# Patient Record
Sex: FEMALE | Race: WHITE | NOT HISPANIC OR LATINO | Employment: OTHER | URBAN - METROPOLITAN AREA
[De-identification: names, ages, dates, MRNs, and addresses within clinical notes are randomized per-mention and may not be internally consistent; named-entity substitution may affect disease eponyms.]

---

## 2017-04-15 ENCOUNTER — HOSPITAL ENCOUNTER (EMERGENCY)
Facility: HOSPITAL | Age: 72
Discharge: HOME | End: 2017-04-15
Attending: EMERGENCY MEDICINE
Payer: MEDICARE

## 2017-04-15 VITALS
TEMPERATURE: 97 F | WEIGHT: 187 LBS | SYSTOLIC BLOOD PRESSURE: 120 MMHG | RESPIRATION RATE: 20 BRPM | OXYGEN SATURATION: 97 % | HEART RATE: 58 BPM | BODY MASS INDEX: 33.13 KG/M2 | DIASTOLIC BLOOD PRESSURE: 56 MMHG | HEIGHT: 63 IN

## 2017-04-15 DIAGNOSIS — L29.9 ITCHING: ICD-10-CM

## 2017-04-15 DIAGNOSIS — F22 EKBOM'S DELUSIONAL PARASITOSIS: Primary | ICD-10-CM

## 2017-04-15 DIAGNOSIS — R53.81 MALAISE: ICD-10-CM

## 2017-04-15 LAB
ALBUMIN SERPL BCP-MCNC: 3.7 G/DL
ALP SERPL-CCNC: 68 U/L
ALT SERPL W/O P-5'-P-CCNC: 19 U/L
ANION GAP SERPL CALC-SCNC: 12 MMOL/L
AST SERPL-CCNC: 21 U/L
BASOPHILS # BLD AUTO: 0.05 K/UL
BASOPHILS NFR BLD: 0.4 %
BILIRUB SERPL-MCNC: 0.4 MG/DL
BILIRUB UR QL STRIP: NEGATIVE
BUN SERPL-MCNC: 25 MG/DL
CALCIUM SERPL-MCNC: 9.7 MG/DL
CHLORIDE SERPL-SCNC: 101 MMOL/L
CLARITY UR: CLEAR
CO2 SERPL-SCNC: 27 MMOL/L
COLOR UR: YELLOW
CREAT SERPL-MCNC: 1.3 MG/DL
DIFFERENTIAL METHOD: ABNORMAL
EOSINOPHIL # BLD AUTO: 0.3 K/UL
EOSINOPHIL NFR BLD: 2.4 %
ERYTHROCYTE [DISTWIDTH] IN BLOOD BY AUTOMATED COUNT: 13 %
EST. GFR  (AFRICAN AMERICAN): 48 ML/MIN/1.73 M^2
EST. GFR  (NON AFRICAN AMERICAN): 41 ML/MIN/1.73 M^2
GLUCOSE SERPL-MCNC: 99 MG/DL
GLUCOSE UR QL STRIP: NEGATIVE
HCT VFR BLD AUTO: 38.7 %
HGB BLD-MCNC: 12.6 G/DL
HGB UR QL STRIP: NEGATIVE
KETONES UR QL STRIP: NEGATIVE
LEUKOCYTE ESTERASE UR QL STRIP: NEGATIVE
LYMPHOCYTES # BLD AUTO: 2.9 K/UL
LYMPHOCYTES NFR BLD: 23.7 %
MCH RBC QN AUTO: 29.6 PG
MCHC RBC AUTO-ENTMCNC: 32.6 %
MCV RBC AUTO: 91 FL
MONOCYTES # BLD AUTO: 1.6 K/UL
MONOCYTES NFR BLD: 13.2 %
NEUTROPHILS # BLD AUTO: 7.2 K/UL
NEUTROPHILS NFR BLD: 60.1 %
NITRITE UR QL STRIP: NEGATIVE
PH UR STRIP: 6 [PH] (ref 5–8)
PLATELET # BLD AUTO: 318 K/UL
PMV BLD AUTO: 9.7 FL
POTASSIUM SERPL-SCNC: 4.7 MMOL/L
PROT SERPL-MCNC: 7.4 G/DL
PROT UR QL STRIP: ABNORMAL
RBC # BLD AUTO: 4.26 M/UL
SODIUM SERPL-SCNC: 140 MMOL/L
SP GR UR STRIP: 1.02 (ref 1–1.03)
TROPONIN I SERPL DL<=0.01 NG/ML-MCNC: 0.01 NG/ML
URN SPEC COLLECT METH UR: ABNORMAL
UROBILINOGEN UR STRIP-ACNC: 1 EU/DL
WBC # BLD AUTO: 12.03 K/UL

## 2017-04-15 PROCEDURE — 85025 COMPLETE CBC W/AUTO DIFF WBC: CPT

## 2017-04-15 PROCEDURE — 63600175 PHARM REV CODE 636 W HCPCS: Performed by: EMERGENCY MEDICINE

## 2017-04-15 PROCEDURE — 93005 ELECTROCARDIOGRAM TRACING: CPT

## 2017-04-15 PROCEDURE — 80053 COMPREHEN METABOLIC PANEL: CPT

## 2017-04-15 PROCEDURE — 99284 EMERGENCY DEPT VISIT MOD MDM: CPT | Mod: 25

## 2017-04-15 PROCEDURE — 84484 ASSAY OF TROPONIN QUANT: CPT

## 2017-04-15 PROCEDURE — 96374 THER/PROPH/DIAG INJ IV PUSH: CPT

## 2017-04-15 PROCEDURE — 81003 URINALYSIS AUTO W/O SCOPE: CPT

## 2017-04-15 PROCEDURE — 93010 ELECTROCARDIOGRAM REPORT: CPT | Mod: ,,, | Performed by: INTERNAL MEDICINE

## 2017-04-15 RX ORDER — PERMETHRIN 50 MG/G
CREAM TOPICAL
Qty: 60 G | Refills: 1 | Status: SHIPPED | OUTPATIENT
Start: 2017-04-15 | End: 2021-05-22 | Stop reason: ALTCHOICE

## 2017-04-15 RX ORDER — DIPHENHYDRAMINE HYDROCHLORIDE 50 MG/ML
12.5 INJECTION INTRAMUSCULAR; INTRAVENOUS
Status: COMPLETED | OUTPATIENT
Start: 2017-04-15 | End: 2017-04-15

## 2017-04-15 RX ORDER — HYDROXYZINE HYDROCHLORIDE 25 MG/1
25 TABLET, FILM COATED ORAL EVERY 6 HOURS
Qty: 12 TABLET | Refills: 0 | Status: SHIPPED | OUTPATIENT
Start: 2017-04-15 | End: 2021-05-22 | Stop reason: ALTCHOICE

## 2017-04-15 RX ADMIN — DIPHENHYDRAMINE HYDROCHLORIDE 12.5 MG: 50 INJECTION, SOLUTION INTRAMUSCULAR; INTRAVENOUS at 01:04

## 2017-04-15 NOTE — ED NOTES
Pt presents to ed with c/o a bodily insect infestation xseveral months. States that she constantly has bugs crawling on her and is not able to get rid of them. Reports that small, white maggots were found crawling out of a self-inflicted scratching wound on face. Patient brought samples of the insects, and while there appears to be perhaps 1-2 small insects, the rest have the appearance of small pieces of rolled-up tissue. Pt is aaox4, neurologically intact. No psychiatric history on file. Family member at bedside. Will continue to monitor.

## 2017-04-15 NOTE — ED AVS SNAPSHOT
[facility] [address]  [place] [address]               Enedelia García   4/15/2017 12:16 AM   ED    Description:  Female : 1945   Department:  [facility]           Your Care was Coordinated By:     Provider Role From To    Samuel Pantoja MD Attending Provider 04/15/17 0025 --      Reason for Visit     Head Lice           Diagnoses this Visit        Comments    Ekbom's delusional parasitosis    -  Primary     Malaise         Formication of skin         Itching           ED Disposition     None           To Do List           Follow-up Information     Follow up with John Gaytan MD In 2 days.    Specialty:  Family Medicine    Contact information:    [address]  [facility] [address]  [phone]         These Medications        Disp Refills Start End    hydrOXYzine HCl (ATARAX) 25 MG tablet 12 tablet 0 4/15/2017     Take 1 tablet (25 mg total) by mouth every 6 (six) hours. - Oral    Pharmacy: [facility] - [address] Ph #: [phone]       permethrin (ELIMITE) 5 % cream 60 g 1 4/15/2017     Apply to affected area once    Pharmacy: [facility] [facility] [address] Ph #: [phone]         [facility]     [facility] Nurse Care [facility] - 24/7 Assistance  Unless otherwise directed by your provider, please contact [facility], our nurse care line that is available for 24/7 assistance.     Registered nurses in the [facility] provide: appointment scheduling, clinical advisement, health education, and other advisory services.  Call: [phone] (toll free)               Medications           Message regarding Medications     Verify the changes and/or additions to your medication regime listed below are the same as discussed with your clinician today.  If any of these changes or additions are incorrect, please  notify your healthcare provider.        START taking these NEW medications        Refills    hydrOXYzine HCl (ATARAX) 25 MG tablet 0    Sig: Take 1 tablet (25 mg total) by mouth every 6 (six) hours.    Class: Print    Route: Oral    permethrin (ELIMITE) 5 % cream 1    Sig: Apply to affected area once    Class: Print      These medications were administered today        Dose Freq    diphenhydrAMINE injection 12.5 mg 12.5 mg ED 1 Time    Sig: Inject 0.25 mLs (12.5 mg total) into the vein ED 1 Time.    Class: Normal    Route: Intravenous           Verify that the below list of medications is an accurate representation of the medications you are currently taking.  If none reported, the list may be blank. If incorrect, please contact your healthcare provider. Carry this list with you in case of emergency.           Current Medications     aspirin (ECOTRIN) 81 MG EC tablet Take 81 mg by mouth once daily.    atenolol (TENORMIN) 25 MG tablet Take 25 mg by mouth once daily.    duloxetine (CYMBALTA) 30 MG capsule Take 30 mg by mouth once daily.    fenofibrate (TRICOR) 54 MG tablet Take 54 mg by mouth once daily.    furosemide (LASIX) 40 MG tablet Take 40 mg by mouth 2 (two) times daily.    gabapentin (NEURONTIN) 600 MG tablet Take 600 mg by mouth 3 (three) times daily.    hydrOXYzine HCl (ATARAX) 25 MG tablet Take 1 tablet (25 mg total) by mouth every 6 (six) hours.    lorazepam (ATIVAN) 1 MG tablet Take 1 mg by mouth every 6 (six) hours as needed.    morphine (MS CONTIN) 15 MG 12 hr tablet Take 15 mg by mouth 2 (two) times daily.    omeprazole (PRILOSEC) 40 MG capsule Take 40 mg by mouth once daily.    permethrin (ELIMITE) 5 % cream Apply to affected area once    rosuvastatin (CRESTOR) 10 MG tablet Take 10 mg by mouth once daily.    tizanidine (ZANAFLEX) 2 MG tablet Take 4 mg by mouth every 6 (six) hours as needed.    valsartan (DIOVAN) 40 MG tablet Take 40 mg by mouth once daily.           Clinical Reference Information     "       Your Vitals Were     BP Pulse Temp Resp Height Weight    118/58 58 97.4 °F (36.3 °C) (Oral) 20 5' 3" (1.6 m) 84.8 kg (187 lb)    SpO2 BMI             93% 33.13 kg/m2         Allergies as of 4/15/2017        Reactions    Iodinated Contrast Media - Oral And Iv Dye Anaphylaxis      Immunizations Administered on Date of Encounter - 4/15/2017     None      ED Micro, Lab, POCT     Start Ordered       Status Ordering Provider    04/15/17 0100 04/15/17 0059  Troponin I  STAT      In process     04/15/17 0059 04/15/17 0059  CBC auto differential  STAT      Final result     04/15/17 0059 04/15/17 0059  Comprehensive metabolic panel  STAT      In process     04/15/17 0059 04/15/17 0059  Urinalysis  STAT      Acknowledged       ED Imaging Orders     None      Discharge References/Attachments     SCABIES (ENGLISH)      [facility] Sign-Up     Activating your [facility] account is as easy as 1-2-3!     1) Visit [email or web address], select Sign Up Now, enter this activation code and your date of birth, then select Next.  [email or web address]  Expires: 5/30/2017  1:21 AM      2) Create a username and password to use when you visit [facility] in the future and select a security question in case you lose your password and select Next.    3) Enter your e-mail address and click Sign Up!    Additional Information  If you have questions, please e-mail [email or web address] or call [phone] to talk to our [facility] staff. Remember, [facility] is NOT to be used for urgent needs. For medical emergencies, dial 911.          [facility] complies with applicable Federal civil rights laws and does not discriminate on the basis of race, color, national origin, age, disability, or sex.        Language Assistance Services     ATTENTION: Language assistance services are available, free of charge. Please call [phone].      ATENCIÓN: Si habla español, tiene a webb disposición servicios gratuitos de asistencia lingüística. Llame " al [phone].     [place] Ý: N?u b?n nói Ti?ng Vi?t, có các d?ch v? h? tr? ngôn ng? mi?n phí dành cho b?n. G?i s? [phone].

## 2017-04-15 NOTE — ED PROVIDER NOTES
"Encounter Date: 4/15/2017       History     Chief Complaint   Patient presents with    Head Lice     States that she has had bugs crawling all over her for the past two months and seem to be living under her skin. Claims that "maggots" have been crawling out of an abrasion on her face.      Review of patient's allergies indicates:   Allergen Reactions    Iodinated contrast media - oral and iv dye Anaphylaxis     HPI Comments: Pt comes to the ED with a 2 month history of feeling of bugs crawling in her hair and on her skin. She was given permethrin by her PCP without improvement. Her boy friend who lives with her has no symptoms.   Pt has several empty pill bottles which she claims contains bugs that she has removed.     The history is provided by the patient and a friend.     Past Medical History:   Diagnosis Date    Coronary artery disease     Epilepsy     Hyperlipidemia     Hypertension      Past Surgical History:   Procedure Laterality Date    APPENDECTOMY      CORONARY STENT PLACEMENT      HYSTERECTOMY      TONSILLECTOMY       Family History   Problem Relation Age of Onset    Heart disease Mother     Heart disease Father      Social History   Substance Use Topics    Smoking status: Never Smoker    Smokeless tobacco: Not on file    Alcohol use No     Review of Systems   Constitutional: Negative for fatigue and fever.   HENT: Negative for congestion and sore throat.    Respiratory: Negative for cough, shortness of breath, wheezing and stridor.    Cardiovascular: Negative for chest pain, palpitations and leg swelling.   Gastrointestinal: Negative for abdominal distention, abdominal pain, constipation, nausea and vomiting.   Genitourinary: Negative for difficulty urinating and dysuria.   Musculoskeletal: Negative for back pain.   Skin: Positive for rash.   Neurological: Negative for weakness.   Hematological: Does not bruise/bleed easily.   All other systems reviewed and are negative.      Physical " Exam   Initial Vitals   BP Pulse Resp Temp SpO2   04/15/17 0018 04/15/17 0018 04/15/17 0018 04/15/17 0018 04/15/17 0018   118/58 58 20 97.4 °F (36.3 °C) 93 %     Physical Exam    Nursing note and vitals reviewed.  Constitutional: Vital signs are normal. She appears well-developed and well-nourished. She is not diaphoretic. No distress.   HENT:   Head: Normocephalic and atraumatic.   Eyes: Conjunctivae and EOM are normal. Pupils are equal, round, and reactive to light.   Neck: Normal range of motion. Neck supple.   Cardiovascular: Normal rate, regular rhythm and normal heart sounds.   Pulmonary/Chest: Breath sounds normal. No respiratory distress. She has no wheezes. She has no rhonchi. She has no rales.   Abdominal: Soft. She exhibits no distension. There is no tenderness. There is no rebound and no guarding.   Musculoskeletal: Normal range of motion. She exhibits no edema or tenderness.   Neurological: She is alert and oriented to person, place, and time.   Skin: Skin is warm and dry.   Multiple linear erosions in skin. No visible evidence of lice, mites or any insects.    Psychiatric: She has a normal mood and affect.         ED Course   Procedures  Labs Reviewed   CBC W/ AUTO DIFFERENTIAL   COMPREHENSIVE METABOLIC PANEL   URINALYSIS   TROPONIN I                               ED Course     Clinical Impression:   The primary encounter diagnosis was Ekbom's delusional parasitosis. Diagnoses of Malaise, Formication of skin, and Itching were also pertinent to this visit.          Samuel Pantoja MD  04/15/17 0122

## 2017-12-21 ENCOUNTER — HOSPITAL ENCOUNTER (EMERGENCY)
Facility: HOSPITAL | Age: 72
Discharge: PSYCHIATRIC OR SUBSTANCE USE FACILITY | End: 2017-12-21
Attending: EMERGENCY MEDICINE
Payer: MEDICARE

## 2017-12-21 VITALS
DIASTOLIC BLOOD PRESSURE: 66 MMHG | OXYGEN SATURATION: 97 % | SYSTOLIC BLOOD PRESSURE: 138 MMHG | HEIGHT: 64 IN | WEIGHT: 190 LBS | BODY MASS INDEX: 32.44 KG/M2 | HEART RATE: 72 BPM | TEMPERATURE: 98 F | RESPIRATION RATE: 18 BRPM

## 2017-12-21 DIAGNOSIS — F33.3 MDD (MAJOR DEPRESSIVE DISORDER), RECURRENT, SEVERE, WITH PSYCHOSIS: ICD-10-CM

## 2017-12-21 DIAGNOSIS — F22 EKBOM'S DELUSIONAL PARASITOSIS: Primary | ICD-10-CM

## 2017-12-21 LAB
ALBUMIN SERPL BCP-MCNC: 3.6 G/DL
ALP SERPL-CCNC: 71 U/L
ALT SERPL W/O P-5'-P-CCNC: 40 U/L
AMPHET+METHAMPHET UR QL: NEGATIVE
ANION GAP SERPL CALC-SCNC: 11 MMOL/L
APAP SERPL-MCNC: <3 UG/ML
AST SERPL-CCNC: 52 U/L
BARBITURATES UR QL SCN>200 NG/ML: NEGATIVE
BASOPHILS # BLD AUTO: 0 K/UL
BASOPHILS NFR BLD: 0 %
BENZODIAZ UR QL SCN>200 NG/ML: NEGATIVE
BILIRUB SERPL-MCNC: 0.3 MG/DL
BILIRUB UR QL STRIP: ABNORMAL
BUN SERPL-MCNC: 41 MG/DL
BZE UR QL SCN: NEGATIVE
CALCIUM SERPL-MCNC: 10.1 MG/DL
CANNABINOIDS UR QL SCN: NEGATIVE
CHLORIDE SERPL-SCNC: 103 MMOL/L
CLARITY UR: CLEAR
CO2 SERPL-SCNC: 26 MMOL/L
COLOR UR: YELLOW
CREAT SERPL-MCNC: 1.9 MG/DL
CREAT UR-MCNC: 186.6 MG/DL
DIFFERENTIAL METHOD: ABNORMAL
EOSINOPHIL # BLD AUTO: 0 K/UL
EOSINOPHIL NFR BLD: 0 %
ERYTHROCYTE [DISTWIDTH] IN BLOOD BY AUTOMATED COUNT: 13.2 %
EST. GFR  (AFRICAN AMERICAN): 30 ML/MIN/1.73 M^2
EST. GFR  (NON AFRICAN AMERICAN): 26 ML/MIN/1.73 M^2
ETHANOL SERPL-MCNC: <10 MG/DL
GLUCOSE SERPL-MCNC: 96 MG/DL
GLUCOSE UR QL STRIP: NEGATIVE
HCT VFR BLD AUTO: 34.5 %
HGB BLD-MCNC: 11.3 G/DL
HGB UR QL STRIP: NEGATIVE
KETONES UR QL STRIP: NEGATIVE
LEUKOCYTE ESTERASE UR QL STRIP: NEGATIVE
LYMPHOCYTES # BLD AUTO: 1.8 K/UL
LYMPHOCYTES NFR BLD: 10.7 %
MCH RBC QN AUTO: 29.7 PG
MCHC RBC AUTO-ENTMCNC: 32.8 G/DL
MCV RBC AUTO: 91 FL
METHADONE UR QL SCN>300 NG/ML: NEGATIVE
MONOCYTES # BLD AUTO: 1.5 K/UL
MONOCYTES NFR BLD: 9 %
NEUTROPHILS # BLD AUTO: 13.3 K/UL
NEUTROPHILS NFR BLD: 80 %
NITRITE UR QL STRIP: NEGATIVE
OPIATES UR QL SCN: NORMAL
PCP UR QL SCN>25 NG/ML: NEGATIVE
PH UR STRIP: 5 [PH] (ref 5–8)
PLATELET # BLD AUTO: 332 K/UL
PMV BLD AUTO: 9.3 FL
POTASSIUM SERPL-SCNC: 4.5 MMOL/L
PROT SERPL-MCNC: 7.6 G/DL
PROT UR QL STRIP: ABNORMAL
RBC # BLD AUTO: 3.81 M/UL
SODIUM SERPL-SCNC: 140 MMOL/L
SP GR UR STRIP: >=1.03 (ref 1–1.03)
TOXICOLOGY INFORMATION: NORMAL
TSH SERPL DL<=0.005 MIU/L-ACNC: 0.44 UIU/ML
URN SPEC COLLECT METH UR: ABNORMAL
UROBILINOGEN UR STRIP-ACNC: 1 EU/DL
WBC # BLD AUTO: 16.59 K/UL

## 2017-12-21 PROCEDURE — 99285 EMERGENCY DEPT VISIT HI MDM: CPT | Mod: 25

## 2017-12-21 PROCEDURE — 81003 URINALYSIS AUTO W/O SCOPE: CPT

## 2017-12-21 PROCEDURE — G0426 INPT/ED TELECONSULT50: HCPCS | Mod: GT,,, | Performed by: PSYCHIATRY & NEUROLOGY

## 2017-12-21 PROCEDURE — 80320 DRUG SCREEN QUANTALCOHOLS: CPT

## 2017-12-21 PROCEDURE — 85025 COMPLETE CBC W/AUTO DIFF WBC: CPT

## 2017-12-21 PROCEDURE — 80307 DRUG TEST PRSMV CHEM ANLYZR: CPT

## 2017-12-21 PROCEDURE — 25000003 PHARM REV CODE 250: Performed by: EMERGENCY MEDICINE

## 2017-12-21 PROCEDURE — 80329 ANALGESICS NON-OPIOID 1 OR 2: CPT

## 2017-12-21 PROCEDURE — 80053 COMPREHEN METABOLIC PANEL: CPT

## 2017-12-21 PROCEDURE — 84443 ASSAY THYROID STIM HORMONE: CPT

## 2017-12-21 RX ORDER — SULFAMETHOXAZOLE AND TRIMETHOPRIM 800; 160 MG/1; MG/1
1 TABLET ORAL 2 TIMES DAILY
COMMUNITY
End: 2020-01-15

## 2017-12-21 RX ORDER — QUETIAPINE FUMARATE 25 MG/1
50 TABLET, FILM COATED ORAL
Status: COMPLETED | OUTPATIENT
Start: 2017-12-21 | End: 2017-12-21

## 2017-12-21 RX ADMIN — QUETIAPINE FUMARATE 50 MG: 25 TABLET, FILM COATED ORAL at 03:12

## 2017-12-21 NOTE — ED NOTES
"To ER with small superficial sore to right arm.  Pt states that she has a "bug" that looks like a "bone" coming out of it for several months.  Denies pain.  Request x-ray in order to see worm.  "

## 2017-12-21 NOTE — ED NOTES
Awake and alert, resp even and unlabored.  No distress or c/o at this time.  Sitter at the bedside.

## 2017-12-21 NOTE — ED NOTES
Pt has been accepted to [facility] by Dr. Vences. Will request transport. Report given to [facility] ([phone]).

## 2017-12-21 NOTE — CONSULTS
"Tele-Consultation to Emergency Department from Psychiatry    Please see previous notes:    Patient agreeable to consultation via telepsychiatry.    Consultation started: 12/21/2017 at 1:30 AM  The chief complaint leading to psychiatric consultation is: " Bugs are in her body "  This consultation was requested by Bennett Souza MD, the Emergency Department attending physician.  The location of the consulting psychiatrist is [place].  The patient location is [facility].  The patient arrived at the ED at: 12:28 AM    Also present with the patient at the time of the consultation: Patient    Patient Identification:  Enedelia García is a 72 y.o. female.    Patient information was obtained from patient, past medical records and ER Physician.  Patient presented involuntarily to the Emergency Department  by her daughter    History of Present Illness:  This is a 72 years old CM with a possible history of depression who was brought into ER for having delusional thoughts that bugs are stuck in her body with severe agitation and irritability.    Upon evaluation : She says she has been suffering a lot for the past one year. Says she has bugs and little creatures harboring in her body and it hurts badly . She says they are moving in her ears . She says nobody believes her . She says she has been seen by different dermatologist and no one has given her any explanation for her issues. She says they think " I am crazy" . She says "these creatures camouflage underneath the scab . Says she has had 20 of them few days ago and she put them on the piece of paper. She says they only move when she touches them . She says they have wings  but they don't fly , they are in different shapes and sizes , some of them are triangular" . She says sometimes they calm down but they never leave her body and they dorm inside of her. Says they have gotten worse for the past 2 months . Says " They ruin her life ,she is not able to enjoy her life, " "says lost her job one year ago bec of all this ". Says her daughter saw them crawling . She says all this making her frustrated , agitated and depressed.She says she was very happy bubbly person and used to put make-up on her and dressed up but not anymore since this has been happening for the past one year.     She admits to feeling depressed and started crying while talking about these issues . Says  she wants to feel normal again. Says " I am intelligent woman, these little creatures  destroy my body  and dermatologists just give me a lotion to put it on, and they say I have to see a real doctor, I want to feel myself again I want to be happy , joyful and gracious woman that I was before. " Reports not sleeping well ,wakes up every 2 hrs ,  not eating well as well , feels helpless, denies to SI or HI or AVH.     Says her cat  recently and she misses her a lot.    Psychiatric History:   Hospitalization: No  Medication Trials: Yes  Suicide Attempts: no  Violence: No  Depression: Yes  Monika: no  AH's: No  Delusions: Yes    Review of Systems:  Negative except depression , not sleeping , delusional thoughts    Past Medical History:   Past Medical History:   Diagnosis Date    Coronary artery disease     Epilepsy     Hyperlipidemia     Hypertension         Seizures: H/o Seizure d/o  Head trauma/l.o.c.: No  Wish to become pregnant[if female of childbearing age]: NA    Allergies: Listed below  Review of patient's allergies indicates:   Allergen Reactions    Iodinated contrast- oral and iv dye Anaphylaxis       Medications in ER: Medications - No data to display    Medications at home: Cymbalta    Substance Abuse History:   Alchohol: Denies  Drug: Denies     Legal History:   Past charges/incarcerations: No  Pending charges: No    Family Psychiatric History: Unknown    Social History:   History of Physical/Sexual Abuse: NA  Education: HS    Employment/Disability: 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 , 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   Financial: Yes  Children: Daughter   Housing Status: Lives by herself  Sikhism: NA   History: NA   Recreational Activities: Not able to enjoy  Access to Gun: No     Current Evaluation:     Constitutional  Vitals:  Vitals:    12/21/17 0017   BP: (!) 141/63   Pulse: 73   Resp: 20   Temp: 98 °F (36.7 °C)   TempSrc: Oral   SpO2: 96%   Weight: 86.2 kg (190 lb)   Height: 5' 4" (1.626 m)      General:  unremarkable, age appropriate     Musculoskeletal  Muscle Strength/Tone:   moving arms normally   Gait & Station:   sitting on stretcher     Psychiatric  Level of Consciousness: alert  Orientation: oriented to person, place and time  Grooming: in hospital gown  Psychomotor Behavior: no agitation  Speech: normal in rate, rhythm and volume  Language: uses words appropriately  Mood: Depressed  Affect: Restricted  Thought Process: Disorg  Associations: Intact  Thought Content: No AVH no SI or HI + Delusions  Memory: Intact  Attention: intact to interview  Fund of Knowledge: appears adequate  Insight: Poor   Judgement: Poor    Relevant Elements of Neurological Exam: no abnormality of posture noted    Assessment - Diagnosis - Goals:     Diagnosis/Impression: MDD REC severe with Psychosis    Rec: PEC d/t GD ,start her on Prozac 10 mg po QAM for depression and Seroquel 50 mg po QHS for psychosis and sleep  , discont. Cymbalta, needs to be hospitalized in Inpatient Psych Unit for stabilization.    Time with patient: 15 minutes    More than 50% of the time was spent counseling/coordinating care    Laboratory Data: Labs Reviewed - No data to display    Thanks Bennett Souza MD for the consult.    Consulting clinician was informed of the encounter and consult note.    Consultation ended: 12/21/2017 at  2:30 AM  "

## 2017-12-21 NOTE — ED PROVIDER NOTES
Encounter Date: 12/21/2017    SCRIBE #1 NOTE: I, Reneemel Lee, am scribing for, and in the presence of, Dr. Souza.       History     Chief Complaint   Patient presents with    foreign body in right arm     pt to triage ambulatory and reports may have a bug stuck in her right forearm and is picking at her skin; pt reports this has been happening for a year and everyone thinks i am crazy; pt has used elimite in the last 3 months; pt has another hospital bracelet on from [facility] for chest pain and uti and taking bactrim     Time seen by provider: 12:28 AM    This is a 72 y.o. female who presents with complaint of small white fibers and bugs boring through her skin onset a year ago. The patient associates pain to affected areas. The patient says she's seen multiple dermatologists over the course of the past year and has been diagnosed with delusional parasitosis. The patient states the symptoms started shortly after she lost her job. She endorses depression for a number of issues in her life. She has become fixated on this issue and demands an xray or ultrasound to evaluate for the bugs she thinks are under her skin. She states some of them have teeth and she can see their head sticking out of her skin.  She also has been keeping prescription bottles with what she thinks are the bugs and fibers she has pulled from her skin.      The history is provided by the patient.     Review of patient's allergies indicates:   Allergen Reactions    Iodinated contrast- oral and iv dye Anaphylaxis     Past Medical History:   Diagnosis Date    Coronary artery disease     Epilepsy     Hyperlipidemia     Hypertension      Past Surgical History:   Procedure Laterality Date    APPENDECTOMY      BACK SURGERY      CORONARY STENT PLACEMENT      HYSTERECTOMY      TONSILLECTOMY       Family History   Problem Relation Age of Onset    Heart disease Mother     Heart disease Father      Social History   Substance Use Topics     Smoking status: Never Smoker    Smokeless tobacco: Not on file    Alcohol use No     Review of Systems   Constitutional: Negative for fever.   HENT: Negative for sore throat.    Respiratory: Negative for shortness of breath.    Cardiovascular: Negative for chest pain.   Gastrointestinal: Negative for nausea.   Genitourinary: Negative for dysuria.   Musculoskeletal: Negative for back pain.   Skin:        Small fibers and bugs under skin   Neurological: Negative for weakness.   Hematological: Does not bruise/bleed easily.   All other systems reviewed and are negative.      Physical Exam     Initial Vitals [12/21/17 0017]   BP Pulse Resp Temp SpO2   (!) 141/63 73 20 98 °F (36.7 °C) 96 %      MAP       89         Physical Exam    Nursing note and vitals reviewed.  Constitutional: She appears well-developed and well-nourished. She is not diaphoretic. No distress.   HENT:   Head: Normocephalic and atraumatic.   Mouth/Throat: Oropharynx is clear and moist.   Eyes: Conjunctivae and EOM are normal. Pupils are equal, round, and reactive to light.   Neck: Normal range of motion. Neck supple.   Cardiovascular: Normal rate, regular rhythm, normal heart sounds and intact distal pulses. Exam reveals no gallop and no friction rub.    No murmur heard.  Pulmonary/Chest: Breath sounds normal. She has no wheezes. She has no rhonchi. She has no rales.   Abdominal: Soft. She exhibits no distension. There is no tenderness.   Musculoskeletal: Normal range of motion.   Neurological: She is alert and oriented to person, place, and time. She has normal strength.   Skin: Skin is warm and dry. No rash noted. No erythema.   Numerous excoriations over the body    Psychiatric: Her mood appears anxious. She is agitated. Thought content is delusional.         ED Course   Procedures  Labs Reviewed   CBC W/ AUTO DIFFERENTIAL - Abnormal; Notable for the following:        Result Value    WBC 16.59 (*)     RBC 3.81 (*)     Hemoglobin 11.3 (*)      Hematocrit 34.5 (*)     Gran # 13.3 (*)     Mono # 1.5 (*)     Gran% 80.0 (*)     Lymph% 10.7 (*)     All other components within normal limits   COMPREHENSIVE METABOLIC PANEL - Abnormal; Notable for the following:     BUN, Bld 41 (*)     Creatinine 1.9 (*)     AST 52 (*)     eGFR if  30 (*)     eGFR if non  26 (*)     All other components within normal limits   ACETAMINOPHEN LEVEL - Abnormal; Notable for the following:     Acetaminophen (Tylenol), Serum <3.0 (*)     All other components within normal limits   ALCOHOL,MEDICAL (ETHANOL)   TSH   URINALYSIS   DRUG SCREEN PANEL, URINE EMERGENCY          X-Rays:       Medical Decision Making:   History:   Old Medical Records: I decided to obtain old medical records.              Attending Attestation:           Physician Attestation for Scribe:  Physician Attestation Statement for Scribe #1: I, Bennett Souza, reviewed documentation, as scribed by Renee Lee in my presence, and it is both accurate and complete.                 ED Course     I obtained a telemedicine consult from psychiatry.  Psychiatrist believes the patient is psychotic and gravely disabled and warrants completion of the physician's emergency certificate for psychiatric care.  The patient is agreeable to this plan.  I also discussed plan of care with the patient's daughter who is in agreement.  Clinical Impression:   The primary encounter diagnosis was Ekbom's delusional parasitosis. A diagnosis of MDD (major depressive disorder), recurrent, severe, with psychosis was also pertinent to this visit.  .  1. Ekbom's delusional parasitosis    2. MDD (major depressive disorder), recurrent, severe, with psychosis        Disposition:   Disposition: Transferred  Condition: Stable            Bennett Souza MD  12/21/17 0433

## 2020-01-15 ENCOUNTER — HOSPITAL ENCOUNTER (EMERGENCY)
Facility: HOSPITAL | Age: 75
Discharge: HOME | End: 2020-01-15
Attending: EMERGENCY MEDICINE
Payer: MEDICARE

## 2020-01-15 VITALS
HEIGHT: 64 IN | TEMPERATURE: 98 F | RESPIRATION RATE: 20 BRPM | WEIGHT: 175 LBS | SYSTOLIC BLOOD PRESSURE: 188 MMHG | BODY MASS INDEX: 29.88 KG/M2 | OXYGEN SATURATION: 99 % | DIASTOLIC BLOOD PRESSURE: 86 MMHG | HEART RATE: 51 BPM

## 2020-01-15 DIAGNOSIS — T14.8XXA SKIN AVULSION: ICD-10-CM

## 2020-01-15 DIAGNOSIS — Z23 TETANUS-DIPHTHERIA VACCINATION ADMINISTERED AT CURRENT VISIT: ICD-10-CM

## 2020-01-15 DIAGNOSIS — M79.605 LEFT LEG PAIN: Primary | ICD-10-CM

## 2020-01-15 PROCEDURE — 25000003 PHARM REV CODE 250: Performed by: NURSE PRACTITIONER

## 2020-01-15 PROCEDURE — 90715 TDAP VACCINE 7 YRS/> IM: CPT | Performed by: NURSE PRACTITIONER

## 2020-01-15 PROCEDURE — 99284 EMERGENCY DEPT VISIT MOD MDM: CPT | Mod: 25

## 2020-01-15 PROCEDURE — 63600175 PHARM REV CODE 636 W HCPCS: Performed by: NURSE PRACTITIONER

## 2020-01-15 PROCEDURE — 90471 IMMUNIZATION ADMIN: CPT | Performed by: NURSE PRACTITIONER

## 2020-01-15 RX ORDER — ATENOLOL 25 MG/1
50 TABLET ORAL
Status: COMPLETED | OUTPATIENT
Start: 2020-01-15 | End: 2020-01-15

## 2020-01-15 RX ORDER — ATENOLOL 25 MG/1
25 TABLET ORAL
Status: DISCONTINUED | OUTPATIENT
Start: 2020-01-15 | End: 2020-01-15

## 2020-01-15 RX ADMIN — ATENOLOL 50 MG: 25 TABLET ORAL at 04:01

## 2020-01-15 RX ADMIN — CLOSTRIDIUM TETANI TOXOID ANTIGEN (FORMALDEHYDE INACTIVATED), CORYNEBACTERIUM DIPHTHERIAE TOXOID ANTIGEN (FORMALDEHYDE INACTIVATED), BORDETELLA PERTUSSIS TOXOID ANTIGEN (GLUTARALDEHYDE INACTIVATED), BORDETELLA PERTUSSIS FILAMENTOUS HEMAGGLUTININ ANTIGEN (FORMALDEHYDE INACTIVATED), BORDETELLA PERTUSSIS PERTACTIN ANTIGEN, AND BORDETELLA PERTUSSIS FIMBRIAE 2/3 ANTIGEN 0.5 ML: 5; 2; 2.5; 5; 3; 5 INJECTION, SUSPENSION INTRAMUSCULAR at 03:01

## 2020-01-15 NOTE — DISCHARGE INSTRUCTIONS
Clean wound twice a day with soap and water  Change bandage as needed  Return with any signs of infection

## 2020-01-15 NOTE — ED PROVIDER NOTES
Encounter Date: 1/15/2020    SCRIBE #1 NOTE: I, Nazario Steele, am scribing for, and in the presence of,  JERED Thomason. I have scribed the entire note.       History     Chief Complaint   Patient presents with    Leg Injury     Leg scraped by the bottom corner of a car door. Skin tear noted to LLE. /85, denies symptoms.      Enedelia García is a 74 y.o. female who  has a past medical history of Coronary artery disease, Epilepsy, Hyperlipidemia, and Hypertension.    The patient presents to the ED due to left leg injury. Patient reports that she scraped her left leg while closing a car door on her left leg just PTA. She has been able to ambulate since the incident without any issues. Patient denies any further trauma, and she does not report any other symptoms. She is unsure of her tetanus status.    The history is provided by the patient.     Review of patient's allergies indicates:   Allergen Reactions    Iodinated contrast media Anaphylaxis    Phenergan [promethazine]      Vomiting     Past Medical History:   Diagnosis Date    Coronary artery disease     Epilepsy     Hyperlipidemia     Hypertension      Past Surgical History:   Procedure Laterality Date    APPENDECTOMY      BACK SURGERY      CORONARY STENT PLACEMENT      HYSTERECTOMY      TONSILLECTOMY       Family History   Problem Relation Age of Onset    Heart disease Mother     Heart disease Father      Social History     Tobacco Use    Smoking status: Never Smoker   Substance Use Topics    Alcohol use: No    Drug use: No     Review of Systems   Constitutional: Negative for chills and fever.   HENT: Negative for ear pain and sore throat.    Eyes: Negative for redness.   Respiratory: Negative for shortness of breath.    Cardiovascular: Negative for chest pain.   Gastrointestinal: Negative for abdominal pain, diarrhea, nausea and vomiting.   Genitourinary: Negative for dysuria.   Musculoskeletal: Negative for back pain.   Skin:  Positive for wound. Negative for rash.   Neurological: Negative for weakness and headaches.   Hematological: Does not bruise/bleed easily.   All other systems reviewed and are negative.      Physical Exam     Initial Vitals [01/15/20 1429]   BP Pulse Resp Temp SpO2   (!) 213/85 (!) 55 20 98.4 °F (36.9 °C) 97 %      MAP       --         Physical Exam    Nursing note and vitals reviewed.  Constitutional: She appears well-developed and well-nourished. No distress.   HENT:   Head: Normocephalic and atraumatic.   Eyes: Conjunctivae and EOM are normal. Pupils are equal, round, and reactive to light.   Cardiovascular: Normal rate, regular rhythm, normal heart sounds and intact distal pulses. Exam reveals no gallop and no friction rub.    No murmur heard.  Pulmonary/Chest: Breath sounds normal. No respiratory distress. She has no wheezes. She has no rhonchi. She has no rales. She exhibits no tenderness.   Musculoskeletal: Normal range of motion. She exhibits no tenderness.   Neurological: She is alert and oriented to person, place, and time. GCS score is 15. GCS eye subscore is 4. GCS verbal subscore is 5. GCS motor subscore is 6.   Skin: Skin is warm and dry. Capillary refill takes less than 2 seconds.   See images below regarding skin tear to left lower leg.           ED Course   Procedures  Labs Reviewed - No data to display        Medical Decision Making:   Initial Assessment:   74-year-old female which presents to the emergency room for evaluation of a skin tear to her left lower leg.  Patient is ambulatory without difficulty.  She is unsure of her tetanus status a 1 has been ordered.  Differential Diagnosis:   Skin tear, encounter for tetanus, hypertension  ED Management:  Patient examined and noted to have a small skin avulsion to her left lower.  Last tetanus was 2013 to the patient was given a tetanus shot while in the ED.  A Aquacel foam was placed to the skin avulsion and the patient was advised to clean the area  twice a day with soap and water.  Patient also advised of signs of infection to monitor for. Patient given strict return precautions and voiced understanding of all discharge instructions. Pt was stable at discharge.                        ED Course as of Chris 15 1859   Wed Chris 15, 2020   1509 BP(!): 213/85 [AT]   1509 Temp: 98.4 °F (36.9 °C) [AT]   1509 Temp src: Oral [AT]   1509 Pulse(!): 55 [AT]   1509 Resp: 20 [AT]   1509 SpO2: 97 % [AT]   1739 BP(!): 188/86 [AT]      ED Course User Index  [AT] JERED Will        Clinical Impression:       ICD-10-CM ICD-9-CM   1. Left leg pain M79.605 729.5   2. Skin avulsion T14.8XXA 879.8   3. Tetanus-diphtheria vaccination administered at current visit Z23 V49.89     Disposition:   Disposition: Discharged  Condition: Stable    Scribe attestation: This document was produced by a scribe under my direction and in my presence. I agree with the content of the note and have made any necessary edits.     Jania Man DNP, JERED-BC                 JERED Will  01/15/20 1902

## 2020-01-15 NOTE — ED NOTES
Patient is unsure of what medications she takes daily and thinks she was taken off all medications recently for her BP

## 2020-01-15 NOTE — ED NOTES
Patient has a laceration to the left lateral side of lower extremity. Patient stated she smashed her leg in the car door. Wound cleaned and mirplex applied to site    APPEARANCE: Alert, oriented and in no acute distress.  CARDIAC: Normal rate and rhythm, no murmur heard.   PERIPHERAL VASCULAR: peripheral pulses present. Normal cap refill. No edema. Warm to touch.    RESPIRATORY:Normal rate and effort, breath sounds clear bilaterally throughout chest. Respirations are equal and unlabored no obvious signs of distress.  GASTRO: soft, bowel sounds normal, no tenderness, no abdominal distention.  MUSC: Full ROM. No bony tenderness or soft tissue tenderness. No obvious deformity.  SKIN: Skin is warm and dry, normal skin turgor, mucous membranes moist. Wound to left lower extremity. Cleaned and dressed  NEURO: 5/5 strength major flexors/extensors bilaterally. Sensory intact to light touch bilaterally. [place] coma scale: eyes open spontaneously-4, oriented & converses-5, obeys commands-6. No neurological abnormalities.   MENTAL STATUS: awake, alert and aware of environment.

## 2020-09-11 ENCOUNTER — OFFICE VISIT (OUTPATIENT)
Dept: URGENT CARE | Facility: CLINIC | Age: 75
End: 2020-09-11
Payer: MEDICARE

## 2020-09-11 VITALS
HEART RATE: 60 BPM | TEMPERATURE: 98 F | HEIGHT: 64 IN | BODY MASS INDEX: 29.88 KG/M2 | DIASTOLIC BLOOD PRESSURE: 57 MMHG | WEIGHT: 175 LBS | RESPIRATION RATE: 18 BRPM | OXYGEN SATURATION: 95 % | SYSTOLIC BLOOD PRESSURE: 136 MMHG

## 2020-09-11 DIAGNOSIS — L30.9 DERMATITIS: Primary | ICD-10-CM

## 2020-09-11 DIAGNOSIS — R21 RASH: ICD-10-CM

## 2020-09-11 LAB
CTP QC/QA: YES
SARS-COV-2 RDRP RESP QL NAA+PROBE: NEGATIVE

## 2020-09-11 PROCEDURE — 99203 OFFICE O/P NEW LOW 30 MIN: CPT | Mod: S$GLB,,, | Performed by: PHYSICIAN ASSISTANT

## 2020-09-11 PROCEDURE — U0002: ICD-10-PCS | Mod: S$GLB,,, | Performed by: PHYSICIAN ASSISTANT

## 2020-09-11 PROCEDURE — 99203 PR OFFICE/OUTPT VISIT, NEW, LEVL III, 30-44 MIN: ICD-10-PCS | Mod: S$GLB,,, | Performed by: PHYSICIAN ASSISTANT

## 2020-09-11 PROCEDURE — U0002 COVID-19 LAB TEST NON-CDC: HCPCS | Mod: S$GLB,,, | Performed by: PHYSICIAN ASSISTANT

## 2020-09-11 RX ORDER — MUPIROCIN 20 MG/G
OINTMENT TOPICAL
Qty: 22 G | Refills: 0 | Status: SHIPPED | OUTPATIENT
Start: 2020-09-11 | End: 2021-05-22 | Stop reason: ALTCHOICE

## 2020-09-11 NOTE — PROGRESS NOTES
"Subjective:       Patient ID: Enedelia García is a 75 y.o. female.    Vitals:  height is 5' 4" (1.626 m) and weight is 79.4 kg (175 lb). Her temperature is 98.1 °F (36.7 °C). Her blood pressure is 136/57 (abnormal) and her pulse is 60. Her respiration is 18 and oxygen saturation is 95%.     Chief Complaint: Insect Bite    About a month ago, patient states she woke up with a few bites. Now, a month later, the bites are everywhere. She went to see her dermatologist. He told her they were bites and he gave her a antibiotics. She had it filled originally, and is now 2 days into her refill.    Insect Bite  This is a new problem. The current episode started 1 to 4 weeks ago. The problem occurs constantly. The problem has been gradually worsening. Associated symptoms include fatigue. Pertinent negatives include no abdominal pain, arthralgias, chest pain, chills, congestion, coughing, fever, headaches, joint swelling, myalgias, nausea, neck pain, rash, sore throat, vertigo, vomiting or weakness.       Constitution: Positive for fatigue. Negative for chills and fever.   HENT: Negative for congestion and sore throat.    Neck: Negative for neck pain, neck stiffness and painful lymph nodes.   Cardiovascular: Negative for chest pain and leg swelling.   Eyes: Negative for double vision and blurred vision.   Respiratory: Negative for cough and shortness of breath.    Gastrointestinal: Negative for abdominal pain, nausea, vomiting and constipation.   Genitourinary: Negative for dysuria, frequency, urgency and history of kidney stones.   Musculoskeletal: Negative for joint pain, joint swelling, muscle cramps and muscle ache.   Skin: Negative for color change, pale, rash, erythema and bruising.   Allergic/Immunologic: Negative for seasonal allergies.   Neurological: Negative for dizziness, history of vertigo, light-headedness, passing out and headaches.   Hematologic/Lymphatic: Negative for swollen lymph nodes. "   Psychiatric/Behavioral: Negative for nervous/anxious, sleep disturbance and depression. The patient is not nervous/anxious.        Objective:      Physical Exam   Constitutional: She is oriented to person, place, and time. She appears well-developed. She is cooperative.  Non-toxic appearance. She does not appear ill. No distress.   HENT:   Head: Normocephalic and atraumatic. Head is without abrasion, without contusion and without laceration.   Ears:   Right Ear: Hearing, tympanic membrane, external ear and ear canal normal.   Left Ear: Hearing, tympanic membrane, external ear and ear canal normal.   Nose: Nose normal. No mucosal edema, rhinorrhea or nasal deformity. No epistaxis. Right sinus exhibits no maxillary sinus tenderness and no frontal sinus tenderness. Left sinus exhibits no maxillary sinus tenderness and no frontal sinus tenderness.   Mouth/Throat: Uvula is midline, oropharynx is clear and moist and mucous membranes are normal. No trismus in the jaw. Normal dentition. No uvula swelling. No oropharyngeal exudate, posterior oropharyngeal edema or posterior oropharyngeal erythema.   Eyes: Pupils are equal, round, and reactive to light. Conjunctivae, EOM and lids are normal. No scleral icterus.   Neck: Trachea normal, full passive range of motion without pain and phonation normal. Neck supple. No neck rigidity. No edema and no erythema present.   Cardiovascular: Normal rate, regular rhythm, normal heart sounds and normal pulses.   Pulmonary/Chest: Effort normal and breath sounds normal. No stridor. No respiratory distress. She has no decreased breath sounds. She has no rhonchi.   Abdominal: Normal appearance.   Musculoskeletal: Normal range of motion.         General: No deformity.   Neurological: She is alert and oriented to person, place, and time. She exhibits normal muscle tone. Coordination normal.   Skin: Skin is warm, dry, intact, not diaphoretic, not pale, rash, not pustular, macular, not vesicular,  not papular and no abscessed. Capillary refill takes less than 2 seconds. abrasion, burn, bruising, erythema and ecchymosis     Psychiatric: Her speech is normal and behavior is normal. Judgment and thought content normal.   Nursing note and vitals reviewed.        Assessment:       1. Dermatitis    2. Rash        Plan:         Dermatitis  -     mupirocin (BACTROBAN) 2 % ointment; Apply to affected area 3 times daily  Dispense: 22 g; Refill: 0    Rash  -     POCT COVID-19 Rapid Screening     Reviewed lab results with patient.  Discussed exact etiology of dermatitis unknown.  Discussed could be from insect bites however she has a few yellow crusting lesions do not believe she has an overlying infection.  Discussed since she is currently taking doxycycline she can continue to take this medication.  Discussed to continue treatment as prescribed by Dermatology.  Discussed to follow with dermatology to ensure complete resolution dermatitis. Discussed diagnosis with patient as well as treatment and home care. Discussed return to clinic precautions vs ER precautions. All patients questions answered. Patient verbalized understanding. Patient agreed with plan of care.         Nonspecific Dermatitis  Dermatitis is a skin rash caused by something that touches the skin and makes it irritated and inflamed.  Your skin may be red, swollen, dry, and may be cracked. Blisters may form and ooze. The rash will itch.  Dermatitis can form on the face and neck, backs of hands, forearms, genitals, and lower legs. Dermatitis is not passed from person to person.  Talk with your health care provider about what may have caused the rash. Common things that cause skin allergies are metal in jewelry, plants like poison ivy or poison oak, and certain skin care products. You will need to avoid the source of your rash in the future to prevent it from coming back. In some cases, the cause of the dermatitis may not be found.  Treatment is done to  relieve itching and prevent the rash from coming back. The rash should go away in a few days to a few weeks.  Home care  The health care provider may prescribe medications to relieve swelling and itching. Follow all instructions when using these medications.  · Avoid anything that heats up your skin, such as hot showers or baths, or direct sunlight. This can make itching worse.  · Stay away from whatever you think caused the rash.  · Bathe in warm, not hot, water. Apply a moisturizing lotion after bathing to prevent dry skin.  · Avoid skin irritants such as wool or silk clothing, grease, oils, harsh soaps, and detergents.  · Apply cold compresses to soothe your sores to help relieve your symptoms. Do this for 30 minutes 3 to 4 times a day. You can make a cold compress by soaking a cloth in cold water. Squeeze out excess water. You can add colloidal oatmeal to the water to help reduce itching. For severe itching in a small area, apply an ice pack wrapped in a thin towel. Do this for 20 minutes 3 to 4 times a day.  · You can also help relieve large areas of itching by taking a lukewarm bath with colloidal oatmeal added to the water.  · Use hydrocortisone cream for redness and irritation, unless another medicine was prescribed. You can also use benzocaine anesthetic cream or spray.  · Use oral diphenhydramine to help reduce itching. This is an antihistamine you can buy at drug and grocery stores. It can make you sleepy, so use lower doses during the daytime. Or you can use loratadine. This is an antihistamine that will not make you sleepy. Dont use diphenhydramine if you have glaucoma or have trouble urinating because of an enlarged prostate.  · Wash your hands or use an antibacterial gel often to prevent the spread of the rash.  Follow-up care  Follow up with your health care provider. Make an appointment with your health care provider if your symptoms do not get better in the next 1 to 2 weeks.  When to seek medical  advice  Call your health care provider right away if any of these occur:  · Spreading of the rash to other parts of your body  · Severe swelling of your face, eyelids, mouth, throat or tongue  · Trouble urinating due to swelling in the genital area  · Fever of 100.4°F (38°C) or higher  · Redness or swelling that gets worse  · Pain that gets worse  · Foul-smelling fluid leaking from the skin  · Yellow-brown crusts on the open blisters  · Joint pain   Date Last Reviewed: 7/23/2014 © 2000-2017 [employer]. [address] [place] [address]. All rights reserved. This information is not intended as a substitute for professional medical care. Always follow your healthcare professional's instructions.      Please follow up with your Primary care provider within 2-5 days if your signs and symptoms have not resolved or worsen.     If your condition worsens or fails to improve we recommend that you receive another evaluation at the emergency room immediately or contact your primary medical clinic to discuss your concerns.   You must understand that you have received an Urgent Care treatment only and that you may be released before all of your medical problems are known or treated. You, the patient, will arrange for follow up care as instructed.     RED FLAGS/WARNING SYMPTOMS DISCUSSED WITH PATIENT THAT WOULD WARRANT EMERGENT MEDICAL ATTENTION. PATIENT VERBALIZED UNDERSTANDING.

## 2020-09-11 NOTE — PATIENT INSTRUCTIONS
Nonspecific Dermatitis  Dermatitis is a skin rash caused by something that touches the skin and makes it irritated and inflamed.  Your skin may be red, swollen, dry, and may be cracked. Blisters may form and ooze. The rash will itch.  Dermatitis can form on the face and neck, backs of hands, forearms, genitals, and lower legs. Dermatitis is not passed from person to person.  Talk with your health care provider about what may have caused the rash. Common things that cause skin allergies are metal in jewelry, plants like poison ivy or poison oak, and certain skin care products. You will need to avoid the source of your rash in the future to prevent it from coming back. In some cases, the cause of the dermatitis may not be found.  Treatment is done to relieve itching and prevent the rash from coming back. The rash should go away in a few days to a few weeks.  Home care  The health care provider may prescribe medications to relieve swelling and itching. Follow all instructions when using these medications.  · Avoid anything that heats up your skin, such as hot showers or baths, or direct sunlight. This can make itching worse.  · Stay away from whatever you think caused the rash.  · Bathe in warm, not hot, water. Apply a moisturizing lotion after bathing to prevent dry skin.  · Avoid skin irritants such as wool or silk clothing, grease, oils, harsh soaps, and detergents.  · Apply cold compresses to soothe your sores to help relieve your symptoms. Do this for 30 minutes 3 to 4 times a day. You can make a cold compress by soaking a cloth in cold water. Squeeze out excess water. You can add colloidal oatmeal to the water to help reduce itching. For severe itching in a small area, apply an ice pack wrapped in a thin towel. Do this for 20 minutes 3 to 4 times a day.  · You can also help relieve large areas of itching by taking a lukewarm bath with colloidal oatmeal added to the water.  · Use hydrocortisone cream for redness  and irritation, unless another medicine was prescribed. You can also use benzocaine anesthetic cream or spray.  · Use oral diphenhydramine to help reduce itching. This is an antihistamine you can buy at drug and grocery stores. It can make you sleepy, so use lower doses during the daytime. Or you can use loratadine. This is an antihistamine that will not make you sleepy. Dont use diphenhydramine if you have glaucoma or have trouble urinating because of an enlarged prostate.  · Wash your hands or use an antibacterial gel often to prevent the spread of the rash.  Follow-up care  Follow up with your health care provider. Make an appointment with your health care provider if your symptoms do not get better in the next 1 to 2 weeks.  When to seek medical advice  Call your health care provider right away if any of these occur:  · Spreading of the rash to other parts of your body  · Severe swelling of your face, eyelids, mouth, throat or tongue  · Trouble urinating due to swelling in the genital area  · Fever of 100.4°F (38°C) or higher  · Redness or swelling that gets worse  · Pain that gets worse  · Foul-smelling fluid leaking from the skin  · Yellow-brown crusts on the open blisters  · Joint pain   Date Last Reviewed: 7/23/2014  © 1796-6285 [employer]. [address] [place] [address], [place] [address]. All rights reserved. This information is not intended as a substitute for professional medical care. Always follow your healthcare professional's instructions.      Please follow up with your Primary care provider within 2-5 days if your signs and symptoms have not resolved or worsen.     If your condition worsens or fails to improve we recommend that you receive another evaluation at the emergency room immediately or contact your primary medical clinic to discuss your concerns.   You must understand that you have received an Urgent Care treatment only and that you may be released before all of your medical  problems are known or treated. You, the patient, will arrange for follow up care as instructed.     RED FLAGS/WARNING SYMPTOMS DISCUSSED WITH PATIENT THAT WOULD WARRANT EMERGENT MEDICAL ATTENTION. PATIENT VERBALIZED UNDERSTANDING.

## 2021-03-05 ENCOUNTER — TELEPHONE (OUTPATIENT)
Dept: RHEUMATOLOGY | Facility: CLINIC | Age: 76
End: 2021-03-05

## 2021-03-10 ENCOUNTER — TELEPHONE (OUTPATIENT)
Dept: RHEUMATOLOGY | Facility: CLINIC | Age: 76
End: 2021-03-10

## 2021-03-11 ENCOUNTER — TELEPHONE (OUTPATIENT)
Dept: RHEUMATOLOGY | Facility: CLINIC | Age: 76
End: 2021-03-11

## 2021-03-15 ENCOUNTER — HOSPITAL ENCOUNTER (OUTPATIENT)
Dept: RADIOLOGY | Facility: HOSPITAL | Age: 76
Discharge: HOME | End: 2021-03-15
Attending: INTERNAL MEDICINE
Payer: MEDICARE

## 2021-03-15 ENCOUNTER — OFFICE VISIT (OUTPATIENT)
Dept: RHEUMATOLOGY | Facility: CLINIC | Age: 76
End: 2021-03-15
Payer: MEDICARE

## 2021-03-15 VITALS
OXYGEN SATURATION: 96 % | HEIGHT: 64 IN | HEART RATE: 49 BPM | DIASTOLIC BLOOD PRESSURE: 75 MMHG | BODY MASS INDEX: 30.04 KG/M2 | TEMPERATURE: 97 F | SYSTOLIC BLOOD PRESSURE: 152 MMHG

## 2021-03-15 DIAGNOSIS — M25.50 POLYARTHRALGIA: ICD-10-CM

## 2021-03-15 DIAGNOSIS — M79.7 FIBROMYALGIA: Primary | ICD-10-CM

## 2021-03-15 DIAGNOSIS — K21.9 GASTROESOPHAGEAL REFLUX DISEASE, UNSPECIFIED WHETHER ESOPHAGITIS PRESENT: ICD-10-CM

## 2021-03-15 DIAGNOSIS — F32.89 OTHER DEPRESSION: ICD-10-CM

## 2021-03-15 DIAGNOSIS — I25.10 CORONARY ARTERY DISEASE, ANGINA PRESENCE UNSPECIFIED, UNSPECIFIED VESSEL OR LESION TYPE, UNSPECIFIED WHETHER NATIVE OR TRANSPLANTED HEART: ICD-10-CM

## 2021-03-15 DIAGNOSIS — Z79.891 CHRONICALLY ON OPIATE THERAPY: ICD-10-CM

## 2021-03-15 DIAGNOSIS — G89.29 CHRONIC MIDLINE LOW BACK PAIN WITHOUT SCIATICA: ICD-10-CM

## 2021-03-15 DIAGNOSIS — M54.50 CHRONIC MIDLINE LOW BACK PAIN WITHOUT SCIATICA: ICD-10-CM

## 2021-03-15 DIAGNOSIS — M79.7 FIBROMYALGIA: ICD-10-CM

## 2021-03-15 PROCEDURE — 1101F PT FALLS ASSESS-DOCD LE1/YR: CPT | Mod: CPTII,S$GLB,, | Performed by: INTERNAL MEDICINE

## 2021-03-15 PROCEDURE — 1159F MED LIST DOCD IN RCRD: CPT | Mod: S$GLB,,, | Performed by: INTERNAL MEDICINE

## 2021-03-15 PROCEDURE — 3077F PR MOST RECENT SYSTOLIC BLOOD PRESSURE >= 140 MM HG: ICD-10-PCS | Mod: CPTII,S$GLB,, | Performed by: INTERNAL MEDICINE

## 2021-03-15 PROCEDURE — 1159F PR MEDICATION LIST DOCUMENTED IN MEDICAL RECORD: ICD-10-PCS | Mod: S$GLB,,, | Performed by: INTERNAL MEDICINE

## 2021-03-15 PROCEDURE — 77077 JOINT SURVEY SINGLE VIEW: CPT | Mod: TC,PO

## 2021-03-15 PROCEDURE — 1125F AMNT PAIN NOTED PAIN PRSNT: CPT | Mod: S$GLB,,, | Performed by: INTERNAL MEDICINE

## 2021-03-15 PROCEDURE — 77077 XR ARTHRITIS SURVEY: ICD-10-PCS | Mod: 26,,, | Performed by: RADIOLOGY

## 2021-03-15 PROCEDURE — 3077F SYST BP >= 140 MM HG: CPT | Mod: CPTII,S$GLB,, | Performed by: INTERNAL MEDICINE

## 2021-03-15 PROCEDURE — 3078F DIAST BP <80 MM HG: CPT | Mod: CPTII,S$GLB,, | Performed by: INTERNAL MEDICINE

## 2021-03-15 PROCEDURE — 99205 OFFICE O/P NEW HI 60 MIN: CPT | Mod: S$GLB,,, | Performed by: INTERNAL MEDICINE

## 2021-03-15 PROCEDURE — 1125F PR PAIN SEVERITY QUANTIFIED, PAIN PRESENT: ICD-10-PCS | Mod: S$GLB,,, | Performed by: INTERNAL MEDICINE

## 2021-03-15 PROCEDURE — 77077 JOINT SURVEY SINGLE VIEW: CPT | Mod: 26,,, | Performed by: RADIOLOGY

## 2021-03-15 PROCEDURE — 3288F FALL RISK ASSESSMENT DOCD: CPT | Mod: CPTII,S$GLB,, | Performed by: INTERNAL MEDICINE

## 2021-03-15 PROCEDURE — 3078F PR MOST RECENT DIASTOLIC BLOOD PRESSURE < 80 MM HG: ICD-10-PCS | Mod: CPTII,S$GLB,, | Performed by: INTERNAL MEDICINE

## 2021-03-15 PROCEDURE — 3288F PR FALLS RISK ASSESSMENT DOCUMENTED: ICD-10-PCS | Mod: CPTII,S$GLB,, | Performed by: INTERNAL MEDICINE

## 2021-03-15 PROCEDURE — 99999 PR PBB SHADOW E&M-EST. PATIENT-LVL III: ICD-10-PCS | Mod: PBBFAC,,, | Performed by: INTERNAL MEDICINE

## 2021-03-15 PROCEDURE — 99205 PR OFFICE/OUTPT VISIT, NEW, LEVL V, 60-74 MIN: ICD-10-PCS | Mod: S$GLB,,, | Performed by: INTERNAL MEDICINE

## 2021-03-15 PROCEDURE — 99999 PR PBB SHADOW E&M-EST. PATIENT-LVL III: CPT | Mod: PBBFAC,,, | Performed by: INTERNAL MEDICINE

## 2021-03-15 PROCEDURE — 1101F PR PT FALLS ASSESS DOC 0-1 FALLS W/OUT INJ PAST YR: ICD-10-PCS | Mod: CPTII,S$GLB,, | Performed by: INTERNAL MEDICINE

## 2021-04-13 ENCOUNTER — TELEPHONE (OUTPATIENT)
Dept: RHEUMATOLOGY | Facility: CLINIC | Age: 76
End: 2021-04-13

## 2021-05-19 ENCOUNTER — OFFICE VISIT (OUTPATIENT)
Dept: URGENT CARE | Facility: CLINIC | Age: 76
End: 2021-05-19
Payer: MEDICARE

## 2021-05-19 VITALS
OXYGEN SATURATION: 96 % | DIASTOLIC BLOOD PRESSURE: 75 MMHG | WEIGHT: 175 LBS | BODY MASS INDEX: 29.88 KG/M2 | SYSTOLIC BLOOD PRESSURE: 159 MMHG | RESPIRATION RATE: 18 BRPM | HEIGHT: 64 IN | TEMPERATURE: 97 F | HEART RATE: 58 BPM

## 2021-05-19 DIAGNOSIS — M79.604 PAIN IN RIGHT LEG: Primary | ICD-10-CM

## 2021-05-19 DIAGNOSIS — S80.11XA CONTUSION OF RIGHT LOWER EXTREMITY, INITIAL ENCOUNTER: ICD-10-CM

## 2021-05-19 PROCEDURE — 87186 SC STD MICRODIL/AGAR DIL: CPT | Performed by: FAMILY MEDICINE

## 2021-05-19 PROCEDURE — 96372 PR INJECTION,THERAP/PROPH/DIAG2ST, IM OR SUBCUT: ICD-10-PCS | Mod: S$GLB,,, | Performed by: FAMILY MEDICINE

## 2021-05-19 PROCEDURE — 99214 OFFICE O/P EST MOD 30 MIN: CPT | Mod: 25,S$GLB,, | Performed by: FAMILY MEDICINE

## 2021-05-19 PROCEDURE — 73590 XR TIBIA FIBULA 2 VIEW RIGHT: ICD-10-PCS | Mod: FY,RT,S$GLB, | Performed by: RADIOLOGY

## 2021-05-19 PROCEDURE — 99214 PR OFFICE/OUTPT VISIT, EST, LEVL IV, 30-39 MIN: ICD-10-PCS | Mod: 25,S$GLB,, | Performed by: FAMILY MEDICINE

## 2021-05-19 PROCEDURE — 1125F PR PAIN SEVERITY QUANTIFIED, PAIN PRESENT: ICD-10-PCS | Mod: S$GLB,,, | Performed by: FAMILY MEDICINE

## 2021-05-19 PROCEDURE — 1125F AMNT PAIN NOTED PAIN PRSNT: CPT | Mod: S$GLB,,, | Performed by: FAMILY MEDICINE

## 2021-05-19 PROCEDURE — 96372 THER/PROPH/DIAG INJ SC/IM: CPT | Mod: S$GLB,,, | Performed by: FAMILY MEDICINE

## 2021-05-19 PROCEDURE — 73590 X-RAY EXAM OF LOWER LEG: CPT | Mod: FY,RT,S$GLB, | Performed by: RADIOLOGY

## 2021-05-19 PROCEDURE — 87070 CULTURE OTHR SPECIMN AEROBIC: CPT | Performed by: FAMILY MEDICINE

## 2021-05-19 PROCEDURE — 87077 CULTURE AEROBIC IDENTIFY: CPT | Performed by: FAMILY MEDICINE

## 2021-05-19 RX ORDER — MUPIROCIN 20 MG/G
OINTMENT TOPICAL
Qty: 22 G | Refills: 1 | Status: SHIPPED | OUTPATIENT
Start: 2021-05-19 | End: 2021-05-22 | Stop reason: ALTCHOICE

## 2021-05-19 RX ORDER — SULFAMETHOXAZOLE AND TRIMETHOPRIM 800; 160 MG/1; MG/1
1 TABLET ORAL 2 TIMES DAILY
Qty: 20 TABLET | Refills: 0 | Status: SHIPPED | OUTPATIENT
Start: 2021-05-19 | End: 2021-05-22

## 2021-05-19 RX ORDER — CEFTRIAXONE 500 MG/1
500 INJECTION, POWDER, FOR SOLUTION INTRAMUSCULAR; INTRAVENOUS
Status: COMPLETED | OUTPATIENT
Start: 2021-05-19 | End: 2021-05-19

## 2021-05-19 RX ADMIN — CEFTRIAXONE 500 MG: 500 INJECTION, POWDER, FOR SOLUTION INTRAMUSCULAR; INTRAVENOUS at 02:05

## 2021-05-22 ENCOUNTER — HOSPITAL ENCOUNTER (OUTPATIENT)
Facility: HOSPITAL | Age: 76
Discharge: HOME | End: 2021-05-22
Attending: EMERGENCY MEDICINE | Admitting: INTERNAL MEDICINE
Payer: MEDICARE

## 2021-05-22 VITALS
WEIGHT: 188 LBS | OXYGEN SATURATION: 96 % | BODY MASS INDEX: 32.1 KG/M2 | SYSTOLIC BLOOD PRESSURE: 178 MMHG | RESPIRATION RATE: 18 BRPM | HEIGHT: 64 IN | TEMPERATURE: 98 F | DIASTOLIC BLOOD PRESSURE: 76 MMHG | HEART RATE: 61 BPM

## 2021-05-22 DIAGNOSIS — R07.9 CHEST PAIN: Primary | ICD-10-CM

## 2021-05-22 DIAGNOSIS — I25.118 CORONARY ARTERY DISEASE OF NATIVE ARTERY OF NATIVE HEART WITH STABLE ANGINA PECTORIS: ICD-10-CM

## 2021-05-22 DIAGNOSIS — E78.2 MIXED HYPERLIPIDEMIA: ICD-10-CM

## 2021-05-22 DIAGNOSIS — R07.2 PRECORDIAL PAIN: ICD-10-CM

## 2021-05-22 DIAGNOSIS — N17.9 AKI (ACUTE KIDNEY INJURY): ICD-10-CM

## 2021-05-22 DIAGNOSIS — I25.10 CORONARY ARTERY DISEASE INVOLVING NATIVE CORONARY ARTERY OF NATIVE HEART WITHOUT ANGINA PECTORIS: ICD-10-CM

## 2021-05-22 DIAGNOSIS — I16.0 HYPERTENSIVE URGENCY: ICD-10-CM

## 2021-05-22 DIAGNOSIS — D64.9 NORMOCYTIC ANEMIA: ICD-10-CM

## 2021-05-22 LAB
ALBUMIN SERPL BCP-MCNC: 3.5 G/DL (ref 3.5–5.2)
ALBUMIN SERPL BCP-MCNC: 3.6 G/DL (ref 3.5–5.2)
ALP SERPL-CCNC: 64 U/L (ref 55–135)
ALP SERPL-CCNC: 66 U/L (ref 55–135)
ALT SERPL W/O P-5'-P-CCNC: 22 U/L (ref 10–44)
ALT SERPL W/O P-5'-P-CCNC: 22 U/L (ref 10–44)
ANION GAP SERPL CALC-SCNC: 11 MMOL/L (ref 8–16)
ANION GAP SERPL CALC-SCNC: 9 MMOL/L (ref 8–16)
AORTIC ROOT ANNULUS: 2.69 CM
AST SERPL-CCNC: 23 U/L (ref 10–40)
AST SERPL-CCNC: 23 U/L (ref 10–40)
AV INDEX (PROSTH): 0.37
AV MEAN GRADIENT: 19 MMHG
AV PEAK GRADIENT: 33 MMHG
AV VALVE AREA: 1.18 CM2
AV VELOCITY RATIO: 0.36
BACTERIA SPEC AEROBE CULT: ABNORMAL
BASOPHILS # BLD AUTO: 0.05 K/UL (ref 0–0.2)
BASOPHILS # BLD AUTO: 0.05 K/UL (ref 0–0.2)
BASOPHILS NFR BLD: 0.4 % (ref 0–1.9)
BASOPHILS NFR BLD: 0.4 % (ref 0–1.9)
BILIRUB SERPL-MCNC: 0.2 MG/DL (ref 0.1–1)
BILIRUB SERPL-MCNC: 0.3 MG/DL (ref 0.1–1)
BNP SERPL-MCNC: 110 PG/ML (ref 0–99)
BSA FOR ECHO PROCEDURE: 1.96 M2
BUN SERPL-MCNC: 26 MG/DL (ref 8–23)
BUN SERPL-MCNC: 28 MG/DL (ref 8–23)
CALCIUM SERPL-MCNC: 9 MG/DL (ref 8.7–10.5)
CALCIUM SERPL-MCNC: 9.3 MG/DL (ref 8.7–10.5)
CHLORIDE SERPL-SCNC: 100 MMOL/L (ref 95–110)
CHLORIDE SERPL-SCNC: 101 MMOL/L (ref 95–110)
CHOLEST SERPL-MCNC: 207 MG/DL (ref 120–199)
CHOLEST/HDLC SERPL: 3.8 {RATIO} (ref 2–5)
CK SERPL-CCNC: 145 U/L (ref 20–180)
CO2 SERPL-SCNC: 26 MMOL/L (ref 23–29)
CO2 SERPL-SCNC: 29 MMOL/L (ref 23–29)
CREAT SERPL-MCNC: 1.5 MG/DL (ref 0.5–1.4)
CREAT SERPL-MCNC: 1.7 MG/DL (ref 0.5–1.4)
CREAT UR-MCNC: 57 MG/DL (ref 15–325)
CTP QC/QA: YES
CV ECHO LV RWT: 0.57 CM
D DIMER PPP IA.FEU-MCNC: 0.75 MG/L FEU
DIFFERENTIAL METHOD: ABNORMAL
DIFFERENTIAL METHOD: ABNORMAL
DOP CALC AO PEAK VEL: 2.87 M/S
DOP CALC AO VTI: 66.55 CM
DOP CALC LVOT AREA: 3.2 CM2
DOP CALC LVOT DIAMETER: 2.01 CM
DOP CALC LVOT PEAK VEL: 1.04 M/S
DOP CALC LVOT STROKE VOLUME: 78.84 CM3
DOP CALCLVOT PEAK VEL VTI: 24.86 CM
E WAVE DECELERATION TIME: 168.88 MSEC
E/A RATIO: 0.99
E/E' RATIO: 11.53 M/S
ECHO LV POSTERIOR WALL: 1.11 CM (ref 0.6–1.1)
EJECTION FRACTION: 55 %
EOSINOPHIL # BLD AUTO: 0.1 K/UL (ref 0–0.5)
EOSINOPHIL # BLD AUTO: 0.1 K/UL (ref 0–0.5)
EOSINOPHIL NFR BLD: 0.5 % (ref 0–8)
EOSINOPHIL NFR BLD: 0.9 % (ref 0–8)
ERYTHROCYTE [DISTWIDTH] IN BLOOD BY AUTOMATED COUNT: 13.4 % (ref 11.5–14.5)
ERYTHROCYTE [DISTWIDTH] IN BLOOD BY AUTOMATED COUNT: 13.6 % (ref 11.5–14.5)
EST. GFR  (AFRICAN AMERICAN): 33 ML/MIN/1.73 M^2
EST. GFR  (AFRICAN AMERICAN): 39 ML/MIN/1.73 M^2
EST. GFR  (NON AFRICAN AMERICAN): 29 ML/MIN/1.73 M^2
EST. GFR  (NON AFRICAN AMERICAN): 34 ML/MIN/1.73 M^2
ESTIMATED AVG GLUCOSE: 117 MG/DL (ref 68–131)
FERRITIN SERPL-MCNC: 99 NG/ML (ref 20–300)
FOLATE SERPL-MCNC: 38.6 NG/ML (ref 4–24)
FRACTIONAL SHORTENING: 37 % (ref 28–44)
GLUCOSE SERPL-MCNC: 89 MG/DL (ref 70–110)
GLUCOSE SERPL-MCNC: 91 MG/DL (ref 70–110)
HBA1C MFR BLD: 5.7 % (ref 4–5.6)
HCT VFR BLD AUTO: 34.7 % (ref 37–48.5)
HCT VFR BLD AUTO: 34.9 % (ref 37–48.5)
HDLC SERPL-MCNC: 55 MG/DL (ref 40–75)
HDLC SERPL: 26.6 % (ref 20–50)
HGB BLD-MCNC: 11.4 G/DL (ref 12–16)
HGB BLD-MCNC: 11.4 G/DL (ref 12–16)
IMM GRANULOCYTES # BLD AUTO: 0.07 K/UL (ref 0–0.04)
IMM GRANULOCYTES # BLD AUTO: 0.07 K/UL (ref 0–0.04)
IMM GRANULOCYTES NFR BLD AUTO: 0.5 % (ref 0–0.5)
IMM GRANULOCYTES NFR BLD AUTO: 0.6 % (ref 0–0.5)
INTERVENTRICULAR SEPTUM: 1.15 CM (ref 0.6–1.1)
IRON SERPL-MCNC: 41 UG/DL (ref 30–160)
IVRT: 68.51 MSEC
LA MAJOR: 5.68 CM
LA MINOR: 5.35 CM
LA WIDTH: 2.65 CM
LDLC SERPL CALC-MCNC: 111.4 MG/DL (ref 63–159)
LEFT ATRIUM SIZE: 4.09 CM
LEFT ATRIUM VOLUME INDEX MOD: 18.2 ML/M2
LEFT ATRIUM VOLUME INDEX: 26.6 ML/M2
LEFT ATRIUM VOLUME MOD: 34.8 CM3
LEFT ATRIUM VOLUME: 50.76 CM3
LEFT INTERNAL DIMENSION IN SYSTOLE: 2.48 CM (ref 2.1–4)
LEFT VENTRICLE DIASTOLIC VOLUME INDEX: 34.9 ML/M2
LEFT VENTRICLE DIASTOLIC VOLUME: 66.66 ML
LEFT VENTRICLE MASS INDEX: 77 G/M2
LEFT VENTRICLE SYSTOLIC VOLUME INDEX: 11.4 ML/M2
LEFT VENTRICLE SYSTOLIC VOLUME: 21.83 ML
LEFT VENTRICULAR INTERNAL DIMENSION IN DIASTOLE: 3.92 CM (ref 3.5–6)
LEFT VENTRICULAR MASS: 146.86 G
LV LATERAL E/E' RATIO: 12.25 M/S
LV SEPTAL E/E' RATIO: 10.89 M/S
LYMPHOCYTES # BLD AUTO: 2.3 K/UL (ref 1–4.8)
LYMPHOCYTES # BLD AUTO: 2.3 K/UL (ref 1–4.8)
LYMPHOCYTES NFR BLD: 15.8 % (ref 18–48)
LYMPHOCYTES NFR BLD: 19.3 % (ref 18–48)
MAGNESIUM SERPL-MCNC: 2.8 MG/DL (ref 1.6–2.6)
MCH RBC QN AUTO: 30.6 PG (ref 27–31)
MCH RBC QN AUTO: 30.6 PG (ref 27–31)
MCHC RBC AUTO-ENTMCNC: 32.7 G/DL (ref 32–36)
MCHC RBC AUTO-ENTMCNC: 32.9 G/DL (ref 32–36)
MCV RBC AUTO: 93 FL (ref 82–98)
MCV RBC AUTO: 94 FL (ref 82–98)
MONOCYTES # BLD AUTO: 1.4 K/UL (ref 0.3–1)
MONOCYTES # BLD AUTO: 1.6 K/UL (ref 0.3–1)
MONOCYTES NFR BLD: 11.3 % (ref 4–15)
MONOCYTES NFR BLD: 11.7 % (ref 4–15)
MV A" WAVE DURATION": 11.99 MSEC
MV MEAN GRADIENT: 0 MMHG
MV PEAK A VEL: 0.99 M/S
MV PEAK E VEL: 0.98 M/S
MV PEAK GRADIENT: 4 MMHG
MV STENOSIS PRESSURE HALF TIME: 51.94 MS
MV VALVE AREA P 1/2 METHOD: 4.24 CM2
NEUTROPHILS # BLD AUTO: 10.2 K/UL (ref 1.8–7.7)
NEUTROPHILS # BLD AUTO: 8 K/UL (ref 1.8–7.7)
NEUTROPHILS NFR BLD: 67.1 % (ref 38–73)
NEUTROPHILS NFR BLD: 71.5 % (ref 38–73)
NONHDLC SERPL-MCNC: 152 MG/DL
NRBC BLD-RTO: 0 /100 WBC
NRBC BLD-RTO: 0 /100 WBC
PISA MRMAX VEL: 0.05 M/S
PISA TR MAX VEL: 3.16 M/S
PLATELET # BLD AUTO: 306 K/UL (ref 150–450)
PLATELET # BLD AUTO: 317 K/UL (ref 150–450)
PMV BLD AUTO: 9.2 FL (ref 9.2–12.9)
PMV BLD AUTO: 9.4 FL (ref 9.2–12.9)
POTASSIUM SERPL-SCNC: 4.3 MMOL/L (ref 3.5–5.1)
POTASSIUM SERPL-SCNC: 4.8 MMOL/L (ref 3.5–5.1)
PROT SERPL-MCNC: 6.9 G/DL (ref 6–8.4)
PROT SERPL-MCNC: 7 G/DL (ref 6–8.4)
PULM VEIN S/D RATIO: 1.37
PV PEAK D VEL: 0.57 M/S
PV PEAK S VEL: 0.78 M/S
RA MAJOR: 4.87 CM
RA PRESSURE: 3 MMHG
RA WIDTH: 3.11 CM
RBC # BLD AUTO: 3.73 M/UL (ref 4–5.4)
RBC # BLD AUTO: 3.73 M/UL (ref 4–5.4)
RIGHT VENTRICULAR END-DIASTOLIC DIMENSION: 2.16 CM
RV TISSUE DOPPLER FREE WALL SYSTOLIC VELOCITY 1 (APICAL 4 CHAMBER VIEW): 21.34 CM/S
SARS-COV-2 RDRP RESP QL NAA+PROBE: NEGATIVE
SATURATED IRON: 10 % (ref 20–50)
SODIUM SERPL-SCNC: 138 MMOL/L (ref 136–145)
SODIUM SERPL-SCNC: 138 MMOL/L (ref 136–145)
SODIUM UR-SCNC: 126 MMOL/L (ref 20–250)
TDI LATERAL: 0.08 M/S
TDI SEPTAL: 0.09 M/S
TDI: 0.09 M/S
TOTAL IRON BINDING CAPACITY: 391 UG/DL (ref 250–450)
TR MAX PG: 40 MMHG
TRANSFERRIN SERPL-MCNC: 264 MG/DL (ref 200–375)
TRIGL SERPL-MCNC: 203 MG/DL (ref 30–150)
TROPONIN I SERPL DL<=0.01 NG/ML-MCNC: 0.01 NG/ML (ref 0–0.03)
TSH SERPL DL<=0.005 MIU/L-ACNC: 1.01 UIU/ML (ref 0.4–4)
TV REST PULMONARY ARTERY PRESSURE: 43 MMHG
UUN UR-MCNC: 399 MG/DL (ref 140–1050)
VIT B12 SERPL-MCNC: 209 PG/ML (ref 210–950)
WBC # BLD AUTO: 11.92 K/UL (ref 3.9–12.7)
WBC # BLD AUTO: 14.27 K/UL (ref 3.9–12.7)

## 2021-05-22 PROCEDURE — 93010 EKG 12-LEAD: ICD-10-PCS | Mod: ,,, | Performed by: INTERNAL MEDICINE

## 2021-05-22 PROCEDURE — 36415 COLL VENOUS BLD VENIPUNCTURE: CPT | Performed by: STUDENT IN AN ORGANIZED HEALTH CARE EDUCATION/TRAINING PROGRAM

## 2021-05-22 PROCEDURE — 84443 ASSAY THYROID STIM HORMONE: CPT | Performed by: STUDENT IN AN ORGANIZED HEALTH CARE EDUCATION/TRAINING PROGRAM

## 2021-05-22 PROCEDURE — 96372 THER/PROPH/DIAG INJ SC/IM: CPT | Mod: 59

## 2021-05-22 PROCEDURE — 83735 ASSAY OF MAGNESIUM: CPT | Performed by: STUDENT IN AN ORGANIZED HEALTH CARE EDUCATION/TRAINING PROGRAM

## 2021-05-22 PROCEDURE — 80053 COMPREHEN METABOLIC PANEL: CPT | Performed by: EMERGENCY MEDICINE

## 2021-05-22 PROCEDURE — 93010 ELECTROCARDIOGRAM REPORT: CPT | Mod: ,,, | Performed by: INTERNAL MEDICINE

## 2021-05-22 PROCEDURE — 25000003 PHARM REV CODE 250: Performed by: STUDENT IN AN ORGANIZED HEALTH CARE EDUCATION/TRAINING PROGRAM

## 2021-05-22 PROCEDURE — G0378 HOSPITAL OBSERVATION PER HR: HCPCS

## 2021-05-22 PROCEDURE — 96375 TX/PRO/DX INJ NEW DRUG ADDON: CPT | Mod: 59

## 2021-05-22 PROCEDURE — 63600175 PHARM REV CODE 636 W HCPCS: Performed by: STUDENT IN AN ORGANIZED HEALTH CARE EDUCATION/TRAINING PROGRAM

## 2021-05-22 PROCEDURE — 84540 ASSAY OF URINE/UREA-N: CPT | Performed by: STUDENT IN AN ORGANIZED HEALTH CARE EDUCATION/TRAINING PROGRAM

## 2021-05-22 PROCEDURE — 80053 COMPREHEN METABOLIC PANEL: CPT | Mod: 91 | Performed by: STUDENT IN AN ORGANIZED HEALTH CARE EDUCATION/TRAINING PROGRAM

## 2021-05-22 PROCEDURE — 82607 VITAMIN B-12: CPT | Performed by: STUDENT IN AN ORGANIZED HEALTH CARE EDUCATION/TRAINING PROGRAM

## 2021-05-22 PROCEDURE — 82746 ASSAY OF FOLIC ACID SERUM: CPT | Performed by: STUDENT IN AN ORGANIZED HEALTH CARE EDUCATION/TRAINING PROGRAM

## 2021-05-22 PROCEDURE — 93005 ELECTROCARDIOGRAM TRACING: CPT

## 2021-05-22 PROCEDURE — U0002 COVID-19 LAB TEST NON-CDC: HCPCS | Performed by: EMERGENCY MEDICINE

## 2021-05-22 PROCEDURE — 25000003 PHARM REV CODE 250: Performed by: EMERGENCY MEDICINE

## 2021-05-22 PROCEDURE — 85025 COMPLETE CBC W/AUTO DIFF WBC: CPT | Mod: 91 | Performed by: STUDENT IN AN ORGANIZED HEALTH CARE EDUCATION/TRAINING PROGRAM

## 2021-05-22 PROCEDURE — 63600175 PHARM REV CODE 636 W HCPCS: Performed by: EMERGENCY MEDICINE

## 2021-05-22 PROCEDURE — 83880 ASSAY OF NATRIURETIC PEPTIDE: CPT | Performed by: EMERGENCY MEDICINE

## 2021-05-22 PROCEDURE — 83036 HEMOGLOBIN GLYCOSYLATED A1C: CPT | Performed by: STUDENT IN AN ORGANIZED HEALTH CARE EDUCATION/TRAINING PROGRAM

## 2021-05-22 PROCEDURE — 96374 THER/PROPH/DIAG INJ IV PUSH: CPT

## 2021-05-22 PROCEDURE — 82728 ASSAY OF FERRITIN: CPT | Performed by: STUDENT IN AN ORGANIZED HEALTH CARE EDUCATION/TRAINING PROGRAM

## 2021-05-22 PROCEDURE — 82550 ASSAY OF CK (CPK): CPT | Performed by: STUDENT IN AN ORGANIZED HEALTH CARE EDUCATION/TRAINING PROGRAM

## 2021-05-22 PROCEDURE — 85025 COMPLETE CBC W/AUTO DIFF WBC: CPT | Performed by: EMERGENCY MEDICINE

## 2021-05-22 PROCEDURE — 82570 ASSAY OF URINE CREATININE: CPT | Performed by: STUDENT IN AN ORGANIZED HEALTH CARE EDUCATION/TRAINING PROGRAM

## 2021-05-22 PROCEDURE — 85379 FIBRIN DEGRADATION QUANT: CPT | Performed by: EMERGENCY MEDICINE

## 2021-05-22 PROCEDURE — 84484 ASSAY OF TROPONIN QUANT: CPT | Performed by: EMERGENCY MEDICINE

## 2021-05-22 PROCEDURE — 93010 ELECTROCARDIOGRAM REPORT: CPT | Mod: 76,,, | Performed by: INTERNAL MEDICINE

## 2021-05-22 PROCEDURE — 83540 ASSAY OF IRON: CPT | Performed by: STUDENT IN AN ORGANIZED HEALTH CARE EDUCATION/TRAINING PROGRAM

## 2021-05-22 PROCEDURE — 84484 ASSAY OF TROPONIN QUANT: CPT | Mod: 91 | Performed by: STUDENT IN AN ORGANIZED HEALTH CARE EDUCATION/TRAINING PROGRAM

## 2021-05-22 PROCEDURE — 99285 EMERGENCY DEPT VISIT HI MDM: CPT | Mod: 25

## 2021-05-22 PROCEDURE — 80061 LIPID PANEL: CPT | Performed by: STUDENT IN AN ORGANIZED HEALTH CARE EDUCATION/TRAINING PROGRAM

## 2021-05-22 PROCEDURE — 94761 N-INVAS EAR/PLS OXIMETRY MLT: CPT

## 2021-05-22 PROCEDURE — 84300 ASSAY OF URINE SODIUM: CPT | Performed by: STUDENT IN AN ORGANIZED HEALTH CARE EDUCATION/TRAINING PROGRAM

## 2021-05-22 RX ORDER — FUROSEMIDE 40 MG/1
40 TABLET ORAL 2 TIMES DAILY
Status: DISCONTINUED | OUTPATIENT
Start: 2021-05-22 | End: 2021-05-22 | Stop reason: HOSPADM

## 2021-05-22 RX ORDER — GABAPENTIN 300 MG/1
600 CAPSULE ORAL 3 TIMES DAILY
Status: DISCONTINUED | OUTPATIENT
Start: 2021-05-22 | End: 2021-05-22 | Stop reason: HOSPADM

## 2021-05-22 RX ORDER — GLUCAGON 1 MG
1 KIT INJECTION
Status: DISCONTINUED | OUTPATIENT
Start: 2021-05-22 | End: 2021-05-22 | Stop reason: HOSPADM

## 2021-05-22 RX ORDER — LOSARTAN POTASSIUM 50 MG/1
50 TABLET ORAL DAILY
Status: COMPLETED | OUTPATIENT
Start: 2021-05-22 | End: 2021-05-22

## 2021-05-22 RX ORDER — ACETAMINOPHEN 325 MG/1
650 TABLET ORAL EVERY 6 HOURS PRN
Status: DISCONTINUED | OUTPATIENT
Start: 2021-05-22 | End: 2021-05-22 | Stop reason: HOSPADM

## 2021-05-22 RX ORDER — DULOXETIN HYDROCHLORIDE 30 MG/1
30 CAPSULE, DELAYED RELEASE ORAL DAILY
Status: DISCONTINUED | OUTPATIENT
Start: 2021-05-22 | End: 2021-05-22 | Stop reason: HOSPADM

## 2021-05-22 RX ORDER — LOSARTAN POTASSIUM 50 MG/1
50 TABLET ORAL DAILY
Status: DISCONTINUED | OUTPATIENT
Start: 2021-05-22 | End: 2021-05-22

## 2021-05-22 RX ORDER — IRBESARTAN 150 MG/1
150 TABLET ORAL NIGHTLY
Status: ON HOLD | COMMUNITY
End: 2021-05-22 | Stop reason: SDUPTHER

## 2021-05-22 RX ORDER — METOPROLOL SUCCINATE 25 MG/1
25 TABLET, EXTENDED RELEASE ORAL DAILY
COMMUNITY
End: 2022-04-04 | Stop reason: SDUPTHER

## 2021-05-22 RX ORDER — ASPIRIN 81 MG/1
81 TABLET ORAL DAILY
Status: DISCONTINUED | OUTPATIENT
Start: 2021-05-22 | End: 2021-05-22 | Stop reason: HOSPADM

## 2021-05-22 RX ORDER — ROSUVASTATIN CALCIUM 10 MG/1
20 TABLET, COATED ORAL DAILY
Status: DISCONTINUED | OUTPATIENT
Start: 2021-05-22 | End: 2021-05-22 | Stop reason: HOSPADM

## 2021-05-22 RX ORDER — METOPROLOL SUCCINATE 25 MG/1
25 TABLET, EXTENDED RELEASE ORAL DAILY
Status: DISCONTINUED | OUTPATIENT
Start: 2021-05-22 | End: 2021-05-22

## 2021-05-22 RX ORDER — ISOSORBIDE MONONITRATE 30 MG/1
30 TABLET, EXTENDED RELEASE ORAL DAILY
COMMUNITY
Start: 2021-05-03 | End: 2021-09-30 | Stop reason: SDUPTHER

## 2021-05-22 RX ORDER — ONDANSETRON 2 MG/ML
4 INJECTION INTRAMUSCULAR; INTRAVENOUS
Status: COMPLETED | OUTPATIENT
Start: 2021-05-22 | End: 2021-05-22

## 2021-05-22 RX ORDER — IBUPROFEN 200 MG
16 TABLET ORAL
Status: DISCONTINUED | OUTPATIENT
Start: 2021-05-22 | End: 2021-05-22 | Stop reason: HOSPADM

## 2021-05-22 RX ORDER — ASPIRIN 325 MG
325 TABLET ORAL
Status: COMPLETED | OUTPATIENT
Start: 2021-05-22 | End: 2021-05-22

## 2021-05-22 RX ORDER — SODIUM CHLORIDE 0.9 % (FLUSH) 0.9 %
10 SYRINGE (ML) INJECTION
Status: DISCONTINUED | OUTPATIENT
Start: 2021-05-22 | End: 2021-05-22 | Stop reason: HOSPADM

## 2021-05-22 RX ORDER — HEPARIN SODIUM 5000 [USP'U]/ML
5000 INJECTION, SOLUTION INTRAVENOUS; SUBCUTANEOUS EVERY 8 HOURS
Status: DISCONTINUED | OUTPATIENT
Start: 2021-05-22 | End: 2021-05-22 | Stop reason: HOSPADM

## 2021-05-22 RX ORDER — NITROGLYCERIN 0.4 MG/1
0.4 TABLET SUBLINGUAL EVERY 5 MIN PRN
Status: DISCONTINUED | OUTPATIENT
Start: 2021-05-22 | End: 2021-05-22 | Stop reason: HOSPADM

## 2021-05-22 RX ORDER — LOSARTAN POTASSIUM 50 MG/1
100 TABLET ORAL DAILY
Status: DISCONTINUED | OUTPATIENT
Start: 2021-05-23 | End: 2021-05-22 | Stop reason: HOSPADM

## 2021-05-22 RX ORDER — OXYCODONE AND ACETAMINOPHEN 10; 325 MG/1; MG/1
1 TABLET ORAL 2 TIMES DAILY PRN
Status: ON HOLD | COMMUNITY
Start: 2021-05-10 | End: 2021-05-22 | Stop reason: HOSPADM

## 2021-05-22 RX ORDER — PANTOPRAZOLE SODIUM 40 MG/1
40 TABLET, DELAYED RELEASE ORAL DAILY
Status: DISCONTINUED | OUTPATIENT
Start: 2021-05-22 | End: 2021-05-22 | Stop reason: HOSPADM

## 2021-05-22 RX ORDER — IRBESARTAN 300 MG/1
300 TABLET ORAL NIGHTLY
Qty: 60 TABLET | Refills: 5 | Status: SHIPPED | OUTPATIENT
Start: 2021-05-22 | End: 2022-08-12 | Stop reason: SDUPTHER

## 2021-05-22 RX ORDER — METOPROLOL SUCCINATE 25 MG/1
25 TABLET, EXTENDED RELEASE ORAL DAILY
Status: DISCONTINUED | OUTPATIENT
Start: 2021-05-22 | End: 2021-05-22 | Stop reason: HOSPADM

## 2021-05-22 RX ORDER — HYDRALAZINE HYDROCHLORIDE 20 MG/ML
10 INJECTION INTRAMUSCULAR; INTRAVENOUS
Status: COMPLETED | OUTPATIENT
Start: 2021-05-22 | End: 2021-05-22

## 2021-05-22 RX ORDER — IBUPROFEN 200 MG
24 TABLET ORAL
Status: DISCONTINUED | OUTPATIENT
Start: 2021-05-22 | End: 2021-05-22 | Stop reason: HOSPADM

## 2021-05-22 RX ORDER — IRBESARTAN 300 MG/1
300 TABLET ORAL NIGHTLY
Qty: 60 TABLET | Refills: 5 | Status: SHIPPED | OUTPATIENT
Start: 2021-05-22 | End: 2021-05-22 | Stop reason: SDUPTHER

## 2021-05-22 RX ADMIN — LOSARTAN POTASSIUM 50 MG: 50 TABLET, FILM COATED ORAL at 09:05

## 2021-05-22 RX ADMIN — ROSUVASTATIN CALCIUM 20 MG: 10 TABLET, COATED ORAL at 08:05

## 2021-05-22 RX ADMIN — PANTOPRAZOLE SODIUM 40 MG: 40 TABLET, DELAYED RELEASE ORAL at 08:05

## 2021-05-22 RX ADMIN — METOPROLOL SUCCINATE 25 MG: 25 TABLET, EXTENDED RELEASE ORAL at 05:05

## 2021-05-22 RX ADMIN — HYDRALAZINE HYDROCHLORIDE 10 MG: 20 INJECTION, SOLUTION INTRAMUSCULAR; INTRAVENOUS at 01:05

## 2021-05-22 RX ADMIN — THERA TABS 1 TABLET: TAB at 08:05

## 2021-05-22 RX ADMIN — HEPARIN SODIUM 5000 UNITS: 5000 INJECTION INTRAVENOUS; SUBCUTANEOUS at 05:05

## 2021-05-22 RX ADMIN — ASPIRIN 81 MG: 81 TABLET, COATED ORAL at 08:05

## 2021-05-22 RX ADMIN — LIDOCAINE HYDROCHLORIDE: 20 SOLUTION ORAL; TOPICAL at 02:05

## 2021-05-22 RX ADMIN — LOSARTAN POTASSIUM 50 MG: 50 TABLET, FILM COATED ORAL at 05:05

## 2021-05-22 RX ADMIN — ASPIRIN 325 MG ORAL TABLET 325 MG: 325 PILL ORAL at 01:05

## 2021-05-22 RX ADMIN — ONDANSETRON 4 MG: 2 INJECTION INTRAMUSCULAR; INTRAVENOUS at 04:05

## 2021-05-22 RX ADMIN — FUROSEMIDE 40 MG: 40 TABLET ORAL at 08:05

## 2021-05-22 RX ADMIN — ACETAMINOPHEN 650 MG: 325 TABLET ORAL at 09:05

## 2021-05-22 RX ADMIN — GABAPENTIN 600 MG: 300 CAPSULE ORAL at 08:05

## 2021-05-22 RX ADMIN — DULOXETINE HYDROCHLORIDE 30 MG: 30 CAPSULE, DELAYED RELEASE ORAL at 08:05

## 2021-05-23 ENCOUNTER — TELEPHONE (OUTPATIENT)
Dept: URGENT CARE | Facility: CLINIC | Age: 76
End: 2021-05-23

## 2021-05-23 DIAGNOSIS — Z22.322 MRSA (METHICILLIN RESISTANT STAPH AUREUS) CULTURE POSITIVE: Primary | ICD-10-CM

## 2021-05-23 RX ORDER — CLINDAMYCIN HYDROCHLORIDE 300 MG/1
300 CAPSULE ORAL 3 TIMES DAILY
Qty: 21 CAPSULE | Refills: 0 | Status: SHIPPED | OUTPATIENT
Start: 2021-05-23 | End: 2021-05-30

## 2021-05-24 ENCOUNTER — HOSPITAL ENCOUNTER (EMERGENCY)
Facility: HOSPITAL | Age: 76
Discharge: HOME | End: 2021-05-24
Attending: EMERGENCY MEDICINE
Payer: MEDICARE

## 2021-05-24 VITALS
WEIGHT: 195 LBS | HEART RATE: 60 BPM | TEMPERATURE: 99 F | BODY MASS INDEX: 33.47 KG/M2 | OXYGEN SATURATION: 98 % | DIASTOLIC BLOOD PRESSURE: 66 MMHG | RESPIRATION RATE: 18 BRPM | SYSTOLIC BLOOD PRESSURE: 146 MMHG

## 2021-05-24 DIAGNOSIS — K59.00 CONSTIPATION: ICD-10-CM

## 2021-05-24 DIAGNOSIS — R55 SYNCOPE: ICD-10-CM

## 2021-05-24 LAB
ALBUMIN SERPL BCP-MCNC: 3.4 G/DL (ref 3.5–5.2)
ALP SERPL-CCNC: 94 U/L (ref 55–135)
ALT SERPL W/O P-5'-P-CCNC: 23 U/L (ref 10–44)
ANION GAP SERPL CALC-SCNC: 12 MMOL/L (ref 8–16)
AST SERPL-CCNC: 23 U/L (ref 10–40)
BASOPHILS NFR BLD: 0 % (ref 0–1.9)
BILIRUB SERPL-MCNC: 0.2 MG/DL (ref 0.1–1)
BILIRUB UR QL STRIP: NEGATIVE
BUN SERPL-MCNC: 31 MG/DL (ref 8–23)
CALCIUM SERPL-MCNC: 11.1 MG/DL (ref 8.7–10.5)
CHLORIDE SERPL-SCNC: 100 MMOL/L (ref 95–110)
CLARITY UR: CLEAR
CO2 SERPL-SCNC: 26 MMOL/L (ref 23–29)
COLOR UR: YELLOW
CREAT SERPL-MCNC: 1.8 MG/DL (ref 0.5–1.4)
CTP QC/QA: YES
DIFFERENTIAL METHOD: ABNORMAL
EOSINOPHIL NFR BLD: 1 % (ref 0–8)
ERYTHROCYTE [DISTWIDTH] IN BLOOD BY AUTOMATED COUNT: 13.4 % (ref 11.5–14.5)
EST. GFR  (AFRICAN AMERICAN): 31 ML/MIN/1.73 M^2
EST. GFR  (NON AFRICAN AMERICAN): 27 ML/MIN/1.73 M^2
GIANT PLATELETS BLD QL SMEAR: PRESENT
GLUCOSE SERPL-MCNC: 133 MG/DL (ref 70–110)
GLUCOSE UR QL STRIP: NEGATIVE
HCT VFR BLD AUTO: 41.1 % (ref 37–48.5)
HGB BLD-MCNC: 13.1 G/DL (ref 12–16)
HGB UR QL STRIP: NEGATIVE
HOWELL-JOLLY BOD BLD QL SMEAR: ABNORMAL
IMM GRANULOCYTES # BLD AUTO: ABNORMAL K/UL (ref 0–0.04)
IMM GRANULOCYTES NFR BLD AUTO: ABNORMAL % (ref 0–0.5)
KETONES UR QL STRIP: NEGATIVE
LACTATE SERPL-SCNC: 1.8 MMOL/L (ref 0.5–2.2)
LACTATE SERPL-SCNC: 2.8 MMOL/L (ref 0.5–2.2)
LEUKOCYTE ESTERASE UR QL STRIP: NEGATIVE
LYMPHOCYTES NFR BLD: 9 % (ref 18–48)
MCH RBC QN AUTO: 29.9 PG (ref 27–31)
MCHC RBC AUTO-ENTMCNC: 31.9 G/DL (ref 32–36)
MCV RBC AUTO: 94 FL (ref 82–98)
MONOCYTES NFR BLD: 12 % (ref 4–15)
MYELOCYTES NFR BLD MANUAL: 1 %
NEUTROPHILS NFR BLD: 77 % (ref 38–73)
NITRITE UR QL STRIP: NEGATIVE
NRBC BLD-RTO: 0 /100 WBC
PAPPENHEIMER BOD BLD QL SMEAR: PRESENT
PH UR STRIP: 6 [PH] (ref 5–8)
PLATELET # BLD AUTO: 356 K/UL (ref 150–450)
PLATELET BLD QL SMEAR: ABNORMAL
PMV BLD AUTO: 10.5 FL (ref 9.2–12.9)
POLYCHROMASIA BLD QL SMEAR: ABNORMAL
POTASSIUM SERPL-SCNC: 4.5 MMOL/L (ref 3.5–5.1)
PROT SERPL-MCNC: 6.8 G/DL (ref 6–8.4)
PROT UR QL STRIP: NEGATIVE
RBC # BLD AUTO: 4.38 M/UL (ref 4–5.4)
SARS-COV-2 RDRP RESP QL NAA+PROBE: NEGATIVE
SODIUM SERPL-SCNC: 138 MMOL/L (ref 136–145)
SP GR UR STRIP: 1.01 (ref 1–1.03)
URN SPEC COLLECT METH UR: NORMAL
UROBILINOGEN UR STRIP-ACNC: NEGATIVE EU/DL
WBC # BLD AUTO: 22.53 K/UL (ref 3.9–12.7)

## 2021-05-24 PROCEDURE — 87040 BLOOD CULTURE FOR BACTERIA: CPT | Performed by: EMERGENCY MEDICINE

## 2021-05-24 PROCEDURE — 80053 COMPREHEN METABOLIC PANEL: CPT | Performed by: PHYSICIAN ASSISTANT

## 2021-05-24 PROCEDURE — 96374 THER/PROPH/DIAG INJ IV PUSH: CPT

## 2021-05-24 PROCEDURE — 96361 HYDRATE IV INFUSION ADD-ON: CPT

## 2021-05-24 PROCEDURE — 93010 ELECTROCARDIOGRAM REPORT: CPT | Mod: ,,, | Performed by: INTERNAL MEDICINE

## 2021-05-24 PROCEDURE — 25000003 PHARM REV CODE 250: Performed by: NURSE PRACTITIONER

## 2021-05-24 PROCEDURE — 99284 EMERGENCY DEPT VISIT MOD MDM: CPT | Mod: 25

## 2021-05-24 PROCEDURE — 93005 ELECTROCARDIOGRAM TRACING: CPT

## 2021-05-24 PROCEDURE — 85027 COMPLETE CBC AUTOMATED: CPT | Performed by: NURSE PRACTITIONER

## 2021-05-24 PROCEDURE — 83605 ASSAY OF LACTIC ACID: CPT | Mod: 91 | Performed by: NURSE PRACTITIONER

## 2021-05-24 PROCEDURE — U0002 COVID-19 LAB TEST NON-CDC: HCPCS | Performed by: NURSE PRACTITIONER

## 2021-05-24 PROCEDURE — 85007 BL SMEAR W/DIFF WBC COUNT: CPT | Performed by: NURSE PRACTITIONER

## 2021-05-24 PROCEDURE — 63600175 PHARM REV CODE 636 W HCPCS: Performed by: EMERGENCY MEDICINE

## 2021-05-24 PROCEDURE — 81003 URINALYSIS AUTO W/O SCOPE: CPT | Performed by: NURSE PRACTITIONER

## 2021-05-24 PROCEDURE — 93010 EKG 12-LEAD: ICD-10-PCS | Mod: ,,, | Performed by: INTERNAL MEDICINE

## 2021-05-24 RX ORDER — ONDANSETRON 2 MG/ML
4 INJECTION INTRAMUSCULAR; INTRAVENOUS
Status: COMPLETED | OUTPATIENT
Start: 2021-05-24 | End: 2021-05-24

## 2021-05-24 RX ORDER — ONDANSETRON 4 MG/1
4 TABLET, ORALLY DISINTEGRATING ORAL EVERY 6 HOURS PRN
Qty: 12 TABLET | Refills: 0 | Status: SHIPPED | OUTPATIENT
Start: 2021-05-24 | End: 2021-07-04 | Stop reason: SDUPTHER

## 2021-05-24 RX ADMIN — SODIUM CHLORIDE 1000 ML: 0.9 INJECTION, SOLUTION INTRAVENOUS at 04:05

## 2021-05-24 RX ADMIN — ONDANSETRON 4 MG: 2 INJECTION INTRAMUSCULAR; INTRAVENOUS at 03:05

## 2021-05-25 ENCOUNTER — TELEPHONE (OUTPATIENT)
Dept: URGENT CARE | Facility: CLINIC | Age: 76
End: 2021-05-25

## 2021-05-27 ENCOUNTER — TELEPHONE (OUTPATIENT)
Dept: URGENT CARE | Facility: CLINIC | Age: 76
End: 2021-05-27

## 2021-05-28 ENCOUNTER — TELEPHONE (OUTPATIENT)
Dept: URGENT CARE | Facility: CLINIC | Age: 76
End: 2021-05-28

## 2021-05-30 LAB
BACTERIA BLD CULT: NORMAL
BACTERIA BLD CULT: NORMAL

## 2021-06-22 ENCOUNTER — OFFICE VISIT (OUTPATIENT)
Dept: URGENT CARE | Facility: CLINIC | Age: 76
End: 2021-06-22
Payer: MEDICARE

## 2021-06-22 VITALS
SYSTOLIC BLOOD PRESSURE: 162 MMHG | RESPIRATION RATE: 18 BRPM | DIASTOLIC BLOOD PRESSURE: 75 MMHG | OXYGEN SATURATION: 95 % | HEIGHT: 64 IN | TEMPERATURE: 98 F | HEART RATE: 64 BPM | BODY MASS INDEX: 33.29 KG/M2 | WEIGHT: 195 LBS

## 2021-06-22 DIAGNOSIS — R30.0 DYSURIA: ICD-10-CM

## 2021-06-22 DIAGNOSIS — N30.00 ACUTE CYSTITIS WITHOUT HEMATURIA: Primary | ICD-10-CM

## 2021-06-22 LAB
BILIRUB UR QL STRIP: NEGATIVE
GLUCOSE UR QL STRIP: NEGATIVE
KETONES UR QL STRIP: NEGATIVE
LEUKOCYTE ESTERASE UR QL STRIP: POSITIVE
PH, POC UA: 5
POC BLOOD, URINE: NEGATIVE
POC NITRATES, URINE: NEGATIVE
PROT UR QL STRIP: NEGATIVE
SP GR UR STRIP: 1.02 (ref 1–1.03)
UROBILINOGEN UR STRIP-ACNC: ABNORMAL (ref 0.1–1.1)

## 2021-06-22 PROCEDURE — 87186 SC STD MICRODIL/AGAR DIL: CPT | Performed by: PHYSICIAN ASSISTANT

## 2021-06-22 PROCEDURE — 81003 POCT URINALYSIS, DIPSTICK, AUTOMATED, W/O SCOPE: ICD-10-PCS | Mod: QW,S$GLB,, | Performed by: PHYSICIAN ASSISTANT

## 2021-06-22 PROCEDURE — 87077 CULTURE AEROBIC IDENTIFY: CPT | Performed by: PHYSICIAN ASSISTANT

## 2021-06-22 PROCEDURE — 81003 URINALYSIS AUTO W/O SCOPE: CPT | Mod: QW,S$GLB,, | Performed by: PHYSICIAN ASSISTANT

## 2021-06-22 PROCEDURE — 99213 PR OFFICE/OUTPT VISIT, EST, LEVL III, 20-29 MIN: ICD-10-PCS | Mod: 25,S$GLB,, | Performed by: PHYSICIAN ASSISTANT

## 2021-06-22 PROCEDURE — 99213 OFFICE O/P EST LOW 20 MIN: CPT | Mod: 25,S$GLB,, | Performed by: PHYSICIAN ASSISTANT

## 2021-06-22 PROCEDURE — 87088 URINE BACTERIA CULTURE: CPT | Performed by: PHYSICIAN ASSISTANT

## 2021-06-22 PROCEDURE — 87086 URINE CULTURE/COLONY COUNT: CPT | Performed by: PHYSICIAN ASSISTANT

## 2021-06-22 RX ORDER — CEPHALEXIN 500 MG/1
500 CAPSULE ORAL EVERY 12 HOURS
Qty: 14 CAPSULE | Refills: 0 | Status: SHIPPED | OUTPATIENT
Start: 2021-06-22 | End: 2021-06-29

## 2021-06-25 ENCOUNTER — TELEPHONE (OUTPATIENT)
Dept: URGENT CARE | Facility: CLINIC | Age: 76
End: 2021-06-25

## 2021-06-25 LAB — BACTERIA UR CULT: ABNORMAL

## 2021-06-28 ENCOUNTER — TELEPHONE (OUTPATIENT)
Dept: URGENT CARE | Facility: CLINIC | Age: 76
End: 2021-06-28

## 2021-06-30 ENCOUNTER — OFFICE VISIT (OUTPATIENT)
Dept: CARDIOLOGY | Facility: CLINIC | Age: 76
End: 2021-06-30
Payer: MEDICARE

## 2021-06-30 ENCOUNTER — TELEPHONE (OUTPATIENT)
Dept: URGENT CARE | Facility: CLINIC | Age: 76
End: 2021-06-30

## 2021-06-30 VITALS
SYSTOLIC BLOOD PRESSURE: 122 MMHG | HEART RATE: 67 BPM | HEIGHT: 64 IN | WEIGHT: 191.94 LBS | DIASTOLIC BLOOD PRESSURE: 72 MMHG | BODY MASS INDEX: 32.77 KG/M2

## 2021-06-30 DIAGNOSIS — I20.89 STABLE ANGINA PECTORIS: Primary | ICD-10-CM

## 2021-06-30 DIAGNOSIS — I10 ESSENTIAL HYPERTENSION: ICD-10-CM

## 2021-06-30 DIAGNOSIS — I25.118 CORONARY ARTERY DISEASE OF NATIVE ARTERY OF NATIVE HEART WITH STABLE ANGINA PECTORIS: ICD-10-CM

## 2021-06-30 PROCEDURE — 1159F PR MEDICATION LIST DOCUMENTED IN MEDICAL RECORD: ICD-10-PCS | Mod: S$GLB,,, | Performed by: INTERNAL MEDICINE

## 2021-06-30 PROCEDURE — 99204 OFFICE O/P NEW MOD 45 MIN: CPT | Mod: S$GLB,,, | Performed by: INTERNAL MEDICINE

## 2021-06-30 PROCEDURE — 1159F MED LIST DOCD IN RCRD: CPT | Mod: S$GLB,,, | Performed by: INTERNAL MEDICINE

## 2021-06-30 PROCEDURE — 99204 PR OFFICE/OUTPT VISIT, NEW, LEVL IV, 45-59 MIN: ICD-10-PCS | Mod: S$GLB,,, | Performed by: INTERNAL MEDICINE

## 2021-06-30 PROCEDURE — 99999 PR PBB SHADOW E&M-EST. PATIENT-LVL IV: ICD-10-PCS | Mod: PBBFAC,,, | Performed by: INTERNAL MEDICINE

## 2021-06-30 PROCEDURE — 99999 PR PBB SHADOW E&M-EST. PATIENT-LVL IV: CPT | Mod: PBBFAC,,, | Performed by: INTERNAL MEDICINE

## 2021-06-30 RX ORDER — SULFAMETHOXAZOLE AND TRIMETHOPRIM 800; 160 MG/1; MG/1
TABLET ORAL
COMMUNITY
End: 2021-12-20

## 2021-06-30 RX ORDER — BUTALBITAL, ACETAMINOPHEN AND CAFFEINE 300; 40; 50 MG/1; MG/1; MG/1
1 CAPSULE ORAL
COMMUNITY
Start: 2021-06-16 | End: 2023-06-22 | Stop reason: DRUGHIGH

## 2021-06-30 RX ORDER — METOPROLOL TARTRATE 25 MG/1
12.5 TABLET, FILM COATED ORAL 2 TIMES DAILY
COMMUNITY
Start: 2021-06-09 | End: 2021-12-20

## 2021-06-30 RX ORDER — MUPIROCIN 20 MG/G
OINTMENT TOPICAL
COMMUNITY
End: 2021-12-06

## 2021-06-30 RX ORDER — HYDROXYZINE HYDROCHLORIDE 25 MG/1
25 TABLET, FILM COATED ORAL DAILY PRN
COMMUNITY
Start: 2021-06-01 | End: 2021-12-20

## 2021-06-30 RX ORDER — OXYCODONE HYDROCHLORIDE 10 MG/1
10 TABLET ORAL 4 TIMES DAILY
COMMUNITY
End: 2024-01-18 | Stop reason: ALTCHOICE

## 2021-07-01 ENCOUNTER — PATIENT MESSAGE (OUTPATIENT)
Dept: ADMINISTRATIVE | Facility: OTHER | Age: 76
End: 2021-07-01

## 2021-07-04 ENCOUNTER — HOSPITAL ENCOUNTER (EMERGENCY)
Facility: HOSPITAL | Age: 76
Discharge: HOME | End: 2021-07-04
Attending: EMERGENCY MEDICINE
Payer: MEDICARE

## 2021-07-04 DIAGNOSIS — N39.0 URINARY TRACT INFECTION WITHOUT HEMATURIA, SITE UNSPECIFIED: Primary | ICD-10-CM

## 2021-07-04 LAB
BACTERIA #/AREA URNS HPF: ABNORMAL /HPF
BILIRUB UR QL STRIP: NEGATIVE
CLARITY UR: ABNORMAL
COLOR UR: YELLOW
GLUCOSE UR QL STRIP: NEGATIVE
HGB UR QL STRIP: ABNORMAL
HYALINE CASTS #/AREA URNS LPF: 0 /LPF
KETONES UR QL STRIP: NEGATIVE
LEUKOCYTE ESTERASE UR QL STRIP: ABNORMAL
MICROSCOPIC COMMENT: ABNORMAL
NITRITE UR QL STRIP: NEGATIVE
PH UR STRIP: 5 [PH] (ref 5–8)
PROT UR QL STRIP: ABNORMAL
RBC #/AREA URNS HPF: 50 /HPF (ref 0–4)
SP GR UR STRIP: 1.03 (ref 1–1.03)
URN SPEC COLLECT METH UR: ABNORMAL
UROBILINOGEN UR STRIP-ACNC: NEGATIVE EU/DL
WBC #/AREA URNS HPF: >100 /HPF (ref 0–5)

## 2021-07-04 PROCEDURE — 87186 SC STD MICRODIL/AGAR DIL: CPT | Performed by: EMERGENCY MEDICINE

## 2021-07-04 PROCEDURE — 99284 EMERGENCY DEPT VISIT MOD MDM: CPT | Mod: 25

## 2021-07-04 PROCEDURE — 87077 CULTURE AEROBIC IDENTIFY: CPT | Performed by: EMERGENCY MEDICINE

## 2021-07-04 PROCEDURE — 87086 URINE CULTURE/COLONY COUNT: CPT | Performed by: EMERGENCY MEDICINE

## 2021-07-04 PROCEDURE — 81000 URINALYSIS NONAUTO W/SCOPE: CPT | Performed by: EMERGENCY MEDICINE

## 2021-07-04 PROCEDURE — 87088 URINE BACTERIA CULTURE: CPT | Performed by: EMERGENCY MEDICINE

## 2021-07-04 RX ORDER — ONDANSETRON 4 MG/1
4 TABLET, ORALLY DISINTEGRATING ORAL EVERY 6 HOURS PRN
Qty: 15 TABLET | Refills: 0 | Status: SHIPPED | OUTPATIENT
Start: 2021-07-04 | End: 2021-12-20

## 2021-07-04 RX ORDER — PHENAZOPYRIDINE HYDROCHLORIDE 200 MG/1
200 TABLET, FILM COATED ORAL 3 TIMES DAILY
Qty: 6 TABLET | Refills: 0 | Status: SHIPPED | OUTPATIENT
Start: 2021-07-04 | End: 2021-07-14

## 2021-07-04 RX ORDER — AMOXICILLIN AND CLAVULANATE POTASSIUM 875; 125 MG/1; MG/1
1 TABLET, FILM COATED ORAL 2 TIMES DAILY
Qty: 14 TABLET | Refills: 0 | Status: SHIPPED | OUTPATIENT
Start: 2021-07-04 | End: 2021-09-15 | Stop reason: ALTCHOICE

## 2021-07-05 VITALS
TEMPERATURE: 98 F | BODY MASS INDEX: 30.9 KG/M2 | HEIGHT: 64 IN | WEIGHT: 181 LBS | SYSTOLIC BLOOD PRESSURE: 186 MMHG | HEART RATE: 67 BPM | OXYGEN SATURATION: 97 % | DIASTOLIC BLOOD PRESSURE: 74 MMHG | RESPIRATION RATE: 18 BRPM

## 2021-07-07 LAB — BACTERIA UR CULT: ABNORMAL

## 2021-09-09 ENCOUNTER — HOSPITAL ENCOUNTER (EMERGENCY)
Facility: HOSPITAL | Age: 76
Discharge: HOME | End: 2021-09-09
Attending: EMERGENCY MEDICINE
Payer: MEDICARE

## 2021-09-09 VITALS
OXYGEN SATURATION: 95 % | TEMPERATURE: 98 F | DIASTOLIC BLOOD PRESSURE: 56 MMHG | HEART RATE: 71 BPM | RESPIRATION RATE: 18 BRPM | SYSTOLIC BLOOD PRESSURE: 115 MMHG

## 2021-09-09 DIAGNOSIS — S81.811A NONINFECTED SKIN TEAR OF RIGHT LOWER EXTREMITY, INITIAL ENCOUNTER: ICD-10-CM
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
## 2021-09-09 DIAGNOSIS — S81.812A NONINFECTED SKIN TEAR OF LEFT LOWER EXTREMITY, INITIAL ENCOUNTER: ICD-10-CM

## 2021-09-09 DIAGNOSIS — S81.812A LACERATION OF LEFT LOWER EXTREMITY, INITIAL ENCOUNTER: ICD-10-CM

## 2021-09-09 PROCEDURE — 25000003 PHARM REV CODE 250: Performed by: EMERGENCY MEDICINE

## 2021-09-09 PROCEDURE — 12032 INTMD RPR S/A/T/EXT 2.6-7.5: CPT

## 2021-09-09 PROCEDURE — 90714 TD VACC NO PRESV 7 YRS+ IM: CPT | Performed by: EMERGENCY MEDICINE

## 2021-09-09 PROCEDURE — 63600175 PHARM REV CODE 636 W HCPCS: Performed by: EMERGENCY MEDICINE

## 2021-09-09 PROCEDURE — 99284 EMERGENCY DEPT VISIT MOD MDM: CPT | Mod: 25

## 2021-09-09 PROCEDURE — 90471 IMMUNIZATION ADMIN: CPT | Performed by: EMERGENCY MEDICINE

## 2021-09-09 RX ORDER — ACETAMINOPHEN 500 MG
1000 TABLET ORAL
Status: COMPLETED | OUTPATIENT
Start: 2021-09-09 | End: 2021-09-09

## 2021-09-09 RX ORDER — MUPIROCIN 20 MG/G
OINTMENT TOPICAL 2 TIMES DAILY
Qty: 22 G | Refills: 0 | Status: SHIPPED | OUTPATIENT
Start: 2021-09-09 | End: 2021-10-01 | Stop reason: SDUPTHER

## 2021-09-09 RX ORDER — CEPHALEXIN 500 MG/1
500 CAPSULE ORAL EVERY 6 HOURS
Qty: 20 CAPSULE | Refills: 0 | Status: SHIPPED | OUTPATIENT
Start: 2021-09-09 | End: 2021-09-14

## 2021-09-09 RX ORDER — CEPHALEXIN 500 MG/1
500 CAPSULE ORAL
Status: COMPLETED | OUTPATIENT
Start: 2021-09-09 | End: 2021-09-09

## 2021-09-09 RX ORDER — LIDOCAINE HYDROCHLORIDE AND EPINEPHRINE 10; 10 MG/ML; UG/ML
20 INJECTION, SOLUTION INFILTRATION; PERINEURAL ONCE
Status: COMPLETED | OUTPATIENT
Start: 2021-09-09 | End: 2021-09-09

## 2021-09-09 RX ADMIN — Medication: at 01:09

## 2021-09-09 RX ADMIN — TETANUS AND DIPHTHERIA TOXOIDS ADSORBED 0.5 ML: 2; 2 INJECTION INTRAMUSCULAR at 01:09

## 2021-09-09 RX ADMIN — CEPHALEXIN 500 MG: 500 CAPSULE ORAL at 02:09

## 2021-09-09 RX ADMIN — BACITRACIN, NEOMYCIN, POLYMYXIN B 1 EACH: 400; 3.5; 5 OINTMENT TOPICAL at 03:09

## 2021-09-09 RX ADMIN — LIDOCAINE HYDROCHLORIDE,EPINEPHRINE BITARTRATE 20 ML: 10; .01 INJECTION, SOLUTION INFILTRATION; PERINEURAL at 02:09

## 2021-09-09 RX ADMIN — ACETAMINOPHEN 1000 MG: 500 TABLET ORAL at 01:09

## 2021-09-15 ENCOUNTER — HOSPITAL ENCOUNTER (EMERGENCY)
Facility: HOSPITAL | Age: 76
Discharge: HOME | End: 2021-09-15
Attending: EMERGENCY MEDICINE
Payer: MEDICARE

## 2021-09-15 VITALS
SYSTOLIC BLOOD PRESSURE: 130 MMHG | HEART RATE: 69 BPM | TEMPERATURE: 99 F | BODY MASS INDEX: 30.9 KG/M2 | WEIGHT: 181 LBS | OXYGEN SATURATION: 100 % | RESPIRATION RATE: 17 BRPM | DIASTOLIC BLOOD PRESSURE: 79 MMHG | HEIGHT: 64 IN

## 2021-09-15 DIAGNOSIS — Z48.02 VISIT FOR SUTURE REMOVAL: Primary | ICD-10-CM

## 2021-09-15 DIAGNOSIS — L03.90 CELLULITIS, UNSPECIFIED CELLULITIS SITE: ICD-10-CM

## 2021-09-15 PROCEDURE — 99283 EMERGENCY DEPT VISIT LOW MDM: CPT

## 2021-09-15 RX ORDER — CLINDAMYCIN HYDROCHLORIDE 150 MG/1
300 CAPSULE ORAL EVERY 8 HOURS
Qty: 42 CAPSULE | Refills: 0 | Status: SHIPPED | OUTPATIENT
Start: 2021-09-15 | End: 2021-09-22

## 2021-09-29 ENCOUNTER — TELEPHONE (OUTPATIENT)
Dept: INTERNAL MEDICINE | Facility: CLINIC | Age: 76
End: 2021-09-29

## 2021-09-29 ENCOUNTER — TELEPHONE (OUTPATIENT)
Dept: RHEUMATOLOGY | Facility: CLINIC | Age: 76
End: 2021-09-29

## 2021-09-29 ENCOUNTER — TELEPHONE (OUTPATIENT)
Dept: CARDIOLOGY | Facility: CLINIC | Age: 76
End: 2021-09-29

## 2021-09-30 ENCOUNTER — OFFICE VISIT (OUTPATIENT)
Dept: CARDIOLOGY | Facility: CLINIC | Age: 76
End: 2021-09-30
Payer: MEDICARE

## 2021-09-30 VITALS
HEIGHT: 64 IN | DIASTOLIC BLOOD PRESSURE: 78 MMHG | WEIGHT: 186.06 LBS | HEART RATE: 65 BPM | BODY MASS INDEX: 31.77 KG/M2 | SYSTOLIC BLOOD PRESSURE: 180 MMHG

## 2021-09-30 DIAGNOSIS — T14.90XA TRAUMA: ICD-10-CM

## 2021-09-30 DIAGNOSIS — N17.9 AKI (ACUTE KIDNEY INJURY): ICD-10-CM

## 2021-09-30 DIAGNOSIS — R23.9 ALTERATION IN SKIN INTEGRITY RELATED TO SURGICAL INCISION: ICD-10-CM

## 2021-09-30 DIAGNOSIS — I25.118 CORONARY ARTERY DISEASE OF NATIVE ARTERY OF NATIVE HEART WITH STABLE ANGINA PECTORIS: Primary | ICD-10-CM

## 2021-09-30 DIAGNOSIS — I50.32 CHRONIC DIASTOLIC HEART FAILURE: ICD-10-CM

## 2021-09-30 PROCEDURE — 99999 PR PBB SHADOW E&M-EST. PATIENT-LVL III: ICD-10-PCS | Mod: PBBFAC,,, | Performed by: INTERNAL MEDICINE

## 2021-09-30 PROCEDURE — 99213 OFFICE O/P EST LOW 20 MIN: CPT | Mod: PBBFAC,PN | Performed by: INTERNAL MEDICINE

## 2021-09-30 PROCEDURE — 99999 PR PBB SHADOW E&M-EST. PATIENT-LVL III: CPT | Mod: PBBFAC,,, | Performed by: INTERNAL MEDICINE

## 2021-09-30 PROCEDURE — 99214 PR OFFICE/OUTPT VISIT, EST, LEVL IV, 30-39 MIN: ICD-10-PCS | Mod: S$GLB,,, | Performed by: INTERNAL MEDICINE

## 2021-09-30 PROCEDURE — 99214 OFFICE O/P EST MOD 30 MIN: CPT | Mod: S$GLB,,, | Performed by: INTERNAL MEDICINE

## 2021-09-30 RX ORDER — ISOSORBIDE MONONITRATE 60 MG/1
60 TABLET, EXTENDED RELEASE ORAL DAILY
Qty: 90 TABLET | Refills: 1 | Status: SHIPPED | OUTPATIENT
Start: 2021-09-30 | End: 2022-04-28 | Stop reason: SDUPTHER

## 2021-10-01 ENCOUNTER — HOSPITAL ENCOUNTER (OUTPATIENT)
Dept: WOUND CARE | Facility: HOSPITAL | Age: 76
Discharge: HOME | End: 2021-10-01
Attending: INTERNAL MEDICINE
Payer: MEDICARE

## 2021-10-01 VITALS — SYSTOLIC BLOOD PRESSURE: 158 MMHG | HEART RATE: 55 BPM | DIASTOLIC BLOOD PRESSURE: 72 MMHG | TEMPERATURE: 98 F

## 2021-10-01 DIAGNOSIS — T14.90XA TRAUMA: ICD-10-CM

## 2021-10-01 DIAGNOSIS — T14.8XXA SKIN AVULSION: Primary | ICD-10-CM

## 2021-10-01 DIAGNOSIS — R23.9 ALTERATION IN SKIN INTEGRITY RELATED TO SURGICAL INCISION: ICD-10-CM

## 2021-10-01 PROCEDURE — 11042 DBRDMT SUBQ TIS 1ST 20SQCM/<: CPT

## 2021-10-01 PROCEDURE — 27201912 HC WOUND CARE DEBRIDEMENT SUPPLIES

## 2021-10-01 PROCEDURE — 99213 OFFICE O/P EST LOW 20 MIN: CPT

## 2021-10-01 RX ORDER — MUPIROCIN 20 MG/G
OINTMENT TOPICAL DAILY
Qty: 22 G | Refills: 3 | Status: SHIPPED | OUTPATIENT
Start: 2021-10-01 | End: 2021-11-08 | Stop reason: SDUPTHER

## 2021-10-11 ENCOUNTER — HOSPITAL ENCOUNTER (OUTPATIENT)
Dept: WOUND CARE | Facility: HOSPITAL | Age: 76
Discharge: HOME | End: 2021-10-11
Attending: PREVENTIVE MEDICINE
Payer: MEDICARE

## 2021-10-11 VITALS
DIASTOLIC BLOOD PRESSURE: 79 MMHG | BODY MASS INDEX: 31.76 KG/M2 | HEART RATE: 31 BPM | SYSTOLIC BLOOD PRESSURE: 171 MMHG | TEMPERATURE: 97 F | HEIGHT: 64 IN | WEIGHT: 186 LBS

## 2021-10-11 DIAGNOSIS — T14.90XA TRAUMA: ICD-10-CM

## 2021-10-11 DIAGNOSIS — T14.8XXA SKIN AVULSION: ICD-10-CM

## 2021-10-11 DIAGNOSIS — R23.9 ALTERATION IN SKIN INTEGRITY RELATED TO SURGICAL INCISION: Primary | ICD-10-CM

## 2021-10-11 PROCEDURE — 27201912 HC WOUND CARE DEBRIDEMENT SUPPLIES

## 2021-10-11 PROCEDURE — 11042 DBRDMT SUBQ TIS 1ST 20SQCM/<: CPT

## 2021-10-18 ENCOUNTER — HOSPITAL ENCOUNTER (OUTPATIENT)
Dept: WOUND CARE | Facility: HOSPITAL | Age: 76
Discharge: HOME | End: 2021-10-18
Attending: PREVENTIVE MEDICINE
Payer: MEDICARE

## 2021-10-18 VITALS
TEMPERATURE: 97 F | SYSTOLIC BLOOD PRESSURE: 161 MMHG | WEIGHT: 186 LBS | DIASTOLIC BLOOD PRESSURE: 71 MMHG | HEART RATE: 56 BPM | HEIGHT: 64 IN | BODY MASS INDEX: 31.76 KG/M2

## 2021-10-18 DIAGNOSIS — T14.8XXA SKIN AVULSION: ICD-10-CM

## 2021-10-18 DIAGNOSIS — R23.9 ALTERATION IN SKIN INTEGRITY RELATED TO SURGICAL INCISION: Primary | ICD-10-CM

## 2021-10-18 DIAGNOSIS — T14.90XA TRAUMA: ICD-10-CM

## 2021-10-18 PROCEDURE — 11042 DBRDMT SUBQ TIS 1ST 20SQCM/<: CPT

## 2021-10-18 PROCEDURE — 27201912 HC WOUND CARE DEBRIDEMENT SUPPLIES

## 2021-10-25 ENCOUNTER — HOSPITAL ENCOUNTER (OUTPATIENT)
Dept: WOUND CARE | Facility: HOSPITAL | Age: 76
Discharge: HOME | End: 2021-10-25
Attending: PREVENTIVE MEDICINE
Payer: MEDICARE

## 2021-10-25 VITALS
TEMPERATURE: 97 F | BODY MASS INDEX: 31.76 KG/M2 | HEART RATE: 58 BPM | SYSTOLIC BLOOD PRESSURE: 144 MMHG | HEIGHT: 64 IN | DIASTOLIC BLOOD PRESSURE: 66 MMHG | WEIGHT: 186 LBS

## 2021-10-25 DIAGNOSIS — T14.8XXA SKIN AVULSION: ICD-10-CM

## 2021-10-25 DIAGNOSIS — R23.9 ALTERATION IN SKIN INTEGRITY RELATED TO SURGICAL INCISION: Primary | ICD-10-CM

## 2021-10-25 DIAGNOSIS — T14.90XA TRAUMA: ICD-10-CM

## 2021-10-25 PROCEDURE — 11042 DBRDMT SUBQ TIS 1ST 20SQCM/<: CPT

## 2021-10-25 PROCEDURE — 27201912 HC WOUND CARE DEBRIDEMENT SUPPLIES

## 2021-11-01 ENCOUNTER — HOSPITAL ENCOUNTER (OUTPATIENT)
Dept: WOUND CARE | Facility: HOSPITAL | Age: 76
Discharge: HOME | End: 2021-11-01
Attending: PREVENTIVE MEDICINE
Payer: MEDICARE

## 2021-11-01 VITALS
SYSTOLIC BLOOD PRESSURE: 124 MMHG | TEMPERATURE: 98 F | HEIGHT: 64 IN | DIASTOLIC BLOOD PRESSURE: 60 MMHG | WEIGHT: 186 LBS | HEART RATE: 60 BPM | BODY MASS INDEX: 31.76 KG/M2

## 2021-11-01 DIAGNOSIS — R23.9 ALTERATION IN SKIN INTEGRITY RELATED TO SURGICAL INCISION: Primary | ICD-10-CM

## 2021-11-01 DIAGNOSIS — T14.8XXA SKIN AVULSION: ICD-10-CM

## 2021-11-01 DIAGNOSIS — T14.90XA TRAUMA: ICD-10-CM

## 2021-11-01 PROCEDURE — 27201912 HC WOUND CARE DEBRIDEMENT SUPPLIES

## 2021-11-01 PROCEDURE — 11042 DBRDMT SUBQ TIS 1ST 20SQCM/<: CPT

## 2021-11-08 ENCOUNTER — HOSPITAL ENCOUNTER (OUTPATIENT)
Dept: WOUND CARE | Facility: HOSPITAL | Age: 76
Discharge: HOME | End: 2021-11-08
Attending: PREVENTIVE MEDICINE
Payer: MEDICARE

## 2021-11-08 VITALS
HEIGHT: 64 IN | HEART RATE: 58 BPM | BODY MASS INDEX: 31.76 KG/M2 | DIASTOLIC BLOOD PRESSURE: 65 MMHG | SYSTOLIC BLOOD PRESSURE: 146 MMHG | WEIGHT: 186 LBS | TEMPERATURE: 97 F

## 2021-11-08 DIAGNOSIS — T14.8XXA SKIN AVULSION: Primary | ICD-10-CM

## 2021-11-08 DIAGNOSIS — R23.9 ALTERATION IN SKIN INTEGRITY RELATED TO SURGICAL INCISION: ICD-10-CM

## 2021-11-08 DIAGNOSIS — T14.90XA TRAUMA: ICD-10-CM

## 2021-11-08 PROCEDURE — 11042 DBRDMT SUBQ TIS 1ST 20SQCM/<: CPT

## 2021-11-08 PROCEDURE — 27201912 HC WOUND CARE DEBRIDEMENT SUPPLIES

## 2021-11-08 RX ORDER — MUPIROCIN 20 MG/G
OINTMENT TOPICAL DAILY
Qty: 22 G | Refills: 3 | Status: SHIPPED | OUTPATIENT
Start: 2021-11-08 | End: 2021-12-06

## 2021-11-15 ENCOUNTER — HOSPITAL ENCOUNTER (OUTPATIENT)
Dept: WOUND CARE | Facility: HOSPITAL | Age: 76
Discharge: HOME | End: 2021-11-15
Attending: PREVENTIVE MEDICINE
Payer: MEDICARE

## 2021-11-15 VITALS
HEART RATE: 63 BPM | TEMPERATURE: 98 F | SYSTOLIC BLOOD PRESSURE: 140 MMHG | WEIGHT: 186 LBS | HEIGHT: 64 IN | DIASTOLIC BLOOD PRESSURE: 67 MMHG | BODY MASS INDEX: 31.76 KG/M2

## 2021-11-15 DIAGNOSIS — T14.90XA TRAUMA: ICD-10-CM

## 2021-11-15 DIAGNOSIS — R23.9 ALTERATION IN SKIN INTEGRITY RELATED TO SURGICAL INCISION: ICD-10-CM

## 2021-11-15 DIAGNOSIS — T14.8XXA SKIN AVULSION: Primary | ICD-10-CM

## 2021-11-15 PROCEDURE — 11042 DBRDMT SUBQ TIS 1ST 20SQCM/<: CPT

## 2021-11-15 PROCEDURE — 27201912 HC WOUND CARE DEBRIDEMENT SUPPLIES

## 2021-11-29 ENCOUNTER — HOSPITAL ENCOUNTER (OUTPATIENT)
Dept: WOUND CARE | Facility: HOSPITAL | Age: 76
Discharge: HOME | End: 2021-11-29
Attending: PREVENTIVE MEDICINE
Payer: MEDICARE

## 2021-11-29 VITALS
SYSTOLIC BLOOD PRESSURE: 137 MMHG | HEIGHT: 64 IN | TEMPERATURE: 97 F | WEIGHT: 186 LBS | DIASTOLIC BLOOD PRESSURE: 63 MMHG | HEART RATE: 61 BPM | BODY MASS INDEX: 31.76 KG/M2

## 2021-11-29 DIAGNOSIS — T14.8XXA SKIN AVULSION: ICD-10-CM

## 2021-11-29 DIAGNOSIS — R23.9 ALTERATION IN SKIN INTEGRITY RELATED TO SURGICAL INCISION: Primary | ICD-10-CM

## 2021-11-29 DIAGNOSIS — T14.90XA TRAUMA: ICD-10-CM

## 2021-11-29 PROCEDURE — 27201912 HC WOUND CARE DEBRIDEMENT SUPPLIES

## 2021-11-29 PROCEDURE — 11042 DBRDMT SUBQ TIS 1ST 20SQCM/<: CPT

## 2021-12-06 ENCOUNTER — HOSPITAL ENCOUNTER (OUTPATIENT)
Dept: WOUND CARE | Facility: HOSPITAL | Age: 76
Discharge: HOME | End: 2021-12-06
Attending: PREVENTIVE MEDICINE
Payer: MEDICARE

## 2021-12-06 VITALS
SYSTOLIC BLOOD PRESSURE: 154 MMHG | DIASTOLIC BLOOD PRESSURE: 71 MMHG | TEMPERATURE: 98 F | BODY MASS INDEX: 31.76 KG/M2 | WEIGHT: 186 LBS | HEART RATE: 55 BPM | HEIGHT: 64 IN

## 2021-12-06 DIAGNOSIS — R23.9 ALTERATION IN SKIN INTEGRITY RELATED TO SURGICAL INCISION: ICD-10-CM

## 2021-12-06 DIAGNOSIS — T14.8XXA SKIN AVULSION: ICD-10-CM

## 2021-12-06 DIAGNOSIS — T14.90XA TRAUMA: Primary | ICD-10-CM

## 2021-12-06 PROCEDURE — 99213 OFFICE O/P EST LOW 20 MIN: CPT

## 2021-12-06 RX ORDER — MUPIROCIN 20 MG/G
OINTMENT TOPICAL 2 TIMES DAILY
Qty: 22 G | Refills: 3 | Status: SHIPPED | OUTPATIENT
Start: 2021-12-06 | End: 2021-12-20

## 2021-12-09 ENCOUNTER — TELEPHONE (OUTPATIENT)
Dept: CARDIOLOGY | Facility: CLINIC | Age: 76
End: 2021-12-09
Payer: MEDICARE

## 2021-12-13 ENCOUNTER — HOSPITAL ENCOUNTER (OUTPATIENT)
Dept: WOUND CARE | Facility: HOSPITAL | Age: 76
Discharge: HOME | End: 2021-12-13
Attending: PREVENTIVE MEDICINE
Payer: MEDICARE

## 2021-12-13 VITALS
HEART RATE: 59 BPM | SYSTOLIC BLOOD PRESSURE: 167 MMHG | HEIGHT: 64 IN | TEMPERATURE: 97 F | WEIGHT: 186 LBS | DIASTOLIC BLOOD PRESSURE: 64 MMHG | BODY MASS INDEX: 31.76 KG/M2

## 2021-12-13 DIAGNOSIS — T14.90XA TRAUMA: ICD-10-CM

## 2021-12-13 DIAGNOSIS — T14.8XXA SKIN AVULSION: ICD-10-CM

## 2021-12-13 DIAGNOSIS — R23.9 ALTERATION IN SKIN INTEGRITY RELATED TO SURGICAL INCISION: Primary | ICD-10-CM

## 2021-12-13 PROCEDURE — 99212 OFFICE O/P EST SF 10 MIN: CPT

## 2021-12-20 ENCOUNTER — OFFICE VISIT (OUTPATIENT)
Dept: FAMILY MEDICINE | Facility: CLINIC | Age: 76
End: 2021-12-20
Payer: MEDICARE

## 2021-12-20 VITALS
WEIGHT: 177.94 LBS | HEIGHT: 64 IN | DIASTOLIC BLOOD PRESSURE: 80 MMHG | OXYGEN SATURATION: 95 % | HEART RATE: 56 BPM | BODY MASS INDEX: 30.38 KG/M2 | SYSTOLIC BLOOD PRESSURE: 136 MMHG

## 2021-12-20 DIAGNOSIS — E66.09 CLASS 1 OBESITY DUE TO EXCESS CALORIES WITH SERIOUS COMORBIDITY AND BODY MASS INDEX (BMI) OF 30.0 TO 30.9 IN ADULT: ICD-10-CM

## 2021-12-20 DIAGNOSIS — E78.2 MIXED HYPERLIPIDEMIA: ICD-10-CM

## 2021-12-20 DIAGNOSIS — R53.83 CAREGIVER WITH FATIGUE: ICD-10-CM

## 2021-12-20 DIAGNOSIS — I25.10 PRESENCE OF STENT IN CORONARY ARTERY IN PATIENT WITH CORONARY ARTERY DISEASE: ICD-10-CM

## 2021-12-20 DIAGNOSIS — F43.21 ADJUSTMENT DISORDER WITH DEPRESSED MOOD: ICD-10-CM

## 2021-12-20 DIAGNOSIS — N18.31 STAGE 3A CHRONIC KIDNEY DISEASE: ICD-10-CM

## 2021-12-20 DIAGNOSIS — Z23 IMMUNIZATION DUE: ICD-10-CM

## 2021-12-20 DIAGNOSIS — I25.10 CORONARY ARTERY DISEASE INVOLVING NATIVE CORONARY ARTERY OF NATIVE HEART WITHOUT ANGINA PECTORIS: ICD-10-CM

## 2021-12-20 DIAGNOSIS — Z95.5 PRESENCE OF STENT IN CORONARY ARTERY IN PATIENT WITH CORONARY ARTERY DISEASE: ICD-10-CM

## 2021-12-20 DIAGNOSIS — Z78.0 ENCOUNTER FOR OSTEOPOROSIS SCREENING IN ASYMPTOMATIC POSTMENOPAUSAL PATIENT: ICD-10-CM

## 2021-12-20 DIAGNOSIS — Z13.820 ENCOUNTER FOR OSTEOPOROSIS SCREENING IN ASYMPTOMATIC POSTMENOPAUSAL PATIENT: ICD-10-CM

## 2021-12-20 DIAGNOSIS — R09.81 SINUS CONGESTION: ICD-10-CM

## 2021-12-20 DIAGNOSIS — I10 PRIMARY HYPERTENSION: Primary | ICD-10-CM

## 2021-12-20 PROBLEM — M54.50 CHRONIC LOW BACK PAIN: Status: ACTIVE | Noted: 2021-12-20

## 2021-12-20 PROBLEM — M41.9 ACQUIRED SCOLIOSIS: Status: ACTIVE | Noted: 2021-12-20

## 2021-12-20 PROBLEM — M19.90 ARTHRITIS: Status: ACTIVE | Noted: 2021-12-20

## 2021-12-20 PROBLEM — I35.0 AORTIC VALVE STENOSIS: Status: ACTIVE | Noted: 2021-12-20

## 2021-12-20 PROBLEM — F41.1 GENERALIZED ANXIETY DISORDER: Status: ACTIVE | Noted: 2021-12-20

## 2021-12-20 PROBLEM — I16.0 HYPERTENSIVE URGENCY: Status: RESOLVED | Noted: 2021-05-22 | Resolved: 2021-12-20

## 2021-12-20 PROBLEM — M79.605 LEFT LEG PAIN: Status: RESOLVED | Noted: 2020-01-15 | Resolved: 2021-12-20

## 2021-12-20 PROBLEM — M17.9 OSTEOARTHRITIS OF KNEE: Status: ACTIVE | Noted: 2021-12-20

## 2021-12-20 PROBLEM — N18.30 STAGE 3 CHRONIC KIDNEY DISEASE: Status: ACTIVE | Noted: 2021-12-20

## 2021-12-20 PROBLEM — G89.29 CHRONIC LOW BACK PAIN: Status: ACTIVE | Noted: 2021-12-20

## 2021-12-20 PROBLEM — T14.8XXA SKIN AVULSION: Status: RESOLVED | Noted: 2020-01-15 | Resolved: 2021-12-20

## 2021-12-20 PROBLEM — Z96.651 STATUS POST RIGHT KNEE REPLACEMENT: Status: ACTIVE | Noted: 2021-12-20

## 2021-12-20 PROCEDURE — 99214 OFFICE O/P EST MOD 30 MIN: CPT | Mod: 25,S$GLB,, | Performed by: FAMILY MEDICINE

## 2021-12-20 PROCEDURE — 90694 FLU VACCINE - QUADRIVALENT - ADJUVANTED: ICD-10-PCS | Mod: S$GLB,,, | Performed by: FAMILY MEDICINE

## 2021-12-20 PROCEDURE — 99214 PR OFFICE/OUTPT VISIT, EST, LEVL IV, 30-39 MIN: ICD-10-PCS | Mod: 25,S$GLB,, | Performed by: FAMILY MEDICINE

## 2021-12-20 PROCEDURE — 99999 PR PBB SHADOW E&M-EST. PATIENT-LVL V: ICD-10-PCS | Mod: PBBFAC,,, | Performed by: FAMILY MEDICINE

## 2021-12-20 PROCEDURE — 99999 PR PBB SHADOW E&M-EST. PATIENT-LVL V: CPT | Mod: PBBFAC,,, | Performed by: FAMILY MEDICINE

## 2021-12-20 PROCEDURE — G0008 ADMIN INFLUENZA VIRUS VAC: HCPCS | Mod: S$GLB,,, | Performed by: FAMILY MEDICINE

## 2021-12-20 PROCEDURE — G0008 FLU VACCINE - QUADRIVALENT - ADJUVANTED: ICD-10-PCS | Mod: S$GLB,,, | Performed by: FAMILY MEDICINE

## 2021-12-20 PROCEDURE — 90694 VACC AIIV4 NO PRSRV 0.5ML IM: CPT | Mod: S$GLB,,, | Performed by: FAMILY MEDICINE

## 2021-12-20 RX ORDER — BUPRENORPHINE 10 UG/H
PATCH TRANSDERMAL
COMMUNITY
End: 2022-03-09

## 2021-12-20 RX ORDER — DULOXETIN HYDROCHLORIDE 30 MG/1
CAPSULE, DELAYED RELEASE ORAL
Qty: 60 CAPSULE | Refills: 3 | Status: SHIPPED | OUTPATIENT
Start: 2021-12-20 | End: 2022-05-06 | Stop reason: SDUPTHER

## 2021-12-20 RX ORDER — AMOXICILLIN AND CLAVULANATE POTASSIUM 875; 125 MG/1; MG/1
TABLET, FILM COATED ORAL
COMMUNITY
End: 2022-02-23

## 2021-12-20 RX ORDER — OXYCODONE AND ACETAMINOPHEN 10; 325 MG/1; MG/1
1 TABLET ORAL 3 TIMES DAILY PRN
COMMUNITY
Start: 2021-11-26 | End: 2022-04-26

## 2021-12-20 RX ORDER — BUPRENORPHINE 10 UG/H
PATCH TRANSDERMAL
COMMUNITY
Start: 2021-08-11 | End: 2022-04-26

## 2021-12-20 RX ORDER — CLINDAMYCIN HYDROCHLORIDE 300 MG/1
CAPSULE ORAL
COMMUNITY
End: 2022-02-23

## 2021-12-20 RX ORDER — CLINDAMYCIN HYDROCHLORIDE 150 MG/1
CAPSULE ORAL
COMMUNITY
End: 2022-02-23

## 2021-12-20 RX ORDER — FLUTICASONE PROPIONATE 50 MCG
2 SPRAY, SUSPENSION (ML) NASAL DAILY
Qty: 15.8 ML | Refills: 2 | Status: SHIPPED | OUTPATIENT
Start: 2021-12-20 | End: 2022-08-27

## 2021-12-20 RX ORDER — OXYCODONE AND ACETAMINOPHEN 10; 325 MG/1; MG/1
TABLET ORAL
COMMUNITY
End: 2022-04-26

## 2021-12-27 ENCOUNTER — HOSPITAL ENCOUNTER (OUTPATIENT)
Dept: WOUND CARE | Facility: HOSPITAL | Age: 76
Discharge: HOME | End: 2021-12-27
Attending: PREVENTIVE MEDICINE
Payer: MEDICARE

## 2021-12-27 VITALS
HEART RATE: 51 BPM | SYSTOLIC BLOOD PRESSURE: 115 MMHG | BODY MASS INDEX: 30.22 KG/M2 | TEMPERATURE: 99 F | WEIGHT: 177 LBS | DIASTOLIC BLOOD PRESSURE: 53 MMHG | HEIGHT: 64 IN

## 2021-12-27 DIAGNOSIS — L97.912 TRAUMATIC ULCER OF RIGHT LOWER LEG WITH FAT LAYER EXPOSED: ICD-10-CM

## 2021-12-27 DIAGNOSIS — L97.919: ICD-10-CM

## 2021-12-27 PROCEDURE — 99212 OFFICE O/P EST SF 10 MIN: CPT

## 2022-01-05 ENCOUNTER — TELEPHONE (OUTPATIENT)
Dept: INTERNAL MEDICINE | Facility: CLINIC | Age: 77
End: 2022-01-05
Payer: MEDICARE

## 2022-01-05 NOTE — TELEPHONE ENCOUNTER
Returned the patients phone call and informed her that no other medication as been put in place of the metoprolol due to the low blood pressure. Patient verbalized understanding.

## 2022-01-05 NOTE — TELEPHONE ENCOUNTER
----- Message from Nusrat Morrissey sent at 1/5/2022 10:02 AM CST -----  Contact: [phone]  Type:  Needs Medical Advice    Who Called:Pt   Reason for call;regarding a question regarding her medication for metoprolol succinate (TOPROL-XL) 25 MG 24 hr tablet, pt's Summary state to stop taking the medication and would like to know what medication is taking it's Place  Would the patient rather a call back or a response via [facility]?  Call back  Best Call Back Number: [phone]

## 2022-01-10 ENCOUNTER — TELEPHONE (OUTPATIENT)
Dept: FAMILY MEDICINE | Facility: CLINIC | Age: 77
End: 2022-01-10
Payer: MEDICARE

## 2022-01-10 ENCOUNTER — TELEPHONE (OUTPATIENT)
Dept: INTERNAL MEDICINE | Facility: CLINIC | Age: 77
End: 2022-01-10
Payer: MEDICARE

## 2022-01-10 NOTE — TELEPHONE ENCOUNTER
----- Message from Nelly De La Garza sent at 1/10/2022  3:51 PM CST -----  Type:  Patient Requesting Referral    Who Called:pt  Does the patient already have the specialty appointment scheduled?:no  If yes, what is the date of that appointment?:no  Referral to What Specialty:  Reason for Referral:Back  Does the patient want the referral with a specific physician?:open   [facility]  Patient Requesting a Response?:yes  Would the patient rather a call back or a response via [facility]? call  Best Call Back Number:[phone]  Additional Information:         Pt would like to speak w/ someone regarding this issues

## 2022-01-10 NOTE — TELEPHONE ENCOUNTER
Left message requesting callback.  Advised that I did not see where patient has ever seen Dr Brown.  Would not be able to place order.  Asked to call back if she is looking to establish.

## 2022-01-10 NOTE — TELEPHONE ENCOUNTER
----- Message from Nelly De La Garza sent at 1/10/2022  4:15 PM CST -----  Type:  Patient Returning Call    Who Called:pt  Who Left Message for Patient:tyrone  Does the patient know what this is regarding?:yes  Would the patient rather a call back or a response via [facility]? call  Best Call Back [phone]  Additional Information:     Returning a call  Pt stated she is confused and she sent wrong msg to wrong provider  Pt would like a call back

## 2022-01-12 ENCOUNTER — OFFICE VISIT (OUTPATIENT)
Dept: CARDIOLOGY | Facility: CLINIC | Age: 77
End: 2022-01-12
Payer: MEDICARE

## 2022-01-12 VITALS
SYSTOLIC BLOOD PRESSURE: 134 MMHG | WEIGHT: 175.25 LBS | BODY MASS INDEX: 29.92 KG/M2 | DIASTOLIC BLOOD PRESSURE: 71 MMHG | HEART RATE: 54 BPM | HEIGHT: 64 IN

## 2022-01-12 DIAGNOSIS — I25.10 ATHEROSCLEROSIS OF NATIVE CORONARY ARTERY OF NATIVE HEART WITHOUT ANGINA PECTORIS: ICD-10-CM

## 2022-01-12 DIAGNOSIS — R07.9 CHEST PAIN, UNSPECIFIED TYPE: ICD-10-CM

## 2022-01-12 DIAGNOSIS — I25.10 CORONARY ARTERY DISEASE INVOLVING NATIVE CORONARY ARTERY OF NATIVE HEART WITHOUT ANGINA PECTORIS: ICD-10-CM

## 2022-01-12 PROCEDURE — 3078F DIAST BP <80 MM HG: CPT | Mod: CPTII,S$GLB,, | Performed by: INTERNAL MEDICINE

## 2022-01-12 PROCEDURE — 3288F FALL RISK ASSESSMENT DOCD: CPT | Mod: CPTII,S$GLB,, | Performed by: INTERNAL MEDICINE

## 2022-01-12 PROCEDURE — 3078F PR MOST RECENT DIASTOLIC BLOOD PRESSURE < 80 MM HG: ICD-10-PCS | Mod: CPTII,S$GLB,, | Performed by: INTERNAL MEDICINE

## 2022-01-12 PROCEDURE — 99999 PR PBB SHADOW E&M-EST. PATIENT-LVL V: CPT | Mod: PBBFAC,,, | Performed by: INTERNAL MEDICINE

## 2022-01-12 PROCEDURE — 1159F PR MEDICATION LIST DOCUMENTED IN MEDICAL RECORD: ICD-10-PCS | Mod: CPTII,S$GLB,, | Performed by: INTERNAL MEDICINE

## 2022-01-12 PROCEDURE — 99214 OFFICE O/P EST MOD 30 MIN: CPT | Mod: S$GLB,,, | Performed by: INTERNAL MEDICINE

## 2022-01-12 PROCEDURE — 1126F PR PAIN SEVERITY QUANTIFIED, NO PAIN PRESENT: ICD-10-PCS | Mod: CPTII,S$GLB,, | Performed by: INTERNAL MEDICINE

## 2022-01-12 PROCEDURE — 1160F RVW MEDS BY RX/DR IN RCRD: CPT | Mod: CPTII,S$GLB,, | Performed by: INTERNAL MEDICINE

## 2022-01-12 PROCEDURE — 1101F PR PT FALLS ASSESS DOC 0-1 FALLS W/OUT INJ PAST YR: ICD-10-PCS | Mod: CPTII,S$GLB,, | Performed by: INTERNAL MEDICINE

## 2022-01-12 PROCEDURE — 99214 PR OFFICE/OUTPT VISIT, EST, LEVL IV, 30-39 MIN: ICD-10-PCS | Mod: S$GLB,,, | Performed by: INTERNAL MEDICINE

## 2022-01-12 PROCEDURE — 3075F PR MOST RECENT SYSTOLIC BLOOD PRESS GE 130-139MM HG: ICD-10-PCS | Mod: CPTII,S$GLB,, | Performed by: INTERNAL MEDICINE

## 2022-01-12 PROCEDURE — 3288F PR FALLS RISK ASSESSMENT DOCUMENTED: ICD-10-PCS | Mod: CPTII,S$GLB,, | Performed by: INTERNAL MEDICINE

## 2022-01-12 PROCEDURE — 1159F MED LIST DOCD IN RCRD: CPT | Mod: CPTII,S$GLB,, | Performed by: INTERNAL MEDICINE

## 2022-01-12 PROCEDURE — 3075F SYST BP GE 130 - 139MM HG: CPT | Mod: CPTII,S$GLB,, | Performed by: INTERNAL MEDICINE

## 2022-01-12 PROCEDURE — 99999 PR PBB SHADOW E&M-EST. PATIENT-LVL V: ICD-10-PCS | Mod: PBBFAC,,, | Performed by: INTERNAL MEDICINE

## 2022-01-12 PROCEDURE — 1101F PT FALLS ASSESS-DOCD LE1/YR: CPT | Mod: CPTII,S$GLB,, | Performed by: INTERNAL MEDICINE

## 2022-01-12 PROCEDURE — 1126F AMNT PAIN NOTED NONE PRSNT: CPT | Mod: CPTII,S$GLB,, | Performed by: INTERNAL MEDICINE

## 2022-01-12 PROCEDURE — 1160F PR REVIEW ALL MEDS BY PRESCRIBER/CLIN PHARMACIST DOCUMENTED: ICD-10-PCS | Mod: CPTII,S$GLB,, | Performed by: INTERNAL MEDICINE

## 2022-01-12 RX ORDER — CHLORHEXIDINE GLUCONATE ORAL RINSE 1.2 MG/ML
15 SOLUTION DENTAL 2 TIMES DAILY
COMMUNITY
Start: 2021-12-29 | End: 2022-02-23

## 2022-01-12 RX ORDER — CEPHALEXIN 500 MG/1
CAPSULE ORAL
COMMUNITY
Start: 2021-12-29 | End: 2022-02-23

## 2022-01-12 RX ORDER — MUPIROCIN 20 MG/G
OINTMENT TOPICAL 2 TIMES DAILY
COMMUNITY
Start: 2021-12-22 | End: 2022-06-24 | Stop reason: SDUPTHER

## 2022-01-12 NOTE — PROGRESS NOTES
[facility] [address]     SUBJECTIVE:     History of Present Illness:  Patient is a 76 y.o. female presents with need to establish care. Was seeing a cardiologist at [facility] and last visit was within the past year.     Primary Diagnosis:   1. Hypertension  2. DM: none  3. Past smoker: over 10 years ago   4. Heart disease: In 2000, had one stent placed at [facility]. IN 2005, had 2 stents placed. - unknown anatomy     ROS     Since last visit in 9/21:   A. Taken 10 sl NTG since September due to heaviness; under a lot of stress; Heavy feeling in chest occurs at rest; sometimes when washing the dishes. No radiation; lasts about 20 mins or so. Softens with NTG. Walks in store without chest tightness   B. No shortness of breath  C. Rare palpitations   D. Blood pressures normal at home   Review of patient's allergies indicates:   Allergen Reactions    Iodinated contrast media Anaphylaxis and Other (See Comments)    Phenergan [promethazine]      Vomiting       Past Medical History:   Diagnosis Date    Coronary artery disease     Epilepsy     Hyperlipidemia     Hypertension        Past Surgical History:   Procedure Laterality Date    APPENDECTOMY      BACK SURGERY      CORONARY STENT PLACEMENT      HYSTERECTOMY      TONSILLECTOMY         Family History   Problem Relation Age of Onset    Heart disease Mother     Heart disease Father        Social History     Tobacco Use    Smoking status: Never Smoker    Smokeless tobacco: Never Used   Substance Use Topics    Alcohol use: No    Drug use: No        Past Hospitalization:     Home meds:  Current Outpatient Medications on File Prior to Visit   Medication Sig Dispense Refill    amoxicillin-clavulanate 875-125mg (AUGMENTIN) 875-125 mg per tablet amoxicillin 875 mg-potassium clavulanate 125 mg tablet   TAKE ONE TABLET BY MOUTH TWICE DAILY      aspirin (ECOTRIN) 81 MG EC tablet Take 81 mg by mouth once daily.      buprenorphine (BUTRANS) 10 mcg/hour PTWK buprenorphine 10  mcg/hour weekly transdermal patch   APPLY 1 PATCH EVERY WEEK AS NEEDED FOR 30 DAYS      buprenorphine (BUTRANS) 10 mcg/hour PTWK SMARTSI Patch(s) Topical Once a Week PRN      butalbital-acetaminophen-caff -40 mg Cap TAKE ONE CAPSULE BY MOUTH ONCE TO TWICE DAILY AS NEEDED FOR MIGRAINE headaches      calcium carbonate/vitamin D3 (VITAMIN D-3 ORAL) Take by mouth once daily.      cephALEXin (KEFLEX) 500 MG capsule Take by mouth.      chlorhexidine (PERIDEX) 0.12 % solution 15 mLs 2 (two) times daily.      clindamycin (CLEOCIN) 150 MG capsule clindamycin HCl 150 mg capsule   TAKE TWO CAPSULES BY MOUTH EVERY 8 HOURS FOR 7 DAYS      clindamycin (CLEOCIN) 300 MG capsule clindamycin HCl 300 mg capsule   TAKE 1 CAPSULE BY MOUTH THREE TIMES DAILY FOR 7 DAYS      DULoxetine (CYMBALTA) 30 MG capsule Take 1 tab po daily x 2 weeks then 2 tabs po daily thereafter. 60 capsule 3    fluticasone propionate (FLONASE) 50 mcg/actuation nasal spray 2 sprays (100 mcg total) by Each Nostril route once daily. 15.8 mL 2    furosemide (LASIX) 40 MG tablet Take 40 mg by mouth once daily.       gabapentin (NEURONTIN) 600 MG tablet Take 600 mg by mouth 3 (three) times daily.      irbesartan (AVAPRO) 300 MG tablet Take 1 tablet (300 mg total) by mouth every evening. 60 tablet 5    isosorbide mononitrate (IMDUR) 60 MG 24 hr tablet Take 1 tablet (60 mg total) by mouth once daily. 90 tablet 1    metoprolol succinate (TOPROL-XL) 25 MG 24 hr tablet Take 25 mg by mouth once daily.      multivitamin capsule Take 1 capsule by mouth once daily.      mupirocin (BACTROBAN) 2 % ointment 2 (two) times daily. apply to affected area      omeprazole (PRILOSEC) 40 MG capsule Take 40 mg by mouth once daily.      oxyCODONE (ROXICODONE) 10 mg Tab immediate release tablet oxycodone 10 mg tablet   Take 1 tablet 3 times a day by oral route as needed for 7 days.      oxyCODONE-acetaminophen (PERCOCET)  mg per tablet  oxycodone-acetaminophen 10 mg-325 mg tablet   TAKE ONE TABLET BY MOUTH THREE TIMES DAILY AS NEEDED FOR PAIN      oxyCODONE-acetaminophen (PERCOCET)  mg per tablet Take 1 tablet by mouth 3 (three) times daily as needed.      rosuvastatin 40 mg CpSP Take 40 mg by mouth once daily.        No current facility-administered medications on file prior to visit.       Cardiac meds:  Metoprolol succinate 25 mg  Imdur 60 mg  Irbesartan 300 mg  Lasix 40 mg  rosuvastatin 40 mg   ASA 81 mg       OBJECTIVE:     Vital Signs (Most Recent)  Pulse: (!) 54 (01/12/22 1326)  BP: 134/71 (01/12/22 1326)      Physical Exam:    Neck: decreased  Carotids upstroke ,basilar  bruits; normal JVP  Lungs :clear  Heart: RR, normal S1,S2, systolic ejection murmur base to LSB, no gallops  Abd: no masses; no bruits;   Exts: normal DP and PT pulses bilaterally, trace  edema noted           LABS      CBC  Hemoglobin (g/dL)   Date Value   05/24/2021 13.1   05/22/2021 11.4 (L)   05/22/2021 11.4 (L)     Hematocrit (%)   Date Value   05/24/2021 41.1   05/22/2021 34.7 (L)   05/22/2021 34.9 (L)          BMP  Sodium (mmol/L)   Date Value   05/24/2021 138   05/22/2021 138   05/22/2021 138     Potassium (mmol/L)   Date Value   05/24/2021 4.5   05/22/2021 4.3   05/22/2021 4.8     CO2 (mmol/L)   Date Value   05/24/2021 26   05/22/2021 29   05/22/2021 26     Chloride (mmol/L)   Date Value   05/24/2021 100   05/22/2021 100   05/22/2021 101     BUN (mg/dL)   Date Value   05/24/2021 31 (H)   05/22/2021 26 (H)   05/22/2021 28 (H)     Creatinine (mg/dL)   Date Value   05/24/2021 1.8 (H)   05/22/2021 1.5 (H)   05/22/2021 1.7 (H)     Glucose (mg/dL)   Date Value   05/24/2021 133 (H)   05/22/2021 89   05/22/2021 91     eGFR if non African American (mL/min/1.73 m^2)   Date Value   05/24/2021 27 (A)   05/22/2021 34 (A)   05/22/2021 29 (A)       BNP  BNP (pg/mL)   Date Value   05/22/2021 110 (H)   11/18/2013 175 (H)       Troponin panel:   No results found for:  "TROPONIN      COAGS  INR (no units)   Date Value   2013 1.0   2013 1.0     aPTT (sec)   Date Value   2013 25.7         Lipid panel:    Lab Results   Component Value Date    CHOL 207 (H) 2021     Lab Results   Component Value Date    HDL 55 2021     Lab Results   Component Value Date    LDLCALC 111.4 2021     Lab Results   Component Value Date    TRIG 203 (H) 2021     Lab Results   Component Value Date    CHOLHDL 26.6 2021       Diagnostic Results:    EK21: NSR; normal   Echo: 21: normal EF, diastolic dysfunction; mild TR; PAS 43; MIGDALIA 1.18 cm2 with aortic sclerosis   Nuclear stress: : negative for ischemia; normal EF     Chart review:  GFR 27 1 month ago    ASSESSMENT/PLAN:     1. CAD: s/p stent placement - now with "? angina" occasionally with some NTG SL use. Is this really angina vs reflux   2. Chronic diastolic dysfunction  3. Hypertension controlled  4. Moderate aortic stenosis at the most  5. Last set of lipids - .4;   6. CKD 3  Plan: 1. Nuclear stress to assess for ischemia - unless large area, due to renal dysfunction, will treat medically  2. Continue all medications  3.return 3 months - call results     Patricia Rogers MD        "

## 2022-01-20 ENCOUNTER — HOSPITAL ENCOUNTER (OUTPATIENT)
Dept: RADIOLOGY | Facility: HOSPITAL | Age: 77
Discharge: HOME | End: 2022-01-20
Attending: FAMILY MEDICINE
Payer: MEDICARE

## 2022-01-20 DIAGNOSIS — Z78.0 ENCOUNTER FOR OSTEOPOROSIS SCREENING IN ASYMPTOMATIC POSTMENOPAUSAL PATIENT: ICD-10-CM

## 2022-01-20 DIAGNOSIS — Z13.820 ENCOUNTER FOR OSTEOPOROSIS SCREENING IN ASYMPTOMATIC POSTMENOPAUSAL PATIENT: ICD-10-CM

## 2022-01-20 PROCEDURE — 77080 DXA BONE DENSITY AXIAL: CPT | Mod: 26,,, | Performed by: STUDENT IN AN ORGANIZED HEALTH CARE EDUCATION/TRAINING PROGRAM

## 2022-01-20 PROCEDURE — 77080 DXA BONE DENSITY AXIAL: CPT | Mod: TC

## 2022-01-20 PROCEDURE — 77080 DEXA BONE DENSITY SPINE HIP: ICD-10-PCS | Mod: 26,,, | Performed by: STUDENT IN AN ORGANIZED HEALTH CARE EDUCATION/TRAINING PROGRAM

## 2022-02-16 ENCOUNTER — TELEPHONE (OUTPATIENT)
Dept: FAMILY MEDICINE | Facility: CLINIC | Age: 77
End: 2022-02-16
Payer: MEDICARE

## 2022-02-16 NOTE — TELEPHONE ENCOUNTER
Pt. States intermittent bladder spasms. Thinks she may have a UTI. Offered Azo to help with spasms until she can see Dr. Hummel. Offered tomorrow, but did not accept. Appointment set for 2/23/22. Verbalized understanding

## 2022-02-16 NOTE — TELEPHONE ENCOUNTER
----- Message from Leatha Ivy sent at 2/16/2022  9:09 AM CST -----  Type:  Needs Medical Advice    Who Called: self  Reason:Patient has a possible UTI and needs to speak with nurse. She is wondering if you can prescribe her something until she can come in? Her  had  a stroke so she would need to find someone for him before she can come in   Would the patient rather a call back or a response via [facility]? call  Best Call Back Number: [phone]  Additional Information:none

## 2022-02-21 ENCOUNTER — OFFICE VISIT (OUTPATIENT)
Dept: RHEUMATOLOGY | Facility: CLINIC | Age: 77
End: 2022-02-21
Payer: MEDICARE

## 2022-02-21 VITALS
HEIGHT: 64 IN | BODY MASS INDEX: 29.79 KG/M2 | DIASTOLIC BLOOD PRESSURE: 69 MMHG | SYSTOLIC BLOOD PRESSURE: 145 MMHG | HEART RATE: 62 BPM | WEIGHT: 174.5 LBS | RESPIRATION RATE: 18 BRPM

## 2022-02-21 DIAGNOSIS — Z87.39 HISTORY OF FIBROMYALGIA: ICD-10-CM

## 2022-02-21 DIAGNOSIS — R76.8 POSITIVE ANA (ANTINUCLEAR ANTIBODY): ICD-10-CM

## 2022-02-21 DIAGNOSIS — Z91.81 RISK FOR FALLS: ICD-10-CM

## 2022-02-21 DIAGNOSIS — M18.0 PRIMARY OSTEOARTHRITIS OF BOTH FIRST CARPOMETACARPAL JOINTS: Primary | ICD-10-CM

## 2022-02-21 DIAGNOSIS — M15.9 GENERALIZED OSTEOARTHRITIS: ICD-10-CM

## 2022-02-21 PROCEDURE — 1159F PR MEDICATION LIST DOCUMENTED IN MEDICAL RECORD: ICD-10-PCS | Mod: CPTII,S$GLB,, | Performed by: STUDENT IN AN ORGANIZED HEALTH CARE EDUCATION/TRAINING PROGRAM

## 2022-02-21 PROCEDURE — 1125F AMNT PAIN NOTED PAIN PRSNT: CPT | Mod: CPTII,S$GLB,, | Performed by: STUDENT IN AN ORGANIZED HEALTH CARE EDUCATION/TRAINING PROGRAM

## 2022-02-21 PROCEDURE — 99215 PR OFFICE/OUTPT VISIT, EST, LEVL V, 40-54 MIN: ICD-10-PCS | Mod: S$GLB,,, | Performed by: STUDENT IN AN ORGANIZED HEALTH CARE EDUCATION/TRAINING PROGRAM

## 2022-02-21 PROCEDURE — 99999 PR PBB SHADOW E&M-EST. PATIENT-LVL V: ICD-10-PCS | Mod: PBBFAC,,, | Performed by: STUDENT IN AN ORGANIZED HEALTH CARE EDUCATION/TRAINING PROGRAM

## 2022-02-21 PROCEDURE — 3077F SYST BP >= 140 MM HG: CPT | Mod: CPTII,S$GLB,, | Performed by: STUDENT IN AN ORGANIZED HEALTH CARE EDUCATION/TRAINING PROGRAM

## 2022-02-21 PROCEDURE — 1125F PR PAIN SEVERITY QUANTIFIED, PAIN PRESENT: ICD-10-PCS | Mod: CPTII,S$GLB,, | Performed by: STUDENT IN AN ORGANIZED HEALTH CARE EDUCATION/TRAINING PROGRAM

## 2022-02-21 PROCEDURE — 3078F DIAST BP <80 MM HG: CPT | Mod: CPTII,S$GLB,, | Performed by: STUDENT IN AN ORGANIZED HEALTH CARE EDUCATION/TRAINING PROGRAM

## 2022-02-21 PROCEDURE — 99215 OFFICE O/P EST HI 40 MIN: CPT | Mod: S$GLB,,, | Performed by: STUDENT IN AN ORGANIZED HEALTH CARE EDUCATION/TRAINING PROGRAM

## 2022-02-21 PROCEDURE — 1159F MED LIST DOCD IN RCRD: CPT | Mod: CPTII,S$GLB,, | Performed by: STUDENT IN AN ORGANIZED HEALTH CARE EDUCATION/TRAINING PROGRAM

## 2022-02-21 PROCEDURE — 3288F FALL RISK ASSESSMENT DOCD: CPT | Mod: CPTII,S$GLB,, | Performed by: STUDENT IN AN ORGANIZED HEALTH CARE EDUCATION/TRAINING PROGRAM

## 2022-02-21 PROCEDURE — 3077F PR MOST RECENT SYSTOLIC BLOOD PRESSURE >= 140 MM HG: ICD-10-PCS | Mod: CPTII,S$GLB,, | Performed by: STUDENT IN AN ORGANIZED HEALTH CARE EDUCATION/TRAINING PROGRAM

## 2022-02-21 PROCEDURE — 1101F PR PT FALLS ASSESS DOC 0-1 FALLS W/OUT INJ PAST YR: ICD-10-PCS | Mod: CPTII,S$GLB,, | Performed by: STUDENT IN AN ORGANIZED HEALTH CARE EDUCATION/TRAINING PROGRAM

## 2022-02-21 PROCEDURE — 99999 PR PBB SHADOW E&M-EST. PATIENT-LVL V: CPT | Mod: PBBFAC,,, | Performed by: STUDENT IN AN ORGANIZED HEALTH CARE EDUCATION/TRAINING PROGRAM

## 2022-02-21 PROCEDURE — 3288F PR FALLS RISK ASSESSMENT DOCUMENTED: ICD-10-PCS | Mod: CPTII,S$GLB,, | Performed by: STUDENT IN AN ORGANIZED HEALTH CARE EDUCATION/TRAINING PROGRAM

## 2022-02-21 PROCEDURE — 3078F PR MOST RECENT DIASTOLIC BLOOD PRESSURE < 80 MM HG: ICD-10-PCS | Mod: CPTII,S$GLB,, | Performed by: STUDENT IN AN ORGANIZED HEALTH CARE EDUCATION/TRAINING PROGRAM

## 2022-02-21 PROCEDURE — 1101F PT FALLS ASSESS-DOCD LE1/YR: CPT | Mod: CPTII,S$GLB,, | Performed by: STUDENT IN AN ORGANIZED HEALTH CARE EDUCATION/TRAINING PROGRAM

## 2022-02-21 NOTE — PROGRESS NOTES
"[facility]umatology     PCP: Marichuy Hummel MD    Reason for Visit:   OA      Subjective:      History of Present Illness:  Enedelia García is a 76 y.o. female with extensive history as annotated below  referred to rheumatology for evaluation polyarthralgias    Seen by Dr. Babb at one point (rheumatology), unsure medication for "arthritis"  Then and unknown rheumatologist at [facility], unsure medication for "arthritis"  Then Dr. Bucio 3/2021, who dg her with FM with instruction for PRN follow up  Previously seen by Dr. Wen (pain) now Andrae Hathaway (pain managment)   Taking the following with different providers:   Gabapentin 300mg TID, oxycodone 10 mg TID, Tizanidine PRN   Fiorcet PRN   Was previously on Cymbalta (no longer?), no longer on Celexa.   No longer on Buprenorphine or Fentanyl patch   Takes Ibuprofen PRN   The above therapies take the edge off   Sp RFA recently with good relief  2/2022: Initial visit with rheumatology  Reporting poor sleep, waking unrefreshed, fatigue, HA, chronic widespread pain daily (worse at the end of the day).  Anxiety/worry surrounding her husbands health - multiple CVA, cares for him  AC joint R, CMC, knees, hips, back  No swelling  Gets gelling effect      Past Medical History:  HTN  OA L Knee partial replacement, AC, hip, Cspine  Fibromyalgia  CAD stents x3   Epilepsy? (temporal lobe sz?) previously on AED  GERD  Depression and anxiety      Past Surgical History:  Past Surgical History:   Procedure Laterality Date    APPENDECTOMY      BACK SURGERY      CORONARY STENT PLACEMENT      HYSTERECTOMY      TONSILLECTOMY         Allergies:  Review of patient's allergies indicates:   Allergen Reactions    Iodinated contrast media Anaphylaxis and Other (See Comments)    Phenergan [promethazine]      Vomiting       Medications:  Prior to Admission medications    Medication Sig Start Date End Date Taking? Authorizing Provider       Yes Historical Provider   aspirin " (ECOTRIN) 81 MG EC tablet Take 81 mg by mouth once daily.   Yes Historical Provider   buprenorphine (BUTRANS) 10 mcg/hour PTWK buprenorphine 10 mcg/hour weekly transdermal patch   APPLY 1 PATCH EVERY WEEK AS NEEDED FOR 30 DAYS   Yes Historical Provider       Yes Historical Provider   butalbital-acetaminophen-caff -40 mg Cap TAKE ONE CAPSULE BY MOUTH ONCE TO TWICE DAILY AS NEEDED FOR MIGRAINE headaches 6/16/21  Yes Historical Provider   calcium carbonate/vitamin D3 (VITAMIN D-3 ORAL) Take by mouth once daily.   Yes Historical Provider       Yes Historical Provider       Yes Historical Provider       Yes Historical Provider       Yes Historical Provider       Yes Marichuy Hummel MD       Yes Marichuy Hummel MD   furosemide (LASIX) 40 MG tablet Take 40 mg by mouth once daily.    Yes Historical Provider   gabapentin (NEURONTIN) 600 MG tablet Take 600 mg by mouth 3 (three) times daily.   Yes Historical Provider   irbesartan (AVAPRO) 300 MG tablet Take 1 tablet (300 mg total) by mouth every evening. 5/22/21  Yes Matt Irvin MD   isosorbide mononitrate (IMDUR) 60 MG 24 hr tablet Take 1 tablet (60 mg total) by mouth once daily. 9/30/21  Yes Patricia Rogers MD   metoprolol succinate (TOPROL-XL) 25 MG 24 hr tablet Take 25 mg by mouth once daily.   Yes Historical Provider   multivitamin capsule Take 1 capsule by mouth once daily.   Yes Historical Provider   mupirocin (BACTROBAN) 2 % ointment 2 (two) times daily. apply to affected area 12/22/21  Yes Historical Provider   omeprazole (PRILOSEC) 40 MG capsule Take 40 mg by mouth once daily.   Yes Historical Provider       Yes Historical Provider   oxyCODONE-acetaminophen (PERCOCET)  mg per tablet oxycodone-acetaminophen 10 mg-325 mg tablet   TAKE ONE TABLET BY MOUTH THREE TIMES DAILY AS NEEDED FOR PAIN   Yes Historical Provider       Yes Historical Provider   rosuvastatin 40 mg CpSP Take 40 mg by mouth once daily.    Yes Historical Provider       Family  History:      Daughter with SLE  Had a sister  with stomach cancer    Social History:  Tobacco: Tobacco 16 yr- ~50 years then quit ~30-40 py hx.  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  4 children 1 with SLE?  6 grandkids all healthy  3 great-grandchildren    Review of Systems:  As per HPI      Objective:     Physical Examination:  Vitals:    22 1501   BP: (!) 145/69   Pulse: 62   Resp: 18     General: NAD, BMI 29 no alopecia  Head: Normocephalic, atraumatic  Eye: EOMI, no ocular inflammation  ENT: No oral ulcers, normal pooling, normal papillae  Resp: CTAB; normal respiratory effort  CV: Regular rate; normal S1 S2; no r/m/g  Extremities:No peripheral edema, good equal pulses throughout  Skin: Warm, dry, intact, no rash, no lesions  Normal nailfold capillaroscopy  MSK:Normal active and passive ROM  + R AC joint tenderness and + cross arm test  + CMC grind test  Neg finkelstein and resisted abduction of the thumb  Minor h/b nodes  No gross deformities/abnormalities  No synovitis  No raynaud  Knee Exam: healed surgical scar L knee medial   no effusion, normal color, euthermic.    Normal flexion and extension to 170 degrees  + crepitus no joint line tenderness.    Fully weight bearing ambulates with assist device.  no synovitis.   Neuro: CN 2-12 grossly intact, Normal strength, normal reflexes.  Psych:Good eye contact, normal mood and affect    Laboratory Results:    Rheumatology Data:   with Dr. Bucio   - MELISA 1:160   - MELCHOR profile negative   - SSA negative   - ESR/CRP/CK normal   - WBC/PLT normal       Radiology:  XR arthritis survey      Assessment:   Enedelia García is a 76 y.o. female with extensive history as annotated below  referred to rheumatology for evaluation polyarthralgias    Generalized Osteoarthritis: Hands, AC joint, knees, hips, spine   - Following with Pain management on several therapties   Oxycodone,  "Gabapentin, Tizanidine, Fiorcet, Ibuprofen   Prev on Cymbalta, Bup, and Fentanyl patches   - Turmeric, favian, omega 3s, chondroitin   - CMC brace ordered and Rollator for additional unsteady gait due to pain   - Strengthening exercises and daily walks discussed   - aquatherapy referral   - Knee offloading PF brace in the future if needed, had partial to Lknee   But does not have time to get revision or total due to caring for her    - Paraffin wax   - Diclofenac gel   - Acupuncture    Positive MELISA: 1:160, negative profile. No current evidence of an active connective tissue disease (I.e. SLE or Sjogren).  MELISA can be positive for a number of reasons including: medications, other autoimmune conditions (I.e. thyroid), pulmonary disease, malignancy, chronic infections (I.e. Tuberculosis. HIV, Hepatitis), and in those with relatives who have a CTD.  It can also be positive in up to 30% of healthy individuals and tends to become more positive with age.    History of FM diagnosis: therapies as above.    Return to Clinic DIANA Kelley MD  [facility]    ACR Statement: "Fibromyalgia is not a form of arthritis (joint disease). It does not cause inflammation or damage to joints, muscles or other tissues. However, because fibromyalgia can cause chronic pain and fatigue similar to arthritis, some people may advise you to see a rheumatologist. As a result, often a rheumatologist detects this disease (and rules out rheumatic diseases). For long term care, you do not need to follow with a rheumatologist. Your primary care physician can provide all the other care and treatment of fibromyalgia that you need."    55 minutes spent in the room with the patient: history, exam, orders, counseling  10 minutes spent note authoring and on chart review          "

## 2022-02-23 ENCOUNTER — OFFICE VISIT (OUTPATIENT)
Dept: FAMILY MEDICINE | Facility: CLINIC | Age: 77
End: 2022-02-23
Payer: MEDICARE

## 2022-02-23 ENCOUNTER — LAB VISIT (OUTPATIENT)
Dept: LAB | Facility: HOSPITAL | Age: 77
End: 2022-02-23
Attending: FAMILY MEDICINE
Payer: MEDICARE

## 2022-02-23 VITALS
OXYGEN SATURATION: 98 % | HEART RATE: 58 BPM | DIASTOLIC BLOOD PRESSURE: 80 MMHG | WEIGHT: 174.19 LBS | BODY MASS INDEX: 29.74 KG/M2 | HEIGHT: 64 IN | SYSTOLIC BLOOD PRESSURE: 140 MMHG

## 2022-02-23 DIAGNOSIS — E78.2 MIXED HYPERLIPIDEMIA: ICD-10-CM

## 2022-02-23 DIAGNOSIS — R73.03 PREDIABETES: ICD-10-CM

## 2022-02-23 DIAGNOSIS — D64.9 NORMOCYTIC ANEMIA: ICD-10-CM

## 2022-02-23 DIAGNOSIS — N18.30 STAGE 3 CHRONIC KIDNEY DISEASE, UNSPECIFIED WHETHER STAGE 3A OR 3B CKD: ICD-10-CM

## 2022-02-23 DIAGNOSIS — R53.83 CAREGIVER WITH FATIGUE: ICD-10-CM

## 2022-02-23 DIAGNOSIS — N30.00 ACUTE CYSTITIS WITHOUT HEMATURIA: Primary | ICD-10-CM

## 2022-02-23 DIAGNOSIS — I10 BENIGN ESSENTIAL HYPERTENSION: ICD-10-CM

## 2022-02-23 DIAGNOSIS — M79.7 FIBROMYALGIA: ICD-10-CM

## 2022-02-23 LAB
ALBUMIN SERPL BCP-MCNC: 3.6 G/DL (ref 3.5–5.2)
ALP SERPL-CCNC: 69 U/L (ref 55–135)
ALT SERPL W/O P-5'-P-CCNC: 17 U/L (ref 10–44)
ANION GAP SERPL CALC-SCNC: 8 MMOL/L (ref 8–16)
AST SERPL-CCNC: 16 U/L (ref 10–40)
BASOPHILS # BLD AUTO: 0.07 K/UL (ref 0–0.2)
BASOPHILS NFR BLD: 0.5 % (ref 0–1.9)
BILIRUB SERPL-MCNC: 0.5 MG/DL (ref 0.1–1)
BUN SERPL-MCNC: 23 MG/DL (ref 8–23)
CALCIUM SERPL-MCNC: 9.9 MG/DL (ref 8.7–10.5)
CHLORIDE SERPL-SCNC: 100 MMOL/L (ref 95–110)
CHOLEST SERPL-MCNC: 179 MG/DL (ref 120–199)
CHOLEST/HDLC SERPL: 3.8 {RATIO} (ref 2–5)
CO2 SERPL-SCNC: 31 MMOL/L (ref 23–29)
CREAT SERPL-MCNC: 1 MG/DL (ref 0.5–1.4)
DIFFERENTIAL METHOD: ABNORMAL
EOSINOPHIL # BLD AUTO: 0.1 K/UL (ref 0–0.5)
EOSINOPHIL NFR BLD: 0.4 % (ref 0–8)
ERYTHROCYTE [DISTWIDTH] IN BLOOD BY AUTOMATED COUNT: 12.2 % (ref 11.5–14.5)
EST. GFR  (AFRICAN AMERICAN): >60 ML/MIN/1.73 M^2
EST. GFR  (NON AFRICAN AMERICAN): 54.9 ML/MIN/1.73 M^2
ESTIMATED AVG GLUCOSE: 120 MG/DL (ref 68–131)
GLUCOSE SERPL-MCNC: 91 MG/DL (ref 70–110)
HBA1C MFR BLD: 5.8 % (ref 4–5.6)
HCT VFR BLD AUTO: 39.7 % (ref 37–48.5)
HDLC SERPL-MCNC: 47 MG/DL (ref 40–75)
HDLC SERPL: 26.3 % (ref 20–50)
HGB BLD-MCNC: 12.4 G/DL (ref 12–16)
IMM GRANULOCYTES # BLD AUTO: 0.06 K/UL (ref 0–0.04)
IMM GRANULOCYTES NFR BLD AUTO: 0.5 % (ref 0–0.5)
IRON SERPL-MCNC: 75 UG/DL (ref 30–160)
LDLC SERPL CALC-MCNC: 98.4 MG/DL (ref 63–159)
LYMPHOCYTES # BLD AUTO: 3 K/UL (ref 1–4.8)
LYMPHOCYTES NFR BLD: 23 % (ref 18–48)
MCH RBC QN AUTO: 30.3 PG (ref 27–31)
MCHC RBC AUTO-ENTMCNC: 31.2 G/DL (ref 32–36)
MCV RBC AUTO: 97 FL (ref 82–98)
MONOCYTES # BLD AUTO: 1.8 K/UL (ref 0.3–1)
MONOCYTES NFR BLD: 13.9 % (ref 4–15)
NEUTROPHILS # BLD AUTO: 8.1 K/UL (ref 1.8–7.7)
NEUTROPHILS NFR BLD: 61.7 % (ref 38–73)
NONHDLC SERPL-MCNC: 132 MG/DL
NRBC BLD-RTO: 0 /100 WBC
PLATELET # BLD AUTO: 356 K/UL (ref 150–450)
PMV BLD AUTO: 10.6 FL (ref 9.2–12.9)
POTASSIUM SERPL-SCNC: 4.1 MMOL/L (ref 3.5–5.1)
PROT SERPL-MCNC: 7.1 G/DL (ref 6–8.4)
RBC # BLD AUTO: 4.09 M/UL (ref 4–5.4)
SATURATED IRON: 18 % (ref 20–50)
SODIUM SERPL-SCNC: 139 MMOL/L (ref 136–145)
TOTAL IRON BINDING CAPACITY: 411 UG/DL (ref 250–450)
TRANSFERRIN SERPL-MCNC: 278 MG/DL (ref 200–375)
TRIGL SERPL-MCNC: 168 MG/DL (ref 30–150)
TSH SERPL DL<=0.005 MIU/L-ACNC: 1.3 UIU/ML (ref 0.4–4)
WBC # BLD AUTO: 13.1 K/UL (ref 3.9–12.7)

## 2022-02-23 PROCEDURE — 3288F FALL RISK ASSESSMENT DOCD: CPT | Mod: CPTII,S$GLB,, | Performed by: FAMILY MEDICINE

## 2022-02-23 PROCEDURE — 3079F PR MOST RECENT DIASTOLIC BLOOD PRESSURE 80-89 MM HG: ICD-10-PCS | Mod: CPTII,S$GLB,, | Performed by: FAMILY MEDICINE

## 2022-02-23 PROCEDURE — 1159F PR MEDICATION LIST DOCUMENTED IN MEDICAL RECORD: ICD-10-PCS | Mod: CPTII,S$GLB,, | Performed by: FAMILY MEDICINE

## 2022-02-23 PROCEDURE — 3077F PR MOST RECENT SYSTOLIC BLOOD PRESSURE >= 140 MM HG: ICD-10-PCS | Mod: CPTII,S$GLB,, | Performed by: FAMILY MEDICINE

## 2022-02-23 PROCEDURE — 99999 PR PBB SHADOW E&M-EST. PATIENT-LVL V: CPT | Mod: PBBFAC,,, | Performed by: FAMILY MEDICINE

## 2022-02-23 PROCEDURE — 1159F MED LIST DOCD IN RCRD: CPT | Mod: CPTII,S$GLB,, | Performed by: FAMILY MEDICINE

## 2022-02-23 PROCEDURE — 1126F PR PAIN SEVERITY QUANTIFIED, NO PAIN PRESENT: ICD-10-PCS | Mod: CPTII,S$GLB,, | Performed by: FAMILY MEDICINE

## 2022-02-23 PROCEDURE — 83036 HEMOGLOBIN GLYCOSYLATED A1C: CPT | Performed by: FAMILY MEDICINE

## 2022-02-23 PROCEDURE — 99214 PR OFFICE/OUTPT VISIT, EST, LEVL IV, 30-39 MIN: ICD-10-PCS | Mod: S$GLB,,, | Performed by: FAMILY MEDICINE

## 2022-02-23 PROCEDURE — 36415 COLL VENOUS BLD VENIPUNCTURE: CPT | Mod: PO | Performed by: FAMILY MEDICINE

## 2022-02-23 PROCEDURE — 80061 LIPID PANEL: CPT | Performed by: FAMILY MEDICINE

## 2022-02-23 PROCEDURE — 1101F PR PT FALLS ASSESS DOC 0-1 FALLS W/OUT INJ PAST YR: ICD-10-PCS | Mod: CPTII,S$GLB,, | Performed by: FAMILY MEDICINE

## 2022-02-23 PROCEDURE — 85025 COMPLETE CBC W/AUTO DIFF WBC: CPT | Performed by: FAMILY MEDICINE

## 2022-02-23 PROCEDURE — 3079F DIAST BP 80-89 MM HG: CPT | Mod: CPTII,S$GLB,, | Performed by: FAMILY MEDICINE

## 2022-02-23 PROCEDURE — 80053 COMPREHEN METABOLIC PANEL: CPT | Performed by: FAMILY MEDICINE

## 2022-02-23 PROCEDURE — 3288F PR FALLS RISK ASSESSMENT DOCUMENTED: ICD-10-PCS | Mod: CPTII,S$GLB,, | Performed by: FAMILY MEDICINE

## 2022-02-23 PROCEDURE — 99999 PR PBB SHADOW E&M-EST. PATIENT-LVL V: ICD-10-PCS | Mod: PBBFAC,,, | Performed by: FAMILY MEDICINE

## 2022-02-23 PROCEDURE — 1160F PR REVIEW ALL MEDS BY PRESCRIBER/CLIN PHARMACIST DOCUMENTED: ICD-10-PCS | Mod: CPTII,S$GLB,, | Performed by: FAMILY MEDICINE

## 2022-02-23 PROCEDURE — 1160F RVW MEDS BY RX/DR IN RCRD: CPT | Mod: CPTII,S$GLB,, | Performed by: FAMILY MEDICINE

## 2022-02-23 PROCEDURE — 1126F AMNT PAIN NOTED NONE PRSNT: CPT | Mod: CPTII,S$GLB,, | Performed by: FAMILY MEDICINE

## 2022-02-23 PROCEDURE — 84443 ASSAY THYROID STIM HORMONE: CPT | Performed by: FAMILY MEDICINE

## 2022-02-23 PROCEDURE — 3077F SYST BP >= 140 MM HG: CPT | Mod: CPTII,S$GLB,, | Performed by: FAMILY MEDICINE

## 2022-02-23 PROCEDURE — 99215 OFFICE O/P EST HI 40 MIN: CPT | Mod: PBBFAC,PO | Performed by: FAMILY MEDICINE

## 2022-02-23 PROCEDURE — 99214 OFFICE O/P EST MOD 30 MIN: CPT | Mod: S$GLB,,, | Performed by: FAMILY MEDICINE

## 2022-02-23 PROCEDURE — 1101F PT FALLS ASSESS-DOCD LE1/YR: CPT | Mod: CPTII,S$GLB,, | Performed by: FAMILY MEDICINE

## 2022-02-23 PROCEDURE — 84466 ASSAY OF TRANSFERRIN: CPT | Performed by: FAMILY MEDICINE

## 2022-02-23 NOTE — PROGRESS NOTES
Subjective:       Patient ID: Enedelia García is a 76 y.o. female.    Chief Complaint: Urinary Tract Infection    Patient Active Problem List   Diagnosis    CAD (coronary artery disease)    H/O esophageal spasm    Hyperlipidemia    Fibromyalgia    GERD (gastroesophageal reflux disease)    Moderate episode of recurrent major depressive disorder    ADWOA (acute kidney injury)    Normocytic anemia    Presence of stent in coronary artery in patient with coronary artery disease    Caregiver with fatigue    Status post right knee replacement    Acquired scoliosis    Aortic valve stenosis    Arthritis    Benign essential hypertension    Osteoarthritis of knee    Chronic low back pain    Generalized anxiety disorder    Stage 3 chronic kidney disease    Traumatic ulcer of right lower leg    Primary osteoarthritis of both first carpometacarpal joints    Risk for falls    Positive MELISA (antinuclear antibody)    Generalized osteoarthritis      HPI  77 yo female took some AZO for urinary symptoms x 1 week.   Felt urinary hesitancy, burning. No fever/chills, N/, no hematuria. AZO x 4 days and symptoms have since resolved. Last dose 3 days ago.     Review of Systems   All other systems reviewed and are negative.       Objective:     Vitals:    02/23/22 1312   BP: (!) 140/80   Pulse: (!) 58        Physical Exam  Vitals and nursing note reviewed.   Constitutional:       General: She is not in acute distress.     Appearance: She is not ill-appearing, toxic-appearing or diaphoretic.   HENT:      Head: Normocephalic and atraumatic.   Eyes:      General: No scleral icterus.     Extraocular Movements: Extraocular movements intact.      Conjunctiva/sclera: Conjunctivae normal.   Pulmonary:      Effort: No respiratory distress.   Neurological:      General: No focal deficit present.      Mental Status: She is alert. Mental status is at baseline.   Psychiatric:         Attention and Perception: Attention and  perception normal.         Mood and Affect: Mood and affect normal.         Speech: Speech normal.         Behavior: Behavior normal.         Thought Content: Thought content normal.         Cognition and Memory: Cognition and memory normal.         Judgment: Judgment normal.         Assessment:       1. Acute cystitis without hematuria    2. Benign essential hypertension    3. Fibromyalgia    4. Mixed hyperlipidemia    5. Stage 3 chronic kidney disease, unspecified whether stage 3a or 3b CKD    6. Normocytic anemia    7. Prediabetes    8. Caregiver with fatigue        Plan:         Acute cystitis without hematuria  -     Urinalysis; Future; Expected date: 02/23/2022  -     Urine culture; Future; Expected date: 02/23/2022  - Appears resolved. Follow up UA to see if still with inflammatory signs. Continue to hydrate.     Benign essential hypertension  -     CBC Auto Differential; Future; Expected date: 02/23/2022  -     Comprehensive Metabolic Panel; Future; Expected date: 02/23/2022  - Elevated today, close follow up    Fibromyalgia  - Stable    Mixed hyperlipidemia  -     Hemoglobin A1C; Future; Expected date: 02/23/2022  -     Lipid Panel; Future; Expected date: 02/23/2022  -     TSH; Future; Expected date: 02/23/2022    Stage 3 chronic kidney disease, unspecified whether stage 3a or 3b CKD  - Recheck labs    Normocytic anemia  -     Iron and TIBC; Future; Expected date: 02/23/2022  -     CBC Auto Differential; Future; Expected date: 02/23/2022    Prediabetes  -     Hemoglobin A1C; Future; Expected date: 02/23/2022    Caregiver with fatigue  - Psychiatry and counseling referral. Number given for scheduling.     Patient's questions answered. Plan reviewed with patient at the end of visit. Relevant precautions to chief complaint and reasons to seek medical care or contact the office sooner reviewed with patient.     Follow up in about 2 weeks (around 3/9/2022) for Annual Exam.

## 2022-02-23 NOTE — PATIENT INSTRUCTIONS
Please call this number to schedule your initial counseling  or psychiatry appointment. The referral order has been placed, but the patient has to initiate the scheduling.   [phone]

## 2022-02-24 PROBLEM — R73.03 PREDIABETES: Status: ACTIVE | Noted: 2022-02-24

## 2022-03-03 DIAGNOSIS — M25.562 LEFT KNEE PAIN, UNSPECIFIED CHRONICITY: Primary | ICD-10-CM

## 2022-03-09 ENCOUNTER — OFFICE VISIT (OUTPATIENT)
Dept: FAMILY MEDICINE | Facility: CLINIC | Age: 77
End: 2022-03-09
Payer: MEDICARE

## 2022-03-09 VITALS
DIASTOLIC BLOOD PRESSURE: 60 MMHG | OXYGEN SATURATION: 96 % | SYSTOLIC BLOOD PRESSURE: 100 MMHG | HEIGHT: 64 IN | WEIGHT: 178.56 LBS | BODY MASS INDEX: 30.48 KG/M2 | HEART RATE: 58 BPM

## 2022-03-09 DIAGNOSIS — I25.10 CORONARY ARTERY DISEASE INVOLVING NATIVE CORONARY ARTERY OF NATIVE HEART WITHOUT ANGINA PECTORIS: ICD-10-CM

## 2022-03-09 DIAGNOSIS — N18.31 STAGE 3A CHRONIC KIDNEY DISEASE: ICD-10-CM

## 2022-03-09 DIAGNOSIS — A60.00 RECURRENT GENITAL HERPES: ICD-10-CM

## 2022-03-09 DIAGNOSIS — L30.9 DERMATITIS: ICD-10-CM

## 2022-03-09 DIAGNOSIS — I10 BENIGN ESSENTIAL HYPERTENSION: ICD-10-CM

## 2022-03-09 DIAGNOSIS — K59.09 CHRONIC CONSTIPATION: ICD-10-CM

## 2022-03-09 DIAGNOSIS — Z95.5 PRESENCE OF STENT IN CORONARY ARTERY IN PATIENT WITH CORONARY ARTERY DISEASE: ICD-10-CM

## 2022-03-09 DIAGNOSIS — Z00.01 ENCOUNTER FOR GENERAL ADULT MEDICAL EXAMINATION WITH ABNORMAL FINDINGS: Primary | ICD-10-CM

## 2022-03-09 DIAGNOSIS — M85.89 OSTEOPENIA OF MULTIPLE SITES: ICD-10-CM

## 2022-03-09 DIAGNOSIS — I25.10 PRESENCE OF STENT IN CORONARY ARTERY IN PATIENT WITH CORONARY ARTERY DISEASE: ICD-10-CM

## 2022-03-09 DIAGNOSIS — E78.2 MIXED HYPERLIPIDEMIA: ICD-10-CM

## 2022-03-09 DIAGNOSIS — R73.03 PREDIABETES: ICD-10-CM

## 2022-03-09 PROBLEM — N17.9 AKI (ACUTE KIDNEY INJURY): Status: RESOLVED | Noted: 2021-05-22 | Resolved: 2022-03-09

## 2022-03-09 PROCEDURE — 99499 RISK ADDL DX/OHS AUDIT: ICD-10-PCS | Mod: S$GLB,,, | Performed by: FAMILY MEDICINE

## 2022-03-09 PROCEDURE — 3078F DIAST BP <80 MM HG: CPT | Mod: CPTII,S$GLB,, | Performed by: FAMILY MEDICINE

## 2022-03-09 PROCEDURE — 99397 PR PREVENTIVE VISIT,EST,65 & OVER: ICD-10-PCS | Mod: S$GLB,,, | Performed by: FAMILY MEDICINE

## 2022-03-09 PROCEDURE — 99214 OFFICE O/P EST MOD 30 MIN: CPT | Mod: PBBFAC,PO | Performed by: FAMILY MEDICINE

## 2022-03-09 PROCEDURE — 1125F PR PAIN SEVERITY QUANTIFIED, PAIN PRESENT: ICD-10-PCS | Mod: CPTII,S$GLB,, | Performed by: FAMILY MEDICINE

## 2022-03-09 PROCEDURE — 3288F FALL RISK ASSESSMENT DOCD: CPT | Mod: CPTII,S$GLB,, | Performed by: FAMILY MEDICINE

## 2022-03-09 PROCEDURE — 99999 PR PBB SHADOW E&M-EST. PATIENT-LVL IV: CPT | Mod: PBBFAC,,, | Performed by: FAMILY MEDICINE

## 2022-03-09 PROCEDURE — 3288F PR FALLS RISK ASSESSMENT DOCUMENTED: ICD-10-PCS | Mod: CPTII,S$GLB,, | Performed by: FAMILY MEDICINE

## 2022-03-09 PROCEDURE — 3074F PR MOST RECENT SYSTOLIC BLOOD PRESSURE < 130 MM HG: ICD-10-PCS | Mod: CPTII,S$GLB,, | Performed by: FAMILY MEDICINE

## 2022-03-09 PROCEDURE — 1125F AMNT PAIN NOTED PAIN PRSNT: CPT | Mod: CPTII,S$GLB,, | Performed by: FAMILY MEDICINE

## 2022-03-09 PROCEDURE — 99397 PER PM REEVAL EST PAT 65+ YR: CPT | Mod: S$GLB,,, | Performed by: FAMILY MEDICINE

## 2022-03-09 PROCEDURE — 1101F PT FALLS ASSESS-DOCD LE1/YR: CPT | Mod: CPTII,S$GLB,, | Performed by: FAMILY MEDICINE

## 2022-03-09 PROCEDURE — 99499 UNLISTED E&M SERVICE: CPT | Mod: S$GLB,,, | Performed by: FAMILY MEDICINE

## 2022-03-09 PROCEDURE — 3078F PR MOST RECENT DIASTOLIC BLOOD PRESSURE < 80 MM HG: ICD-10-PCS | Mod: CPTII,S$GLB,, | Performed by: FAMILY MEDICINE

## 2022-03-09 PROCEDURE — 3074F SYST BP LT 130 MM HG: CPT | Mod: CPTII,S$GLB,, | Performed by: FAMILY MEDICINE

## 2022-03-09 PROCEDURE — 99999 PR PBB SHADOW E&M-EST. PATIENT-LVL IV: ICD-10-PCS | Mod: PBBFAC,,, | Performed by: FAMILY MEDICINE

## 2022-03-09 PROCEDURE — 1101F PR PT FALLS ASSESS DOC 0-1 FALLS W/OUT INJ PAST YR: ICD-10-PCS | Mod: CPTII,S$GLB,, | Performed by: FAMILY MEDICINE

## 2022-03-09 RX ORDER — CLOTRIMAZOLE AND BETAMETHASONE DIPROPIONATE 10; .64 MG/G; MG/G
CREAM TOPICAL 2 TIMES DAILY
Qty: 45 G | Refills: 0 | Status: SHIPPED | OUTPATIENT
Start: 2022-03-09 | End: 2022-08-27

## 2022-03-09 RX ORDER — VALACYCLOVIR HYDROCHLORIDE 1 G/1
1000 TABLET, FILM COATED ORAL DAILY
Qty: 5 TABLET | Refills: 0 | Status: SHIPPED | OUTPATIENT
Start: 2022-03-09 | End: 2022-04-26 | Stop reason: SDUPTHER

## 2022-03-09 NOTE — PROGRESS NOTES
Subjective:       Patient ID: Enedelia García is a 76 y.o. female.    Chief Complaint: Annual Exam    Patient Active Problem List   Diagnosis    CAD (coronary artery disease)    H/O esophageal spasm    Hyperlipidemia    Fibromyalgia    GERD (gastroesophageal reflux disease)    Moderate episode of recurrent major depressive disorder    Presence of stent in coronary artery in patient with coronary artery disease    Caregiver with fatigue    S/P left unicompartmental knee replacement    Acquired scoliosis    Aortic valve stenosis    Arthritis    Benign essential hypertension    Osteoarthritis of knee    Chronic low back pain    Generalized anxiety disorder    Stage 3 chronic kidney disease    Traumatic ulcer of right lower leg    Primary osteoarthritis of both first carpometacarpal joints    Risk for falls    Positive MELISA (antinuclear antibody)    Generalized osteoarthritis    Prediabetes    Narcotic drug use      HPI  77 yo female here for annual exam    Acute concerns:    Stool change - reports hard stools in balls followed by normal stool past couple of weeks. No abd pain, fever. Reports little water and fiber intake in diet. Reports had mucus and small scant blood in mucus after big bowel movement.     Also has vesicular rash on her buttock. Reports recurred in past and told it was herpes but does not like the diagnosis. Also has some redness and irritation in gluteal clef.     Review of Systems   All other systems reviewed and are negative.       Objective:     Vitals:    03/09/22 1415   BP: 100/60   Pulse: (!) 58        Physical Exam  Vitals and nursing note reviewed.   Constitutional:       General: She is not in acute distress.     Appearance: Normal appearance. She is not ill-appearing, toxic-appearing or diaphoretic.   HENT:      Head: Normocephalic and atraumatic.   Eyes:      General: No scleral icterus.     Conjunctiva/sclera: Conjunctivae normal.   Cardiovascular:      Rate  and Rhythm: Normal rate.   Pulmonary:      Effort: Pulmonary effort is normal. No respiratory distress.   Skin:     Coloration: Skin is not pale.      Comments: Mildly erythematous in gluteal clef.   Vesicular lesions, new, not crusted on left buttock.      Neurological:      Mental Status: She is alert. Mental status is at baseline.   Psychiatric:         Attention and Perception: Attention and perception normal.         Mood and Affect: Mood and affect normal.         Speech: Speech normal.         Behavior: Behavior normal.         Cognition and Memory: Cognition and memory normal.         Judgment: Judgment normal.         Assessment:       1. Encounter for general adult medical examination with abnormal findings    2. Chronic constipation    3. Recurrent genital herpes    4. Dermatitis    5. Benign essential hypertension    6. Prediabetes    7. Stage 3a chronic kidney disease    8. Presence of stent in coronary artery in patient with coronary artery disease    9. Mixed hyperlipidemia    10. Coronary artery disease involving native coronary artery of native heart without angina pectoris          Plan:         Encounter for general adult medical examination with abnormal findings  - Risk and age appropriate anticipatory guidance. HM reviewed and updated. Recommendations discussed with patient as appropriate.     Chronic constipation  - Encourage fiber and water intake.   - If does not improve or sees more blood may need to update colonoscopy. What she described consistent with post constipation mucous/ faint findings of blood.     Recurrent genital herpes  -     clotrimazole-betamethasone 1-0.05% (LOTRISONE) cream; Apply topically 2 (two) times daily.  Dispense: 45 g; Refill: 0  -     valACYclovir (VALTREX) 1000 MG tablet; Take 1 tablet (1,000 mg total) by mouth once daily. for 5 days  Dispense: 5 tablet; Refill: 0    Dermatitis  -     clotrimazole-betamethasone 1-0.05% (LOTRISONE) cream; Apply topically 2 (two)  times daily.  Dispense: 45 g; Refill: 0    Benign essential hypertension  Prediabetes  Stage 3a chronic kidney disease  Presence of stent in coronary artery in patient with coronary artery disease  Mixed hyperlipidemia  Coronary artery disease involving native coronary artery of native heart without angina pectoris  - Chronic health condition is stable and controlled. Continue current medication regimen and relevant lifestyle modifications. Necessary medication refills addressed. Routine ongoing surveillance monitoring.   - Medical risk optimization.    Patient's questions answered. Plan reviewed with patient at the end of visit. Relevant precautions to chief complaint and reasons to seek medical care or contact the office sooner reviewed with patient.     Follow up in about 6 months (around 9/9/2022) for Hypertension Follow-up, Mood Follow-up.

## 2022-03-09 NOTE — PATIENT INSTRUCTIONS
Please call this number to schedule your initial counseling and/ or psychiatry appointment. The referral order has been placed, but the patient has to initiate the scheduling.   [phone]

## 2022-03-10 ENCOUNTER — OFFICE VISIT (OUTPATIENT)
Dept: ORTHOPEDICS | Facility: CLINIC | Age: 77
End: 2022-03-10
Payer: MEDICARE

## 2022-03-10 ENCOUNTER — CLINICAL SUPPORT (OUTPATIENT)
Dept: LAB | Facility: HOSPITAL | Age: 77
End: 2022-03-10
Attending: ORTHOPAEDIC SURGERY
Payer: MEDICARE

## 2022-03-10 ENCOUNTER — HOSPITAL ENCOUNTER (OUTPATIENT)
Dept: RADIOLOGY | Facility: HOSPITAL | Age: 77
Discharge: HOME | End: 2022-03-10
Attending: ORTHOPAEDIC SURGERY
Payer: MEDICARE

## 2022-03-10 VITALS
HEIGHT: 64 IN | WEIGHT: 178.56 LBS | HEART RATE: 63 BPM | DIASTOLIC BLOOD PRESSURE: 87 MMHG | SYSTOLIC BLOOD PRESSURE: 148 MMHG | BODY MASS INDEX: 30.48 KG/M2

## 2022-03-10 DIAGNOSIS — I35.0 AORTIC VALVE STENOSIS, ETIOLOGY OF CARDIAC VALVE DISEASE UNSPECIFIED: ICD-10-CM

## 2022-03-10 DIAGNOSIS — I25.10 PRESENCE OF STENT IN CORONARY ARTERY IN PATIENT WITH CORONARY ARTERY DISEASE: ICD-10-CM

## 2022-03-10 DIAGNOSIS — M17.12 PRIMARY OSTEOARTHRITIS OF LEFT KNEE: ICD-10-CM

## 2022-03-10 DIAGNOSIS — Z23 TETANUS-DIPHTHERIA VACCINATION ADMINISTERED AT CURRENT VISIT: ICD-10-CM

## 2022-03-10 DIAGNOSIS — F33.1 MODERATE EPISODE OF RECURRENT MAJOR DEPRESSIVE DISORDER: ICD-10-CM

## 2022-03-10 DIAGNOSIS — F11.90 NARCOTIC DRUG USE: ICD-10-CM

## 2022-03-10 DIAGNOSIS — F41.1 GENERALIZED ANXIETY DISORDER: ICD-10-CM

## 2022-03-10 DIAGNOSIS — R73.03 PREDIABETES: ICD-10-CM

## 2022-03-10 DIAGNOSIS — Z95.5 PRESENCE OF STENT IN CORONARY ARTERY IN PATIENT WITH CORONARY ARTERY DISEASE: ICD-10-CM

## 2022-03-10 DIAGNOSIS — Z01.818 PRE-OP TESTING: ICD-10-CM

## 2022-03-10 DIAGNOSIS — I25.10 CORONARY ARTERY DISEASE INVOLVING NATIVE CORONARY ARTERY OF NATIVE HEART WITHOUT ANGINA PECTORIS: ICD-10-CM

## 2022-03-10 DIAGNOSIS — E78.2 MIXED HYPERLIPIDEMIA: ICD-10-CM

## 2022-03-10 DIAGNOSIS — Z96.652 S/P LEFT UNICOMPARTMENTAL KNEE REPLACEMENT: ICD-10-CM

## 2022-03-10 DIAGNOSIS — M25.562 LEFT KNEE PAIN, UNSPECIFIED CHRONICITY: ICD-10-CM

## 2022-03-10 DIAGNOSIS — Z96.652 S/P LEFT UNICOMPARTMENTAL KNEE REPLACEMENT: Primary | ICD-10-CM

## 2022-03-10 PROCEDURE — 99499 RISK ADDL DX/OHS AUDIT: ICD-10-PCS | Mod: S$GLB,,, | Performed by: ORTHOPAEDIC SURGERY

## 2022-03-10 PROCEDURE — 1101F PT FALLS ASSESS-DOCD LE1/YR: CPT | Mod: CPTII,S$GLB,, | Performed by: ORTHOPAEDIC SURGERY

## 2022-03-10 PROCEDURE — 99499 RISK ADDL DX/OHS AUDIT: ICD-10-PCS | Mod: ,,, | Performed by: ORTHOPAEDIC SURGERY

## 2022-03-10 PROCEDURE — 73564 X-RAY EXAM KNEE 4 OR MORE: CPT | Mod: TC,FY,LT

## 2022-03-10 PROCEDURE — 1159F MED LIST DOCD IN RCRD: CPT | Mod: CPTII,S$GLB,, | Performed by: ORTHOPAEDIC SURGERY

## 2022-03-10 PROCEDURE — 3079F DIAST BP 80-89 MM HG: CPT | Mod: CPTII,S$GLB,, | Performed by: ORTHOPAEDIC SURGERY

## 2022-03-10 PROCEDURE — 99499 UNLISTED E&M SERVICE: CPT | Mod: ,,, | Performed by: ORTHOPAEDIC SURGERY

## 2022-03-10 PROCEDURE — 3288F PR FALLS RISK ASSESSMENT DOCUMENTED: ICD-10-PCS | Mod: CPTII,S$GLB,, | Performed by: ORTHOPAEDIC SURGERY

## 2022-03-10 PROCEDURE — 93010 ELECTROCARDIOGRAM REPORT: CPT | Mod: ,,, | Performed by: INTERNAL MEDICINE

## 2022-03-10 PROCEDURE — 3288F FALL RISK ASSESSMENT DOCD: CPT | Mod: CPTII,S$GLB,, | Performed by: ORTHOPAEDIC SURGERY

## 2022-03-10 PROCEDURE — 99499 UNLISTED E&M SERVICE: CPT | Mod: S$GLB,,, | Performed by: ORTHOPAEDIC SURGERY

## 2022-03-10 PROCEDURE — 73564 XR KNEE COMP 4 OR MORE VIEWS LEFT: ICD-10-PCS | Mod: 26,LT,, | Performed by: RADIOLOGY

## 2022-03-10 PROCEDURE — 3077F PR MOST RECENT SYSTOLIC BLOOD PRESSURE >= 140 MM HG: ICD-10-PCS | Mod: CPTII,S$GLB,, | Performed by: ORTHOPAEDIC SURGERY

## 2022-03-10 PROCEDURE — 1159F PR MEDICATION LIST DOCUMENTED IN MEDICAL RECORD: ICD-10-PCS | Mod: CPTII,S$GLB,, | Performed by: ORTHOPAEDIC SURGERY

## 2022-03-10 PROCEDURE — 71045 X-RAY EXAM CHEST 1 VIEW: CPT | Mod: TC,FY

## 2022-03-10 PROCEDURE — 99205 PR OFFICE/OUTPT VISIT, NEW, LEVL V, 60-74 MIN: ICD-10-PCS | Mod: S$GLB,,, | Performed by: ORTHOPAEDIC SURGERY

## 2022-03-10 PROCEDURE — 1101F PR PT FALLS ASSESS DOC 0-1 FALLS W/OUT INJ PAST YR: ICD-10-PCS | Mod: CPTII,S$GLB,, | Performed by: ORTHOPAEDIC SURGERY

## 2022-03-10 PROCEDURE — 93005 ELECTROCARDIOGRAM TRACING: CPT

## 2022-03-10 PROCEDURE — 3079F PR MOST RECENT DIASTOLIC BLOOD PRESSURE 80-89 MM HG: ICD-10-PCS | Mod: CPTII,S$GLB,, | Performed by: ORTHOPAEDIC SURGERY

## 2022-03-10 PROCEDURE — 93010 EKG 12-LEAD: ICD-10-PCS | Mod: ,,, | Performed by: INTERNAL MEDICINE

## 2022-03-10 PROCEDURE — 71045 X-RAY EXAM CHEST 1 VIEW: CPT | Mod: 26,,, | Performed by: RADIOLOGY

## 2022-03-10 PROCEDURE — 71045 XR CHEST 1 VIEW PRE-OP: ICD-10-PCS | Mod: 26,,, | Performed by: RADIOLOGY

## 2022-03-10 PROCEDURE — 99999 PR PBB SHADOW E&M-EST. PATIENT-LVL V: CPT | Mod: PBBFAC,,, | Performed by: ORTHOPAEDIC SURGERY

## 2022-03-10 PROCEDURE — 73564 X-RAY EXAM KNEE 4 OR MORE: CPT | Mod: 26,LT,, | Performed by: RADIOLOGY

## 2022-03-10 PROCEDURE — 3077F SYST BP >= 140 MM HG: CPT | Mod: CPTII,S$GLB,, | Performed by: ORTHOPAEDIC SURGERY

## 2022-03-10 PROCEDURE — 99205 OFFICE O/P NEW HI 60 MIN: CPT | Mod: S$GLB,,, | Performed by: ORTHOPAEDIC SURGERY

## 2022-03-10 PROCEDURE — 99999 PR PBB SHADOW E&M-EST. PATIENT-LVL V: ICD-10-PCS | Mod: PBBFAC,,, | Performed by: ORTHOPAEDIC SURGERY

## 2022-03-10 RX ORDER — HYDROXYZINE HYDROCHLORIDE 25 MG/1
25 TABLET, FILM COATED ORAL 3 TIMES DAILY PRN
COMMUNITY
Start: 2021-09-10 | End: 2022-08-27

## 2022-03-10 RX ORDER — NITROGLYCERIN 0.3 MG/1
TABLET SUBLINGUAL
COMMUNITY
Start: 2022-01-27 | End: 2022-04-26 | Stop reason: SDUPTHER

## 2022-03-10 RX ORDER — ALPRAZOLAM 0.25 MG/1
TABLET ORAL
COMMUNITY
Start: 2022-03-02 | End: 2022-04-26

## 2022-03-10 NOTE — PROGRESS NOTES
Subjective:      Patient ID: Enedelia García is a 76 y.o. female.    Chief Complaint: Pain of the Left Knee      Patient ID: Enedelia García is a 76 y.o. female.    Chief Complaint: Pain of the Left Knee      KNEE PAIN: Complains of pain to the leftknee.   PAIN LOCATED: anterior and lateral  ONSET: 2 years ago.  QUALITY:  Patient states the pain is worsening  HISTORY: Previous knee injury/surgery: yes  Hx: UKA  ASSOCIATED SYMPTOM AND TRIGGERS:Standing/Weightbearing, walking, trouble w stairs, stiffness, swelling, limping, stiffness w sitting   USES ASSISTIVE DEVICE: walker  RELIEVED BY:rest, heat, ice, medication: Narcotics used but not effective  PATIENT DENIES: bruising, redness, deformity         Social History     Occupational History    Not on file   Tobacco Use    Smoking status: Never Smoker    Smokeless tobacco: Never Used   Substance and Sexual Activity    Alcohol use: No    Drug use: No    Sexual activity: Not Currently      Review of Systems   Constitutional: Negative for diaphoresis.   HENT: Negative for ear discharge, nosebleeds and stridor.    Eyes: Negative for photophobia.   Cardiovascular: Negative for syncope.   Respiratory: Negative for hemoptysis, shortness of breath and wheezing.    Neurological: Negative for tremors.   Psychiatric/Behavioral: Negative.          Objective:    General    Constitutional: She is oriented to person, place, and time. She appears well-developed and well-nourished.   HENT:   Head: Normocephalic and atraumatic.   Eyes: EOM are normal.   Pulmonary/Chest: Effort normal.   Neurological: She is alert and oriented to person, place, and time.   Psychiatric: She has a normal mood and affect. Her behavior is normal. Judgment and thought content normal.     General Musculoskeletal Exam   Gait: normal         Left Knee Exam     Inspection   Erythema: absent  Scars: present  Swelling: absent  Effusion: absent  Deformity: absent  Bruising: absent    Tenderness   The  patient tender to palpation of the lateral joint line and medial joint line.    Range of Motion   Extension: 5   Flexion: 90     Tests   Stability PCL-Posterior Drawer: normal (0 to 2mm)  MCL - Valgus: normal (0 to 2mm)  LCL - Varus: normal (0 to 2mm)  Patella   Passive Patellar Tilt: neutral  Patellar Tracking: normal    Other   Sensation: normal    Comments:  Incision well healed         Assessment:       1. S/P left unicompartmental knee replacement    2. Primary osteoarthritis of left knee    3. Moderate episode of recurrent major depressive disorder    4. Generalized anxiety disorder    5. Presence of stent in coronary artery in patient with coronary artery disease    6. Mixed hyperlipidemia    7. Coronary artery disease involving native coronary artery of native heart without angina pectoris    8. Aortic valve stenosis, etiology of cardiac valve disease unspecified    9. Tetanus-diphtheria vaccination administered at current visit    10. Prediabetes    11. Narcotic drug use          Plan:       The patient has failed non operative management and continued pain and swelling. The natural history of a failed replacement was discussed at length and nonoperative and operative treatment was reviewed. Due to the pain and associated disability, I recommend left revision total knee replacement.  The risks, benefits, convalescence, and alternatives were reviewed.  Numerous questions were asked and answered.  Models were used as an education tool.  Surgery will be scheduled at a convenient time.  The discussion with the patient included a description of a revision total knee replacement.  A revision total knee replacement (TKR) removing the old implants and resurfacing of all three surfaces-the patella, femur, and tibia, with metal and plastic parts as appropriate. The prosthetic parts are usually cemented into position wuith metal rods in the middle of the bone and will allow a patient a full range of motion from. The  postoperative motion, however, is determined by multiple factors, the most important of which is preoperative motion. In general, the better the motion preoperatively, the better the motion postoperatively.  In younger patients  I will generally leave the patella unresurfaced if possible, in an effort to preserve bone stock for any future revision surgeries. In those patients we discuss the risk of anterior knee pain in a small subset of patients (5-10%) with an unresurfaced patella. The operation, pending medical clearance, generally requires a hospitalization of 0-3 days for one knee (three-four days for a bilateral replacement). In general, we prefer to perform the procedure under epidural/spinal anesthesia.   Postoperatively, the patient is  walking the day after surgery and may be later in the week  if stable with a walker or cane.  The first couple of days are very painful and the pain medication will alleviate but not eliminate the pain.  Thus, the patient must really push hard to get the range of motion.   The cane is to be dispensed with once the patient is secure enough.  In general, there is no cast or brace required with a routine revision knee replacement. In the long term, we do not encourage our patients to run for the sake of running; although, pending their preoperative status, we often allow patients to play doubles tennis or comparable activities.  We also allow them to do gentle intermediate downhill skiing if they are truly an expert skier.  Biking is encouraged as well as swimming.  The follow-up periods are usually at three weeks, 3 months, six months, and yearly intervals.  Potential complications with total knee replacements include fracture, damage to ligaments and tendons. infection (less than 2%), which is much higher in patients with obesity, diabetes, or other conditions which adversely affect the immune system is also a mjor complication.  (Patients with a previous history of  osteomyelitis can have infection rates as high as 15%).  By nerve damage we mean a peroneal nerve palsy with a foot drop (a flapping foot with ambulation).  This is particularly more apt to occur with a bad valgus (knock knee) deformity.  The incidence can be quite high in this particular patient population.  There are always areas of skin numbness, but this is not an untoward effect, nor do we consider it a complication.  Other potential complications include dislocation of the patellar component (less than 2%).  The loosening of either the tibial or femoral component is much more infrequent.  It usually occurs with infection or long-term use in a patient population that is at extreme risk (e.g., markedly overweight and not conscientious about their exercises).  Major blood vessel damage is also extremely rare.  Theoretically, because of the anatomic proximity of the popliteal artery, it could be lacerated with subsequent repairs required.  Albeit unlikely, a disruption of the popliteal artery could theoretically result in an amputation.  Similarly, infection could theoretically result in an amputation if one were to grow out an organism that cannot be controlled with antibiotics.  General medical complications include phlebitis for which we prophylactically anticoagulate, but it could still occur and fatal pulmonary embolus has been reported.  Cardiovascular problems, such as a heart attack or ischemia, are always a concern with such hemodynamic changes in the blood vascular system.  Other general complications are very rare but in medicine anything could theoretically happen. We also discussed possibly performing a pre-operative peripheral sensory block of the bracnhes of the AFCN and ISN of the same knee using iovera to help with pain control post surgery. This is a relatively new technology with early data demonstrating significant pain improvement and functional recovery. However the science defining all the  associated risks is still developing. I think the patient understands the risk benefit ratio of a revision total knee replacement and the iovera treatment and would like to pursue the total knee replacement and iovera treatment. we will begin the pre-operative process. Additionally, this case will undergo peer review for appropriateness of care during our departmental weekly indications conference prior to surgery. We will also use the [employer] NMES device with garment prescribed for disuse atrophy as part of rehabilitation program to restore strength. Due to the large surface area and frequency of treatments, it is not feasible to use conventional electrodes, requiring the use of a conductive garment. This device is being used in conjunction with physical therapy. we will begin the pre-operative process.

## 2022-03-11 ENCOUNTER — TELEPHONE (OUTPATIENT)
Dept: FAMILY MEDICINE | Facility: CLINIC | Age: 77
End: 2022-03-11
Payer: MEDICARE

## 2022-03-11 ENCOUNTER — TELEPHONE (OUTPATIENT)
Dept: ORTHOPEDICS | Facility: CLINIC | Age: 77
End: 2022-03-11
Payer: MEDICARE

## 2022-03-11 PROBLEM — M85.89 OSTEOPENIA OF MULTIPLE SITES: Status: ACTIVE | Noted: 2022-03-11

## 2022-03-11 NOTE — TELEPHONE ENCOUNTER
----- Message from Gato Faith sent at 3/11/2022  2:35 PM CST -----  Type: Patient Returning Call        Who Called: Pt  Who Left Message for Patient: NA  Does the patient know what this is regarding?: Patient had blood work done yesterday on 03/10 for Dr. Catalan. She says he had to draw seven tubes of blood. The patient says that she was told that she has an infection in her blood. She would like a call back for a second opinion to see if Dr. Hummel can go over the lab results and give her more information.   Would the patient rather a call back or a response via [facility]? Call  Best Call Back Number: [phone]  Additional Information: Please assist, thank you.

## 2022-03-11 NOTE — TELEPHONE ENCOUNTER
----- Message from Stan Catalan MD sent at 3/11/2022 10:08 AM CST -----  Regarding: viridiana fleming

## 2022-03-18 ENCOUNTER — TELEPHONE (OUTPATIENT)
Dept: ORTHOPEDICS | Facility: CLINIC | Age: 77
End: 2022-03-18
Payer: MEDICARE

## 2022-03-18 ENCOUNTER — TELEPHONE (OUTPATIENT)
Dept: NEUROLOGY | Facility: HOSPITAL | Age: 77
End: 2022-03-18
Payer: MEDICARE

## 2022-03-18 NOTE — TELEPHONE ENCOUNTER
----- Message from Suzette Acevedo sent at 3/18/2022 10:38 AM CDT -----  Contact: [phone]/self  Who Called: PT    Regarding: dr andrade told pt that she has a infection in her blood . Issues with stool, clear mucus in stool. Dark brown as well     Would the patient rather a call back or a response via [facility]? Call back    Best Call Back Number: [phone]    Additional Information: n/a

## 2022-03-18 NOTE — TELEPHONE ENCOUNTER
Returned call. She reports that she went to the ortho for possible knee replacement appointment.  She was told that she has an infection in her blood and he will aspirating fluid from her knee to see if that is the source.  She also has stool issues, stool leaking, clear mucus. She is wanting an earlier appointment than the one she obtained on 3/31.  Appointment offered with  Dr. Kraft next Wednesday, 3/23 at 1:45.  She stated understanding of date and time and virtual visit status.

## 2022-03-18 NOTE — TELEPHONE ENCOUNTER
----- Message from Jc Gilmore sent at 3/18/2022 10:13 AM CDT -----  Type:  Patient Requesting call back    Who Called:Enedelia García  Would the patient rather a call back or a response via [facility]? Call back  Best Call Back Number:[phone]  Additional Information:  Patient Requesting call back regarding ongoing pain and stool changing colors, but doesn't have a fever, but just alarmed.

## 2022-03-21 ENCOUNTER — OFFICE VISIT (OUTPATIENT)
Dept: ORTHOPEDICS | Facility: CLINIC | Age: 77
End: 2022-03-21
Payer: MEDICARE

## 2022-03-21 VITALS
BODY MASS INDEX: 30.39 KG/M2 | SYSTOLIC BLOOD PRESSURE: 110 MMHG | HEIGHT: 64 IN | DIASTOLIC BLOOD PRESSURE: 70 MMHG | HEART RATE: 67 BPM | WEIGHT: 178 LBS

## 2022-03-21 DIAGNOSIS — Z96.652 S/P LEFT UNICOMPARTMENTAL KNEE REPLACEMENT: Primary | ICD-10-CM

## 2022-03-21 PROCEDURE — 1101F PT FALLS ASSESS-DOCD LE1/YR: CPT | Mod: CPTII,S$GLB,, | Performed by: ORTHOPAEDIC SURGERY

## 2022-03-21 PROCEDURE — 20610 DRAIN/INJ JOINT/BURSA W/O US: CPT | Mod: LT,S$GLB,, | Performed by: ORTHOPAEDIC SURGERY

## 2022-03-21 PROCEDURE — 99213 OFFICE O/P EST LOW 20 MIN: CPT | Mod: 25,S$GLB,, | Performed by: ORTHOPAEDIC SURGERY

## 2022-03-21 PROCEDURE — 3074F PR MOST RECENT SYSTOLIC BLOOD PRESSURE < 130 MM HG: ICD-10-PCS | Mod: CPTII,S$GLB,, | Performed by: ORTHOPAEDIC SURGERY

## 2022-03-21 PROCEDURE — 1101F PR PT FALLS ASSESS DOC 0-1 FALLS W/OUT INJ PAST YR: ICD-10-PCS | Mod: CPTII,S$GLB,, | Performed by: ORTHOPAEDIC SURGERY

## 2022-03-21 PROCEDURE — 3078F DIAST BP <80 MM HG: CPT | Mod: CPTII,S$GLB,, | Performed by: ORTHOPAEDIC SURGERY

## 2022-03-21 PROCEDURE — 1125F PR PAIN SEVERITY QUANTIFIED, PAIN PRESENT: ICD-10-PCS | Mod: CPTII,S$GLB,, | Performed by: ORTHOPAEDIC SURGERY

## 2022-03-21 PROCEDURE — 1159F PR MEDICATION LIST DOCUMENTED IN MEDICAL RECORD: ICD-10-PCS | Mod: CPTII,S$GLB,, | Performed by: ORTHOPAEDIC SURGERY

## 2022-03-21 PROCEDURE — 99999 PR PBB SHADOW E&M-EST. PATIENT-LVL IV: CPT | Mod: PBBFAC,,, | Performed by: ORTHOPAEDIC SURGERY

## 2022-03-21 PROCEDURE — 3288F PR FALLS RISK ASSESSMENT DOCUMENTED: ICD-10-PCS | Mod: CPTII,S$GLB,, | Performed by: ORTHOPAEDIC SURGERY

## 2022-03-21 PROCEDURE — 3288F FALL RISK ASSESSMENT DOCD: CPT | Mod: CPTII,S$GLB,, | Performed by: ORTHOPAEDIC SURGERY

## 2022-03-21 PROCEDURE — 99213 PR OFFICE/OUTPT VISIT, EST, LEVL III, 20-29 MIN: ICD-10-PCS | Mod: 25,S$GLB,, | Performed by: ORTHOPAEDIC SURGERY

## 2022-03-21 PROCEDURE — 3074F SYST BP LT 130 MM HG: CPT | Mod: CPTII,S$GLB,, | Performed by: ORTHOPAEDIC SURGERY

## 2022-03-21 PROCEDURE — 20610 LARGE JOINT ASPIRATION/INJECTION: L SUPRA PATELLAR BURSA: ICD-10-PCS | Mod: LT,S$GLB,, | Performed by: ORTHOPAEDIC SURGERY

## 2022-03-21 PROCEDURE — 3078F PR MOST RECENT DIASTOLIC BLOOD PRESSURE < 80 MM HG: ICD-10-PCS | Mod: CPTII,S$GLB,, | Performed by: ORTHOPAEDIC SURGERY

## 2022-03-21 PROCEDURE — 1125F AMNT PAIN NOTED PAIN PRSNT: CPT | Mod: CPTII,S$GLB,, | Performed by: ORTHOPAEDIC SURGERY

## 2022-03-21 PROCEDURE — 99999 PR PBB SHADOW E&M-EST. PATIENT-LVL IV: ICD-10-PCS | Mod: PBBFAC,,, | Performed by: ORTHOPAEDIC SURGERY

## 2022-03-21 PROCEDURE — 1159F MED LIST DOCD IN RCRD: CPT | Mod: CPTII,S$GLB,, | Performed by: ORTHOPAEDIC SURGERY

## 2022-03-21 NOTE — PROGRESS NOTES
[facility] [address]          Subjective:      Patient ID: Enedelia García is a 76 y.o. female.    Chief Complaint: Pain of the Left Knee    Knee Pain: left  swelling: yes  worsens with activity: yes  relieved by: None    Patient was booked for revision TKA but had elevated ESR/CRP, presents today for aspiration.       Past Medical History:   Diagnosis Date    Coronary artery disease     Epilepsy     Hyperlipidemia     Hypertension     Normocytic anemia         Past Surgical History:   Procedure Laterality Date    APPENDECTOMY      BACK SURGERY      CORONARY STENT PLACEMENT      HYSTERECTOMY      TONSILLECTOMY          Current Outpatient Medications   Medication Instructions    ALPRAZolam (XANAX) 0.25 MG tablet TAKE ONE TABLET BY MOUTH 30 MINUTES PRIOR TO PROCEDURE, THEN TAKE ONE TABLET IF NEEDED WHEN YOU ARRIVE TO THE OFFICE FOR PROCEDURAL ANXIETY    aspirin (ECOTRIN) 81 mg, Daily    buprenorphine (BUTRANS) 10 mcg/hour PTWK SMARTSI Patch(s) Topical Once a Week PRN    butalbital-acetaminophen-caff -40 mg Cap TAKE ONE CAPSULE BY MOUTH ONCE TO TWICE DAILY AS NEEDED FOR MIGRAINE headaches    calcium carbonate/vitamin D3 (VITAMIN D-3 ORAL) Oral, Daily    clotrimazole-betamethasone 1-0.05% (LOTRISONE) cream Topical (Top), 2 times daily    DULoxetine (CYMBALTA) 30 MG capsule Take 1 tab po daily x 2 weeks then 2 tabs po daily thereafter.    fluticasone propionate (FLONASE) 100 mcg, Each Nostril, Daily    furosemide (LASIX) 40 mg, Oral, Daily    gabapentin (NEURONTIN) 600 mg, 3 times daily    hydrOXYzine HCL (ATARAX) 25 MG tablet   See Instructions, 30 tab, 1, Substitution Allowed, TAKE 1 TABLET BY MOUTH DAILY AS NEEDED FOR ITCHING, 30, Route to Pharmacy Electronically, [id number], [id number], Instructions Replace Required Details, cm,...    irbesartan (AVAPRO) 300 mg, Oral, Nightly    isosorbide mononitrate (IMDUR) 60 mg, Oral, Daily     metoprolol succinate (TOPROL-XL) 25 mg, Oral, Daily    multivitamin capsule 1 capsule, Oral, Daily    mupirocin (BACTROBAN) 2 % ointment 2 times daily, apply to affected area    nitroGLYCERIN (NITROSTAT) 0.3 MG SL tablet   See Instructions, 100 tab, 5, Substitution Allowed, PLACE ONE TABLET UNDER THE TONGUE EVERY 5 MINUTES FOR UP TO 3 doses IF NEEDED FOR CHEST PAIN. call 911 IF PAIN persists., 30, Route to Pharmacy Electronically, [facility], [id number]...    omeprazole (PRILOSEC) 40 mg, Daily    oxyCODONE (ROXICODONE) 10 mg Tab immediate release tablet oxycodone 10 mg tablet   Take 1 tablet 3 times a day by oral route as needed for 7 days.    oxyCODONE-acetaminophen (PERCOCET)  mg per tablet oxycodone-acetaminophen 10 mg-325 mg tablet   TAKE ONE TABLET BY MOUTH THREE TIMES DAILY AS NEEDED FOR PAIN    oxyCODONE-acetaminophen (PERCOCET)  mg per tablet 1 tablet, Oral, 3 times daily PRN    rosuvastatin 40 mg, Oral, Daily    valACYclovir (VALTREX) 1,000 mg, Oral, Daily        Review of patient's allergies indicates:   Allergen Reactions    Iodinated contrast media Anaphylaxis and Other (See Comments)    Phenergan [promethazine]      Vomiting       Social History     Socioeconomic History    Marital status: Significant Other   Tobacco Use    Smoking status: Never Smoker    Smokeless tobacco: Never Used   Substance and Sexual Activity    Alcohol use: No    Drug use: No    Sexual activity: Not Currently       Family History   Problem Relation Age of Onset    Heart disease Mother     Heart disease Father          Review of Systems   Constitutional: Negative for chills, decreased appetite and fever.   Cardiovascular: Negative for chest pain.   Respiratory: Negative for shortness of breath.    Gastrointestinal: Negative for abdominal pain.             Objective:        General    Constitutional: She is oriented to person, place, and time. She appears well-developed and  well-nourished.   Cardiovascular: Intact distal pulses.    Pulmonary/Chest: Effort normal.   Neurological: She is alert and oriented to person, place, and time.             Left Knee Exam     Inspection   Erythema: absent  Scars: present  Effusion: present    Tenderness   The patient tender to palpation of the medial joint line and lateral joint line.    Crepitus   The patient has crepitus of the lateral joint line and medial joint line.    Range of Motion   Extension: 0   Flexion: 100     Tests   Stability Lachman: normal (-1 to 2mm) PCL-Posterior Drawer: normal (0 to 2mm)  MCL - Valgus: abnormal - grade II  LCL - Varus: abnormal - grade II    Other   Sensation: normal    Muscle Strength   Left Lower Extremity   Hip Abduction: 5/5   Quadriceps:  5/5   Hamstrin/5     Vascular Exam       Left Pulses  Dorsalis Pedis:      2+  Posterior Tibial:      2+                  Imaging: On independent review of left knee radiographs patient is s/p left medial sided UKA with lateral joint space narrowing.    Assessment:        Enedelia García is a 76 y.o. female hx of left knee UKA 3 years ago now with further degeneration of her lateral joint space. Pre-op inflammatory labs were significantly elevated.     Encounter Diagnosis   Name Primary?    S/P left unicompartmental knee replacement Yes       Plan :  Left knee aspiration to rule out infection.  Return to clinic in 1 week.       Orders Placed This Encounter   Procedures    Large Joint Aspiration/Injection: L supra patellar bursa        Albert Kerr MD   2022

## 2022-03-21 NOTE — PROCEDURES
Large Joint Aspiration/Injection: L supra patellar bursa    Date/Time: 3/21/2022 10:30 AM  Performed by: Stan Catalan MD  Authorized by: Stan Catalan MD     Consent Done?:  Yes (Verbal)  Indications:  Joint swelling  Site marked: the procedure site was marked    Timeout: prior to procedure the correct patient, procedure, and site was verified    Prep: patient was prepped and draped in usual sterile fashion      Local anesthesia used?: Yes    Anesthesia:  Local infiltration    Details:  Needle Size:  18 G  Ultrasonic Guidance for needle placement?: No    Approach:  Superior  Location:  Knee  Site:  L supra patellar bursa  Aspirate amount (mL):  30  Aspirate:  Serous  Lab: fluid sent for laboratory analysis    Patient tolerance:  Patient tolerated the procedure well with no immediate complications     Sent for synovasure

## 2022-03-23 ENCOUNTER — OFFICE VISIT (OUTPATIENT)
Dept: NEUROLOGY | Facility: HOSPITAL | Age: 77
End: 2022-03-23
Attending: INTERNAL MEDICINE
Payer: MEDICARE

## 2022-03-23 DIAGNOSIS — K59.00 CONSTIPATION, UNSPECIFIED CONSTIPATION TYPE: Primary | ICD-10-CM

## 2022-03-23 RX ORDER — POLYETHYLENE GLYCOL 3350 17 G/17G
17 POWDER, FOR SOLUTION ORAL DAILY
Qty: 90 PACKET | Refills: 3 | Status: SHIPPED | OUTPATIENT
Start: 2022-03-23 | End: 2022-08-27

## 2022-03-23 NOTE — PROGRESS NOTES
[facility]    Patient location: home  Reason for visit/referral: diarrhea, constipation  Televisit was completed without video and via phone only due to technical difficulties  Total time spent on phone and reviewing patient's records: >30 minutes    HPI 76 y.o. female with PMH significant for HTN, CAD, mood disorders, pre-DM, CKD3a here for diarrhea. Her diarrhea started after the initiation of fiber supplements and was associated with bloating. She has since stopped this with resolution of diarrhea. She is now having stool infrequency. Is having BM's every 4 days. Stools are pebble like. No straining with defecation. Hx of 4 vaginal births. Denies digital maneuvers routinely but did have to do this once several months ago. Does use percocet for back pain 3x/day. Reports 20 lbs weight loss with impaired mobility from MSK issues. Infrequently using ibuprofen for headaches about 1-2 x/month. Had hysterectomy many years ago.    Denies melena, hematochezia, abdominal pain, FHx GI malignancies    Physical Examination  Linear, no distress. Non pressured speech. Mood appropriate    Assessment:   1. Constipation - likely medication induced  2. On percocet, fiorcet, xanax, atarax    Plan:  -will avoid fiber since had side effects previously  -encouraged conservative measures where feasible:   -miralax 17 g daily (can uptitrate to 34 g BID for goal of 1 semisolid BM every 3 days on average; patient wrote down these instructions)  -if unsuccessful can consider relistor in setting of opioid use vs prucalopride      Shan Cintron MD ([facility]) and Dank Kraft MD   [address] [place] [address] [address]   [place] [address] [phone]

## 2022-03-25 ENCOUNTER — TELEPHONE (OUTPATIENT)
Dept: ORTHOPEDICS | Facility: CLINIC | Age: 77
End: 2022-03-25
Payer: MEDICARE

## 2022-03-25 NOTE — TELEPHONE ENCOUNTER
----- Message from Leatha Ivy sent at 3/25/2022 11:11 AM CDT -----  Type:  Needs Medical Advice    Who Called: self  Reason:reschedule surgery   Would the patient rather a call back or a response via [facility]? call  Best Call Back Number: [phone]  Additional Information:none

## 2022-03-25 NOTE — TELEPHONE ENCOUNTER
----- Message from Leatha Ivy sent at 3/25/2022  3:34 PM CDT -----  Type:  Needs Medical Advice    Who Called: self  Reason:returning call  Would the patient rather a call back or a response via [employer]? call  Best Call Back Number: [phone]  Additional Information: none

## 2022-03-29 ENCOUNTER — OFFICE VISIT (OUTPATIENT)
Dept: ORTHOPEDICS | Facility: CLINIC | Age: 77
End: 2022-03-29
Payer: MEDICARE

## 2022-03-29 DIAGNOSIS — Z96.652 S/P LEFT UNICOMPARTMENTAL KNEE REPLACEMENT: Primary | ICD-10-CM

## 2022-03-29 PROCEDURE — 99999 PR PBB SHADOW E&M-EST. PATIENT-LVL III: CPT | Mod: PBBFAC,,, | Performed by: ORTHOPAEDIC SURGERY

## 2022-03-29 PROCEDURE — 99213 PR OFFICE/OUTPT VISIT, EST, LEVL III, 20-29 MIN: ICD-10-PCS | Mod: S$GLB,,, | Performed by: ORTHOPAEDIC SURGERY

## 2022-03-29 PROCEDURE — 99213 OFFICE O/P EST LOW 20 MIN: CPT | Mod: S$GLB,,, | Performed by: ORTHOPAEDIC SURGERY

## 2022-03-29 PROCEDURE — 1101F PR PT FALLS ASSESS DOC 0-1 FALLS W/OUT INJ PAST YR: ICD-10-PCS | Mod: CPTII,S$GLB,, | Performed by: ORTHOPAEDIC SURGERY

## 2022-03-29 PROCEDURE — 1159F PR MEDICATION LIST DOCUMENTED IN MEDICAL RECORD: ICD-10-PCS | Mod: CPTII,S$GLB,, | Performed by: ORTHOPAEDIC SURGERY

## 2022-03-29 PROCEDURE — 1159F MED LIST DOCD IN RCRD: CPT | Mod: CPTII,S$GLB,, | Performed by: ORTHOPAEDIC SURGERY

## 2022-03-29 PROCEDURE — 1101F PT FALLS ASSESS-DOCD LE1/YR: CPT | Mod: CPTII,S$GLB,, | Performed by: ORTHOPAEDIC SURGERY

## 2022-03-29 PROCEDURE — 3288F FALL RISK ASSESSMENT DOCD: CPT | Mod: CPTII,S$GLB,, | Performed by: ORTHOPAEDIC SURGERY

## 2022-03-29 PROCEDURE — 99999 PR PBB SHADOW E&M-EST. PATIENT-LVL III: ICD-10-PCS | Mod: PBBFAC,,, | Performed by: ORTHOPAEDIC SURGERY

## 2022-03-29 PROCEDURE — 3288F PR FALLS RISK ASSESSMENT DOCUMENTED: ICD-10-PCS | Mod: CPTII,S$GLB,, | Performed by: ORTHOPAEDIC SURGERY

## 2022-03-29 NOTE — PROGRESS NOTES
Subjective:      Patient ID: Enedelia García is a 76 y.o. female.    Chief Complaint: Pain of the Left Knee    Here to review aspiration results    Social History     Occupational History    Not on file   Tobacco Use    Smoking status: Never Smoker    Smokeless tobacco: Never Used   Substance and Sexual Activity    Alcohol use: No    Drug use: No    Sexual activity: Not Currently      ROS      Objective:    Ortho/SPM Exam       Assessment:       1. S/P left unicompartmental knee replacement          Plan:       synovasure negative for infection. Will. Continue w standard revision tka protocol

## 2022-03-30 ENCOUNTER — HOSPITAL ENCOUNTER (OUTPATIENT)
Dept: PREADMISSION TESTING | Facility: HOSPITAL | Age: 77
Discharge: HOME | End: 2022-03-30

## 2022-03-30 NOTE — DISCHARGE INSTRUCTIONS
Your surgery is scheduled for 4/18/22.    Please report to [facility] on the 1st Floor at 0530 a.m.    THIS TIME IS SUBJECT TO CHANGE.  YOU WILL RECEIVE A PHONE CALL THE DAY BEFORE SURGERY BY 3:30 PM TO CONFIRM YOUR TIME OF ARRIVAL.  IF YOU HAVE NOT RECEIVED A PHONE CALL BY 3:30 PM THE DAY BEFORE YOUR SURGERY PLEASE CALL [phone]     INSTRUCTIONS IMPORTANT!!!  ¨ Do not eat or drink after 12 midnight-including water, candy, gum, & mints. OK to brush teeth.      ____  Proceed to [facility] on *** for additional testing.        ____  Do not wear makeup, including mascara.  ____  No powder, lotions or creams to surgical area.  ____  Please remove all jewelry, including piercings and leave at home.  ____  No money or valuables needed. Please leave at home.  ____  Please bring any documents given by your doctor.  ____  If going home the same day, arrange for a ride home. You will not be able to             drive if Anesthesia was used.  ____  Children under 18 years require a parent / guardian present the entire time             they are in surgery / recovery.  ____  Wear loose fitting clothing. Allow for dressings, bandages.  ____  Stop Aspirin, Ibuprofen, Motrin, Aleve, Goody's/BC powders, Excedrine and Naproxen (NSAIDS) at least 3-5 days before surgery, unless otherwise instructed by your doctor, or the nurse.   You MAY use Tylenol/acetaminophen until day of surgery.  ____  Wash the surgical area with Hibiclens the night before surgery, and again the             morning of surgery.  Be sure to rinse hibiclens off completely (if instructed by   nurse).  ____  If you take diabetic medication, do not take am of surgery unless instructed by Doctor.  ____  Call MD for temperature above 101 degrees or any other signs of infection such as Urinary (bladder) infection, Upper respiratory infection, skin boils, etc.  ____ Stop taking any Fish Oil supplement or any Vitamins that contain Vitamin E at  least 5 days prior to surgery.  ____ Do Not wear your contact lenses the day of your procedure.  You may wear your glasses.      ____Do not shave surgical site for 3 days prior to surgery.  ____ Practice Good hand washing before, during, and after procedure.      I have read or had read and explained to me, and understand the above information.  Additional comments or instructions:  For additional questions call [phone]      ANESTHESIA SIDE EFFECTS  -For the first 24 hours after surgery:  Do not drive, use heavy equipment, make important decisions, or drink alcohol  -It is normal to feel sleepy for several hours.  Rest until you are more awake.  -Have someone stay with you, if needed.  They can watch for problems and help keep you safe.  -Some possible post anesthesia side effects include: nausea and vomiting, sore throat and hoarseness, sleepiness, and dizziness.        Pre-Op Bathing Instructions    Before surgery, you can play an important role in your own health.    Because skin is not sterile, we need to be sure that your skin is as free of germs as possible. By following the instructions below, you can reduce the number of germs on your skin before surgery.    IMPORTANT: You will need to shower with a special soap called Hibiclens*, available at any pharmacy.  If you are allergic to Chlorhexidine (the antiseptic in Hibiclens), use an antibacterial soap such as Dial Soap for your preoperative shower.  You will shower with Hibiclens both the night before your surgery and the morning of your surgery.  Do not use Hibiclens on the head, face or genitals to avoid injury to those areas.    STEP #1: THE NIGHT BEFORE YOUR SURGERY     Do not shave the area of your body where your surgery will be performed.  Shower and wash your hair and body as usual with your normal soap and shampoo.  Rinse your hair and body thoroughly after you shower to remove all soap residue.  With your hand, apply one packet of Hibiclens soap to  the surgical site.   Wash the site gently for five (5) minutes. Do not scrub your skin too hard.   Do not wash with your regular soap after Hibiclens is used.  Rinse your body thoroughly.  Pat yourself dry with a clean, soft towel.  Do not use lotion, cream, or powder.  Wear clean clothes.    STEP #2: THE MORNING OF YOUR SURGERY     Repeat Step #1.    * Not to be used by people allergic to Chlorhexidine.

## 2022-04-04 RX ORDER — METOPROLOL SUCCINATE 25 MG/1
25 TABLET, EXTENDED RELEASE ORAL DAILY
Qty: 90 TABLET | Refills: 3 | Status: SHIPPED | OUTPATIENT
Start: 2022-04-04 | End: 2022-08-12 | Stop reason: SDUPTHER

## 2022-04-07 ENCOUNTER — TELEPHONE (OUTPATIENT)
Dept: INTERNAL MEDICINE | Facility: CLINIC | Age: 77
End: 2022-04-07
Payer: MEDICARE

## 2022-04-07 NOTE — TELEPHONE ENCOUNTER
Patient states that she has a blood pressure of 75/30 and this morning it 100/43, and she rechecked it later and it was 90/53. Patient has not taken her blood pressure medicine due to being out of it for four days. Patient does not want to go to the ED. Patient states that she slept with her head elevated, and drank plenty of fluids. Patient states that she is feeling tired.

## 2022-04-07 NOTE — TELEPHONE ENCOUNTER
Contacted patient and informed her of Dr. Hummel's recommendations. Patient on the phone crying and stating that she's scared. Advised patient that she should go the the nearest ED if she is worried, and scheduled. Patient is worried also about her  who is at home with her that had a stoke a couple of weeks ago. Patient states that she will call 911.

## 2022-04-07 NOTE — TELEPHONE ENCOUNTER
----- Message from Leatha Ivy sent at 4/7/2022 10:26 AM CDT -----  Type:  Needs Medical Advice    Who Called: self  Reason:BP has been running low and she is concerned. 75/30 was what it was last night.   Would the patient rather a call back or a response via [facility]? #call  Best Call Back Number: [phone]  Additional Information: none

## 2022-04-07 NOTE — TELEPHONE ENCOUNTER
Please encourage patient to be with family and not alone. If she feels tired, lightheaded, weak, should proceed immediately to the ER.   Please hold all blood pressure meds and all sedating meds. Honestly all her meds can be held for a few days until follow up.     Please schedule her a visit in clinic with me tomorrow or early next week. If she will go to ER for fluid bolus, then schedule her follow up visit after with me. Thanks!

## 2022-04-09 ENCOUNTER — HOSPITAL ENCOUNTER (EMERGENCY)
Facility: HOSPITAL | Age: 77
Discharge: HOME | End: 2022-04-10
Attending: EMERGENCY MEDICINE
Payer: MEDICARE

## 2022-04-09 DIAGNOSIS — R53.83 FATIGUE: ICD-10-CM

## 2022-04-09 DIAGNOSIS — R19.7 DIARRHEA, UNSPECIFIED TYPE: Primary | ICD-10-CM

## 2022-04-09 DIAGNOSIS — N30.00 ACUTE CYSTITIS WITHOUT HEMATURIA: ICD-10-CM

## 2022-04-09 LAB
BASOPHILS # BLD AUTO: 0.05 K/UL (ref 0–0.2)
BASOPHILS NFR BLD: 0.5 % (ref 0–1.9)
DIFFERENTIAL METHOD: ABNORMAL
EOSINOPHIL # BLD AUTO: 0.3 K/UL (ref 0–0.5)
EOSINOPHIL NFR BLD: 3 % (ref 0–8)
ERYTHROCYTE [DISTWIDTH] IN BLOOD BY AUTOMATED COUNT: 13.1 % (ref 11.5–14.5)
HCT VFR BLD AUTO: 33.1 % (ref 37–48.5)
HGB BLD-MCNC: 10.6 G/DL (ref 12–16)
IMM GRANULOCYTES # BLD AUTO: 0.02 K/UL (ref 0–0.04)
IMM GRANULOCYTES NFR BLD AUTO: 0.2 % (ref 0–0.5)
LYMPHOCYTES # BLD AUTO: 2.6 K/UL (ref 1–4.8)
LYMPHOCYTES NFR BLD: 28.3 % (ref 18–48)
MCH RBC QN AUTO: 30.1 PG (ref 27–31)
MCHC RBC AUTO-ENTMCNC: 32 G/DL (ref 32–36)
MCV RBC AUTO: 94 FL (ref 82–98)
MONOCYTES # BLD AUTO: 1.5 K/UL (ref 0.3–1)
MONOCYTES NFR BLD: 16 % (ref 4–15)
NEUTROPHILS # BLD AUTO: 4.8 K/UL (ref 1.8–7.7)
NEUTROPHILS NFR BLD: 52 % (ref 38–73)
NRBC BLD-RTO: 0 /100 WBC
PLATELET # BLD AUTO: 270 K/UL (ref 150–450)
PMV BLD AUTO: 9.4 FL (ref 9.2–12.9)
RBC # BLD AUTO: 3.52 M/UL (ref 4–5.4)
WBC # BLD AUTO: 9.23 K/UL (ref 3.9–12.7)

## 2022-04-09 PROCEDURE — 80053 COMPREHEN METABOLIC PANEL: CPT | Performed by: EMERGENCY MEDICINE

## 2022-04-09 PROCEDURE — 96360 HYDRATION IV INFUSION INIT: CPT

## 2022-04-09 PROCEDURE — 99284 EMERGENCY DEPT VISIT MOD MDM: CPT | Mod: 25

## 2022-04-09 PROCEDURE — 93010 EKG 12-LEAD: ICD-10-PCS | Mod: ,,, | Performed by: INTERNAL MEDICINE

## 2022-04-09 PROCEDURE — 93010 ELECTROCARDIOGRAM REPORT: CPT | Mod: ,,, | Performed by: INTERNAL MEDICINE

## 2022-04-09 PROCEDURE — 85025 COMPLETE CBC W/AUTO DIFF WBC: CPT | Performed by: EMERGENCY MEDICINE

## 2022-04-09 PROCEDURE — 93005 ELECTROCARDIOGRAM TRACING: CPT

## 2022-04-10 VITALS
OXYGEN SATURATION: 97 % | HEART RATE: 70 BPM | DIASTOLIC BLOOD PRESSURE: 73 MMHG | WEIGHT: 165 LBS | RESPIRATION RATE: 18 BRPM | SYSTOLIC BLOOD PRESSURE: 172 MMHG | BODY MASS INDEX: 28.32 KG/M2 | TEMPERATURE: 98 F

## 2022-04-10 LAB
ALBUMIN SERPL BCP-MCNC: 3.6 G/DL (ref 3.5–5.2)
ALP SERPL-CCNC: 66 U/L (ref 55–135)
ALT SERPL W/O P-5'-P-CCNC: 21 U/L (ref 10–44)
ANION GAP SERPL CALC-SCNC: 10 MMOL/L (ref 8–16)
AST SERPL-CCNC: 24 U/L (ref 10–40)
BACTERIA #/AREA URNS HPF: ABNORMAL /HPF
BILIRUB SERPL-MCNC: 0.4 MG/DL (ref 0.1–1)
BILIRUB UR QL STRIP: NEGATIVE
BUN SERPL-MCNC: 20 MG/DL (ref 8–23)
CALCIUM SERPL-MCNC: 9 MG/DL (ref 8.7–10.5)
CHLORIDE SERPL-SCNC: 101 MMOL/L (ref 95–110)
CLARITY UR: CLEAR
CO2 SERPL-SCNC: 26 MMOL/L (ref 23–29)
COLOR UR: COLORLESS
CREAT SERPL-MCNC: 1.1 MG/DL (ref 0.5–1.4)
EST. GFR  (AFRICAN AMERICAN): 56 ML/MIN/1.73 M^2
EST. GFR  (NON AFRICAN AMERICAN): 49 ML/MIN/1.73 M^2
GLUCOSE SERPL-MCNC: 100 MG/DL (ref 70–110)
GLUCOSE UR QL STRIP: NEGATIVE
HGB UR QL STRIP: NEGATIVE
HYALINE CASTS #/AREA URNS LPF: 8 /LPF
KETONES UR QL STRIP: NEGATIVE
LEUKOCYTE ESTERASE UR QL STRIP: ABNORMAL
MICROSCOPIC COMMENT: ABNORMAL
NITRITE UR QL STRIP: NEGATIVE
PH UR STRIP: 5 [PH] (ref 5–8)
POTASSIUM SERPL-SCNC: 4.5 MMOL/L (ref 3.5–5.1)
PROT SERPL-MCNC: 6.5 G/DL (ref 6–8.4)
PROT UR QL STRIP: NEGATIVE
RBC #/AREA URNS HPF: 1 /HPF (ref 0–4)
SODIUM SERPL-SCNC: 137 MMOL/L (ref 136–145)
SP GR UR STRIP: 1 (ref 1–1.03)
SQUAMOUS #/AREA URNS HPF: 2 /HPF
URN SPEC COLLECT METH UR: ABNORMAL
UROBILINOGEN UR STRIP-ACNC: NEGATIVE EU/DL
WBC #/AREA URNS HPF: 5 /HPF (ref 0–5)

## 2022-04-10 PROCEDURE — 25000003 PHARM REV CODE 250: Performed by: EMERGENCY MEDICINE

## 2022-04-10 PROCEDURE — 81000 URINALYSIS NONAUTO W/SCOPE: CPT | Performed by: EMERGENCY MEDICINE

## 2022-04-10 RX ORDER — SULFAMETHOXAZOLE AND TRIMETHOPRIM 800; 160 MG/1; MG/1
1 TABLET ORAL 2 TIMES DAILY
Qty: 6 TABLET | Refills: 0 | Status: SHIPPED | OUTPATIENT
Start: 2022-04-10 | End: 2022-04-13

## 2022-04-10 RX ORDER — DIPHENOXYLATE HYDROCHLORIDE AND ATROPINE SULFATE 2.5; .025 MG/1; MG/1
1 TABLET ORAL 4 TIMES DAILY PRN
Qty: 12 TABLET | Refills: 0 | Status: SHIPPED | OUTPATIENT
Start: 2022-04-10 | End: 2022-04-20

## 2022-04-10 RX ADMIN — SODIUM CHLORIDE 1000 ML: 0.9 INJECTION, SOLUTION INTRAVENOUS at 12:04

## 2022-04-10 NOTE — ED NOTES
Pt given d/c instructions and prescriptions with correct return verbalization of understanding. Pt is alert, oriented x4 and in no distress prior to departing ER. Normal respiratory drive noted.

## 2022-04-11 NOTE — ED PROVIDER NOTES
Chief Complaint:  Weakness, fatigue, low blood pressures at home    History of Present Illness:    Enedelia García 76 y.o. with a  has a past medical history of Coronary artery disease, Epilepsy, Hyperlipidemia, Hypertension, and Normocytic anemia. who presents to the emergency department today with a complaint of Weakness, fatigue, low blood pressures at home.  Patient reports that she has been having a time with diarrhea.  It has been ongoing for last few days.  She has been taking her blood pressure readings at home have been hypotensive.  Her last reading was systolic in the 70s.  This prompted her to come to the emergency department.  She reports she drank 3 bottles of water prior to coming into the emergency department.  She denies any recent antibiotics.  Denies any travel.  No fever, chills or sweats.  No abdominal pain.  No nausea vomiting.  No chest pain, no shortness of breath.  No headache.      ROS    Constitutional: No fever, no chills.  Eyes: No discharge. No pain.  HENT: No nasal drainage. No ear ache. No sore throat.  Cardiovascular: No chest pain, no palpitations.  Respiratory: No cough, no shortness of breath.  Gastrointestinal:  See above  Genitourinary: No hematuria, dysuria, urgency.  Musculoskeletal: No back pain.   Skin: No rashes, no lesions.  Neurological: No headache, no focal weakness.    Otherwise remaining ROS negative     The history is provided by the patient      ALLERGIES REVIEWED  MEDICATIONS REVIEWED  PMH/PSH/SOC/FH REVIEWED :  Enedelia García  has a past medical history of Coronary artery disease, Epilepsy, Hyperlipidemia, Hypertension, and Normocytic anemia. and   has a past surgical history that includes Coronary stent placement; Hysterectomy; Tonsillectomy; Appendectomy; and Back surgery. with  reports that she has never smoked. She has never used smokeless tobacco. She reports that she does not drink alcohol and does not use drugs. and a family history includes  Heart disease in her father and mother.      Nursing/Ancillary staff note reviewed.  VS reviewed         Physical Exam     BP (!) 172/73   Pulse 70   Temp 98.2 °F (36.8 °C) (Oral)   Resp 18   Wt 74.8 kg (165 lb)   SpO2 97%   BMI 28.32 kg/m²     Physical Exam    General Appearance: The patient is alert, has no immediate need for airway protection and no signs of toxicity. No acute distress. Lying in bed but able to sit up without difficulty.   HEENT: Eyes: Pupils equal and round no pallor or injection. Extra ocular movements intact. No drainage.       Mouth: Mucous membranes are dry. Oropharynx clear.   Neck:Neck is supple non-tender. No lymphadenopathy. No stridor.   Respiratory: There are no retractions, lungs are clear to auscultation. No wheezing, no crackles. Chest wall nontender to palpation.   Cardiovascular: Regular rate and rhythm. No murmurs, rubs or gallops.  Gastrointestinal:  Abdomen is soft and non-tender, no masses, bowel sounds normal. No guarding, no rebound.  No pulsatile mass.   Neurological: Alert and oriented x 4. CN II-XII grossly intact. No focal weakness. Strength intact 5/5 bilaterally in upper and lower extremities.   Skin: Warm and dry, no rashes.  Musculoskeletal: Extremities are non-tender, non-swollen and have full range of motion. Back NTTP along the midline.     DIFFERENTIAL DIAGNOSIS: After history and physical exam a differential diagnosis was considered, but was not limited to, electrolyte abnormality, depression, anxiety, CVA, spinal cord abnormality, and infectious causes.              I independently interpreted the lab results and it showed the following:  CBC unremarkable, CMP unremarkable, urinalysis 1+ leukocyte esterase          I independently ordered and interpreted the EKG and it showed:  70 bpm, normal axis, no STEMI, read by myself read by cardiology pending.           ED Course     Medications   sodium chloride 0.9% bolus 1,000 mL (0 mLs Intravenous Stopped  4/10/22 0122)         ED Course as of 04/11/22 0608   Sun Apr 10, 2022   0145 Leukocytes, UA(!): 1+ [JA]   0147 WBC, UA: 5 [JA]   0147 Pt prefers to be treated than wait and see cultures.  [JA]      ED Course User Index  [JA] Ze Batista MD              Medical Decision Making:   History:   I obtained history from:  The patient  Old Medical Records: I decided to obtain old medical records.       Initial management:  Enedelia García 76 y.o. female who presents to the ED today for weakness.  Patient had episode of hypotension at home.  This is not supported here in the emergency department.  Blood pressure appropriate.  She has been having some diarrhea and appears dry on exam.  Will obtain labs.  Monitor and reassess..   The patient was seen and examined, see chart. The history and physical exam was obtained, see chart. The nursing notes and vital signs were reviewed, see chart.        Independently Interpreted Test(s):   I have ordered and independently interpreted EKG Reading(s) - see prior notes  Clinical Tests:   I have ordered and independently interpreted Lab Tests: Ordered and Reviewed  Medical Tests: Ordered and Reviewed        ED Management:  Enedelia García  presents to the emergency Department today with weakness, diarrhea.  Her workup today is unremarkable.  She is feeling improved following IV fluids.  There is no need for admission today.  I discussed with her symptom control.  She will follow-up with her PCP.  She did have 1+ leukocyte esterase on her urinalysis.  She has had some frequency and given the diarrhea will place on antibiotics.  Patient is comfortable with this plan.  The pt is comfortable with this plan and comfortable going home at this time. After taking into careful account the historical factors and physical exam findings of the patient's presentation today, in conjunction with the empirical and objective data obtained on ED workup, no acute emergent medical condition  requiring admission has been identified. The patient appears to be low risk for an emergent medical condition and I feel it is safe and appropriate at this time for the patient to be discharged to follow-up as detailed in their discharge instructions for reevaluation and possible continued outpatient workup and management. Regardless, an unremarkable evaluation in the ED does not preclude the development or presence of a serious or life threatening condition. As such, patient was instructed to return immediately for any worsening or change in current symptoms. Precautions for return discussed at length.  Discharge and follow-up instructions discussed with the patient who expressed understanding and willingness to comply with my recommendations.    Voice recognition software utilized in this note.              Impression      The primary encounter diagnosis was Diarrhea, unspecified type. Diagnoses of Fatigue and Acute cystitis without hematuria were also pertinent to this visit.                Discharge Medication List as of 4/10/2022  1:51 AM      START taking these medications    Details   diphenoxylate-atropine 2.5-0.025 mg (LOMOTIL) 2.5-0.025 mg per tablet Take 1 tablet by mouth 4 (four) times daily as needed for Diarrhea., Starting Sun 4/10/2022, Until Wed 4/20/2022 at 2359, Normal      sulfamethoxazole-trimethoprim 800-160mg (BACTRIM DS) 800-160 mg Tab Take 1 tablet by mouth 2 (two) times daily. for 3 days, Starting Sun 4/10/2022, Until Wed 4/13/2022, Normal              Follow-up Information     Marichuy Hummel MD. Schedule an appointment as soon as possible for a visit in 1 week.    Specialty: Family Medicine  Contact information:  [address] [facility] [address]  [phone]             [facility] - Emergency Dept.    Specialty: Emergency Medicine  Why: If symptoms worsen  Contact information:  [address] [facility] [address] [phone]                                    Ze LAROSE  MD Lester  04/11/22 0608

## 2022-04-13 ENCOUNTER — TELEPHONE (OUTPATIENT)
Dept: FAMILY MEDICINE | Facility: CLINIC | Age: 77
End: 2022-04-13
Payer: MEDICARE

## 2022-04-13 NOTE — TELEPHONE ENCOUNTER
Called patient to schedule her hospital follow up, advise the patient the next available its on 04/20 and 04/21 at 9 am. Patients its requesting an appointment after 12 pm. Advise the patient next available in the afternoon its on 05/02. Patient decline appt. Pt its asking for Dr Hummel to review her test results done on 04/09 while she was admitted and give her the results.

## 2022-04-13 NOTE — TELEPHONE ENCOUNTER
----- Message from Zenaida Barlow RN sent at 4/13/2022 12:22 PM CDT -----  Good Afternoon. Unfortunately, we do not have any appointments sooner than 4/20 at this time.  ----- Message -----  From: Yasmeen Shane MA  Sent: 4/13/2022  11:48 AM CDT  To: Sera Miranda    Good morning, please assist with this patient. We do not have an appointment sooner them 04/20 . Please advise   ----- Message -----  From: Pati Sevilla  Sent: 4/13/2022  11:16 AM CDT  To: [facility] Benedict Staff    Type:  Sooner Apoointment Request    Caller is requesting a sooner appointment.  Caller declined first available appointment listed below.  Caller will not accept being placed on the waitlist and is requesting a message be sent to doctor.  Name of Caller:pt  When is the first available appointment?04/20/22  Symptoms:pt needs to schedule a hospital f/u within 3 days pt was discharged on 04/10/22  Would the patient rather a call back or a response via [facility]? call  Best Call Back Number:[phone]  Additional Information:

## 2022-04-14 ENCOUNTER — HOSPITAL ENCOUNTER (EMERGENCY)
Facility: HOSPITAL | Age: 77
Discharge: HOME | End: 2022-04-14
Attending: EMERGENCY MEDICINE
Payer: MEDICARE

## 2022-04-14 VITALS
SYSTOLIC BLOOD PRESSURE: 174 MMHG | OXYGEN SATURATION: 98 % | HEIGHT: 64 IN | TEMPERATURE: 98 F | DIASTOLIC BLOOD PRESSURE: 74 MMHG | RESPIRATION RATE: 18 BRPM | WEIGHT: 165 LBS | HEART RATE: 62 BPM | BODY MASS INDEX: 28.17 KG/M2

## 2022-04-14 DIAGNOSIS — I73.00 RAYNAUD'S PHENOMENON WITHOUT GANGRENE: ICD-10-CM

## 2022-04-14 DIAGNOSIS — R55 NEAR SYNCOPE: Primary | ICD-10-CM

## 2022-04-14 DIAGNOSIS — R42 DIZZINESS: ICD-10-CM

## 2022-04-14 DIAGNOSIS — R53.1 WEAKNESS: ICD-10-CM

## 2022-04-14 LAB
ALBUMIN SERPL BCP-MCNC: 3.4 G/DL (ref 3.5–5.2)
ALP SERPL-CCNC: 66 U/L (ref 55–135)
ALT SERPL W/O P-5'-P-CCNC: 16 U/L (ref 10–44)
ANION GAP SERPL CALC-SCNC: 11 MMOL/L (ref 8–16)
AST SERPL-CCNC: 20 U/L (ref 10–40)
BASOPHILS # BLD AUTO: 0.05 K/UL (ref 0–0.2)
BASOPHILS NFR BLD: 0.6 % (ref 0–1.9)
BILIRUB SERPL-MCNC: 0.2 MG/DL (ref 0.1–1)
BUN SERPL-MCNC: 19 MG/DL (ref 8–23)
CALCIUM SERPL-MCNC: 9.4 MG/DL (ref 8.7–10.5)
CHLORIDE SERPL-SCNC: 102 MMOL/L (ref 95–110)
CO2 SERPL-SCNC: 27 MMOL/L (ref 23–29)
CREAT SERPL-MCNC: 1.2 MG/DL (ref 0.5–1.4)
DIFFERENTIAL METHOD: ABNORMAL
EOSINOPHIL # BLD AUTO: 0.4 K/UL (ref 0–0.5)
EOSINOPHIL NFR BLD: 4.5 % (ref 0–8)
ERYTHROCYTE [DISTWIDTH] IN BLOOD BY AUTOMATED COUNT: 13.2 % (ref 11.5–14.5)
EST. GFR  (AFRICAN AMERICAN): 51 ML/MIN/1.73 M^2
EST. GFR  (NON AFRICAN AMERICAN): 44 ML/MIN/1.73 M^2
GLUCOSE SERPL-MCNC: 97 MG/DL (ref 70–110)
HCT VFR BLD AUTO: 32.1 % (ref 37–48.5)
HGB BLD-MCNC: 10.2 G/DL (ref 12–16)
IMM GRANULOCYTES # BLD AUTO: 0.02 K/UL (ref 0–0.04)
IMM GRANULOCYTES NFR BLD AUTO: 0.3 % (ref 0–0.5)
LYMPHOCYTES # BLD AUTO: 2 K/UL (ref 1–4.8)
LYMPHOCYTES NFR BLD: 25.5 % (ref 18–48)
MCH RBC QN AUTO: 29.7 PG (ref 27–31)
MCHC RBC AUTO-ENTMCNC: 31.8 G/DL (ref 32–36)
MCV RBC AUTO: 94 FL (ref 82–98)
MONOCYTES # BLD AUTO: 1.2 K/UL (ref 0.3–1)
MONOCYTES NFR BLD: 15.3 % (ref 4–15)
NEUTROPHILS # BLD AUTO: 4.3 K/UL (ref 1.8–7.7)
NEUTROPHILS NFR BLD: 53.8 % (ref 38–73)
NRBC BLD-RTO: 0 /100 WBC
PLATELET # BLD AUTO: 263 K/UL (ref 150–450)
PMV BLD AUTO: 9.7 FL (ref 9.2–12.9)
POTASSIUM SERPL-SCNC: 4 MMOL/L (ref 3.5–5.1)
PROT SERPL-MCNC: 6.5 G/DL (ref 6–8.4)
RBC # BLD AUTO: 3.43 M/UL (ref 4–5.4)
SODIUM SERPL-SCNC: 140 MMOL/L (ref 136–145)
TROPONIN I SERPL DL<=0.01 NG/ML-MCNC: <0.006 NG/ML (ref 0–0.03)
TSH SERPL DL<=0.005 MIU/L-ACNC: 1.99 UIU/ML (ref 0.4–4)
WBC # BLD AUTO: 7.99 K/UL (ref 3.9–12.7)

## 2022-04-14 PROCEDURE — 96360 HYDRATION IV INFUSION INIT: CPT

## 2022-04-14 PROCEDURE — 93005 ELECTROCARDIOGRAM TRACING: CPT

## 2022-04-14 PROCEDURE — 93010 EKG 12-LEAD: ICD-10-PCS | Mod: ,,, | Performed by: INTERNAL MEDICINE

## 2022-04-14 PROCEDURE — 25000003 PHARM REV CODE 250: Performed by: EMERGENCY MEDICINE

## 2022-04-14 PROCEDURE — 84443 ASSAY THYROID STIM HORMONE: CPT | Performed by: EMERGENCY MEDICINE

## 2022-04-14 PROCEDURE — 80053 COMPREHEN METABOLIC PANEL: CPT | Performed by: EMERGENCY MEDICINE

## 2022-04-14 PROCEDURE — 93010 ELECTROCARDIOGRAM REPORT: CPT | Mod: 76,,, | Performed by: INTERNAL MEDICINE

## 2022-04-14 PROCEDURE — 85025 COMPLETE CBC W/AUTO DIFF WBC: CPT | Performed by: EMERGENCY MEDICINE

## 2022-04-14 PROCEDURE — 99284 EMERGENCY DEPT VISIT MOD MDM: CPT | Mod: 25

## 2022-04-14 PROCEDURE — 84484 ASSAY OF TROPONIN QUANT: CPT | Performed by: EMERGENCY MEDICINE

## 2022-04-14 RX ORDER — OMEPRAZOLE 20 MG/1
20 CAPSULE, DELAYED RELEASE ORAL DAILY PRN
COMMUNITY
Start: 2022-03-28 | End: 2024-01-11 | Stop reason: SDUPTHER

## 2022-04-14 RX ADMIN — SODIUM CHLORIDE 1000 ML: 0.9 INJECTION, SOLUTION INTRAVENOUS at 03:04

## 2022-04-14 NOTE — DISCHARGE INSTRUCTIONS
Please ensure you drink plenty of fluids.  Please follow-up with primary care doctor.  Please follow-up with cardiology.  You may need a Holter monitor.  Please return to the ER for any concerning reason.

## 2022-04-14 NOTE — ED PROVIDER NOTES
"Encounter Date: 4/14/2022    SCRIBE #1 NOTE: IMercedes , am scribing for, and in the presence of, Rome Mohr MD.       History     Chief Complaint   Patient presents with    Dizziness     Nocturnal dizziness x 5 nights with "pins and needles" to finger tips x 1 week. S/s only occur at night.      Enedelia García is a 76 y.o. female who  has a past medical history of Coronary artery disease, Epilepsy, Hyperlipidemia, Hypertension, and Normocytic anemia.    The patient presents to the ED due to weakness that onset yesterday.   She states she was sitting down and went to stand up then started to feel nauseous, weak and light-headedness. She originally states she felt dizzy but after questioned she meant weakness. She denies chest pain. She was evaluated in this emergency department on 04/09/2022 for diarrhea and was treated with Bactrim and Lomotil. At time of encounter, she denies having diarrhea. She notes normal eating and drinking. She reveals she recently stopped taking her blood pressure medication. She also mentions having cold and numb fingertips.            The history is provided by the patient.     Review of patient's allergies indicates:   Allergen Reactions    Iodinated contrast media Anaphylaxis and Other (See Comments)    Phenergan [promethazine]      Vomiting     Past Medical History:   Diagnosis Date    Coronary artery disease     Epilepsy     Hyperlipidemia     Hypertension     Normocytic anemia      Past Surgical History:   Procedure Laterality Date    APPENDECTOMY      BACK SURGERY      CORONARY STENT PLACEMENT      HYSTERECTOMY      TONSILLECTOMY       Family History   Problem Relation Age of Onset    Heart disease Mother     Heart disease Father      Social History     Tobacco Use    Smoking status: Never Smoker    Smokeless tobacco: Never Used   Substance Use Topics    Alcohol use: No    Drug use: No     Review of Systems   Cardiovascular: Negative for " chest pain.   Gastrointestinal: Positive for nausea. Negative for diarrhea.   Neurological: Positive for weakness and light-headedness.   All other systems reviewed and are negative.      Physical Exam     Initial Vitals [04/14/22 0015]   BP Pulse Resp Temp SpO2   (!) 111/49 63 18 98.1 °F (36.7 °C) 98 %      MAP       --         Physical Exam    Nursing note and vitals reviewed.  Constitutional:   Elderly, chronically ill-appearing, no acute distress   HENT:   Head: Normocephalic and atraumatic.   Eyes: EOM are normal. Pupils are equal, round, and reactive to light.   Neck:   Normal range of motion.  Cardiovascular: Normal rate, regular rhythm and normal heart sounds.   Pulmonary/Chest: Breath sounds normal. No respiratory distress.   Abdominal: Abdomen is soft. She exhibits no distension. There is no abdominal tenderness.   Musculoskeletal:      Cervical back: Normal range of motion.      Comments: Bluish discoloration of distal fingertips consistent with Raynaud's phenomenon, color improving     Neurological: She is alert and oriented to person, place, and time. She has normal strength. GCS score is 15. GCS eye subscore is 4. GCS verbal subscore is 5. GCS motor subscore is 6.   Skin: Skin is warm and dry. Capillary refill takes less than 2 seconds.   Psychiatric: She has a normal mood and affect. Thought content normal.              ED Course   Procedures  Labs Reviewed   CBC W/ AUTO DIFFERENTIAL - Abnormal; Notable for the following components:       Result Value    RBC 3.43 (*)     Hemoglobin 10.2 (*)     Hematocrit 32.1 (*)     MCHC 31.8 (*)     Mono # 1.2 (*)     Mono % 15.3 (*)     All other components within normal limits   COMPREHENSIVE METABOLIC PANEL - Abnormal; Notable for the following components:    Albumin 3.4 (*)     eGFR if  51 (*)     eGFR if non  44 (*)     All other components within normal limits   TROPONIN I   TSH          Imaging Results    None         "  Medications   sodium chloride 0.9% bolus 1,000 mL (1,000 mLs Intravenous New Bag 4/14/22 0300)     Medical Decision Making:   Initial Assessment:   This is a pleasant 76-year-old female multiple medical problems presents the ER for evaluation of "dizziness ".  When asked to clarify what she meant, she endorse that she felt weak, as if she was going to pass out denies sensation the room was spinning.  She reports at 9 she feels weak, and then when she goes to stand she sees black spots and has not sensation she is about to pass out.  She was seen in the ER recently for acute diarrheal illness workup was reassuring and patient was discharged.  She reports her diarrhea has improved.  She came to the ER for further evaluation.  Resting comfortably in bed no acute distress neurologically intact.  She is also complaining of discoloration of her fingers which appear to be consistent with Raynaud's phenomenon.  The rest of her physical exam was overall reassuring.  Believe she may have orthostatic presyncope from likely dehydration from acute illness.  Other differential includes NSTEMI ACS electrolyte abnormality infection other cause.  Will plan blood work symptomatic support reassess.  Will check orthostatics.          Scribe Attestation:   Scribe #1: I performed the above scribed service and the documentation accurately describes the services I performed. I attest to the accuracy of the note.        ED Course as of 04/14/22 0458   Thu Apr 14, 2022   0251 EKG sinus rhythm 63 beats per minute, 2nd degree AV block appears Mobitz type 1, no STEMI [SE]   0305 Repeat EKG normal sinus rhythm with first-degree AV block with WY interval 254 milliseconds no STEMI. [SE]   0445 Patient resting comfortably in bed, no acute distress.  Labs reviewed no acute process identified.  Initial EKG did show Mobitz type 1, however repeat revealed first-degree AV block.  I discussed with patient.  Differential includes orthostatic presyncope " versus unknown arrhythmia.  I discussed with patient options of admission versus discharge with close follow-up in patient elected close follow-up.  We discussed plan to discharge home return precautions follow-up with cardiac allergy for likely Holter monitor.  Strict return precautions discussed with patient understood agreed with.  Patient will be discharged. [SE]      ED Course User Index  [SE] Rome Mohr MD             Clinical Impression:   Final diagnoses:  [R42] Dizziness  [R53.1] Weakness  [R55] Near syncope (Primary)  [I73.00] Raynaud's phenomenon without gangrene           My Scribe Attestation: I acknowledge that the documentation on this chart was provided by described on the date of service noted above and that the documentation in the chart accurately reflects work and decisions made by me alone.     ED Disposition Condition    Discharge Stable        ED Prescriptions     None        Follow-up Information     Follow up With Specialties Details Why Contact Info Additional Information    Marichuy Hummel MD Family Medicine Schedule an appointment as soon as possible for a visit in 2 days  [address] [facility] [id number] [phone]       [facility] Cardiology Cardiology Schedule an appointment as soon as possible for a visit in 2 days  [address], [facility] [address]  [facility] [address] [phone] Please park in [address] or [address] and use Matilde shepherd. Take Medical Office [facility] elevators.           Rome Mohr MD  04/14/22 2737

## 2022-04-14 NOTE — ED NOTES
Pt c/o feeling faint w/ finger numbness/coolness to bilateral extremities x 5 nights. Pt reports hx of MI w/ stint placement, hypertension, and vertigo. Pt states BP at home has been low, has not taken BP medication x 3 days. Pt denies falls and LOC, chest pain, SOB, nausea, vomiting, and diaphoresis. Pt recently discharged following 2 day admission, taking antibiotics for UTI. Pt denies dysuria. Pt AAOx3. Respirations even and unlabored. Skin is warm and dry. NAD noted. CB within reach.

## 2022-04-18 DIAGNOSIS — I20.89 STABLE ANGINA PECTORIS: ICD-10-CM

## 2022-04-19 ENCOUNTER — TELEPHONE (OUTPATIENT)
Dept: FAMILY MEDICINE | Facility: CLINIC | Age: 77
End: 2022-04-19
Payer: MEDICARE

## 2022-04-19 ENCOUNTER — TELEPHONE (OUTPATIENT)
Dept: ADMINISTRATIVE | Facility: OTHER | Age: 77
End: 2022-04-19
Payer: MEDICARE

## 2022-04-19 NOTE — TELEPHONE ENCOUNTER
----- Message from Jania Newman sent at 4/19/2022 11:18 AM CDT -----  Type: Requesting to speak with nurse         Who Called: PT  Regarding: Pt needing to get scheduled for follow up   Would the patient rather a call back or a [facility]? Call back  Best Call Back Number: [phone]  Additional Information: n/a

## 2022-04-19 NOTE — TELEPHONE ENCOUNTER
Called patient to schedule a follow up. Pt did not answer. Left a voicemail to give the clinic a call

## 2022-04-20 ENCOUNTER — HOSPITAL ENCOUNTER (OUTPATIENT)
Dept: PREADMISSION TESTING | Facility: HOSPITAL | Age: 77
Discharge: HOME | End: 2022-04-20
Attending: ORTHOPAEDIC SURGERY
Payer: MEDICARE

## 2022-04-20 NOTE — DISCHARGE INSTRUCTIONS
Your surgery is scheduled for 5/9/22.    Please report to [facility] on the 1st Floor at 0930 a.m.    THIS TIME IS SUBJECT TO CHANGE.  YOU WILL RECEIVE A PHONE CALL THE DAY BEFORE SURGERY BY 3:30 PM TO CONFIRM YOUR TIME OF ARRIVAL.  IF YOU HAVE NOT RECEIVED A PHONE CALL BY 3:30 PM THE DAY BEFORE YOUR SURGERY PLEASE CALL [phone]     INSTRUCTIONS IMPORTANT!!!  ¨ Do not eat or drink after 12 midnight-including water, candy, gum, & mints. OK to brush teeth.      ____  Proceed to [facility] on *** for additional testing.        ____  Do not wear makeup, including mascara.  ____  No powder, lotions or creams to surgical area.  ____  Please remove all jewelry, including piercings and leave at home.  ____  No money or valuables needed. Please leave at home.  ____  Please bring any documents given by your doctor.  ____  If going home the same day, arrange for a ride home. You will not be able to             drive if Anesthesia was used.  ____  Children under 18 years require a parent / guardian present the entire time             they are in surgery / recovery.  ____  Wear loose fitting clothing. Allow for dressings, bandages.  ____  Stop Aspirin, Ibuprofen, Motrin, Aleve, Goody's/BC powders, Excedrine and Naproxen (NSAIDS) at least 3-5 days before surgery, unless otherwise instructed by your doctor, or the nurse.   You MAY use Tylenol/acetaminophen until day of surgery.  ____  Wash the surgical area with Hibiclens the night before surgery, and again the             morning of surgery.  Be sure to rinse hibiclens off completely (if instructed by   nurse).  ____  If you take diabetic medication, do not take am of surgery unless instructed by Doctor.  ____  Call MD for temperature above 101 degrees or any other signs of infection such as Urinary (bladder) infection, Upper respiratory infection, skin boils, etc.  ____ Stop taking any Fish Oil supplement or any Vitamins that contain Vitamin E at  least 5 days prior to surgery.  ____ Do Not wear your contact lenses the day of your procedure.  You may wear your glasses.      ____Do not shave surgical site for 3 days prior to surgery.  ____ Practice Good hand washing before, during, and after procedure.      I have read or had read and explained to me, and understand the above information.  Additional comments or instructions:  For additional questions call [phone]      ANESTHESIA SIDE EFFECTS  -For the first 24 hours after surgery:  Do not drive, use heavy equipment, make important decisions, or drink alcohol  -It is normal to feel sleepy for several hours.  Rest until you are more awake.  -Have someone stay with you, if needed.  They can watch for problems and help keep you safe.  -Some possible post anesthesia side effects include: nausea and vomiting, sore throat and hoarseness, sleepiness, and dizziness.        Pre-Op Bathing Instructions    Before surgery, you can play an important role in your own health.    Because skin is not sterile, we need to be sure that your skin is as free of germs as possible. By following the instructions below, you can reduce the number of germs on your skin before surgery.    IMPORTANT: You will need to shower with a special soap called Hibiclens*, available at any pharmacy.  If you are allergic to Chlorhexidine (the antiseptic in Hibiclens), use an antibacterial soap such as Dial Soap for your preoperative shower.  You will shower with Hibiclens both the night before your surgery and the morning of your surgery.  Do not use Hibiclens on the head, face or genitals to avoid injury to those areas.    STEP #1: THE NIGHT BEFORE YOUR SURGERY     Do not shave the area of your body where your surgery will be performed.  Shower and wash your hair and body as usual with your normal soap and shampoo.  Rinse your hair and body thoroughly after you shower to remove all soap residue.  With your hand, apply one packet of Hibiclens soap to  the surgical site.   Wash the site gently for five (5) minutes. Do not scrub your skin too hard.   Do not wash with your regular soap after Hibiclens is used.  Rinse your body thoroughly.  Pat yourself dry with a clean, soft towel.  Do not use lotion, cream, or powder.  Wear clean clothes.    STEP #2: THE MORNING OF YOUR SURGERY     Repeat Step #1.    * Not to be used by people allergic to Chlorhexidine.

## 2022-04-22 ENCOUNTER — TELEPHONE (OUTPATIENT)
Dept: CARDIOLOGY | Facility: CLINIC | Age: 77
End: 2022-04-22
Payer: MEDICARE

## 2022-04-22 NOTE — TELEPHONE ENCOUNTER
----- Message from Mary Duke sent at 4/22/2022 10:06 AM CDT -----  Regarding: refill med  Contact: Pt  Pt calling in regards to medication    Needs refill: rosuvastatin 40 mg [facility]    Pharmacy: Preferred : [facility] [address] [place] [id number], [phone]    Please call    Phone [phone]

## 2022-04-25 RX ORDER — ROSUVASTATIN CALCIUM 40 MG/1
40 TABLET, COATED ORAL DAILY
Qty: 90 TABLET | Refills: 3 | Status: SHIPPED | OUTPATIENT
Start: 2022-04-25 | End: 2022-04-26 | Stop reason: SDUPTHER

## 2022-04-26 ENCOUNTER — TELEPHONE (OUTPATIENT)
Dept: FAMILY MEDICINE | Facility: CLINIC | Age: 77
End: 2022-04-26

## 2022-04-26 ENCOUNTER — OFFICE VISIT (OUTPATIENT)
Dept: FAMILY MEDICINE | Facility: CLINIC | Age: 77
End: 2022-04-26
Payer: MEDICARE

## 2022-04-26 ENCOUNTER — LAB VISIT (OUTPATIENT)
Dept: LAB | Facility: HOSPITAL | Age: 77
End: 2022-04-26
Attending: FAMILY MEDICINE
Payer: MEDICARE

## 2022-04-26 VITALS
WEIGHT: 173.06 LBS | OXYGEN SATURATION: 97 % | DIASTOLIC BLOOD PRESSURE: 78 MMHG | SYSTOLIC BLOOD PRESSURE: 134 MMHG | HEART RATE: 62 BPM | HEIGHT: 64 IN | BODY MASS INDEX: 29.55 KG/M2

## 2022-04-26 DIAGNOSIS — I25.10 CORONARY ARTERY DISEASE INVOLVING NATIVE CORONARY ARTERY OF NATIVE HEART WITHOUT ANGINA PECTORIS: ICD-10-CM

## 2022-04-26 DIAGNOSIS — Z11.59 NEED FOR HEPATITIS C SCREENING TEST: ICD-10-CM

## 2022-04-26 DIAGNOSIS — D64.9 ANEMIA, UNSPECIFIED TYPE: ICD-10-CM

## 2022-04-26 DIAGNOSIS — Z95.5 PRESENCE OF STENT IN CORONARY ARTERY IN PATIENT WITH CORONARY ARTERY DISEASE: ICD-10-CM

## 2022-04-26 DIAGNOSIS — D53.9 NUTRITIONAL ANEMIA, UNSPECIFIED: ICD-10-CM

## 2022-04-26 DIAGNOSIS — A60.00 RECURRENT GENITAL HERPES: ICD-10-CM

## 2022-04-26 DIAGNOSIS — E78.2 MIXED HYPERLIPIDEMIA: Primary | ICD-10-CM

## 2022-04-26 DIAGNOSIS — M85.89 OSTEOPENIA OF MULTIPLE SITES: ICD-10-CM

## 2022-04-26 DIAGNOSIS — I25.10 PRESENCE OF STENT IN CORONARY ARTERY IN PATIENT WITH CORONARY ARTERY DISEASE: ICD-10-CM

## 2022-04-26 LAB
BASOPHILS # BLD AUTO: 0.05 K/UL (ref 0–0.2)
BASOPHILS NFR BLD: 0.4 % (ref 0–1.9)
DIFFERENTIAL METHOD: ABNORMAL
EOSINOPHIL # BLD AUTO: 0 K/UL (ref 0–0.5)
EOSINOPHIL NFR BLD: 0.2 % (ref 0–8)
ERYTHROCYTE [DISTWIDTH] IN BLOOD BY AUTOMATED COUNT: 13.4 % (ref 11.5–14.5)
HCT VFR BLD AUTO: 37.1 % (ref 37–48.5)
HGB BLD-MCNC: 11.6 G/DL (ref 12–16)
IMM GRANULOCYTES # BLD AUTO: 0.04 K/UL (ref 0–0.04)
IMM GRANULOCYTES NFR BLD AUTO: 0.3 % (ref 0–0.5)
IRON SERPL-MCNC: 99 UG/DL (ref 30–160)
LDH SERPL L TO P-CCNC: 197 U/L (ref 110–260)
LYMPHOCYTES # BLD AUTO: 3 K/UL (ref 1–4.8)
LYMPHOCYTES NFR BLD: 24.3 % (ref 18–48)
MCH RBC QN AUTO: 29.5 PG (ref 27–31)
MCHC RBC AUTO-ENTMCNC: 31.3 G/DL (ref 32–36)
MCV RBC AUTO: 94 FL (ref 82–98)
MONOCYTES # BLD AUTO: 1.6 K/UL (ref 0.3–1)
MONOCYTES NFR BLD: 12.8 % (ref 4–15)
NEUTROPHILS # BLD AUTO: 7.8 K/UL (ref 1.8–7.7)
NEUTROPHILS NFR BLD: 62 % (ref 38–73)
NRBC BLD-RTO: 0 /100 WBC
PATH REV BLD -IMP: NORMAL
PLATELET # BLD AUTO: 373 K/UL (ref 150–450)
PMV BLD AUTO: 10.6 FL (ref 9.2–12.9)
RBC # BLD AUTO: 3.93 M/UL (ref 4–5.4)
RETICS/RBC NFR AUTO: 2 % (ref 0.5–2.5)
SATURATED IRON: 24 % (ref 20–50)
TOTAL IRON BINDING CAPACITY: 416 UG/DL (ref 250–450)
TRANSFERRIN SERPL-MCNC: 281 MG/DL (ref 200–375)
WBC # BLD AUTO: 12.51 K/UL (ref 3.9–12.7)

## 2022-04-26 PROCEDURE — 3075F PR MOST RECENT SYSTOLIC BLOOD PRESS GE 130-139MM HG: ICD-10-PCS | Mod: CPTII,S$GLB,, | Performed by: FAMILY MEDICINE

## 2022-04-26 PROCEDURE — 3078F DIAST BP <80 MM HG: CPT | Mod: CPTII,S$GLB,, | Performed by: FAMILY MEDICINE

## 2022-04-26 PROCEDURE — 85025 COMPLETE CBC W/AUTO DIFF WBC: CPT | Performed by: FAMILY MEDICINE

## 2022-04-26 PROCEDURE — 85060 PATHOLOGIST REVIEW: ICD-10-PCS | Mod: ,,, | Performed by: PATHOLOGY

## 2022-04-26 PROCEDURE — 1159F MED LIST DOCD IN RCRD: CPT | Mod: CPTII,S$GLB,, | Performed by: FAMILY MEDICINE

## 2022-04-26 PROCEDURE — 99214 OFFICE O/P EST MOD 30 MIN: CPT | Mod: S$GLB,,, | Performed by: FAMILY MEDICINE

## 2022-04-26 PROCEDURE — 84466 ASSAY OF TRANSFERRIN: CPT | Performed by: FAMILY MEDICINE

## 2022-04-26 PROCEDURE — 3288F PR FALLS RISK ASSESSMENT DOCUMENTED: ICD-10-PCS | Mod: CPTII,S$GLB,, | Performed by: FAMILY MEDICINE

## 2022-04-26 PROCEDURE — 3078F PR MOST RECENT DIASTOLIC BLOOD PRESSURE < 80 MM HG: ICD-10-PCS | Mod: CPTII,S$GLB,, | Performed by: FAMILY MEDICINE

## 2022-04-26 PROCEDURE — 3075F SYST BP GE 130 - 139MM HG: CPT | Mod: CPTII,S$GLB,, | Performed by: FAMILY MEDICINE

## 2022-04-26 PROCEDURE — 99214 PR OFFICE/OUTPT VISIT, EST, LEVL IV, 30-39 MIN: ICD-10-PCS | Mod: S$GLB,,, | Performed by: FAMILY MEDICINE

## 2022-04-26 PROCEDURE — 1100F PR PT FALLS ASSESS DOC 2+ FALLS/FALL W/INJURY/YR: ICD-10-PCS | Mod: CPTII,S$GLB,, | Performed by: FAMILY MEDICINE

## 2022-04-26 PROCEDURE — 83010 ASSAY OF HAPTOGLOBIN QUANT: CPT | Performed by: FAMILY MEDICINE

## 2022-04-26 PROCEDURE — 1160F PR REVIEW ALL MEDS BY PRESCRIBER/CLIN PHARMACIST DOCUMENTED: ICD-10-PCS | Mod: CPTII,S$GLB,, | Performed by: FAMILY MEDICINE

## 2022-04-26 PROCEDURE — 86803 HEPATITIS C AB TEST: CPT | Performed by: FAMILY MEDICINE

## 2022-04-26 PROCEDURE — 1160F RVW MEDS BY RX/DR IN RCRD: CPT | Mod: CPTII,S$GLB,, | Performed by: FAMILY MEDICINE

## 2022-04-26 PROCEDURE — 1125F AMNT PAIN NOTED PAIN PRSNT: CPT | Mod: CPTII,S$GLB,, | Performed by: FAMILY MEDICINE

## 2022-04-26 PROCEDURE — 1159F PR MEDICATION LIST DOCUMENTED IN MEDICAL RECORD: ICD-10-PCS | Mod: CPTII,S$GLB,, | Performed by: FAMILY MEDICINE

## 2022-04-26 PROCEDURE — 82607 VITAMIN B-12: CPT | Performed by: FAMILY MEDICINE

## 2022-04-26 PROCEDURE — 99999 PR PBB SHADOW E&M-EST. PATIENT-LVL V: CPT | Mod: PBBFAC,,, | Performed by: FAMILY MEDICINE

## 2022-04-26 PROCEDURE — 36415 COLL VENOUS BLD VENIPUNCTURE: CPT | Mod: PO | Performed by: FAMILY MEDICINE

## 2022-04-26 PROCEDURE — 85045 AUTOMATED RETICULOCYTE COUNT: CPT | Performed by: FAMILY MEDICINE

## 2022-04-26 PROCEDURE — 85060 BLOOD SMEAR INTERPRETATION: CPT | Mod: ,,, | Performed by: PATHOLOGY

## 2022-04-26 PROCEDURE — 1100F PTFALLS ASSESS-DOCD GE2>/YR: CPT | Mod: CPTII,S$GLB,, | Performed by: FAMILY MEDICINE

## 2022-04-26 PROCEDURE — 82728 ASSAY OF FERRITIN: CPT | Performed by: FAMILY MEDICINE

## 2022-04-26 PROCEDURE — 1125F PR PAIN SEVERITY QUANTIFIED, PAIN PRESENT: ICD-10-PCS | Mod: CPTII,S$GLB,, | Performed by: FAMILY MEDICINE

## 2022-04-26 PROCEDURE — 99999 PR PBB SHADOW E&M-EST. PATIENT-LVL V: ICD-10-PCS | Mod: PBBFAC,,, | Performed by: FAMILY MEDICINE

## 2022-04-26 PROCEDURE — 83615 LACTATE (LD) (LDH) ENZYME: CPT | Performed by: FAMILY MEDICINE

## 2022-04-26 PROCEDURE — 82746 ASSAY OF FOLIC ACID SERUM: CPT | Performed by: FAMILY MEDICINE

## 2022-04-26 PROCEDURE — 3288F FALL RISK ASSESSMENT DOCD: CPT | Mod: CPTII,S$GLB,, | Performed by: FAMILY MEDICINE

## 2022-04-26 RX ORDER — ROSUVASTATIN CALCIUM 40 MG/1
40 TABLET, COATED ORAL DAILY
Qty: 90 TABLET | Refills: 3 | Status: SHIPPED | OUTPATIENT
Start: 2022-04-26 | End: 2022-04-28 | Stop reason: SDUPTHER

## 2022-04-26 RX ORDER — VALACYCLOVIR HYDROCHLORIDE 1 G/1
1000 TABLET, FILM COATED ORAL DAILY
Qty: 5 TABLET | Refills: 0 | Status: SHIPPED | OUTPATIENT
Start: 2022-04-26 | End: 2022-08-27

## 2022-04-26 RX ORDER — NITROGLYCERIN 0.3 MG/1
TABLET SUBLINGUAL
Qty: 30 TABLET | Refills: 0 | Status: SHIPPED | OUTPATIENT
Start: 2022-04-26 | End: 2022-10-24 | Stop reason: SDUPTHER

## 2022-04-26 NOTE — TELEPHONE ENCOUNTER
----- Message from Jc Gilmore sent at 4/26/2022  3:14 PM CDT -----  Type:  Pharmacy requesting call back    Who Called:Leticia García  Would the patient rather a call back or a response via [facility]? Call back  Best Call Back [phone]  Additional Information: Pharmacy requesting call back regarding RX nitroglycerin of 0.4 mg in 25 quanties or 0.3 available in quanties of 100 and needs to know which to fill.

## 2022-04-26 NOTE — PROGRESS NOTES
Subjective:       Patient ID: Enedelia García is a 77 y.o. female.    Chief Complaint: Follow-up    Patient Active Problem List   Diagnosis    CAD (coronary artery disease)    H/O esophageal spasm    Hyperlipidemia    Fibromyalgia    GERD (gastroesophageal reflux disease)    Moderate episode of recurrent major depressive disorder    Presence of stent in coronary artery in patient with coronary artery disease    Caregiver with fatigue    S/P left unicompartmental knee replacement    Acquired scoliosis    Aortic valve stenosis    Arthritis    Benign essential hypertension    Osteoarthritis of knee    Chronic low back pain    Generalized anxiety disorder    Stage 3 chronic kidney disease    Traumatic ulcer of right lower leg    Primary osteoarthritis of both first carpometacarpal joints    Risk for falls    Positive MELISA (antinuclear antibody)    Generalized osteoarthritis    Prediabetes    Narcotic drug use    Osteopenia of multiple sites      HPI  78 yo female presents today for follow up after recent hospital visit for hypotension. Patient reports BP has normalized and taking meds normally now. Reports it was 1 week of increased fatigue, dizziness and low BP.   Seen in ED and no emergent need for hospitalization.     Discussed that possibly she took medication more than once for pain or BP. On oxycodone for chronic pain. No longer on alprazolam. Patient states she prepares pill box for her . States she can do for herself to prevent extra dosing accidentally.     Has ongoing work up with Cardiology and planned surgery with Orthopedics. Discussed lab findings.   Needed refills addressed.     Review of Systems   All other systems reviewed and are negative.       Objective:     Vitals:    04/26/22 1316   BP: 134/78   Pulse: 62        Physical Exam  Vitals and nursing note reviewed.   Constitutional:       General: She is not in acute distress.     Appearance: Normal appearance. She is  not ill-appearing, toxic-appearing or diaphoretic.      Comments: +walker use   HENT:      Head: Normocephalic and atraumatic.   Eyes:      General: No scleral icterus.     Conjunctiva/sclera: Conjunctivae normal.   Cardiovascular:      Rate and Rhythm: Normal rate.      Heart sounds: Murmur heard.   Pulmonary:      Effort: Pulmonary effort is normal. No respiratory distress.      Breath sounds: Normal breath sounds.   Skin:     Coloration: Skin is not pale.   Neurological:      Mental Status: She is alert. Mental status is at baseline.   Psychiatric:         Attention and Perception: Attention and perception normal.         Mood and Affect: Mood and affect normal.         Speech: Speech normal.         Behavior: Behavior normal.         Cognition and Memory: Cognition and memory normal.         Judgment: Judgment normal.         Assessment:       1. Mixed hyperlipidemia    2. Presence of stent in coronary artery in patient with coronary artery disease    3. Coronary artery disease involving native coronary artery of native heart without angina pectoris    4. Recurrent genital herpes    5. Osteopenia of multiple sites    6. Anemia, unspecified type    7. Nutritional anemia, unspecified     8. Need for hepatitis C screening test        Plan:         Mixed hyperlipidemia  -     rosuvastatin (CRESTOR) 40 MG Tab; Take 1 tablet (40 mg total) by mouth once daily.  Dispense: 90 tablet; Refill: 3    Presence of stent in coronary artery in patient with coronary artery disease  Coronary artery disease involving native coronary artery of native heart without angina pectoris  -     nitroGLYCERIN (NITROSTAT) 0.3 MG SL tablet; PLACE ONE TABLET UNDER THE TONGUE EVERY 5 MINUTES FOR UP TO 3 doses IF NEEDED FOR CHEST PAIN. call 911 IF PAIN persists  Dispense: 30 tablet; Refill: 0    Recurrent genital herpes  -     valACYclovir (VALTREX) 1000 MG tablet; Take 1 tablet (1,000 mg total) by mouth once daily. As needed for outbreak for 5  days  Dispense: 5 tablet; Refill: 0    Osteopenia of multiple sites  - Discussed with patient. Will discuss starting medication after recovery from surgery.     Anemia, unspecified type  -     Ambulatory referral/consult to Gastroenterology; Future; Expected date: 05/03/2022  -     Pathologist Interpretation Differential; Future  -     Lactate Dehydrogenase; Future  -     Haptoglobin; Future  -     Iron and TIBC; Future  -     Ferritin; Future  -     Reticulocytes; Future  -     Vitamin B12; Future  -     Folate; Future  -     Urinalysis, Reflex to Urine Culture Urine, Clean Catch; Future; Expected date: 04/26/2022    Nutritional anemia, unspecified   -     Vitamin B12; Future  -     Folate; Future    Need for hepatitis C screening test  -     Hepatitis C Antibody; Future; Expected date: 04/26/2022    Patient's questions answered. Plan reviewed with patient at the end of visit. Relevant precautions to chief complaint and reasons to seek medical care or contact the office sooner reviewed with patient.     Follow up for Follow up in 9/2022 as scheduled.

## 2022-04-26 NOTE — TELEPHONE ENCOUNTER
0.3 at 100 is what Dr. Hummel requesting for the pt. Roseanne with [facility] was contacted and informed.

## 2022-04-27 ENCOUNTER — HOSPITAL ENCOUNTER (OUTPATIENT)
Dept: PREADMISSION TESTING | Facility: HOSPITAL | Age: 77
Discharge: HOME | End: 2022-04-27
Attending: ORTHOPAEDIC SURGERY
Payer: MEDICARE

## 2022-04-27 LAB
FERRITIN SERPL-MCNC: 55 NG/ML (ref 20–300)
FOLATE SERPL-MCNC: 25.8 NG/ML (ref 4–24)
HAPTOGLOB SERPL-MCNC: 260 MG/DL (ref 30–250)
PATH REV BLD -IMP: NORMAL
VIT B12 SERPL-MCNC: 406 PG/ML (ref 210–950)

## 2022-04-27 NOTE — DISCHARGE INSTRUCTIONS
Your surgery is scheduled for 5/9/22.    Please report to Hospital Front Lobby on the 1st Floor at 0930 a.m.    THIS TIME IS SUBJECT TO CHANGE.  YOU WILL RECEIVE A PHONE CALL THE DAY BEFORE SURGERY BY 3:30 PM TO CONFIRM YOUR TIME OF ARRIVAL.  IF YOU HAVE NOT RECEIVED A PHONE CALL BY 3:30 PM THE DAY BEFORE YOUR SURGERY PLEASE CALL [phone]     INSTRUCTIONS IMPORTANT!!!  ¨ Do not eat or drink after 12 midnight-including water, candy, gum, & mints. OK to brush teeth.      ____  Proceed to [facility] on *** for additional testing.        ____  Do not wear makeup, including mascara.  ____  No powder, lotions or creams to surgical area.  ____  Please remove all jewelry, including piercings and leave at home.  ____  No money or valuables needed. Please leave at home.  ____  Please bring any documents given by your doctor.  ____  If going home the same day, arrange for a ride home. You will not be able to             drive if Anesthesia was used.  ____  Children under 18 years require a parent / guardian present the entire time             they are in surgery / recovery.  ____  Wear loose fitting clothing. Allow for dressings, bandages.  ____  Stop Aspirin, Ibuprofen, Motrin, Aleve, Goody's/BC powders, Excedrine and Naproxen (NSAIDS) at least 3-5 days before surgery, unless otherwise instructed by your doctor, or the nurse.   You MAY use Tylenol/acetaminophen until day of surgery.  ____  Wash the surgical area with Hibiclens the night before surgery, and again the             morning of surgery.  Be sure to rinse hibiclens off completely (if instructed by   nurse).  ____  If you take diabetic medication, do not take am of surgery unless instructed by Doctor.  ____  Call MD for temperature above 101 degrees or any other signs of infection such as Urinary (bladder) infection, Upper respiratory infection, skin boils, etc.  ____ Stop taking any Fish Oil supplement or any Vitamins that contain Vitamin E at  least 5 days prior to surgery.  ____ Do Not wear your contact lenses the day of your procedure.  You may wear your glasses.      ____Do not shave surgical site for 3 days prior to surgery.  ____ Practice Good hand washing before, during, and after procedure.      I have read or had read and explained to me, and understand the above information.  Additional comments or instructions:  For additional questions call [phone]      ANESTHESIA SIDE EFFECTS  -For the first 24 hours after surgery:  Do not drive, use heavy equipment, make important decisions, or drink alcohol  -It is normal to feel sleepy for several hours.  Rest until you are more awake.  -Have someone stay with you, if needed.  They can watch for problems and help keep you safe.  -Some possible post anesthesia side effects include: nausea and vomiting, sore throat and hoarseness, sleepiness, and dizziness.        Pre-Op Bathing Instructions    Before surgery, you can play an important role in your own health.    Because skin is not sterile, we need to be sure that your skin is as free of germs as possible. By following the instructions below, you can reduce the number of germs on your skin before surgery.    IMPORTANT: You will need to shower with a special soap called Hibiclens*, available at any pharmacy.  If you are allergic to Chlorhexidine (the antiseptic in Hibiclens), use an antibacterial soap such as Dial Soap for your preoperative shower.  You will shower with Hibiclens both the night before your surgery and the morning of your surgery.  Do not use Hibiclens on the head, face or genitals to avoid injury to those areas.    STEP #1: THE NIGHT BEFORE YOUR SURGERY     Do not shave the area of your body where your surgery will be performed.  Shower and wash your hair and body as usual with your normal soap and shampoo.  Rinse your hair and body thoroughly after you shower to remove all soap residue.  With your hand, apply one packet of Hibiclens soap to  the surgical site.   Wash the site gently for five (5) minutes. Do not scrub your skin too hard.   Do not wash with your regular soap after Hibiclens is used.  Rinse your body thoroughly.  Pat yourself dry with a clean, soft towel.  Do not use lotion, cream, or powder.  Wear clean clothes.    STEP #2: THE MORNING OF YOUR SURGERY     Repeat Step #1.    * Not to be used by people allergic to Chlorhexidine.

## 2022-04-28 DIAGNOSIS — E78.2 MIXED HYPERLIPIDEMIA: ICD-10-CM

## 2022-04-28 LAB — HCV AB SERPL QL IA: NEGATIVE

## 2022-04-28 NOTE — TELEPHONE ENCOUNTER
No new care gaps identified.  Powered by [employer]. Reference number: [id number].   4/28/2022 1:19:34 PM CDT

## 2022-04-29 RX ORDER — ISOSORBIDE MONONITRATE 60 MG/1
60 TABLET, EXTENDED RELEASE ORAL DAILY
Qty: 90 TABLET | Refills: 1 | Status: SHIPPED | OUTPATIENT
Start: 2022-04-29 | End: 2022-08-12 | Stop reason: SDUPTHER

## 2022-04-29 RX ORDER — FUROSEMIDE 40 MG/1
40 TABLET ORAL DAILY
Qty: 90 TABLET | Refills: 1 | Status: SHIPPED | OUTPATIENT
Start: 2022-04-29 | End: 2022-08-22 | Stop reason: SDUPTHER

## 2022-04-29 RX ORDER — ROSUVASTATIN CALCIUM 40 MG/1
40 TABLET, COATED ORAL DAILY
Qty: 90 TABLET | Refills: 3 | Status: SHIPPED | OUTPATIENT
Start: 2022-04-29 | End: 2022-10-24 | Stop reason: SDUPTHER

## 2022-05-04 ENCOUNTER — TELEPHONE (OUTPATIENT)
Dept: ORTHOPEDICS | Facility: CLINIC | Age: 77
End: 2022-05-04
Payer: MEDICARE

## 2022-05-04 ENCOUNTER — TELEPHONE (OUTPATIENT)
Dept: CARDIOLOGY | Facility: CLINIC | Age: 77
End: 2022-05-04
Payer: MEDICARE

## 2022-05-04 NOTE — TELEPHONE ENCOUNTER
----- Message from Luann Ortez sent at 5/4/2022 11:36 AM CDT -----  Regarding: Reschedule Procedure  Type:  Needs Medical Advice    Who Called: pt  Would the patient rather a call back or a response via [facility]? call  Best Call Back Number: [phone]  Additional Information: reschedule procedure 6 weeks

## 2022-05-04 NOTE — TELEPHONE ENCOUNTER
Spoke with patient and she advised me that she have to canceled her surgery for now due to her  had a stroke and he is schedule for surgery on 5/3. Patient schedule another appointment to see  on 5/24

## 2022-05-04 NOTE — TELEPHONE ENCOUNTER
----- Message from Nusrat Morrissey sent at 5/4/2022 10:29 AM CDT -----  Contact: [phone]  Type:  Needs Medical Advice    Who Called: Pt   Reason for call: regarding rescheduling her procedure on 5/9 for a Month out and very Important she speaks with the nurse  Would the patient rather a call back or a response via [facility]? Call back  Best Call Back Number: [phone]

## 2022-05-06 ENCOUNTER — HOSPITAL ENCOUNTER (OUTPATIENT)
Dept: RADIOLOGY | Facility: HOSPITAL | Age: 77
Discharge: HOME | End: 2022-05-06
Attending: INTERNAL MEDICINE
Payer: MEDICARE

## 2022-05-06 ENCOUNTER — HOSPITAL ENCOUNTER (OUTPATIENT)
Dept: CARDIOLOGY | Facility: HOSPITAL | Age: 77
Discharge: HOME | End: 2022-05-06
Attending: INTERNAL MEDICINE
Payer: MEDICARE

## 2022-05-06 ENCOUNTER — PATIENT MESSAGE (OUTPATIENT)
Dept: FAMILY MEDICINE | Facility: CLINIC | Age: 77
End: 2022-05-06
Payer: MEDICARE

## 2022-05-06 DIAGNOSIS — I20.89 STABLE ANGINA PECTORIS: ICD-10-CM

## 2022-05-06 DIAGNOSIS — F43.21 ADJUSTMENT DISORDER WITH DEPRESSED MOOD: ICD-10-CM

## 2022-05-06 PROCEDURE — 78452 HT MUSCLE IMAGE SPECT MULT: CPT | Mod: 26,,, | Performed by: RADIOLOGY

## 2022-05-06 PROCEDURE — A9502 TC99M TETROFOSMIN: HCPCS

## 2022-05-06 PROCEDURE — 93016 CV STRESS TEST SUPVJ ONLY: CPT | Mod: ,,, | Performed by: INTERNAL MEDICINE

## 2022-05-06 PROCEDURE — 78452 NM MYOCARDIAL PERFUSION SPECT MULTI STUDY: ICD-10-PCS | Mod: 26,,, | Performed by: RADIOLOGY

## 2022-05-06 PROCEDURE — 93017 CV STRESS TEST TRACING ONLY: CPT

## 2022-05-06 PROCEDURE — 93018 NUCLEAR STRESS TEST (CUPID ONLY): ICD-10-PCS | Mod: ,,, | Performed by: INTERNAL MEDICINE

## 2022-05-06 PROCEDURE — 93018 CV STRESS TEST I&R ONLY: CPT | Mod: ,,, | Performed by: INTERNAL MEDICINE

## 2022-05-06 PROCEDURE — 93016 NUCLEAR STRESS TEST (CUPID ONLY): ICD-10-PCS | Mod: ,,, | Performed by: INTERNAL MEDICINE

## 2022-05-06 RX ORDER — DULOXETIN HYDROCHLORIDE 60 MG/1
60 CAPSULE, DELAYED RELEASE ORAL DAILY
Qty: 90 CAPSULE | Refills: 1 | Status: SHIPPED | OUTPATIENT
Start: 2022-05-06 | End: 2022-08-11 | Stop reason: SDUPTHER

## 2022-05-06 NOTE — TELEPHONE ENCOUNTER
No new care gaps identified.  [facility] Embedded Care Gaps. Reference number: [id number]. 5/06/2022   10:05:53 AM SHRUTI

## 2022-05-09 LAB
CV STRESS BASE HR: 60 BPM
DIASTOLIC BLOOD PRESSURE: 85 MMHG
OHS CV CPX 85 PERCENT MAX PREDICTED HEART RATE MALE: 118
OHS CV CPX MAX PREDICTED HEART RATE: 138
OHS CV CPX PATIENT IS FEMALE: 1
OHS CV CPX PATIENT IS MALE: 0
OHS CV CPX PEAK DIASTOLIC BLOOD PRESSURE: 71 MMHG
OHS CV CPX PEAK HEAR RATE: 80 BPM
OHS CV CPX PEAK RATE PRESSURE PRODUCT: NORMAL
OHS CV CPX PEAK SYSTOLIC BLOOD PRESSURE: 159 MMHG
OHS CV CPX PERCENT MAX PREDICTED HEART RATE ACHIEVED: 58
OHS CV CPX RATE PRESSURE PRODUCT PRESENTING: 9180
SYSTOLIC BLOOD PRESSURE: 153 MMHG

## 2022-05-10 ENCOUNTER — PATIENT OUTREACH (OUTPATIENT)
Dept: ADMINISTRATIVE | Facility: OTHER | Age: 77
End: 2022-05-10
Payer: MEDICARE

## 2022-05-10 ENCOUNTER — TELEPHONE (OUTPATIENT)
Dept: OBSTETRICS AND GYNECOLOGY | Facility: CLINIC | Age: 77
End: 2022-05-10
Payer: MEDICARE

## 2022-05-10 NOTE — TELEPHONE ENCOUNTER
Returned call to pt. Pt request sooner appointment for R ovary pain. Rescheduled patient to be seen 5/11/2022 @1400. Pt verbalized understanding.

## 2022-05-10 NOTE — TELEPHONE ENCOUNTER
----- Message from Deb Kline Patient Care Assistant sent at 5/10/2022 11:22 AM CDT -----  Type:  Sooner Apoointment Request    Caller is requesting a sooner appointment.  Caller declined first available appointment listed below.  Caller will not accept being placed on the waitlist and is requesting a message be sent to doctor.  Name of Caller: pt  When is the first available appointment? 05/31  Symptoms: severe pain in right ovarie  Would the patient rather a call back or a response via [facility]?  Please call  Best Call Back Number: [phone]  Additional Information:

## 2022-05-11 ENCOUNTER — OFFICE VISIT (OUTPATIENT)
Dept: OBSTETRICS AND GYNECOLOGY | Facility: CLINIC | Age: 77
End: 2022-05-11
Payer: MEDICARE

## 2022-05-11 VITALS
DIASTOLIC BLOOD PRESSURE: 82 MMHG | WEIGHT: 169.75 LBS | SYSTOLIC BLOOD PRESSURE: 128 MMHG | BODY MASS INDEX: 29.14 KG/M2

## 2022-05-11 DIAGNOSIS — R10.2 PELVIC PAIN IN FEMALE: Primary | ICD-10-CM

## 2022-05-11 PROCEDURE — 1125F AMNT PAIN NOTED PAIN PRSNT: CPT | Mod: CPTII,S$GLB,, | Performed by: NURSE PRACTITIONER

## 2022-05-11 PROCEDURE — 99203 PR OFFICE/OUTPT VISIT, NEW, LEVL III, 30-44 MIN: ICD-10-PCS | Mod: S$GLB,,, | Performed by: NURSE PRACTITIONER

## 2022-05-11 PROCEDURE — 1160F PR REVIEW ALL MEDS BY PRESCRIBER/CLIN PHARMACIST DOCUMENTED: ICD-10-PCS | Mod: CPTII,S$GLB,, | Performed by: NURSE PRACTITIONER

## 2022-05-11 PROCEDURE — 1101F PT FALLS ASSESS-DOCD LE1/YR: CPT | Mod: CPTII,S$GLB,, | Performed by: NURSE PRACTITIONER

## 2022-05-11 PROCEDURE — 3288F FALL RISK ASSESSMENT DOCD: CPT | Mod: CPTII,S$GLB,, | Performed by: NURSE PRACTITIONER

## 2022-05-11 PROCEDURE — 1159F MED LIST DOCD IN RCRD: CPT | Mod: CPTII,S$GLB,, | Performed by: NURSE PRACTITIONER

## 2022-05-11 PROCEDURE — 99999 PR PBB SHADOW E&M-EST. PATIENT-LVL III: CPT | Mod: PBBFAC,,, | Performed by: NURSE PRACTITIONER

## 2022-05-11 PROCEDURE — 3074F PR MOST RECENT SYSTOLIC BLOOD PRESSURE < 130 MM HG: ICD-10-PCS | Mod: CPTII,S$GLB,, | Performed by: NURSE PRACTITIONER

## 2022-05-11 PROCEDURE — 1101F PR PT FALLS ASSESS DOC 0-1 FALLS W/OUT INJ PAST YR: ICD-10-PCS | Mod: CPTII,S$GLB,, | Performed by: NURSE PRACTITIONER

## 2022-05-11 PROCEDURE — 1160F RVW MEDS BY RX/DR IN RCRD: CPT | Mod: CPTII,S$GLB,, | Performed by: NURSE PRACTITIONER

## 2022-05-11 PROCEDURE — 3079F PR MOST RECENT DIASTOLIC BLOOD PRESSURE 80-89 MM HG: ICD-10-PCS | Mod: CPTII,S$GLB,, | Performed by: NURSE PRACTITIONER

## 2022-05-11 PROCEDURE — 1159F PR MEDICATION LIST DOCUMENTED IN MEDICAL RECORD: ICD-10-PCS | Mod: CPTII,S$GLB,, | Performed by: NURSE PRACTITIONER

## 2022-05-11 PROCEDURE — 99203 OFFICE O/P NEW LOW 30 MIN: CPT | Mod: S$GLB,,, | Performed by: NURSE PRACTITIONER

## 2022-05-11 PROCEDURE — 3074F SYST BP LT 130 MM HG: CPT | Mod: CPTII,S$GLB,, | Performed by: NURSE PRACTITIONER

## 2022-05-11 PROCEDURE — 3288F PR FALLS RISK ASSESSMENT DOCUMENTED: ICD-10-PCS | Mod: CPTII,S$GLB,, | Performed by: NURSE PRACTITIONER

## 2022-05-11 PROCEDURE — 99999 PR PBB SHADOW E&M-EST. PATIENT-LVL III: ICD-10-PCS | Mod: PBBFAC,,, | Performed by: NURSE PRACTITIONER

## 2022-05-11 PROCEDURE — 1125F PR PAIN SEVERITY QUANTIFIED, PAIN PRESENT: ICD-10-PCS | Mod: CPTII,S$GLB,, | Performed by: NURSE PRACTITIONER

## 2022-05-11 PROCEDURE — 3079F DIAST BP 80-89 MM HG: CPT | Mod: CPTII,S$GLB,, | Performed by: NURSE PRACTITIONER

## 2022-05-11 NOTE — PROGRESS NOTES
CC: Pelvic pain    HPI: Pt is a 77 y.o.  female who presents with intermittent right sided pelvic pain for 6 months to a year, describes it as sharp last for about an hour.  Denies radiation, no new or increase in exercise, denies injury. Denies dysuria, abnormal vaginal bleeding or vaginal discharge. Has a history of constipation. Denies diarrhea, nausea or vomiting. Takes pain medication for knee 3x day, does not take additional pain medication, states she has to lie down until it goes away. No pain today.     Hyst: > 30 yrs ago, states due to cyst on ovaries, ovaries were not removed, denies having cyst removed on ovaries  PAP: Denies hx of abnormal PAP   Colonoscopy: over 10 yrs ago, normal per pt.     Past Medical History:   Diagnosis Date    Coronary artery disease     Epilepsy     Hyperlipidemia     Hypertension     Normocytic anemia        Past Surgical History:   Procedure Laterality Date    APPENDECTOMY      BACK SURGERY      CORONARY STENT PLACEMENT      HYSTERECTOMY      TONSILLECTOMY         OB History   No obstetric history on file.       Family History   Problem Relation Age of Onset    Heart disease Mother     Heart disease Father        Social History     Tobacco Use    Smoking status: Never Smoker    Smokeless tobacco: Never Used   Substance Use Topics    Alcohol use: No    Drug use: No       /82   Wt 77 kg (169 lb 12.1 oz)   BMI 29.14 kg/m²     ROS:  GENERAL: No fever, chills, fatigability or weight loss.  VULVAR: No pain, no lesions and no itching.  VAGINAL: No relaxation, no itching, no discharge, no abnormal bleeding and no lesions.  ABDOMEN: right sided pelvic abdominal pain. Denies nausea. Denies vomiting. No diarrhea. + constipation  BREAST: Denies pain. No lumps. No discharge.  URINARY: No incontinence, no nocturia, no frequency and no dysuria.  CARDIOVASCULAR: No chest pain. No shortness of breath. No leg cramps.  NEUROLOGICAL: No headaches. No vision  changes.    PE:   APPEARANCE: Well nourished, well developed, in no acute distress.  AFFECT: Alert and oriented x 3  SKIN: Warm, dry, & intact. No acne or hirsutism.  NECK: Neck symmetric  NODES: No inguinal or femoral lymph node enlargement  CHEST:  Easy, even breaths.  ABDOMEN: Soft.  Nontender, nondistended.  PELVIC: Normal external genitalia without lesions.  Normal hair distribution.  Adequate perineal body, normal urethral meatus. Vagina atrophic without lesions or discharge.   No significant cystocele, + rectocele.  Bimanual exam shows cervix and uterus to be surgically absent.  Adnexa without masses or tenderness.    Anus Perineum:No lesions, no relaxation, no external hemorrhoids.  EXTREMITIES: No edema.    ASSESSMENT AND PLAN  1. Pelvic pain in female  US Pelvis Comp with Transvag NON-OB (xpd     Discussed:  -Possible causes of pelvic pain, ED precautions given    Follow-up: Pending u/s results

## 2022-05-11 NOTE — PROGRESS NOTES
Health Maintenance Due   Topic Date Due    Shingles Vaccine (2 of 2) 05/04/2022     Updates were requested from care everywhere.  Chart was reviewed for overdue [facility]active [facility]ters ([facility]) topics (CRS, Breast Cancer Screening, Eye exam)  Health Maintenance has been updated.  LINKS immunization registry triggered.  Immunizations were reconciled.

## 2022-05-17 ENCOUNTER — HOSPITAL ENCOUNTER (OUTPATIENT)
Dept: RADIOLOGY | Facility: HOSPITAL | Age: 77
Discharge: HOME | End: 2022-05-17
Attending: NURSE PRACTITIONER
Payer: MEDICARE

## 2022-05-17 DIAGNOSIS — R10.2 PELVIC PAIN IN FEMALE: ICD-10-CM

## 2022-05-17 PROCEDURE — 76830 TRANSVAGINAL US NON-OB: CPT | Mod: TC

## 2022-05-17 PROCEDURE — 76856 US PELVIS COMP WITH TRANSVAG NON-OB (XPD): ICD-10-PCS | Mod: 26,,, | Performed by: RADIOLOGY

## 2022-05-17 PROCEDURE — 76856 US EXAM PELVIC COMPLETE: CPT | Mod: 26,,, | Performed by: RADIOLOGY

## 2022-05-17 PROCEDURE — 76830 US PELVIS COMP WITH TRANSVAG NON-OB (XPD): ICD-10-PCS | Mod: 26,,, | Performed by: RADIOLOGY

## 2022-05-17 PROCEDURE — 76830 TRANSVAGINAL US NON-OB: CPT | Mod: 26,,, | Performed by: RADIOLOGY

## 2022-05-24 ENCOUNTER — OFFICE VISIT (OUTPATIENT)
Dept: ORTHOPEDICS | Facility: CLINIC | Age: 77
End: 2022-05-24
Payer: MEDICARE

## 2022-05-24 VITALS
DIASTOLIC BLOOD PRESSURE: 50 MMHG | WEIGHT: 171.44 LBS | HEART RATE: 63 BPM | HEIGHT: 64 IN | BODY MASS INDEX: 29.27 KG/M2 | SYSTOLIC BLOOD PRESSURE: 120 MMHG

## 2022-05-24 DIAGNOSIS — Z01.818 PRE-OP TESTING: ICD-10-CM

## 2022-05-24 DIAGNOSIS — I35.0 AORTIC VALVE STENOSIS, ETIOLOGY OF CARDIAC VALVE DISEASE UNSPECIFIED: ICD-10-CM

## 2022-05-24 DIAGNOSIS — F11.90 NARCOTIC DRUG USE: ICD-10-CM

## 2022-05-24 DIAGNOSIS — F41.1 GENERALIZED ANXIETY DISORDER: ICD-10-CM

## 2022-05-24 DIAGNOSIS — Z96.652 S/P LEFT UNICOMPARTMENTAL KNEE REPLACEMENT: Primary | ICD-10-CM

## 2022-05-24 DIAGNOSIS — R73.03 PREDIABETES: ICD-10-CM

## 2022-05-24 DIAGNOSIS — E78.2 MIXED HYPERLIPIDEMIA: ICD-10-CM

## 2022-05-24 DIAGNOSIS — Z95.5 PRESENCE OF STENT IN CORONARY ARTERY IN PATIENT WITH CORONARY ARTERY DISEASE: ICD-10-CM

## 2022-05-24 DIAGNOSIS — I10 BENIGN ESSENTIAL HYPERTENSION: ICD-10-CM

## 2022-05-24 DIAGNOSIS — N18.31 STAGE 3A CHRONIC KIDNEY DISEASE: ICD-10-CM

## 2022-05-24 DIAGNOSIS — I25.10 PRESENCE OF STENT IN CORONARY ARTERY IN PATIENT WITH CORONARY ARTERY DISEASE: ICD-10-CM

## 2022-05-24 DIAGNOSIS — F33.1 MODERATE EPISODE OF RECURRENT MAJOR DEPRESSIVE DISORDER: ICD-10-CM

## 2022-05-24 DIAGNOSIS — I25.10 CORONARY ARTERY DISEASE INVOLVING NATIVE CORONARY ARTERY OF NATIVE HEART WITHOUT ANGINA PECTORIS: ICD-10-CM

## 2022-05-24 DIAGNOSIS — K21.9 GASTROESOPHAGEAL REFLUX DISEASE, UNSPECIFIED WHETHER ESOPHAGITIS PRESENT: ICD-10-CM

## 2022-05-24 PROCEDURE — 99215 OFFICE O/P EST HI 40 MIN: CPT | Mod: S$GLB,,, | Performed by: ORTHOPAEDIC SURGERY

## 2022-05-24 PROCEDURE — 3288F FALL RISK ASSESSMENT DOCD: CPT | Mod: CPTII,S$GLB,, | Performed by: ORTHOPAEDIC SURGERY

## 2022-05-24 PROCEDURE — 1125F PR PAIN SEVERITY QUANTIFIED, PAIN PRESENT: ICD-10-PCS | Mod: CPTII,S$GLB,, | Performed by: ORTHOPAEDIC SURGERY

## 2022-05-24 PROCEDURE — 1159F MED LIST DOCD IN RCRD: CPT | Mod: CPTII,S$GLB,, | Performed by: ORTHOPAEDIC SURGERY

## 2022-05-24 PROCEDURE — 3074F SYST BP LT 130 MM HG: CPT | Mod: CPTII,S$GLB,, | Performed by: ORTHOPAEDIC SURGERY

## 2022-05-24 PROCEDURE — 99215 PR OFFICE/OUTPT VISIT, EST, LEVL V, 40-54 MIN: ICD-10-PCS | Mod: S$GLB,,, | Performed by: ORTHOPAEDIC SURGERY

## 2022-05-24 PROCEDURE — 99999 PR PBB SHADOW E&M-EST. PATIENT-LVL V: CPT | Mod: PBBFAC,,, | Performed by: ORTHOPAEDIC SURGERY

## 2022-05-24 PROCEDURE — 3078F DIAST BP <80 MM HG: CPT | Mod: CPTII,S$GLB,, | Performed by: ORTHOPAEDIC SURGERY

## 2022-05-24 PROCEDURE — 1159F PR MEDICATION LIST DOCUMENTED IN MEDICAL RECORD: ICD-10-PCS | Mod: CPTII,S$GLB,, | Performed by: ORTHOPAEDIC SURGERY

## 2022-05-24 PROCEDURE — 3288F PR FALLS RISK ASSESSMENT DOCUMENTED: ICD-10-PCS | Mod: CPTII,S$GLB,, | Performed by: ORTHOPAEDIC SURGERY

## 2022-05-24 PROCEDURE — 3078F PR MOST RECENT DIASTOLIC BLOOD PRESSURE < 80 MM HG: ICD-10-PCS | Mod: CPTII,S$GLB,, | Performed by: ORTHOPAEDIC SURGERY

## 2022-05-24 PROCEDURE — 1101F PT FALLS ASSESS-DOCD LE1/YR: CPT | Mod: CPTII,S$GLB,, | Performed by: ORTHOPAEDIC SURGERY

## 2022-05-24 PROCEDURE — 1125F AMNT PAIN NOTED PAIN PRSNT: CPT | Mod: CPTII,S$GLB,, | Performed by: ORTHOPAEDIC SURGERY

## 2022-05-24 PROCEDURE — 1101F PR PT FALLS ASSESS DOC 0-1 FALLS W/OUT INJ PAST YR: ICD-10-PCS | Mod: CPTII,S$GLB,, | Performed by: ORTHOPAEDIC SURGERY

## 2022-05-24 PROCEDURE — 99499 UNLISTED E&M SERVICE: CPT | Mod: S$GLB,,, | Performed by: ORTHOPAEDIC SURGERY

## 2022-05-24 PROCEDURE — 3074F PR MOST RECENT SYSTOLIC BLOOD PRESSURE < 130 MM HG: ICD-10-PCS | Mod: CPTII,S$GLB,, | Performed by: ORTHOPAEDIC SURGERY

## 2022-05-24 PROCEDURE — 99999 PR PBB SHADOW E&M-EST. PATIENT-LVL V: ICD-10-PCS | Mod: PBBFAC,,, | Performed by: ORTHOPAEDIC SURGERY

## 2022-05-24 NOTE — PROGRESS NOTES
Subjective:      Patient ID: Enedelia García is a 77 y.o. female.    Chief Complaint: Pain of the Left Knee    Patient had to reschedule revision due to her 's illness. She is here to reschedule      Social History     Occupational History    Not on file   Tobacco Use    Smoking status: Never Smoker    Smokeless tobacco: Never Used   Substance and Sexual Activity    Alcohol use: No    Drug use: No    Sexual activity: Not Currently      ROS      Objective:    Ortho/SPM Exam       Assessment:       1. S/P left unicompartmental knee replacement    2. Narcotic drug use    3. Moderate episode of recurrent major depressive disorder    4. Generalized anxiety disorder    5. Presence of stent in coronary artery in patient with coronary artery disease    6. Mixed hyperlipidemia    7. Coronary artery disease involving native coronary artery of native heart without angina pectoris    8. Benign essential hypertension    9. Aortic valve stenosis, etiology of cardiac valve disease unspecified    10. Stage 3a chronic kidney disease    11. Prediabetes    12. Gastroesophageal reflux disease, unspecified whether esophagitis present          Plan:       Reviewed surgical processes again. Will re-order pre op labs and schedule revision tka again

## 2022-05-31 ENCOUNTER — OFFICE VISIT (OUTPATIENT)
Dept: GASTROENTEROLOGY | Facility: CLINIC | Age: 77
End: 2022-05-31
Payer: MEDICARE

## 2022-05-31 DIAGNOSIS — K59.04 CHRONIC IDIOPATHIC CONSTIPATION: Primary | ICD-10-CM

## 2022-05-31 DIAGNOSIS — Z12.11 SCREENING FOR COLON CANCER: ICD-10-CM

## 2022-05-31 DIAGNOSIS — D64.9 ANEMIA, UNSPECIFIED TYPE: ICD-10-CM

## 2022-05-31 PROCEDURE — 99214 PR OFFICE/OUTPT VISIT, EST, LEVL IV, 30-39 MIN: ICD-10-PCS | Mod: 95,,, | Performed by: NURSE PRACTITIONER

## 2022-05-31 PROCEDURE — 99214 OFFICE O/P EST MOD 30 MIN: CPT | Mod: 95,,, | Performed by: NURSE PRACTITIONER

## 2022-05-31 NOTE — PROGRESS NOTES
GASTROENTEROLOGY CLINIC NOTE    Chief Complaint: The primary encounter diagnosis was Chronic idiopathic constipation. A diagnosis of Screening for colon cancer was also pertinent to this visit.  Referring provider/PCP: Marichuy Hummel MD    Audio Only Telehealth Visit     The patient location is: [place]  The chief complaint leading to consultation is: colon cancer screening, constipation  Visit type: Virtual visit with audio only (telephone)  Total time spent with patient: 35     The reason for the audio only service rather than synchronous audio and video virtual visit was related to technical difficulties or patient preference/necessity.     Each patient to whom I provide medical services by telemedicine is:  (1) informed of the relationship between the physician and patient and the respective role of any other health care provider with respect to management of the patient; and (2) notified that they may decline to receive medical services by telemedicine and may withdraw from such care at any time. Patient verbally consented to receive this service via voice-only telephone call.     This service was not originating from a related E/M service provided within the previous 7 days nor will  to an E/M service or procedure within the next 24 hours or my soonest available appointment.  Prevailing standard of care was able to be met in this audio-only visit.      HPI:  Enedelia García is a 77 y.o. female who is a new patient to me with a PMH of Coronary artery disease, Epilepsy, Hyperlipidemia, Hypertension, and Normocytic anemia .  She presents via telemedicine encounter today to establish care for constipation, anemia, and colon cancer screening.  These are new problems.  Mild anemia noted on recent labwork.  Denies shortness of breath, dizziness, syncope, unexplained weight loss, or changes in appetite.  She is experiencing constipation which is new within the last month.  Bowel movements were daily and  soft in consistency.  Now bowel movements occur every other day and are described as [place] Type 1 in consistency.  Notes mucus in stool, straining with bowel movements, and occasional blood when she has increased straining.  No melena, diarrhea, abdominal pain, or nocturnal symptoms. She does report increased amount of stress recently as her  is currently hospitalized and has h/o Parkinson's.  She is his primary caregiver.  She is scheduled to have knee replacement surgery in July 2022.    Treatments Tried: Glycolax (not helping)  NSAIDs: ASA 81mg  Anticoagulation or Antiplatelet: No    Prior Upper Endoscopy: No  Prior Colonoscopy: Over 10 years ago. No abnormal findings  Family h/o Colon Cancer: No  Family h/o Crohn's Disease or Ulcerative Colitis: No  Family h/o Celiac Sprue: No  Abdominal Surgeries: Appendectomy, hysterectomy    Review of Systems   Constitutional: Positive for malaise/fatigue. Negative for weight loss.   HENT: Negative for sore throat.    Eyes: Negative for blurred vision.   Respiratory: Negative for cough and shortness of breath.    Cardiovascular: Negative for chest pain.   Gastrointestinal: Positive for constipation. Negative for abdominal pain, blood in stool, diarrhea, heartburn, melena, nausea and vomiting.   Genitourinary: Negative for dysuria.   Musculoskeletal: Negative for myalgias.   Skin: Negative for rash.   Neurological: Negative for headaches.   Endo/Heme/Allergies: Negative for environmental allergies.   Psychiatric/Behavioral: Negative for suicidal ideas. The patient is not nervous/anxious.        Past Medical History: has a past medical history of Coronary artery disease, Epilepsy, Hyperlipidemia, Hypertension, and Normocytic anemia.    Past Surgical History: has a past surgical history that includes Coronary stent placement; Hysterectomy; Tonsillectomy; Appendectomy; and Back surgery.    Family History:family history includes Heart disease in her father and  mother.    Allergies:   Review of patient's allergies indicates:   Allergen Reactions    Iodinated contrast media Anaphylaxis and Other (See Comments)    Phenergan [promethazine]      Vomiting       Social History: reports that she has never smoked. She has never used smokeless tobacco. She reports that she does not drink alcohol and does not use drugs.    Home medications:   Current Outpatient Medications on File Prior to Visit   Medication Sig Dispense Refill    aspirin (ECOTRIN) 81 MG EC tablet Take 81 mg by mouth once daily.      butalbital-acetaminophen-caff -40 mg Cap TAKE ONE CAPSULE BY MOUTH ONCE TO TWICE DAILY AS NEEDED FOR MIGRAINE headaches      calcium carbonate/vitamin D3 (VITAMIN D-3 ORAL) Take by mouth once daily.      clotrimazole-betamethasone 1-0.05% (LOTRISONE) cream Apply topically 2 (two) times daily. 45 g 0    DULoxetine (CYMBALTA) 60 MG capsule Take 1 capsule (60 mg total) by mouth once daily. Take 1 tab po daily x 2 weeks then 2 tabs po daily thereafter. 90 capsule 1    fluticasone propionate (FLONASE) 50 mcg/actuation nasal spray 2 sprays (100 mcg total) by Each Nostril route once daily. 15.8 mL 2    furosemide (LASIX) 40 MG tablet Take 1 tablet (40 mg total) by mouth once daily. 90 tablet 1    gabapentin (NEURONTIN) 600 MG tablet Take 600 mg by mouth 3 (three) times daily.      hydrOXYzine HCL (ATARAX) 25 MG tablet   See Instructions, 30 tab, 1, Substitution Allowed, TAKE 1 TABLET BY MOUTH DAILY AS NEEDED FOR ITCHING, 30, Route to Pharmacy Electronically, [id number], [id number], Instructions Replace Required Details, cm,...      irbesartan (AVAPRO) 300 MG tablet Take 1 tablet (300 mg total) by mouth every evening. 60 tablet 5    isosorbide mononitrate (IMDUR) 60 MG 24 hr tablet Take 1 tablet (60 mg total) by mouth once daily. 90 tablet 1    metoprolol succinate (TOPROL-XL) 25 MG 24 hr tablet Take 1 tablet (25 mg total) by mouth  once daily. 90 tablet 3    multivitamin capsule Take 1 capsule by mouth once daily.      mupirocin (BACTROBAN) 2 % ointment 2 (two) times daily. apply to affected area      nitroGLYCERIN (NITROSTAT) 0.3 MG SL tablet PLACE ONE TABLET UNDER THE TONGUE EVERY 5 MINUTES FOR UP TO 3 doses IF NEEDED FOR CHEST PAIN. call 911 IF PAIN persists 30 tablet 0    omeprazole (PRILOSEC) 20 MG capsule Take 20 mg by mouth once daily.      oxyCODONE (ROXICODONE) 10 mg Tab immediate release tablet oxycodone 10 mg tablet   Take 1 tablet 3 times a day by oral route as needed for 7 days.      polyethylene glycol (GLYCOLAX) 17 gram PwPk Take 17 g by mouth once daily. 90 packet 3    rosuvastatin (CRESTOR) 40 MG Tab Take 1 tablet (40 mg total) by mouth once daily. 90 tablet 3    valACYclovir (VALTREX) 1000 MG tablet Take 1 tablet (1,000 mg total) by mouth once daily. As needed for outbreak for 5 days 5 tablet 0     No current facility-administered medications on file prior to visit.       Vital signs:  There were no vitals taken for this visit.    Physical Exam  Constitutional:       Comments: Limited Physical Exam d/t Telemedicine Encounter   Pulmonary:      Effort: Pulmonary effort is normal. No respiratory distress.   Neurological:      Mental Status: She is alert and oriented to person, place, and time.   Psychiatric:         Mood and Affect: Mood is anxious.         Behavior: Behavior normal.         Thought Content: Thought content normal.         Routine labs:  Lab Results   Component Value Date    WBC 12.51 04/26/2022    HGB 11.6 (L) 04/26/2022    HCT 37.1 04/26/2022    MCV 94 04/26/2022     04/26/2022     Lab Results   Component Value Date    INR 1.0 03/10/2022     Lab Results   Component Value Date    IRON 99 04/26/2022    FERRITIN 55 04/26/2022    TIBC 416 04/26/2022    FESATURATED 24 04/26/2022     Lab Results   Component Value Date     04/14/2022    K 4.0 04/14/2022     04/14/2022    CO2 27 04/14/2022     BUN 19 04/14/2022    CREATININE 1.2 04/14/2022     Lab Results   Component Value Date    ALBUMIN 3.4 (L) 04/14/2022    ALT 16 04/14/2022    AST 20 04/14/2022    ALKPHOS 66 04/14/2022    BILITOT 0.2 04/14/2022     No results found for: GLUCOSE  Lab Results   Component Value Date    TSH 1.991 04/14/2022     Lab Results   Component Value Date    CALCIUM 9.4 04/14/2022       Imaging:  US Pelvis Comp with Transvag NON-OB (xpd  Narrative: EXAMINATION:  US PELVIS COMP WITH TRANSVAG NON-OB (XPD)    CLINICAL HISTORY:  Pelvic and perineal pain    TECHNIQUE:  Transabdominal sonography of the pelvis was performed, followed by transvaginal sonography to better evaluate the uterus and ovaries.    COMPARISON:  None.    FINDINGS:  Uterus:    Absent    Neither ovary is identified on today's exam.    Free Fluid:    None.  Impression: Prior hysterectomy.  Neither ovary is identified on today's exam.  No significant sonographic abnormality identified.    Electronically signed by: Santosh Levi MD  Date:    05/17/2022  Time:    14:57      I have reviewed prior labs, imaging, and notes.      Assessment:  1. Chronic idiopathic constipation    2. Screening for colon cancer    3. Anemia, unspecified type        Plan:  Orders Placed This Encounter    linaCLOtide (LINZESS) 72 mcg Cap capsule    Case Request Endoscopy: COLONOSCOPY     Colonoscopy for colon cancer screening. Miralax prep as patient has h/o CKD.  Linzess 72 mcg daily for constipation.   Increase water intake.   Patient has upcoming knee replacement surgery next month.  I think it is reasonable to schedule colonoscopy after knee surgery.   Monitor CBC for worsening anemia.       Plan of care discussed with patient who is in agreement and verbalized understanding.     I have explained the planned procedures to the patient.The risks, benefits and alternatives of the procedure were also explained in detail. Patient verbalized understanding, all questions were answered. The  patient agrees to proceed as planned    Follow Up: As Needed Pending Above Workup          CRISTIAN Donnelly,FNP-BC  [facility]

## 2022-05-31 NOTE — Clinical Note
Please call patient to schedule colonoscopy. Miralax prep.  She has knee replacement surgery in July so we can do the colonoscopy end of August.

## 2022-06-03 ENCOUNTER — TELEPHONE (OUTPATIENT)
Dept: GASTROENTEROLOGY | Facility: CLINIC | Age: 77
End: 2022-06-03
Payer: MEDICARE

## 2022-06-06 ENCOUNTER — TELEPHONE (OUTPATIENT)
Dept: GASTROENTEROLOGY | Facility: CLINIC | Age: 77
End: 2022-06-06
Payer: MEDICARE

## 2022-06-06 NOTE — TELEPHONE ENCOUNTER
Spoke to patient to schedule her colonoscopy procedure, she stated she will call back because her  was just release from the hospital and she has a knee replacement in July so she is going to call back to schedule her procedure.

## 2022-06-24 ENCOUNTER — HOSPITAL ENCOUNTER (OUTPATIENT)
Dept: WOUND CARE | Facility: HOSPITAL | Age: 77
Discharge: HOME | End: 2022-06-24
Attending: PREVENTIVE MEDICINE
Payer: MEDICARE

## 2022-06-24 VITALS
TEMPERATURE: 98 F | WEIGHT: 171 LBS | HEIGHT: 64 IN | SYSTOLIC BLOOD PRESSURE: 164 MMHG | DIASTOLIC BLOOD PRESSURE: 87 MMHG | HEART RATE: 59 BPM | BODY MASS INDEX: 29.19 KG/M2

## 2022-06-24 DIAGNOSIS — L97.911: ICD-10-CM

## 2022-06-24 DIAGNOSIS — S80.821A: Primary | ICD-10-CM

## 2022-06-24 PROCEDURE — 99203 OFFICE O/P NEW LOW 30 MIN: CPT

## 2022-06-24 RX ORDER — MUPIROCIN 20 MG/G
OINTMENT TOPICAL DAILY
Qty: 22 G | Refills: 3 | Status: SHIPPED | OUTPATIENT
Start: 2022-06-24 | End: 2022-08-08 | Stop reason: SDUPTHER

## 2022-06-24 NOTE — PROGRESS NOTES
"                     [facility]    Subjective:       Patient ID: Enedelia García is a 77 y.o. female.    Chief Complaint: Non-healing Wound (Right leg)    76 y/o female readmitted today for blister to right lower leg she has had for several weeks. No s/s of infection noted. States blister "popped" earlier. How occurred unknown, but patient admits to "picking her skin. Seen by Dr. Farias. Mupirocin and bordered foam applied.     Review of Systems   All other systems reviewed and are negative.        Objective:        Physical Exam       Wound 06/24/22 1400 Blister(s) Right lower Leg (Active)   06/24/22 1400    Pre-existing: Yes   Primary Wound Type: Blister(s)   Side: Right   Orientation: lower   Location: Leg   Wound Number:    Ankle-Brachial Index:    Pulses: palpable   Removal Indication and Assessment:    Wound Outcome:    (Retired) Wound Type:    (Retired) Wound Length (cm):    (Retired) Wound Width (cm):    (Retired) Depth (cm):    Wound Description (Comments):    Removal Indications:    Wound Image     06/24/22 1500   Dressing Appearance Intact;Dried drainage 06/24/22 1500   Drainage Amount Scant 06/24/22 1500   Drainage Characteristics/Odor Serosanguineous 06/24/22 1500   Appearance Red;Yellow;Moist 06/24/22 1500   Tissue loss description Partial thickness 06/24/22 1500   Red (%), Wound Tissue Color 90 % 06/24/22 1500   Yellow (%), Wound Tissue Color 10 % 06/24/22 1500   Periwound Area Dry;Denuded 06/24/22 1500   Wound Edges Defined 06/24/22 1500   Wound Length (cm) 1.5 cm 06/24/22 1500   Wound Width (cm) 1 cm 06/24/22 1500   Wound Depth (cm) 0.1 cm 06/24/22 1500   Wound Volume (cm^3) 0.15 cm^3 06/24/22 1500   Wound Surface Area (cm^2) 1.5 cm^2 06/24/22 1500   Care Cleansed with:;Sterile normal saline 06/24/22 1500   Dressing Applied;Island/border;Other (comment) 06/24/22 1500   Periwound Care Dry periwound area maintained 06/24/22 1500   Dressing Change Due 06/25/22 06/24/22 " 1500         Assessment:         ICD-10-CM ICD-9-CM   1. Blister of right leg without infection, initial encounter  S80.821A 916.2   2. Traumatic ulcer of right lower leg, limited to breakdown of skin  L97.911 707.19         Plan:   Tissue pathology and/or culture taken:  [] Yes [x] No   Sharp debridement performed:   [] Yes [x] No   Labs ordered this visit:   [] Yes [x] No   Imaging ordered this visit:   [] Yes [x] No   Patient instructed to perform same wound care daily and verbalizes understanding. Rx for mupirocin sent to pharmacy.          Orders Placed This Encounter   Procedures    Change dressing     Cleanse wound with: Normal saline  Lidocaine: prn  Silver nitrate: prn  Primary dressing: Mupirocin  Secondary dressing: 3 x 3 bordered foam or bandaid  Frequency: Daily per patient  Follow-up: 7/15/22        Follow up in about 4 weeks (around 7/22/2022).

## 2022-06-29 ENCOUNTER — ANESTHESIA EVENT (OUTPATIENT)
Dept: SURGERY | Facility: HOSPITAL | Age: 77
DRG: 467 | End: 2022-06-29
Payer: MEDICARE

## 2022-06-29 ENCOUNTER — OFFICE VISIT (OUTPATIENT)
Dept: FAMILY MEDICINE | Facility: HOSPITAL | Age: 77
End: 2022-06-29
Attending: SPECIALIST
Payer: MEDICARE

## 2022-06-29 ENCOUNTER — CLINICAL SUPPORT (OUTPATIENT)
Dept: LAB | Facility: HOSPITAL | Age: 77
End: 2022-06-29
Attending: ORTHOPAEDIC SURGERY
Payer: MEDICARE

## 2022-06-29 ENCOUNTER — HOSPITAL ENCOUNTER (OUTPATIENT)
Dept: RADIOLOGY | Facility: HOSPITAL | Age: 77
Discharge: HOME | End: 2022-06-29
Attending: ORTHOPAEDIC SURGERY
Payer: MEDICARE

## 2022-06-29 ENCOUNTER — HOSPITAL ENCOUNTER (OUTPATIENT)
Dept: PREADMISSION TESTING | Facility: HOSPITAL | Age: 77
Discharge: HOME | End: 2022-06-29
Attending: ORTHOPAEDIC SURGERY
Payer: MEDICARE

## 2022-06-29 VITALS
OXYGEN SATURATION: 99 % | SYSTOLIC BLOOD PRESSURE: 136 MMHG | HEIGHT: 64 IN | HEART RATE: 76 BPM | DIASTOLIC BLOOD PRESSURE: 88 MMHG | BODY MASS INDEX: 29.81 KG/M2 | WEIGHT: 174.63 LBS

## 2022-06-29 VITALS
SYSTOLIC BLOOD PRESSURE: 110 MMHG | DIASTOLIC BLOOD PRESSURE: 54 MMHG | HEART RATE: 57 BPM | BODY MASS INDEX: 29.66 KG/M2 | HEIGHT: 64 IN | WEIGHT: 173.75 LBS

## 2022-06-29 DIAGNOSIS — Z01.818 PREOP EXAMINATION: Primary | ICD-10-CM

## 2022-06-29 DIAGNOSIS — E78.2 MIXED HYPERLIPIDEMIA: ICD-10-CM

## 2022-06-29 DIAGNOSIS — F11.90 NARCOTIC DRUG USE: ICD-10-CM

## 2022-06-29 DIAGNOSIS — F33.1 MODERATE EPISODE OF RECURRENT MAJOR DEPRESSIVE DISORDER: ICD-10-CM

## 2022-06-29 DIAGNOSIS — I25.10 PRESENCE OF STENT IN CORONARY ARTERY IN PATIENT WITH CORONARY ARTERY DISEASE: ICD-10-CM

## 2022-06-29 DIAGNOSIS — I10 BENIGN ESSENTIAL HYPERTENSION: ICD-10-CM

## 2022-06-29 DIAGNOSIS — I35.0 AORTIC VALVE STENOSIS, ETIOLOGY OF CARDIAC VALVE DISEASE UNSPECIFIED: ICD-10-CM

## 2022-06-29 DIAGNOSIS — R73.03 PREDIABETES: ICD-10-CM

## 2022-06-29 DIAGNOSIS — Z96.652 S/P LEFT UNICOMPARTMENTAL KNEE REPLACEMENT: ICD-10-CM

## 2022-06-29 DIAGNOSIS — F41.1 GENERALIZED ANXIETY DISORDER: ICD-10-CM

## 2022-06-29 DIAGNOSIS — I25.10 CORONARY ARTERY DISEASE INVOLVING NATIVE CORONARY ARTERY OF NATIVE HEART WITHOUT ANGINA PECTORIS: ICD-10-CM

## 2022-06-29 DIAGNOSIS — K21.9 GASTROESOPHAGEAL REFLUX DISEASE, UNSPECIFIED WHETHER ESOPHAGITIS PRESENT: ICD-10-CM

## 2022-06-29 DIAGNOSIS — Z95.5 PRESENCE OF STENT IN CORONARY ARTERY IN PATIENT WITH CORONARY ARTERY DISEASE: ICD-10-CM

## 2022-06-29 DIAGNOSIS — N18.31 STAGE 3A CHRONIC KIDNEY DISEASE: ICD-10-CM

## 2022-06-29 PROCEDURE — 99215 OFFICE O/P EST HI 40 MIN: CPT | Mod: 25 | Performed by: STUDENT IN AN ORGANIZED HEALTH CARE EDUCATION/TRAINING PROGRAM

## 2022-06-29 PROCEDURE — 71045 XR CHEST 1 VIEW PRE-OP: ICD-10-PCS | Mod: 26,,, | Performed by: RADIOLOGY

## 2022-06-29 PROCEDURE — 77073 XR HIP TO ANKLE: ICD-10-PCS | Mod: 26,,, | Performed by: RADIOLOGY

## 2022-06-29 PROCEDURE — 77073 BONE LENGTH STUDIES: CPT | Mod: 26,,, | Performed by: RADIOLOGY

## 2022-06-29 PROCEDURE — 77073 BONE LENGTH STUDIES: CPT | Mod: TC,FY

## 2022-06-29 PROCEDURE — 71045 X-RAY EXAM CHEST 1 VIEW: CPT | Mod: TC,FY

## 2022-06-29 PROCEDURE — 71045 X-RAY EXAM CHEST 1 VIEW: CPT | Mod: 26,,, | Performed by: RADIOLOGY

## 2022-06-29 RX ORDER — SODIUM CHLORIDE, SODIUM LACTATE, POTASSIUM CHLORIDE, CALCIUM CHLORIDE 600; 310; 30; 20 MG/100ML; MG/100ML; MG/100ML; MG/100ML
INJECTION, SOLUTION INTRAVENOUS CONTINUOUS
Status: CANCELLED | OUTPATIENT
Start: 2022-06-29

## 2022-06-29 RX ORDER — LIDOCAINE HYDROCHLORIDE 10 MG/ML
1 INJECTION, SOLUTION EPIDURAL; INFILTRATION; INTRACAUDAL; PERINEURAL ONCE
Status: CANCELLED | OUTPATIENT
Start: 2022-06-29 | End: 2022-06-29

## 2022-06-29 NOTE — DISCHARGE INSTRUCTIONS
Your surgery is scheduled for 7/18/22.    Please report to [facility] on the 1st Floor at 0700 a.m.    THIS TIME IS SUBJECT TO CHANGE.  YOU WILL RECEIVE A PHONE CALL THE DAY BEFORE SURGERY BY 3:30 PM TO CONFIRM YOUR TIME OF ARRIVAL.  IF YOU HAVE NOT RECEIVED A PHONE CALL BY 3:30 PM THE DAY BEFORE YOUR SURGERY PLEASE CALL [phone]     INSTRUCTIONS IMPORTANT!!!  ¨ Do not eat or drink after 12 midnight-including water, candy, gum, & mints. OK to brush teeth.      ____  Proceed to [facility] on *** for additional testing.        ____  Do not wear makeup, including mascara.  ____  No powder, lotions or creams to surgical area.  ____  Please remove all jewelry, including piercings and leave at home.  ____  No money or valuables needed. Please leave at home.  ____  Please bring any documents given by your doctor.  ____  If going home the same day, arrange for a ride home. You will not be able to             drive if Anesthesia was used.  ____  Children under 18 years require a parent / guardian present the entire time             they are in surgery / recovery.  ____  Wear loose fitting clothing. Allow for dressings, bandages.  ____  Stop Aspirin, Ibuprofen, Motrin, Aleve, Goody's/BC powders, Excedrine and Naproxen (NSAIDS) at least 3-5 days before surgery, unless otherwise instructed by your doctor, or the nurse.   You MAY use Tylenol/acetaminophen until day of surgery.  ____  Wash the surgical area with Hibiclens the night before surgery, and again the             morning of surgery.  Be sure to rinse hibiclens off completely (if instructed by   nurse).  ____  If you take diabetic medication, do not take am of surgery unless instructed by Doctor.  ____  Call MD for temperature above 101 degrees or any other signs of infection such as Urinary (bladder) infection, Upper respiratory infection, skin boils, etc.  ____ Stop taking any Fish Oil supplement or any Vitamins that contain Vitamin E at  least 5 days prior to surgery.  ____ Do Not wear your contact lenses the day of your procedure.  You may wear your glasses.      ____Do not shave surgical site for 3 days prior to surgery.  ____ Practice Good hand washing before, during, and after procedure.      I have read or had read and explained to me, and understand the above information.  Additional comments or instructions:  For additional questions call [phone]      ANESTHESIA SIDE EFFECTS  -For the first 24 hours after surgery:  Do not drive, use heavy equipment, make important decisions, or drink alcohol  -It is normal to feel sleepy for several hours.  Rest until you are more awake.  -Have someone stay with you, if needed.  They can watch for problems and help keep you safe.  -Some possible post anesthesia side effects include: nausea and vomiting, sore throat and hoarseness, sleepiness, and dizziness.        Pre-Op Bathing Instructions    Before surgery, you can play an important role in your own health.    Because skin is not sterile, we need to be sure that your skin is as free of germs as possible. By following the instructions below, you can reduce the number of germs on your skin before surgery.    IMPORTANT: You will need to shower with a special soap called Hibiclens*, available at any pharmacy.  If you are allergic to Chlorhexidine (the antiseptic in Hibiclens), use an antibacterial soap such as Dial Soap for your preoperative shower.  You will shower with Hibiclens both the night before your surgery and the morning of your surgery.  Do not use Hibiclens on the head, face or genitals to avoid injury to those areas.    STEP #1: THE NIGHT BEFORE YOUR SURGERY     Do not shave the area of your body where your surgery will be performed.  Shower and wash your hair and body as usual with your normal soap and shampoo.  Rinse your hair and body thoroughly after you shower to remove all soap residue.  With your hand, apply one packet of Hibiclens soap to  the surgical site.   Wash the site gently for five (5) minutes. Do not scrub your skin too hard.   Do not wash with your regular soap after Hibiclens is used.  Rinse your body thoroughly.  Pat yourself dry with a clean, soft towel.  Do not use lotion, cream, or powder.  Wear clean clothes.    STEP #2: THE MORNING OF YOUR SURGERY     Repeat Step #1.    * Not to be used by people allergic to Chlorhexidine.

## 2022-06-29 NOTE — PRE-PROCEDURE INSTRUCTIONS
Family will      Allergies, medical, surgical, family and psychosocial histories reviewed with patient. Periop plan of care reviewed. Preop instructions given, including medications to take and to hold. Hibiclens soap and instructions on use given. Time allotted for questions to be addressed.  Patient verbalized understanding.

## 2022-06-29 NOTE — ANESTHESIA PREPROCEDURE EVALUATION
"                                                                                                             06/29/2022  Enedelia García is a 77 y.o., female scheduled for REVISION, ARTHROPLASTY, KNEE (Left Knee) 7/18/22.    Per family medicine Dr. De La O, "Per evaluation, patient is moderate risk for a moderate risk surgery. Patient is medically optimized for surgery at this time".    Past Medical History:   Diagnosis Date    Coronary artery disease     Epilepsy     Hyperlipidemia     Hypertension     Normocytic anemia      Past Surgical History:   Procedure Laterality Date    APPENDECTOMY      BACK SURGERY      CORONARY STENT PLACEMENT      HYSTERECTOMY      TONSILLECTOMY         Pre-op Assessment    I have reviewed the Patient Summary Reports.     I have reviewed the Nursing Notes. I have reviewed the NPO Status.   I have reviewed the Medications.     Review of Systems  Anesthesia Hx:  No problems with previous Anesthesia Denies Hx of Anesthetic complications   Denies Personal Hx of Anesthesia complications.   Social:  Former Smoker, No Alcohol Use    EENT/Dental:   Throat Disease: History of esophageal spasm   Cardiovascular:   Exercise tolerance: good Hypertension, well controlled CAD  CABG/stent   Denies Angina. hyperlipidemia    Pulmonary:  Pulmonary Normal  Denies Shortness of breath.    Renal/:   Chronic Renal Disease    Hepatic/GI:   GERD, well controlled    Musculoskeletal:   Arthritis     Neurological:   Denies TIA. Denies CVA. Neuromuscular Disease,  Seizures (about 6m-1 year ago), well controlled    Endocrine:  Endocrine Normal    Psych:   Psychiatric History anxiety depression          Physical Exam  General: Well nourished, Cooperative, Alert and Oriented    Airway:  Mallampati: III / II  Mouth Opening: Normal  TM Distance: Normal  Tongue: Normal  Neck ROM: Normal ROM    Dental:  Dentures  Upper denture  Chest/Lungs:  Clear to auscultation, Normal Respiratory Rate    Heart:  Rate: " Normal  Rhythm: Regular Rhythm  Sounds: Normal    CXR 6/29/22  Cardiomediastinal silhouette is unchanged.  Lungs appear similar without large airspace opacity or lobar consolidation.  Possible mild subsegmental atelectasis in the left lower lung.  No pleural effusion or gross pneumothorax.  Calcific atherosclerosis of the aorta.  No acute osseous abnormality.    EKG 6/29/22    Recent Results (from the past 336 hour(s))   CBC Auto Differential    Collection Time: 06/29/22 12:53 PM   Result Value Ref Range    WBC 10.15 3.90 - 12.70 K/uL    Hemoglobin 11.2 (L) 12.0 - 16.0 g/dL    Hematocrit 35.2 (L) 37.0 - 48.5 %    Platelets 304 150 - 450 K/uL     No results found for this or any previous visit (from the past 336 hour(s)).      Anesthesia Plan  Type of Anesthesia, risks & benefits discussed:    Anesthesia Type: Gen ETT, Epidural, Spinal, Regional  Intra-op Monitoring Plan: Standard ASA Monitors  Post Op Pain Control Plan: multimodal analgesia  Induction:  IV  Airway Plan: Direct  Informed Consent: Informed consent signed with the Patient and all parties understand the risks and agree with anesthesia plan.  All questions answered.   ASA Score: 3  Anesthesia Plan Notes: Anesthesia consent to be signed prior to surgery 7/18/22      Ready For Surgery From Anesthesia Perspective.     .

## 2022-07-06 NOTE — PROGRESS NOTES
Progress Note   Family Medicine    Subjective:    Chief Complaint: pre-op exam, medical Optimization    History of Present Illness:    Enedelia García is a 77 y.o. female who  has a past medical history of Coronary artery disease, Epilepsy, Hyperlipidemia, Hypertension, and Normocytic anemia. The patient presents to clinic for pre-operative examination.    HPI: I had seen this pleasant 77 y.o. female in the pre-op clinic today prior to her elective     Active Cardiac Conditions: History is not suggestive of unstable coronary syndrome, decompensated heart failure, significant arrhythmias, severe valvular disease.    Exercise Tolerance: Pt can undertake activities such as cooking, cleaning, vacuuming, showering, dressing, shopping and walking 1-2 blocks without chest pain or shortness of breath making her exercise tolerance more than 4 METs.     The patient denies chest pain upon exertion, denies dyspnea, denies nausea, denies vomiting, denies diaphoresis, denies syncope, denies radiation to the arm/jaw/back, denies ripping or tearing sensation, denies pleuritic chest pain, denies unilateral leg swelling, and denies leg pain.      Current Anticoagulants and Antiplatelet Therapy: No    Anesthesia: No personal or family history of complications with anesthesia.    The following portions of the patient's history were reviewed and updated as appropriate: allergies, current medications, past family history, past medical history, past social history, past surgical history and problem list.    Past Medical History:   Diagnosis Date    Coronary artery disease     Epilepsy     Hyperlipidemia     Hypertension     Normocytic anemia        Past Surgical History:   Procedure Laterality Date    APPENDECTOMY      BACK SURGERY      CORONARY STENT PLACEMENT      HYSTERECTOMY      TONSILLECTOMY         Social History  Social History     Tobacco Use    Smoking status: Never Smoker    Smokeless tobacco: Never Used  "  Substance Use Topics    Alcohol use: No    Drug use: No       Family History   Problem Relation Age of Onset    Heart disease Mother     Heart disease Father      Review of patient's allergies indicates:   Allergen Reactions    Iodinated contrast media Anaphylaxis and Other (See Comments)    Phenergan [promethazine]      Vomiting       Review of Systems [Negative unless checked off]    General ROS: []fever, []chills, []weight loss, []malaise/fatigue.  ENT ROS: []congestion, []rhinorrhea,  []sore throat, []neck pain, []hearing loss.  Ophthalmic ROS:[]blurry vision, [] double vision, []photophobia, []eye pain.  Respiratory ROS: []cough, []pleuritic chest pain, []shortness of breath, []wheezing.  CVS ROS:[]chest pain, []dyspnea on exertion, []palpitations, []orthopnea, []leg swelling, []PND.   GI ROS: []nausea, []vomiting, [] epigastric pain, []abd pain, []diarrhea, []constipation, []blood/melena in stool.   Urology ROS:[]dysuria, []frequency, []flank pain,[] trouble voiding, [] hematuria.   MSK ROS: []myalgias, []joint pain, []muscular weakness,  []back pain, [] falls.   Derm ROS: []pruritis, []rash, []jaundice.  Neurological:[]dizziness,[]numbness,[]loss of consciousness, []weakness  []headaches.   Psych ROS: []hallucinations, []depression, []anxiety, []suicidal ideation.    Physical Examination  BP (!) 110/54 (BP Location: Right arm)   Pulse (!) 57   Ht 5' 4" (1.626 m)   Wt 78.8 kg (173 lb 11.6 oz)   BMI 29.82 kg/m²   Wt Readings from Last 3 Encounters:   06/29/22 79.2 kg (174 lb 9.7 oz)   06/29/22 78.8 kg (173 lb 11.6 oz)   06/24/22 77.6 kg (171 lb)     BP Readings from Last 3 Encounters:   06/29/22 136/88   06/29/22 (!) 110/54   06/24/22 (!) 164/87     Estimated body mass index is 29.82 kg/m² as calculated from the following:    Height as of this encounter: 5' 4" (1.626 m).    Weight as of this encounter: 78.8 kg (173 lb 11.6 oz).     General appearance: alert, cooperative, no distress  Eyes: pupils " equal and reactive, extraocular eye movements intact   Ears: bilateral TM's and external ear canals normal   Nose: normal and patent, no erythema, discharge or polyps   Sinuses: Normal paranasal sinuses without tenderness   Throat: mucous membranes moist, pharynx normal without lesions   Neck: no thyromegaly, trachea midline  Lungs: clear to auscultation, no wheezes, rales or rhonchi, symmetric air entry, no dullness to percussion bilaterally.  Heart: normal rate, regular rhythm, normal S1, S2, no murmurs, rubs, clicks or gallops, no displacement of the PMI.  Abdomen: soft, nontender, nondistended, no masses or organomegaly   Back: full range of motion, no tenderness, palpable spasm or pain on motion   Extremities: peripheral pulses normal, no pedal edema, no clubbing or cyanosis   Feet: warm, good capillary refill.  Integument: normal coloration and turgor, no rashes, no suspicious skin lesions noted.  Neurological:alert, oriented, normal speech, no focal findings or movement disorder noted   Psychiatric: alert, oriented to person, place, and time    Data reviewed    Previous medical records reviewed and summarized above in HPI.     Laboratory    I have reviewed old labs below:    Lab Results   Component Value Date    WBC 10.15 06/29/2022    HGB 11.2 (L) 06/29/2022    HCT 35.2 (L) 06/29/2022     06/29/2022    CHOL 179 02/23/2022    TRIG 168 (H) 02/23/2022    HDL 47 02/23/2022    ALT 16 06/29/2022    AST 18 06/29/2022     06/29/2022    K 4.0 06/29/2022     06/29/2022    CREATININE 1.3 06/29/2022    BUN 27 (H) 06/29/2022    CO2 33 (H) 06/29/2022    TSH 1.991 04/14/2022    INR 1.0 06/29/2022    HGBA1C 6.4 (H) 06/29/2022     Imaging/Tracing: I have reviewed the pertinent results/findings and my personal findings are below:     EKG: pending    Chest X-Ray: pending    Assessment/Plan    Enedelia García is a 77 y.o. female who presents to clinic with:    1. Preop examination         Diagnosis plan  remarks     Assessment:stable.   Plan: Current treatment plan is effective, no change in therapy..    Evaluating Risk in Surgery is a combination of patients risk factors and surgical risk factors. Patient is being evaluated for medical optimization and not surgical clearance. MACE is the major adverse cardiovascular event risk, and can be assessed based on the Revised Cardiac Risk Index score.    RCRI: 0    Per ACC/AHA samy-operative guidelines, moderate risk surgery and METS >=4 is low risk for MACE and no further cardiac testing is required.     Pt has no active cardiac condition (ACS/USA, decompenstated CHF, ventricular arrhythmias or severe valvular disease) and can easily achieve > 4 METS.  Pt does not require further cardiac evaluation prior to undergoing surgical procedure. These recommendations follow the 2014 ACC/AHA Guideline on Perioperative Cardiovascular Evaluation and Management of Patients Undergoing Noncardiac Surgery. (JACC Vol. 64 No. 22, Dec 9, 2014, pp u23-b641).    Per evaluation, patient is moderate risk for a moderate risk surgery. Patient is medically optimized for surgery at this time. Instructed as above on samy-operative medication recommendations and further risk reduction. All questions answered.       Medication Monitoring: In today's visit, monitoring for drug toxicity was accomplished. Proper use of medications was discussed.     Counseling: Counseling included discussion regarding imaging findings, diagnosis, possibilities, treatment options, medications, risks, and benefits. She had many questions regarding the options and long-term effects. All questions were answered. She expressed understanding after counseling regarding the diagnosis and recommendations. She was capable and demonstrated competence with understanding of these options. Shared decision making was performed resulting in her choosing the current treatment plan.     She was counseled about the importance of  healthy dietary habits as well as routine physical activity and exercise for better health outcomes. I also discussed the importance of cancer screening.     I also discussed the importance of close follow up to discuss labs, change or modify her medications if needed, monitor side effects, and further evaluation of medical problems.     Additional workup planned: see labs ordered below.    See below for labs and meds ordered with associated diagnosis    1. Preop examination       Medication List with Changes/Refills   Current Medications    ASPIRIN (ECOTRIN) 81 MG EC TABLET    Take 81 mg by mouth once daily.    BUTALBITAL-ACETAMINOPHEN-CAFF -40 MG CAP    TAKE ONE CAPSULE BY MOUTH ONCE TO TWICE DAILY AS NEEDED FOR MIGRAINE headaches    CALCIUM CARBONATE/VITAMIN D3 (VITAMIN D-3 ORAL)    Take by mouth once daily.    CLOTRIMAZOLE-BETAMETHASONE 1-0.05% (LOTRISONE) CREAM    Apply topically 2 (two) times daily.    DULOXETINE (CYMBALTA) 60 MG CAPSULE    Take 1 capsule (60 mg total) by mouth once daily. Take 1 tab po daily x 2 weeks then 2 tabs po daily thereafter.    FLUTICASONE PROPIONATE (FLONASE) 50 MCG/ACTUATION NASAL SPRAY    2 sprays (100 mcg total) by Each Nostril route once daily.    FUROSEMIDE (LASIX) 40 MG TABLET    Take 1 tablet (40 mg total) by mouth once daily.    GABAPENTIN (NEURONTIN) 600 MG TABLET    Take 600 mg by mouth 3 (three) times daily.    HYDROXYZINE HCL (ATARAX) 25 MG TABLET      See Instructions, 30 tab, 1, Substitution Allowed, TAKE 1 TABLET BY MOUTH DAILY AS NEEDED FOR ITCHING, 30, Route to Pharmacy Electronically, [id number], [id number], Instructions Replace Required Details, cm,...    IRBESARTAN (AVAPRO) 300 MG TABLET    Take 1 tablet (300 mg total) by mouth every evening.    ISOSORBIDE MONONITRATE (IMDUR) 60 MG 24 HR TABLET    Take 1 tablet (60 mg total) by mouth once daily.    METOPROLOL SUCCINATE (TOPROL-XL) 25 MG 24 HR TABLET    Take 1  "tablet (25 mg total) by mouth once daily.    MULTIVITAMIN CAPSULE    Take 1 capsule by mouth once daily.    MUPIROCIN (BACTROBAN) 2 % OINTMENT    Apply topically once daily. apply to affected area    NITROGLYCERIN (NITROSTAT) 0.3 MG SL TABLET    PLACE ONE TABLET UNDER THE TONGUE EVERY 5 MINUTES FOR UP TO 3 doses IF NEEDED FOR CHEST PAIN. call 911 IF PAIN persists    OMEPRAZOLE (PRILOSEC) 20 MG CAPSULE    Take 20 mg by mouth once daily.    OXYCODONE (ROXICODONE) 10 MG TAB IMMEDIATE RELEASE TABLET    oxycodone 10 mg tablet   Take 1 tablet 3 times a day by oral route as needed for 7 days.    POLYETHYLENE GLYCOL (GLYCOLAX) 17 GRAM PWPK    Take 17 g by mouth once daily.    ROSUVASTATIN (CRESTOR) 40 MG TAB    Take 1 tablet (40 mg total) by mouth once daily.    VALACYCLOVIR (VALTREX) 1000 MG TABLET    Take 1 tablet (1,000 mg total) by mouth once daily. As needed for outbreak for 5 days     Modified Medications    No medications on file       No follow-ups on file. for further workup and reassessment if labs and tests obtained are stable or sooner as needed. She was instructed to call the clinic or go to the emergency department if her symptoms do not improve, worsens, or if new symptoms develop. Shared decision making occurred and she verbalized understanding in agreement with this plan.     Documentation entered by me for this encounter may have been done in part using speech-recognition technology. Although I have made an effort to ensure accuracy, "sound like" errors may exist and should be interpreted in context.     Sushil De La O MD  07/05/2022       "

## 2022-07-06 NOTE — H&P (VIEW-ONLY)
Progress Note   Family Medicine    Subjective:    Chief Complaint: pre-op exam, medical Optimization    History of Present Illness:    Enedelia García is a 77 y.o. female who  has a past medical history of Coronary artery disease, Epilepsy, Hyperlipidemia, Hypertension, and Normocytic anemia. The patient presents to clinic for pre-operative examination.    HPI: I had seen this pleasant 77 y.o. female in the pre-op clinic today prior to her elective     Active Cardiac Conditions: History is not suggestive of unstable coronary syndrome, decompensated heart failure, significant arrhythmias, severe valvular disease.    Exercise Tolerance: Pt can undertake activities such as cooking, cleaning, vacuuming, showering, dressing, shopping and walking 1-2 blocks without chest pain or shortness of breath making her exercise tolerance more than 4 METs.     The patient denies chest pain upon exertion, denies dyspnea, denies nausea, denies vomiting, denies diaphoresis, denies syncope, denies radiation to the arm/jaw/back, denies ripping or tearing sensation, denies pleuritic chest pain, denies unilateral leg swelling, and denies leg pain.      Current Anticoagulants and Antiplatelet Therapy: No    Anesthesia: No personal or family history of complications with anesthesia.    The following portions of the patient's history were reviewed and updated as appropriate: allergies, current medications, past family history, past medical history, past social history, past surgical history and problem list.    Past Medical History:   Diagnosis Date    Coronary artery disease     Epilepsy     Hyperlipidemia     Hypertension     Normocytic anemia        Past Surgical History:   Procedure Laterality Date    APPENDECTOMY      BACK SURGERY      CORONARY STENT PLACEMENT      HYSTERECTOMY      TONSILLECTOMY         Social History  Social History     Tobacco Use    Smoking status: Never Smoker    Smokeless tobacco: Never Used  "  Substance Use Topics    Alcohol use: No    Drug use: No       Family History   Problem Relation Age of Onset    Heart disease Mother     Heart disease Father      Review of patient's allergies indicates:   Allergen Reactions    Iodinated contrast media Anaphylaxis and Other (See Comments)    Phenergan [promethazine]      Vomiting       Review of Systems [Negative unless checked off]    General ROS: []fever, []chills, []weight loss, []malaise/fatigue.  ENT ROS: []congestion, []rhinorrhea,  []sore throat, []neck pain, []hearing loss.  Ophthalmic ROS:[]blurry vision, [] double vision, []photophobia, []eye pain.  Respiratory ROS: []cough, []pleuritic chest pain, []shortness of breath, []wheezing.  CVS ROS:[]chest pain, []dyspnea on exertion, []palpitations, []orthopnea, []leg swelling, []PND.   GI ROS: []nausea, []vomiting, [] epigastric pain, []abd pain, []diarrhea, []constipation, []blood/melena in stool.   Urology ROS:[]dysuria, []frequency, []flank pain,[] trouble voiding, [] hematuria.   MSK ROS: []myalgias, []joint pain, []muscular weakness,  []back pain, [] falls.   Derm ROS: []pruritis, []rash, []jaundice.  Neurological:[]dizziness,[]numbness,[]loss of consciousness, []weakness  []headaches.   Psych ROS: []hallucinations, []depression, []anxiety, []suicidal ideation.    Physical Examination  BP (!) 110/54 (BP Location: Right arm)   Pulse (!) 57   Ht 5' 4" (1.626 m)   Wt 78.8 kg (173 lb 11.6 oz)   BMI 29.82 kg/m²   Wt Readings from Last 3 Encounters:   06/29/22 79.2 kg (174 lb 9.7 oz)   06/29/22 78.8 kg (173 lb 11.6 oz)   06/24/22 77.6 kg (171 lb)     BP Readings from Last 3 Encounters:   06/29/22 136/88   06/29/22 (!) 110/54   06/24/22 (!) 164/87     Estimated body mass index is 29.82 kg/m² as calculated from the following:    Height as of this encounter: 5' 4" (1.626 m).    Weight as of this encounter: 78.8 kg (173 lb 11.6 oz).     General appearance: alert, cooperative, no distress  Eyes: pupils " equal and reactive, extraocular eye movements intact   Ears: bilateral TM's and external ear canals normal   Nose: normal and patent, no erythema, discharge or polyps   Sinuses: Normal paranasal sinuses without tenderness   Throat: mucous membranes moist, pharynx normal without lesions   Neck: no thyromegaly, trachea midline  Lungs: clear to auscultation, no wheezes, rales or rhonchi, symmetric air entry, no dullness to percussion bilaterally.  Heart: normal rate, regular rhythm, normal S1, S2, no murmurs, rubs, clicks or gallops, no displacement of the PMI.  Abdomen: soft, nontender, nondistended, no masses or organomegaly   Back: full range of motion, no tenderness, palpable spasm or pain on motion   Extremities: peripheral pulses normal, no pedal edema, no clubbing or cyanosis   Feet: warm, good capillary refill.  Integument: normal coloration and turgor, no rashes, no suspicious skin lesions noted.  Neurological:alert, oriented, normal speech, no focal findings or movement disorder noted   Psychiatric: alert, oriented to person, place, and time    Data reviewed    Previous medical records reviewed and summarized above in HPI.     Laboratory    I have reviewed old labs below:    Lab Results   Component Value Date    WBC 10.15 06/29/2022    HGB 11.2 (L) 06/29/2022    HCT 35.2 (L) 06/29/2022     06/29/2022    CHOL 179 02/23/2022    TRIG 168 (H) 02/23/2022    HDL 47 02/23/2022    ALT 16 06/29/2022    AST 18 06/29/2022     06/29/2022    K 4.0 06/29/2022     06/29/2022    CREATININE 1.3 06/29/2022    BUN 27 (H) 06/29/2022    CO2 33 (H) 06/29/2022    TSH 1.991 04/14/2022    INR 1.0 06/29/2022    HGBA1C 6.4 (H) 06/29/2022     Imaging/Tracing: I have reviewed the pertinent results/findings and my personal findings are below:     EKG: pending    Chest X-Ray: pending    Assessment/Plan    Enedelia García is a 77 y.o. female who presents to clinic with:    1. Preop examination         Diagnosis plan  remarks     Assessment:stable.   Plan: Current treatment plan is effective, no change in therapy..    Evaluating Risk in Surgery is a combination of patients risk factors and surgical risk factors. Patient is being evaluated for medical optimization and not surgical clearance. MACE is the major adverse cardiovascular event risk, and can be assessed based on the Revised Cardiac Risk Index score.    RCRI: 0    Per ACC/AHA samy-operative guidelines, moderate risk surgery and METS >=4 is low risk for MACE and no further cardiac testing is required.     Pt has no active cardiac condition (ACS/USA, decompenstated CHF, ventricular arrhythmias or severe valvular disease) and can easily achieve > 4 METS.  Pt does not require further cardiac evaluation prior to undergoing surgical procedure. These recommendations follow the 2014 ACC/AHA Guideline on Perioperative Cardiovascular Evaluation and Management of Patients Undergoing Noncardiac Surgery. (JACC Vol. 64 No. 22, Dec 9, 2014, pp d02-y222).    Per evaluation, patient is moderate risk for a moderate risk surgery. Patient is medically optimized for surgery at this time. Instructed as above on samy-operative medication recommendations and further risk reduction. All questions answered.       Medication Monitoring: In today's visit, monitoring for drug toxicity was accomplished. Proper use of medications was discussed.     Counseling: Counseling included discussion regarding imaging findings, diagnosis, possibilities, treatment options, medications, risks, and benefits. She had many questions regarding the options and long-term effects. All questions were answered. She expressed understanding after counseling regarding the diagnosis and recommendations. She was capable and demonstrated competence with understanding of these options. Shared decision making was performed resulting in her choosing the current treatment plan.     She was counseled about the importance of  healthy dietary habits as well as routine physical activity and exercise for better health outcomes. I also discussed the importance of cancer screening.     I also discussed the importance of close follow up to discuss labs, change or modify her medications if needed, monitor side effects, and further evaluation of medical problems.     Additional workup planned: see labs ordered below.    See below for labs and meds ordered with associated diagnosis    1. Preop examination       Medication List with Changes/Refills   Current Medications    ASPIRIN (ECOTRIN) 81 MG EC TABLET    Take 81 mg by mouth once daily.    BUTALBITAL-ACETAMINOPHEN-CAFF -40 MG CAP    TAKE ONE CAPSULE BY MOUTH ONCE TO TWICE DAILY AS NEEDED FOR MIGRAINE headaches    CALCIUM CARBONATE/VITAMIN D3 (VITAMIN D-3 ORAL)    Take by mouth once daily.    CLOTRIMAZOLE-BETAMETHASONE 1-0.05% (LOTRISONE) CREAM    Apply topically 2 (two) times daily.    DULOXETINE (CYMBALTA) 60 MG CAPSULE    Take 1 capsule (60 mg total) by mouth once daily. Take 1 tab po daily x 2 weeks then 2 tabs po daily thereafter.    FLUTICASONE PROPIONATE (FLONASE) 50 MCG/ACTUATION NASAL SPRAY    2 sprays (100 mcg total) by Each Nostril route once daily.    FUROSEMIDE (LASIX) 40 MG TABLET    Take 1 tablet (40 mg total) by mouth once daily.    GABAPENTIN (NEURONTIN) 600 MG TABLET    Take 600 mg by mouth 3 (three) times daily.    HYDROXYZINE HCL (ATARAX) 25 MG TABLET      See Instructions, 30 tab, 1, Substitution Allowed, TAKE 1 TABLET BY MOUTH DAILY AS NEEDED FOR ITCHING, 30, Route to Pharmacy Electronically, [id number], [id number], Instructions Replace Required Details, cm,...    IRBESARTAN (AVAPRO) 300 MG TABLET    Take 1 tablet (300 mg total) by mouth every evening.    ISOSORBIDE MONONITRATE (IMDUR) 60 MG 24 HR TABLET    Take 1 tablet (60 mg total) by mouth once daily.    METOPROLOL SUCCINATE (TOPROL-XL) 25 MG 24 HR TABLET    Take 1  "tablet (25 mg total) by mouth once daily.    MULTIVITAMIN CAPSULE    Take 1 capsule by mouth once daily.    MUPIROCIN (BACTROBAN) 2 % OINTMENT    Apply topically once daily. apply to affected area    NITROGLYCERIN (NITROSTAT) 0.3 MG SL TABLET    PLACE ONE TABLET UNDER THE TONGUE EVERY 5 MINUTES FOR UP TO 3 doses IF NEEDED FOR CHEST PAIN. call 911 IF PAIN persists    OMEPRAZOLE (PRILOSEC) 20 MG CAPSULE    Take 20 mg by mouth once daily.    OXYCODONE (ROXICODONE) 10 MG TAB IMMEDIATE RELEASE TABLET    oxycodone 10 mg tablet   Take 1 tablet 3 times a day by oral route as needed for 7 days.    POLYETHYLENE GLYCOL (GLYCOLAX) 17 GRAM PWPK    Take 17 g by mouth once daily.    ROSUVASTATIN (CRESTOR) 40 MG TAB    Take 1 tablet (40 mg total) by mouth once daily.    VALACYCLOVIR (VALTREX) 1000 MG TABLET    Take 1 tablet (1,000 mg total) by mouth once daily. As needed for outbreak for 5 days     Modified Medications    No medications on file       No follow-ups on file. for further workup and reassessment if labs and tests obtained are stable or sooner as needed. She was instructed to call the clinic or go to the emergency department if her symptoms do not improve, worsens, or if new symptoms develop. Shared decision making occurred and she verbalized understanding in agreement with this plan.     Documentation entered by me for this encounter may have been done in part using speech-recognition technology. Although I have made an effort to ensure accuracy, "sound like" errors may exist and should be interpreted in context.     Sushil De La O MD  07/05/2022       "

## 2022-07-13 RX ORDER — TRANEXAMIC ACID 100 MG/ML
1000 INJECTION, SOLUTION INTRAVENOUS ONCE
Status: CANCELLED | OUTPATIENT
Start: 2022-07-13 | End: 2022-07-13

## 2022-07-13 RX ORDER — ONDANSETRON 2 MG/ML
4 INJECTION INTRAMUSCULAR; INTRAVENOUS EVERY 12 HOURS PRN
Status: CANCELLED | OUTPATIENT
Start: 2022-07-13

## 2022-07-13 RX ORDER — AMOXICILLIN 250 MG
1 CAPSULE ORAL 2 TIMES DAILY
Status: CANCELLED | OUTPATIENT
Start: 2022-07-13

## 2022-07-13 RX ORDER — CELECOXIB 100 MG/1
200 CAPSULE ORAL 2 TIMES DAILY
Status: CANCELLED | OUTPATIENT
Start: 2022-07-13

## 2022-07-13 RX ORDER — PREGABALIN 75 MG/1
75 CAPSULE ORAL 2 TIMES DAILY
Status: CANCELLED | OUTPATIENT
Start: 2022-07-13

## 2022-07-13 RX ORDER — CEFAZOLIN SODIUM 2 G/50ML
2 SOLUTION INTRAVENOUS
Status: CANCELLED | OUTPATIENT
Start: 2022-07-13 | End: 2022-07-14

## 2022-07-13 RX ORDER — CEFAZOLIN SODIUM 2 G/50ML
2 SOLUTION INTRAVENOUS ONCE
Status: CANCELLED | OUTPATIENT
Start: 2022-07-13 | End: 2022-07-13

## 2022-07-13 RX ORDER — BISACODYL 10 MG
10 SUPPOSITORY, RECTAL RECTAL DAILY PRN
Status: CANCELLED | OUTPATIENT
Start: 2022-07-13

## 2022-07-13 RX ORDER — ACETAMINOPHEN 325 MG/1
650 TABLET ORAL EVERY 6 HOURS SCHEDULED
Status: CANCELLED | OUTPATIENT
Start: 2022-07-13

## 2022-07-13 RX ORDER — FAMOTIDINE 20 MG/1
20 TABLET, FILM COATED ORAL 2 TIMES DAILY
Status: CANCELLED | OUTPATIENT
Start: 2022-07-13

## 2022-07-13 RX ORDER — ASPIRIN 81 MG/1
81 TABLET ORAL 2 TIMES DAILY
Status: CANCELLED | OUTPATIENT
Start: 2022-07-13

## 2022-07-14 ENCOUNTER — RESEARCH ENCOUNTER (OUTPATIENT)
Dept: RESEARCH | Facility: HOSPITAL | Age: 77
End: 2022-07-14
Payer: MEDICARE

## 2022-07-14 ENCOUNTER — PROCEDURE VISIT (OUTPATIENT)
Dept: ORTHOPEDICS | Facility: CLINIC | Age: 77
End: 2022-07-14
Payer: MEDICARE

## 2022-07-14 DIAGNOSIS — I25.10 PRESENCE OF STENT IN CORONARY ARTERY IN PATIENT WITH CORONARY ARTERY DISEASE: ICD-10-CM

## 2022-07-14 DIAGNOSIS — F11.90 NARCOTIC DRUG USE: ICD-10-CM

## 2022-07-14 DIAGNOSIS — I10 BENIGN ESSENTIAL HYPERTENSION: ICD-10-CM

## 2022-07-14 DIAGNOSIS — I25.10 CORONARY ARTERY DISEASE INVOLVING NATIVE CORONARY ARTERY OF NATIVE HEART WITHOUT ANGINA PECTORIS: ICD-10-CM

## 2022-07-14 DIAGNOSIS — N18.31 STAGE 3A CHRONIC KIDNEY DISEASE: ICD-10-CM

## 2022-07-14 DIAGNOSIS — Z95.5 PRESENCE OF STENT IN CORONARY ARTERY IN PATIENT WITH CORONARY ARTERY DISEASE: ICD-10-CM

## 2022-07-14 DIAGNOSIS — F33.1 MODERATE EPISODE OF RECURRENT MAJOR DEPRESSIVE DISORDER: ICD-10-CM

## 2022-07-14 DIAGNOSIS — I35.0 AORTIC VALVE STENOSIS, ETIOLOGY OF CARDIAC VALVE DISEASE UNSPECIFIED: ICD-10-CM

## 2022-07-14 DIAGNOSIS — E78.2 MIXED HYPERLIPIDEMIA: ICD-10-CM

## 2022-07-14 DIAGNOSIS — Z96.652 S/P LEFT UNICOMPARTMENTAL KNEE REPLACEMENT: ICD-10-CM

## 2022-07-14 DIAGNOSIS — R73.03 PREDIABETES: ICD-10-CM

## 2022-07-14 DIAGNOSIS — F41.1 GENERALIZED ANXIETY DISORDER: ICD-10-CM

## 2022-07-14 DIAGNOSIS — K21.9 GASTROESOPHAGEAL REFLUX DISEASE, UNSPECIFIED WHETHER ESOPHAGITIS PRESENT: ICD-10-CM

## 2022-07-14 PROCEDURE — 99499 UNLISTED E&M SERVICE: CPT | Mod: S$GLB,,, | Performed by: ORTHOPAEDIC SURGERY

## 2022-07-14 PROCEDURE — 64640 PR DESTRUCT BY NEURO AGENT; OTHER PERIPH NERVE: ICD-10-PCS | Mod: LT,S$GLB,, | Performed by: ORTHOPAEDIC SURGERY

## 2022-07-14 PROCEDURE — 99499 NO LOS: ICD-10-PCS | Mod: S$GLB,,, | Performed by: ORTHOPAEDIC SURGERY

## 2022-07-14 PROCEDURE — 64640 INJECTION TREATMENT OF NERVE: CPT | Mod: LT,S$GLB,, | Performed by: ORTHOPAEDIC SURGERY

## 2022-07-14 NOTE — PROCEDURES
Procedures   Procedure Note Iovera:    DATE OF PROCEDURE:  07/14/2022    PREOPERATIVE DIAGNOSIS: left knee pain.     POSTOPERATIVE DIAGNOSIS: left knee pain.     PROCEDURE: Iovera treatment of anterior femoral cutaneous nerve, Lateral femoral cut nerve, and both branches of infrapatellar saphenous nerve using at least 4 different punctures to treat all 4 nerves. (cpt 64640 x4)    ATTENDING SURGEON: Stan Catalan M.D.   ASSISTANT: shi basurto    COMPLICATIONS: None.     IMPLANTS:  None    ESTIMATED BLOOD LOSS:  < 5cc    SPECIMENS REMOVED:  None    ANESTHESIA: Local lidocaine    INDICATIONS FOR PROCEDURE: This is a 77 y.o. female with longstanding knee pain. They have failed non operative management including injections.I discussed a new treatment therapy called Iovera, which is cryotherapy, to provide symptomatic relief along the sensory distribution of the infrapatellar tendon branch of the saphenous nerve  and AFCN. The patient elected to move forward with this  We did discuss the fact that this is a fairly novel procedure and there is very limited scientific data around this.  However, it FDA approved.  The patient was given patient information and literature to review prior to the procedure as well.  Based on this, the patient agreed to move forward with doing the procedure.      PROCEDURE:    The patient was placed supine on the exam table and the proximal medial aspect of the left tibia and anterior aspect of distal femur was prepped with sterile Betadine and alcohol.  A line was drawn extending approximately 5 cm medial to inferior pole of the patella distally to a point approximately 5 cm medial to the tibial tubercle.  A second line was drawn in a medial to lateral direction the width of the patella approximately 7 cm proximal to the patella. We then infiltrated the skin with lidocaine along both lines using a 25g needle. We then introduced the Iovera device along these lines and this device penetrated the skin,  creating cryotherapy to both branches of the infrapatellar saphenous nerve, a third treatment to the anterior femoral cutaneous nerve, and fourth LFCN. 7 punctures of the skin were made to treat the 2 branches of the ISN and another 7 punctures were made to treat the AFCN and LFCN. There were a total of 4 nerves treated with iovera. The patient tolerated the procedure well with no problems.

## 2022-07-14 NOTE — PROGRESS NOTES
"Protocol: innovations in [employer] Outcomes Registry (Carol)  Protocol#: Carol  IRB#: 2021.156  Version Date: 18-Mar-2022  PI: Stan Catalan MD  Patient Initials: ODALIS    Patient approached in private clinic room to discuss above study. Patient states she is having trouble seeing and even with her glasses she cannot see her phone or "read anything clearly". She stated she would love to be a part of this study in the future once she gets a new prescription of glasses so she can read and complete the questionnaires on her phone.  May re approach in the future.    "

## 2022-07-15 ENCOUNTER — HOSPITAL ENCOUNTER (OUTPATIENT)
Dept: WOUND CARE | Facility: HOSPITAL | Age: 77
Discharge: HOME | DRG: 467 | End: 2022-07-15
Attending: PREVENTIVE MEDICINE
Payer: MEDICARE

## 2022-07-15 DIAGNOSIS — L97.911: Primary | ICD-10-CM

## 2022-07-15 DIAGNOSIS — S80.821S: ICD-10-CM

## 2022-07-15 PROCEDURE — 99212 OFFICE O/P EST SF 10 MIN: CPT

## 2022-07-15 RX ORDER — AMOXICILLIN AND CLAVULANATE POTASSIUM 875; 125 MG/1; MG/1
1 TABLET, FILM COATED ORAL EVERY 12 HOURS
Qty: 10 TABLET | Refills: 0 | Status: SHIPPED | OUTPATIENT
Start: 2022-07-15 | End: 2022-07-20

## 2022-07-15 NOTE — PROGRESS NOTES
Wound Care & [facility] Clinic    Subjective:       Patient ID: Enedelia García is a 77 y.o. female.    Chief Complaint: Wound Care    Patient to wound care center with no new problems or complaints.  Continue with the same treatment plan, added Tubigrip F BLE to the orders   and discussed elevation.     Review of Systems   All other systems reviewed and are negative.        Objective:        Physical Exam       Wound 06/24/22 1400 Blister(s) Right lower Leg (Active)   06/24/22 1400    Pre-existing: Yes   Primary Wound Type: Blister(s)   Side: Right   Orientation: lower   Location: Leg   Wound Number:    Ankle-Brachial Index:    Pulses: palpable   Removal Indication and Assessment:    Wound Outcome:    (Retired) Wound Type:    (Retired) Wound Length (cm):    (Retired) Wound Width (cm):    (Retired) Depth (cm):    Wound Description (Comments):    Removal Indications:    Wound Image    07/15/22 1400   Dressing Appearance Open to air 07/15/22 1400   Appearance Intact;Pink;Red;Dry 07/15/22 1400   Periwound Area Edematous 07/15/22 1400   Care Cleansed with:;Sterile normal saline 07/15/22 1400   Dressing Applied;Changed;Island/border;Tubular bandage 07/15/22 1400   Compression Tubular elasticized bandage 07/15/22 1400   Dressing Change Due 08/05/22 07/15/22 1400         Assessment:         ICD-10-CM ICD-9-CM   1. Traumatic ulcer of right lower leg, limited to breakdown of skin  L97.911 707.19   2. Blister of right lower extremity without infection, sequela  S80.821S 906.2         Plan:   Tissue pathology and/or culture taken:  [] Yes [x] No   Sharp debridement performed:   [] Yes [x] No   Labs ordered this visit:   [] Yes [x] No   Imaging ordered this visit:   [] Yes [x] No           Orders Placed This Encounter   Procedures    Change dressing     Cleanse wound with: Normal saline   Lidocaine: prn   Silver nitrate: prn   Primary dressing: Mupirocin   Secondary dressing: 3 x 3 bordered  foam or bandaid, Tubigrip F BLE  Elevate as much as possible  Frequency: Daily per patient   Follow-up: 08/05/22        Follow up in about 3 weeks (around 8/5/2022).

## 2022-07-18 ENCOUNTER — ANESTHESIA (OUTPATIENT)
Dept: SURGERY | Facility: HOSPITAL | Age: 77
DRG: 467 | End: 2022-07-18
Payer: MEDICARE

## 2022-07-18 ENCOUNTER — HOSPITAL ENCOUNTER (INPATIENT)
Facility: HOSPITAL | Age: 77
LOS: 4 days | Discharge: HOME HEALTH | DRG: 467 | End: 2022-07-22
Attending: ORTHOPAEDIC SURGERY | Admitting: ORTHOPAEDIC SURGERY
Payer: MEDICARE

## 2022-07-18 DIAGNOSIS — Z96.652 S/P LEFT UNICOMPARTMENTAL KNEE REPLACEMENT: ICD-10-CM

## 2022-07-18 DIAGNOSIS — K21.9 GASTROESOPHAGEAL REFLUX DISEASE, UNSPECIFIED WHETHER ESOPHAGITIS PRESENT: ICD-10-CM

## 2022-07-18 DIAGNOSIS — Z96.652 S/P TOTAL KNEE ARTHROPLASTY, LEFT: Primary | ICD-10-CM

## 2022-07-18 DIAGNOSIS — F33.1 MODERATE EPISODE OF RECURRENT MAJOR DEPRESSIVE DISORDER: ICD-10-CM

## 2022-07-18 DIAGNOSIS — R73.03 PREDIABETES: ICD-10-CM

## 2022-07-18 DIAGNOSIS — I10 HYPERTENSION, UNSPECIFIED TYPE: ICD-10-CM

## 2022-07-18 LAB
APPEARANCE FLD: CLEAR
BODY FLD TYPE: NORMAL
COLOR FLD: COLORLESS
EOSINOPHIL NFR FLD MANUAL: 1 %
GRAM STN SPEC: NORMAL
LYMPHOCYTES NFR FLD MANUAL: 45 %
MONOS+MACROS NFR FLD MANUAL: 47 %
NEUTROPHILS NFR FLD MANUAL: 7 %
POCT GLUCOSE: 139 MG/DL (ref 70–110)
WBC # FLD: 182 /CU MM

## 2022-07-18 PROCEDURE — 25000003 PHARM REV CODE 250: Performed by: ORTHOPAEDIC SURGERY

## 2022-07-18 PROCEDURE — 25000003 PHARM REV CODE 250: Performed by: STUDENT IN AN ORGANIZED HEALTH CARE EDUCATION/TRAINING PROGRAM

## 2022-07-18 PROCEDURE — A6011 COLLAGEN GEL/PASTE WOUND FIL: HCPCS | Performed by: ORTHOPAEDIC SURGERY

## 2022-07-18 PROCEDURE — 88305 TISSUE EXAM BY PATHOLOGIST: ICD-10-PCS | Mod: 26,,, | Performed by: PATHOLOGY

## 2022-07-18 PROCEDURE — 87070 CULTURE OTHR SPECIMN AEROBIC: CPT | Performed by: ORTHOPAEDIC SURGERY

## 2022-07-18 PROCEDURE — 87206 SMEAR FLUORESCENT/ACID STAI: CPT | Performed by: ORTHOPAEDIC SURGERY

## 2022-07-18 PROCEDURE — 88331 PR  PATH CONSULT IN SURG,W FRZ SEC: ICD-10-PCS | Mod: 26,,, | Performed by: PATHOLOGY

## 2022-07-18 PROCEDURE — 88305 TISSUE EXAM BY PATHOLOGIST: CPT | Mod: 59 | Performed by: PATHOLOGY

## 2022-07-18 PROCEDURE — 37000009 HC ANESTHESIA EA ADD 15 MINS: Performed by: ORTHOPAEDIC SURGERY

## 2022-07-18 PROCEDURE — 88331 PATH CONSLTJ SURG 1 BLK 1SPC: CPT | Mod: 26,,, | Performed by: PATHOLOGY

## 2022-07-18 PROCEDURE — 36415 COLL VENOUS BLD VENIPUNCTURE: CPT | Performed by: ORTHOPAEDIC SURGERY

## 2022-07-18 PROCEDURE — 88300 PR  SURG PATH,GROSS,LEVEL I: ICD-10-PCS | Mod: 26,59,, | Performed by: PATHOLOGY

## 2022-07-18 PROCEDURE — 88300 SURGICAL PATH GROSS: CPT | Performed by: PATHOLOGY

## 2022-07-18 PROCEDURE — 88305 TISSUE EXAM BY PATHOLOGIST: CPT | Mod: 26,,, | Performed by: PATHOLOGY

## 2022-07-18 PROCEDURE — 87075 CULTR BACTERIA EXCEPT BLOOD: CPT | Mod: 59 | Performed by: ORTHOPAEDIC SURGERY

## 2022-07-18 PROCEDURE — 27201423 OPTIME MED/SURG SUP & DEVICES STERILE SUPPLY: Performed by: ORTHOPAEDIC SURGERY

## 2022-07-18 PROCEDURE — 87070 CULTURE OTHR SPECIMN AEROBIC: CPT | Mod: 59 | Performed by: ORTHOPAEDIC SURGERY

## 2022-07-18 PROCEDURE — C1713 ANCHOR/SCREW BN/BN,TIS/BN: HCPCS | Performed by: ORTHOPAEDIC SURGERY

## 2022-07-18 PROCEDURE — 36000710: Performed by: ORTHOPAEDIC SURGERY

## 2022-07-18 PROCEDURE — 63600175 PHARM REV CODE 636 W HCPCS: Performed by: NURSE PRACTITIONER

## 2022-07-18 PROCEDURE — 27487 REVISE/REPLACE KNEE JOINT: CPT | Mod: 52,LT,, | Performed by: ORTHOPAEDIC SURGERY

## 2022-07-18 PROCEDURE — 63600175 PHARM REV CODE 636 W HCPCS: Performed by: STUDENT IN AN ORGANIZED HEALTH CARE EDUCATION/TRAINING PROGRAM

## 2022-07-18 PROCEDURE — 25000003 PHARM REV CODE 250: Performed by: NURSE ANESTHETIST, CERTIFIED REGISTERED

## 2022-07-18 PROCEDURE — 36000711: Performed by: ORTHOPAEDIC SURGERY

## 2022-07-18 PROCEDURE — 27487 PR REVISE KNEE JOINT REPLACE,ALL PARTS: ICD-10-PCS | Mod: 52,LT,, | Performed by: ORTHOPAEDIC SURGERY

## 2022-07-18 PROCEDURE — 88300 SURGICAL PATH GROSS: CPT | Mod: 26,59,, | Performed by: PATHOLOGY

## 2022-07-18 PROCEDURE — 37000008 HC ANESTHESIA 1ST 15 MINUTES: Performed by: ORTHOPAEDIC SURGERY

## 2022-07-18 PROCEDURE — 94761 N-INVAS EAR/PLS OXIMETRY MLT: CPT

## 2022-07-18 PROCEDURE — 71000039 HC RECOVERY, EACH ADD'L HOUR: Performed by: ORTHOPAEDIC SURGERY

## 2022-07-18 PROCEDURE — 88331 PATH CONSLTJ SURG 1 BLK 1SPC: CPT | Performed by: PATHOLOGY

## 2022-07-18 PROCEDURE — 89051 BODY FLUID CELL COUNT: CPT | Performed by: ORTHOPAEDIC SURGERY

## 2022-07-18 PROCEDURE — 11000001 HC ACUTE MED/SURG PRIVATE ROOM

## 2022-07-18 PROCEDURE — C9290 INJ, BUPIVACAINE LIPOSOME: HCPCS | Performed by: STUDENT IN AN ORGANIZED HEALTH CARE EDUCATION/TRAINING PROGRAM

## 2022-07-18 PROCEDURE — 87102 FUNGUS ISOLATION CULTURE: CPT | Mod: 59 | Performed by: ORTHOPAEDIC SURGERY

## 2022-07-18 PROCEDURE — 87176 TISSUE HOMOGENIZATION CULTR: CPT | Mod: 91 | Performed by: ORTHOPAEDIC SURGERY

## 2022-07-18 PROCEDURE — 87205 SMEAR GRAM STAIN: CPT | Performed by: ORTHOPAEDIC SURGERY

## 2022-07-18 PROCEDURE — 87116 MYCOBACTERIA CULTURE: CPT | Mod: 59 | Performed by: ORTHOPAEDIC SURGERY

## 2022-07-18 PROCEDURE — 63600175 PHARM REV CODE 636 W HCPCS: Performed by: NURSE ANESTHETIST, CERTIFIED REGISTERED

## 2022-07-18 PROCEDURE — 71000033 HC RECOVERY, INTIAL HOUR: Performed by: ORTHOPAEDIC SURGERY

## 2022-07-18 PROCEDURE — C1776 JOINT DEVICE (IMPLANTABLE): HCPCS | Performed by: ORTHOPAEDIC SURGERY

## 2022-07-18 DEVICE — STEM ATTUNE PRESSFIT 16X60MM: Type: IMPLANTABLE DEVICE | Site: KNEE | Status: FUNCTIONAL

## 2022-07-18 DEVICE — COMP FEM POS ATTUNE SZ5 L: Type: IMPLANTABLE DEVICE | Site: KNEE | Status: FUNCTIONAL

## 2022-07-18 DEVICE — PATELLA ATTUNE MEDLZD DOME 35: Type: IMPLANTABLE DEVICE | Site: KNEE | Status: FUNCTIONAL

## 2022-07-18 DEVICE — CEMENT BONE ANTIBIO SIMPLEX P: Type: IMPLANTABLE DEVICE | Site: KNEE | Status: FUNCTIONAL

## 2022-07-18 DEVICE — IMPLANTABLE DEVICE: Type: IMPLANTABLE DEVICE | Site: KNEE | Status: FUNCTIONAL

## 2022-07-18 RX ORDER — ACETAMINOPHEN 325 MG/1
325 TABLET ORAL EVERY 4 HOURS
Qty: 84 TABLET | Refills: 0 | Status: SHIPPED | OUTPATIENT
Start: 2022-07-18 | End: 2022-08-05

## 2022-07-18 RX ORDER — FAMOTIDINE 20 MG/1
20 TABLET, FILM COATED ORAL 2 TIMES DAILY PRN
Qty: 84 TABLET | Refills: 0 | Status: SHIPPED | OUTPATIENT
Start: 2022-07-18 | End: 2022-09-09 | Stop reason: SDUPTHER

## 2022-07-18 RX ORDER — ATORVASTATIN CALCIUM 40 MG/1
80 TABLET, FILM COATED ORAL NIGHTLY
Refills: 3 | Status: DISCONTINUED | OUTPATIENT
Start: 2022-07-18 | End: 2022-07-22 | Stop reason: HOSPADM

## 2022-07-18 RX ORDER — DULOXETIN HYDROCHLORIDE 30 MG/1
60 CAPSULE, DELAYED RELEASE ORAL ONCE
Status: COMPLETED | OUTPATIENT
Start: 2022-07-18 | End: 2022-07-18

## 2022-07-18 RX ORDER — IBUPROFEN 200 MG
24 TABLET ORAL
Status: DISCONTINUED | OUTPATIENT
Start: 2022-07-18 | End: 2022-07-22 | Stop reason: HOSPADM

## 2022-07-18 RX ORDER — DOCUSATE SODIUM 100 MG/1
CAPSULE, LIQUID FILLED ORAL EVERY 6 HOURS
Qty: 168 CAPSULE | Refills: 0 | Status: SHIPPED | OUTPATIENT
Start: 2022-07-18 | End: 2022-08-27

## 2022-07-18 RX ORDER — ONDANSETRON 2 MG/ML
4 INJECTION INTRAMUSCULAR; INTRAVENOUS DAILY PRN
Status: DISCONTINUED | OUTPATIENT
Start: 2022-07-18 | End: 2022-07-18 | Stop reason: HOSPADM

## 2022-07-18 RX ORDER — TRANEXAMIC ACID 650 MG/1
1950 TABLET ORAL ONCE
Status: COMPLETED | OUTPATIENT
Start: 2022-07-18 | End: 2022-07-18

## 2022-07-18 RX ORDER — DULOXETIN HYDROCHLORIDE 30 MG/1
60 CAPSULE, DELAYED RELEASE ORAL DAILY
Status: DISCONTINUED | OUTPATIENT
Start: 2022-07-18 | End: 2022-07-22 | Stop reason: HOSPADM

## 2022-07-18 RX ORDER — TRAMADOL HYDROCHLORIDE 50 MG/1
50 TABLET ORAL EVERY 4 HOURS PRN
Status: DISCONTINUED | OUTPATIENT
Start: 2022-07-18 | End: 2022-07-19

## 2022-07-18 RX ORDER — ACETAMINOPHEN 325 MG/1
650 TABLET ORAL EVERY 6 HOURS SCHEDULED
Status: DISCONTINUED | OUTPATIENT
Start: 2022-07-18 | End: 2022-07-22 | Stop reason: HOSPADM

## 2022-07-18 RX ORDER — PROPOFOL 10 MG/ML
VIAL (ML) INTRAVENOUS
Status: DISCONTINUED | OUTPATIENT
Start: 2022-07-18 | End: 2022-07-18

## 2022-07-18 RX ORDER — LIDOCAINE HYDROCHLORIDE 20 MG/ML
INJECTION, SOLUTION EPIDURAL; INFILTRATION; INTRACAUDAL; PERINEURAL
Status: DISCONTINUED | OUTPATIENT
Start: 2022-07-18 | End: 2022-07-18

## 2022-07-18 RX ORDER — BISACODYL 10 MG
10 SUPPOSITORY, RECTAL RECTAL DAILY PRN
Status: DISCONTINUED | OUTPATIENT
Start: 2022-07-18 | End: 2022-07-22 | Stop reason: HOSPADM

## 2022-07-18 RX ORDER — ASPIRIN 81 MG/1
81 TABLET ORAL 2 TIMES DAILY
Status: DISCONTINUED | OUTPATIENT
Start: 2022-07-18 | End: 2022-07-22 | Stop reason: HOSPADM

## 2022-07-18 RX ORDER — MUPIROCIN 20 MG/G
OINTMENT TOPICAL 2 TIMES DAILY
Status: CANCELLED | OUTPATIENT
Start: 2022-07-18 | End: 2022-07-23

## 2022-07-18 RX ORDER — DICLOFENAC SODIUM 75 MG/1
75 TABLET, DELAYED RELEASE ORAL 2 TIMES DAILY
Qty: 84 TABLET | Refills: 0 | Status: SHIPPED | OUTPATIENT
Start: 2022-07-18 | End: 2022-08-22

## 2022-07-18 RX ORDER — CELECOXIB 100 MG/1
400 CAPSULE ORAL ONCE
Status: COMPLETED | OUTPATIENT
Start: 2022-07-18 | End: 2022-07-18

## 2022-07-18 RX ORDER — ONDANSETRON 2 MG/ML
4 INJECTION INTRAMUSCULAR; INTRAVENOUS EVERY 12 HOURS PRN
Status: DISCONTINUED | OUTPATIENT
Start: 2022-07-18 | End: 2022-07-22 | Stop reason: HOSPADM

## 2022-07-18 RX ORDER — FAMOTIDINE 20 MG/1
20 TABLET, FILM COATED ORAL 2 TIMES DAILY
Status: DISCONTINUED | OUTPATIENT
Start: 2022-07-18 | End: 2022-07-19

## 2022-07-18 RX ORDER — ONDANSETRON 2 MG/ML
INJECTION INTRAMUSCULAR; INTRAVENOUS
Status: DISCONTINUED | OUTPATIENT
Start: 2022-07-18 | End: 2022-07-18

## 2022-07-18 RX ORDER — TRANEXAMIC ACID 100 MG/ML
INJECTION, SOLUTION INTRAVENOUS
Status: DISCONTINUED | OUTPATIENT
Start: 2022-07-18 | End: 2022-07-18

## 2022-07-18 RX ORDER — DIPHENHYDRAMINE HYDROCHLORIDE 50 MG/ML
12.5 INJECTION INTRAMUSCULAR; INTRAVENOUS EVERY 6 HOURS PRN
Status: DISCONTINUED | OUTPATIENT
Start: 2022-07-18 | End: 2022-07-18 | Stop reason: HOSPADM

## 2022-07-18 RX ORDER — POLYETHYLENE GLYCOL 3350 17 G/17G
17 POWDER, FOR SOLUTION ORAL DAILY
Status: DISCONTINUED | OUTPATIENT
Start: 2022-07-18 | End: 2022-07-21

## 2022-07-18 RX ORDER — HYDROMORPHONE HYDROCHLORIDE 2 MG/ML
0.5 INJECTION, SOLUTION INTRAMUSCULAR; INTRAVENOUS; SUBCUTANEOUS EVERY 5 MIN PRN
Status: DISCONTINUED | OUTPATIENT
Start: 2022-07-18 | End: 2022-07-18 | Stop reason: HOSPADM

## 2022-07-18 RX ORDER — PREGABALIN 75 MG/1
150 CAPSULE ORAL ONCE
Status: COMPLETED | OUTPATIENT
Start: 2022-07-18 | End: 2022-07-18

## 2022-07-18 RX ORDER — TRANEXAMIC ACID 100 MG/ML
1000 INJECTION, SOLUTION INTRAVENOUS EVERY 6 HOURS SCHEDULED
Status: DISCONTINUED | OUTPATIENT
Start: 2022-07-18 | End: 2022-07-18 | Stop reason: SDUPTHER

## 2022-07-18 RX ORDER — PROPOFOL 10 MG/ML
VIAL (ML) INTRAVENOUS CONTINUOUS PRN
Status: DISCONTINUED | OUTPATIENT
Start: 2022-07-18 | End: 2022-07-18

## 2022-07-18 RX ORDER — INSULIN ASPART 100 [IU]/ML
1-10 INJECTION, SOLUTION INTRAVENOUS; SUBCUTANEOUS
Status: DISCONTINUED | OUTPATIENT
Start: 2022-07-18 | End: 2022-07-22 | Stop reason: HOSPADM

## 2022-07-18 RX ORDER — IBUPROFEN 200 MG
16 TABLET ORAL
Status: DISCONTINUED | OUTPATIENT
Start: 2022-07-18 | End: 2022-07-22 | Stop reason: HOSPADM

## 2022-07-18 RX ORDER — DEXMEDETOMIDINE HYDROCHLORIDE 100 UG/ML
INJECTION, SOLUTION INTRAVENOUS
Status: DISCONTINUED | OUTPATIENT
Start: 2022-07-18 | End: 2022-07-18

## 2022-07-18 RX ORDER — FUROSEMIDE 40 MG/1
40 TABLET ORAL DAILY
Status: DISCONTINUED | OUTPATIENT
Start: 2022-07-18 | End: 2022-07-22 | Stop reason: HOSPADM

## 2022-07-18 RX ORDER — BUTALBITAL, ACETAMINOPHEN AND CAFFEINE 50; 325; 40 MG/1; MG/1; MG/1
TABLET ORAL DAILY PRN
Status: DISCONTINUED | OUTPATIENT
Start: 2022-07-18 | End: 2022-07-22 | Stop reason: HOSPADM

## 2022-07-18 RX ORDER — ASPIRIN 81 MG/1
81 TABLET ORAL 2 TIMES DAILY
Qty: 84 TABLET | Refills: 0 | Status: SHIPPED | OUTPATIENT
Start: 2022-07-18 | End: 2022-09-09 | Stop reason: SDUPTHER

## 2022-07-18 RX ORDER — SODIUM CHLORIDE 0.9 % (FLUSH) 0.9 %
3 SYRINGE (ML) INJECTION
Status: DISCONTINUED | OUTPATIENT
Start: 2022-07-18 | End: 2022-07-18 | Stop reason: HOSPADM

## 2022-07-18 RX ORDER — SODIUM CHLORIDE, SODIUM LACTATE, POTASSIUM CHLORIDE, CALCIUM CHLORIDE 600; 310; 30; 20 MG/100ML; MG/100ML; MG/100ML; MG/100ML
INJECTION, SOLUTION INTRAVENOUS CONTINUOUS
Status: DISCONTINUED | OUTPATIENT
Start: 2022-07-18 | End: 2022-07-22 | Stop reason: HOSPADM

## 2022-07-18 RX ORDER — ISOSORBIDE MONONITRATE 30 MG/1
60 TABLET, EXTENDED RELEASE ORAL DAILY
Status: DISCONTINUED | OUTPATIENT
Start: 2022-07-18 | End: 2022-07-22 | Stop reason: HOSPADM

## 2022-07-18 RX ORDER — AMOXICILLIN 250 MG
1 CAPSULE ORAL 2 TIMES DAILY
Status: DISCONTINUED | OUTPATIENT
Start: 2022-07-18 | End: 2022-07-22 | Stop reason: HOSPADM

## 2022-07-18 RX ORDER — GLUCAGON 1 MG
1 KIT INJECTION
Status: DISCONTINUED | OUTPATIENT
Start: 2022-07-18 | End: 2022-07-22 | Stop reason: HOSPADM

## 2022-07-18 RX ORDER — OXYCODONE HYDROCHLORIDE 5 MG/1
5 TABLET ORAL EVERY 6 HOURS PRN
Status: DISCONTINUED | OUTPATIENT
Start: 2022-07-18 | End: 2022-07-19

## 2022-07-18 RX ORDER — GABAPENTIN 300 MG/1
600 CAPSULE ORAL 3 TIMES DAILY
Status: DISCONTINUED | OUTPATIENT
Start: 2022-07-18 | End: 2022-07-22 | Stop reason: HOSPADM

## 2022-07-18 RX ORDER — BUPIVACAINE HYDROCHLORIDE 2.5 MG/ML
INJECTION, SOLUTION INFILTRATION; PERINEURAL
Status: DISCONTINUED | OUTPATIENT
Start: 2022-07-18 | End: 2022-07-18 | Stop reason: HOSPADM

## 2022-07-18 RX ORDER — BUPIVACAINE HYDROCHLORIDE 2.5 MG/ML
90 INJECTION, SOLUTION EPIDURAL; INFILTRATION; INTRACAUDAL ONCE
Status: DISCONTINUED | OUTPATIENT
Start: 2022-07-18 | End: 2022-07-22 | Stop reason: HOSPADM

## 2022-07-18 RX ORDER — LIDOCAINE HYDROCHLORIDE 10 MG/ML
1 INJECTION, SOLUTION EPIDURAL; INFILTRATION; INTRACAUDAL; PERINEURAL ONCE
Status: DISCONTINUED | OUTPATIENT
Start: 2022-07-18 | End: 2022-07-18 | Stop reason: HOSPADM

## 2022-07-18 RX ORDER — VALSARTAN 160 MG/1
160 TABLET ORAL DAILY
Refills: 5 | Status: DISCONTINUED | OUTPATIENT
Start: 2022-07-18 | End: 2022-07-22 | Stop reason: HOSPADM

## 2022-07-18 RX ORDER — METOPROLOL SUCCINATE 25 MG/1
25 TABLET, EXTENDED RELEASE ORAL DAILY
Status: DISCONTINUED | OUTPATIENT
Start: 2022-07-18 | End: 2022-07-22 | Stop reason: HOSPADM

## 2022-07-18 RX ORDER — CEFAZOLIN SODIUM 2 G/50ML
2 SOLUTION INTRAVENOUS
Status: DISCONTINUED | OUTPATIENT
Start: 2022-07-18 | End: 2022-07-22 | Stop reason: HOSPADM

## 2022-07-18 RX ORDER — DEXAMETHASONE SODIUM PHOSPHATE 100 MG/10ML
10 INJECTION INTRAMUSCULAR; INTRAVENOUS ONCE
Status: COMPLETED | OUTPATIENT
Start: 2022-07-18 | End: 2022-07-18

## 2022-07-18 RX ORDER — TRANEXAMIC ACID 100 MG/ML
1000 INJECTION, SOLUTION INTRAVENOUS EVERY 6 HOURS SCHEDULED
Status: DISPENSED | OUTPATIENT
Start: 2022-07-18 | End: 2022-07-19

## 2022-07-18 RX ORDER — HYDROXYZINE HYDROCHLORIDE 25 MG/1
25 TABLET, FILM COATED ORAL 3 TIMES DAILY PRN
Status: DISCONTINUED | OUTPATIENT
Start: 2022-07-18 | End: 2022-07-22 | Stop reason: HOSPADM

## 2022-07-18 RX ORDER — BUPIVACAINE HYDROCHLORIDE 7.5 MG/ML
INJECTION, SOLUTION INTRASPINAL
Status: DISCONTINUED | OUTPATIENT
Start: 2022-07-18 | End: 2022-07-18

## 2022-07-18 RX ORDER — NITROGLYCERIN 0.3 MG/1
0.3 TABLET SUBLINGUAL EVERY 5 MIN PRN
Status: DISCONTINUED | OUTPATIENT
Start: 2022-07-18 | End: 2022-07-22 | Stop reason: HOSPADM

## 2022-07-18 RX ORDER — CELECOXIB 100 MG/1
200 CAPSULE ORAL 2 TIMES DAILY
Status: DISCONTINUED | OUTPATIENT
Start: 2022-07-18 | End: 2022-07-22 | Stop reason: HOSPADM

## 2022-07-18 RX ADMIN — VALSARTAN 160 MG: 160 TABLET ORAL at 06:07

## 2022-07-18 RX ADMIN — ASPIRIN 81 MG: 81 TABLET, COATED ORAL at 09:07

## 2022-07-18 RX ADMIN — BUPIVACAINE HYDROCHLORIDE 2 ML: 7.5 INJECTION, SOLUTION SUBARACHNOID at 11:07

## 2022-07-18 RX ADMIN — TRANEXAMIC ACID 1000 MG: 100 INJECTION, SOLUTION INTRAVENOUS at 01:07

## 2022-07-18 RX ADMIN — DULOXETINE 60 MG: 30 CAPSULE, DELAYED RELEASE ORAL at 09:07

## 2022-07-18 RX ADMIN — PROPOFOL 75 MCG/KG/MIN: 10 INJECTION, EMULSION INTRAVENOUS at 11:07

## 2022-07-18 RX ADMIN — FUROSEMIDE 40 MG: 40 TABLET ORAL at 06:07

## 2022-07-18 RX ADMIN — TRANEXAMIC ACID 1000 MG: 100 INJECTION, SOLUTION INTRAVENOUS at 11:07

## 2022-07-18 RX ADMIN — ATORVASTATIN CALCIUM 80 MG: 40 TABLET, FILM COATED ORAL at 09:07

## 2022-07-18 RX ADMIN — DOCUSATE SODIUM AND SENNOSIDES 1 TABLET: 8.6; 5 TABLET, FILM COATED ORAL at 09:07

## 2022-07-18 RX ADMIN — DEXAMETHASONE SODIUM PHOSPHATE 10 MG: 10 INJECTION, SOLUTION INTRAMUSCULAR; INTRAVENOUS at 11:07

## 2022-07-18 RX ADMIN — DULOXETINE 60 MG: 30 CAPSULE, DELAYED RELEASE ORAL at 06:07

## 2022-07-18 RX ADMIN — LIDOCAINE HYDROCHLORIDE 50 MG: 20 INJECTION, SOLUTION EPIDURAL; INFILTRATION; INTRACAUDAL; PERINEURAL at 11:07

## 2022-07-18 RX ADMIN — SODIUM CHLORIDE, SODIUM LACTATE, POTASSIUM CHLORIDE, AND CALCIUM CHLORIDE: .6; .31; .03; .02 INJECTION, SOLUTION INTRAVENOUS at 11:07

## 2022-07-18 RX ADMIN — TRANEXAMIC ACID 1000 MG: 100 INJECTION, SOLUTION INTRAVENOUS at 03:07

## 2022-07-18 RX ADMIN — PROPOFOL 20 MG: 10 INJECTION, EMULSION INTRAVENOUS at 11:07

## 2022-07-18 RX ADMIN — CEFAZOLIN SODIUM 2 G: 2 SOLUTION INTRAVENOUS at 06:07

## 2022-07-18 RX ADMIN — ISOSORBIDE MONONITRATE 60 MG: 30 TABLET, EXTENDED RELEASE ORAL at 06:07

## 2022-07-18 RX ADMIN — TRANEXAMIC ACID 1950 MG: 650 TABLET ORAL at 09:07

## 2022-07-18 RX ADMIN — DEXMEDETOMIDINE HYDROCHLORIDE 8 MCG: 100 INJECTION, SOLUTION, CONCENTRATE INTRAVENOUS at 11:07

## 2022-07-18 RX ADMIN — FAMOTIDINE 20 MG: 20 TABLET ORAL at 09:07

## 2022-07-18 RX ADMIN — PREGABALIN 150 MG: 75 CAPSULE ORAL at 09:07

## 2022-07-18 RX ADMIN — ONDANSETRON 4 MG: 2 INJECTION, SOLUTION INTRAMUSCULAR; INTRAVENOUS at 11:07

## 2022-07-18 RX ADMIN — GLYCOPYRROLATE 0.2 MG: 0.2 INJECTION, SOLUTION INTRAMUSCULAR; INTRAVITREAL at 11:07

## 2022-07-18 RX ADMIN — GABAPENTIN 600 MG: 300 CAPSULE ORAL at 06:07

## 2022-07-18 RX ADMIN — CELECOXIB 400 MG: 100 CAPSULE ORAL at 09:07

## 2022-07-18 RX ADMIN — GABAPENTIN 600 MG: 300 CAPSULE ORAL at 11:07

## 2022-07-18 RX ADMIN — TRAMADOL HYDROCHLORIDE 50 MG: 50 TABLET, COATED ORAL at 09:07

## 2022-07-18 RX ADMIN — CELECOXIB 200 MG: 100 CAPSULE ORAL at 09:07

## 2022-07-18 RX ADMIN — ACETAMINOPHEN 650 MG: 325 TABLET ORAL at 06:07

## 2022-07-18 RX ADMIN — SODIUM CHLORIDE, SODIUM LACTATE, POTASSIUM CHLORIDE, AND CALCIUM CHLORIDE: .6; .31; .03; .02 INJECTION, SOLUTION INTRAVENOUS at 01:07

## 2022-07-18 RX ADMIN — DEXMEDETOMIDINE HYDROCHLORIDE 12 MCG: 100 INJECTION, SOLUTION, CONCENTRATE INTRAVENOUS at 11:07

## 2022-07-18 RX ADMIN — ACETAMINOPHEN 650 MG: 325 TABLET ORAL at 11:07

## 2022-07-18 RX ADMIN — BUPIVACAINE 20 ML: 13.3 INJECTION, SUSPENSION, LIPOSOMAL INFILTRATION at 02:07

## 2022-07-18 RX ADMIN — PROPOFOL 10 MG: 10 INJECTION, EMULSION INTRAVENOUS at 11:07

## 2022-07-18 NOTE — PLAN OF CARE
Problem: Adult Inpatient Plan of Care  Goal: Plan of Care Review  Outcome: Ongoing, Progressing      07/18/22 1801   Admission   Initial VN Admission Questions Complete  (Patient arrived to unit from OR, AAOx4.  at bedside. Admission questions completed. Medication regimen reviewed. Allowed time for questions. Instructed pt to call for assistance.)   Communication Issues? None   Shift   Virtual Nurse - Rounding Complete   Virtual Nurse - Patient Verbalized Approval Of Camera Use;VN Rounding   Type of Frequent Check   Type Patient Rounds   Safety/Activity   Patient Rounds call light in patient/parent reach;visualized patient   Safety Promotion/Fall Prevention instructed to call staff for mobility;family to remain at bedside   Pain/Comfort/Sleep   Sleep/Rest/Relaxation awake

## 2022-07-18 NOTE — SUBJECTIVE & OBJECTIVE
Past Medical History:   Diagnosis Date    Coronary artery disease     Epilepsy     Hyperlipidemia     Hypertension     Normocytic anemia        Past Surgical History:   Procedure Laterality Date    APPENDECTOMY      BACK SURGERY      CORONARY STENT PLACEMENT      HYSTERECTOMY      TONSILLECTOMY         Review of patient's allergies indicates:   Allergen Reactions    Iodinated contrast media Anaphylaxis and Other (See Comments)    Phenergan [promethazine]      Vomiting       No current facility-administered medications on file prior to encounter.     Current Outpatient Medications on File Prior to Encounter   Medication Sig    butalbital-acetaminophen-caff -40 mg Cap TAKE ONE CAPSULE BY MOUTH ONCE TO TWICE DAILY AS NEEDED FOR MIGRAINE headaches    DULoxetine (CYMBALTA) 60 MG capsule Take 1 capsule (60 mg total) by mouth once daily. Take 1 tab po daily x 2 weeks then 2 tabs po daily thereafter.    furosemide (LASIX) 40 MG tablet Take 1 tablet (40 mg total) by mouth once daily.    gabapentin (NEURONTIN) 600 MG tablet Take 600 mg by mouth 3 (three) times daily.    hydrOXYzine HCL (ATARAX) 25 MG tablet   See Instructions, 30 tab, 1, Substitution Allowed, TAKE 1 TABLET BY MOUTH DAILY AS NEEDED FOR ITCHING, 30, [facility] Pharm[facility], [id number], [id number], Instructions Replace Required Details, cm,...    irbesartan (AVAPRO) 300 MG tablet Take 1 tablet (300 mg total) by mouth every evening.    isosorbide mononitrate (IMDUR) 60 MG 24 hr tablet Take 1 tablet (60 mg total) by mouth once daily.    linaCLOtide (LINZESS) 72 mcg Cap capsule Take 72 mcg by mouth before breakfast.    metoprolol succinate (TOPROL-XL) 25 MG 24 hr tablet Take 1 tablet (25 mg total) by mouth once daily.    multivitamin capsule Take 1 capsule by mouth once daily.    omeprazole (PRILOSEC) 20 MG capsule Take 20 mg by mouth once daily.    oxyCODONE (ROXICODONE) 10 mg Tab immediate release tablet  oxycodone 10 mg tablet   Take 1 tablet 3 times a day by oral route as needed for 7 days.    polyethylene glycol (GLYCOLAX) 17 gram PwPk Take 17 g by mouth once daily.    rosuvastatin (CRESTOR) 40 MG Tab Take 1 tablet (40 mg total) by mouth once daily.    aspirin (ECOTRIN) 81 MG EC tablet Take 81 mg by mouth once daily.    calcium carbonate/vitamin D3 (VITAMIN D-3 ORAL) Take by mouth once daily.    clotrimazole-betamethasone 1-0.05% (LOTRISONE) cream Apply topically 2 (two) times daily.    fluticasone propionate (FLONASE) 50 mcg/actuation nasal spray 2 sprays (100 mcg total) by Each Nostril route once daily.    nitroGLYCERIN (NITROSTAT) 0.3 MG SL tablet PLACE ONE TABLET UNDER THE TONGUE EVERY 5 MINUTES FOR UP TO 3 doses IF NEEDED FOR CHEST PAIN. call 911 IF PAIN persists    valACYclovir (VALTREX) 1000 MG tablet Take 1 tablet (1,000 mg total) by mouth once daily. As needed for outbreak for 5 days     Family History       Problem Relation (Age of Onset)    Heart disease Mother, Father          Tobacco Use    Smoking status: Never Smoker    Smokeless tobacco: Never Used   Substance and Sexual Activity    Alcohol use: No    Drug use: No    Sexual activity: Not Currently     Review of Systems   Constitutional:  Negative for chills and fever.   HENT:  Negative for ear pain and sore throat.    Eyes:  Negative for discharge and redness.   Respiratory:  Negative for cough and chest tightness.    Cardiovascular:  Negative for chest pain and leg swelling.   Gastrointestinal:  Negative for abdominal pain and diarrhea.   Genitourinary:  Negative for dysuria and hematuria.   Musculoskeletal:  Negative for back pain and myalgias.        Left knee burning   Skin:  Negative for pallor and rash.   Neurological:  Negative for dizziness and headaches.   Psychiatric/Behavioral:  Negative for confusion. The patient is not nervous/anxious.    Objective:     Vital Signs (Most Recent):  Temp: 97.8 °F (36.6 °C) (07/18/22 1722)  Pulse: (!) 58  (07/18/22 1722)  Resp: 16 (07/18/22 1722)  BP: (!) 162/80 (07/18/22 1722)  SpO2: 97 % (07/18/22 1722)   Vital Signs (24h Range):  Temp:  [97 °F (36.1 °C)-97.8 °F (36.6 °C)] 97.8 °F (36.6 °C)  Pulse:  [52-69] 58  Resp:  [11-25] 16  SpO2:  [92 %-99 %] 97 %  BP: (123-179)/(57-80) 162/80     Weight: 75.8 kg (167 lb)  Body mass index is 28.67 kg/m².    Physical Exam  Vitals reviewed.   Constitutional:       General: She is not in acute distress.     Appearance: Normal appearance.   HENT:      Head: Normocephalic and atraumatic.   Eyes:      General:         Right eye: No discharge.         Left eye: No discharge.      Extraocular Movements: Extraocular movements intact.      Pupils: Pupils are equal, round, and reactive to light.   Cardiovascular:      Rate and Rhythm: Normal rate and regular rhythm.      Pulses: Normal pulses.      Heart sounds: No murmur heard.  Pulmonary:      Effort: Pulmonary effort is normal. No respiratory distress.      Breath sounds: Normal breath sounds. No wheezing.   Abdominal:      General: Abdomen is flat. Bowel sounds are normal.      Palpations: Abdomen is soft.      Tenderness: There is no abdominal tenderness.   Musculoskeletal:         General: No swelling or tenderness.      Comments: Left knee bandaged with drain in place with red blood   Skin:     Capillary Refill: Capillary refill takes less than 2 seconds.      Coloration: Skin is not jaundiced.   Neurological:      General: No focal deficit present.      Mental Status: She is alert and oriented to person, place, and time.      Comments: Left leg neurovascularly intact, sensation intact   Psychiatric:         Mood and Affect: Mood normal.         Behavior: Behavior normal.       Significant Labs: All pertinent labs within the past 24 hours have been reviewed.  A1C:   Recent Labs   Lab 02/23/22  1402 03/10/22  1629 06/29/22  1253   HGBA1C 5.8* 5.7* 6.4*     CBC: No results for input(s): WBC, HGB, HCT, PLT in the last 48  hours.  CMP: No results for input(s): NA, K, CL, CO2, GLU, BUN, CREATININE, CALCIUM, PROT, ALBUMIN, BILITOT, ALKPHOS, AST, ALT, ANIONGAP, EGFRNONAA in the last 48 hours.    Invalid input(s): ESTGFAFRICA  Magnesium: No results for input(s): MG in the last 48 hours.  POCT Glucose: No results for input(s): POCTGLUCOSE in the last 48 hours.  TSH:   Recent Labs   Lab 04/14/22  0240   TSH 1.991       Significant Imaging: I have reviewed all pertinent imaging results/findings within the past 24 hours.

## 2022-07-18 NOTE — ANESTHESIA PROCEDURE NOTES
Peripheral Block    Patient location during procedure: pre-op   Block not for primary anesthetic.  Reason for block: at surgeon's request and post-op pain management   Post-op Pain Location: post op knee   Start time: 7/18/2022 2:54 PM  Timeout: 7/18/2022 2:53 PM   End time: 7/18/2022 2:56 PM    Staffing  Authorizing Provider: Maykel Brown MD  Performing Provider: José Donaldson MD    Preanesthetic Checklist  Completed: patient identified, IV checked, site marked, risks and benefits discussed, surgical consent, monitors and equipment checked, pre-op evaluation and timeout performed  Peripheral Block  Patient position: supine  Prep: ChloraPrep  Patient monitoring: heart rate, cardiac monitor, continuous pulse ox, continuous capnometry and frequent blood pressure checks  Block type: I PACK and saphenous  Laterality: left  Injection technique: single shot  Needle  Needle type: Stimuplex   Needle gauge: 21 G  Needle length: 4 in  Needle localization: anatomical landmarks and ultrasound guidance   -ultrasound image captured on disc.  Assessment  Injection assessment: negative aspiration, negative parasthesia and local visualized surrounding nerve  Paresthesia pain: none  Heart rate change: no  Slow fractionated injection: yes  Pain Tolerance: no complaints and comfortable throughout block      Additional Notes  VSS.  [facility] RN monitoring vitals throughout procedure.  Patient tolerated procedure well.

## 2022-07-18 NOTE — ASSESSMENT & PLAN NOTE
Home meds: percocet and gabapentin     Plan:  Defer to primary team for pain management  On home gabapentin and on celecoxib

## 2022-07-18 NOTE — ASSESSMENT & PLAN NOTE
DM type 2 with   - Last A1c  Lab Results   Component Value Date    HGBA1C 6.4 (H) 06/29/2022       - BG on Admission   - Home regimen: metformin  - Will order: SSI  - POCT glucose Q4hr  - goal -180

## 2022-07-18 NOTE — HPI
Enedelia García is a 77 y.o. female who  has a past medical history of Coronary artery disease, Epilepsy, Hyperlipidemia, Hypertension, and Normocytic anemia, prediabetes, GERD, ALEXANDRO, chronic opioid use presents to [facility] for left unicompartmental knee arthroplasty. Op note documented complete articular cartilage loss of lateral compartment. Cultures were sent during surgery. Patient reports feeling burning in her knee but denies any pain. [facility] consulted for medical management during hospitalization.

## 2022-07-18 NOTE — OP NOTE
Date: 7/18/22    Pre op dx:  Left failed unicompartmental knee arthroplasty    Post op dx:  Same    Procedure:  Synovectomy anterior and posterior compartments, debridement muscle bone and soft tissues, removal previous unicompartmental knee replacement, revision total knee replacement    Attending Surgeon: Stan Catalan MD    Assistants:  Dr. Whitten and nakia    Complications: none    Estimated bood loss: < 20cc    Implants:  To [facility] attune revision tibial base size 5, DePuy revision tibial augment 5 mm, DePuy attune revision Press-Fit stem 16 mm, DePuy attune patella 35 mm, DePuy attune femoral cemented component size 5 left, DePuy attune tibial insert 10 mm thickness    Indication:  This is a 77-year-old female who had a unicompartmental knee replacement done at the outside hospital.  She had progressive knee pain and swelling.  Radiographs showed degenerative changes throughout the knee.  We discussed risks and benefits of continued nonsurgical management.  Given the amount of degenerative changes throughout the knee patient agreed move forward with revision total knee replacement.    Findings:  Patient had complete articular cartilage loss of lateral compartment.  Overall alignment was in varus.  Cultures were sent during surgery.  Stat frozen showed no evidence of infection.    Procedure:  Patient is brought to operating room placed supine operative table anesthesia was started without any difficulties.  Left knee was then prepped draped in sterile fashion.  Prior to incision proper some seizures well as antibiotic administration was verified.  Previous medial based skin incision was utilized until we came proximally and curved it more towards midline.  We came through the skin subcutaneous scar tissue until we came to the extensor and retinaculum.  Flap was elevated laterally to the lateral border patella not much of a flap was needed medially.  We then aspirated need since no via fluid off for cell count  cultures.  We did inject our medial parapatellar arthrotomy with Marcaine.  We then created our medial parapatellar arthrotomy.  We then exposed the undersurface of the retinaculum and performed synovectomy of the anterior compartment.  We then removed the scope prepatellar fat pad.  We then everted the patella and invaded the periphery the patella and did a lateral facetectomy.  We then elevated sleeve soft tissue off the proximal medial tibia.  Hohmann retractors were then placed medially and laterally.  We then able to visualize a unicompartmental knee replacement.  Using Pegram is the real bearing tibial polyethylene was then removed.  Turned our attention to the femoral component.  Using Bovie cautery we then established the cement implant interface.  We then used flexible osteotomes along the medial and lateral aspects of the femoral component and were able to remove the femoral component with minimal bone loss.  Next we turned our attention to the tibial component.  We transected the ACL and subluxed tibia forward using a PCL retractor.  We exposed the medial aspect of the proximal tibia to identify the boundaries of the tibial base plate.  Using Bovie cautery we then malleted the cement prosthesis interface.  Using flexible osteotomes we then disrupted the prosthesis cement interface.  We then used a bone tamp and were able to remove the tibial base plate.  Next we rongeured the tibial spines and established the center of the tibial plateau.  We then sequentially reamed up to a size 16 mm Reamer.  Left the Reamer in place and then performed a 5 mm resection off of the lateral tibial plateau.  We then removed the Reamer and the cutting guide and assess the size of the proximal tibia before we made our cut for our augment.  Tibia was sized for a size 5 base plate and cutting guide then placed such that we can make our augment cut in line with the center of the projected location of the size 5 tibial base plate.   We then performed a 5 mm augment cut on the medial aspect of the tibia to accommodate the bone loss from the previous unicompartmental knee replacement.  We then placed the size 5 tibial base plate with the 15 mm trial stem to assess its fit and coverage.  With this construct with nice coverage of the proximal tibial plateau both with the base plate and the augment.  Then removed the tibial base plate and trial and then turned our attention to the femur for preparation.  Marianela entry hole for the femoral canal above the notch.  We then placed our distal femoral cutting guide onto the distal femur set for about 6° of valgus cut and 9 mm bony resection.  This was then pinned in place.  We then resected our lateral femoral condyle and a skim cut was made off the medial femoral condyle.  Next we placed our AP cutting guide into the femoral canal and then also placed our tibial base plate so we can establish our rotation of the femoral component.  AP cutting block was placed such that it was parallel to the tibial surface and then pinned in place anterior-posterior condylar bone was then resected.  There was a skim cut medially which meant that we had good bone stock and did not require augments along the medial femoral condyle.  Posterior condylar bone was resected as well we then performed our chamfer cuts for the femoral component.  All instruments were then removed and we placed laminar  with the knee in flexion and resected the ACL and the PCL.  We also performed a posterior synovectomy at this point.  We then injected our posterior capsule with Marcaine.  We then assessed our gaps using a 10 mm spacer block.  In flexion with nice stability with varus and valgus stress.  In extension knee came out to full extension.  Given the bone loss medially appeared that I had nice stability with varus and valgus stress both medially and laterally and nice symmetry as well.  We then turned our attention back to the  femur and placed our tibial protector on the proximal tibia.  Then placed our box cutting guide on the femur this was centralized such that it was centered over the trochlea without excess resection both medially or laterally.  This was then pinned in place and a box cut was then created.  Then placed our femoral trial on the distal femur.  We then subluxed tibia forward with our PCL retractor and impacted the tibial trial into place.  Then placed the 10 mm trial polyethylene and examined our gap stability and tracking.  Knee came out to full extension with nice symmetry with varus and valgus stress both in flexion and extension.  Patella was tracking centrally within trochlear groove.  We then examined all our trial components using fluoroscopy and we had nice placement of all our components with the knee coming out to full extension.  Next we placed in extension everted patella measured patellar thickness to be approximately 24 mm.  Resected approximately 9.5 mm of bone using did Connectloud patella resection guide.  Sized our patella for 35 patellar component.  We then drilled 3 holes for a patellar buttons and placed the patella trial.  Knee was then block brought through range of motion and patella tracking centrally within trochlear groove.  We then removed all our trial components placed in extension with laminar  examined for any bleeding.  There was some small bleeding within synovium posteriorly which was Bovie cauterized.  We then inflated tourniquet and copiously irrigated our knee with pole pulse lavaged Betadine saline.  We then took our extra bone and plugged our femoral canal with bone from our femoral condyle.  We also debrided the undersurface of the extensor mechanism and synovium with the [employer] debridement tool.  We then placed our femoral component using direct impaction.  All excess bony cement was removed from the periphery in from the box.  Next we subluxed tibia forward and using a  pressurized cement gun cemented the tibial component on next.  We took care to ensure that there was no cement within the tibial canal and only along the tibial surface.  Then placed the final 10 mm polyethylene reduced the knee everted the patella and cemented the patellar component on last.  Then possible of cement to harden.  Once cement was hardened we let tourniquet down and re-examined our gap stability and tracking all of which remained unchanged.  Then copiously irrigated knee pulse lavaged Betadine saline as well as bactisure.  We then placed a Hemovac drain exiting anterolaterally along the proximal aspect of the incision.  Medial parapatellar arthrotomy was closed with running barbed suture.  Cellerate was then placed along the arthrotomy closure.  The fascial layer was closed with simple interrupted 1. Vicryl suture.  Subcutaneous skin was closed with 2-0 Vicryl.  Cellerate was placed again.  Skin was closed with staples and Dermabond and dressed with Aquacel.

## 2022-07-18 NOTE — ASSESSMENT & PLAN NOTE
S/p left knee arthroplasty  Cultures sent and started on ancef q 7 days  Nerve block in place    Plan:  Defer to primary for pain and abx management  Defer to primary for drain removal  Recommend broad spectrum abx till cultures complete with growth or ngtd  PT/OT evaluation in AM  Recommend d/c oakley within 24 hrs after procedure

## 2022-07-18 NOTE — CONSULTS
[facility] Medicine  Consult Note    Patient Name: Enedelia García  MRN: [id number]  Admission Date: 7/18/2022  Hospital Length of Stay: 0 days  Attending Physician: Stan Catalan MD   Primary Care Provider: Sushil De La O MD           Patient information was obtained from patient, past medical records and ER records.     Inpatient consult to [facility]  Consult performed by: Xavi Palmer MD  Consult ordered by: Mike Mayers MD        Subjective:     Principal Problem: S/P left unicompartmental knee replacement    Chief Complaint: No chief complaint on file.       HPI: Enedelia García is a 77 y.o. female who  has a past medical history of Coronary artery disease, Epilepsy, Hyperlipidemia, Hypertension, and Normocytic anemia, prediabetes, GERD, ALEXANDRO, chronic opioid use presents to [facility] for left unicompartmental knee arthroplasty. Op note documented complete articular cartilage loss of lateral compartment. Cultures were sent during surgery. Patient reports feeling burning in her knee but denies any pain. [facility] consulted for medical management during hospitalization.          Past Medical History:   Diagnosis Date    Coronary artery disease     Epilepsy     Hyperlipidemia     Hypertension     Normocytic anemia        Past Surgical History:   Procedure Laterality Date    APPENDECTOMY      BACK SURGERY      CORONARY STENT PLACEMENT      HYSTERECTOMY      TONSILLECTOMY         Review of patient's allergies indicates:   Allergen Reactions    Iodinated contrast media Anaphylaxis and Other (See Comments)    Phenergan [promethazine]      Vomiting       No current facility-administered medications on file prior to encounter.     Current Outpatient Medications on File Prior to Encounter   Medication Sig    butalbital-acetaminophen-caff -40 mg Cap TAKE ONE CAPSULE BY MOUTH ONCE TO TWICE DAILY AS NEEDED FOR MIGRAINE headaches    DULoxetine (CYMBALTA) 60 MG capsule Take 1  capsule (60 mg total) by mouth once daily. Take 1 tab po daily x 2 weeks then 2 tabs po daily thereafter.    furosemide (LASIX) 40 MG tablet Take 1 tablet (40 mg total) by mouth once daily.    gabapentin (NEURONTIN) 600 MG tablet Take 600 mg by mouth 3 (three) times daily.    hydrOXYzine HCL (ATARAX) 25 MG tablet   See Instructions, 30 tab, 1, Substitution Allowed, TAKE 1 TABLET BY MOUTH DAILY AS NEEDED FOR ITCHING, 30, Route to Pharmacy Electronically, [id number], [id number], Instructions Replace Required Details, cm,...    irbesartan (AVAPRO) 300 MG tablet Take 1 tablet (300 mg total) by mouth every evening.    isosorbide mononitrate (IMDUR) 60 MG 24 hr tablet Take 1 tablet (60 mg total) by mouth once daily.    linaCLOtide (LINZESS) 72 mcg Cap capsule Take 72 mcg by mouth before breakfast.    metoprolol succinate (TOPROL-XL) 25 MG 24 hr tablet Take 1 tablet (25 mg total) by mouth once daily.    multivitamin capsule Take 1 capsule by mouth once daily.    omeprazole (PRILOSEC) 20 MG capsule Take 20 mg by mouth once daily.    oxyCODONE (ROXICODONE) 10 mg Tab immediate release tablet oxycodone 10 mg tablet   Take 1 tablet 3 times a day by oral route as needed for 7 days.    polyethylene glycol (GLYCOLAX) 17 gram PwPk Take 17 g by mouth once daily.    rosuvastatin (CRESTOR) 40 MG Tab Take 1 tablet (40 mg total) by mouth once daily.    aspirin (ECOTRIN) 81 MG EC tablet Take 81 mg by mouth once daily.    calcium carbonate/vitamin D3 (VITAMIN D-3 ORAL) Take by mouth once daily.    clotrimazole-betamethasone 1-0.05% (LOTRISONE) cream Apply topically 2 (two) times daily.    fluticasone propionate (FLONASE) 50 mcg/actuation nasal spray 2 sprays (100 mcg total) by Each Nostril route once daily.    nitroGLYCERIN (NITROSTAT) 0.3 MG SL tablet PLACE ONE TABLET UNDER THE TONGUE EVERY 5 MINUTES FOR UP TO 3 doses IF NEEDED FOR CHEST PAIN. call 911 IF PAIN persists     valACYclovir (VALTREX) 1000 MG tablet Take 1 tablet (1,000 mg total) by mouth once daily. As needed for outbreak for 5 days     Family History       Problem Relation (Age of Onset)    Heart disease Mother, Father          Tobacco Use    Smoking status: Never Smoker    Smokeless tobacco: Never Used   Substance and Sexual Activity    Alcohol use: No    Drug use: No    Sexual activity: Not Currently     Review of Systems   Constitutional:  Negative for chills and fever.   HENT:  Negative for ear pain and sore throat.    Eyes:  Negative for discharge and redness.   Respiratory:  Negative for cough and chest tightness.    Cardiovascular:  Negative for chest pain and leg swelling.   Gastrointestinal:  Negative for abdominal pain and diarrhea.   Genitourinary:  Negative for dysuria and hematuria.   Musculoskeletal:  Negative for back pain and myalgias.        Left knee burning   Skin:  Negative for pallor and rash.   Neurological:  Negative for dizziness and headaches.   Psychiatric/Behavioral:  Negative for confusion. The patient is not nervous/anxious.    Objective:     Vital Signs (Most Recent):  Temp: 97.8 °F (36.6 °C) (07/18/22 1722)  Pulse: (!) 58 (07/18/22 1722)  Resp: 16 (07/18/22 1722)  BP: (!) 162/80 (07/18/22 1722)  SpO2: 97 % (07/18/22 1722)   Vital Signs (24h Range):  Temp:  [97 °F (36.1 °C)-97.8 °F (36.6 °C)] 97.8 °F (36.6 °C)  Pulse:  [52-69] 58  Resp:  [11-25] 16  SpO2:  [92 %-99 %] 97 %  BP: (123-179)/(57-80) 162/80     Weight: 75.8 kg (167 lb)  Body mass index is 28.67 kg/m².    Physical Exam  Vitals reviewed.   Constitutional:       General: She is not in acute distress.     Appearance: Normal appearance.   HENT:      Head: Normocephalic and atraumatic.   Eyes:      General:         Right eye: No discharge.         Left eye: No discharge.      Extraocular Movements: Extraocular movements intact.      Pupils: Pupils are equal, round, and reactive to light.   Cardiovascular:      Rate and Rhythm:  Normal rate and regular rhythm.      Pulses: Normal pulses.      Heart sounds: No murmur heard.  Pulmonary:      Effort: Pulmonary effort is normal. No respiratory distress.      Breath sounds: Normal breath sounds. No wheezing.   Abdominal:      General: Abdomen is flat. Bowel sounds are normal.      Palpations: Abdomen is soft.      Tenderness: There is no abdominal tenderness.   Musculoskeletal:         General: No swelling or tenderness.      Comments: Left knee bandaged with drain in place with red blood   Skin:     Capillary Refill: Capillary refill takes less than 2 seconds.      Coloration: Skin is not jaundiced.   Neurological:      General: No focal deficit present.      Mental Status: She is alert and oriented to person, place, and time.      Comments: Left leg neurovascularly intact, sensation intact   Psychiatric:         Mood and Affect: Mood normal.         Behavior: Behavior normal.       Significant Labs: All pertinent labs within the past 24 hours have been reviewed.  A1C:   Recent Labs   Lab 02/23/22  1402 03/10/22  1629 06/29/22  1253   HGBA1C 5.8* 5.7* 6.4*     CBC: No results for input(s): WBC, HGB, HCT, PLT in the last 48 hours.  CMP: No results for input(s): NA, K, CL, CO2, GLU, BUN, CREATININE, CALCIUM, PROT, ALBUMIN, BILITOT, ALKPHOS, AST, ALT, ANIONGAP, EGFRNONAA in the last 48 hours.    Invalid input(s): ESTGFAFRICA  Magnesium: No results for input(s): MG in the last 48 hours.  POCT Glucose: No results for input(s): POCTGLUCOSE in the last 48 hours.  TSH:   Recent Labs   Lab 04/14/22  0240   TSH 1.991       Significant Imaging: I have reviewed all pertinent imaging results/findings within the past 24 hours.    Assessment/Plan:     * S/P left unicompartmental knee replacement  S/p left knee arthroplasty  Cultures sent and started on ancef q 7 days  Nerve block in place    Plan:  Defer to primary for pain and abx management  Defer to primary for drain removal  Recommend broad spectrum abx  till cultures complete with growth or ngtd  PT/OT evaluation in AM  Recommend d/c oakley within 24 hrs after procedure      Narcotic drug use  Home meds: percocet and gabapentin     Plan:  Defer to primary team for pain management  On home gabapentin and on celecoxib      Prediabetes  DM type 2 with   - Last A1c  Lab Results   Component Value Date    HGBA1C 6.4 (H) 06/29/2022       - BG on Admission   - Home regimen: metformin  - Will order: SSI  - POCT glucose Q4hr  - goal -180    HTN (hypertension)  Continue home meds: furosemide, isosorbide mononitrate, valsartan  Goal SBP <140      Moderate episode of recurrent major depressive disorder  Continue home meds duloxetine        GERD (gastroesophageal reflux disease)  Continue home famotidine BID      VTE Risk Mitigation (From admission, onward)         Ordered     Place MANOJ hose  Until discontinued         07/18/22 1458     Place sequential compression device  Until discontinued         07/18/22 1458                    Thank you for your consult. I will follow-up with patient. Please contact us if you have any additional questions.    Xavi Palmer MD  Department of Hospital Medicine   [facility]

## 2022-07-18 NOTE — BRIEF OP NOTE
[facility] - Surgery ([facility])  Brief Operative Note    SUMMARY     Surgery Date: 7/18/2022     Surgeon(s) and Role:     * Stan Catalan MD - Primary    Assisting Surgeon: None    Pre-op Diagnosis:  S/P left unicompartmental knee replacement [Z96.652]  Narcotic drug use [F11.90]  Moderate episode of recurrent major depressive disorder [F33.1]  Generalized anxiety disorder [F41.1]  Presence of stent in coronary artery in patient with coronary artery disease [I25.10, Z95.5]  Mixed hyperlipidemia [E78.2]  Coronary artery disease involving native coronary artery of native heart without angina pectoris [I25.10]  Benign essential hypertension [I10]  Aortic valve stenosis, etiology of cardiac valve disease unspecified [I35.0]  Stage 3a chronic kidney disease [N18.31]  Prediabetes [R73.03]  Gastroesophageal reflux disease, unspecified whether esophagitis present [K21.9]    Post-op Diagnosis:  Post-Op Diagnosis Codes:     * S/P left unicompartmental knee replacement [Z96.652]     * Narcotic drug use [F11.90]     * Moderate episode of recurrent major depressive disorder [F33.1]     * Generalized anxiety disorder [F41.1]     * Presence of stent in coronary artery in patient with coronary artery disease [I25.10, Z95.5]     * Mixed hyperlipidemia [E78.2]     * Coronary artery disease involving native coronary artery of native heart without angina pectoris [I25.10]     * Benign essential hypertension [I10]     * Aortic valve stenosis, etiology of cardiac valve disease unspecified [I35.0]     * Stage 3a chronic kidney disease [N18.31]     * Prediabetes [R73.03]     * Gastroesophageal reflux disease, unspecified whether esophagitis present [K21.9]    Procedure(s) (LRB):  REVISION, ARTHROPLASTY, KNEE (Left)    Anesthesia: Spinal    Operative Findings: per op note    Estimated Blood Loss: * No values recorded between 7/18/2022 11:55 AM and 7/18/2022  2:43 PM *    Estimated Blood Loss has been documented.         Specimens:   Specimen (24h  ago, onward)             Start     Ordered    07/18/22 1341  Specimen to Pathology, Surgery Orthopedics  Once        Comments: Pre-op Diagnosis: S/P left unicompartmental knee replacement [Z96.652]Narcotic drug use [F11.90]Moderate episode of recurrent major depressive disorder [F33.1]Generalized anxiety disorder [F41.1]Presence of stent in coronary artery in patient with coronary artery disease [I25.10, Z95.5]Mixed hyperlipidemia [E78.2]Coronary artery disease involving native coronary artery of native heart without angina pectoris [I25.10]Benign essential hypertension [I10]Aortic valve stenosis, etiology of cardiac lico alve disease unspecified [I35.0]Stage 3a chronic kidney disease [N18.31]Prediabetes [R73.03]Gastroesophageal reflux disease, unspecified whether esophagitis present [K21.9]Procedure(s):REVISION, ARTHROPLASTY, KNEE Number of specimens: 3Name of specimens: 1. Synovium Left knee-FROZEN2. Synovium Left knee-perm3. Old implant-Gross ID     References:    Click here for ordering Quick Tip   Question Answer Comment   Procedure Type: Orthopedics    Specimen Class: Routine/Screening    Which provider would you like to cc? KEYONNA NAIR    Release to patient Immediate        07/18/22 1341                [id number]

## 2022-07-18 NOTE — ANESTHESIA PROCEDURE NOTES
Spinal    Diagnosis: IUP  Patient location during procedure: OR  Start time: 7/18/2022 11:28 AM  Timeout: 7/18/2022 11:27 AM  End time: 7/18/2022 11:32 AM    Staffing  Authorizing Provider: Maykel Brown MD  Performing Provider: José Donaldson MD    Preanesthetic Checklist  Completed: patient identified, IV checked, site marked, risks and benefits discussed, surgical consent, monitors and equipment checked, pre-op evaluation and timeout performed  Spinal Block  Patient position: sitting  Prep: ChloraPrep  Patient monitoring: heart rate, cardiac monitor, continuous pulse ox and frequent blood pressure checks  Approach: midline  Location: L4-5  Injection technique: single shot  CSF Fluid: clear free-flowing CSF  Needle  Needle type: pencil-tip   Needle gauge: 22 G  Needle length: 3.5 in  Additional Documentation: incremental injection, negative aspiration for heme and no paresthesia on injection  Needle localization: anatomical landmarks  Assessment  Ease of block: moderate  Patient's tolerance of the procedure: comfortable throughout block and no complaints

## 2022-07-18 NOTE — ANESTHESIA POSTPROCEDURE EVALUATION
Anesthesia Post Evaluation    Patient: Enedelia García    Procedure(s) Performed: Procedure(s) (LRB):  REVISION, ARTHROPLASTY, KNEE (Left)    Final Anesthesia Type: spinal      Patient location during evaluation: PACU  Patient participation: Yes- Able to Participate  Level of consciousness: awake and alert, oriented and awake  Post-procedure vital signs: reviewed and stable  Pain management: adequate  Airway patency: patent    PONV status at discharge: No PONV  Anesthetic complications: no      Cardiovascular status: blood pressure returned to baseline  Respiratory status: unassisted and room air  Hydration status: euvolemic  Follow-up not needed.          Vitals Value Taken Time   /80 07/18/22 1722   Temp 36.6 °C (97.8 °F) 07/18/22 1722   Pulse 58 07/18/22 1722   Resp 16 07/18/22 1722   SpO2 97 % 07/18/22 1722         Event Time   Out of Recovery 16:37:41         Pain/Mynor Score: Pain Rating Prior to Med Admin: 10 (7/18/2022  6:23 PM)  Pain Rating Post Med Admin: 0 (7/18/2022  4:30 PM)  Mynor Score: 9 (7/18/2022  4:30 PM)

## 2022-07-19 ENCOUNTER — PATIENT MESSAGE (OUTPATIENT)
Dept: RESEARCH | Facility: CLINIC | Age: 77
End: 2022-07-19
Payer: MEDICARE

## 2022-07-19 ENCOUNTER — PATIENT OUTREACH (OUTPATIENT)
Dept: ADMINISTRATIVE | Facility: OTHER | Age: 77
End: 2022-07-19
Payer: MEDICARE

## 2022-07-19 LAB
ANION GAP SERPL CALC-SCNC: 13 MMOL/L (ref 8–16)
BASOPHILS # BLD AUTO: 0.02 K/UL (ref 0–0.2)
BASOPHILS NFR BLD: 0.2 % (ref 0–1.9)
BUN SERPL-MCNC: 23 MG/DL (ref 8–23)
CALCIUM SERPL-MCNC: 8.9 MG/DL (ref 8.7–10.5)
CHLORIDE SERPL-SCNC: 104 MMOL/L (ref 95–110)
CO2 SERPL-SCNC: 26 MMOL/L (ref 23–29)
CREAT SERPL-MCNC: 1.1 MG/DL (ref 0.5–1.4)
DIFFERENTIAL METHOD: ABNORMAL
EOSINOPHIL # BLD AUTO: 0 K/UL (ref 0–0.5)
EOSINOPHIL NFR BLD: 0 % (ref 0–8)
ERYTHROCYTE [DISTWIDTH] IN BLOOD BY AUTOMATED COUNT: 12.3 % (ref 11.5–14.5)
EST. GFR  (AFRICAN AMERICAN): 56 ML/MIN/1.73 M^2
EST. GFR  (NON AFRICAN AMERICAN): 49 ML/MIN/1.73 M^2
GLUCOSE SERPL-MCNC: 112 MG/DL (ref 70–110)
HCT VFR BLD AUTO: 23.4 % (ref 37–48.5)
HGB BLD-MCNC: 7.8 G/DL (ref 12–16)
IMM GRANULOCYTES # BLD AUTO: 0.03 K/UL (ref 0–0.04)
IMM GRANULOCYTES NFR BLD AUTO: 0.3 % (ref 0–0.5)
LYMPHOCYTES # BLD AUTO: 1.1 K/UL (ref 1–4.8)
LYMPHOCYTES NFR BLD: 10.5 % (ref 18–48)
MCH RBC QN AUTO: 30.8 PG (ref 27–31)
MCHC RBC AUTO-ENTMCNC: 33.3 G/DL (ref 32–36)
MCV RBC AUTO: 93 FL (ref 82–98)
MONOCYTES # BLD AUTO: 1.1 K/UL (ref 0.3–1)
MONOCYTES NFR BLD: 10.6 % (ref 4–15)
NEUTROPHILS # BLD AUTO: 8.2 K/UL (ref 1.8–7.7)
NEUTROPHILS NFR BLD: 78.4 % (ref 38–73)
NRBC BLD-RTO: 0 /100 WBC
PLATELET # BLD AUTO: 250 K/UL (ref 150–450)
PMV BLD AUTO: 9.8 FL (ref 9.2–12.9)
POCT GLUCOSE: 125 MG/DL (ref 70–110)
POCT GLUCOSE: 126 MG/DL (ref 70–110)
POCT GLUCOSE: 132 MG/DL (ref 70–110)
POCT GLUCOSE: 136 MG/DL (ref 70–110)
POTASSIUM SERPL-SCNC: 4.1 MMOL/L (ref 3.5–5.1)
RBC # BLD AUTO: 2.53 M/UL (ref 4–5.4)
SODIUM SERPL-SCNC: 143 MMOL/L (ref 136–145)
WBC # BLD AUTO: 10.43 K/UL (ref 3.9–12.7)

## 2022-07-19 PROCEDURE — 97110 THERAPEUTIC EXERCISES: CPT

## 2022-07-19 PROCEDURE — 97530 THERAPEUTIC ACTIVITIES: CPT

## 2022-07-19 PROCEDURE — 63600175 PHARM REV CODE 636 W HCPCS: Performed by: STUDENT IN AN ORGANIZED HEALTH CARE EDUCATION/TRAINING PROGRAM

## 2022-07-19 PROCEDURE — 97116 GAIT TRAINING THERAPY: CPT

## 2022-07-19 PROCEDURE — 80048 BASIC METABOLIC PNL TOTAL CA: CPT | Performed by: STUDENT IN AN ORGANIZED HEALTH CARE EDUCATION/TRAINING PROGRAM

## 2022-07-19 PROCEDURE — 36415 COLL VENOUS BLD VENIPUNCTURE: CPT | Performed by: STUDENT IN AN ORGANIZED HEALTH CARE EDUCATION/TRAINING PROGRAM

## 2022-07-19 PROCEDURE — 94761 N-INVAS EAR/PLS OXIMETRY MLT: CPT

## 2022-07-19 PROCEDURE — 25000003 PHARM REV CODE 250: Performed by: ORTHOPAEDIC SURGERY

## 2022-07-19 PROCEDURE — 11000001 HC ACUTE MED/SURG PRIVATE ROOM

## 2022-07-19 PROCEDURE — 97165 OT EVAL LOW COMPLEX 30 MIN: CPT

## 2022-07-19 PROCEDURE — 97161 PT EVAL LOW COMPLEX 20 MIN: CPT

## 2022-07-19 PROCEDURE — 25000003 PHARM REV CODE 250: Performed by: STUDENT IN AN ORGANIZED HEALTH CARE EDUCATION/TRAINING PROGRAM

## 2022-07-19 PROCEDURE — 85025 COMPLETE CBC W/AUTO DIFF WBC: CPT | Performed by: STUDENT IN AN ORGANIZED HEALTH CARE EDUCATION/TRAINING PROGRAM

## 2022-07-19 PROCEDURE — 94799 UNLISTED PULMONARY SVC/PX: CPT

## 2022-07-19 RX ORDER — OXYCODONE HYDROCHLORIDE 5 MG/1
10 TABLET ORAL EVERY 4 HOURS PRN
Status: DISCONTINUED | OUTPATIENT
Start: 2022-07-19 | End: 2022-07-22 | Stop reason: HOSPADM

## 2022-07-19 RX ORDER — FAMOTIDINE 20 MG/1
20 TABLET, FILM COATED ORAL DAILY
Status: DISCONTINUED | OUTPATIENT
Start: 2022-07-20 | End: 2022-07-22

## 2022-07-19 RX ORDER — MUPIROCIN 20 MG/G
OINTMENT TOPICAL 2 TIMES DAILY
Status: DISCONTINUED | OUTPATIENT
Start: 2022-07-19 | End: 2022-07-22 | Stop reason: HOSPADM

## 2022-07-19 RX ORDER — OXYCODONE HYDROCHLORIDE 5 MG/1
5 TABLET ORAL EVERY 4 HOURS PRN
Status: DISCONTINUED | OUTPATIENT
Start: 2022-07-19 | End: 2022-07-22 | Stop reason: HOSPADM

## 2022-07-19 RX ADMIN — DULOXETINE 60 MG: 30 CAPSULE, DELAYED RELEASE ORAL at 09:07

## 2022-07-19 RX ADMIN — DOCUSATE SODIUM AND SENNOSIDES 1 TABLET: 8.6; 5 TABLET, FILM COATED ORAL at 08:07

## 2022-07-19 RX ADMIN — GABAPENTIN 600 MG: 300 CAPSULE ORAL at 08:07

## 2022-07-19 RX ADMIN — MUPIROCIN: 20 OINTMENT TOPICAL at 09:07

## 2022-07-19 RX ADMIN — TRANEXAMIC ACID 1000 MG: 100 INJECTION, SOLUTION INTRAVENOUS at 04:07

## 2022-07-19 RX ADMIN — ASPIRIN 81 MG: 81 TABLET, COATED ORAL at 09:07

## 2022-07-19 RX ADMIN — TRAMADOL HYDROCHLORIDE 50 MG: 50 TABLET, COATED ORAL at 04:07

## 2022-07-19 RX ADMIN — POLYETHYLENE GLYCOL 3350 17 G: 17 POWDER, FOR SOLUTION ORAL at 09:07

## 2022-07-19 RX ADMIN — ASPIRIN 81 MG: 81 TABLET, COATED ORAL at 08:07

## 2022-07-19 RX ADMIN — FAMOTIDINE 20 MG: 20 TABLET ORAL at 09:07

## 2022-07-19 RX ADMIN — ISOSORBIDE MONONITRATE 60 MG: 30 TABLET, EXTENDED RELEASE ORAL at 09:07

## 2022-07-19 RX ADMIN — CEFAZOLIN SODIUM 2 G: 2 SOLUTION INTRAVENOUS at 12:07

## 2022-07-19 RX ADMIN — OXYCODONE HYDROCHLORIDE 10 MG: 5 TABLET ORAL at 07:07

## 2022-07-19 RX ADMIN — CEFAZOLIN SODIUM 2 G: 2 SOLUTION INTRAVENOUS at 11:07

## 2022-07-19 RX ADMIN — CEFAZOLIN SODIUM 2 G: 2 SOLUTION INTRAVENOUS at 04:07

## 2022-07-19 RX ADMIN — ACETAMINOPHEN 325 MG: 325 TABLET ORAL at 05:07

## 2022-07-19 RX ADMIN — OXYCODONE 5 MG: 5 TABLET ORAL at 06:07

## 2022-07-19 RX ADMIN — OXYCODONE 5 MG: 5 TABLET ORAL at 11:07

## 2022-07-19 RX ADMIN — HYDROXYZINE HYDROCHLORIDE 25 MG: 25 TABLET, FILM COATED ORAL at 06:07

## 2022-07-19 RX ADMIN — GABAPENTIN 600 MG: 300 CAPSULE ORAL at 02:07

## 2022-07-19 RX ADMIN — TRAMADOL HYDROCHLORIDE 50 MG: 50 TABLET, COATED ORAL at 01:07

## 2022-07-19 RX ADMIN — ACETAMINOPHEN 650 MG: 325 TABLET ORAL at 06:07

## 2022-07-19 RX ADMIN — ACETAMINOPHEN 650 MG: 325 TABLET ORAL at 12:07

## 2022-07-19 RX ADMIN — OXYCODONE HYDROCHLORIDE 10 MG: 5 TABLET ORAL at 09:07

## 2022-07-19 RX ADMIN — OXYCODONE HYDROCHLORIDE 10 MG: 5 TABLET ORAL at 02:07

## 2022-07-19 RX ADMIN — CELECOXIB 200 MG: 100 CAPSULE ORAL at 08:07

## 2022-07-19 RX ADMIN — ACETAMINOPHEN 650 MG: 325 TABLET ORAL at 11:07

## 2022-07-19 RX ADMIN — OXYCODONE 5 MG: 5 TABLET ORAL at 12:07

## 2022-07-19 RX ADMIN — METOPROLOL SUCCINATE 25 MG: 25 TABLET, EXTENDED RELEASE ORAL at 09:07

## 2022-07-19 RX ADMIN — CELECOXIB 200 MG: 100 CAPSULE ORAL at 09:07

## 2022-07-19 RX ADMIN — CEFAZOLIN SODIUM 2 G: 2 SOLUTION INTRAVENOUS at 10:07

## 2022-07-19 RX ADMIN — ATORVASTATIN CALCIUM 80 MG: 40 TABLET, FILM COATED ORAL at 08:07

## 2022-07-19 RX ADMIN — MUPIROCIN: 20 OINTMENT TOPICAL at 08:07

## 2022-07-19 RX ADMIN — DOCUSATE SODIUM AND SENNOSIDES 1 TABLET: 8.6; 5 TABLET, FILM COATED ORAL at 09:07

## 2022-07-19 RX ADMIN — GABAPENTIN 600 MG: 300 CAPSULE ORAL at 09:07

## 2022-07-19 NOTE — PLAN OF CARE
Problem: Occupational Therapy  Goal: Occupational Therapy Goal  Description: Goals to be met by: 8/19/22     Patient will increase functional independence with ADLs by performing:    LE Dressing with Supervision.  Grooming while standing with Supervision.  Toileting from toilet with Supervision for hygiene and clothing management.   Supine to sit with Supervision.  Step transfer with Supervision  Toilet transfer to toilet with Supervision.  Upper extremity exercise program x10 reps per handout, with independence.    Outcome: Ongoing, Progressing     Pt would benefit from cont OT services in order to maximize functional independence. Recommending HHOT/PT at d/c

## 2022-07-19 NOTE — PLAN OF CARE
Problem: Physical Therapy  Goal: Physical Therapy Goal  Description: Goals to be met by: 22     Patient will increase functional independence with mobility by performin. Supine to sit with Modified [place]  2. Sit to supine with Modified [place]  3. Sit to stand transfer with Supervision  4. Bed to chair transfer with Supervision using Rolling Walker  5. Gait  x 100 feet with Supervision using Rolling Walker.   6. Ascend/Descend 1 step with Stand-by Assistance using Rolling Walker.    Outcome: Ongoing, Progressing     PT Eval completed, note to follow. Pt ambulated ~24 ft with RW and CGA-Carlee. Pt demonstrating L knee extension lag with exercises and gait. Anticipate pt to d/c home with HH PT/OT. Pt owns all recommended DME.

## 2022-07-19 NOTE — PLAN OF CARE
Pt. AAOx4. Pt. Sometimes forgetful to situation but easily reoriented. Tolerating diet. Pain controlled by PRN oxy and scheduled gabapentin. L knee hemovac output charted. K knee island intact, drainage marked. Pt. Up to BSC with rolling walker and 1 person assistance. Urinary oakley removed during shift, Pt. Due to void by 2051. BS monitored. Bed alarm on and call light in reach. Pt. Instructed to call for assistance.   Problem: Adult Inpatient Plan of Care  Goal: Plan of Care Review  Outcome: Ongoing, Progressing     Problem: Adult Inpatient Plan of Care  Goal: Patient-Specific Goal (Individualized)  Outcome: Ongoing, Progressing     Problem: Adult Inpatient Plan of Care  Goal: Absence of Hospital-Acquired Illness or Injury  Outcome: Ongoing, Progressing     Problem: Adult Inpatient Plan of Care  Goal: Optimal Comfort and Wellbeing  Outcome: Ongoing, Progressing     Problem: Adult Inpatient Plan of Care  Goal: Readiness for Transition of Care  Outcome: Ongoing, Progressing     Problem: Impaired Wound Healing  Goal: Optimal Wound Healing  Outcome: Ongoing, Progressing

## 2022-07-19 NOTE — SUBJECTIVE & OBJECTIVE
Interval History: Pt w/ compliants of pain throughout the evening post op reporting no help from nerve block. Pain recs per Ortho. Pt to follow up with PT this AM for outpt recs.     Review of Systems   Constitutional:  Negative for chills and fever.   HENT:  Negative for ear pain and sore throat.    Eyes:  Negative for discharge and redness.   Respiratory:  Negative for cough and chest tightness.    Cardiovascular:  Negative for chest pain and leg swelling.   Gastrointestinal:  Negative for abdominal pain and diarrhea.   Genitourinary:  Negative for dysuria and hematuria.   Musculoskeletal:  Negative for back pain and myalgias.        Left knee burning   Skin:  Negative for pallor and rash.   Neurological:  Negative for dizziness and headaches.   Psychiatric/Behavioral:  Negative for confusion. The patient is not nervous/anxious.    Objective:     Vital Signs (Most Recent):  Temp: 96.1 °F (35.6 °C) (07/19/22 0752)  Pulse: 81 (07/19/22 0752)  Resp: 18 (07/19/22 0752)  BP: 121/60 (07/19/22 0752)  SpO2: 98 % (07/19/22 0752) Vital Signs (24h Range):  Temp:  [96.1 °F (35.6 °C)-98.4 °F (36.9 °C)] 96.1 °F (35.6 °C)  Pulse:  [52-81] 81  Resp:  [11-25] 18  SpO2:  [92 %-99 %] 98 %  BP: (119-179)/(56-80) 121/60     Weight: 75.8 kg (167 lb)  Body mass index is 28.67 kg/m².    Intake/Output Summary (Last 24 hours) at 7/19/2022 0851  Last data filed at 7/19/2022 0600  Gross per 24 hour   Intake 2573.6 ml   Output 1151 ml   Net 1422.6 ml      Physical Exam  Vitals reviewed.   Constitutional:       General: She is not in acute distress.     Appearance: Normal appearance.   HENT:      Head: Normocephalic and atraumatic.   Eyes:      General:         Right eye: No discharge.         Left eye: No discharge.      Extraocular Movements: Extraocular movements intact.      Pupils: Pupils are equal, round, and reactive to light.   Cardiovascular:      Rate and Rhythm: Normal rate and regular rhythm.      Pulses: Normal pulses.      Heart  sounds: No murmur heard.  Pulmonary:      Effort: Pulmonary effort is normal. No respiratory distress.      Breath sounds: Normal breath sounds. No wheezing.   Abdominal:      General: Abdomen is flat. Bowel sounds are normal.      Palpations: Abdomen is soft.      Tenderness: There is no abdominal tenderness.   Musculoskeletal:         General: No swelling or tenderness.      Comments: Left knee bandaged with drain in place with red blood   Skin:     Capillary Refill: Capillary refill takes less than 2 seconds.      Coloration: Skin is not jaundiced.   Neurological:      General: No focal deficit present.      Mental Status: She is alert and oriented to person, place, and time.      Comments: Left leg neurovascularly intact, sensation intact   Psychiatric:         Mood and Affect: Mood normal.         Behavior: Behavior normal.     Recent Labs   Lab 07/18/22  1212 07/19/22  0425   WBC  --  10.43   HGB  --  7.8*   HCT  --  23.4*   MCV  --  93   RBC  --  2.53*   MCH  --  30.8   MCHC  --  33.3   RDW  --  12.3   PLT  --  250   MPV  --  9.8   GRAN  --  78.4*  8.2*   LYMPH  --  10.5*  1.1   MONO  --  10.6  1.1*   EOSINOPHIL  --  0.0   BASOPHIL  --  0.2   SEGS 7  --      Recent Labs   Lab 07/19/22  0425      K 4.1      CO2 26   ANIONGAP 13   BUN 23   CREATININE 1.1   *   CALCIUM 8.9   ESTGFRAFRICA 56*   EGFRNONAA 49*     No results for input(s): COLORU, APPEARANCEUA, PHUR, SPECGRAV, PROTEINUA, GLUCUA, KETONESU, BILIRUBINUA, OCCULTUA, UROBILINOGEN, NITRITE, LEUKOCYTESUR, RBCUA, WBCUA, BACTERIA, SQUAMEPITHEL, HYALINECASTS, GRANULARCAST, MICROCMT in the last 168 hours.    Invalid input(s): SPECIMENU, AMORPHOUSU  No results for input(s): TROPONINI, CPK, CPKMB in the last 168 hours.  No results for input(s): PT, INR, APTT in the last 168 hours.  No results for input(s): TSH, O6KJYHT, V0VLSWE, THYROIDAB, FREET4 in the last 168 hours.  X-Ray Knee 1 or 2 View Left    Result Date: 7/18/2022  EXAMINATION: XR  KNEE 1 OR 2 VIEW LEFT CLINICAL HISTORY: post op; Presence of left artificial knee joint TECHNIQUE: 03/10/2022 views of the knee COMPARISON: None FINDINGS: Postsurgical changes of recent total knee arthroplasty procedure. The orthopedic hardware appears in good position and alignment without adjacent fracture.     As above Electronically signed by: Martinez Caldera MD Date:    07/18/2022 Time:    16:11    X-Ray Hip to Ankle    Result Date: 6/29/2022  EXAMINATION: XR HIP TO ANKLE CLINICAL HISTORY: Presence of left artificial knee joint TECHNIQUE: Lower extremity series. COMPARISON: None available. FINDINGS: Prior unicompartmental knee arthroplasty of the left medial compartment which appears intact.  There is advanced degenerative change of the left lateral compartment with accompanying subchondral sclerosis and valgus deformity.  Femorotibial joint space narrowing seen to a lesser degree of the right medial greater than lateral femorotibial compartment with scattered meniscal chondrocalcinosis. Lower extremity length was calculated from the superior acetabulum to the talar dome using electronic calipers measuring 78.3 cm on the right and 78.7 cm on the left.     As above. Electronically signed by: Trev Cole Date:    06/29/2022 Time:    13:58    SURG FL Surgery Fluoro Usage    Result Date: 7/18/2022  See OP Notes for results. IMPRESSION: See OP Notes for results. This procedure was auto-finalized by: Virtual Radiologist    X-Ray Chest 1 View Pre-OP    Result Date: 6/29/2022  EXAMINATION: XR CHEST 1 VIEW PRE-OP CLINICAL HISTORY: Presence of left artificial knee joint TECHNIQUE: Single frontal view of the chest was performed. COMPARISON: 03/10/2022 FINDINGS: Cardiomediastinal silhouette is unchanged.  Lungs appear similar without large airspace opacity or lobar consolidation.  Possible mild subsegmental atelectasis in the left lower lung.  No pleural effusion or gross pneumothorax.  Calcific atherosclerosis of the  aorta.  No acute osseous abnormality.     As above. Electronically signed by: Trev Cole Date:    06/29/2022 Time:    13:50    Microbiology Results (last 7 days)       Procedure Component Value Units Date/Time    Culture, Body Fluid - Bactec [[id number]] Collected: 07/18/22 2124    Order Status: No result Updated: 07/18/22 2125    Gram stain [[id number]] Collected: 07/18/22 1328    Order Status: Completed Specimen: Bone from Knee, Left Updated: 07/18/22 2109     Gram Stain Result Rare WBC's      No organisms seen    Narrative:      Tibial surface    Gram stain [[id number]] Collected: 07/18/22 1254    Order Status: Completed Specimen: Wound from Knee, Left Updated: 07/18/22 2107     Gram Stain Result Rare WBC's      No organisms seen    Narrative:      Posterior synovium    Gram stain [[id number]] Collected: 07/18/22 1336    Order Status: Completed Specimen: Bone from Knee, Left Updated: 07/18/22 2105     Gram Stain Result No WBC's      No organisms seen    Narrative:      Femoral surface    Fungus culture [[id number]] Collected: 07/18/22 1254    Order Status: Sent Specimen: Wound from Knee, Left Updated: 07/18/22 2027    AFB Culture & Smear [[id number]] Collected: 07/18/22 1254    Order Status: Sent Specimen: Wound from Knee, Left Updated: 07/18/22 2027    Aerobic culture [[id number]] Collected: 07/18/22 1254    Order Status: Sent Specimen: Wound from Knee, Left Updated: 07/18/22 2027    Culture, Anaerobe [[id number]] Collected: 07/18/22 1254    Order Status: Sent Specimen: Wound from Knee, Left Updated: 07/18/22 2027    Culture, Anaerobe [[id number]] Collected: 07/18/22 1328    Order Status: Sent Specimen: Bone from Knee, Left Updated: 07/18/22 2023    Fungus culture [[id number]] Collected: 07/18/22 1328    Order Status: Sent Specimen: Bone from Knee, Left Updated: 07/18/22 2023    AFB Culture & Smear [[id number]] Collected: 07/18/22 1328    Order Status: Sent Specimen: Bone from Knee, Left Updated: 07/18/22 2023     Culture, Anaerobe [[id number]] Collected: 07/18/22 1336    Order Status: Sent Specimen: Bone from Knee, Left Updated: 07/18/22 2023    Aerobic culture [[id number]] Collected: 07/18/22 1328    Order Status: Sent Specimen: Bone from Knee, Left Updated: 07/18/22 2023    AFB Culture & Smear [[id number]] Collected: 07/18/22 1336    Order Status: Sent Specimen: Bone from Knee, Left Updated: 07/18/22 2023    Fungus culture [[id number]] Collected: 07/18/22 1336    Order Status: Sent Specimen: Bone from Knee, Left Updated: 07/18/22 2023    Aerobic culture [[id number]] Collected: 07/18/22 1336    Order Status: Sent Specimen: Bone from Knee, Left Updated: 07/18/22 2023    Culture, Anaerobe [[id number]] Collected: 07/18/22 1212    Order Status: Canceled Specimen: Joint Fluid from Knee, Left     Aerobic culture [[id number]] Collected: 07/18/22 1212    Order Status: Canceled Specimen: Wound from Knee, Left

## 2022-07-19 NOTE — PT/OT/SLP EVAL
Occupational Therapy   Evaluation/Treatment     Name: Enedelia García  MRN: [id number]  Admitting Diagnosis:  S/P left unicompartmental knee replacement  Recent Surgery: Procedure(s) (LRB):  REVISION, ARTHROPLASTY, KNEE (Left) 1 Day Post-Op    Recommendations:     Discharge Recommendations: home health PT, home health OT  Discharge Equipment Recommendations:  none  Barriers to discharge:  Decreased caregiver support    Assessment:     Enedelia García is a 77 y.o. female with a medical diagnosis of S/P left unicompartmental knee replacement.  She presents with The encounter diagnosis was S/P left unicompartmental knee replacement.  . Performance deficits affecting function: weakness, gait instability, impaired balance, impaired endurance, decreased lower extremity function, decreased ROM, impaired coordination, impaired self care skills, impaired functional mobility, pain, impaired skin, orthopedic precautions, edema, decreased safety awareness, impaired fine motor.        Pt would benefit from cont OT services in order to maximize functional independence. Recommending HHOT/PT at d/c      Rehab Prognosis: Good; patient would benefit from acute skilled OT services to address these deficits and reach maximum level of function.       Plan:     Patient to be seen 5 x/week to address the above listed problems via self-care/home management, therapeutic activities, therapeutic exercises  · Plan of Care Expires: 08/19/22  · Plan of Care Reviewed with: patient    Subjective     Chief Complaint: pain; shaking/tremors in LLE with movement   Patient/Family Comments/goals: return to (I) PLOF     Occupational Profile:  Living Environment: with significant other, SSH, threshold to enter, t/s combo  Previous level of function: mod I, but assist with tub t/f PRN and dsg; reports urge incontinence and wears briefs   Equipment Used at Home:  walker, rolling, rollator, shower chair, bedside commode  Assistance upon Discharge: pt  reports that significant other has had 3 recent CVAs and also with dementia     Pain/Comfort:  · Pain Rating 1: 6/10  · Location - Side 1: Left  · Location - Orientation 1: generalized  · Location 1: knee  · Pain Addressed 1: Reposition, Distraction, Cessation of Activity, Nurse notified  · Pain Rating Post-Intervention 1: 7/10    Patients cultural, spiritual, Yazidi conflicts given the current situation:      Objective:     Communicated with: nsg prior to session.  General Precautions: Standard, fall   Orthopedic Precautions:LLE weight bearing as tolerated   Braces: N/A      Occupational Performance:    Bed Mobility:    · Patient completed Scooting/Bridging with stand by assistance  · Patient completed Supine to Sit with contact guard assistance    Functional Mobility/Transfers:  · Patient completed Sit <> Stand Transfer with contact guard assistance and minimum assistance  with  rolling walker   · Patient completed Bed <> Chair Transfer using Step Transfer technique with contact guard assistance and minimum assistance with rolling walker  · Functional Mobility: CGA- Min A with RW; maximal cues for posture, sequence and RW management     Activities of Daily Living:  · Lower Body Dressing: total assistance      Cognitive/Visual Perceptual:  WFL   Slightly tearful 2/2 situation; and painful LLE     Physical Exam:  Balance:    -       WFL seated; fair standing   Postural examination/scapula alignment:    -       Rounded shoulders  -       Forward head  -       trunk flexion   Skin integrity: Wound surgical LLE  Dominant hand:    -       right   Upper Extremity Range of Motion:   BUE WFL   Upper Extremity Strength:  BUE WFL     AMPAC 6 Click ADL:  AMPAC Total Score: 15    Treatment & Education:  Pt performing skills as listed above  Pt re-educated on impt of elevating LLE with correct placement of prop to encourage knee ext  Educated on use of ice pack/restricition/precautions   Stood from EOB x 2 trials with RW;  cues for hand placement   Functional mobility limited performed in hallway with adaptive sequence following therapist demonstration; cues for posture and sequence throughout; pt with increased proximity to RW and cues to decrease proximity   Seated rest break required   T/f to b/s chair   Elevated LLE on pillows and ice packs provided         Education:    Patient left up in chair with all lines intact, call button in reach, bed alarm on and nsg notified    GOALS:   Multidisciplinary Problems     Occupational Therapy Goals        Problem: Occupational Therapy    Goal Priority Disciplines Outcome Interventions   Occupational Therapy Goal     OT, PT/OT Ongoing, Progressing    Description: Goals to be met by: 8/19/22     Patient will increase functional independence with ADLs by performing:    LE Dressing with Supervision.  Grooming while standing with Supervision.  Toileting from toilet with Supervision for hygiene and clothing management.   Supine to sit with Supervision.  Step transfer with Supervision  Toilet transfer to toilet with Supervision.  Upper extremity exercise program x10 reps per handout, with independence.                     History:     Past Medical History:   Diagnosis Date    Coronary artery disease     Epilepsy     Hyperlipidemia     Hypertension     Normocytic anemia        Past Surgical History:   Procedure Laterality Date    APPENDECTOMY      BACK SURGERY      CORONARY STENT PLACEMENT      HYSTERECTOMY      REVISION OF KNEE ARTHROPLASTY Left 7/18/2022    Procedure: REVISION, ARTHROPLASTY, KNEE;  Surgeon: Stan Catalan MD;  Location: [facility];  Service: Orthopedics;  Laterality: Left;    TONSILLECTOMY         Time Tracking:     OT Date of Treatment: 07/19/22  OT Start Time: 1105  OT Stop Time: 1140  OT Total Time (min): 35 min    Billable Minutes:Evaluation 10  Therapeutic Activity 25    7/19/2022

## 2022-07-19 NOTE — ASSESSMENT & PLAN NOTE
DM type 2 with   - Last A1c  Lab Results   Component Value Date    HGBA1C 6.4 (H) 06/29/2022       - BG on Admission   - Home regimen: metformin  - Will hold metformin while inpt and order SSI  - POCT glucose Q4hr  - goal -180

## 2022-07-19 NOTE — NURSING
Pt. BP decreased, Sonia CORTEZ notified. MD stated to administer metoprolol, and hold lasix and valsartan.

## 2022-07-19 NOTE — PROGRESS NOTES
[facility] Progress Note    Surgeries:  Left revision total knee arthroplasty, synovectomy 7/19    S: Tolerated procedure yesterday without complication. Complaining of increased pain last night as block wore off; pain better controlled on oxycodone 5mg. Requesting home pain regimen of oxy 10mg. Per nursing staff hemovac suction came loose leaked an unrecorded amount of drainage. The drain was subsequently fixed. No other acute issues. Hobbs in place with appropriate output. Tolerating PO. AFVSS. Denies numbness or tingling. No CP, SOB.    O:   Vitals:    07/19/22 1144   BP: (!) 112/52   Pulse: 67   Resp: 19   Temp: 98.5 °F (36.9 °C)     Recent Labs     07/19/22  0425   WBC 10.43   HGB 7.8*   HCT 23.4*        Recent Labs     07/19/22  0425      K 4.1      CO2 26   BUN 23   *     No results for input(s): ESR, CRP in the last 72 hours.    PE:  Gen: A+Ox3, NAD  Card: RRR by RP  Lungs: nonlabored breathing, symmetric chest rise  Abd: S/NT/ND    Left left extremity  Dressings with minimal saturation  Appropriate post operative pain and swelling  Drain with serosang 50cc output overnight  Motor intact ehl/fhl/g/s/ta  Sensation intact light touch s/s/sp/dp/t  Well perfused distally, palpable DP    A/P:  77F s/p L revision TKA 7/18/22    WBAT  Ancef q8h  Follow intraoperative cultures  -NGTD  Drain in place; record output q shift; will pull when output <50cc per shift  Bowel regimen  Regular diet  Hobbs out today  MM Pain control with Oxy 5mg for intermediate pain, Oxy 10mg for severe pain  ASA 81mg BID for DVT proph  Appreciate family medicine recommendations  PT/OT  Social work for discharge    Mike Mayers MD

## 2022-07-19 NOTE — PLAN OF CARE
Problem: Physical Therapy  Goal: Physical Therapy Goal  Description: Goals to be met by: 22     Patient will increase functional independence with mobility by performin. Supine to sit with Modified [place]  2. Sit to supine with Modified [place]  3. Sit to stand transfer with Supervision  4. Bed to chair transfer with Supervision using Rolling Walker  5. Gait  x 100 feet with Supervision using Rolling Walker.   6. Ascend/Descend 1 step with Stand-by Assistance using Rolling Walker.    Outcome: Ongoing, Progressing   Sup to sit with min A with LLE lift, sit to stand with rw with CGA, GT training with rw 30 ft x 2 with decreased anna, decreased step length and height, increased time in double stance, antalgic gt, step to. Pt michael LLE TE in sup and sit with demo/vcs. Pt demo'd understanding.

## 2022-07-19 NOTE — PT/OT/SLP PROGRESS
"Physical Therapy Treatment    Patient Name:  Enedelia García   MRN:  [id number]    Recommendations:     Discharge Recommendations:  home health PT, home health OT   Discharge Equipment Recommendations: none   Barriers to discharge: None    Assessment:     Enedelia García is a 77 y.o. female admitted with a medical diagnosis of S/P left unicompartmental knee replacement.  She presents with the following impairments/functional limitations:  weakness, impaired endurance, impaired self care skills, impaired functional mobility, gait instability, impaired balance, decreased lower extremity function, pain, impaired skin, edema, decreased ROM.    Rehab Prognosis: Good; patient would benefit from acute skilled PT services to address these deficits and reach maximum level of function.    Recent Surgery: Procedure(s) (LRB):  REVISION, ARTHROPLASTY, KNEE (Left) 1 Day Post-Op    Plan:     During this hospitalization, patient to be seen daily (BID if time allows) to address the identified rehab impairments via therapeutic activities, therapeutic exercises, gait training, neuromuscular re-education and progress toward the following goals:    · Plan of Care Expires:  08/19/22    Subjective     Chief Complaint: pain in LL knee posterior  Patient/Family Comments/goals: "it was hurting in the front but now it's hurting in the back.  Pain/Comfort:  · Pain Rating 1: 7/10  · Location - Side 1: Left  · Location - Orientation 1: posterior  · Location 1: knee  · Pain Addressed 1: Pre-medicate for activity, Reposition, Distraction, Cessation of Activity, Nurse notified  · Pain Rating Post-Intervention 1: 7/10      Objective:     Communicated with nurse prior to session.  Patient found HOB elevated with telemetry, peripheral IV, hemovac upon PT entry to room.     General Precautions: Standard, fall   Orthopedic Precautions:LLE weight bearing as tolerated   Braces: N/A  Respiratory Status: Room air     Functional Mobility:  · Bed " Mobility:     · Scooting: stand by assistance  · Supine to Sit: minimum assistance with LLE lift  · Transfers:     · Sit to supine with min A with LLE lift  · Sit to Stand:  contact guard assistance with rolling walker  · Gait: 30 ft x2  with rw  · Balance: F+ standin, g sitting      AM-PAC 6 CLICK MOBILITY  Turning over in bed (including adjusting bedclothes, sheets and blankets)?: 4  Sitting down on and standing up from a chair with arms (e.g., wheelchair, bedside commode, etc.): 4  Moving from lying on back to sitting on the side of the bed?: 4  Moving to and from a bed to a chair (including a wheelchair)?: 3  Need to walk in hospital room?: 3  Climbing 3-5 steps with a railing?: 2  Basic Mobility Total Score: 20       Therapeutic Activities and Exercises:   Lower Extremity Exercises.   Patient educated on the purpose of therapeutic exercise.     Patient verbalized acceptance/understanding of instructions, expectations, and limitations(for safety).   Patient performed: 2 sets of 10 reps (each) of B LE There Ex: AP, LAQ, Hip abd/add, Hip flexion while sitting up on EOB.        Patient required requires verbal cues/tactile cues to ensure correct sequence, to maintain proper form, and to allow for self-correction.    pt performed supine BLE x 10 reps :HS, AP, GS, QS . Encouraged pt to performed BLE HS, AP, GS, QS throughout the day . Pt verbalize understanding.   .     Patient left HOB elevated with ice pack behind and on top knee with  all lines intact, call button in reach and nurse present..    GOALS:   Multidisciplinary Problems     Physical Therapy Goals        Problem: Physical Therapy    Goal Priority Disciplines Outcome Goal Variances Interventions   Physical Therapy Goal     PT, PT/OT Ongoing, Progressing     Description: Goals to be met by: 22     Patient will increase functional independence with mobility by performin. Supine to sit with Modified [place]  2. Sit to supine with Modified  [place]  3. Sit to stand transfer with Supervision  4. Bed to chair transfer with Supervision using Rolling Walker  5. Gait  x 100 feet with Supervision using Rolling Walker.   6. Ascend/Descend 1 step with Stand-by Assistance using Rolling Walker.                     Time Tracking:     PT Received On: 07/19/22  PT Start Time: 1354     PT Stop Time: 1444  PT Total Time (min): 50 min     Billable Minutes: Gait Training 15 and Therapeutic Exercise 35    Treatment Type: Treatment  PT/PTA: PTA     PTA Visit Number: 1     07/19/2022

## 2022-07-19 NOTE — NURSING
DO Sonia contacted by bedside nurse pertaining to discontinuing urinary catheter. Sonia stated he would look in Pt. Chart and notify of decision. GILBERT.

## 2022-07-19 NOTE — PT/OT/SLP EVAL
Physical Therapy Evaluation    Patient Name:  Enedelia García   MRN:  [id number]    Recommendations:     Discharge Recommendations:  home health PT, home health OT   Discharge Equipment Recommendations: none   Barriers to discharge: impaired functional mobility    Assessment:     Enedelia García is a 77 y.o. female admitted with a medical diagnosis of S/P left unicompartmental knee replacement.  She presents with the following impairments/functional limitations:  weakness, gait instability, impaired balance, impaired endurance, impaired self care skills, impaired functional mobility, pain, decreased safety awareness, decreased lower extremity function, decreased ROM, edema, impaired skin, orthopedic precautions .Pt ambulated ~24 ft with RW and CGA-Carlee. Pt demonstrating L knee extension lag with exercises and gait. Anticipate pt to d/c home with HH PT/OT. Pt owns all recommended DME.     Rehab Prognosis: Good; patient would benefit from acute skilled PT services to address these deficits and reach maximum level of function.    Recent Surgery: Procedure(s) (LRB):  REVISION, ARTHROPLASTY, KNEE (Left) 1 Day Post-Op    Plan:     During this hospitalization, patient to be seen daily (BID if time allows) to address the identified rehab impairments via therapeutic activities, therapeutic exercises, gait training, neuromuscular re-education and progress toward the following goals:    · Plan of Care Expires:  08/19/22    Subjective     Chief Complaint: L knee pain  Patient/Family Comments/goals: return home, improve function  Pain/Comfort:  Pain Rating 1: 6/10  Location - Side 1: Left  Location - Orientation 1: generalized  Location 1: knee  Pain Addressed 1: Reposition, Distraction, Cessation of Activity, Nurse notified, Pre-medicate for activity  Pain Rating Post-Intervention 1: 7/10    Patients cultural, spiritual, Restorationism conflicts given the current situation: no    Living Environment:  Pt lives with her  significant other (who has hx of dementia and 3 strokes) in a SSH, [place], and T/S with SC.   Prior to admission, patients level of function was Mod I with use of RW for mobility. Pt drives. Pt reports she occasionally needs assistance from significant other for dressing and transfers in/out tub. Pt reports wearing diapers 2/2 incontinence.  Equipment used at home: walker, rolling, rollator, shower chair, bedside commode.   Upon discharge, patient will have assistance from significant other but limited 2/2 SO cognition.    Objective:     Communicated with nsg prior to session.  Patient found HOB elevated with peripheral IV, hemovac, oakley catheter  upon PT entry to room.    General Precautions: Standard, fall   Orthopedic Precautions:LLE weight bearing as tolerated   Braces: N/A  Respiratory Status: Room air    Exams:  · Cognitive Exam:  Patient is oriented WFL  · Postural Exam:  Patient presented with the following abnormalities:    · -       Rounded shoulders  · -       Forward head  · Skin Integrity/Edema:      · -       Skin integrity: Wound surgical L knee with ACE bandaging , mild drainage, hemovac present  · -       Edema: Mild LLE  · RLE ROM: WFL  · RLE Strength: WFL  · LLE ROM: WFL except L knee AROM grossly 10-95 degrees, L knee ext lag  · LLE Strength: Not assessed 2/2 pain    Functional Mobility:  · Bed Mobility:     · Scooting: stand by assistance  · Supine to Sit: contact guard assistance and minimum assistance  · Transfers:     · Sit to Stand:  contact guard assistance and minimum assistance with rolling walker; VC's for hand placement and sequencing   · Bed to Chair: contact guard assistance and minimum assistance with  rolling walker  using  Stand/Step pivot Transfer  · Gait: Pt ambulated ~24 ft with RW and CGA-Carlee. Pt demo increased forward flexion at B hips and increased L knee flexion in stance. VC's for 3 pt gait sequencing, upright posture, and L knee extension in stance    Therapeutic  Activities and Exercises:   Pt educated on role of PT/POC, HEP, ice/rest/elevation, proper pillow placement to promote passive knee extension, and overall safety using DME.   Pt sat EOB.  Pt reporting mild dizziness which improved with time and LE exercises.  Pt ambulated as reported above then returned seated EOB for rest break.  Seated therex B LE: X 10 reps APs and R LAQs; L LAQs required AAROM  Pt performed bed>chair t/f as reported above with VC's for sequencing.  BLE reclined and practiced ~5 reps L knee QS. LLE elevated on pillow.   Ice packs applied to L knee.    AM-PAC 6 CLICK MOBILITY  Total Score:16     Patient left up in chair with all lines intact, call button in reach, chair alarm on, nsg notified and PCT present.    GOALS:   Multidisciplinary Problems     Physical Therapy Goals        Problem: Physical Therapy    Goal Priority Disciplines Outcome Goal Variances Interventions   Physical Therapy Goal     PT, PT/OT Ongoing, Progressing     Description: Goals to be met by: 22     Patient will increase functional independence with mobility by performin. Supine to sit with Modified [place]  2. Sit to supine with Modified [place]  3. Sit to stand transfer with Supervision  4. Bed to chair transfer with Supervision using Rolling Walker  5. Gait  x 100 feet with Supervision using Rolling Walker.   6. Ascend/Descend 1 step with Stand-by Assistance using Rolling Walker.                     History:     Past Medical History:   Diagnosis Date    Coronary artery disease     Epilepsy     Hyperlipidemia     Hypertension     Normocytic anemia        Past Surgical History:   Procedure Laterality Date    APPENDECTOMY      BACK SURGERY      CORONARY STENT PLACEMENT      HYSTERECTOMY      TONSILLECTOMY         Time Tracking:     PT Received On: 22  PT Start Time: 1104     PT Stop Time: 1141  PT Total Time (min): 37 min     Billable Minutes: Evaluation 10, Gait Training 10 and  Therapeutic Exercise 10 (cotx with OT)      07/19/2022

## 2022-07-19 NOTE — PROGRESS NOTES
[facility] Medicine  Progress Note    Patient Name: Enedelia García  MRN: [id number]  Patient Class: IP- Inpatient   Admission Date: 7/18/2022  Length of Stay: 1 days  Attending Physician: Stan Catalan MD  Primary Care Provider: Sushil De La O MD        Subjective:     Principal Problem:S/P left unicompartmental knee replacement        HPI:  Enedelia García is a 77 y.o. female who  has a past medical history of Coronary artery disease, Epilepsy, Hyperlipidemia, Hypertension, and Normocytic anemia, prediabetes, GERD, ALEXANDRO, chronic opioid use presents to [facility] for left unicompartmental knee arthroplasty. Op note documented complete articular cartilage loss of lateral compartment. Cultures were sent during surgery. Patient reports feeling burning in her knee but denies any pain. [facility] FM consulted for medical management during hospitalization.          Overview/Hospital Course:  No notes on file    Interval History: Pt w/ compliants of pain throughout the evening post op reporting no help from nerve block. Pain recs per Ortho. Pt to follow up with PT this AM for outpt recs.     Review of Systems   Constitutional:  Negative for chills and fever.   HENT:  Negative for ear pain and sore throat.    Eyes:  Negative for discharge and redness.   Respiratory:  Negative for cough and chest tightness.    Cardiovascular:  Negative for chest pain and leg swelling.   Gastrointestinal:  Negative for abdominal pain and diarrhea.   Genitourinary:  Negative for dysuria and hematuria.   Musculoskeletal:  Negative for back pain and myalgias.        Left knee burning   Skin:  Negative for pallor and rash.   Neurological:  Negative for dizziness and headaches.   Psychiatric/Behavioral:  Negative for confusion. The patient is not nervous/anxious.    Objective:     Vital Signs (Most Recent):  Temp: 96.1 °F (35.6 °C) (07/19/22 0752)  Pulse: 81 (07/19/22 0752)  Resp: 18 (07/19/22 0752)  BP: 121/60 (07/19/22 0752)  SpO2: 98  % (07/19/22 0752) Vital Signs (24h Range):  Temp:  [96.1 °F (35.6 °C)-98.4 °F (36.9 °C)] 96.1 °F (35.6 °C)  Pulse:  [52-81] 81  Resp:  [11-25] 18  SpO2:  [92 %-99 %] 98 %  BP: (119-179)/(56-80) 121/60     Weight: 75.8 kg (167 lb)  Body mass index is 28.67 kg/m².    Intake/Output Summary (Last 24 hours) at 7/19/2022 0851  Last data filed at 7/19/2022 0600  Gross per 24 hour   Intake 2573.6 ml   Output 1151 ml   Net 1422.6 ml      Physical Exam  Vitals reviewed.   Constitutional:       General: She is not in acute distress.     Appearance: Normal appearance.   HENT:      Head: Normocephalic and atraumatic.   Eyes:      General:         Right eye: No discharge.         Left eye: No discharge.      Extraocular Movements: Extraocular movements intact.      Pupils: Pupils are equal, round, and reactive to light.   Cardiovascular:      Rate and Rhythm: Normal rate and regular rhythm.      Pulses: Normal pulses.      Heart sounds: No murmur heard.  Pulmonary:      Effort: Pulmonary effort is normal. No respiratory distress.      Breath sounds: Normal breath sounds. No wheezing.   Abdominal:      General: Abdomen is flat. Bowel sounds are normal.      Palpations: Abdomen is soft.      Tenderness: There is no abdominal tenderness.   Musculoskeletal:         General: No swelling or tenderness.      Comments: Left knee bandaged with drain in place with red blood   Skin:     Capillary Refill: Capillary refill takes less than 2 seconds.      Coloration: Skin is not jaundiced.   Neurological:      General: No focal deficit present.      Mental Status: She is alert and oriented to person, place, and time.      Comments: Left leg neurovascularly intact, sensation intact   Psychiatric:         Mood and Affect: Mood normal.         Behavior: Behavior normal.     Recent Labs   Lab 07/18/22  1212 07/19/22  0425   WBC  --  10.43   HGB  --  7.8*   HCT  --  23.4*   MCV  --  93   RBC  --  2.53*   MCH  --  30.8   MCHC  --  33.3   RDW  --   12.3   PLT  --  250   MPV  --  9.8   GRAN  --  78.4*  8.2*   LYMPH  --  10.5*  1.1   MONO  --  10.6  1.1*   EOSINOPHIL  --  0.0   BASOPHIL  --  0.2   SEGS 7  --      Recent Labs   Lab 07/19/22  0425      K 4.1      CO2 26   ANIONGAP 13   BUN 23   CREATININE 1.1   *   CALCIUM 8.9   ESTGFRAFRICA 56*   EGFRNONAA 49*     No results for input(s): COLORU, APPEARANCEUA, PHUR, SPECGRAV, PROTEINUA, GLUCUA, KETONESU, BILIRUBINUA, OCCULTUA, UROBILINOGEN, NITRITE, LEUKOCYTESUR, RBCUA, WBCUA, BACTERIA, SQUAMEPITHEL, HYALINECASTS, GRANULARCAST, MICROCMT in the last 168 hours.    Invalid input(s): SPECIMENU, AMORPHOUSU  No results for input(s): TROPONINI, CPK, CPKMB in the last 168 hours.  No results for input(s): PT, INR, APTT in the last 168 hours.  No results for input(s): TSH, P7PEFBP, R8XJKBT, THYROIDAB, FREET4 in the last 168 hours.  X-Ray Knee 1 or 2 View Left    Result Date: 7/18/2022  EXAMINATION: XR KNEE 1 OR 2 VIEW LEFT CLINICAL HISTORY: post op; Presence of left artificial knee joint TECHNIQUE: 03/10/2022 views of the knee COMPARISON: None FINDINGS: Postsurgical changes of recent total knee arthroplasty procedure. The orthopedic hardware appears in good position and alignment without adjacent fracture.     As above Electronically signed by: Martinez Caldera MD Date:    07/18/2022 Time:    16:11    X-Ray Hip to Ankle    Result Date: 6/29/2022  EXAMINATION: XR HIP TO ANKLE CLINICAL HISTORY: Presence of left artificial knee joint TECHNIQUE: Lower extremity series. COMPARISON: None available. FINDINGS: Prior unicompartmental knee arthroplasty of the left medial compartment which appears intact.  There is advanced degenerative change of the left lateral compartment with accompanying subchondral sclerosis and valgus deformity.  Femorotibial joint space narrowing seen to a lesser degree of the right medial greater than lateral femorotibial compartment with scattered meniscal chondrocalcinosis. Lower  extremity length was calculated from the superior acetabulum to the talar dome using electronic calipers measuring 78.3 cm on the right and 78.7 cm on the left.     As above. Electronically signed by: Trev Cole Date:    06/29/2022 Time:    13:58    SURG FL Surgery Fluoro Usage    Result Date: 7/18/2022  See OP Notes for results. IMPRESSION: See OP Notes for results. This procedure was auto-finalized by: Virtual Radiologist    X-Ray Chest 1 View Pre-OP    Result Date: 6/29/2022  EXAMINATION: XR CHEST 1 VIEW PRE-OP CLINICAL HISTORY: Presence of left artificial knee joint TECHNIQUE: Single frontal view of the chest was performed. COMPARISON: 03/10/2022 FINDINGS: Cardiomediastinal silhouette is unchanged.  Lungs appear similar without large airspace opacity or lobar consolidation.  Possible mild subsegmental atelectasis in the left lower lung.  No pleural effusion or gross pneumothorax.  Calcific atherosclerosis of the aorta.  No acute osseous abnormality.     As above. Electronically signed by: Trev Cole Date:    06/29/2022 Time:    13:50    Microbiology Results (last 7 days)       Procedure Component Value Units Date/Time    Culture, Body Fluid - Bactec [[id number]] Collected: 07/18/22 2124    Order Status: No result Updated: 07/18/22 2125    Gram stain [[id number]] Collected: 07/18/22 1328    Order Status: Completed Specimen: Bone from Knee, Left Updated: 07/18/22 2109     Gram Stain Result Rare WBC's      No organisms seen    Narrative:      Tibial surface    Gram stain [[id number]] Collected: 07/18/22 1254    Order Status: Completed Specimen: Wound from Knee, Left Updated: 07/18/22 2107     Gram Stain Result Rare WBC's      No organisms seen    Narrative:      Posterior synovium    Gram stain [[id number]] Collected: 07/18/22 1336    Order Status: Completed Specimen: Bone from Knee, Left Updated: 07/18/22 2105     Gram Stain Result No WBC's      No organisms seen    Narrative:      Femoral surface     Fungus culture [[id number]] Collected: 07/18/22 1254    Order Status: Sent Specimen: Wound from Knee, Left Updated: 07/18/22 2027    AFB Culture & Smear [[id number]] Collected: 07/18/22 1254    Order Status: Sent Specimen: Wound from Knee, Left Updated: 07/18/22 2027    Aerobic culture [[id number]] Collected: 07/18/22 1254    Order Status: Sent Specimen: Wound from Knee, Left Updated: 07/18/22 2027    Culture, Anaerobe [[id number]] Collected: 07/18/22 1254    Order Status: Sent Specimen: Wound from Knee, Left Updated: 07/18/22 2027    Culture, Anaerobe [[id number]] Collected: 07/18/22 1328    Order Status: Sent Specimen: Bone from Knee, Left Updated: 07/18/22 2023    Fungus culture [[id number]] Collected: 07/18/22 1328    Order Status: Sent Specimen: Bone from Knee, Left Updated: 07/18/22 2023    AFB Culture & Smear [[id number]] Collected: 07/18/22 1328    Order Status: Sent Specimen: Bone from Knee, Left Updated: 07/18/22 2023    Culture, Anaerobe [[id number]] Collected: 07/18/22 1336    Order Status: Sent Specimen: Bone from Knee, Left Updated: 07/18/22 2023    Aerobic culture [[id number]] Collected: 07/18/22 1328    Order Status: Sent Specimen: Bone from Knee, Left Updated: 07/18/22 2023    AFB Culture & Smear [[id number]] Collected: 07/18/22 1336    Order Status: Sent Specimen: Bone from Knee, Left Updated: 07/18/22 2023    Fungus culture [[id number]] Collected: 07/18/22 1336    Order Status: Sent Specimen: Bone from Knee, Left Updated: 07/18/22 2023    Aerobic culture [[id number]] Collected: 07/18/22 1336    Order Status: Sent Specimen: Bone from Knee, Left Updated: 07/18/22 2023    Culture, Anaerobe [[id number]] Collected: 07/18/22 1212    Order Status: Canceled Specimen: Joint Fluid from Knee, Left     Aerobic culture [[id number]] Collected: 07/18/22 1212    Order Status: Canceled Specimen: Wound from Knee, Left                   Assessment/Plan:      * S/P left unicompartmental knee replacement  S/p left knee  arthroplasty  Cultures sent and started on ancef q 7 days  Nerve block in place    Plan:  Defer to primary for pain and abx management  Defer to primary for drain removal  Recommend broad spectrum abx till cultures complete with growth or ngtd  PT/OT evaluation in AM  Recommend d/c oakley within 24 hrs after procedure      Narcotic drug use  Home meds: percocet and gabapentin     Plan:  Defer to primary team for pain management  On home gabapentin and on celecoxib      Prediabetes  DM type 2 with   - Last A1c  Lab Results   Component Value Date    HGBA1C 6.4 (H) 06/29/2022       - BG on Admission   - Home regimen: metformin  - Will hold metformin while inpt and order SSI  - POCT glucose Q4hr  - goal -180    HTN (hypertension)  Continue home meds: furosemide, isosorbide mononitrate, valsartan  Goal SBP <140      Moderate episode of recurrent major depressive disorder  Continue home meds duloxetine        GERD (gastroesophageal reflux disease)  Continue home famotidine BID      VTE Risk Mitigation (From admission, onward)         Ordered     Place MANOJ hose  Until discontinued         07/18/22 1458     Place sequential compression device  Until discontinued         07/18/22 1458                Discharge Planning   ISAURA:      Code Status: Prior   Is the patient medically ready for discharge?:     Reason for patient still in hospital (select all that apply): Patient trending condition           Sushil De La O MD  Department of Hospital Medicine   [facility]

## 2022-07-19 NOTE — HOSPITAL COURSE
Patient is a 78yo woman w/ PMH of HTN, preDM, HLD, GERD, CAD, epilepsy, anxiety, depression and chronic opioid use. She is s/p left knee revision (Dr. Catalan) on 7/18 due to cartilage loss. FM was consulted for mgmt of chronic conditions.  She is recovering well, VSS, labs wnl. Hobbs and wound vac d/c'd. She is feeding and voiding appropriately. PT following, pending ortho recs and final cultures. Pain well controlled with scheduled Tylenol and Gabapentin, and Oxy PRN. Had constipation with daily senna and miralax, passed BM on 7/22 after enema on 7/21. Held BP meds on 7/20 due to hypotension, resumed on 7/21.  Developed ADWOA on 7/21 (BUN/Cr 34/1.5), encouraged fluid intake. Wound/joint cultures NGTD. PT following.

## 2022-07-19 NOTE — PROGRESS NOTES
IP Liaison - Initial Visit Note    Patient: Enedelia García  MRN:  [id number]  Date of Service:  7/19/2022  Completed by:  LEYLA Leung    Reason for Visit   Patient presents with    IP Liaison Initial Visit       RSW met with patient at bedside in order to complete SDOH questionnaire and liaison assessment.  Pt has identified barriers to care of financial resource strain. Pt also stated she would not feel like cooking when she discharged. RSW provided pt with PHN summary of benefits and [facility] on Aging information. RSW referred pt to [facility] for meals-on-wheels services, pt expressed understanding of services. RSW also provided pt with [facility] financial assistance information per pt request. RSW will continue to follow up with patient.    The following were addressed during this visit:  - Review SDOH Questions   - Complete patient assessment   - Complete initial visit with patient        Patient Summary     IP Liaison Patient Assessment    General  Level of Caregiver support: Member independent and does not need caregiver assistance  Have you had to make a decision between paying for any of the following in the last 2 months?: None  Transportation means: Self  Assessments  Was the PHQ Depression Screening completed this visit?: No  Was the ALEXANDRO-7 Screening completed this visit?: No       LEYLA Leung

## 2022-07-19 NOTE — NURSING
Oxy IR given for c/o pain. Patient crying rolling around the bed in pain. Will continue to monitor.

## 2022-07-19 NOTE — PROGRESS NOTES
Pharmacist Renal Dose Adjustment Note    Enedelia García is a 77 y.o. female being treated with the medication famotidine    Patient Data:    Vital Signs (Most Recent):  Temp: 98.5 °F (36.9 °C) (07/19/22 1144)  Pulse: 67 (07/19/22 1144)  Resp: 19 (07/19/22 1144)  BP: (!) 112/52 (07/19/22 1144)  SpO2: 96 % (07/19/22 1144)   Vital Signs (72h Range):  Temp:  [96.1 °F (35.6 °C)-98.5 °F (36.9 °C)]   Pulse:  [52-81]   Resp:  [11-25]   BP: (112-179)/(52-80)   SpO2:  [92 %-99 %]      Recent Labs   Lab 07/19/22 0425   CREATININE 1.1     Serum creatinine: 1.1 mg/dL 07/19/22 0425  Estimated creatinine clearance: 42.7 mL/min    Medication:famotidine dose: 20mg frequency BID will be changed to medication:famotidine dose:20mg frequency:daily    Pharmacist's Name: Donita Pretty  Pharmacist's Extension: [phone]

## 2022-07-19 NOTE — PLAN OF CARE
The sw met with the pt to complete the assessment. The pt lives in [place] along with her s/o Nick Gross [phone]. The pt still drives but her dtr Laya Garcia [phone] will transport her home at d/c. The pt's independent with her adl's and uses a rollator,r walker and shwr chair at home. The sw completed the white board in the pt's room and gave her a d/c brochure with her name and contact info on it. The sw encouraged her to call if she has any further questions or concerns. The sw will continue to follow the pt throughout her transitions of care and will assist with any d/c needs.        07/19/22 0914   Discharge Assessment   Assessment Type Discharge Planning Assessment   Confirmed/corrected address, phone number and insurance Yes   Confirmed Demographics Correct on Facesheet   Source of Information patient   When was your last doctors appointment? 07/13/22   Communicated ISAURA with patient/caregiver Date not available/Unable to determine   Reason For Admission S/P left unicompartmental knee replacement   Lives With significant other   Do you expect to return to your current living situation? Yes   Do you have help at home or someone to help you manage your care at home? Yes   Who are your caregiver(s) and their phone number(s)? Laya Garcia(dtr)[phone]   Prior to hospitilization cognitive status: Alert/Oriented   Current cognitive status: Alert/Oriented   Walking or Climbing Stairs Difficulty ambulation difficulty, requires equipment;stair climbing difficulty, requires equipment;transferring difficulty, requires equipment   Dressing/Bathing Difficulty none   Home Accessibility wheelchair accessible;stairs to enter home   Number of Stairs, Main Entrance one   Home Layout Able to live on 1st floor   Equipment Currently Used at Home rollator;walker, rolling;shower chair   Readmission within 30 days? No   Patient currently being followed by outpatient case management? No   Do you currently have service(s)  that help you manage your care at home? No   Do you take prescription medications? Yes   Do you have prescription coverage? Yes   Coverage PHN   Do you have any problems affording any of your prescribed medications? No  (the pt receives her meds affordably at [facility] in [place])   Is the patient taking medications as prescribed? yes   Who is going to help you get home at discharge? Laya Garcia(dtr)[phone]   How do you get to doctors appointments? car, drives self   Are you on dialysis? No   Do you take coumadin? No   Discharge Plan A Home Health   Discharge Plan B Home with family   DME Needed Upon Discharge  other (see comments)  (TBD)   Discharge Plan discussed with: Patient   Discharge Barriers Identified None   Relationship/Environment   Name(s) of Who Lives With Patient Nick Renato(s/o)[phone]

## 2022-07-19 NOTE — NURSING
Patient c/o pain 10/10 no pain relief with prn pain medications currently ordered. Carlee Scott DO notified and orders given.

## 2022-07-20 ENCOUNTER — PATIENT OUTREACH (OUTPATIENT)
Dept: ADMINISTRATIVE | Facility: OTHER | Age: 77
End: 2022-07-20
Payer: MEDICARE

## 2022-07-20 ENCOUNTER — TELEPHONE (OUTPATIENT)
Dept: REHABILITATION | Facility: HOSPITAL | Age: 77
End: 2022-07-20
Payer: MEDICARE

## 2022-07-20 LAB
POCT GLUCOSE: 115 MG/DL (ref 70–110)
POCT GLUCOSE: 118 MG/DL (ref 70–110)
POCT GLUCOSE: 93 MG/DL (ref 70–110)

## 2022-07-20 PROCEDURE — 97110 THERAPEUTIC EXERCISES: CPT

## 2022-07-20 PROCEDURE — 94799 UNLISTED PULMONARY SVC/PX: CPT

## 2022-07-20 PROCEDURE — 94760 N-INVAS EAR/PLS OXIMETRY 1: CPT

## 2022-07-20 PROCEDURE — 11000001 HC ACUTE MED/SURG PRIVATE ROOM

## 2022-07-20 PROCEDURE — 99900035 HC TECH TIME PER 15 MIN (STAT)

## 2022-07-20 PROCEDURE — 25000003 PHARM REV CODE 250: Performed by: STUDENT IN AN ORGANIZED HEALTH CARE EDUCATION/TRAINING PROGRAM

## 2022-07-20 PROCEDURE — 25000003 PHARM REV CODE 250: Performed by: ORTHOPAEDIC SURGERY

## 2022-07-20 PROCEDURE — 63600175 PHARM REV CODE 636 W HCPCS: Performed by: STUDENT IN AN ORGANIZED HEALTH CARE EDUCATION/TRAINING PROGRAM

## 2022-07-20 PROCEDURE — 97116 GAIT TRAINING THERAPY: CPT

## 2022-07-20 PROCEDURE — 97530 THERAPEUTIC ACTIVITIES: CPT

## 2022-07-20 RX ADMIN — DULOXETINE 60 MG: 30 CAPSULE, DELAYED RELEASE ORAL at 08:07

## 2022-07-20 RX ADMIN — CELECOXIB 200 MG: 100 CAPSULE ORAL at 08:07

## 2022-07-20 RX ADMIN — OXYCODONE HYDROCHLORIDE 10 MG: 5 TABLET ORAL at 01:07

## 2022-07-20 RX ADMIN — OXYCODONE HYDROCHLORIDE 10 MG: 5 TABLET ORAL at 05:07

## 2022-07-20 RX ADMIN — ACETAMINOPHEN 650 MG: 325 TABLET ORAL at 05:07

## 2022-07-20 RX ADMIN — MUPIROCIN: 20 OINTMENT TOPICAL at 09:07

## 2022-07-20 RX ADMIN — GABAPENTIN 600 MG: 300 CAPSULE ORAL at 08:07

## 2022-07-20 RX ADMIN — CEFAZOLIN SODIUM 2 G: 2 SOLUTION INTRAVENOUS at 08:07

## 2022-07-20 RX ADMIN — OXYCODONE HYDROCHLORIDE 10 MG: 5 TABLET ORAL at 03:07

## 2022-07-20 RX ADMIN — FAMOTIDINE 20 MG: 20 TABLET ORAL at 08:07

## 2022-07-20 RX ADMIN — GABAPENTIN 600 MG: 300 CAPSULE ORAL at 09:07

## 2022-07-20 RX ADMIN — ASPIRIN 81 MG: 81 TABLET, COATED ORAL at 09:07

## 2022-07-20 RX ADMIN — CELECOXIB 200 MG: 100 CAPSULE ORAL at 09:07

## 2022-07-20 RX ADMIN — CEFAZOLIN SODIUM 2 G: 2 SOLUTION INTRAVENOUS at 03:07

## 2022-07-20 RX ADMIN — DOCUSATE SODIUM AND SENNOSIDES 1 TABLET: 8.6; 5 TABLET, FILM COATED ORAL at 08:07

## 2022-07-20 RX ADMIN — OXYCODONE HYDROCHLORIDE 10 MG: 5 TABLET ORAL at 08:07

## 2022-07-20 RX ADMIN — MUPIROCIN: 20 OINTMENT TOPICAL at 08:07

## 2022-07-20 RX ADMIN — ATORVASTATIN CALCIUM 80 MG: 40 TABLET, FILM COATED ORAL at 09:07

## 2022-07-20 RX ADMIN — ACETAMINOPHEN 650 MG: 325 TABLET ORAL at 11:07

## 2022-07-20 RX ADMIN — DOCUSATE SODIUM AND SENNOSIDES 1 TABLET: 8.6; 5 TABLET, FILM COATED ORAL at 09:07

## 2022-07-20 RX ADMIN — GABAPENTIN 600 MG: 300 CAPSULE ORAL at 02:07

## 2022-07-20 RX ADMIN — ASPIRIN 81 MG: 81 TABLET, COATED ORAL at 08:07

## 2022-07-20 RX ADMIN — POLYETHYLENE GLYCOL 3350 17 G: 17 POWDER, FOR SOLUTION ORAL at 08:07

## 2022-07-20 NOTE — PLAN OF CARE
Pt. AAOx4. L knee dressed intact, drainage marked but no changes since yesterday. Hemovac dressing reinforced. Pt. Up to BSC with rolling walker and 1 person assist. BS monitored. PRN oxycodone for c/o pain. Family at bedside. Bed alarm on and call light in reach, Pt. Instructed to call for assistance.   Problem: Adult Inpatient Plan of Care  Goal: Plan of Care Review  Outcome: Ongoing, Progressing     Problem: Adult Inpatient Plan of Care  Goal: Patient-Specific Goal (Individualized)  Outcome: Ongoing, Progressing     Problem: Adult Inpatient Plan of Care  Goal: Absence of Hospital-Acquired Illness or Injury  Outcome: Ongoing, Progressing     Problem: Adult Inpatient Plan of Care  Goal: Optimal Comfort and Wellbeing  Outcome: Ongoing, Progressing     Problem: Adult Inpatient Plan of Care  Goal: Readiness for Transition of Care  Outcome: Ongoing, Progressing     Problem: Impaired Wound Healing  Goal: Optimal Wound Healing  Outcome: Ongoing, Progressing

## 2022-07-20 NOTE — NURSING
Noticeable increase in Pt. Lethargy during shift. No change in medication administration from previous day shift. Dr. Mayers notified, MD stated he would continue to monitor Pt. Closely and to pass onto night shift nurse to continue to monitor Pt. Closely. WCTM.

## 2022-07-20 NOTE — PLAN OF CARE
VN NOTE: Labs, notes, orders, care plan review, will be available as needed.   Problem: Adult Inpatient Plan of Care  Goal: Plan of Care Review  Outcome: Ongoing, Progressing

## 2022-07-20 NOTE — PT/OT/SLP PROGRESS
Occupational Therapy   Treatment    Name: Enedelia García  MRN: [id number]  Admitting Diagnosis:  S/P left unicompartmental knee replacement  2 Days Post-Op    Recommendations:     Discharge Recommendations: home health PT, home health OT  Discharge Equipment Recommendations:  none  Barriers to discharge:  Decreased caregiver support    Assessment:     Enedelia García is a 77 y.o. female with a medical diagnosis of S/P left unicompartmental knee replacement.  She presents with The encounter diagnosis was S/P left unicompartmental knee replacement.  . Performance deficits affecting function are weakness, gait instability, impaired balance, impaired endurance, impaired self care skills, impaired functional mobility, pain, impaired skin, orthopedic precautions, edema, decreased coordination, decreased safety awareness, decreased lower extremity function.     Pt with increased lethargy this PM. Found with ice pack placed directly on skin/incision, knee max elevated with bed controls. Repositioned knee with appropriate placement of pillows and into extension; locked knees out into extension on bed; removed ice packs. Re-educated on positioning. Pt falling asleep throughout time in room. Nsg notified.      Rehab Prognosis:  Good; patient would benefit from acute skilled OT services to address these deficits and reach maximum level of function.       Plan:     Patient to be seen 5 x/week to address the above listed problems via self-care/home management, therapeutic activities, therapeutic exercises  · Plan of Care Expires: 08/19/22  · Plan of Care Reviewed with: patient    Subjective     Pain/Comfort:  · Pain Rating 1:  (did not rate)  · Location - Side 1: Left  · Location - Orientation 1: generalized  · Location 1: knee  · Pain Addressed 1: Pre-medicate for activity, Reposition, Distraction, Cessation of Activity, Nurse notified  · Pain Rating Post-Intervention 1:  (did not rate)    Objective:     Communicated  with: nsg prior to session.     General Precautions: Standard, fall   Orthopedic Precautions:LLE weight bearing as tolerated   Braces: N/A    Occupational Performance:       [facility] 6 Click ADL: 16    Treatment & Education:  Pt found with knee in significantly flexed position with ice pack applied directly to skin; pt reporting increased lethargy and falling asleep throughout time in room   Re-educated pt on proper knee placement and use of ice packs   Repositioned knee to appropriate position and removed ice packs 2/2 skin significantly cold to the touch on posterior leg   Knee function of bed locked into extension   Discussed with nsg     Patient left supine with all lines intact, call button in reach, bed alarm on and nsg notifiedEducation:      GOALS:   Multidisciplinary Problems     Occupational Therapy Goals        Problem: Occupational Therapy    Goal Priority Disciplines Outcome Interventions   Occupational Therapy Goal     OT, PT/OT Ongoing, Progressing    Description: Goals to be met by: 8/19/22     Patient will increase functional independence with ADLs by performing:    LE Dressing with Supervision.  Grooming while standing with Supervision.  Toileting from toilet with Supervision for hygiene and clothing management.   Supine to sit with Supervision.  Step transfer with Supervision  Toilet transfer to toilet with Supervision.  Upper extremity exercise program x10 reps per handout, with independence.                     Time Tracking:     OT Date of Treatment: 07/20/22  OT Start Time: 1451  OT Stop Time: 1459  OT Total Time (min): 8 min    Billable Minutes:Therapeutic Activity 8    OT/JAYY: OT     JAYY Visit Number: 0    7/20/2022

## 2022-07-20 NOTE — PROGRESS NOTES
[facility] Progress Note    Surgeries:  Left revision total knee arthroplasty, synovectomy 7/19    S: NAEON. Pain well controlled. AFVSS.  No other acute issues. Hobbs removed yesterday; patient voiding appropriately. Tolerating PO. Denies numbness or tingling. No CP, SOB.    O:   Vitals:    07/20/22 0328   BP: (!) 142/63   Pulse: 66   Resp: 18   Temp: 98.2 °F (36.8 °C)     Recent Labs     07/19/22  0425   WBC 10.43   HGB 7.8*   HCT 23.4*        Recent Labs     07/19/22  0425      K 4.1      CO2 26   BUN 23   *     No results for input(s): ESR, CRP in the last 72 hours.    PE:  Gen: A+Ox3, NAD  Card: RRR by RP  Lungs: nonlabored breathing, symmetric chest rise  Abd: S/NT/ND    Left left extremity  Dressings with mild saturation  Appropriate post operative pain and swelling  Drain with minimal serosang output overnight  Motor intact ehl/fhl/g/s/ta  Sensation intact light touch s/s/sp/dp/t  Well perfused distally, palpable DP    A/P:  77F s/p L revision TKA 7/18/22    WBAT  Ancef q8h  Follow intraoperative cultures  -NGTD  Drain removed; reinforce dressings prn  Bowel regimen  Regular diet  MM Pain control with Oxy 5mg for intermediate pain, Oxy 10mg for severe pain  ASA 81mg BID for DVT proph  Appreciate family medicine recommendations  PT/OT  Social work for discharge    Disposition: pending final cultures, PT clearance    Mike Mayers MD

## 2022-07-20 NOTE — SUBJECTIVE & OBJECTIVE
Interval History: NAEON. Pt had drain pulled this AM and was able to work with PT yesterday after adjustments made to her pain meds. We are awaiting culture results and     Review of Systems   Constitutional:  Negative for chills and fever.   HENT:  Negative for ear pain and sore throat.    Eyes:  Negative for discharge and redness.   Respiratory:  Negative for cough and chest tightness.    Cardiovascular:  Negative for chest pain and leg swelling.   Gastrointestinal:  Negative for abdominal pain and diarrhea.   Genitourinary:  Negative for dysuria and hematuria.   Musculoskeletal:  Negative for back pain and myalgias.        Left knee burning   Skin:  Negative for pallor and rash.   Neurological:  Negative for dizziness and headaches.   Psychiatric/Behavioral:  Negative for confusion. The patient is not nervous/anxious.    Objective:     Vital Signs (Most Recent):  Temp: 98.4 °F (36.9 °C) (07/20/22 0805)  Pulse: 64 (07/20/22 1042)  Resp: 16 (07/20/22 0846)  BP: 110/64 (07/20/22 1042)  SpO2: 96 % (07/20/22 0805) Vital Signs (24h Range):  Temp:  [97.1 °F (36.2 °C)-98.5 °F (36.9 °C)] 98.4 °F (36.9 °C)  Pulse:  [64-77] 64  Resp:  [16-19] 16  SpO2:  [93 %-99 %] 96 %  BP: (101-142)/(48-64) 110/64     Weight: 85.3 kg (188 lb 0.8 oz)  Body mass index is 32.28 kg/m².    Intake/Output Summary (Last 24 hours) at 7/20/2022 1105  Last data filed at 7/20/2022 1031  Gross per 24 hour   Intake 98.08 ml   Output 650 ml   Net -551.92 ml      Physical Exam  Vitals reviewed.   Constitutional:       General: She is not in acute distress.     Appearance: Normal appearance.   HENT:      Head: Normocephalic and atraumatic.   Eyes:      General:         Right eye: No discharge.         Left eye: No discharge.      Extraocular Movements: Extraocular movements intact.      Pupils: Pupils are equal, round, and reactive to light.   Cardiovascular:      Rate and Rhythm: Normal rate and regular rhythm.      Pulses: Normal pulses.      Heart  sounds: No murmur heard.  Pulmonary:      Effort: Pulmonary effort is normal. No respiratory distress.      Breath sounds: Normal breath sounds. No wheezing.   Abdominal:      General: Abdomen is flat. Bowel sounds are normal.      Palpations: Abdomen is soft.      Tenderness: There is no abdominal tenderness.   Musculoskeletal:         General: No swelling or tenderness.      Comments: Left knee bandaged with drain in place with red blood   Skin:     Capillary Refill: Capillary refill takes less than 2 seconds.      Coloration: Skin is not jaundiced.   Neurological:      General: No focal deficit present.      Mental Status: She is alert and oriented to person, place, and time.      Comments: Left leg neurovascularly intact, sensation intact   Psychiatric:         Mood and Affect: Mood normal.         Behavior: Behavior normal.   Recent Labs   Lab 07/18/22  1212 07/19/22  0425   WBC  --  10.43   HGB  --  7.8*   HCT  --  23.4*   MCV  --  93   RBC  --  2.53*   MCH  --  30.8   MCHC  --  33.3   RDW  --  12.3   PLT  --  250   MPV  --  9.8   GRAN  --  78.4*  8.2*   LYMPH  --  10.5*  1.1   MONO  --  10.6  1.1*   EOSINOPHIL  --  0.0   BASOPHIL  --  0.2   SEGS 7  --      Recent Labs   Lab 07/19/22  0425      K 4.1      CO2 26   ANIONGAP 13   BUN 23   CREATININE 1.1   *   CALCIUM 8.9   ESTGFRAFRICA 56*   EGFRNONAA 49*     No results for input(s): COLORU, APPEARANCEUA, PHUR, SPECGRAV, PROTEINUA, GLUCUA, KETONESU, BILIRUBINUA, OCCULTUA, UROBILINOGEN, NITRITE, LEUKOCYTESUR, RBCUA, WBCUA, BACTERIA, SQUAMEPITHEL, HYALINECASTS, GRANULARCAST, MICROCMT in the last 168 hours.    Invalid input(s): SPECIMENU, AMORPHOUSU  No results for input(s): TROPONINI, CPK, CPKMB in the last 168 hours.  No results for input(s): PT, INR, APTT in the last 168 hours.  No results for input(s): TSH, U3DRTGK, J1AEMZD, THYROIDAB, FREET4 in the last 168 hours.  X-Ray Knee 1 or 2 View Left    Result Date: 7/18/2022  EXAMINATION: XR  KNEE 1 OR 2 VIEW LEFT CLINICAL HISTORY: post op; Presence of left artificial knee joint TECHNIQUE: 03/10/2022 views of the knee COMPARISON: None FINDINGS: Postsurgical changes of recent total knee arthroplasty procedure. The orthopedic hardware appears in good position and alignment without adjacent fracture.     As above Electronically signed by: Martinez Caldera MD Date:    07/18/2022 Time:    16:11    X-Ray Hip to Ankle    Result Date: 6/29/2022  EXAMINATION: XR HIP TO ANKLE CLINICAL HISTORY: Presence of left artificial knee joint TECHNIQUE: Lower extremity series. COMPARISON: None available. FINDINGS: Prior unicompartmental knee arthroplasty of the left medial compartment which appears intact.  There is advanced degenerative change of the left lateral compartment with accompanying subchondral sclerosis and valgus deformity.  Femorotibial joint space narrowing seen to a lesser degree of the right medial greater than lateral femorotibial compartment with scattered meniscal chondrocalcinosis. Lower extremity length was calculated from the superior acetabulum to the talar dome using electronic calipers measuring 78.3 cm on the right and 78.7 cm on the left.     As above. Electronically signed by: Trev Cole Date:    06/29/2022 Time:    13:58    SURG [place] Surgery Fluoro Usage    Result Date: 7/18/2022  See OP Notes for results. IMPRESSION: See OP Notes for results. This procedure was auto-finalized by: Virtual Radiologist    X-Ray Chest 1 View Pre-OP    Result Date: 6/29/2022  EXAMINATION: XR CHEST 1 VIEW PRE-OP CLINICAL HISTORY: Presence of left artificial knee joint TECHNIQUE: Single frontal view of the chest was performed. COMPARISON: 03/10/2022 FINDINGS: Cardiomediastinal silhouette is unchanged.  Lungs appear similar without large airspace opacity or lobar consolidation.  Possible mild subsegmental atelectasis in the left lower lung.  No pleural effusion or gross pneumothorax.  Calcific atherosclerosis of the  aorta.  No acute osseous abnormality.     As above. Electronically signed by: Trev Cole Date:    06/29/2022 Time:    13:50    Microbiology Results (last 7 days)       Procedure Component Value Units Date/Time    Culture, Anaerobe [[id number]] Collected: 07/18/22 1328    Order Status: Completed Specimen: Bone from Knee, Left Updated: 07/20/22 0749     Anaerobic Culture Culture in progress    Narrative:      Tibial surface    Culture, Anaerobe [[id number]] Collected: 07/18/22 1336    Order Status: Completed Specimen: Bone from Knee, Left Updated: 07/20/22 0749     Anaerobic Culture Culture in progress    Narrative:      Femoral surface    Culture, Anaerobe [[id number]] Collected: 07/18/22 1254    Order Status: Completed Specimen: Wound from Knee, Left Updated: 07/20/22 0749     Anaerobic Culture Culture in progress    Narrative:      Posterior synovium    Aerobic culture [[id number]] Collected: 07/18/22 1254    Order Status: Completed Specimen: Wound from Knee, Left Updated: 07/20/22 0735     Aerobic Bacterial Culture No growth    Narrative:      Posterior synovium    Aerobic culture [[id number]] Collected: 07/18/22 1328    Order Status: Completed Specimen: Bone from Knee, Left Updated: 07/20/22 0735     Aerobic Bacterial Culture No growth    Narrative:      Tibial surface    Aerobic culture [[id number]] Collected: 07/18/22 1336    Order Status: Completed Specimen: Bone from Knee, Left Updated: 07/20/22 0735     Aerobic Bacterial Culture No growth    Narrative:      Femoral surface    AFB Culture & Smear [[id number]] Collected: 07/18/22 1328    Order Status: Completed Specimen: Bone from Knee, Left Updated: 07/19/22 2127     AFB Culture & Smear Culture in progress     AFB CULTURE STAIN No acid fast bacilli seen.    Narrative:      Tibial surface    AFB Culture & Smear [[id number]] Collected: 07/18/22 1336    Order Status: Completed Specimen: Bone from Knee, Left Updated: 07/19/22 2127     AFB Culture & Smear Culture in  progress     AFB CULTURE STAIN No acid fast bacilli seen.    Narrative:      Femoral surface    AFB Culture & Smear [[id number]] Collected: 07/18/22 1254    Order Status: Completed Specimen: Wound from Knee, Left Updated: 07/19/22 2127     AFB Culture & Smear Culture in progress     AFB CULTURE STAIN No acid fast bacilli seen.    Narrative:      Posterior synovium    Fungus culture [[id number]] Collected: 07/18/22 1336    Order Status: Completed Specimen: Bone from Knee, Left Updated: 07/19/22 1416     Fungus (Mycology) Culture Culture in progress    Narrative:      Femoral surface    Fungus culture [[id number]] Collected: 07/18/22 1254    Order Status: Completed Specimen: Wound from Knee, Left Updated: 07/19/22 1416     Fungus (Mycology) Culture Culture in progress    Narrative:      Posterior synovium    Fungus culture [[id number]] Collected: 07/18/22 1328    Order Status: Completed Specimen: Bone from Knee, Left Updated: 07/19/22 1416     Fungus (Mycology) Culture Culture in progress    Narrative:      Tibial surface    Culture, Body Fluid - Bactec [[id number]] Collected: 07/18/22 2124    Order Status: No result Updated: 07/18/22 2125    Gram stain [[id number]] Collected: 07/18/22 1328    Order Status: Completed Specimen: Bone from Knee, Left Updated: 07/18/22 2109     Gram Stain Result Rare WBC's      No organisms seen    Narrative:      Tibial surface    Gram stain [[id number]] Collected: 07/18/22 1254    Order Status: Completed Specimen: Wound from Knee, Left Updated: 07/18/22 2107     Gram Stain Result Rare WBC's      No organisms seen    Narrative:      Posterior synovium    Gram stain [[id number]] Collected: 07/18/22 1336    Order Status: Completed Specimen: Bone from Knee, Left Updated: 07/18/22 2105     Gram Stain Result No WBC's      No organisms seen    Narrative:      Femoral surface    Culture, Anaerobe [[id number]] Collected: 07/18/22 1212    Order Status: Canceled Specimen: Joint Fluid from Knee, Left      Aerobic culture [[id number]] Collected: 07/18/22 1212    Order Status: Canceled Specimen: Wound from Knee, Left

## 2022-07-20 NOTE — PROGRESS NOTES
RSW met with patient to discuss discharge and additional patient barriers to care. Pt identified no additional social barriers to care. Per pt, pt is not in need of resources at this time.    The following were addressed during this visit:  -Complete follow-up with patient    LEYLA Leung

## 2022-07-20 NOTE — PLAN OF CARE
Problem: Occupational Therapy  Goal: Occupational Therapy Goal  Description: Goals to be met by: 8/19/22     Patient will increase functional independence with ADLs by performing:    LE Dressing with Supervision.  Grooming while standing with Supervision.  Toileting from toilet with Supervision for hygiene and clothing management.   Supine to sit with Supervision.  Step transfer with Supervision  Toilet transfer to toilet with Supervision.  Upper extremity exercise program x10 reps per handout, with independence.    Outcome: Ongoing, Progressing     Pt with increased lethargy this PM. Found with ice pack placed directly on skin/incision, knee max elevated with bed controls. Repositioned knee with appropriate placement of pillows and into extension; locked knees out into extension on bed; removed ice packs. Re-educated on positioning. Pt falling asleep throughout time in room. Nsg notified.

## 2022-07-20 NOTE — PROGRESS NOTES
[facility] Medicine  Progress Note    Patient Name: Enedelia García  MRN: [id number]  Patient Class: IP- Inpatient   Admission Date: 7/18/2022  Length of Stay: 2 days  Attending Physician: Stan Catalan MD  Primary Care Provider: Lauren Brown MD        Subjective:     Principal Problem:S/P left unicompartmental knee replacement        HPI:  Enedelia García is a 77 y.o. female who  has a past medical history of Coronary artery disease, Epilepsy, Hyperlipidemia, Hypertension, and Normocytic anemia, prediabetes, GERD, ALEXANDRO, chronic opioid use presents to [facility] for left unicompartmental knee arthroplasty. Op note documented complete articular cartilage loss of lateral compartment. Cultures were sent during surgery. Patient reports feeling burning in her knee but denies any pain. [facility] consulted for medical management during hospitalization.          Overview/Hospital Course:  Patient is a 78yo woman w/ PMH of HTN, preDM, HLD, GERD, CAD, epilepsy, anxiety, depression and chronic opioid use. She is s/p left knee revision (Dr. Catalan) on 7/18 due to cartilage loss. FM was consulted for mgmt of chronic conditions.  She is recovering well, VSS, labs wnl. PT following, pending ortho recs.      Interval History: NAEON. Pt had drain pulled this AM and was able to work with PT yesterday after adjustments made to her pain meds. We are awaiting culture results and     Review of Systems   Constitutional:  Negative for chills and fever.   HENT:  Negative for ear pain and sore throat.    Eyes:  Negative for discharge and redness.   Respiratory:  Negative for cough and chest tightness.    Cardiovascular:  Negative for chest pain and leg swelling.   Gastrointestinal:  Negative for abdominal pain and diarrhea.   Genitourinary:  Negative for dysuria and hematuria.   Musculoskeletal:  Negative for back pain and myalgias.        Left knee burning   Skin:  Negative for pallor and rash.   Neurological:  Negative for  dizziness and headaches.   Psychiatric/Behavioral:  Negative for confusion. The patient is not nervous/anxious.    Objective:     Vital Signs (Most Recent):  Temp: 98.4 °F (36.9 °C) (07/20/22 0805)  Pulse: 64 (07/20/22 1042)  Resp: 16 (07/20/22 0846)  BP: 110/64 (07/20/22 1042)  SpO2: 96 % (07/20/22 0805) Vital Signs (24h Range):  Temp:  [97.1 °F (36.2 °C)-98.5 °F (36.9 °C)] 98.4 °F (36.9 °C)  Pulse:  [64-77] 64  Resp:  [16-19] 16  SpO2:  [93 %-99 %] 96 %  BP: (101-142)/(48-64) 110/64     Weight: 85.3 kg (188 lb 0.8 oz)  Body mass index is 32.28 kg/m².    Intake/Output Summary (Last 24 hours) at 7/20/2022 1105  Last data filed at 7/20/2022 1031  Gross per 24 hour   Intake 98.08 ml   Output 650 ml   Net -551.92 ml      Physical Exam  Vitals reviewed.   Constitutional:       General: She is not in acute distress.     Appearance: Normal appearance.   HENT:      Head: Normocephalic and atraumatic.   Eyes:      General:         Right eye: No discharge.         Left eye: No discharge.      Extraocular Movements: Extraocular movements intact.      Pupils: Pupils are equal, round, and reactive to light.   Cardiovascular:      Rate and Rhythm: Normal rate and regular rhythm.      Pulses: Normal pulses.      Heart sounds: No murmur heard.  Pulmonary:      Effort: Pulmonary effort is normal. No respiratory distress.      Breath sounds: Normal breath sounds. No wheezing.   Abdominal:      General: Abdomen is flat. Bowel sounds are normal.      Palpations: Abdomen is soft.      Tenderness: There is no abdominal tenderness.   Musculoskeletal:         General: No swelling or tenderness.      Comments: Left knee bandaged with drain in place with red blood   Skin:     Capillary Refill: Capillary refill takes less than 2 seconds.      Coloration: Skin is not jaundiced.   Neurological:      General: No focal deficit present.      Mental Status: She is alert and oriented to person, place, and time.      Comments: Left leg  neurovascularly intact, sensation intact   Psychiatric:         Mood and Affect: Mood normal.         Behavior: Behavior normal.   Recent Labs   Lab 07/18/22  1212 07/19/22  0425   WBC  --  10.43   HGB  --  7.8*   HCT  --  23.4*   MCV  --  93   RBC  --  2.53*   MCH  --  30.8   MCHC  --  33.3   RDW  --  12.3   PLT  --  250   MPV  --  9.8   GRAN  --  78.4*  8.2*   LYMPH  --  10.5*  1.1   MONO  --  10.6  1.1*   EOSINOPHIL  --  0.0   BASOPHIL  --  0.2   SEGS 7  --      Recent Labs   Lab 07/19/22  0425      K 4.1      CO2 26   ANIONGAP 13   BUN 23   CREATININE 1.1   *   CALCIUM 8.9   ESTGFRAFRICA 56*   EGFRNONAA 49*     No results for input(s): COLORU, APPEARANCEUA, PHUR, SPECGRAV, PROTEINUA, GLUCUA, KETONESU, BILIRUBINUA, OCCULTUA, UROBILINOGEN, NITRITE, LEUKOCYTESUR, RBCUA, WBCUA, BACTERIA, SQUAMEPITHEL, HYALINECASTS, GRANULARCAST, MICROCMT in the last 168 hours.    Invalid input(s): SPECIMENU, AMORPHOUSU  No results for input(s): TROPONINI, CPK, CPKMB in the last 168 hours.  No results for input(s): PT, INR, APTT in the last 168 hours.  No results for input(s): TSH, C4VZGWA, C3UNFCF, THYROIDAB, FREET4 in the last 168 hours.  X-Ray Knee 1 or 2 View Left    Result Date: 7/18/2022  EXAMINATION: XR KNEE 1 OR 2 VIEW LEFT CLINICAL HISTORY: post op; Presence of left artificial knee joint TECHNIQUE: 03/10/2022 views of the knee COMPARISON: None FINDINGS: Postsurgical changes of recent total knee arthroplasty procedure. The orthopedic hardware appears in good position and alignment without adjacent fracture.     As above Electronically signed by: Martinez Caldera MD Date:    07/18/2022 Time:    16:11    X-Ray Hip to Ankle    Result Date: 6/29/2022  EXAMINATION: XR HIP TO ANKLE CLINICAL HISTORY: Presence of left artificial knee joint TECHNIQUE: Lower extremity series. COMPARISON: None available. FINDINGS: Prior unicompartmental knee arthroplasty of the left medial compartment which appears intact.  There is  advanced degenerative change of the left lateral compartment with accompanying subchondral sclerosis and valgus deformity.  Femorotibial joint space narrowing seen to a lesser degree of the right medial greater than lateral femorotibial compartment with scattered meniscal chondrocalcinosis. Lower extremity length was calculated from the superior acetabulum to the talar dome using electronic calipers measuring 78.3 cm on the right and 78.7 cm on the left.     As above. Electronically signed by: Trev Cole Date:    06/29/2022 Time:    13:58    SURG FL Surgery Fluoro Usage    Result Date: 7/18/2022  See OP Notes for results. IMPRESSION: See OP Notes for results. This procedure was auto-finalized by: Virtual Radiologist    X-Ray Chest 1 View Pre-OP    Result Date: 6/29/2022  EXAMINATION: XR CHEST 1 VIEW PRE-OP CLINICAL HISTORY: Presence of left artificial knee joint TECHNIQUE: Single frontal view of the chest was performed. COMPARISON: 03/10/2022 FINDINGS: Cardiomediastinal silhouette is unchanged.  Lungs appear similar without large airspace opacity or lobar consolidation.  Possible mild subsegmental atelectasis in the left lower lung.  No pleural effusion or gross pneumothorax.  Calcific atherosclerosis of the aorta.  No acute osseous abnormality.     As above. Electronically signed by: Trev Cole Date:    06/29/2022 Time:    13:50    Microbiology Results (last 7 days)       Procedure Component Value Units Date/Time    Culture, Anaerobe [[id number]] Collected: 07/18/22 1328    Order Status: Completed Specimen: Bone from Knee, Left Updated: 07/20/22 0749     Anaerobic Culture Culture in progress    Narrative:      Tibial surface    Culture, Anaerobe [[id number]] Collected: 07/18/22 1336    Order Status: Completed Specimen: Bone from Knee, Left Updated: 07/20/22 0749     Anaerobic Culture Culture in progress    Narrative:      Femoral surface    Culture, Anaerobe [[id number]] Collected: 07/18/22 1254    Order  Status: Completed Specimen: Wound from Knee, Left Updated: 07/20/22 0749     Anaerobic Culture Culture in progress    Narrative:      Posterior synovium    Aerobic culture [[id number]] Collected: 07/18/22 1254    Order Status: Completed Specimen: Wound from Knee, Left Updated: 07/20/22 0735     Aerobic Bacterial Culture No growth    Narrative:      Posterior synovium    Aerobic culture [[id number]] Collected: 07/18/22 1328    Order Status: Completed Specimen: Bone from Knee, Left Updated: 07/20/22 0735     Aerobic Bacterial Culture No growth    Narrative:      Tibial surface    Aerobic culture [[id number]] Collected: 07/18/22 1336    Order Status: Completed Specimen: Bone from Knee, Left Updated: 07/20/22 0735     Aerobic Bacterial Culture No growth    Narrative:      Femoral surface    AFB Culture & Smear [[id number]] Collected: 07/18/22 1328    Order Status: Completed Specimen: Bone from Knee, Left Updated: 07/19/22 2127     AFB Culture & Smear Culture in progress     AFB CULTURE STAIN No acid fast bacilli seen.    Narrative:      Tibial surface    AFB Culture & Smear [[id number]] Collected: 07/18/22 1336    Order Status: Completed Specimen: Bone from Knee, Left Updated: 07/19/22 2127     AFB Culture & Smear Culture in progress     AFB CULTURE STAIN No acid fast bacilli seen.    Narrative:      Femoral surface    AFB Culture & Smear [[id number]] Collected: 07/18/22 1254    Order Status: Completed Specimen: Wound from Knee, Left Updated: 07/19/22 2127     AFB Culture & Smear Culture in progress     AFB CULTURE STAIN No acid fast bacilli seen.    Narrative:      Posterior synovium    Fungus culture [[id number]] Collected: 07/18/22 1336    Order Status: Completed Specimen: Bone from Knee, Left Updated: 07/19/22 1416     Fungus (Mycology) Culture Culture in progress    Narrative:      Femoral surface    Fungus culture [[id number]] Collected: 07/18/22 1254    Order Status: Completed Specimen: Wound from Knee, Left Updated:  07/19/22 1416     Fungus (Mycology) Culture Culture in progress    Narrative:      Posterior synovium    Fungus culture [[id number]] Collected: 07/18/22 1328    Order Status: Completed Specimen: Bone from Knee, Left Updated: 07/19/22 1416     Fungus (Mycology) Culture Culture in progress    Narrative:      Tibial surface    Culture, Body Fluid - Bactec [[id number]] Collected: 07/18/22 2124    Order Status: No result Updated: 07/18/22 2125    Gram stain [[id number]] Collected: 07/18/22 1328    Order Status: Completed Specimen: Bone from Knee, Left Updated: 07/18/22 2109     Gram Stain Result Rare WBC's      No organisms seen    Narrative:      Tibial surface    Gram stain [[id number]] Collected: 07/18/22 1254    Order Status: Completed Specimen: Wound from Knee, Left Updated: 07/18/22 2107     Gram Stain Result Rare WBC's      No organisms seen    Narrative:      Posterior synovium    Gram stain [[id number]] Collected: 07/18/22 1336    Order Status: Completed Specimen: Bone from Knee, Left Updated: 07/18/22 2105     Gram Stain Result No WBC's      No organisms seen    Narrative:      Femoral surface    Culture, Anaerobe [[id number]] Collected: 07/18/22 1212    Order Status: Canceled Specimen: Joint Fluid from Knee, Left     Aerobic culture [[id number]] Collected: 07/18/22 1212    Order Status: Canceled Specimen: Wound from Knee, Left                 Assessment/Plan:      * S/P left unicompartmental knee replacement  S/p left knee arthroplasty  Cultures sent and started on ancef q 7 days  Nerve block in place    Plan:  Defer to primary for pain and abx management  Defer to primary for drain removal  Recommend broad spectrum abx till cultures complete with growth or ngtd  PT/OT evaluation in AM  Recommend d/c [facility] within 24 hrs after procedure      Narcotic drug use  Home meds: percocet and gabapentin     Plan:  Defer to primary team for pain management  On home gabapentin and on celecoxib      Prediabetes  DM type 2 with    - Last A1c  Lab Results   Component Value Date    HGBA1C 6.4 (H) 06/29/2022       - BG on Admission   - Home regimen: metformin  - Will hold metformin while inpt and order SSI  - POCT glucose Q4hr  - goal -180    HTN (hypertension)  Continue home meds: furosemide, isosorbide mononitrate, valsartan  Goal SBP <140      Moderate episode of recurrent major depressive disorder  Continue home meds duloxetine        GERD (gastroesophageal reflux disease)  Continue home famotidine BID      VTE Risk Mitigation (From admission, onward)         Ordered     Place MANOJ hose  Until discontinued         07/18/22 1458     Place sequential compression device  Until discontinued         07/18/22 1458                Discharge Planning   ISAURA:      Code Status: Prior   Is the patient medically ready for discharge?:     Reason for patient still in hospital (select all that apply): Patient trending condition  Discharge Plan A: Home Health        Sushil De La O MD  Department of Hospital Medicine   [facility]

## 2022-07-20 NOTE — PLAN OF CARE
Pt on room air in no apparent distress. IS tx. Given with good pt. Effort.  Will cont. To monitor.

## 2022-07-20 NOTE — TELEPHONE ENCOUNTER
Called Mrs. Kelly.  She had cancelled her first scheduled post-op appointment.  Left voicemail with callback number.     Freddie Branham DPT

## 2022-07-20 NOTE — PLAN OF CARE
Problem: Physical Therapy  Goal: Physical Therapy Goal  Description: Goals to be met by: 22     Patient will increase functional independence with mobility by performin. Supine to sit with Modified Saunders  2. Sit to supine with Modified Saunders  3. Sit to stand transfer with Supervision  4. Bed to chair transfer with Supervision using Rolling Walker  5. Gait  x 100 feet with Supervision using Rolling Walker.   6. Ascend/Descend 1 step with Stand-by Assistance using Rolling Walker.    Outcome: Ongoing, Progressing   Pt is progressing well towards goals

## 2022-07-20 NOTE — NURSING
Pt. BP decreased this AM. Family medicine team notified. MD stated to hold metoprolol, valsartan, lasix, and isosorbide for 1 hour and recheck BP in 1 hour and notify team of BP in 1 hour. GILBERT.

## 2022-07-20 NOTE — PT/OT/SLP PROGRESS
Physical Therapy Treatment    Patient Name:  Enedelia García   MRN:  [id number]    Recommendations:     Discharge Recommendations:  home health PT, home health OT   Discharge Equipment Recommendations: none   Barriers to discharge: None    Assessment:     Enedelia García is a 77 y.o. female admitted with a medical diagnosis of S/P left unicompartmental knee replacement.  She presents with the following impairments/functional limitations:  weakness, impaired endurance, impaired self care skills, impaired functional mobility, gait instability, impaired balance, decreased lower extremity function, pain, decreased ROM, impaired skin, edema.  Pt with decreased anna, decreased step length and height, step to gt pattern, increased time in double jadon.  Pt trained on stairs. Pt needed demo/vcs for proper technique.  Pt demo'd/ understanding.  Pt had increased gt distance today and improved endurance.    Rehab Prognosis: Good; patient would benefit from acute skilled PT services to address these deficits and reach maximum level of function.    Recent Surgery: Procedure(s) (LRB):  REVISION, ARTHROPLASTY, KNEE (Left) 2 Days Post-Op    Plan:     During this hospitalization, patient to be seen daily (BID if time allows) to address the identified rehab impairments via therapeutic activities, therapeutic exercises, gait training, neuromuscular re-education and progress toward the following goals:    · Plan of Care Expires:  08/19/22    Subjective     Chief Complaint: pain  Patient/Family Comments/goals: pt agreed to therapy  Pain/Comfort:  · Pain Rating 1: 6/10  · Location - Side 1: Left  · Location - Orientation 1: anterior  · Location 1: knee  · Pain Addressed 1: Pre-medicate for activity, Reposition, Distraction, Cessation of Activity, Nurse notified  · Pain Rating Post-Intervention 1: 6/10      Objective:     Communicated with nurse prior to session.  Patient found supine with telemetry, peripheral IV upon PT entry  to room.     General Precautions: Standard, fall   Orthopedic Precautions:LLE weight bearing as tolerated   Braces:    Respiratory Status: Room air     Functional Mobility:  · Bed Mobility:     · Scooting: stand by assistance  · Supine to Sit: stand by assistance  · Transfers:     · Sit to Stand:  supervision with rolling walker  · Gait: 60 ft x 2 with rw with S  · Balance: F+ standing, G sitting  · Stairs:  Pt ascended/descended 2 stair(s) with No Assistive Device with right handrail and L HHA with Minimal Assistance.       AM-PAC 6 CLICK MOBILITY  Turning over in bed (including adjusting bedclothes, sheets and blankets)?: 4  Sitting down on and standing up from a chair with arms (e.g., wheelchair, bedside commode, etc.): 4  Moving from lying on back to sitting on the side of the bed?: 4  Moving to and from a bed to a chair (including a wheelchair)?: 3  Need to walk in hospital room?: 3  Climbing 3-5 steps with a railing?: 3  Basic Mobility Total Score: 21       Therapeutic Activities and Exercises:   Lower Extremity Exercises.   Patient educated on the purpose of therapeutic exercise.     Patient verbalized acceptance/understanding of instructions, expectations, and limitations(for safety).   Patient performed: 1 set of 10 reps (each) of B LE There Ex: AP, LAQ, Hip abd/add, Hip flexion while sitting up on EOB.        Patient required still requires verbal cues/tactile cues to ensure correct sequence, to maintain proper form, and to allow for self-correction.   Pt reported no complaints or problems with exercise activity.   pt performed supine BLE x 10 reps : AP, GS, QS . Encouraged pt to performed BLE AP, GS, QS throughout the day . Pt verbalize understanding.    Sat EOB for set up with good sitting balance.    Patient left sitting edge of bed with all lines intact, call button in reach and nurse notified..    GOALS:   Multidisciplinary Problems     Physical Therapy Goals        Problem: Physical Therapy     Goal Priority Disciplines Outcome Goal Variances Interventions   Physical Therapy Goal     PT, PT/OT Ongoing, Progressing     Description: Goals to be met by: 22     Patient will increase functional independence with mobility by performin. Supine to sit with Modified Fleming  2. Sit to supine with Modified Fleming  3. Sit to stand transfer with Supervision  4. Bed to chair transfer with Supervision using Rolling Walker  5. Gait  x 100 feet with Supervision using Rolling Walker.   6. Ascend/Descend 1 step with Stand-by Assistance using Rolling Walker.                     Time Tracking:     PT Received On: 22  PT Start Time: 1150     PT Stop Time: 1230  PT Total Time (min): 40 min     Billable Minutes: Gait Training 12, Therapeutic Activity 8 and Therapeutic Exercise 20    Treatment Type: Treatment  PT/PTA: PTA     PTA Visit Number: 2     2022

## 2022-07-21 LAB
ANION GAP SERPL CALC-SCNC: 11 MMOL/L (ref 8–16)
BASOPHILS # BLD AUTO: 0.05 K/UL (ref 0–0.2)
BASOPHILS NFR BLD: 0.6 % (ref 0–1.9)
BUN SERPL-MCNC: 34 MG/DL (ref 8–23)
CALCIUM SERPL-MCNC: 8.9 MG/DL (ref 8.7–10.5)
CHLORIDE SERPL-SCNC: 102 MMOL/L (ref 95–110)
CO2 SERPL-SCNC: 27 MMOL/L (ref 23–29)
CREAT SERPL-MCNC: 1.5 MG/DL (ref 0.5–1.4)
DIFFERENTIAL METHOD: ABNORMAL
EOSINOPHIL # BLD AUTO: 0.5 K/UL (ref 0–0.5)
EOSINOPHIL NFR BLD: 5.2 % (ref 0–8)
ERYTHROCYTE [DISTWIDTH] IN BLOOD BY AUTOMATED COUNT: 12.7 % (ref 11.5–14.5)
EST. GFR  (AFRICAN AMERICAN): 38 ML/MIN/1.73 M^2
EST. GFR  (NON AFRICAN AMERICAN): 33 ML/MIN/1.73 M^2
GLUCOSE SERPL-MCNC: 87 MG/DL (ref 70–110)
HCT VFR BLD AUTO: 23 % (ref 37–48.5)
HGB BLD-MCNC: 7.7 G/DL (ref 12–16)
IMM GRANULOCYTES # BLD AUTO: 0.03 K/UL (ref 0–0.04)
IMM GRANULOCYTES NFR BLD AUTO: 0.3 % (ref 0–0.5)
LYMPHOCYTES # BLD AUTO: 2 K/UL (ref 1–4.8)
LYMPHOCYTES NFR BLD: 22.7 % (ref 18–48)
MCH RBC QN AUTO: 31.3 PG (ref 27–31)
MCHC RBC AUTO-ENTMCNC: 33.5 G/DL (ref 32–36)
MCV RBC AUTO: 94 FL (ref 82–98)
MONOCYTES # BLD AUTO: 1.5 K/UL (ref 0.3–1)
MONOCYTES NFR BLD: 17.3 % (ref 4–15)
NEUTROPHILS # BLD AUTO: 4.7 K/UL (ref 1.8–7.7)
NEUTROPHILS NFR BLD: 53.9 % (ref 38–73)
NRBC BLD-RTO: 0 /100 WBC
PLATELET # BLD AUTO: 265 K/UL (ref 150–450)
PMV BLD AUTO: 9.6 FL (ref 9.2–12.9)
POCT GLUCOSE: 103 MG/DL (ref 70–110)
POCT GLUCOSE: 125 MG/DL (ref 70–110)
POCT GLUCOSE: 139 MG/DL (ref 70–110)
POTASSIUM SERPL-SCNC: 4.3 MMOL/L (ref 3.5–5.1)
RBC # BLD AUTO: 2.46 M/UL (ref 4–5.4)
SODIUM SERPL-SCNC: 140 MMOL/L (ref 136–145)
WBC # BLD AUTO: 8.77 K/UL (ref 3.9–12.7)

## 2022-07-21 PROCEDURE — 25000003 PHARM REV CODE 250: Performed by: STUDENT IN AN ORGANIZED HEALTH CARE EDUCATION/TRAINING PROGRAM

## 2022-07-21 PROCEDURE — 36415 COLL VENOUS BLD VENIPUNCTURE: CPT | Performed by: STUDENT IN AN ORGANIZED HEALTH CARE EDUCATION/TRAINING PROGRAM

## 2022-07-21 PROCEDURE — 97110 THERAPEUTIC EXERCISES: CPT | Mod: CQ

## 2022-07-21 PROCEDURE — 85025 COMPLETE CBC W/AUTO DIFF WBC: CPT | Performed by: STUDENT IN AN ORGANIZED HEALTH CARE EDUCATION/TRAINING PROGRAM

## 2022-07-21 PROCEDURE — 94761 N-INVAS EAR/PLS OXIMETRY MLT: CPT

## 2022-07-21 PROCEDURE — 99900035 HC TECH TIME PER 15 MIN (STAT)

## 2022-07-21 PROCEDURE — 63600175 PHARM REV CODE 636 W HCPCS: Performed by: STUDENT IN AN ORGANIZED HEALTH CARE EDUCATION/TRAINING PROGRAM

## 2022-07-21 PROCEDURE — 11000001 HC ACUTE MED/SURG PRIVATE ROOM

## 2022-07-21 PROCEDURE — 94799 UNLISTED PULMONARY SVC/PX: CPT

## 2022-07-21 PROCEDURE — 25000003 PHARM REV CODE 250: Performed by: ORTHOPAEDIC SURGERY

## 2022-07-21 PROCEDURE — 97116 GAIT TRAINING THERAPY: CPT | Mod: CQ

## 2022-07-21 PROCEDURE — 80048 BASIC METABOLIC PNL TOTAL CA: CPT | Performed by: STUDENT IN AN ORGANIZED HEALTH CARE EDUCATION/TRAINING PROGRAM

## 2022-07-21 PROCEDURE — 97530 THERAPEUTIC ACTIVITIES: CPT | Mod: CO

## 2022-07-21 RX ORDER — POLYETHYLENE GLYCOL 3350 17 G/17G
17 POWDER, FOR SOLUTION ORAL 2 TIMES DAILY
Status: DISCONTINUED | OUTPATIENT
Start: 2022-07-21 | End: 2022-07-22 | Stop reason: HOSPADM

## 2022-07-21 RX ADMIN — DOCUSATE SODIUM AND SENNOSIDES 1 TABLET: 8.6; 5 TABLET, FILM COATED ORAL at 09:07

## 2022-07-21 RX ADMIN — OXYCODONE HYDROCHLORIDE 10 MG: 5 TABLET ORAL at 05:07

## 2022-07-21 RX ADMIN — OXYCODONE HYDROCHLORIDE 10 MG: 5 TABLET ORAL at 02:07

## 2022-07-21 RX ADMIN — ACETAMINOPHEN 650 MG: 325 TABLET ORAL at 12:07

## 2022-07-21 RX ADMIN — METOPROLOL SUCCINATE 25 MG: 25 TABLET, EXTENDED RELEASE ORAL at 08:07

## 2022-07-21 RX ADMIN — ASPIRIN 81 MG: 81 TABLET, COATED ORAL at 08:07

## 2022-07-21 RX ADMIN — CEFAZOLIN SODIUM 2 G: 2 SOLUTION INTRAVENOUS at 10:07

## 2022-07-21 RX ADMIN — ATORVASTATIN CALCIUM 80 MG: 40 TABLET, FILM COATED ORAL at 09:07

## 2022-07-21 RX ADMIN — FAMOTIDINE 20 MG: 20 TABLET ORAL at 08:07

## 2022-07-21 RX ADMIN — CELECOXIB 200 MG: 100 CAPSULE ORAL at 09:07

## 2022-07-21 RX ADMIN — GABAPENTIN 600 MG: 300 CAPSULE ORAL at 09:07

## 2022-07-21 RX ADMIN — OXYCODONE HYDROCHLORIDE 10 MG: 5 TABLET ORAL at 07:07

## 2022-07-21 RX ADMIN — CEFAZOLIN SODIUM 2 G: 2 SOLUTION INTRAVENOUS at 11:07

## 2022-07-21 RX ADMIN — DULOXETINE 60 MG: 30 CAPSULE, DELAYED RELEASE ORAL at 08:07

## 2022-07-21 RX ADMIN — GABAPENTIN 600 MG: 300 CAPSULE ORAL at 02:07

## 2022-07-21 RX ADMIN — ISOSORBIDE MONONITRATE 60 MG: 30 TABLET, EXTENDED RELEASE ORAL at 08:07

## 2022-07-21 RX ADMIN — ASPIRIN 81 MG: 81 TABLET, COATED ORAL at 09:07

## 2022-07-21 RX ADMIN — CELECOXIB 200 MG: 100 CAPSULE ORAL at 08:07

## 2022-07-21 RX ADMIN — VALSARTAN 160 MG: 160 TABLET ORAL at 08:07

## 2022-07-21 RX ADMIN — MUPIROCIN: 20 OINTMENT TOPICAL at 09:07

## 2022-07-21 RX ADMIN — ACETAMINOPHEN 650 MG: 325 TABLET ORAL at 05:07

## 2022-07-21 RX ADMIN — POLYETHYLENE GLYCOL 3350 17 G: 17 POWDER, FOR SOLUTION ORAL at 09:07

## 2022-07-21 RX ADMIN — OXYCODONE HYDROCHLORIDE 10 MG: 5 TABLET ORAL at 11:07

## 2022-07-21 RX ADMIN — OXYCODONE HYDROCHLORIDE 10 MG: 5 TABLET ORAL at 12:07

## 2022-07-21 RX ADMIN — ACETAMINOPHEN 650 MG: 325 TABLET ORAL at 11:07

## 2022-07-21 RX ADMIN — GABAPENTIN 600 MG: 300 CAPSULE ORAL at 08:07

## 2022-07-21 RX ADMIN — FUROSEMIDE 40 MG: 40 TABLET ORAL at 08:07

## 2022-07-21 RX ADMIN — OXYCODONE HYDROCHLORIDE 10 MG: 5 TABLET ORAL at 10:07

## 2022-07-21 RX ADMIN — POLYETHYLENE GLYCOL 3350 17 G: 17 POWDER, FOR SOLUTION ORAL at 08:07

## 2022-07-21 RX ADMIN — DOCUSATE SODIUM AND SENNOSIDES 1 TABLET: 8.6; 5 TABLET, FILM COATED ORAL at 08:07

## 2022-07-21 RX ADMIN — CEFAZOLIN SODIUM 2 G: 2 SOLUTION INTRAVENOUS at 03:07

## 2022-07-21 RX ADMIN — CEFAZOLIN SODIUM 2 G: 2 SOLUTION INTRAVENOUS at 12:07

## 2022-07-21 RX ADMIN — MUPIROCIN: 20 OINTMENT TOPICAL at 08:07

## 2022-07-21 NOTE — SUBJECTIVE & OBJECTIVE
Interval History: NAEON. Pt waiting final culture results per ortho and working with PT who rec home health PT.     Review of Systems   Constitutional:  Negative for chills and fever.   HENT:  Negative for ear pain and sore throat.    Eyes:  Negative for discharge and redness.   Respiratory:  Negative for cough and chest tightness.    Cardiovascular:  Negative for chest pain and leg swelling.   Gastrointestinal:  Negative for abdominal pain and diarrhea.   Genitourinary:  Negative for dysuria and hematuria.   Musculoskeletal:  Negative for back pain and myalgias.        Left knee burning   Skin:  Negative for pallor and rash.   Neurological:  Negative for dizziness and headaches.   Psychiatric/Behavioral:  Negative for confusion. The patient is not nervous/anxious.    Objective:     Vital Signs (Most Recent):  Temp: 97.7 °F (36.5 °C) (07/21/22 0819)  Pulse: 83 (07/21/22 0819)  Resp: 17 (07/21/22 0819)  BP: (!) 145/65 (07/21/22 0819)  SpO2: (!) 94 % (07/21/22 0819)   Vital Signs (24h Range):  Temp:  [97.7 °F (36.5 °C)-98.2 °F (36.8 °C)] 97.7 °F (36.5 °C)  Pulse:  [64-83] 83  Resp:  [16-20] 17  SpO2:  [94 %-97 %] 94 %  BP: (100-150)/(49-65) 145/65     Weight: 85.3 kg (188 lb 0.8 oz)  Body mass index is 32.28 kg/m².    Intake/Output Summary (Last 24 hours) at 7/21/2022 0838  Last data filed at 7/21/2022 0500  Gross per 24 hour   Intake 148.03 ml   Output 550 ml   Net -401.97 ml      Physical Exam  Vitals reviewed.   Constitutional:       General: She is not in acute distress.     Appearance: Normal appearance.   HENT:      Head: Normocephalic and atraumatic.   Eyes:      General:         Right eye: No discharge.         Left eye: No discharge.      Extraocular Movements: Extraocular movements intact.      Pupils: Pupils are equal, round, and reactive to light.   Cardiovascular:      Rate and Rhythm: Normal rate and regular rhythm.      Pulses: Normal pulses.      Heart sounds: No murmur heard.  Pulmonary:      Effort:  Pulmonary effort is normal. No respiratory distress.      Breath sounds: Normal breath sounds. No wheezing.   Abdominal:      General: Abdomen is flat. Bowel sounds are normal.      Palpations: Abdomen is soft.      Tenderness: There is no abdominal tenderness.   Musculoskeletal:         General: No swelling or tenderness.      Comments: Left knee bandaged cdi   Skin:     Capillary Refill: Capillary refill takes less than 2 seconds.      Coloration: Skin is not jaundiced.   Neurological:      General: No focal deficit present.      Mental Status: She is alert and oriented to person, place, and time.      Comments: Left leg neurovascularly intact, sensation intact   Psychiatric:         Mood and Affect: Mood normal.         Behavior: Behavior normal.       Recent Labs   Lab 07/18/22  1212 07/19/22  0425 07/21/22  0743   WBC  --  10.43 8.77   HGB  --  7.8* 7.7*   HCT  --  23.4* 23.0*   MCV  --  93 94   RBC  --  2.53* 2.46*   MCH  --  30.8 31.3*   MCHC  --  33.3 33.5   RDW  --  12.3 12.7   PLT  --  250 265   MPV  --  9.8 9.6   GRAN  --  78.4*  8.2* 53.9  4.7   LYMPH  --  10.5*  1.1 22.7  2.0   MONO  --  10.6  1.1* 17.3*  1.5*   EOSINOPHIL  --  0.0 5.2   BASOPHIL  --  0.2 0.6   SEGS 7  --   --      Recent Labs   Lab 07/19/22  0425      K 4.1      CO2 26   ANIONGAP 13   BUN 23   CREATININE 1.1   *   CALCIUM 8.9   ESTGFRAFRICA 56*   EGFRNONAA 49*     No results for input(s): COLORU, APPEARANCEUA, PHUR, SPECGRAV, PROTEINUA, GLUCUA, KETONESU, BILIRUBINUA, OCCULTUA, UROBILINOGEN, NITRITE, LEUKOCYTESUR, RBCUA, WBCUA, BACTERIA, SQUAMEPITHEL, HYALINECASTS, GRANULARCAST, MICROCMT in the last 168 hours.    Invalid input(s): SPECIMENU, AMORPHOUSU  No results for input(s): TROPONINI, CPK, CPKMB in the last 168 hours.  No results for input(s): PT, INR, APTT in the last 168 hours.  No results for input(s): TSH, S6KELHA, Q9SYXKG, THYROIDAB, FREET4 in the last 168 hours.  X-Ray Knee 1 or 2 View Left    Result  Date: 7/18/2022  EXAMINATION: XR KNEE 1 OR 2 VIEW LEFT CLINICAL HISTORY: post op; Presence of left artificial knee joint TECHNIQUE: 03/10/2022 views of the knee COMPARISON: None FINDINGS: Postsurgical changes of recent total knee arthroplasty procedure. The orthopedic hardware appears in good position and alignment without adjacent fracture.     As above Electronically signed by: Martinez Caldera MD Date:    07/18/2022 Time:    16:11    X-Ray Hip to Ankle    Result Date: 6/29/2022  EXAMINATION: XR HIP TO ANKLE CLINICAL HISTORY: Presence of left artificial knee joint TECHNIQUE: Lower extremity series. COMPARISON: None available. FINDINGS: Prior unicompartmental knee arthroplasty of the left medial compartment which appears intact.  There is advanced degenerative change of the left lateral compartment with accompanying subchondral sclerosis and valgus deformity.  Femorotibial joint space narrowing seen to a lesser degree of the right medial greater than lateral femorotibial compartment with scattered meniscal chondrocalcinosis. Lower extremity length was calculated from the superior acetabulum to the talar dome using electronic calipers measuring 78.3 cm on the right and 78.7 cm on the left.     As above. Electronically signed by: Trev Cole Date:    06/29/2022 Time:    13:58    SURG [place] Surgery Fluoro Usage    Result Date: 7/18/2022  See OP Notes for results. IMPRESSION: See OP Notes for results. This procedure was auto-finalized by: Virtual [employer]i[facility]    X-Ray Chest 1 View Pre-OP    Result Date: 6/29/2022  EXAMINATION: XR CHEST 1 VIEW PRE-OP CLINICAL HISTORY: Presence of left artificial knee joint TECHNIQUE: Single frontal view of the chest was performed. COMPARISON: 03/10/2022 FINDINGS: Cardiomediastinal silhouette is unchanged.  Lungs appear similar without large airspace opacity or lobar consolidation.  Possible mild subsegmental atelectasis in the left lower lung.  No pleural effusion or gross pneumothorax.   Calcific atherosclerosis of the aorta.  No acute osseous abnormality.     As above. Electronically signed by: Trev Cole Date:    06/29/2022 Time:    13:50    Microbiology Results (last 7 days)       Procedure Component Value Units Date/Time    Culture, Anaerobe [[id number]] Collected: 07/18/22 1328    Order Status: Completed Specimen: Bone from Knee, Left Updated: 07/20/22 0749     Anaerobic Culture Culture in progress    Narrative:      Tibial surface    Culture, Anaerobe [[id number]] Collected: 07/18/22 1336    Order Status: Completed Specimen: Bone from Knee, Left Updated: 07/20/22 0749     Anaerobic Culture Culture in progress    Narrative:      Femoral surface    Culture, Anaerobe [[id number]] Collected: 07/18/22 1254    Order Status: Completed Specimen: Wound from Knee, Left Updated: 07/20/22 0749     Anaerobic Culture Culture in progress    Narrative:      Posterior synovium    Aerobic culture [[id number]] Collected: 07/18/22 1254    Order Status: Completed Specimen: Wound from Knee, Left Updated: 07/20/22 0735     Aerobic Bacterial Culture No growth    Narrative:      Posterior synovium    Aerobic culture [[id number]] Collected: 07/18/22 1328    Order Status: Completed Specimen: Bone from Knee, Left Updated: 07/20/22 0735     Aerobic Bacterial Culture No growth    Narrative:      Tibial surface    Aerobic culture [[id number]] Collected: 07/18/22 1336    Order Status: Completed Specimen: Bone from Knee, Left Updated: 07/20/22 0735     Aerobic Bacterial Culture No growth    Narrative:      Femoral surface    AFB Culture & Smear [[id number]] Collected: 07/18/22 1328    Order Status: Completed Specimen: Bone from Knee, Left Updated: 07/19/22 2127     AFB Culture & Smear Culture in progress     AFB CULTURE STAIN No acid fast bacilli seen.    Narrative:      Tibial surface    AFB Culture & Smear [[id number]] Collected: 07/18/22 1336    Order Status: Completed Specimen: Bone from Knee, Left Updated: 07/19/22 2127      AFB Culture & Smear Culture in progress     AFB CULTURE STAIN No acid fast bacilli seen.    Narrative:      Femoral surface    AFB Culture & Smear [[id number]] Collected: 07/18/22 1254    Order Status: Completed Specimen: Wound from Knee, Left Updated: 07/19/22 2127     AFB Culture & Smear Culture in progress     AFB CULTURE STAIN No acid fast bacilli seen.    Narrative:      Posterior synovium    Fungus culture [[id number]] Collected: 07/18/22 1336    Order Status: Completed Specimen: Bone from Knee, Left Updated: 07/19/22 1416     Fungus (Mycology) Culture Culture in progress    Narrative:      Femoral surface    Fungus culture [[id number]] Collected: 07/18/22 1254    Order Status: Completed Specimen: Wound from Knee, Left Updated: 07/19/22 1416     Fungus (Mycology) Culture Culture in progress    Narrative:      Posterior synovium    Fungus culture [[id number]] Collected: 07/18/22 1328    Order Status: Completed Specimen: Bone from Knee, Left Updated: 07/19/22 1416     Fungus (Mycology) Culture Culture in progress    Narrative:      Tibial surface    Culture, Body Fluid - Bactec [[id number]] Collected: 07/18/22 2124    Order Status: No result Updated: 07/18/22 2125    Gram stain [[id number]] Collected: 07/18/22 1328    Order Status: Completed Specimen: Bone from Knee, Left Updated: 07/18/22 2109     Gram Stain Result Rare WBC's      No organisms seen    Narrative:      Tibial surface    Gram stain [[id number]] Collected: 07/18/22 1254    Order Status: Completed Specimen: Wound from Knee, Left Updated: 07/18/22 2107     Gram Stain Result Rare WBC's      No organisms seen    Narrative:      Posterior synovium    Gram stain [[id number]] Collected: 07/18/22 1336    Order Status: Completed Specimen: Bone from Knee, Left Updated: 07/18/22 2105     Gram Stain Result No WBC's      No organisms seen    Narrative:      Femoral surface    Culture, Anaerobe [[id number]] Collected: 07/18/22 1212    Order Status: Canceled Specimen:  Joint Fluid from Knee, Left     Aerobic culture [[id number]] Collected: 07/18/22 1212    Order Status: Canceled Specimen: Wound from Knee, Left

## 2022-07-21 NOTE — PT/OT/SLP PROGRESS
Physical Therapy Treatment    Patient Name:  Enedelia García   MRN:  [id number]    Recommendations:     Discharge Recommendations:  home health PT   Discharge Equipment Recommendations: none   Barriers to discharge: decreased mobility,strength and endurance    Assessment:     Enedelia García is a 77 y.o. female admitted with a medical diagnosis of S/P left unicompartmental knee replacement.  She presents with the following impairments/functional limitations:  weakness, impaired endurance, impaired functional mobility, impaired balance, gait instability, decreased lower extremity function, pain, decreased ROM, impaired skin, orthopedic precautions,pt with improving status and remains with decreased endurance strength and balance.pt will benefit from  services upon discharge.    Rehab Prognosis: Good; patient would benefit from acute skilled PT services to address these deficits and reach maximum level of function.    Recent Surgery: Procedure(s) (LRB):  REVISION, ARTHROPLASTY, KNEE (Left) 3 Days Post-Op    Plan:     During this hospitalization, patient to be seen daily (BID if time allows) to address the identified rehab impairments via gait training, therapeutic activities, therapeutic exercises, neuromuscular re-education and progress toward the following goals:    · Plan of Care Expires:  08/19/22    Subjective     Chief Complaint: n/a  Patient/Family Comments/goals: pt agreeable to up in chair.  Pain/Comfort:  · Pain Rating 1:  (some with bending)  · Location - Side 1: Left  · Location - Orientation 1: generalized  · Location 1: knee  · Pain Addressed 1: Reposition, Pre-medicate for activity, Distraction      Objective:     Communicated with nsg prior to session.  Patient found supine with   upon PT entry to room.     General Precautions: Standard, fall   Orthopedic Precautions:LLE weight bearing as tolerated   Braces: N/A  Respiratory Status: Room air     Functional Mobility:  · Bed Mobility:      · Supine to Sit: supervision and stand by assistance  · Transfers:     · Sit to Stand:  stand by assistance with rolling walker  · Bed to Chair: contact guard assistance with  rolling walker  using  ambulation  · Toilet Transfer: contact guard assistance with  rolling walker  using  Step Transfer  · Gait: amb ~160' with RW and SBA  · Balance: fair standing balance with RW      AM-PAC 6 CLICK MOBILITY  Turning over in bed (including adjusting bedclothes, sheets and blankets)?: 4  Sitting down on and standing up from a chair with arms (e.g., wheelchair, bedside commode, etc.): 3  Moving from lying on back to sitting on the side of the bed?: 3  Moving to and from a bed to a chair (including a wheelchair)?: 3  Need to walk in hospital room?: 3  Climbing 3-5 steps with a railing?: 3  Basic Mobility Total Score: 19       Therapeutic Activities and Exercises: assisted pt with b/s commode t/f and CGA.filled and placed ice pack on pt's L knee.       Patient left up in chair with all lines intact, call button in reach and nsg notified..    GOALS: see general POC  Multidisciplinary Problems     Physical Therapy Goals        Problem: Physical Therapy    Goal Priority Disciplines Outcome Goal Variances Interventions   Physical Therapy Goal     PT, PT/OT Ongoing, Progressing     Description: Goals to be met by: 22     Patient will increase functional independence with mobility by performin. Supine to sit with Modified [place]  2. Sit to supine with Modified [place]  3. Sit to stand transfer with Supervision  4. Bed to chair transfer with Supervision using Rolling Walker  5. Gait  x 100 feet with Supervision using Rolling Walker.   6. Ascend/Descend 1 step with Stand-by Assistance using Rolling Walker.                     Time Tracking:     PT Received On: 22  PT Start Time: 1045     PT Stop Time: 1115  PT Total Time (min): 30 min     Billable Minutes: Gait Training 17 and Therapeutic Exercise  13    Treatment Type: Treatment  PT/PTA: PTA     PTA Visit Number: 3     07/21/2022

## 2022-07-21 NOTE — PLAN OF CARE
Problem: Physical Therapy  Goal: Physical Therapy Goal  Description: Goals to be met by: 22     Patient will increase functional independence with mobility by performin. Supine to sit with Modified [place]  2. Sit to supine with Modified [place]  3. Sit to stand transfer with Supervision  4. Bed to chair transfer with Supervision using Rolling Walker  5. Gait  x 100 feet with Supervision using Rolling Walker.   6. Ascend/Descend 1 step with Stand-by Assistance using Rolling Walker.    Outcome: Ongoing, Progressing

## 2022-07-21 NOTE — PLAN OF CARE
Problem: Occupational Therapy  Goal: Occupational Therapy Goal  Description: Goals to be met by: 8/19/22     Patient will increase functional independence with ADLs by performing:    LE Dressing with Supervision.  Grooming while standing with Supervision.  Toileting from toilet with Supervision for hygiene and clothing management.   Supine to sit with Supervision.  Step transfer with Supervision  Toilet transfer to toilet with Supervision.  Upper extremity exercise program x10 reps per handout, with independence.    Outcome: Ongoing, Progressing   Enedelia García is a 77 y.o. female with a medical diagnosis of S/P left unicompartmental knee replacement.  Performance deficits affecting function are weakness, impaired endurance, impaired self care skills, impaired functional mobility, gait instability, impaired balance, decreased lower extremity function, pain, decreased ROM, impaired skin, edema, orthopedic precautions. Pt found in bed, agreeable to therapy.  Pt requires SBA with bed mobility and transfers with SBA using RW.  Pt progressing well towards goals. Continue OT services to address functional goals, progressing as able.

## 2022-07-21 NOTE — PROGRESS NOTES
[facility] Progress Note    Surgeries:  Left revision total knee arthroplasty, synovectomy 7/19    S: NAEON. Pain well controlled. AFVSS.  No other acute issues. Drain removed yesterday. Tolerating PO. Voiding appropriately. Ambulated 60ft with therapy yesterday. Denies numbness or tingling. No CP, SOB.    O:   Vitals:    07/21/22 0534   BP:    Pulse:    Resp: 17   Temp:      Recent Labs     07/19/22  0425   WBC 10.43   HGB 7.8*   HCT 23.4*        Recent Labs     07/19/22  0425      K 4.1      CO2 26   BUN 23   *     No results for input(s): ESR, CRP in the last 72 hours.    PE:  Gen: A+Ox3, NAD  Card: RRR by RP  Lungs: nonlabored breathing, symmetric chest rise  Abd: S/NT/ND    Left left extremity  Dressings with mild saturation  Appropriate post operative pain and swelling  Motor intact ehl/fhl/g/s/ta  Sensation intact light touch s/s/sp/dp/t  Well perfused distally, palpable DP    A/P:  77F s/p L revision TKA 7/18/22    WBAT  Ancef q8h  Follow intraoperative cultures  -NGTD  Drain removed; reinforce dressings prn  Bowel regimen  Regular diet  MM Pain control with Oxy 5mg for intermediate pain, Oxy 10mg for severe pain  ASA 81mg BID for DVT proph  Appreciate family medicine recommendations  PT/OT  Social work for discharge    Disposition: pending final cultures, PT clearance    Mike Mayers MD

## 2022-07-21 NOTE — PROGRESS NOTES
[facility] Medicine  Progress Note    Patient Name: Enedelia García  MRN: [id number]  Patient Class: IP- Inpatient   Admission Date: 7/18/2022  Length of Stay: 3 days  Attending Physician: Stan Catalan MD  Primary Care Provider: Lauren Brown MD        Subjective:     Principal Problem:S/P left unicompartmental knee replacement        HPI:  Enedelia García is a 77 y.o. female who  has a past medical history of Coronary artery disease, Epilepsy, Hyperlipidemia, Hypertension, and Normocytic anemia, prediabetes, GERD, ALEXANDRO, chronic opioid use presents to [facility] for left unicompartmental knee arthroplasty. Op note documented complete articular cartilage loss of lateral compartment. Cultures were sent during surgery. Patient reports feeling burning in her knee but denies any pain. [facility] consulted for medical management during hospitalization.          Overview/Hospital Course:  Patient is a 76yo woman w/ PMH of HTN, preDM, HLD, GERD, CAD, epilepsy, anxiety, depression and chronic opioid use. She is s/p left knee revision (Dr. Catalan) on 7/18 due to cartilage loss. FM was consulted for mgmt of chronic conditions.  She is recovering well, VSS, labs wnl. Hobbs and wound vac d/c'd. She is feeding and voiding appropriately. PT following, pending ortho recs and final cultures. Pain well controlled with scheduled Tylenol and Gabapentin, and Oxy PRN.       Interval History: NAEON. Pt waiting final culture results per ortho and working with PT who rec home health PT.     Review of Systems   Constitutional:  Negative for chills and fever.   HENT:  Negative for ear pain and sore throat.    Eyes:  Negative for discharge and redness.   Respiratory:  Negative for cough and chest tightness.    Cardiovascular:  Negative for chest pain and leg swelling.   Gastrointestinal:  Negative for abdominal pain and diarrhea.   Genitourinary:  Negative for dysuria and hematuria.   Musculoskeletal:  Negative for back pain and  myalgias.        Left knee burning   Skin:  Negative for pallor and rash.   Neurological:  Negative for dizziness and headaches.   Psychiatric/Behavioral:  Negative for confusion. The patient is not nervous/anxious.    Objective:     Vital Signs (Most Recent):  Temp: 97.7 °F (36.5 °C) (07/21/22 0819)  Pulse: 83 (07/21/22 0819)  Resp: 17 (07/21/22 0819)  BP: (!) 145/65 (07/21/22 0819)  SpO2: (!) 94 % (07/21/22 0819)   Vital Signs (24h Range):  Temp:  [97.7 °F (36.5 °C)-98.2 °F (36.8 °C)] 97.7 °F (36.5 °C)  Pulse:  [64-83] 83  Resp:  [16-20] 17  SpO2:  [94 %-97 %] 94 %  BP: (100-150)/(49-65) 145/65     Weight: 85.3 kg (188 lb 0.8 oz)  Body mass index is 32.28 kg/m².    Intake/Output Summary (Last 24 hours) at 7/21/2022 0838  Last data filed at 7/21/2022 0500  Gross per 24 hour   Intake 148.03 ml   Output 550 ml   Net -401.97 ml      Physical Exam  Vitals reviewed.   Constitutional:       General: She is not in acute distress.     Appearance: Normal appearance.   HENT:      Head: Normocephalic and atraumatic.   Eyes:      General:         Right eye: No discharge.         Left eye: No discharge.      Extraocular Movements: Extraocular movements intact.      Pupils: Pupils are equal, round, and reactive to light.   Cardiovascular:      Rate and Rhythm: Normal rate and regular rhythm.      Pulses: Normal pulses.      Heart sounds: No murmur heard.  Pulmonary:      Effort: Pulmonary effort is normal. No respiratory distress.      Breath sounds: Normal breath sounds. No wheezing.   Abdominal:      General: Abdomen is flat. Bowel sounds are normal.      Palpations: Abdomen is soft.      Tenderness: There is no abdominal tenderness.   Musculoskeletal:         General: No swelling or tenderness.      Comments: Left knee bandaged cdi   Skin:     Capillary Refill: Capillary refill takes less than 2 seconds.      Coloration: Skin is not jaundiced.   Neurological:      General: No focal deficit present.      Mental Status: She is  alert and oriented to person, place, and time.      Comments: Left leg neurovascularly intact, sensation intact   Psychiatric:         Mood and Affect: Mood normal.         Behavior: Behavior normal.       Recent Labs   Lab 07/18/22  1212 07/19/22  0425 07/21/22  0743   WBC  --  10.43 8.77   HGB  --  7.8* 7.7*   HCT  --  23.4* 23.0*   MCV  --  93 94   RBC  --  2.53* 2.46*   MCH  --  30.8 31.3*   MCHC  --  33.3 33.5   RDW  --  12.3 12.7   PLT  --  250 265   MPV  --  9.8 9.6   GRAN  --  78.4*  8.2* 53.9  4.7   LYMPH  --  10.5*  1.1 22.7  2.0   MONO  --  10.6  1.1* 17.3*  1.5*   EOSINOPHIL  --  0.0 5.2   BASOPHIL  --  0.2 0.6   SEGS 7  --   --      Recent Labs   Lab 07/19/22  0425      K 4.1      CO2 26   ANIONGAP 13   BUN 23   CREATININE 1.1   *   CALCIUM 8.9   ESTGFRAFRICA 56*   EGFRNONAA 49*     No results for input(s): COLORU, APPEARANCEUA, PHUR, SPECGRAV, PROTEINUA, GLUCUA, KETONESU, BILIRUBINUA, OCCULTUA, UROBILINOGEN, NITRITE, LEUKOCYTESUR, RBCUA, WBCUA, BACTERIA, SQUAMEPITHEL, HYALINECASTS, GRANULARCAST, MICROCMT in the last 168 hours.    Invalid input(s): SPECIMENU, AMORPHOUSU  No results for input(s): TROPONINI, CPK, CPKMB in the last 168 hours.  No results for input(s): PT, INR, APTT in the last 168 hours.  No results for input(s): TSH, G5CVZCM, K9BVZWB, THYROIDAB, FREET4 in the last 168 hours.  X-Ray Knee 1 or 2 View Left    Result Date: 7/18/2022  EXAMINATION: XR KNEE 1 OR 2 VIEW LEFT CLINICAL HISTORY: post op; Presence of left artificial knee joint TECHNIQUE: 03/10/2022 views of the knee COMPARISON: None FINDINGS: Postsurgical changes of recent total knee arthroplasty procedure. The orthopedic hardware appears in good position and alignment without adjacent fracture.     As above Electronically signed by: Martinez Caldera MD Date:    07/18/2022 Time:    16:11    X-Ray Hip to Ankle    Result Date: 6/29/2022  EXAMINATION: XR HIP TO ANKLE CLINICAL HISTORY: Presence of left artificial  knee joint TECHNIQUE: Lower extremity series. COMPARISON: None available. FINDINGS: Prior unicompartmental knee arthroplasty of the left medial compartment which appears intact.  There is advanced degenerative change of the left lateral compartment with accompanying subchondral sclerosis and valgus deformity.  Femorotibial joint space narrowing seen to a lesser degree of the right medial greater than lateral femorotibial compartment with scattered meniscal chondrocalcinosis. Lower extremity length was calculated from the superior acetabulum to the talar dome using electronic calipers measuring 78.3 cm on the right and 78.7 cm on the left.     As above. Electronically signed by: Trev Cole Date:    06/29/2022 Time:    13:58    SURG FL Surgery Fluoro Usage    Result Date: 7/18/2022  See OP Notes for results. IMPRESSION: See OP Notes for results. This procedure was auto-finalized by: Virtual Radiologist    X-Ray Chest 1 View Pre-OP    Result Date: 6/29/2022  EXAMINATION: XR CHEST 1 VIEW PRE-OP CLINICAL HISTORY: Presence of left artificial knee joint TECHNIQUE: Single frontal view of the chest was performed. COMPARISON: 03/10/2022 FINDINGS: Cardiomediastinal silhouette is unchanged.  Lungs appear similar without large airspace opacity or lobar consolidation.  Possible mild subsegmental atelectasis in the left lower lung.  No pleural effusion or gross pneumothorax.  Calcific atherosclerosis of the aorta.  No acute osseous abnormality.     As above. Electronically signed by: Trev Cole Date:    06/29/2022 Time:    13:50    Microbiology Results (last 7 days)       Procedure Component Value Units Date/Time    Culture, Anaerobe [[id number]] Collected: 07/18/22 1328    Order Status: Completed Specimen: Bone from Knee, Left Updated: 07/20/22 0749     Anaerobic Culture Culture in progress    Narrative:      Tibial surface    Culture, Anaerobe [[id number]] Collected: 07/18/22 1336    Order Status: Completed Specimen:  Bone from Knee, Left Updated: 07/20/22 0749     Anaerobic Culture Culture in progress    Narrative:      Femoral surface    Culture, Anaerobe [[id number]] Collected: 07/18/22 1254    Order Status: Completed Specimen: Wound from Knee, Left Updated: 07/20/22 0749     Anaerobic Culture Culture in progress    Narrative:      Posterior synovium    Aerobic culture [[id number]] Collected: 07/18/22 1254    Order Status: Completed Specimen: Wound from Knee, Left Updated: 07/20/22 0735     Aerobic Bacterial Culture No growth    Narrative:      Posterior synovium    Aerobic culture [[id number]] Collected: 07/18/22 1328    Order Status: Completed Specimen: Bone from Knee, Left Updated: 07/20/22 0735     Aerobic Bacterial Culture No growth    Narrative:      Tibial surface    Aerobic culture [[id number]] Collected: 07/18/22 1336    Order Status: Completed Specimen: Bone from Knee, Left Updated: 07/20/22 0735     Aerobic Bacterial Culture No growth    Narrative:      Femoral surface    AFB Culture & Smear [[id number]] Collected: 07/18/22 1328    Order Status: Completed Specimen: Bone from Knee, Left Updated: 07/19/22 2127     AFB Culture & Smear Culture in progress     AFB CULTURE STAIN No acid fast bacilli seen.    Narrative:      Tibial surface    AFB Culture & Smear [[id number]] Collected: 07/18/22 1336    Order Status: Completed Specimen: Bone from Knee, Left Updated: 07/19/22 2127     AFB Culture & Smear Culture in progress     AFB CULTURE STAIN No acid fast bacilli seen.    Narrative:      Femoral surface    AFB Culture & Smear [[id number]] Collected: 07/18/22 1254    Order Status: Completed Specimen: Wound from Knee, Left Updated: 07/19/22 2127     AFB Culture & Smear Culture in progress     AFB CULTURE STAIN No acid fast bacilli seen.    Narrative:      Posterior synovium    Fungus culture [[id number]] Collected: 07/18/22 1336    Order Status: Completed Specimen: Bone from Knee, Left Updated: 07/19/22 1416     Fungus (Mycology)  Culture Culture in progress    Narrative:      Femoral surface    Fungus culture [[id number]] Collected: 07/18/22 1254    Order Status: Completed Specimen: Wound from Knee, Left Updated: 07/19/22 1416     Fungus (Mycology) Culture Culture in progress    Narrative:      Posterior synovium    Fungus culture [[id number]] Collected: 07/18/22 1328    Order Status: Completed Specimen: Bone from Knee, Left Updated: 07/19/22 1416     Fungus (Mycology) Culture Culture in progress    Narrative:      Tibial surface    Culture, Body Fluid - Bactec [[id number]] Collected: 07/18/22 2124    Order Status: No result Updated: 07/18/22 2125    Gram stain [[id number]] Collected: 07/18/22 1328    Order Status: Completed Specimen: Bone from Knee, Left Updated: 07/18/22 2109     Gram Stain Result Rare WBC's      No organisms seen    Narrative:      Tibial surface    Gram stain [[id number]] Collected: 07/18/22 1254    Order Status: Completed Specimen: Wound from Knee, Left Updated: 07/18/22 2107     Gram Stain Result Rare WBC's      No organisms seen    Narrative:      Posterior synovium    Gram stain [[id number]] Collected: 07/18/22 1336    Order Status: Completed Specimen: Bone from Knee, Left Updated: 07/18/22 2105     Gram Stain Result No WBC's      No organisms seen    Narrative:      Femoral surface    Culture, Anaerobe [[id number]] Collected: 07/18/22 1212    Order Status: Canceled Specimen: Joint Fluid from Knee, Left     Aerobic culture [[id number]] Collected: 07/18/22 1212    Order Status: Canceled Specimen: Wound from Knee, Left                 Assessment/Plan:      * S/P left unicompartmental knee replacement  S/p left knee arthroplasty  Cultures sent and started on ancef q 7 days  Nerve block in place  Drain pulled   PT rec home w/ home health     Plan:  Defer to primary for pain and abx management  Recommend broad spectrum abx till cultures complete with growth or ngtd          Narcotic drug use  Home meds: percocet and gabapentin      Plan:  Defer to primary team for pain management  On home gabapentin and on celecoxib      Prediabetes  DM type 2 with   - Last A1c  Lab Results   Component Value Date    HGBA1C 6.4 (H) 06/29/2022       - BG on Admission   - Home regimen: metformin  - Will hold metformin while inpt and order SSI  - POCT glucose Q4hr  - goal -180    HTN (hypertension)  Continue home meds: furosemide, isosorbide mononitrate, valsartan  Goal SBP <140      Moderate episode of recurrent major depressive disorder  Continue home meds duloxetine        GERD (gastroesophageal reflux disease)  Continue home famotidine BID      VTE Risk Mitigation (From admission, onward)         Ordered     Place MANOJ hose  Until discontinued         07/18/22 1458     Place sequential compression device  Until discontinued         07/18/22 1458                Discharge Planning   ISAURA:      Code Status: Prior   Is the patient medically ready for discharge?:     Reason for patient still in hospital (select all that apply): Patient trending condition  Discharge Plan A: Home Health        Sushil De La O MD  Department of Hospital Medicine   [facility]

## 2022-07-21 NOTE — PLAN OF CARE
Chio met with pt and pt's significant other Nick [phone] to discuss d/c planning. Per therapy recommendation, Chio discussed with pt and Nick about discharging home with  services. Pt and Nick are agreeable for pt to discharge home with  services. Sw encouraged pt and Nick to call with any questions or concerns. Sw will continue to follow pt for d/c planning.     Future Appointments   Date Time Provider Department Center   8/2/2022  1:15 PM Stan Catalan MD [facility]   8/5/2022  1:00 PM Pineda Farias MD [facility]   9/9/2022  2:20 PM Marichuy Hummel MD [facility]         07/21/22 1412   Post-Acute Status   Post-Acute Authorization Home Health   Coverage [employer]   Hospital Resources/Appts/Education Provided Appointments scheduled by Navigator/Coordinator   Discharge Delays None known at this time   Discharge Plan   Discharge Plan A Home Health

## 2022-07-21 NOTE — PT/OT/SLP PROGRESS
Occupational Therapy   Treatment    Name: Enedelia García  MRN: [id number]  Admitting Diagnosis:  S/P left unicompartmental knee replacement  3 Days Post-Op    Recommendations:     Discharge Recommendations: home health PT  Discharge Equipment Recommendations:  none  Barriers to discharge:  Decreased caregiver support    Assessment:     Enedelia García is a 77 y.o. female with a medical diagnosis of S/P left unicompartmental knee replacement.  Performance deficits affecting function are weakness, impaired endurance, impaired self care skills, impaired functional mobility, gait instability, impaired balance, decreased lower extremity function, pain, decreased ROM, impaired skin, edema, orthopedic precautions. Pt found in bed, agreeable to therapy.  Pt requires SBA with bed mobility and transfers with SBA using RW.  Pt progressing well towards goals. Continue OT services to address functional goals, progressing as able.      Rehab Prognosis:  Good; patient would benefit from acute skilled OT services to address these deficits and reach maximum level of function.       Plan:     Patient to be seen 5 x/week to address the above listed problems via self-care/home management, therapeutic activities, therapeutic exercises  · Plan of Care Expires: 08/19/22  · Plan of Care Reviewed with: patient    Subjective     Pain/Comfort:  · Pain Rating 1:  (Pt reports minimal pain with bending L knee)    Objective:     Communicated with: RN prior to session.  Patient found HOB elevated with telemetry, peripheral IV upon OT entry to room.    General Precautions: Standard, fall   Orthopedic Precautions:LLE weight bearing as tolerated   Braces: N/A  Respiratory Status: Room air     Occupational Performance:     Bed Mobility:    · Patient completed Scooting/Bridging with stand by assistance  · Patient completed Supine to Sit with stand by assistance  · Patient completed Sit to Supine with stand by assistance     Functional  Mobility/Transfers:  · Patient completed Sit <> Stand Transfer with stand by assistance  with  rolling walker   Functional Mobility: Pt ambulated with SBA using RW in preparation for ambulatory level ADL/IADL.    Activities of Daily Living:  · Declined      [facility] 6 Click ADL: 17      Patient left HOB elevated with all lines intact, call button in reach, bed alarm on, RN notified and spouse presentEducation:      GOALS:   Multidisciplinary Problems     Occupational Therapy Goals        Problem: Occupational Therapy    Goal Priority Disciplines Outcome Interventions   Occupational Therapy Goal     OT, PT/OT Ongoing, Progressing    Description: Goals to be met by: 8/19/22     Patient will increase functional independence with ADLs by performing:    LE Dressing with Supervision.  Grooming while standing with Supervision.  Toileting from toilet with Supervision for hygiene and clothing management.   Supine to sit with Supervision.  Step transfer with Supervision  Toilet transfer to toilet with Supervision.  Upper extremity exercise program x10 reps per handout, with independence.                     Time Tracking:     OT Date of Treatment: 07/21/22  OT Start Time: 1409  OT Stop Time: 1425  OT Total Time (min): 16 min    Billable Minutes:Therapeutic Activity 16            7/21/2022

## 2022-07-21 NOTE — ASSESSMENT & PLAN NOTE
S/p left knee arthroplasty  Cultures sent and started on ancef q 7 days  Nerve block in place  Drain pulled   PT rec home w/ home health     Plan:  Defer to primary for pain and abx management  Recommend broad spectrum abx till cultures complete with growth or ngtd

## 2022-07-22 ENCOUNTER — PATIENT OUTREACH (OUTPATIENT)
Dept: ADMINISTRATIVE | Facility: OTHER | Age: 77
End: 2022-07-22
Payer: MEDICARE

## 2022-07-22 VITALS
RESPIRATION RATE: 18 BRPM | OXYGEN SATURATION: 96 % | HEIGHT: 64 IN | TEMPERATURE: 98 F | BODY MASS INDEX: 32.11 KG/M2 | DIASTOLIC BLOOD PRESSURE: 63 MMHG | WEIGHT: 188.06 LBS | HEART RATE: 71 BPM | SYSTOLIC BLOOD PRESSURE: 133 MMHG

## 2022-07-22 LAB
POCT GLUCOSE: 116 MG/DL (ref 70–110)
POCT GLUCOSE: 116 MG/DL (ref 70–110)

## 2022-07-22 PROCEDURE — 94761 N-INVAS EAR/PLS OXIMETRY MLT: CPT

## 2022-07-22 PROCEDURE — 97530 THERAPEUTIC ACTIVITIES: CPT | Mod: CQ

## 2022-07-22 PROCEDURE — 97116 GAIT TRAINING THERAPY: CPT | Mod: CQ

## 2022-07-22 PROCEDURE — 25000003 PHARM REV CODE 250: Performed by: STUDENT IN AN ORGANIZED HEALTH CARE EDUCATION/TRAINING PROGRAM

## 2022-07-22 PROCEDURE — 99900035 HC TECH TIME PER 15 MIN (STAT)

## 2022-07-22 PROCEDURE — 97110 THERAPEUTIC EXERCISES: CPT | Mod: CQ

## 2022-07-22 RX ADMIN — ISOSORBIDE MONONITRATE 60 MG: 30 TABLET, EXTENDED RELEASE ORAL at 09:07

## 2022-07-22 RX ADMIN — METOPROLOL SUCCINATE 25 MG: 25 TABLET, EXTENDED RELEASE ORAL at 09:07

## 2022-07-22 RX ADMIN — ACETAMINOPHEN 650 MG: 325 TABLET ORAL at 05:07

## 2022-07-22 RX ADMIN — OXYCODONE HYDROCHLORIDE 10 MG: 5 TABLET ORAL at 04:07

## 2022-07-22 RX ADMIN — FUROSEMIDE 40 MG: 40 TABLET ORAL at 09:07

## 2022-07-22 RX ADMIN — VALSARTAN 160 MG: 160 TABLET ORAL at 09:07

## 2022-07-22 RX ADMIN — ASPIRIN 81 MG: 81 TABLET, COATED ORAL at 09:07

## 2022-07-22 RX ADMIN — MUPIROCIN: 20 OINTMENT TOPICAL at 09:07

## 2022-07-22 RX ADMIN — OXYCODONE HYDROCHLORIDE 10 MG: 5 TABLET ORAL at 09:07

## 2022-07-22 RX ADMIN — DOCUSATE SODIUM AND SENNOSIDES 1 TABLET: 8.6; 5 TABLET, FILM COATED ORAL at 09:07

## 2022-07-22 RX ADMIN — CELECOXIB 200 MG: 100 CAPSULE ORAL at 09:07

## 2022-07-22 RX ADMIN — DULOXETINE 60 MG: 30 CAPSULE, DELAYED RELEASE ORAL at 09:07

## 2022-07-22 RX ADMIN — GABAPENTIN 600 MG: 300 CAPSULE ORAL at 09:07

## 2022-07-22 RX ADMIN — POLYETHYLENE GLYCOL 3350 17 G: 17 POWDER, FOR SOLUTION ORAL at 09:07

## 2022-07-22 RX ADMIN — GABAPENTIN 600 MG: 300 CAPSULE ORAL at 04:07

## 2022-07-22 RX ADMIN — ACETAMINOPHEN 650 MG: 325 TABLET ORAL at 12:07

## 2022-07-22 NOTE — PROGRESS NOTES
[facility]      HOME HEALTH ORDERS  FACE TO FACE ENCOUNTER    Patient Name: Enedelia García  YOB: 1945    PCP: Lauren Brown MD   PCP Address: [address] / [address] [address]  PCP Phone Number: [phone]  PCP Fax: [phone]    Encounter Date: 5/24/22    Admit to Home Health    Diagnoses:  Active Hospital Problems    Diagnosis  POA    *S/P left unicompartmental knee replacement [Z96.652]  Not Applicable    Narcotic drug use [F11.90]  Yes    Prediabetes [R73.03]  Yes    HTN (hypertension) [I10]  Yes    Moderate episode of recurrent major depressive disorder [F33.1]  Yes    GERD (gastroesophageal reflux disease) [K21.9]  Yes      Resolved Hospital Problems   No resolved problems to display.       Follow Up Appointments:  Future Appointments   Date Time Provider Department Center   8/2/2022  1:15 PM Stan Catalan MD [facility]   8/5/2022  1:00 PM Pineda Farias MD [facility]   9/9/2022  2:20 PM Marichuy Hummel MD [facility]       Allergies:  Review of patient's allergies indicates:   Allergen Reactions    Iodinated contrast media Anaphylaxis and Other (See Comments)    Phenergan [promethazine]      Vomiting       Medications: Review discharge medications with patient and family and provide education.    Current Facility-Administered Medications   Medication Dose Route Frequency Provider Last Rate Last Admin    acetaminophen tablet 650 mg  650 mg Oral 4 times per day Mike Mayers MD   650 mg at 07/22/22 1237    aspirin EC tablet 81 mg  81 mg Oral BID Mike Mayers MD   81 mg at 07/22/22 0918    atorvastatin tablet 80 mg  80 mg Oral QHS Mike Mayers MD   80 mg at 07/21/22 2157    bisacodyL suppository 10 mg  10 mg Rectal Daily PRN Mike Mayers MD        BUPivacaine (PF) 0.25% (2.5 mg/ml) injection 225 mg  90 mL Intrapleural Once Stan Catalan MD        BUPivacaine (PF) 0.25% (2.5 mg/ml) injection 225 mg  90 mL  Intrapleural Once Stan Catalan MD        butalbital-acetaminophen-caffeine -40 mg per tablet   Oral Daily PRN Mike Mayers MD        cefazolin (ANCEF) 2 gram in dextrose 5% 50 mL IVPB (premix)  2 g Intravenous Q8H Mike Mayers MD   Stopped at 07/22/22 0009    celecoxib capsule 200 mg  200 mg Oral BID Mike Mayers MD   200 mg at 07/22/22 0919    dextrose 10% bolus 125 mL  12.5 g Intravenous PRN Xavi Palmer MD        dextrose 10% bolus 250 mL  25 g Intravenous PRN Xavi Palmer MD        DULoxetine DR capsule 60 mg  60 mg Oral Daily Mike Mayers MD   60 mg at 07/22/22 0919    furosemide tablet 40 mg  40 mg Oral Daily Mike Mayers MD   40 mg at 07/22/22 0918    gabapentin capsule 600 mg  600 mg Oral TID Mike Mayers MD   600 mg at 07/22/22 0919    glucagon (human recombinant) injection 1 mg  1 mg Intramuscular PRN Xavi Palmer MD        glucose chewable tablet 16 g  16 g Oral PRN Xavi Palmer MD        glucose chewable tablet 24 g  24 g Oral PRN Xavi Palmer MD        hydrOXYzine HCL tablet 25 mg  25 mg Oral TID PRN Mike Mayers MD   25 mg at 07/19/22 0621    insulin aspart U-100 pen 1-10 Units  1-10 Units Subcutaneous QID (AC + HS) PRN Xavi Palmer MD        isosorbide mononitrate 24 hr tablet 60 mg  60 mg Oral Daily Mike Mayers MD   60 mg at 07/22/22 0919    lactated ringers infusion   Intravenous Continuous Anna Mock DNP   Stopped at 07/18/22 1558    metoprolol succinate (TOPROL-XL) 24 hr tablet 25 mg  25 mg Oral Daily Mike Mayers MD   25 mg at 07/22/22 0919    mupirocin 2 % ointment   Nasal BID Stan Catalan MD   Given at 07/22/22 0926    nitroGLYCERIN SL tablet 0.3 mg  0.3 mg Sublingual Q5 Min PRN Mike Mayers MD        ondansetron injection 4 mg  4 mg Intravenous Q12H PRN Mike Mayers MD        oxyCODONE immediate release tablet 10 mg  10 mg Oral Q4H PRN Mike Mayers MD   10 mg at 07/22/22 0934    oxyCODONE immediate release tablet 5  mg  5 mg Oral Q4H PRN Mike Mayers MD   5 mg at 07/19/22 2313    polyethylene glycol packet 17 g  17 g Oral BID Xavi Palmer MD   17 g at 07/22/22 0919    senna-docusate 8.6-50 mg per tablet 1 tablet  1 tablet Oral BID Mike Mayers MD   1 tablet at 07/22/22 0919    valsartan tablet 160 mg  160 mg Oral Daily Mike Mayers MD   160 mg at 07/22/22 0919     Current Discharge Medication List      START taking these medications    Details   acetaminophen (TYLENOL) 325 MG tablet Take 1 tablet (325 mg total) by mouth every 4 (four) hours. for 14 days  Qty: 84 tablet, Refills: 0      !! aspirin (ECOTRIN) 81 MG EC tablet Take 1 tablet (81 mg total) by mouth 2 (two) times a day.  Qty: 84 tablet, Refills: 0      diclofenac (VOLTAREN) 75 MG EC tablet Take 1 tablet (75 mg total) by mouth 2 (two) times daily.  Qty: 84 tablet, Refills: 0      docusate sodium (COLACE) 50 MG capsule Take 1 capsule (50 mg total) by mouth every 6 (six) hours.  Qty: 168 capsule, Refills: 0      famotidine (PEPCID) 20 MG tablet Take 1 tablet (20 mg total) by mouth 2 (two) times daily as needed for Heartburn.  Qty: 84 tablet, Refills: 0       !! - Potential duplicate medications found. Please discuss with provider.      CONTINUE these medications which have NOT CHANGED    Details   butalbital-acetaminophen-caff -40 mg Cap TAKE ONE CAPSULE BY MOUTH ONCE TO TWICE DAILY AS NEEDED FOR MIGRAINE headaches      DULoxetine (CYMBALTA) 60 MG capsule Take 1 capsule (60 mg total) by mouth once daily. Take 1 tab po daily x 2 weeks then 2 tabs po daily thereafter.  Qty: 90 capsule, Refills: 1    Associated Diagnoses: Adjustment disorder with depressed mood      furosemide (LASIX) 40 MG tablet Take 1 tablet (40 mg total) by mouth once daily.  Qty: 90 tablet, Refills: 1      gabapentin (NEURONTIN) 600 MG tablet Take 600 mg by mouth 3 (three) times daily.      hydrOXYzine HCL (ATARAX) 25 MG tablet   See Instructions, 30 tab, 1, Substitution Allowed,  TAKE 1 TABLET BY MOUTH DAILY AS NEEDED FOR ITCHING, 30, Route to Pharmacy Electronically, [id number], [id number], Instructions Replace Required Details, cm,...      irbesartan (AVAPRO) 300 MG tablet Take 1 tablet (300 mg total) by mouth every evening.  Qty: 60 tablet, Refills: 5    Comments: .      isosorbide mononitrate (IMDUR) 60 MG 24 hr tablet Take 1 tablet (60 mg total) by mouth once daily.  Qty: 90 tablet, Refills: 1      linaCLOtide (LINZESS) 72 mcg Cap capsule Take 72 mcg by mouth before breakfast.      metoprolol succinate (TOPROL-XL) 25 MG 24 hr tablet Take 1 tablet (25 mg total) by mouth once daily.  Qty: 90 tablet, Refills: 3    Comments: .      multivitamin capsule Take 1 capsule by mouth once daily.      omeprazole (PRILOSEC) 20 MG capsule Take 20 mg by mouth once daily.      oxyCODONE (ROXICODONE) 10 mg Tab immediate release tablet oxycodone 10 mg tablet   Take 1 tablet 3 times a day by oral route as needed for 7 days.      polyethylene glycol (GLYCOLAX) 17 gram PwPk Take 17 g by mouth once daily.  Qty: 90 packet, Refills: 3      rosuvastatin (CRESTOR) 40 MG Tab Take 1 tablet (40 mg total) by mouth once daily.  Qty: 90 tablet, Refills: 3    Associated Diagnoses: Mixed hyperlipidemia      !! aspirin (ECOTRIN) 81 MG EC tablet Take 81 mg by mouth once daily.      calcium carbonate/vitamin D3 (VITAMIN D-3 ORAL) Take by mouth once daily.      clotrimazole-betamethasone 1-0.05% (LOTRISONE) cream Apply topically 2 (two) times daily.  Qty: 45 g, Refills: 0    Associated Diagnoses: Recurrent genital herpes; Dermatitis      fluticasone propionate (FLONASE) 50 mcg/actuation nasal spray 2 sprays (100 mcg total) by Each Nostril route once daily.  Qty: 15.8 mL, Refills: 2    Associated Diagnoses: Sinus congestion      mupirocin (BACTROBAN) 2 % ointment Apply topically once daily. apply to affected area  Qty: 22 g, Refills: 3      nitroGLYCERIN (NITROSTAT) 0.3 MG SL tablet  PLACE ONE TABLET UNDER THE TONGUE EVERY 5 MINUTES FOR UP TO 3 doses IF NEEDED FOR CHEST PAIN. call 911 IF PAIN persists  Qty: 30 tablet, Refills: 0    Associated Diagnoses: Presence of stent in coronary artery in patient with coronary artery disease; Coronary artery disease involving native coronary artery of native heart without angina pectoris      valACYclovir (VALTREX) 1000 MG tablet Take 1 tablet (1,000 mg total) by mouth once daily. As needed for outbreak for 5 days  Qty: 5 tablet, Refills: 0    Associated Diagnoses: Recurrent genital herpes       !! - Potential duplicate medications found. Please discuss with provider.      STOP taking these medications       amoxicillin-clavulanate 875-125mg (AUGMENTIN) 875-125 mg per tablet Comments:   Reason for Stopping:                 I have seen and examined this patient within the last 30 days. My clinical findings that support the need for the home health skilled services and home bound status are the following:no   Requiring assistive device to leave home due to unsteady gait caused by  Surgery.     Diet:   regular diet    Labs:  none    Referrals/ Consults  Physical Therapy to evaluate and treat. Evaluate for home safety and equipment needs; Establish/upgrade home exercise program. Perform / instruct on therapeutic exercises, gait training, transfer training, and Range of Motion.  Occupational Therapy to evaluate and treat. Evaluate home environment for safety and equipment needs. Perform/Instruct on transfers, ADL training, ROM, and therapeutic exercises.    Activities:   activity as tolerated    Nursing:   Agency to admit patient within 24 hours of hospital discharge unless specified on physician order or at patient request    SN to complete comprehensive assessment including routine vital signs. Instruct on disease process and s/s of complications to report to MD. Review/verify medication list sent home with the patient at time of discharge  and instruct  patient/caregiver as needed. Frequency may be adjusted depending on start of care date.     Skilled nurse to perform up to 3 visits PRN for symptoms related to diagnosis    Notify MD if SBP > 160 or < 90; DBP > 90 or < 50; HR > 120 or < 50; Temp > 101; O2 < 88%; Other:       Ok to schedule additional visits based on staff availability and patient request on consecutive days within the home health episode.    When multiple disciplines ordered:    Start of Care occurs on Sunday - Wednesday schedule remaining discipline evaluations as ordered on separate consecutive days following the start of care.    Thursday SOC -schedule subsequent evaluations Friday and Monday the following week.     Friday - Saturday SOC - schedule subsequent discipline evaluations on consecutive days starting Monday of the following week.    For all post-discharge communication and subsequent orders please contact patient's primary care physician. If unable to reach primary care physician or do not receive response within 30 minutes, please contact Dr. Catalan's office for clinical staff order clarification    Miscellaneous       Home Health Aide:  Physical Therapy Three times weekly and Occupational Therapy Three times weekly    Wound Care Orders  no    I certify that this patient is confined to her home and needs physical therapy and occupational therapy.

## 2022-07-22 NOTE — PT/OT/SLP PROGRESS
Physical Therapy Treatment    Patient Name:  Enedelia García   MRN:  [id number]    Recommendations:     Discharge Recommendations:  home health PT   Discharge Equipment Recommendations: none   Barriers to discharge: decreased mobility,balance and strength    Assessment:     Enedelia García is a 77 y.o. female admitted with a medical diagnosis of S/P left unicompartmental knee replacement.  She presents with the following impairments/functional limitations:  weakness, impaired endurance, impaired balance, impaired functional mobility, gait instability, decreased lower extremity function, pain, decreased ROM, impaired coordination, impaired joint extensibility, orthopedic precautions,pt with improving mobility and ROM and will benefit from  services before starting out pt PT.    Rehab Prognosis: Good; patient would benefit from acute skilled PT services to address these deficits and reach maximum level of function.    Recent Surgery: Procedure(s) (LRB):  REVISION, ARTHROPLASTY, KNEE (Left) 4 Days Post-Op    Plan:     During this hospitalization, patient to be seen daily (BID as able) to address the identified rehab impairments via gait training, therapeutic activities, therapeutic exercises, neuromuscular re-education and progress toward the following goals:    · Plan of Care Expires:  08/19/22    Subjective     Chief Complaint: n/a  Patient/Family Comments/goals: pt will drive herself to therapy next week.  Pain/Comfort:  · Pain Rating 1:  (some with increased bending)  · Location - Side 1: Left  · Location - Orientation 1: generalized  · Location 1: knee  · Pain Addressed 1: Reposition, Distraction, Nurse notified      Objective:     Communicated with nsg prior to session.  Patient found supine with peripheral IV, telemetry upon PT entry to room.     General Precautions: Standard, fall   Orthopedic Precautions:LLE weight bearing as tolerated   Braces: N/A  Respiratory Status: Room air     Functional  Mobility:  · Bed Mobility:     · Supine to Sit: modified independence  · Transfers:     · Sit to Stand:  modified independence with rolling walker  · Bed to Chair: supervision with  rolling walker  using  ambulation  · Toilet Transfer: supervision with  rolling walker  using  ambulation  · Gait: amb ~170' with RW and S  · Balance: fair standing balance with RW      AM-PAC 6 CLICK MOBILITY  Turning over in bed (including adjusting bedclothes, sheets and blankets)?: 4  Sitting down on and standing up from a chair with arms (e.g., wheelchair, bedside commode, etc.): 4  Moving from lying on back to sitting on the side of the bed?: 4  Moving to and from a bed to a chair (including a wheelchair)?: 3  Need to walk in hospital room?: 3  Climbing 3-5 steps with a railing?: 3  Basic Mobility Total Score: 21       Therapeutic Activities and Exercises: L le seated self-assisted knee flexion X 5 reps and 10 second hold and laq's X 10 reps.      Sup-sit Mod I,sit-stand RW Mod I,pt amb ~160' with RW and S,issued HEP and reviewed,placed ice pack on L knee.(PM)       Patient left up in chair with all lines intact, call button in reach and nsg present..    GOALS:   Multidisciplinary Problems     Physical Therapy Goals        Problem: Physical Therapy    Goal Priority Disciplines Outcome Goal Variances Interventions   Physical Therapy Goal     PT, PT/OT Ongoing, Progressing     Description: Goals to be met by: 22     Patient will increase functional independence with mobility by performin. Supine to sit with Modified [place]  2. Sit to supine with Modified [place]  3. Sit to stand transfer with Supervision  4. Bed to chair transfer with Supervision using Rolling Walker  5. Gait  x 100 feet with Supervision using Rolling Walker.   6. Ascend/Descend 1 step with Stand-by Assistance using Rolling Walker.                     Time Tracking:     PT Received On: 22  PT Start Time: 81   1304(PM)  PT Stop Time:  0906    1330(PM)  PT Total Time (min): 40 min   26 min(PM)    Billable Minutes: Gait Training 17, Therapeutic Activity 11 and Therapeutic Exercise 12     GT 16 and TE 10(PM)    Treatment Type: Treatment  PT/PTA: PTA     PTA Visit Number: 4 07/22/2022

## 2022-07-22 NOTE — SUBJECTIVE & OBJECTIVE
Interval History: NAEON. Pt doing well and working with PT. She is tolerating her diet and passing gas and had one BM since surgery.    Review of Systems   Constitutional:  Negative for chills and fever.   HENT:  Negative for ear pain and sore throat.    Eyes:  Negative for discharge and redness.   Respiratory:  Negative for cough and chest tightness.    Cardiovascular:  Negative for chest pain and leg swelling.   Gastrointestinal:  Negative for abdominal pain and diarrhea.   Genitourinary:  Negative for dysuria and hematuria.   Musculoskeletal:  Negative for back pain and myalgias.        Left knee burning   Skin:  Negative for pallor and rash.   Neurological:  Negative for dizziness and headaches.   Psychiatric/Behavioral:  Negative for confusion. The patient is not nervous/anxious.    Objective:     Vital Signs (Most Recent):  Temp: 98.2 °F (36.8 °C) (07/22/22 0746)  Pulse: 71 (07/22/22 0746)  Resp: 17 (07/22/22 0746)  BP: 133/63 (07/22/22 0746)  SpO2: 95 % (07/22/22 0429) Vital Signs (24h Range):  Temp:  [98.2 °F (36.8 °C)-98.8 °F (37.1 °C)] 98.2 °F (36.8 °C)  Pulse:  [64-81] 71  Resp:  [16-18] 17  SpO2:  [91 %-99 %] 95 %  BP: (106-143)/(53-77) 133/63     Weight: 85.3 kg (188 lb 0.8 oz)  Body mass index is 32.28 kg/m².    Intake/Output Summary (Last 24 hours) at 7/22/2022 0843  Last data filed at 7/21/2022 2356  Gross per 24 hour   Intake 308.58 ml   Output 350 ml   Net -41.42 ml      Physical Exam  Vitals reviewed.   Constitutional:       General: She is not in acute distress.     Appearance: Normal appearance.   HENT:      Head: Normocephalic and atraumatic.   Eyes:      General:         Right eye: No discharge.         Left eye: No discharge.      Extraocular Movements: Extraocular movements intact.      Pupils: Pupils are equal, round, and reactive to light.   Cardiovascular:      Rate and Rhythm: Normal rate and regular rhythm.      Pulses: Normal pulses.      Heart sounds: No murmur heard.  Pulmonary:       Effort: Pulmonary effort is normal. No respiratory distress.      Breath sounds: Normal breath sounds. No wheezing.   Abdominal:      General: Abdomen is flat. Bowel sounds are normal.      Palpations: Abdomen is soft.      Tenderness: There is no abdominal tenderness.   Musculoskeletal:         General: No swelling or tenderness.      Comments: Left knee bandaged cdi   Skin:     Capillary Refill: Capillary refill takes less than 2 seconds.      Coloration: Skin is not jaundiced.   Neurological:      General: No focal deficit present.      Mental Status: She is alert and oriented to person, place, and time.      Comments: Left leg neurovascularly intact, sensation intact   Psychiatric:         Mood and Affect: Mood normal.         Behavior: Behavior normal.     Recent Labs   Lab 07/18/22  1212 07/19/22  0425 07/21/22  0743   WBC  --  10.43 8.77   HGB  --  7.8* 7.7*   HCT  --  23.4* 23.0*   MCV  --  93 94   RBC  --  2.53* 2.46*   MCH  --  30.8 31.3*   MCHC  --  33.3 33.5   RDW  --  12.3 12.7   PLT  --  250 265   MPV  --  9.8 9.6   GRAN  --  78.4*  8.2* 53.9  4.7   LYMPH  --  10.5*  1.1 22.7  2.0   MONO  --  10.6  1.1* 17.3*  1.5*   EOSINOPHIL  --  0.0 5.2   BASOPHIL  --  0.2 0.6   SEGS 7  --   --      Recent Labs   Lab 07/19/22  0425 07/21/22  0743    140   K 4.1 4.3    102   CO2 26 27   ANIONGAP 13 11   BUN 23 34*   CREATININE 1.1 1.5*   * 87   CALCIUM 8.9 8.9   ESTGFRAFRICA 56* 38*   EGFRNONAA 49* 33*     No results for input(s): COLORU, APPEARANCEUA, PHUR, SPECGRAV, PROTEINUA, GLUCUA, KETONESU, BILIRUBINUA, OCCULTUA, UROBILINOGEN, NITRITE, LEUKOCYTESUR, RBCUA, WBCUA, BACTERIA, SQUAMEPITHEL, HYALINECASTS, GRANULARCAST, MICROCMT in the last 168 hours.    Invalid input(s): SPECIMENU, AMORPHOUSU  No results for input(s): TROPONINI, CPK, CPKMB in the last 168 hours.  No results for input(s): PT, INR, APTT in the last 168 hours.  No results for input(s): TSH, W3JBSOF, P1RISIH, THYROIDAB, FREET4  in the last 168 hours.  X-Ray Knee 1 or 2 View Left    Result Date: 7/18/2022  EXAMINATION: XR KNEE 1 OR 2 VIEW LEFT CLINICAL HISTORY: post op; Presence of left artificial knee joint TECHNIQUE: 03/10/2022 views of the knee COMPARISON: None FINDINGS: Postsurgical changes of recent total knee arthroplasty procedure. The orthopedic hardware appears in good position and alignment without adjacent fracture.     As above Electronically signed by: Martinez Caldera MD Date:    07/18/2022 Time:    16:11    X-Ray Hip to Ankle    Result Date: 6/29/2022  EXAMINATION: XR HIP TO ANKLE CLINICAL HISTORY: Presence of left artificial knee joint TECHNIQUE: Lower extremity series. COMPARISON: None available. FINDINGS: Prior unicompartmental knee arthroplasty of the left medial compartment which appears intact.  There is advanced degenerative change of the left lateral compartment with accompanying subchondral sclerosis and valgus deformity.  Femorotibial joint space narrowing seen to a lesser degree of the right medial greater than lateral femorotibial compartment with scattered meniscal chondrocalcinosis. Lower extremity length was calculated from the superior acetabulum to the talar dome using electronic calipers measuring 78.3 cm on the right and 78.7 cm on the left.     As above. Electronically signed by: Trev Cole Date:    06/29/2022 Time:    13:58    SURG [place] Surgery Fluoro Usage    Result Date: 7/18/2022  See OP Notes for results. IMPRESSION: See OP Notes for results. This procedure was auto-finalized by: Virtual Radi[facility]    X-Ray Chest 1 View Pre-OP    Result Date: 6/29/2022  EXAMINATION: XR CHEST 1 VIEW PRE-OP CLINICAL HISTORY: Presence of left artificial knee joint TECHNIQUE: Single frontal view of the chest was performed. COMPARISON: 03/10/2022 FINDINGS: Cardiomediastinal silhouette is unchanged.  Lungs appear similar without large airspace opacity or lobar consolidation.  Possible mild subsegmental atelectasis in the  left lower lung.  No pleural effusion or gross pneumothorax.  Calcific atherosclerosis of the aorta.  No acute osseous abnormality.     As above. Electronically signed by: Trev Cole Date:    06/29/2022 Time:    13:50    Microbiology Results (last 7 days)       Procedure Component Value Units Date/Time    Culture, Anaerobe [[id number]] Collected: 07/18/22 1336    Order Status: Completed Specimen: Bone from Knee, Left Updated: 07/21/22 1043     Anaerobic Culture Culture in progress    Narrative:      Femoral surface    Culture, Anaerobe [[id number]] Collected: 07/18/22 1328    Order Status: Completed Specimen: Bone from Knee, Left Updated: 07/21/22 1042     Anaerobic Culture Culture in progress    Narrative:      Tibial surface    Culture, Anaerobe [[id number]] Collected: 07/18/22 1254    Order Status: Completed Specimen: Wound from Knee, Left Updated: 07/21/22 1041     Anaerobic Culture Culture in progress    Narrative:      Posterior synovium    Aerobic culture [[id number]] Collected: 07/18/22 1254    Order Status: Completed Specimen: Wound from Knee, Left Updated: 07/20/22 0735     Aerobic Bacterial Culture No growth    Narrative:      Posterior synovium    Aerobic culture [[id number]] Collected: 07/18/22 1328    Order Status: Completed Specimen: Bone from Knee, Left Updated: 07/20/22 0735     Aerobic Bacterial Culture No growth    Narrative:      Tibial surface    Aerobic culture [[id number]] Collected: 07/18/22 1336    Order Status: Completed Specimen: Bone from Knee, Left Updated: 07/20/22 0735     Aerobic Bacterial Culture No growth    Narrative:      Femoral surface    AFB Culture & Smear [[id number]] Collected: 07/18/22 1328    Order Status: Completed Specimen: Bone from Knee, Left Updated: 07/19/22 2127     AFB Culture & Smear Culture in progress     AFB CULTURE STAIN No acid fast bacilli seen.    Narrative:      Tibial surface    AFB Culture & Smear [[id number]] Collected: 07/18/22 1336    Order Status:  Completed Specimen: Bone from Knee, Left Updated: 07/19/22 2127     AFB Culture & Smear Culture in progress     AFB CULTURE STAIN No acid fast bacilli seen.    Narrative:      Femoral surface    AFB Culture & Smear [[id number]] Collected: 07/18/22 1254    Order Status: Completed Specimen: Wound from Knee, Left Updated: 07/19/22 2127     AFB Culture & Smear Culture in progress     AFB CULTURE STAIN No acid fast bacilli seen.    Narrative:      Posterior synovium    Fungus culture [[id number]] Collected: 07/18/22 1336    Order Status: Completed Specimen: Bone from Knee, Left Updated: 07/19/22 1416     Fungus (Mycology) Culture Culture in progress    Narrative:      Femoral surface    Fungus culture [[id number]] Collected: 07/18/22 1254    Order Status: Completed Specimen: Wound from Knee, Left Updated: 07/19/22 1416     Fungus (Mycology) Culture Culture in progress    Narrative:      Posterior synovium    Fungus culture [[id number]] Collected: 07/18/22 1328    Order Status: Completed Specimen: Bone from Knee, Left Updated: 07/19/22 1416     Fungus (Mycology) Culture Culture in progress    Narrative:      Tibial surface    Culture, Body Fluid - Bactec [[id number]] Collected: 07/18/22 2124    Order Status: No result Updated: 07/18/22 2125    Gram stain [[id number]] Collected: 07/18/22 1328    Order Status: Completed Specimen: Bone from Knee, Left Updated: 07/18/22 2109     Gram Stain Result Rare WBC's      No organisms seen    Narrative:      Tibial surface    Gram stain [[id number]] Collected: 07/18/22 1254    Order Status: Completed Specimen: Wound from Knee, Left Updated: 07/18/22 2107     Gram Stain Result Rare WBC's      No organisms seen    Narrative:      Posterior synovium    Gram stain [[id number]] Collected: 07/18/22 1336    Order Status: Completed Specimen: Bone from Knee, Left Updated: 07/18/22 2105     Gram Stain Result No WBC's      No organisms seen    Narrative:      Femoral surface    Culture, Anaerobe  [[id number]] Collected: 07/18/22 1212    Order Status: Canceled Specimen: Joint Fluid from Knee, Left     Aerobic culture [[id number]] Collected: 07/18/22 1212    Order Status: Canceled Specimen: Wound from Knee, Left

## 2022-07-22 NOTE — PROGRESS NOTES
[facility] Progress Note    Surgeries:  Left revision total knee arthroplasty, synovectomy 7/19    S: NAEON. Pain well controlled. AFVSS.  No other acute issues. Dressings changed yesterday. Tolerating PO. Voiding appropriately. Ambulated 160ft with therapy yesterday. Denies numbness or tingling. No CP, SOB.    O:   Vitals:    07/22/22 0447   BP:    Pulse:    Resp: 16   Temp:      Recent Labs     07/21/22  0743   WBC 8.77   HGB 7.7*   HCT 23.0*        Recent Labs     07/21/22  0743      K 4.3      CO2 27   BUN 34*   GLU 87     No results for input(s): ESR, CRP in the last 72 hours.    PE:  Gen: A+Ox3, NAD  Card: RRR by RP  Lungs: nonlabored breathing, symmetric chest rise  Abd: S/NT/ND    Left left extremity  Dressings with mild saturation  Appropriate post operative pain and swelling  Motor intact ehl/fhl/g/s/ta  Sensation intact light touch s/s/sp/dp/t  Well perfused distally, palpable DP    A/P:  77F s/p L revision TKA 7/18/22    WBAT  Ancef q8h until discharge  Follow intraoperative cultures  -NGTD  Drain removed; reinforce dressings prn  Bowel regimen  Regular diet  MM Pain control with Oxy 5mg for intermediate pain, Oxy 10mg for severe pain  ASA 81mg BID for DVT proph  Appreciate family medicine recommendations  PT/OT  Social work for discharge    Disposition: pending final cultures, PT clearance    Mike Mayers MD

## 2022-07-22 NOTE — PLAN OF CARE
Problem: Physical Therapy  Goal: Physical Therapy Goal  Description: Goals to be met by: 22     Patient will increase functional independence with mobility by performin. Supine to sit with Modified Steele  2. Sit to supine with Modified Steele  3. Sit to stand transfer with Supervision  4. Bed to chair transfer with Supervision using Rolling Walker  5. Gait  x 100 feet with Supervision using Rolling Walker.   6. Ascend/Descend 1 step with Stand-by Assistance using Rolling Walker.    Outcome: Ongoing, Progressing

## 2022-07-22 NOTE — DISCHARGE SUMMARY
[facility]  Discharge Note  Short Stay    Procedure(s) (LRB):  REVISION, ARTHROPLASTY, KNEE (Left)    OUTCOME: Condition has improved and patient is now ready for discharge.    [place] [facility] [place]AL DIAGNOSIS:  S/P left unicompartmental knee replacement    FOLLOWUP: In clinic    DISCHARGE INSTRUCTIONS:    Discharge Procedure Orders   Diet general     Call MD for:  temperature >100.4     Call MD for:  persistent nausea and vomiting     Call MD for:  severe uncontrolled pain     Call MD for:  difficulty breathing, headache or visual disturbances     Call MD for:  redness, tenderness, or signs of infection (pain, swelling, redness, odor or green/yellow discharge around incision site)     Call MD for:  hives     Call MD for:  persistent dizziness or light-headedness     Call MD for:  extreme fatigue     Leave dressing on - Keep it clean, dry, and intact until clinic visit     HOME HEALTH ORDERS   Order Comments: Subsequent Home Health Orders    Current Medications:  Current Facility-Administered Medications:  acetaminophen tablet 650 mg, 650 mg, Oral, 4 times per day, Mike Mayers MD, 650 mg at 07/22/22 1237  aspirin EC tablet 81 mg, 81 mg, Oral, BID, Mike Mayers MD, 81 mg at 07/22/22 0918  atorvastatin tablet 80 mg, 80 mg, Oral, QHS, Mike Mayers MD, 80 mg at 07/21/22 2157  bisacodyL suppository 10 mg, 10 mg, Rectal, Daily PRN, Mike Mayers MD  BUPivacaine (PF) 0.25% (2.5 mg/ml) injection 225 mg, 90 mL, Intrapleural, Once, Stan Catalan MD  BUPivacaine (PF) 0.25% (2.5 mg/ml) injection 225 mg, 90 mL, Intrapleural, Once, Stan Catalan MD  butalbital-acetaminophen-caffeine -40 mg per tablet, , Oral, Daily PRN, Mike Mayers MD  cefazolin (ANCEF) 2 gram in dextrose 5% 50 mL IVPB (premix), 2 g, Intravenous, Q8H, Mike Mayers MD, Stopped at 07/22/22 0009  celecoxib capsule 200 mg, 200 mg, Oral, BID, Mike Mayers MD, 200 mg at 07/22/22 0919  dextrose 10% bolus 125 mL, 12.5 g,  Intravenous, PRN, Xavi Palmer MD  dextrose 10% bolus 250 mL, 25 g, Intravenous, PRN, Xavi Palmer MD  DULoxetine DR capsule 60 mg, 60 mg, Oral, Daily, Mike Mayers MD, 60 mg at 07/22/22 0919  furosemide tablet 40 mg, 40 mg, Oral, Daily, Mike Mayers MD, 40 mg at 07/22/22 0918  gabapentin capsule 600 mg, 600 mg, Oral, TID, Mike Mayers MD, 600 mg at 07/22/22 0919  glucagon (human recombinant) injection 1 mg, 1 mg, Intramuscular, PRN, Xavi Palmer MD  glucose chewable tablet 16 g, 16 g, Oral, PRN, Xavi Palmer MD  glucose chewable tablet 24 g, 24 g, Oral, PRN, Xavi Palmer MD  hydrOXYzine HCL tablet 25 mg, 25 mg, Oral, TID PRN, Mike Mayers MD, 25 mg at 07/19/22 0621  insulin aspart U-100 pen 1-10 Units, 1-10 Units, Subcutaneous, QID (AC + HS) PRN, Xavi Palmer MD  isosorbide mononitrate 24 hr tablet 60 mg, 60 mg, Oral, Daily, Mike Mayers MD, 60 mg at 07/22/22 0919  lactated ringers infusion, , Intravenous, Continuous, Anna Mock DNP, Stopped at 07/18/22 1558  metoprolol succinate (TOPROL-XL) 24 hr tablet 25 mg, 25 mg, Oral, Daily, Mike Mayers MD, 25 mg at 07/22/22 0919  mupirocin 2 % ointment, , Nasal, BID, Stan Catalan MD, Given at 07/22/22 0926  nitroGLYCERIN SL tablet 0.3 mg, 0.3 mg, Sublingual, Q5 Min PRN, Mike Mayers MD  ondansetron injection 4 mg, 4 mg, Intravenous, Q12H PRN, Mike Mayers MD  oxyCODONE immediate release tablet 10 mg, 10 mg, Oral, Q4H PRN, Mike Mayers MD, 10 mg at 07/22/22 0934  oxyCODONE immediate release tablet 5 mg, 5 mg, Oral, Q4H PRN, Mike Mayers MD, 5 mg at 07/19/22 2313  polyethylene glycol packet 17 g, 17 g, Oral, BID, Xavi Palmer MD, 17 g at 07/22/22 0919  senna-docusate 8.6-50 mg per tablet 1 tablet, 1 tablet, Oral, BID, Mike Mayers MD, 1 tablet at 07/22/22 0919  valsartan tablet 160 mg, 160 mg, Oral, Daily, Mike Mayers MD, 160 mg at 07/22/22 0919        Nursing:       Diet:   regular diet    Activities:   activity as  tolerated    Labs:      Referrals/ Consults  Physical Therapy to evaluate and treat. Evaluate for home safety and equipment needs; Establish/upgrade home exercise program. Perform / instruct on therapeutic exercises, gait training, transfer training, and Range of Motion.  Occupational Therapy to evaluate and treat. Evaluate home environment for safety and equipment needs. Perform/Instruct on transfers, ADL training, ROM, and therapeutic exercises.    Home Health Aide:  Physical Therapy Three times weekly and Occupational Therapy Three times weekly     Order Specific Question Answer Comments   What [facility] Health [facility] the patient [facility]      Weight bearing as tolerated        TIME SPENT ON DISCHARGE: 15 minutes

## 2022-07-22 NOTE — NURSING
Pt discharged to home via wheelchair with  at side. Scripts delivered to patient before discharge.

## 2022-07-22 NOTE — PROGRESS NOTES
[facility] Medicine  Progress Note    Patient Name: Enedelia García  MRN: [id number]  Patient Class: IP- Inpatient   Admission Date: 7/18/2022  Length of Stay: 4 days  Attending Physician: Stan Catalan MD  Primary Care Provider: Lauren Brown MD        Subjective:     Principal Problem:S/P left unicompartmental knee replacement        HPI:  Enedelia García is a 77 y.o. female who  has a past medical history of Coronary artery disease, Epilepsy, Hyperlipidemia, Hypertension, and Normocytic anemia, prediabetes, GERD, ALEXANDRO, chronic opioid use presents to [facility] for left unicompartmental knee arthroplasty. Op note documented complete articular cartilage loss of lateral compartment. Cultures were sent during surgery. Patient reports feeling burning in her knee but denies any pain. [facility] consulted for medical management during hospitalization.          Overview/Hospital Course:  Patient is a 76yo woman w/ PMH of HTN, preDM, HLD, GERD, CAD, epilepsy, anxiety, depression and chronic opioid use. She is s/p left knee revision (Dr. Catalan) on 7/18 due to cartilage loss. FM was consulted for mgmt of chronic conditions.  She is recovering well, VSS, labs wnl. Hobbs and wound vac d/c'd. She is feeding and voiding appropriately. PT following, pending ortho recs and final cultures. Pain well controlled with scheduled Tylenol and Gabapentin, and Oxy PRN. Last BM 7/17 with senna and miralax, ordered enema 7/21. Held BP meds on 7/20 due to hypotension, resumed on 7/21.  Developed ADWOA on 7/21 (BUN/Cr 34/1.5), encouraged fluid intake, monitor.      Interval History: NAEON. Pt doing well and working with PT. She is tolerating her diet and passing gas and had one BM since surgery.    Review of Systems   Constitutional:  Negative for chills and fever.   HENT:  Negative for ear pain and sore throat.    Eyes:  Negative for discharge and redness.   Respiratory:  Negative for cough and chest tightness.    Cardiovascular:   Negative for chest pain and leg swelling.   Gastrointestinal:  Negative for abdominal pain and diarrhea.   Genitourinary:  Negative for dysuria and hematuria.   Musculoskeletal:  Negative for back pain and myalgias.        Left knee burning   Skin:  Negative for pallor and rash.   Neurological:  Negative for dizziness and headaches.   Psychiatric/Behavioral:  Negative for confusion. The patient is not nervous/anxious.    Objective:     Vital Signs (Most Recent):  Temp: 98.2 °F (36.8 °C) (07/22/22 0746)  Pulse: 71 (07/22/22 0746)  Resp: 17 (07/22/22 0746)  BP: 133/63 (07/22/22 0746)  SpO2: 95 % (07/22/22 0429) Vital Signs (24h Range):  Temp:  [98.2 °F (36.8 °C)-98.8 °F (37.1 °C)] 98.2 °F (36.8 °C)  Pulse:  [64-81] 71  Resp:  [16-18] 17  SpO2:  [91 %-99 %] 95 %  BP: (106-143)/(53-77) 133/63     Weight: 85.3 kg (188 lb 0.8 oz)  Body mass index is 32.28 kg/m².    Intake/Output Summary (Last 24 hours) at 7/22/2022 0843  Last data filed at 7/21/2022 2356  Gross per 24 hour   Intake 308.58 ml   Output 350 ml   Net -41.42 ml      Physical Exam  Vitals reviewed.   Constitutional:       General: She is not in acute distress.     Appearance: Normal appearance.   HENT:      Head: Normocephalic and atraumatic.   Eyes:      General:         Right eye: No discharge.         Left eye: No discharge.      Extraocular Movements: Extraocular movements intact.      Pupils: Pupils are equal, round, and reactive to light.   Cardiovascular:      Rate and Rhythm: Normal rate and regular rhythm.      Pulses: Normal pulses.      Heart sounds: No murmur heard.  Pulmonary:      Effort: Pulmonary effort is normal. No respiratory distress.      Breath sounds: Normal breath sounds. No wheezing.   Abdominal:      General: Abdomen is flat. Bowel sounds are normal.      Palpations: Abdomen is soft.      Tenderness: There is no abdominal tenderness.   Musculoskeletal:         General: No swelling or tenderness.      Comments: Left knee bandaged cdi    Skin:     Capillary Refill: Capillary refill takes less than 2 seconds.      Coloration: Skin is not jaundiced.   Neurological:      General: No focal deficit present.      Mental Status: She is alert and oriented to person, place, and time.      Comments: Left leg neurovascularly intact, sensation intact   Psychiatric:         Mood and Affect: Mood normal.         Behavior: Behavior normal.     Recent Labs   Lab 07/18/22  1212 07/19/22  0425 07/21/22  0743   WBC  --  10.43 8.77   HGB  --  7.8* 7.7*   HCT  --  23.4* 23.0*   MCV  --  93 94   RBC  --  2.53* 2.46*   MCH  --  30.8 31.3*   MCHC  --  33.3 33.5   RDW  --  12.3 12.7   PLT  --  250 265   MPV  --  9.8 9.6   GRAN  --  78.4*  8.2* 53.9  4.7   LYMPH  --  10.5*  1.1 22.7  2.0   MONO  --  10.6  1.1* 17.3*  1.5*   EOSINOPHIL  --  0.0 5.2   BASOPHIL  --  0.2 0.6   SEGS 7  --   --      Recent Labs   Lab 07/19/22  0425 07/21/22  0743    140   K 4.1 4.3    102   CO2 26 27   ANIONGAP 13 11   BUN 23 34*   CREATININE 1.1 1.5*   * 87   CALCIUM 8.9 8.9   ESTGFRAFRICA 56* 38*   EGFRNONAA 49* 33*     No results for input(s): COLORU, APPEARANCEUA, PHUR, SPECGRAV, PROTEINUA, GLUCUA, KETONESU, BILIRUBINUA, OCCULTUA, UROBILINOGEN, NITRITE, LEUKOCYTESUR, RBCUA, WBCUA, BACTERIA, SQUAMEPITHEL, HYALINECASTS, GRANULARCAST, MICROCMT in the last 168 hours.    Invalid input(s): SPECIMENU, AMORPHOUSU  No results for input(s): TROPONINI, CPK, CPKMB in the last 168 hours.  No results for input(s): PT, INR, APTT in the last 168 hours.  No results for input(s): TSH, L2WRUHM, M6DEFIR, THYROIDAB, FREET4 in the last 168 hours.  X-Ray Knee 1 or 2 View Left    Result Date: 7/18/2022  EXAMINATION: XR KNEE 1 OR 2 VIEW LEFT CLINICAL HISTORY: post op; Presence of left artificial knee joint TECHNIQUE: 03/10/2022 views of the knee COMPARISON: None FINDINGS: Postsurgical changes of recent total knee arthroplasty procedure. The orthopedic hardware appears in good position and  alignment without adjacent fracture.     As above Electronically signed by: Martinez Caldera MD Date:    07/18/2022 Time:    16:11    X-Ray Hip to Ankle    Result Date: 6/29/2022  EXAMINATION: XR HIP TO ANKLE CLINICAL HISTORY: Presence of left artificial knee joint TECHNIQUE: Lower extremity series. COMPARISON: None available. FINDINGS: Prior unicompartmental knee arthroplasty of the left medial compartment which appears intact.  There is advanced degenerative change of the left lateral compartment with accompanying subchondral sclerosis and valgus deformity.  Femorotibial joint space narrowing seen to a lesser degree of the right medial greater than lateral femorotibial compartment with scattered meniscal chondrocalcinosis. Lower extremity length was calculated from the superior acetabulum to the talar dome using electronic calipers measuring 78.3 cm on the right and 78.7 cm on the left.     As above. Electronically signed by: Trev Cole Date:    06/29/2022 Time:    13:58    SURG FL Surgery Fluoro Usage    Result Date: 7/18/2022  See OP Notes for results. IMPRESSION: See OP Notes for results. This procedure was auto-finalized by: Virtual Radiologist    X-Ray Chest 1 View Pre-OP    Result Date: 6/29/2022  EXAMINATION: XR CHEST 1 VIEW PRE-OP CLINICAL HISTORY: Presence of left artificial knee joint TECHNIQUE: Single frontal view of the chest was performed. COMPARISON: 03/10/2022 FINDINGS: Cardiomediastinal silhouette is unchanged.  Lungs appear similar without large airspace opacity or lobar consolidation.  Possible mild subsegmental atelectasis in the left lower lung.  No pleural effusion or gross pneumothorax.  Calcific atherosclerosis of the aorta.  No acute osseous abnormality.     As above. Electronically signed by: Trev Cole Date:    06/29/2022 Time:    13:50    Microbiology Results (last 7 days)       Procedure Component Value Units Date/Time    Culture, Anaerobe [[id number]] Collected: 07/18/22 1336     Order Status: Completed Specimen: Bone from Knee, Left Updated: 07/21/22 1043     Anaerobic Culture Culture in progress    Narrative:      Femoral surface    Culture, Anaerobe [[id number]] Collected: 07/18/22 1328    Order Status: Completed Specimen: Bone from Knee, Left Updated: 07/21/22 1042     Anaerobic Culture Culture in progress    Narrative:      Tibial surface    Culture, Anaerobe [[id number]] Collected: 07/18/22 1254    Order Status: Completed Specimen: Wound from Knee, Left Updated: 07/21/22 1041     Anaerobic Culture Culture in progress    Narrative:      Posterior synovium    Aerobic culture [[id number]] Collected: 07/18/22 1254    Order Status: Completed Specimen: Wound from Knee, Left Updated: 07/20/22 0735     Aerobic Bacterial Culture No growth    Narrative:      Posterior synovium    Aerobic culture [[id number]] Collected: 07/18/22 1328    Order Status: Completed Specimen: Bone from Knee, Left Updated: 07/20/22 0735     Aerobic Bacterial Culture No growth    Narrative:      Tibial surface    Aerobic culture [[id number]] Collected: 07/18/22 1336    Order Status: Completed Specimen: Bone from Knee, Left Updated: 07/20/22 0735     Aerobic Bacterial Culture No growth    Narrative:      Femoral surface    AFB Culture & Smear [[id number]] Collected: 07/18/22 1328    Order Status: Completed Specimen: Bone from Knee, Left Updated: 07/19/22 2127     AFB Culture & Smear Culture in progress     AFB CULTURE STAIN No acid fast bacilli seen.    Narrative:      Tibial surface    AFB Culture & Smear [[id number]] Collected: 07/18/22 1336    Order Status: Completed Specimen: Bone from Knee, Left Updated: 07/19/22 2127     AFB Culture & Smear Culture in progress     AFB CULTURE STAIN No acid fast bacilli seen.    Narrative:      Femoral surface    AFB Culture & Smear [[id number]] Collected: 07/18/22 1254    Order Status: Completed Specimen: Wound from Knee, Left Updated: 07/19/22 2127     AFB Culture & Smear Culture in  progress     AFB CULTURE STAIN No acid fast bacilli seen.    Narrative:      Posterior synovium    Fungus culture [[id number]] Collected: 07/18/22 1336    Order Status: Completed Specimen: Bone from Knee, Left Updated: 07/19/22 1416     Fungus (Mycology) Culture Culture in progress    Narrative:      Femoral surface    Fungus culture [[id number]] Collected: 07/18/22 1254    Order Status: Completed Specimen: Wound from Knee, Left Updated: 07/19/22 1416     Fungus (Mycology) Culture Culture in progress    Narrative:      Posterior synovium    Fungus culture [[id number]] Collected: 07/18/22 1328    Order Status: Completed Specimen: Bone from Knee, Left Updated: 07/19/22 1416     Fungus (Mycology) Culture Culture in progress    Narrative:      Tibial surface    Culture, Body Fluid - Bactec [[id number]] Collected: 07/18/22 2124    Order Status: No result Updated: 07/18/22 2125    Gram stain [[id number]] Collected: 07/18/22 1328    Order Status: Completed Specimen: Bone from Knee, Left Updated: 07/18/22 2109     Gram Stain Result Rare WBC's      No organisms seen    Narrative:      Tibial surface    Gram stain [[id number]] Collected: 07/18/22 1254    Order Status: Completed Specimen: Wound from Knee, Left Updated: 07/18/22 2107     Gram Stain Result Rare WBC's      No organisms seen    Narrative:      Posterior synovium    Gram stain [[id number]] Collected: 07/18/22 1336    Order Status: Completed Specimen: Bone from Knee, Left Updated: 07/18/22 2105     Gram Stain Result No WBC's      No organisms seen    Narrative:      Femoral surface    Culture, Anaerobe [[id number]] Collected: 07/18/22 1212    Order Status: Canceled Specimen: Joint Fluid from Knee, Left     Aerobic culture [[id number]] Collected: 07/18/22 1212    Order Status: Canceled Specimen: Wound from Knee, Left                 Assessment/Plan:      * S/P left unicompartmental knee replacement  S/p left knee arthroplasty  Cultures sent and started on ancef q 7  days  Nerve block in place  Drain pulled   PT rec home w/ home health     Plan:  Defer to primary for pain and abx management  Recommend broad spectrum abx till cultures complete with growth or ngtd          Narcotic drug use  Home meds: percocet and gabapentin     Plan:  Defer to primary team for pain management  On home gabapentin and on celecoxib      Prediabetes  DM type 2 with   - Last A1c  Lab Results   Component Value Date    HGBA1C 6.4 (H) 06/29/2022       - BG on Admission   - Home regimen: metformin  - Will hold metformin while inpt and order SSI  - POCT glucose Q4hr  - goal -180    HTN (hypertension)  Continue home meds: furosemide, isosorbide mononitrate, valsartan  Goal SBP <140      Moderate episode of recurrent major depressive disorder  Continue home meds duloxetine        GERD (gastroesophageal reflux disease)  Continue home famotidine BID      VTE Risk Mitigation (From admission, onward)         Ordered     Place MANOJ hose  Until discontinued         07/18/22 1458     Place sequential compression device  Until discontinued         07/18/22 1458                Discharge Planning   ISAURA:      Code Status: Prior   Is the patient medically ready for discharge?:     Reason for patient still in hospital (select all that apply): Consult recommendations  Discharge Plan A: Home Health   Discharge Delays: None known at this time    Family Medicine will sign off at this time. Please re-consult for any further inquiries.    Sushil De La O MD  Department of Hospital Medicine   [facility]

## 2022-07-22 NOTE — PT/OT/SLP PROGRESS
Occupational Therapy      Patient Name:  Enedelia García   MRN:  [id number]    Patient not seen today secondary to Other (Comment) (PM 1440: Pt politely declined therapy 2/2 awaiting discharge home.).     7/22/2022

## 2022-07-22 NOTE — PLAN OF CARE
VN reviewed discharge instructions with pt. Using teach back method.  AVS printed and handed to pt by bedside nurse.  Reviewed follow-up appointments, medications, diet, and importance of medication compliance.  Reviewed home care instructions, treatment plan, self-management, and when to seek medical attention.  Allowed time for questions.  All questions answered.  Patient verbalized complete understanding of discharge instructions and voices no concerns.     Discharge instructions complete.  Bedside delivery done.  Transport/wheelchair requested.  Bedside nurse notified.

## 2022-07-22 NOTE — PLAN OF CARE
D/c orders noted.     Chio faxed HH orders to N at [phone].     Chio met with pt and pt's significant to discuss d/c plans. Pt is agreeable to discharge home with HH services. Chio completed patient choice form with pt. Pt's daughter will transport pt home at the time of d/c.     Pt is cleared to go from CM standpoint.     Future Appointments   Date Time Provider Department Center   8/2/2022  1:15 PM Stan Catalan MD [facility]   8/4/2022  9:00 AM Marichuy Hummel MD [facility] [place]   8/5/2022  1:00 PM Pineda Farias MD [facility]   9/9/2022  2:20 PM Marichuy Hummel MD [facility] [place]         07/22/22 1458   Final Note   Assessment Type Final Discharge Note   Anticipated Discharge Disposition Home-Health   What phone number can be called within the next 1-3 days to see how you are doing after discharge? [phone]   Hospital Resources/Appts/Education Provided Appointments scheduled by Navigator/Coordinator   Post-Acute Status   Post-Acute Authorization Home Health   Home Health Status Set-up Complete/Auth obtained   Coverage [employer] Medicare   Discharge Delays None known at this time

## 2022-07-23 ENCOUNTER — NURSE TRIAGE (OUTPATIENT)
Dept: ADMINISTRATIVE | Facility: CLINIC | Age: 77
End: 2022-07-23
Payer: MEDICARE

## 2022-07-23 LAB
BACTERIA SPEC AEROBE CULT: NO GROWTH

## 2022-07-23 NOTE — TELEPHONE ENCOUNTER
Pt called for post discharge Day 1 and she said that she had slept through the night and just woke up so had missed some meds but she is doing ok right now pt doesn't need triage and she was told that if she needs anything to reach out to OOC if needs assistance pt said that she will NAD           Reason for Disposition   Health Information question, no triage required and triager able to answer question    Protocols used: INFORMATION ONLY CALL - NO TRIAGE-A-

## 2022-07-24 ENCOUNTER — TELEPHONE (OUTPATIENT)
Dept: ADMINISTRATIVE | Facility: CLINIC | Age: 77
End: 2022-07-24
Payer: MEDICARE

## 2022-07-24 NOTE — TELEPHONE ENCOUNTER
Spoke to pt who states she is not why she is jelly looking sodium pill. Nurse informed pt she is on ducosate sodium which is a stool softner to prevent constipation. Pt states she had diarrhea all day yesterday. Pt instructed to hold dose and contact ordering provider tomorrow. Pt verbalized understanding.

## 2022-07-25 ENCOUNTER — TELEPHONE (OUTPATIENT)
Dept: ADMINISTRATIVE | Facility: CLINIC | Age: 77
End: 2022-07-25
Payer: MEDICARE

## 2022-07-25 ENCOUNTER — TELEPHONE (OUTPATIENT)
Dept: CARDIOLOGY | Facility: CLINIC | Age: 77
End: 2022-07-25
Payer: MEDICARE

## 2022-07-25 ENCOUNTER — PATIENT OUTREACH (OUTPATIENT)
Dept: ADMINISTRATIVE | Facility: OTHER | Age: 77
End: 2022-07-25
Payer: MEDICARE

## 2022-07-25 ENCOUNTER — TELEPHONE (OUTPATIENT)
Dept: ORTHOPEDICS | Facility: CLINIC | Age: 77
End: 2022-07-25
Payer: MEDICARE

## 2022-07-25 NOTE — TELEPHONE ENCOUNTER
----- Message from Nusrat Morrissey sent at 7/25/2022 10:01 AM CDT -----  Contact: Laya(Daughter)-[phone]  Type:  Needs Medical Advice    Who Called: Pt's Wife  Reason for call: regarding the pt has no feelings in her Hands, pt came home from the hospital on Friday  Would the patient rather a call back or a response via [employer]?  Call back  Best Call Back Number: [phone]

## 2022-07-25 NOTE — PROGRESS NOTES
IP Liaison - Final Visit Note    Patient: Enedelia García  MRN:  [id number]  Date of Service:  7/25/2022  Completed by:  LEYLA Leung    Reason for Visit   Patient presents with    IP Liaison Chart Review        Patient Summary     Discharge Date: 7/22/2022  Discharge telephone number/address: [phone] / [address] [place] [address]  Follow up provider: Stan Catalan MD / Marichuy Hummel MD  Follow up appointments: 8/2/2022 @ 1:15pm / 8/4/2022 @ 9:00am  Home Health agency & telephone number: Orders sent to N  DME ordered &  name: n/a  Assigned OPCM RN/SW: n/a  Report sent to follow up team (PCP/OPCM) via in basket message: n/a  Community Resources arranged including agency name & contact info: n/a      LEYLA Leung

## 2022-07-25 NOTE — TELEPHONE ENCOUNTER
----- Message from Nusrat Morrissey sent at 7/25/2022 10:01 AM CDT -----  Contact: Laya(Daughter)-[phone]  Type:  Needs Medical Advice    Who Called: Pt's Wife  Reason for call: regarding the pt has no feelings in her Hands, pt came home from the hospital on Friday  Would the patient rather a call back or a response via [employer]?  Call back  Best Call Back Number: [phone]

## 2022-07-25 NOTE — PROGRESS NOTES
"Spoke to patient's daughter Laya who verified pt's  and spelling of first and last name. Laya states she does not know why we called, but did share that "patient is in a lot of pain and hands and feet are white, as though she's not getting any circulation." She said she placed a call to the doctor's office and, once I gave her the OOC number, stated she would also place a call to the OOC line because the doctor is taking too long to call back. She mentioned that the pt had received Augmentin while in the hospital and wondered if she needed more, as she was not discharged on the antibiotic. I verified that patient was not prescribed any antibiotics at discharge. She said she would further discuss with home health who were on the way. Once home health arrived, Laya thanked us for calling and ended the call.   "

## 2022-07-26 LAB
BACTERIA SPEC ANAEROBE CULT: NORMAL

## 2022-07-27 ENCOUNTER — TELEPHONE (OUTPATIENT)
Dept: ADMINISTRATIVE | Facility: CLINIC | Age: 77
End: 2022-07-27
Payer: MEDICARE

## 2022-07-27 ENCOUNTER — PATIENT OUTREACH (OUTPATIENT)
Dept: ADMINISTRATIVE | Facility: OTHER | Age: 77
End: 2022-07-27
Payer: MEDICARE

## 2022-07-27 ENCOUNTER — PATIENT OUTREACH (OUTPATIENT)
Dept: ADMINISTRATIVE | Facility: CLINIC | Age: 77
End: 2022-07-27
Payer: MEDICARE

## 2022-07-27 NOTE — TELEPHONE ENCOUNTER
Transportation with [employer] and caregiver services placed a referral in [facility] and called [facility] with no answer. Will continue to follow-up on [facility] platform.

## 2022-07-27 NOTE — PROGRESS NOTES
Patients daughter Laya called, states tht patient will need help with transportation to appointments she has next week.  Abril Trujillo CM, states she will call back to assist. Laya advised, verbalized understanding.

## 2022-07-27 NOTE — PROGRESS NOTES
CHW - Initial Contact    This Community Health Worker completed OR updated the Social Determinant of Health questionnaire with Laya Garcia by telephone today.    Pt identified barriers of most importance are: Transportation and Caregiver Services  Referrals to community agencies completed with patient/caregiver consent outside of [facility] include: Yes  Referrals were put through [facility] -Yes  Support and Services: Transportation Services, Residential Care  Other information discussed the patient needs / wants help with: SDOH   Follow up required: Yes  Initial Outreach - Due: 7/27/2022

## 2022-07-29 ENCOUNTER — NURSE TRIAGE (OUTPATIENT)
Dept: ADMINISTRATIVE | Facility: CLINIC | Age: 77
End: 2022-07-29
Payer: MEDICARE

## 2022-07-29 ENCOUNTER — PATIENT OUTREACH (OUTPATIENT)
Dept: ADMINISTRATIVE | Facility: OTHER | Age: 77
End: 2022-07-29
Payer: MEDICARE

## 2022-07-29 ENCOUNTER — TELEPHONE (OUTPATIENT)
Dept: ADMINISTRATIVE | Facility: OTHER | Age: 77
End: 2022-07-29
Payer: MEDICARE

## 2022-07-29 NOTE — TELEPHONE ENCOUNTER
PD nurse advised daughter requesting to speak to nurse. Spoke to daughter, Laya. Pt recently had knee surgery. Daughter states pt has been hallucinating all week. Daughter thinks it is because pt has been taking pain meds q4h along with gabapentin and other medications. Pt is having blisters behind her knee, and below bandage on her shin. Blisters just started today. Confusion has been going on since she came home. Daughter states this is not pt's baseline. Advised to hang up and call 911. Daughter verbalized understanding. PT rang doorbell right at that time. Daughter stated she would call and hung up.    Reason for Disposition   Sounds like a life-threatening emergency to the triager    Additional Information   Negative: Major abdominal surgical incision and wound gaping open with visible internal organs    Protocols used: POST-OP INCISION SYMPTOMS AND [id number]

## 2022-07-29 NOTE — PROGRESS NOTES
CHW - Follow Up    This Community Health Worker completed a follow up visit with Laya Garcia by telephone today.  Pt/Caregiver reported: Patient having blisters on the side of her wounds and behind her knee post-op total knee replacement.  Community Health Worker provided: Notified nursing in tracker that patient needed assistance.  Follow up required: yes  Follow-up Outreach, Follow-up Outreach - Due: 8/2/2022, 7/29/2022

## 2022-07-29 NOTE — TELEPHONE ENCOUNTER
Caregiver states that patient has blisters on sides of wound and behind the knee. Patient is post-op total knee replacement per daughter. Daughter is ready to bring patient to ER. I asked her to let me have a nurse call her to evaluate the situation and she agreed. I placed a note in the tracker for nurse call and Marilee Garcia said she would make the call. I will continue follow up patient on [facility] platform.

## 2022-07-30 ENCOUNTER — HOSPITAL ENCOUNTER (EMERGENCY)
Facility: HOSPITAL | Age: 77
Discharge: ANOTHER HOSPITAL | End: 2022-07-31
Attending: EMERGENCY MEDICINE
Payer: MEDICARE

## 2022-07-30 ENCOUNTER — TELEPHONE (OUTPATIENT)
Dept: ADMINISTRATIVE | Facility: CLINIC | Age: 77
End: 2022-07-30
Payer: MEDICARE

## 2022-07-30 ENCOUNTER — NURSE TRIAGE (OUTPATIENT)
Dept: ADMINISTRATIVE | Facility: CLINIC | Age: 77
End: 2022-07-30
Payer: MEDICARE

## 2022-07-30 DIAGNOSIS — M79.89 LEG SWELLING: Primary | ICD-10-CM

## 2022-07-30 DIAGNOSIS — R60.9 SWELLING: ICD-10-CM
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
## 2022-07-30 DIAGNOSIS — S80.821A: ICD-10-CM

## 2022-07-30 DIAGNOSIS — G89.18 POSTOPERATIVE PAIN: ICD-10-CM

## 2022-07-30 DIAGNOSIS — D64.9 ANEMIA, UNSPECIFIED TYPE: ICD-10-CM

## 2022-07-30 DIAGNOSIS — T81.40XA POSTOPERATIVE INFECTION, UNSPECIFIED TYPE, INITIAL ENCOUNTER: ICD-10-CM

## 2022-07-30 LAB
ABO + RH BLD: NORMAL
ALBUMIN SERPL BCP-MCNC: 2.5 G/DL (ref 3.5–5.2)
ALP SERPL-CCNC: 83 U/L (ref 55–135)
ALT SERPL W/O P-5'-P-CCNC: 6 U/L (ref 10–44)
ANION GAP SERPL CALC-SCNC: 11 MMOL/L (ref 8–16)
ANISOCYTOSIS BLD QL SMEAR: SLIGHT
AST SERPL-CCNC: 21 U/L (ref 10–40)
BASOPHILS # BLD AUTO: 0.03 K/UL (ref 0–0.2)
BASOPHILS # BLD AUTO: 0.07 K/UL (ref 0–0.2)
BASOPHILS NFR BLD: 0.2 % (ref 0–1.9)
BASOPHILS NFR BLD: 0.5 % (ref 0–1.9)
BILIRUB SERPL-MCNC: 0.3 MG/DL (ref 0.1–1)
BLD GP AB SCN CELLS X3 SERPL QL: NORMAL
BUN SERPL-MCNC: 33 MG/DL (ref 8–23)
CALCIUM SERPL-MCNC: 9.3 MG/DL (ref 8.7–10.5)
CHLORIDE SERPL-SCNC: 100 MMOL/L (ref 95–110)
CO2 SERPL-SCNC: 26 MMOL/L (ref 23–29)
CREAT SERPL-MCNC: 1.8 MG/DL (ref 0.5–1.4)
CRP SERPL-MCNC: 116.6 MG/L (ref 0–8.2)
DIFFERENTIAL METHOD: ABNORMAL
DIFFERENTIAL METHOD: ABNORMAL
EOSINOPHIL # BLD AUTO: 0.5 K/UL (ref 0–0.5)
EOSINOPHIL # BLD AUTO: 0.6 K/UL (ref 0–0.5)
EOSINOPHIL NFR BLD: 3.1 % (ref 0–8)
EOSINOPHIL NFR BLD: 3.4 % (ref 0–8)
ERYTHROCYTE [DISTWIDTH] IN BLOOD BY AUTOMATED COUNT: 12.6 % (ref 11.5–14.5)
ERYTHROCYTE [DISTWIDTH] IN BLOOD BY AUTOMATED COUNT: 12.7 % (ref 11.5–14.5)
ERYTHROCYTE [SEDIMENTATION RATE] IN BLOOD BY WESTERGREN METHOD: 120 MM/HR (ref 0–20)
EST. GFR  (AFRICAN AMERICAN): 31 ML/MIN/1.73 M^2
EST. GFR  (NON AFRICAN AMERICAN): 27 ML/MIN/1.73 M^2
GLUCOSE SERPL-MCNC: 88 MG/DL (ref 70–110)
HCT VFR BLD AUTO: 11.5 % (ref 37–48.5)
HCT VFR BLD AUTO: 23.3 % (ref 37–48.5)
HGB BLD-MCNC: 3.7 G/DL (ref 12–16)
HGB BLD-MCNC: 7.5 G/DL (ref 12–16)
HYPOCHROMIA BLD QL SMEAR: ABNORMAL
IMM GRANULOCYTES # BLD AUTO: 0.05 K/UL (ref 0–0.04)
IMM GRANULOCYTES # BLD AUTO: 0.1 K/UL (ref 0–0.04)
IMM GRANULOCYTES NFR BLD AUTO: 0.3 % (ref 0–0.5)
IMM GRANULOCYTES NFR BLD AUTO: 0.5 % (ref 0–0.5)
LACTATE SERPL-SCNC: 0.6 MMOL/L (ref 0.5–2.2)
LYMPHOCYTES # BLD AUTO: 2.3 K/UL (ref 1–4.8)
LYMPHOCYTES # BLD AUTO: 2.6 K/UL (ref 1–4.8)
LYMPHOCYTES NFR BLD: 14.1 % (ref 18–48)
LYMPHOCYTES NFR BLD: 15.5 % (ref 18–48)
MCH RBC QN AUTO: 30.1 PG (ref 27–31)
MCH RBC QN AUTO: 30.8 PG (ref 27–31)
MCHC RBC AUTO-ENTMCNC: 32.2 G/DL (ref 32–36)
MCHC RBC AUTO-ENTMCNC: 32.2 G/DL (ref 32–36)
MCV RBC AUTO: 94 FL (ref 82–98)
MCV RBC AUTO: 96 FL (ref 82–98)
MONOCYTES # BLD AUTO: 1.9 K/UL (ref 0.3–1)
MONOCYTES # BLD AUTO: 2.6 K/UL (ref 0.3–1)
MONOCYTES NFR BLD: 13 % (ref 4–15)
MONOCYTES NFR BLD: 13.9 % (ref 4–15)
NEUTROPHILS # BLD AUTO: 10 K/UL (ref 1.8–7.7)
NEUTROPHILS # BLD AUTO: 12.7 K/UL (ref 1.8–7.7)
NEUTROPHILS NFR BLD: 67.3 % (ref 38–73)
NEUTROPHILS NFR BLD: 68.2 % (ref 38–73)
NRBC BLD-RTO: 0 /100 WBC
NRBC BLD-RTO: 0 /100 WBC
PLATELET # BLD AUTO: 392 K/UL (ref 150–450)
PLATELET # BLD AUTO: 488 K/UL (ref 150–450)
PLATELET BLD QL SMEAR: ABNORMAL
PMV BLD AUTO: 8.9 FL (ref 9.2–12.9)
PMV BLD AUTO: 9 FL (ref 9.2–12.9)
POTASSIUM SERPL-SCNC: 4.6 MMOL/L (ref 3.5–5.1)
PROT SERPL-MCNC: 6.4 G/DL (ref 6–8.4)
RBC # BLD AUTO: 1.2 M/UL (ref 4–5.4)
RBC # BLD AUTO: 2.49 M/UL (ref 4–5.4)
SODIUM SERPL-SCNC: 137 MMOL/L (ref 136–145)
WBC # BLD AUTO: 14.83 K/UL (ref 3.9–12.7)
WBC # BLD AUTO: 18.6 K/UL (ref 3.9–12.7)

## 2022-07-30 PROCEDURE — 93010 ELECTROCARDIOGRAM REPORT: CPT | Mod: ,,, | Performed by: INTERNAL MEDICINE

## 2022-07-30 PROCEDURE — 85652 RBC SED RATE AUTOMATED: CPT | Performed by: EMERGENCY MEDICINE

## 2022-07-30 PROCEDURE — 93005 ELECTROCARDIOGRAM TRACING: CPT

## 2022-07-30 PROCEDURE — 96374 THER/PROPH/DIAG INJ IV PUSH: CPT

## 2022-07-30 PROCEDURE — 63600175 PHARM REV CODE 636 W HCPCS: Performed by: EMERGENCY MEDICINE

## 2022-07-30 PROCEDURE — 86850 RBC ANTIBODY SCREEN: CPT | Performed by: EMERGENCY MEDICINE

## 2022-07-30 PROCEDURE — 25000003 PHARM REV CODE 250: Performed by: EMERGENCY MEDICINE

## 2022-07-30 PROCEDURE — 85025 COMPLETE CBC W/AUTO DIFF WBC: CPT | Mod: 91 | Performed by: EMERGENCY MEDICINE

## 2022-07-30 PROCEDURE — 99285 EMERGENCY DEPT VISIT HI MDM: CPT | Mod: 25

## 2022-07-30 PROCEDURE — 83605 ASSAY OF LACTIC ACID: CPT | Performed by: EMERGENCY MEDICINE

## 2022-07-30 PROCEDURE — 87040 BLOOD CULTURE FOR BACTERIA: CPT | Mod: 59 | Performed by: EMERGENCY MEDICINE

## 2022-07-30 PROCEDURE — 80053 COMPREHEN METABOLIC PANEL: CPT | Performed by: EMERGENCY MEDICINE

## 2022-07-30 PROCEDURE — 96361 HYDRATE IV INFUSION ADD-ON: CPT

## 2022-07-30 PROCEDURE — 86920 COMPATIBILITY TEST SPIN: CPT | Mod: 59 | Performed by: EMERGENCY MEDICINE

## 2022-07-30 PROCEDURE — 93010 EKG 12-LEAD: ICD-10-PCS | Mod: ,,, | Performed by: INTERNAL MEDICINE

## 2022-07-30 PROCEDURE — 86140 C-REACTIVE PROTEIN: CPT | Performed by: EMERGENCY MEDICINE

## 2022-07-30 RX ORDER — VANCOMYCIN HCL IN 5 % DEXTROSE 1G/250ML
1000 PLASTIC BAG, INJECTION (ML) INTRAVENOUS
Status: DISCONTINUED | OUTPATIENT
Start: 2022-07-30 | End: 2022-07-30

## 2022-07-30 RX ORDER — MORPHINE SULFATE 2 MG/ML
2 INJECTION, SOLUTION INTRAMUSCULAR; INTRAVENOUS
Status: COMPLETED | OUTPATIENT
Start: 2022-07-30 | End: 2022-07-30

## 2022-07-30 RX ORDER — MORPHINE SULFATE 2 MG/ML
2 INJECTION, SOLUTION INTRAMUSCULAR; INTRAVENOUS
Status: COMPLETED | OUTPATIENT
Start: 2022-07-30 | End: 2022-07-31

## 2022-07-30 RX ORDER — HYDROCODONE BITARTRATE AND ACETAMINOPHEN 500; 5 MG/1; MG/1
TABLET ORAL
Status: DISCONTINUED | OUTPATIENT
Start: 2022-07-30 | End: 2022-07-31 | Stop reason: HOSPADM

## 2022-07-30 RX ADMIN — MORPHINE SULFATE 2 MG: 2 INJECTION, SOLUTION INTRAMUSCULAR; INTRAVENOUS at 10:07

## 2022-07-30 RX ADMIN — SODIUM CHLORIDE 500 ML: 0.9 INJECTION, SOLUTION INTRAVENOUS at 10:07

## 2022-07-30 NOTE — TELEPHONE ENCOUNTER
"Pt was calling stating that she wants a list of meds. She then did state upon asking that she has discharge papers with meds listed. Pt was discharged 7/22, but escalated today for day 3. States feeling "loopy" since surgery. Declined triage.  Pt wants to review med list with me. Daughter states has OOC #.  "

## 2022-07-30 NOTE — TELEPHONE ENCOUNTER
Pt recently d/c after total knee replacement. States she has ruber coverage on it but it is lifting. Asking how to manage or re-bandage, States bandage is saturated. States moderate pain and is tolerable. Denies fever. Post op follow up appt sched for Tues. States she has left the dressing alone as she was instructed.     Advised would call OCP. And would call back wDr Sonia [place]. Spoke with Dr. Scott- advised pt can remove dressing - adds she should be having PT and they should be monitoring the wound. Advise pt  she should remove dressing, clean wound,  and keep f/u appt as scheduled.     Pt states she is not comfortable removing bandage and cleaning. Also concerns about blisters to the back of her knee which she has been having. Advised to go to ED for eval and possible redressing.  Advised to call back with concerns or worsening symptoms. Verbalized understanding.     Reason for Disposition   Dressing soaked with blood or body fluid (e.g., drainage)    Additional Information   Negative: [1] Bleeding from incision AND [2] won't stop after 10 minutes of direct pressure   Negative: [1] Widespread rash AND [2] bright red, sunburn-like   Negative: Severe pain in the incision   Negative: [1] Incision gaping open AND [2] < 48 hours since wound re-opened   Negative: [1] Incision gaping open AND [2] length of opening > 2 inches (5 cm)   Negative: Patient sounds very sick or weak to the triager   Negative: Sounds like a serious complication to the triager   Negative: Fever > 100.4 F (38.0 C)   Negative: [1] Incision looks infected (spreading redness, pain) AND [2] fever > 99.5 F (37.5 C)   Negative: [1] Incision looks infected (spreading redness, pain) AND [2] large red area (> 2 in. or 5 cm)   Negative: [1] Incision looks infected (spreading redness, pain) AND [2] face wound   Negative: [1] Red streak runs from the incision AND [2] longer than 1 inch (2.5 cm)   Negative: [1] Pus or bad-smelling fluid  draining from incision AND [2] no fever    Protocols used: POST-OP INCISION SYMPTOMS AND [id number]

## 2022-07-30 NOTE — ED PROVIDER NOTES
Encounter Date: 7/30/2022       History     Chief Complaint   Patient presents with    Knee Pain     Pt c/o L knee pain and swelling for a few days. Reports she is 8 days s/p L total knee replacement and also says she has had excessive drainage coming from dressing. Reports fever of 101 last night. No fever on arrival.     HPI   77f recent L knee replacement 7/18 w Dr Catalan pw 3d of L knee swelling, blistering, redness, and hallucinations, not feeling like herself  Called and was told to come to the ED  Denies fevers, n/v  States she doesn't feel completely normal  Endorses more pain at the site  Stopped gabapentin on her own    Review of patient's allergies indicates:   Allergen Reactions    Iodinated contrast media Anaphylaxis and Other (See Comments)    Phenergan [promethazine]      Vomiting     Past Medical History:   Diagnosis Date    Coronary artery disease     Epilepsy     Hyperlipidemia     Hypertension     Normocytic anemia      Past Surgical History:   Procedure Laterality Date    APPENDECTOMY      BACK SURGERY      CORONARY STENT PLACEMENT      HYSTERECTOMY      REVISION OF KNEE ARTHROPLASTY Left 7/18/2022    Procedure: REVISION, ARTHROPLASTY, KNEE;  Surgeon: Stan Catalan MD;  Location: [facility];  Service: Orthopedics;  Laterality: Left;    TONSILLECTOMY       Family History   Problem Relation Age of Onset    Heart disease Mother     Heart disease Father      Social History     Tobacco Use    Smoking status: Never Smoker    Smokeless tobacco: Never Used   Substance Use Topics    Alcohol use: No    Drug use: No     Review of Systems   Constitutional: Negative for appetite change, chills, diaphoresis and fever.   HENT: Negative for congestion, nosebleeds, sore throat, trouble swallowing and voice change.    Eyes: Negative for photophobia, pain, redness and itching.   Respiratory: Negative for cough, chest tightness and shortness of breath.    Cardiovascular: Negative for chest pain and leg  swelling.   Gastrointestinal: Negative for abdominal pain, constipation, diarrhea, nausea and vomiting.   Genitourinary: Negative for decreased urine volume, difficulty urinating, dysuria and frequency.   Musculoskeletal: Positive for gait problem and joint swelling.   Skin: Positive for color change and wound. Negative for rash.   Neurological: Negative for dizziness, facial asymmetry, speech difficulty and headaches.   Psychiatric/Behavioral: Negative for agitation, confusion and suicidal ideas.   All other systems reviewed and are negative.      Physical Exam     Initial Vitals [07/30/22 1629]   BP Pulse Resp Temp SpO2   (!) 132/57 66 16 98.5 °F (36.9 °C) (!) 94 %      MAP       --         Physical Exam    Nursing note and vitals reviewed.  Constitutional:   EXAM  General: Awake, alert and oriented. No acute distress.     Head: normocephalic and atraumatic     Eyes: Conjunctivae are clear without exudates or hemorrhage. Sclera is non-icteric. EOM are intact. Eyelids are normal in appearance without swelling or lesions.     Ears: The external ear and ear canal are non-tender and without swelling. The canal is clear without discharge. Hearing intact.     Nose: Nares are patent bilaterally.     Neck: The neck is supple. Trachea is midline. Full ROM.     Cardiac: Regular rate.     Respiratory: No signs of respiratory distress. No audible wheezes.     Abdominal: Non-distended.     Extremities: L knee wound intact, with erythema and warmth surrounding superior aspect of wound. Erythema, warmth, swelling on shin, painful to touch. Blisters behind knee, open wound medial knee.      Skin: Appropriate color for ethnicity.     Neurological: The patient is awake, alert and oriented to person, place, and time with normal speech.     Psychiatric: Appropriate mood and affect.     In light of current/ongoing global covid-19 pandemic, all my encounters w pt were with full ppe including but not limited to gown, gloves, n95, eye  protection OR from >6 ft away.             ED Course   Procedures  Labs Reviewed   CBC W/ AUTO DIFFERENTIAL - Abnormal; Notable for the following components:       Result Value    WBC 18.60 (*)     RBC 1.20 (*)     Hemoglobin 3.7 (*)     Hematocrit 11.5 (*)     Platelets 488 (*)     MPV 8.9 (*)     Gran # (ANC) 12.7 (*)     Immature Grans (Abs) 0.10 (*)     Mono # 2.6 (*)     Eos # 0.6 (*)     Lymph % 14.1 (*)     Platelet Estimate Increased (*)     All other components within normal limits    Narrative:      Hgb/Hct  critical result(s) called and verbal readback obtained from    Federica Hairston RN. at 18:20 on 07/30/2022 by ORLANDO 07/30/2022 18:22   COMPREHENSIVE METABOLIC PANEL - Abnormal; Notable for the following components:    BUN 33 (*)     Creatinine 1.8 (*)     Albumin 2.5 (*)     ALT 6 (*)     eGFR if  31 (*)     eGFR if non  27 (*)     All other components within normal limits   URINALYSIS, REFLEX TO URINE CULTURE - Abnormal; Notable for the following components:    Occult Blood UA 1+ (*)     Leukocytes, UA 1+ (*)     All other components within normal limits    Narrative:     Specimen Source->Urine   SEDIMENTATION RATE - Abnormal; Notable for the following components:    Sed Rate 120 (*)     All other components within normal limits   C-REACTIVE PROTEIN - Abnormal; Notable for the following components:    .6 (*)     All other components within normal limits   CBC W/ AUTO DIFFERENTIAL - Abnormal; Notable for the following components:    WBC 14.83 (*)     RBC 2.49 (*)     Hemoglobin 7.5 (*)     Hematocrit 23.3 (*)     MPV 9.0 (*)     Gran # (ANC) 10.0 (*)     Immature Grans (Abs) 0.05 (*)     Mono # 1.9 (*)     Lymph % 15.5 (*)     All other components within normal limits    Narrative:     notified Kalani Cam of H&H Delta check from previous labs by DWVenkata   07/30/2022 20:55   URINALYSIS MICROSCOPIC - Abnormal; Notable for the following components:    Yeast, UA Few (*)      Hyaline Casts, UA 5 (*)     All other components within normal limits    Narrative:     Specimen Source->Urine   CULTURE, BLOOD   CULTURE, BLOOD   LACTIC ACID, PLASMA   TYPE & SCREEN   TYPE & SCREEN   TYPE & SCREEN   PREPARE RBC SOFT          Imaging Results          X-Ray Knee 3 View Left (Final result)  Result time 07/31/22 00:17:52    Final result by Thanh Keller DO (07/31/22 00:17:52)                 Impression:      As above..      Electronically signed by: Thanh Keller  Date:    07/31/2022  Time:    00:17             Narrative:    EXAMINATION:  XR KNEE 3 VIEW LEFT    CLINICAL HISTORY:  Unspecified fall, initial encounter    TECHNIQUE:  AP, lateral, and Merchant views of the left knee were performed.    COMPARISON:  None    FINDINGS:  There are postoperative changes of left total knee arthroplasty.  Hardware is intact and well aligned.  There is no evidence of a perihardware fracture.  There are anterior skin staples.  There is a small amount postoperative edema and joint fluid.                               US Lower Extremity Veins Left (Final result)  Result time 07/30/22 21:24:43    Final result by Thanh Keller DO (07/30/22 21:24:43)                 Impression:      No evidence of deep venous thrombosis in the left lower extremity.      Electronically signed by: Thanh Kleler  Date:    07/30/2022  Time:    21:24             Narrative:    EXAMINATION:  US LOWER EXTREMITY VEINS LEFT    CLINICAL HISTORY:  Other specified soft tissue disorders    TECHNIQUE:  Duplex and color flow Doppler evaluation and graded compression of the left lower extremity veins was performed.    COMPARISON:  None    FINDINGS:  Left thigh veins: The common femoral, femoral, popliteal, upper greater saphenous, and deep femoral veins are patent and free of thrombus. The veins are normally compressible and have normal phasic flow and augmentation response.    Left calf veins: The visualized calf veins are  patent.    Contralateral CFV: The contralateral (right) common femoral vein is patent and free of thrombus.    Miscellaneous: None                               X-Ray Knee 3 View Left (Final result)  Result time 07/30/22 18:59:53    Final result by Thanh Keller DO (07/30/22 18:59:53)                 Impression:      As above.      Electronically signed by: Thanh Keller  Date:    07/30/2022  Time:    18:59             Narrative:    EXAMINATION:  XR KNEE 3 VIEW LEFT    CLINICAL HISTORY:  Unspecified infectious disease    TECHNIQUE:  AP, lateral, and Merchant views of the left knee were performed.    COMPARISON:  07/18/2022    FINDINGS:  There are postop changes of left total knee arthroplasty with a long stem tibial component.  Hardware is intact and well aligned.  No perihardware fracture or evidence of hardware loosening.  There are overlying skin staples.  There is soft tissue edema of the left knee.  There is mild joint fluid, resolution of the previously noted soft tissue gas and pneumarthrosis.                               X-Ray Chest AP Portable (Final result)  Result time 07/30/22 17:26:41    Final result by hTanh Keller DO (07/30/22 17:26:41)                 Impression:      No acute abnormality.      Electronically signed by: Thanh Keller  Date:    07/30/2022  Time:    17:26             Narrative:    EXAMINATION:  XR CHEST AP PORTABLE    CLINICAL HISTORY:  Hypoxemia    TECHNIQUE:  Single frontal view of the chest was performed.    COMPARISON:  06/29/2022.    FINDINGS:  The lungs are well expanded.  No focal opacities are seen. The pleural spaces are clear.    The cardiac silhouette is unremarkable.  There are atherosclerotic calcifications of the aortic arch.    The visualized osseous structures are unremarkable.                                 Medications   sodium chloride 0.9% bolus 500 mL (0 mLs Intravenous Stopped 7/30/22 4017)   morphine injection 2 mg (2 mg Intravenous Given 7/30/22 2821)    morphine injection 2 mg (2 mg Intravenous Given 7/31/22 0000)     Medical Decision Making:   ED Management:  #anemia  Pt's Hgb was downtrending while admitted to the hospital, 7.7 7/21 and 3.7 today. Denies chest pain, sob, lightheadedness, n/v. States always has dark stool bc she takes iron pills. Needs to have a BM, will obtain guaiac. Consented for 4U PRBC. Not tachycardic nor hypotensive, but ox sat 94%.     #post-op infx  Surgical site appears infected. Pt w WBC 18 and elevated inflammatory markers. Vanc, balwindern, blood cx, admission. Ortho resident coming to evaluate.       Pt signed out to oncoming physician pending repeat labs, evaluation by Orthopedics. Disposition pending.                       Clinical Impression:   Final diagnoses:  [D64.9] Anemia, unspecified type  [T81.40XA] Postoperative infection, unspecified type, initial encounter  [M79.89] Leg swelling (Primary)  [W19.XXXA] Fall  [W19.XXXA] Fall - just fell on knee, s/p replacement  [W19.XXXA] Fall - s/p replacement, just fell onto knee, knee pain  [R60.9] Swelling  [G89.18] Postoperative pain          ED Disposition Condition    Admit               Alesha Tejeda MD  08/01/22 0612

## 2022-07-31 ENCOUNTER — TELEPHONE (OUTPATIENT)
Dept: ADMINISTRATIVE | Facility: CLINIC | Age: 77
End: 2022-07-31
Payer: MEDICARE

## 2022-07-31 VITALS
OXYGEN SATURATION: 98 % | BODY MASS INDEX: 32.28 KG/M2 | RESPIRATION RATE: 20 BRPM | WEIGHT: 188.06 LBS | HEART RATE: 68 BPM | SYSTOLIC BLOOD PRESSURE: 178 MMHG | TEMPERATURE: 98 F | DIASTOLIC BLOOD PRESSURE: 90 MMHG

## 2022-07-31 LAB
BACTERIA #/AREA URNS HPF: ABNORMAL /HPF
BILIRUB UR QL STRIP: NEGATIVE
BLD PROD TYP BPU: NORMAL
BLOOD UNIT EXPIRATION DATE: NORMAL
BLOOD UNIT TYPE CODE: 7300
BLOOD UNIT TYPE: NORMAL
CLARITY UR: CLEAR
CODING SYSTEM: NORMAL
COLOR UR: YELLOW
DISPENSE STATUS: NORMAL
GLUCOSE UR QL STRIP: NEGATIVE
HGB UR QL STRIP: ABNORMAL
HYALINE CASTS #/AREA URNS LPF: 5 /LPF
KETONES UR QL STRIP: NEGATIVE
LEUKOCYTE ESTERASE UR QL STRIP: ABNORMAL
MICROSCOPIC COMMENT: ABNORMAL
NITRITE UR QL STRIP: NEGATIVE
NUM UNITS TRANS PACKED RBC: NORMAL
PH UR STRIP: 6 [PH] (ref 5–8)
PROT UR QL STRIP: NEGATIVE
RBC #/AREA URNS HPF: 1 /HPF (ref 0–4)
SP GR UR STRIP: 1.01 (ref 1–1.03)
SQUAMOUS #/AREA URNS HPF: 1 /HPF
URN SPEC COLLECT METH UR: ABNORMAL
UROBILINOGEN UR STRIP-ACNC: NEGATIVE EU/DL
WBC #/AREA URNS HPF: 2 /HPF (ref 0–5)
YEAST URNS QL MICRO: ABNORMAL

## 2022-07-31 PROCEDURE — 63600175 PHARM REV CODE 636 W HCPCS: Performed by: EMERGENCY MEDICINE

## 2022-07-31 PROCEDURE — 81000 URINALYSIS NONAUTO W/SCOPE: CPT | Performed by: EMERGENCY MEDICINE

## 2022-07-31 PROCEDURE — 96376 TX/PRO/DX INJ SAME DRUG ADON: CPT

## 2022-07-31 RX ADMIN — MORPHINE SULFATE 2 MG: 2 INJECTION, SOLUTION INTRAMUSCULAR; INTRAVENOUS at 12:07

## 2022-07-31 NOTE — PROVIDER PROGRESS NOTES - EMERGENCY DEPT.
Encounter Date: 7/30/2022    ED Physician Progress Notes        Physician Note:   I received sign-out from Dr. Tejeda.  Plan was repeat blood work observe and follow up on orthopedic recommendations.  Patient arrived in the ER, hemoglobin under 4 with a leukocytosis of 18.  There was concern for acute infection with acute blood loss anemia.  However upon repeat of CBC hemoglobin normalized to 7.5 without any interventions, WBC is 14.  Does have markedly elevated inflammatory markers, however after patient was seen evaluated by Orthopedics, they report this is normal and expected postop .  They report the skin changes are expected postop changes.  They have low suspicion for postop infection at this time.  They did recommend ultrasound to rule out DVT.  Patient ultrasound negative.      I discussed with family plan to discharge home.  Patient requests to go to the bathroom without assistance or use of mobility device.  While she was in the bathroom she fell onto her right knee.  Repeat imaging revealed no acute fracture.  She is well appearing no acute distress.  I suspect that her leg swelling may be from lack of mobilization causing fluid dependent edema.  I discussed with orthopedics they will plan to arrange outpatient physical therapy.  Strict return precautions were discussed which family understood and agreed with.  And patient will be discharged.

## 2022-07-31 NOTE — DISCHARGE INSTRUCTIONS
Please follow up with orthopedics.  Please continue medications as prescribed.  Return to the ER for any concerning reason.

## 2022-07-31 NOTE — ED NOTES
Pt reported falling on the floor in restroom, pt locked door to restroom, unable to access patient. Security, house sup, and charge nurse notified.

## 2022-07-31 NOTE — ED NOTES
Patient requesting to use restroom to urinate, offered pt purewick and bedpan, pt refused.  Wheelchair offered to patient several times.  Pt refused.  Ambulated pt to restroom with assistance with no difficulties.

## 2022-07-31 NOTE — ED NOTES
Wound cleaned and redressed with mepilex, LLE wrapped with ACE bandage.  Will continue to monitor.

## 2022-07-31 NOTE — ED NOTES
Reports morphine did not help with pain, pain still 10/10, Dr. Mohr notified.  Will continue to monitor.

## 2022-07-31 NOTE — TELEPHONE ENCOUNTER
Patient/family has been through a lot since patient had surgery. Family is looking for personal assistance services, when they are taking care of their own matter. Patient/family was e-mailed resources on the services at [email or web address].    Sandra Torrez.

## 2022-07-31 NOTE — ED NOTES
Patient found at the edge of stretcher attempted to stand.  Educated the patient on the importance of staying in bed and asking for assistance to ambulate or reposition.  Pt pulled back into bed and repositioned, side rails up x2, bed locked in low position, call light in reach with  at bedside.  Will continue monitor.

## 2022-07-31 NOTE — ED NOTES
Patient found at the edge of the stretcher attempting to stand, reminded again the need to stay in the stretcher and ask for assistance if she needs to help.  Pulled back into bed.  Side rails up x2, bed locked in low position with call light within reach.   at bedside.

## 2022-07-31 NOTE — CONSULTS
[facility] Consult Note    HPI:  This is a 77F s/p L revision TKA on 7/18/22. She is in the ED after developing L lower leg redness, pain and swelling 2 days ago on 7/28/22. She denies hx of trauma or falls, says she noticed some redness over her shin and some blisters distal to her incision. She says she felt confused yesterday but now is mentating clearly. She says the redness has improved although she still has some pain over her calf and leg. She is accompanied today by her partner. She has not been doing formal PT but has been doing home exercises. She has not been on any abx, has been taking aspirin. Her initial bloodwork today in ED H/H 3.7 , WBC 18, , . Redraw is still pending.    PMH:   Past Medical History:   Diagnosis Date    Coronary artery disease     Epilepsy     Hyperlipidemia     Hypertension     Normocytic anemia        PSH:   Past Surgical History:   Procedure Laterality Date    APPENDECTOMY      BACK SURGERY      CORONARY STENT PLACEMENT      HYSTERECTOMY      REVISION OF KNEE ARTHROPLASTY Left 7/18/2022    Procedure: REVISION, ARTHROPLASTY, KNEE;  Surgeon: Stan Catalan MD;  Location: [facility];  Service: Orthopedics;  Laterality: Left;    TONSILLECTOMY         Med:   No current facility-administered medications on file prior to encounter.     Current Outpatient Medications on File Prior to Encounter   Medication Sig Dispense Refill    acetaminophen (TYLENOL) 325 MG tablet Take 1 tablet (325 mg total) by mouth every 4 (four) hours. for 14 days 84 tablet 0    aspirin (ECOTRIN) 81 MG EC tablet Take 1 tablet (81 mg total) by mouth 2 (two) times a day. 84 tablet 0    butalbital-acetaminophen-caff -40 mg Cap TAKE ONE CAPSULE BY MOUTH ONCE TO TWICE DAILY AS NEEDED FOR MIGRAINE headaches      calcium carbonate/vitamin D3 (VITAMIN D-3 ORAL) Take by mouth once daily.      clotrimazole-betamethasone 1-0.05% (LOTRISONE) cream Apply topically 2 (two) times daily. 45 g 0     diclofenac (VOLTAREN) 75 MG EC tablet Take 1 tablet (75 mg total) by mouth 2 (two) times daily. 84 tablet 0    docusate sodium (COLACE) 100 MG capsule Take by mouth every 6 (six) hours. 168 capsule 0    DULoxetine (CYMBALTA) 60 MG capsule Take 1 capsule (60 mg total) by mouth once daily. Take 1 tab po daily x 2 weeks then 2 tabs po daily thereafter. 90 capsule 1    famotidine (PEPCID) 20 MG tablet Take 1 tablet (20 mg total) by mouth 2 (two) times daily as needed for Heartburn. 84 tablet 0    fluticasone propionate (FLONASE) 50 mcg/actuation nasal spray 2 sprays (100 mcg total) by Each Nostril route once daily. 15.8 mL 2    furosemide (LASIX) 40 MG tablet Take 1 tablet (40 mg total) by mouth once daily. 90 tablet 1    gabapentin (NEURONTIN) 600 MG tablet Take 600 mg by mouth 3 (three) times daily.      hydrOXYzine HCL (ATARAX) 25 MG tablet   See Instructions, 30 tab, 1, Substitution Allowed, TAKE 1 TABLET BY MOUTH DAILY AS NEEDED FOR ITCHING, 30, Route to Pharmacy Electronically, [id number], [id number], Instructions Replace Required Details, cm,...      irbesartan (AVAPRO) 300 MG tablet Take 1 tablet (300 mg total) by mouth every evening. 60 tablet 5    isosorbide mononitrate (IMDUR) 60 MG 24 hr tablet Take 1 tablet (60 mg total) by mouth once daily. 90 tablet 1    linaCLOtide (LINZESS) 72 mcg Cap capsule Take 72 mcg by mouth before breakfast.      metoprolol succinate (TOPROL-XL) 25 MG 24 hr tablet Take 1 tablet (25 mg total) by mouth once daily. 90 tablet 3    multivitamin capsule Take 1 capsule by mouth once daily.      mupirocin (BACTROBAN) 2 % ointment Apply topically once daily. apply to affected area 22 g 3    nitroGLYCERIN (NITROSTAT) 0.3 MG SL tablet PLACE ONE TABLET UNDER THE TONGUE EVERY 5 MINUTES FOR UP TO 3 doses IF NEEDED FOR CHEST PAIN. call 911 IF PAIN persists 30 tablet 0    omeprazole (PRILOSEC) 20 MG capsule Take 20 mg by mouth once  daily.      oxyCODONE (ROXICODONE) 10 mg Tab immediate release tablet oxycodone 10 mg tablet   Take 1 tablet 3 times a day by oral route as needed for 7 days.      polyethylene glycol (GLYCOLAX) 17 gram PwPk Take 17 g by mouth once daily. 90 packet 3    rosuvastatin (CRESTOR) 40 MG Tab Take 1 tablet (40 mg total) by mouth once daily. 90 tablet 3    valACYclovir (VALTREX) 1000 MG tablet Take 1 tablet (1,000 mg total) by mouth once daily. As needed for outbreak for 5 days 5 tablet 0       Allergies:   Review of patient's allergies indicates:   Allergen Reactions    Iodinated contrast media Anaphylaxis and Other (See Comments)    Phenergan [promethazine]      Vomiting       SH:   Social History     Socioeconomic History    Marital status: Significant Other   Tobacco Use    Smoking status: Never Smoker    Smokeless tobacco: Never Used   Substance and Sexual Activity    Alcohol use: No    Drug use: No    Sexual activity: Not Currently     Social Determinants of Health     Financial Resource Strain: Medium Risk    Difficulty of Paying Living Expenses: Somewhat hard   Food Insecurity: No Food Insecurity    Worried About Running Out of Food in the Last Year: Never true    Ran Out of Food in the Last Year: Never true   Transportation Needs: No Transportation Needs    Lack of Transportation (Medical): No    Lack of Transportation (Non-Medical): No   Physical Activity: Insufficiently Active    Days of Exercise per Week: 3 days    Minutes of Exercise per Session: 20 min   Stress: Stress Concern Present    Feeling of Stress : To some extent   Social Connections: Moderately Isolated    Frequency of Communication with Friends and Family: More than three times a week    Frequency of Social Gatherings with Friends and Family: More than three times a week    Attends Mormon Services: Never    Active Member of Clubs or Organizations: No    Attends Club or Organization Meetings: Never    Marital Status:  Living with partner   Housing Stability: Low Risk     Unable to Pay for Housing in the Last Year: No    Number of Places Lived in the Last Year: 1    Unstable Housing in the Last Year: No       FH:    ROS: otherwise negative except indicated in HPI     BP (!) 132/57 (BP Location: Left arm, Patient Position: Sitting)   Pulse 66   Temp 98.5 °F (36.9 °C) (Oral)   Resp 16   Wt 85.3 kg (188 lb 0.8 oz)   SpO2 (!) 94%   BMI 32.28 kg/m²   NAD, Alert, Awake, Ox4   HEENT: atraumatic, normal cephalic, neg ecchymosis, lacerations   CV: No increased work of breathing   Pulm: Normal work of breathing   Skin: No cutaneous rash, no open wounds   Vascular: 2+ RP, wwp, bcr     Left lower extremity exam:  Left anterior incision healing well, well approximated with staples and Dermabond in place. No erythema, drainage, or fluctuance from the surgical site  Some diffuse edema around the knee joint appropriate for postop status   Knee ROM 0-90 degrees  There is diffuse erythema of the anterior leg that extends to the popliteal fossa. There is some blistering around the proximal tibia and over the calf  There is tenderness to palpation over the calf and medial leg  There is no fluctuance, purulence, or drainage from her leg  Compartments are soft and compressible  No pain with passive stretch of great toe  Ankle dorsiflexion to neutral active ROM  SILT over dorsum and plantar foot  Easily palpable DP pulse 2+  Foot is warm and well perfused     Imaging:  Independent review of xrays L knee shows well positioned TKA components with no interval change.     A/P:   77F s/p L revision TKA on 7/18/22 with lower leg swelling    -pt evaluated at bedside and case discussed with staff Dr. Catalan  -her L knee and surgical site appear to be well approximated, there is no drainage or erythema of the incision.   -she does have tenderness, diffuse swelling, and erythema of her lower leg and calf for which we recommend a DVT workup  -recommend  redrawing bloodwork as initial HH is aberrantly low and needs to be confirmed.  -otherwise no indication for acute orthopedic surgical intervention at this time  -ok to WBAT      Dr. Carlee Scott

## 2022-07-31 NOTE — ED NOTES
"Door opened by maintenance, pt found on floor covered in feces.  Pt reports trying to clean self from BM and LLE suddenly "giving out", denies loc, reports severe pain to lle, skin tear noted to LLE.  Pt cleaned from BM, carried onto stretcher.  Dr. Mohr notified.  Purewick placed on patient.     "

## 2022-08-02 ENCOUNTER — TELEPHONE (OUTPATIENT)
Dept: ADMINISTRATIVE | Facility: CLINIC | Age: 77
End: 2022-08-02
Payer: MEDICARE

## 2022-08-02 ENCOUNTER — OFFICE VISIT (OUTPATIENT)
Dept: ORTHOPEDICS | Facility: CLINIC | Age: 77
End: 2022-08-02
Payer: MEDICARE

## 2022-08-02 VITALS
BODY MASS INDEX: 32.1 KG/M2 | WEIGHT: 188 LBS | SYSTOLIC BLOOD PRESSURE: 176 MMHG | HEART RATE: 69 BPM | HEIGHT: 64 IN | DIASTOLIC BLOOD PRESSURE: 88 MMHG

## 2022-08-02 DIAGNOSIS — Z96.652 STATUS POST REVISION OF TOTAL REPLACEMENT OF LEFT KNEE: Primary | ICD-10-CM

## 2022-08-02 PROCEDURE — 1101F PT FALLS ASSESS-DOCD LE1/YR: CPT | Mod: CPTII,S$GLB,, | Performed by: ORTHOPAEDIC SURGERY

## 2022-08-02 PROCEDURE — 1125F AMNT PAIN NOTED PAIN PRSNT: CPT | Mod: CPTII,S$GLB,, | Performed by: ORTHOPAEDIC SURGERY

## 2022-08-02 PROCEDURE — 3288F FALL RISK ASSESSMENT DOCD: CPT | Mod: CPTII,S$GLB,, | Performed by: ORTHOPAEDIC SURGERY

## 2022-08-02 PROCEDURE — 1159F PR MEDICATION LIST DOCUMENTED IN MEDICAL RECORD: ICD-10-PCS | Mod: CPTII,S$GLB,, | Performed by: ORTHOPAEDIC SURGERY

## 2022-08-02 PROCEDURE — 3077F PR MOST RECENT SYSTOLIC BLOOD PRESSURE >= 140 MM HG: ICD-10-PCS | Mod: CPTII,S$GLB,, | Performed by: ORTHOPAEDIC SURGERY

## 2022-08-02 PROCEDURE — 3077F SYST BP >= 140 MM HG: CPT | Mod: CPTII,S$GLB,, | Performed by: ORTHOPAEDIC SURGERY

## 2022-08-02 PROCEDURE — 1125F PR PAIN SEVERITY QUANTIFIED, PAIN PRESENT: ICD-10-PCS | Mod: CPTII,S$GLB,, | Performed by: ORTHOPAEDIC SURGERY

## 2022-08-02 PROCEDURE — 99024 POSTOP FOLLOW-UP VISIT: CPT | Mod: S$GLB,,, | Performed by: ORTHOPAEDIC SURGERY

## 2022-08-02 PROCEDURE — 99999 PR PBB SHADOW E&M-EST. PATIENT-LVL V: ICD-10-PCS | Mod: PBBFAC,,, | Performed by: ORTHOPAEDIC SURGERY

## 2022-08-02 PROCEDURE — 3288F PR FALLS RISK ASSESSMENT DOCUMENTED: ICD-10-PCS | Mod: CPTII,S$GLB,, | Performed by: ORTHOPAEDIC SURGERY

## 2022-08-02 PROCEDURE — 99024 PR POST-OP FOLLOW-UP VISIT: ICD-10-PCS | Mod: S$GLB,,, | Performed by: ORTHOPAEDIC SURGERY

## 2022-08-02 PROCEDURE — 1159F MED LIST DOCD IN RCRD: CPT | Mod: CPTII,S$GLB,, | Performed by: ORTHOPAEDIC SURGERY

## 2022-08-02 PROCEDURE — 99999 PR PBB SHADOW E&M-EST. PATIENT-LVL V: CPT | Mod: PBBFAC,,, | Performed by: ORTHOPAEDIC SURGERY

## 2022-08-02 PROCEDURE — 1101F PR PT FALLS ASSESS DOC 0-1 FALLS W/OUT INJ PAST YR: ICD-10-PCS | Mod: CPTII,S$GLB,, | Performed by: ORTHOPAEDIC SURGERY

## 2022-08-02 PROCEDURE — 3079F DIAST BP 80-89 MM HG: CPT | Mod: CPTII,S$GLB,, | Performed by: ORTHOPAEDIC SURGERY

## 2022-08-02 PROCEDURE — 3079F PR MOST RECENT DIASTOLIC BLOOD PRESSURE 80-89 MM HG: ICD-10-PCS | Mod: CPTII,S$GLB,, | Performed by: ORTHOPAEDIC SURGERY

## 2022-08-02 NOTE — PROGRESS NOTES
Pt called TCM box on Saturday 7/30/22 at 2:32pm in regard to wound care. Pt called and was spoken to by triage nurse. No contact at this time

## 2022-08-02 NOTE — PROGRESS NOTES
Subjective:      Patient ID: Enedelia García is a 77 y.o. female.    Chief Complaint: Post-op Evaluation of the Left Knee    Patient is 2 weeks s/p  left revision total knee replacement  Anterior knee pain: no  Has improved pain  Is in physical therapy  yes  Problems w incision  no  Is  happy with result  yes  Opiod free: yes    Social History     Occupational History    Not on file   Tobacco Use    Smoking status: Never Smoker    Smokeless tobacco: Never Used   Substance and Sexual Activity    Alcohol use: No    Drug use: No    Sexual activity: Not Currently      ROS      Objective:    General    Constitutional: She is oriented to person, place, and time. She appears well-developed and well-nourished.   HENT:   Head: Normocephalic and atraumatic.   Right Ear: External ear normal.   Left Ear: External ear normal.   Nose: Nose normal.   Eyes: Conjunctivae and EOM are normal. Right eye exhibits no discharge. Left eye exhibits no discharge. No scleral icterus.   Neck: No JVD present. No tracheal deviation present.   Cardiovascular: Intact distal pulses.    Pulmonary/Chest: Effort normal and breath sounds normal. No stridor. No respiratory distress. She has no wheezes.   Abdominal: There is no guarding.   Neurological: She is alert and oriented to person, place, and time. She displays normal reflexes. She exhibits normal muscle tone. Coordination normal.   Psychiatric: She has a normal mood and affect. Her behavior is normal. Judgment and thought content normal.     General Musculoskeletal Exam   Gait: abnormal and antalgic         Left Knee Exam     Inspection   Swelling: present  Effusion: present    Range of Motion   Extension: 10   Flexion: 110     Tests   Stability   MCL - Valgus: normal (0 to 2mm)  LCL - Varus: normal (0 to 2mm)    Other   Sensation: decreased    Comments:  Incision clean, dry intact    Muscle Strength   Left Lower Extremity   Quadriceps:  4/5   Hamstrin/5     Vascular Exam        Edema  Left Lower Leg: present         Assessment:       1. Status post revision of total replacement of left knee          Plan:       Overall patient appears to be doing well and is happy with the result of the knee arthroplasty. They can continue activities as tolerated avoiding high impact activities.  I would like to see the patient back In 3 months with xrays. Compression stocking for L LE swelling

## 2022-08-05 LAB
BACTERIA BLD CULT: NORMAL
BACTERIA BLD CULT: NORMAL

## 2022-08-05 NOTE — PROGRESS NOTES
CHW - Follow Up    This Community Health Worker completed a follow up visit with Enedelia García by telephone today.  Pt/Caregiver reported: Still needs caregiver services.  Community Health Worker provided: Still working on referrals.  Follow up required:Yes   Follow-up Outreach - Due: 8/11/2022

## 2022-08-08 ENCOUNTER — HOSPITAL ENCOUNTER (OUTPATIENT)
Dept: WOUND CARE | Facility: HOSPITAL | Age: 77
Discharge: HOME | End: 2022-08-08
Attending: PREVENTIVE MEDICINE
Payer: MEDICARE

## 2022-08-08 VITALS
BODY MASS INDEX: 32.1 KG/M2 | TEMPERATURE: 98 F | SYSTOLIC BLOOD PRESSURE: 127 MMHG | HEIGHT: 64 IN | WEIGHT: 188 LBS | DIASTOLIC BLOOD PRESSURE: 58 MMHG | HEART RATE: 83 BPM

## 2022-08-08 DIAGNOSIS — R23.9 ALTERATION IN SKIN INTEGRITY RELATED TO SURGICAL INCISION: Primary | ICD-10-CM

## 2022-08-08 DIAGNOSIS — Z96.652 S/P TOTAL KNEE ARTHROPLASTY, LEFT: ICD-10-CM

## 2022-08-08 DIAGNOSIS — L97.911: ICD-10-CM

## 2022-08-08 PROCEDURE — 99213 OFFICE O/P EST LOW 20 MIN: CPT

## 2022-08-08 RX ORDER — MUPIROCIN 20 MG/G
OINTMENT TOPICAL DAILY
Qty: 22 G | Refills: 3 | Status: SHIPPED | OUTPATIENT
Start: 2022-08-08 | End: 2022-08-12 | Stop reason: SDUPTHER

## 2022-08-08 NOTE — PROGRESS NOTES
[facility]    Subjective:       Patient ID: Enedelia García is a 77 y.o. female.    Chief Complaint: Non-healing Wound Follow Up    F/U  for RLE wound.  RLE wound is resolved.  Patient presents with surgical wound to the Left knee.  S/P L TKR on 7/18/22.  Staples were removed at 2 week post op appointment per patients report. No S&S of infection.  Patient reports she went to the ER on Sat 8/6/22 and fell in the restroom.      Review of Systems   All other systems reviewed and are negative.        Objective:        Physical Exam       Incision/Site 07/18/22 1405 Left Knee (Active)   07/18/22 1405   Present Prior to Hospital Arrival?:    Side: Left   Location: Knee   Orientation:    Incision Type:    Closure Method:    Additional Comments:    Removal Indication and Assessment:    Wound Outcome:    Removal Indications:    Wound Image   08/08/22 1000   Dressing Appearance Open to air 08/08/22 1000   Drainage Amount Small 08/08/22 1000   Drainage Characteristics/Odor Yellow;Serosanguineous 08/08/22 1000   Appearance Yellow;Moist;Black;Red 08/08/22 1000   Black (%), Wound Tissue Color 20 % 08/08/22 1000   Red (%), Wound Tissue Color 60 % 08/08/22 1000   Yellow (%), Wound Tissue Color 20 % 08/08/22 1000   Periwound Area Edematous;Pink;Dry 08/08/22 1000   Wound Edges Open;Defined 08/08/22 1000   Wound Length (cm) 4 cm 08/08/22 1000   Wound Width (cm) 1.5 cm 08/08/22 1000   Wound Depth (cm) 0.1 cm 08/08/22 1000   Wound Volume (cm^3) 0.6 cm^3 08/08/22 1000   Wound Surface Area (cm^2) 6 cm^2 08/08/22 1000   Care Cleansed with:;Sterile normal saline 08/08/22 1000   Dressing Applied;Island/border;Tubular bandage 08/08/22 1000   Periwound Care Absorptive dressing applied;Skin barrier film applied 08/08/22 1000   Compression Tubular elasticized bandage 08/08/22 1000   Dressing Change Due 08/09/22 08/08/22 1000       [REMOVED]      Wound 06/24/22 1400 Blister(s) Right lower Leg  (Removed)   06/24/22 1400    Pre-existing: Yes   Primary Wound Type: Blister(s)   Side: Right   Orientation: lower   Location: Leg   Wound Number:    Ankle-Brachial Index:    Pulses: palpable   Removal Indication and Assessment: closed/resurfaced   Wound Outcome: Healed   (Retired) Wound Type:    (Retired) Wound Length (cm):    (Retired) Wound Width (cm):    (Retired) Depth (cm):    Wound Description (Comments):    Removal Indications:    Removed 08/08/22 1100   Wound Image   08/08/22 1000   Dressing Appearance Open to air 08/08/22 1000   Appearance Closed/resurfaced 08/08/22 1000         Assessment:         ICD-10-CM ICD-9-CM   1. Alteration in skin integrity related to surgical incision  R23.9 782.9   2. S/P total knee arthroplasty, left  Z96.652 V43.65   3. Traumatic ulcer of right lower leg, limited to breakdown of skin  L97.911 707.19         Plan:   Tissue pathology and/or culture taken:  [] Yes [x] No   Sharp debridement performed:   [] Yes [x] No   Labs ordered this visit:   [] Yes [x] No   Imaging ordered this visit:   [] Yes [x] No       Surgical wound to left knee is healing well.  Keep leg elevated and compressed.  May shower.      Orders Placed This Encounter   Procedures    Change dressing     Left knee surgical wound   Cleanse wound with: Normal saline   Lidocaine: prn   Silver nitrate: prn   Primary dressing: Mupirocin   Secondary dressing: 3 x 3 bordered foam or bandaid, Tubigrip F BLE   Elevate as much as possible   Frequency: Daily per patient   Follow-up: 08/22/22    Rx for Mupirocin sent to patients pharmacy  Right LE wound is resolved        Follow up in about 2 weeks (around 8/22/2022).

## 2022-08-09 LAB
FINAL PATHOLOGIC DIAGNOSIS: NORMAL
FROZEN SECTION DIAGNOSIS: NORMAL
FROZEN SECTION FOOTNOTE: NORMAL
GROSS: NORMAL
Lab: NORMAL

## 2022-08-11 ENCOUNTER — PATIENT MESSAGE (OUTPATIENT)
Dept: FAMILY MEDICINE | Facility: CLINIC | Age: 77
End: 2022-08-11
Payer: MEDICARE

## 2022-08-11 DIAGNOSIS — F43.21 ADJUSTMENT DISORDER WITH DEPRESSED MOOD: ICD-10-CM

## 2022-08-11 RX ORDER — DULOXETIN HYDROCHLORIDE 60 MG/1
60 CAPSULE, DELAYED RELEASE ORAL DAILY
Qty: 90 CAPSULE | Refills: 1 | Status: SHIPPED | OUTPATIENT
Start: 2022-08-11 | End: 2022-10-24 | Stop reason: SDUPTHER

## 2022-08-11 NOTE — TELEPHONE ENCOUNTER
No new care gaps identified.  [facility] Embedded Care Gaps. Reference number: [id number]. 8/11/2022   2:22:38 PM CDT

## 2022-08-11 NOTE — TELEPHONE ENCOUNTER
----- Message from Nusrat Morrissey sent at 8/11/2022 10:29 AM CDT -----  Contact: [phone]  Type:  Pharmacy Calling to Clarify an RX    Name of Caller: Michelle  Pharmacy Name: [facility]  Prescription Name:DULoxetine (CYMBALTA) 60 MG capsule  What do they need to clarify?: regarding a Refill request  Best Call Back Number: [phone] or fax [phone]

## 2022-08-12 ENCOUNTER — LAB VISIT (OUTPATIENT)
Dept: LAB | Facility: HOSPITAL | Age: 77
End: 2022-08-12
Attending: FAMILY MEDICINE
Payer: MEDICARE

## 2022-08-12 ENCOUNTER — PATIENT OUTREACH (OUTPATIENT)
Dept: ADMINISTRATIVE | Facility: OTHER | Age: 77
End: 2022-08-12

## 2022-08-12 ENCOUNTER — OFFICE VISIT (OUTPATIENT)
Dept: FAMILY MEDICINE | Facility: CLINIC | Age: 77
End: 2022-08-12
Payer: MEDICARE

## 2022-08-12 VITALS
DIASTOLIC BLOOD PRESSURE: 58 MMHG | SYSTOLIC BLOOD PRESSURE: 148 MMHG | OXYGEN SATURATION: 96 % | HEART RATE: 89 BPM | HEIGHT: 64 IN | BODY MASS INDEX: 30.11 KG/M2 | WEIGHT: 176.38 LBS

## 2022-08-12 DIAGNOSIS — I25.10 PRESENCE OF STENT IN CORONARY ARTERY IN PATIENT WITH CORONARY ARTERY DISEASE: ICD-10-CM

## 2022-08-12 DIAGNOSIS — R73.03 PREDIABETES: ICD-10-CM

## 2022-08-12 DIAGNOSIS — I10 PRIMARY HYPERTENSION: ICD-10-CM

## 2022-08-12 DIAGNOSIS — Z95.5 PRESENCE OF STENT IN CORONARY ARTERY IN PATIENT WITH CORONARY ARTERY DISEASE: ICD-10-CM

## 2022-08-12 DIAGNOSIS — Z96.652 STATUS POST REVISION OF TOTAL REPLACEMENT OF LEFT KNEE: ICD-10-CM

## 2022-08-12 DIAGNOSIS — I10 PRIMARY HYPERTENSION: Primary | ICD-10-CM

## 2022-08-12 DIAGNOSIS — E78.2 MIXED HYPERLIPIDEMIA: ICD-10-CM

## 2022-08-12 DIAGNOSIS — Z63.4 BEREAVEMENT: ICD-10-CM

## 2022-08-12 DIAGNOSIS — N18.4 STAGE 4 CHRONIC KIDNEY DISEASE: ICD-10-CM

## 2022-08-12 DIAGNOSIS — D64.9 ANEMIA, UNSPECIFIED TYPE: ICD-10-CM

## 2022-08-12 DIAGNOSIS — F33.3 SEVERE EPISODE OF RECURRENT MAJOR DEPRESSIVE DISORDER, WITH PSYCHOTIC FEATURES: ICD-10-CM

## 2022-08-12 LAB
ALBUMIN SERPL BCP-MCNC: 3 G/DL (ref 3.5–5.2)
ALP SERPL-CCNC: 97 U/L (ref 55–135)
ALT SERPL W/O P-5'-P-CCNC: 10 U/L (ref 10–44)
ANION GAP SERPL CALC-SCNC: 12 MMOL/L (ref 8–16)
AST SERPL-CCNC: 22 U/L (ref 10–40)
BASOPHILS # BLD AUTO: 0.11 K/UL (ref 0–0.2)
BASOPHILS NFR BLD: 1.3 % (ref 0–1.9)
BILIRUB SERPL-MCNC: 0.2 MG/DL (ref 0.1–1)
BUN SERPL-MCNC: 15 MG/DL (ref 8–23)
CALCIUM SERPL-MCNC: 9.6 MG/DL (ref 8.7–10.5)
CHLORIDE SERPL-SCNC: 103 MMOL/L (ref 95–110)
CO2 SERPL-SCNC: 25 MMOL/L (ref 23–29)
CREAT SERPL-MCNC: 0.9 MG/DL (ref 0.5–1.4)
DIFFERENTIAL METHOD: ABNORMAL
EOSINOPHIL # BLD AUTO: 1.1 K/UL (ref 0–0.5)
EOSINOPHIL NFR BLD: 13.2 % (ref 0–8)
ERYTHROCYTE [DISTWIDTH] IN BLOOD BY AUTOMATED COUNT: 12.8 % (ref 11.5–14.5)
EST. GFR  (NO RACE VARIABLE): >60 ML/MIN/1.73 M^2
ESTIMATED AVG GLUCOSE: 105 MG/DL (ref 68–131)
FERRITIN SERPL-MCNC: 115 NG/ML (ref 20–300)
GLUCOSE SERPL-MCNC: 110 MG/DL (ref 70–110)
HBA1C MFR BLD: 5.3 % (ref 4–5.6)
HCT VFR BLD AUTO: 25.6 % (ref 37–48.5)
HGB BLD-MCNC: 7.9 G/DL (ref 12–16)
IMM GRANULOCYTES # BLD AUTO: 0.01 K/UL (ref 0–0.04)
IMM GRANULOCYTES NFR BLD AUTO: 0.1 % (ref 0–0.5)
IRON SERPL-MCNC: 39 UG/DL (ref 30–160)
LYMPHOCYTES # BLD AUTO: 2.2 K/UL (ref 1–4.8)
LYMPHOCYTES NFR BLD: 27.2 % (ref 18–48)
MCH RBC QN AUTO: 30.7 PG (ref 27–31)
MCHC RBC AUTO-ENTMCNC: 30.9 G/DL (ref 32–36)
MCV RBC AUTO: 100 FL (ref 82–98)
MONOCYTES # BLD AUTO: 1.3 K/UL (ref 0.3–1)
MONOCYTES NFR BLD: 16.3 % (ref 4–15)
NEUTROPHILS # BLD AUTO: 3.4 K/UL (ref 1.8–7.7)
NEUTROPHILS NFR BLD: 41.9 % (ref 38–73)
NRBC BLD-RTO: 0 /100 WBC
PLATELET # BLD AUTO: 440 K/UL (ref 150–450)
PMV BLD AUTO: 9.6 FL (ref 9.2–12.9)
POTASSIUM SERPL-SCNC: 4.7 MMOL/L (ref 3.5–5.1)
PROT SERPL-MCNC: 6.2 G/DL (ref 6–8.4)
RBC # BLD AUTO: 2.57 M/UL (ref 4–5.4)
SATURATED IRON: 10 % (ref 20–50)
SODIUM SERPL-SCNC: 140 MMOL/L (ref 136–145)
TOTAL IRON BINDING CAPACITY: 403 UG/DL (ref 250–450)
TRANSFERRIN SERPL-MCNC: 272 MG/DL (ref 200–375)
WBC # BLD AUTO: 8.2 K/UL (ref 3.9–12.7)

## 2022-08-12 PROCEDURE — 1160F RVW MEDS BY RX/DR IN RCRD: CPT | Mod: CPTII,S$GLB,, | Performed by: FAMILY MEDICINE

## 2022-08-12 PROCEDURE — 36415 COLL VENOUS BLD VENIPUNCTURE: CPT | Mod: PO | Performed by: FAMILY MEDICINE

## 2022-08-12 PROCEDURE — 85025 COMPLETE CBC W/AUTO DIFF WBC: CPT | Performed by: FAMILY MEDICINE

## 2022-08-12 PROCEDURE — 99214 PR OFFICE/OUTPT VISIT, EST, LEVL IV, 30-39 MIN: ICD-10-PCS | Mod: S$GLB,,, | Performed by: FAMILY MEDICINE

## 2022-08-12 PROCEDURE — 3078F PR MOST RECENT DIASTOLIC BLOOD PRESSURE < 80 MM HG: ICD-10-PCS | Mod: CPTII,S$GLB,, | Performed by: FAMILY MEDICINE

## 2022-08-12 PROCEDURE — 3078F DIAST BP <80 MM HG: CPT | Mod: CPTII,S$GLB,, | Performed by: FAMILY MEDICINE

## 2022-08-12 PROCEDURE — 1125F AMNT PAIN NOTED PAIN PRSNT: CPT | Mod: CPTII,S$GLB,, | Performed by: FAMILY MEDICINE

## 2022-08-12 PROCEDURE — 99214 OFFICE O/P EST MOD 30 MIN: CPT | Mod: S$GLB,,, | Performed by: FAMILY MEDICINE

## 2022-08-12 PROCEDURE — 3288F PR FALLS RISK ASSESSMENT DOCUMENTED: ICD-10-PCS | Mod: CPTII,S$GLB,, | Performed by: FAMILY MEDICINE

## 2022-08-12 PROCEDURE — 82728 ASSAY OF FERRITIN: CPT | Performed by: FAMILY MEDICINE

## 2022-08-12 PROCEDURE — 1101F PT FALLS ASSESS-DOCD LE1/YR: CPT | Mod: CPTII,S$GLB,, | Performed by: FAMILY MEDICINE

## 2022-08-12 PROCEDURE — 84466 ASSAY OF TRANSFERRIN: CPT | Performed by: FAMILY MEDICINE

## 2022-08-12 PROCEDURE — 99999 PR PBB SHADOW E&M-EST. PATIENT-LVL V: ICD-10-PCS | Mod: PBBFAC,,, | Performed by: FAMILY MEDICINE

## 2022-08-12 PROCEDURE — 1160F PR REVIEW ALL MEDS BY PRESCRIBER/CLIN PHARMACIST DOCUMENTED: ICD-10-PCS | Mod: CPTII,S$GLB,, | Performed by: FAMILY MEDICINE

## 2022-08-12 PROCEDURE — 3288F FALL RISK ASSESSMENT DOCD: CPT | Mod: CPTII,S$GLB,, | Performed by: FAMILY MEDICINE

## 2022-08-12 PROCEDURE — 1111F DSCHRG MED/CURRENT MED MERGE: CPT | Mod: CPTII,S$GLB,, | Performed by: FAMILY MEDICINE

## 2022-08-12 PROCEDURE — 1101F PR PT FALLS ASSESS DOC 0-1 FALLS W/OUT INJ PAST YR: ICD-10-PCS | Mod: CPTII,S$GLB,, | Performed by: FAMILY MEDICINE

## 2022-08-12 PROCEDURE — 80053 COMPREHEN METABOLIC PANEL: CPT | Performed by: FAMILY MEDICINE

## 2022-08-12 PROCEDURE — 1159F MED LIST DOCD IN RCRD: CPT | Mod: CPTII,S$GLB,, | Performed by: FAMILY MEDICINE

## 2022-08-12 PROCEDURE — 1125F PR PAIN SEVERITY QUANTIFIED, PAIN PRESENT: ICD-10-PCS | Mod: CPTII,S$GLB,, | Performed by: FAMILY MEDICINE

## 2022-08-12 PROCEDURE — 99999 PR PBB SHADOW E&M-EST. PATIENT-LVL V: CPT | Mod: PBBFAC,,, | Performed by: FAMILY MEDICINE

## 2022-08-12 PROCEDURE — 3077F SYST BP >= 140 MM HG: CPT | Mod: CPTII,S$GLB,, | Performed by: FAMILY MEDICINE

## 2022-08-12 PROCEDURE — 83036 HEMOGLOBIN GLYCOSYLATED A1C: CPT | Performed by: FAMILY MEDICINE

## 2022-08-12 PROCEDURE — 99499 UNLISTED E&M SERVICE: CPT | Mod: S$GLB,,, | Performed by: FAMILY MEDICINE

## 2022-08-12 PROCEDURE — 3077F PR MOST RECENT SYSTOLIC BLOOD PRESSURE >= 140 MM HG: ICD-10-PCS | Mod: CPTII,S$GLB,, | Performed by: FAMILY MEDICINE

## 2022-08-12 PROCEDURE — 99499 RISK ADDL DX/OHS AUDIT: ICD-10-PCS | Mod: S$GLB,,, | Performed by: FAMILY MEDICINE

## 2022-08-12 PROCEDURE — 1111F PR DISCHARGE MEDS RECONCILED W/ CURRENT OUTPATIENT MED LIST: ICD-10-PCS | Mod: CPTII,S$GLB,, | Performed by: FAMILY MEDICINE

## 2022-08-12 PROCEDURE — 1159F PR MEDICATION LIST DOCUMENTED IN MEDICAL RECORD: ICD-10-PCS | Mod: CPTII,S$GLB,, | Performed by: FAMILY MEDICINE

## 2022-08-12 RX ORDER — METOPROLOL SUCCINATE 25 MG/1
25 TABLET, EXTENDED RELEASE ORAL DAILY
Qty: 90 TABLET | Refills: 1 | Status: ON HOLD | OUTPATIENT
Start: 2022-08-12 | End: 2023-06-24 | Stop reason: HOSPADM

## 2022-08-12 RX ORDER — ISOSORBIDE MONONITRATE 60 MG/1
60 TABLET, EXTENDED RELEASE ORAL DAILY
Qty: 90 TABLET | Refills: 1 | Status: SHIPPED | OUTPATIENT
Start: 2022-08-12 | End: 2023-07-07 | Stop reason: SDUPTHER

## 2022-08-12 RX ORDER — IRBESARTAN 300 MG/1
300 TABLET ORAL NIGHTLY
Qty: 90 TABLET | Refills: 1 | Status: SHIPPED | OUTPATIENT
Start: 2022-08-12 | End: 2022-08-29 | Stop reason: SDUPTHER

## 2022-08-12 RX ORDER — MUPIROCIN 20 MG/G
OINTMENT TOPICAL DAILY
Qty: 22 G | Refills: 0 | Status: ON HOLD | OUTPATIENT
Start: 2022-08-12 | End: 2022-09-11 | Stop reason: HOSPADM

## 2022-08-12 SDOH — SOCIAL DETERMINANTS OF HEALTH (SDOH): DISSAPEARANCE AND DEATH OF FAMILY MEMBER: Z63.4

## 2022-08-12 NOTE — PROGRESS NOTES
Subjective:       Patient ID: Enedelia García is a 77 y.o. female.    Chief Complaint: Follow-up     Patient Active Problem List   Diagnosis    CAD (coronary artery disease)    H/O esophageal spasm    Hyperlipidemia    Fibromyalgia    GERD (gastroesophageal reflux disease)    Moderate episode of recurrent major depressive disorder    Alteration in skin integrity related to surgical incision    Presence of stent in coronary artery in patient with coronary artery disease    Caregiver with fatigue    Status post revision of total replacement of left knee    Acquired scoliosis    Aortic valve stenosis    Arthritis    HTN (hypertension)    Osteoarthritis of knee    Chronic low back pain    Generalized anxiety disorder    Stage 4 chronic kidney disease    Traumatic ulcer of right lower leg    Primary osteoarthritis of both first carpometacarpal joints    Risk for falls    Positive MELISA (antinuclear antibody)    Generalized osteoarthritis    Prediabetes    Narcotic drug use    Osteopenia of multiple sites    Blister of right leg without infection, initial encounter      HPI  76 yo female s/p left revision total knee replacement.   Seeing wound care and had f/u with Ortho surgery.   Overall wound improving. Knee progressing post op. Has PT ordered and needs to schedule.     Patient lost cousin that was a very close friend recently and has been struggling with depression and grief processing. Has daughter and  assisting as caretakers.     Review of Systems   All other systems reviewed and are negative.       Objective:     Vitals:    08/12/22 0958   BP: (!) 148/58   Pulse:         Physical Exam  Vitals and nursing note reviewed.   Constitutional:       General: She is not in acute distress.     Appearance: Normal appearance. She is not ill-appearing, toxic-appearing or diaphoretic.   HENT:      Head: Normocephalic and atraumatic.   Eyes:      General: No scleral icterus.      Conjunctiva/sclera: Conjunctivae normal.   Cardiovascular:      Rate and Rhythm: Normal rate.   Pulmonary:      Effort: Pulmonary effort is normal. No respiratory distress.   Skin:     Coloration: Skin is not pale.   Neurological:      Mental Status: She is alert. Mental status is at baseline.   Psychiatric:         Attention and Perception: Attention and perception normal.         Mood and Affect: Mood is depressed. Affect is tearful.         Speech: Speech normal.         Behavior: Behavior normal.         Cognition and Memory: Cognition and memory normal.         Judgment: Judgment normal.         Assessment:       1. Primary hypertension    2. Status post revision of total replacement of left knee    3. Anemia, unspecified type    4. Stage 4 chronic kidney disease    5. Presence of stent in coronary artery in patient with coronary artery disease    6. Mixed hyperlipidemia    7. Prediabetes    8. Severe episode of recurrent major depressive disorder, with psychotic features    9. Bereavement        Plan:       - Recheck BP today  - 2 week nurse BP check  - sign up [facility]  - labs today  - [facility]  - Cardiology f/u  - Psych/counseling referral    - Reviewed labs from hospitalization - will check on anemia, worsening CKD.     Primary hypertension  -     Lipids Digital Medicine ([facility]) Enrollment Order  -     Lipids Digital Medicine ([facility]): Assign Onboarding Questionnaires  -     irbesartan (AVAPRO) 300 MG tablet; Take 1 tablet (300 mg total) by mouth every evening.  Dispense: 90 tablet; Refill: 1  -     isosorbide mononitrate (IMDUR) 60 MG 24 hr tablet; Take 1 tablet (60 mg total) by mouth once daily.  Dispense: 90 tablet; Refill: 1  -     metoprolol succinate (TOPROL-XL) 25 MG 24 hr tablet; Take 1 tablet (25 mg total) by mouth once daily.  Dispense: 90 tablet; Refill: 1  -     Comprehensive Metabolic Panel; Future; Expected date: 08/12/2022  -     Ambulatory  referral/consult to Home Health; Future; Expected date: 08/13/2022    Status post revision of total replacement of left knee  -     Ambulatory referral/consult to Home Health; Future; Expected date: 08/13/2022    Anemia, unspecified type  -     CBC Auto Differential; Future; Expected date: 08/12/2022  -     Ferritin; Future; Expected date: 08/12/2022  -     IRON AND TIBC; Future; Expected date: 08/12/2022    Stage 4 chronic kidney disease  -     Ambulatory referral/consult to Nephrology; Future; Expected date: 08/19/2022    Presence of stent in coronary artery in patient with coronary artery disease  -     Ambulatory referral/consult to Home Health; Future; Expected date: 08/13/2022  -     Ambulatory referral/consult to Cardiology; Future; Expected date: 08/19/2022    Mixed hyperlipidemia  -     Hypertension [facility] ([facility]) Enrollment Order  -     Hypertension Digital Medicine ([facility]): Assign Onboarding Questionnaires    Prediabetes  -     Hemoglobin A1C; Future; Expected date: 08/12/2022    Severe episode of recurrent major depressive disorder, with psychotic features  -     Ambulatory referral/consult to Psychiatry; Future; Expected date: 08/19/2022  -     Ambulatory referral/consult to Psychology; Future; Expected date: 08/19/2022    Bereavement  -     Ambulatory referral/consult to Psychiatry; Future; Expected date: 08/19/2022  -     Ambulatory referral/consult to Psychology; Future; Expected date: 08/19/2022    Other orders  -     mupirocin (BACTROBAN) 2 % ointment; Apply topically once daily. apply to affected area  Dispense: 22 g; Refill: 0      Patient's questions answered. Plan reviewed with patient at the end of visit. Relevant precautions to chief complaint and reasons to seek medical care or contact the office sooner reviewed with patient.     Follow up in about 4 weeks (around 9/9/2022) for Hypertension Follow-up.

## 2022-08-15 ENCOUNTER — CLINICAL SUPPORT (OUTPATIENT)
Dept: REHABILITATION | Facility: HOSPITAL | Age: 77
End: 2022-08-15
Attending: ORTHOPAEDIC SURGERY
Payer: MEDICARE

## 2022-08-15 DIAGNOSIS — R29.898 DECREASED STRENGTH INVOLVING KNEE JOINT: ICD-10-CM

## 2022-08-15 DIAGNOSIS — M25.662 DECREASED RANGE OF MOTION (ROM) OF LEFT KNEE: ICD-10-CM

## 2022-08-15 DIAGNOSIS — Z96.652 STATUS POST REVISION OF TOTAL REPLACEMENT OF LEFT KNEE: ICD-10-CM

## 2022-08-15 DIAGNOSIS — M25.562 ACUTE PAIN OF LEFT KNEE: ICD-10-CM

## 2022-08-15 PROCEDURE — 97110 THERAPEUTIC EXERCISES: CPT | Mod: PN

## 2022-08-15 PROCEDURE — 97161 PT EVAL LOW COMPLEX 20 MIN: CPT | Mod: PN

## 2022-08-15 NOTE — PLAN OF CARE
[facility]  Physical Therapy Initial Evaluation    Name: Enedelia García  Clinic Number: [id number]    Therapy Diagnosis:   Encounter Diagnoses   Name Primary?    Status post revision of total replacement of left knee     Acute pain of left knee     Decreased range of motion (ROM) of left knee     Decreased strength involving knee joint      Physician: Stan Catalan MD    Physician Orders: PT Eval and Treat   Medical Diagnosis from Referral: Z96.652 (ICD-10-CM) - Status post revision of total replacement of left knee  Evaluation Date: 8/15/2022     Authorization Period Expiration: 09/12/2022  Plan of Care Expiration: 8/15/2022 to 09/30/2022  Visit # / Visits authorized: 1/ 12 FOTO: 1/5  PTA visit: 0/5    Time In: 1610  Time Out: 1655  Total Billable Time: 40 minutes (1 Low Complexity Evaluation, Therapeutic Exercise - 1)  Precautions: TKA revision, Fall risk,     Subjective    Leticia reports she had a left unicompartmental conversion to TKA on 07/18/2022.  Post-op week 4-5.  Did 3 weeks of HHPT.  Presents to OPPT today with rolling walker.  Accompanied by her  for whom she is the primary caregiver.        Past Medical History:   Diagnosis Date    CKD (chronic kidney disease) stage 4, GFR 15-29 ml/min     Coronary artery disease     Edema     Epilepsy     Hematuria, unspecified     Hyperlipidemia     Hypertension     Normocytic anemia      Enedelia García  has a past surgical history that includes Coronary stent placement; Hysterectomy; Tonsillectomy; Appendectomy; Back surgery; and Revision of knee arthroplasty (Left, 7/18/2022).    Enedelia has a current medication list which includes the following prescription(s): aspirin, butalbital-acetaminophen-caff, calcium carbonate/vitamin d3, clotrimazole-betamethasone 1-0.05%, diclofenac, docusate sodium, duloxetine, famotidine, fluticasone propionate, furosemide, gabapentin, hydroxyzine hcl, irbesartan, isosorbide  mononitrate, linaclotide, metoprolol succinate, multivitamin, mupirocin, nitroglycerin, omeprazole, oxycodone, polyethylene glycol, rosuvastatin, and valacyclovir.    Review of patient's allergies indicates:   Allergen Reactions    Iodinated contrast media Anaphylaxis and Other (See Comments)    Phenergan [promethazine]      Vomiting        Imaging: see chart  Prior Therapy: HHPT x 2 weeks.   Social History: Lives at home with her .  Drove to PT today.    Occupation: retired.   Prior Level of Function: progressive worsening of left knee pain following UKA   Current Level of Function: acute post-op state.     Pain:  Current 4/10, worst 7/10, best 2/10   Location: left knee  Description: Aching and Tight  Aggravating Factors: standing, walking, sleeping,   Easing Factors: relaxation    Pts goals:   1. To be able to walk without assistive device.     Objective     Posture:  Stands in lumbar flexion with and without assistive device.   Palpation:  One area of bandage.  Otherwise surgical wound closed.     Knee range of motion:  Left:  (+) 1 - 0 - 114  Right: (+3)- 0 - 128    L/E Strength w/ MicroFET Muscle [place] Dynamometer Right Left   Hip Flexion 17.2 kg  13.8 kg    Hip Abduction 20.5kg  14.2 kg    Quadriceps 20.5 kg  11.7 kg  (55%)   Hamstrings 15.6 kg  14.1 kg      TU seconds (no assistive device but started in standing position due to orthostasis with standing)  30 second sit-to-stand test (without U/E support): 3 (w minimal UE support)    Jr IVAN. Knee Survey:  52  PROMIS-29:  67  Gait Analysis: with RW: decreased weight acceptance through LLE with stiff knee gait pattern during swing phase on the right.     Impairment/Limitation/Restriction for KOOS, Functional, Daily Living    Therapist reviewed KOOS scores for Enedelia García on 8/15/2022.   KOOS documents entered into EP[employer] - see Media section.    Limitation Score: 58%  Predicted Limitation Score: 41%         TREATMENT     Treatment Time  In: 1640  Treatment Time Out: 1655  Total Treatment time separate from Evaluation: 15 minutes    Leticia received therapeutic exercises to develop strength, endurance, ROM and flexibility for 15 minutes including:  - bike x 7' at L2  - leg press Dleg 3x10 at 4 plates      Home Exercises and Patient Education Provided  Education provided:   - home exercise program continue for HHPT; will update next visit  - PT diagnosis and recommended treatment course.     Written Home Exercises Provided: continue from HHPT; will update next visit.    Assessment     Enedelia is a 77 y.o. female referred to outpatient Physical Therapy with a medical diagnosis of 1) Status post revision of total replacement of left knee. Pt presents to PT with pain, decreased left knee ROM, decreased strength, poor posture, impaired balance and gait, and functional deficits with walking. Pt would benefit from skilled PT consisting of manual therapy including STM/MFR left  knee, patellar mobs, knee flex/ext mobs/stretching; therapeutic exercise including LE strengthening/stretching, postural education, balance and gait training, and modalities prn to address limitations and increase functional mobility.  Transitioning from HHPT.  Seems to be in a good position to begin OPPT.  Demonstrates good range of motion, good quad activation, equal hamstring strength to contralateral leg, and is able to ambulate household distances safely without assistive device.  Ready to proceed with OPPT course.       Pt prognosis is Excellent.   Pt will benefit from skilled outpatient Physical Therapy to address the deficits stated above and in the chart below, provide pt/family education, and to maximize pt's level of independence.     Plan of care discussed with patient: Yes  Pt's spiritual, cultural and educational needs considered and patient is agreeable to the plan of care and goals as stated below:     Anticipated Barriers for therapy: primary caregiver for  spouse    Medical Necessity is demonstrated by the following  History  Co-morbidities and personal factors that may impact the plan of care Co-morbidities:   Anxiety or Panic Disorders, Arthritis, Back pain, BMI over 30, Congestive Heart  Failure or Heart Disease, Depression, Headaches, High Blood Pressure, Seizures    Personal Factors:   age     high   Examination  Body Structures and Functions, activity limitations and participation restrictions that may impact the plan of care Body Regions:   back  lower extremities    Body Systems:    gross symmetry  ROM  strength  balance  gait  transfers  motor control  edema  scar formation  skin integrity    Participation Restrictions:   See above    Activity limitations:   Learning and applying knowledge  no deficits    General Tasks and Commands  undertaking multiple tasks    Communication  no deficits    Mobility  lifting and carrying objects  walking  driving (bike, car, motorcycle)    Self care  toileting  dressing    Domestic Life  shopping  cooking  doing house work (cleaning house, washing dishes, laundry)    Interactions/Relationships  family relationships    Life Areas  no deficits    Community and Social Life  community life  recreation and leisure         moderate   Clinical Presentation stable and uncomplicated low   Decision Making/ Complexity Score: low     Goals:  Short Term Goals: 3 weeks  1. Pt will be independent with HEP supplement PT in improving functional mobility.  2. Pt will improve LLE strength to at least 75% of RLE strength as measured via MicroFet handheld dynamometer in order to improve functional mobility  3. Pt will improve left knee AROM to at least 0 - 115 degrees in order to improve gait.    Long Term Goals: 6 weeks  1. Pt will be independent with updated HEP supplement PT in improving functional mobility.  2. Pt will improve LLE strength to at least 90% of LLE strength as measured via MicroFet handheld dynamometer in order to improve  "functional mobility  3. Pt will improve L knee AROM to at least 0 - 120 degrees in order to improve gait and ability to perform ADLs  4. Pt will perform TUG in < 14 seconds without AD in order to demo improved gait speed  5. Pt will perform at least 7 sit to stands without UE support on 30 second sit to stand test in order to demo improved ability to perform transfers        TUG Cutoff Scores:        30" sit to stand Cutoff Scores:          Plan     Plan of care Certification: 8/15/2022 to 09/30/2022.    Outpatient Physical Therapy 2-3 times weekly for 6 weeks to include the following interventions: Gait Training, Manual Therapy, Moist Heat/ Ice, Neuromuscular Re-ed, Orthotic Management and Training, Patient Education, Therapeutic Activites and Therapeutic Exercise, IAS    Freddie Branham, PT, DPT, OCS  "

## 2022-08-17 LAB
FUNGUS SPEC CULT: NORMAL

## 2022-08-18 ENCOUNTER — OFFICE VISIT (OUTPATIENT)
Dept: CARDIOLOGY | Facility: CLINIC | Age: 77
End: 2022-08-18
Payer: MEDICARE

## 2022-08-18 VITALS
HEIGHT: 64 IN | DIASTOLIC BLOOD PRESSURE: 71 MMHG | WEIGHT: 173.31 LBS | HEART RATE: 75 BPM | BODY MASS INDEX: 29.59 KG/M2 | SYSTOLIC BLOOD PRESSURE: 119 MMHG

## 2022-08-18 DIAGNOSIS — R21 RASH: Primary | ICD-10-CM

## 2022-08-18 DIAGNOSIS — Z95.5 PRESENCE OF STENT IN CORONARY ARTERY IN PATIENT WITH CORONARY ARTERY DISEASE: ICD-10-CM

## 2022-08-18 DIAGNOSIS — I25.10 PRESENCE OF STENT IN CORONARY ARTERY IN PATIENT WITH CORONARY ARTERY DISEASE: ICD-10-CM

## 2022-08-18 PROCEDURE — 1126F AMNT PAIN NOTED NONE PRSNT: CPT | Mod: CPTII,S$GLB,, | Performed by: INTERNAL MEDICINE

## 2022-08-18 PROCEDURE — 1160F PR REVIEW ALL MEDS BY PRESCRIBER/CLIN PHARMACIST DOCUMENTED: ICD-10-PCS | Mod: CPTII,S$GLB,, | Performed by: INTERNAL MEDICINE

## 2022-08-18 PROCEDURE — 3288F PR FALLS RISK ASSESSMENT DOCUMENTED: ICD-10-PCS | Mod: CPTII,S$GLB,, | Performed by: INTERNAL MEDICINE

## 2022-08-18 PROCEDURE — 3288F FALL RISK ASSESSMENT DOCD: CPT | Mod: CPTII,S$GLB,, | Performed by: INTERNAL MEDICINE

## 2022-08-18 PROCEDURE — 99499 UNLISTED E&M SERVICE: CPT | Mod: S$GLB,,, | Performed by: INTERNAL MEDICINE

## 2022-08-18 PROCEDURE — 1126F PR PAIN SEVERITY QUANTIFIED, NO PAIN PRESENT: ICD-10-PCS | Mod: CPTII,S$GLB,, | Performed by: INTERNAL MEDICINE

## 2022-08-18 PROCEDURE — 99214 OFFICE O/P EST MOD 30 MIN: CPT | Mod: S$GLB,,, | Performed by: INTERNAL MEDICINE

## 2022-08-18 PROCEDURE — 1159F PR MEDICATION LIST DOCUMENTED IN MEDICAL RECORD: ICD-10-PCS | Mod: CPTII,S$GLB,, | Performed by: INTERNAL MEDICINE

## 2022-08-18 PROCEDURE — 1111F PR DISCHARGE MEDS RECONCILED W/ CURRENT OUTPATIENT MED LIST: ICD-10-PCS | Mod: CPTII,S$GLB,, | Performed by: INTERNAL MEDICINE

## 2022-08-18 PROCEDURE — 1101F PT FALLS ASSESS-DOCD LE1/YR: CPT | Mod: CPTII,S$GLB,, | Performed by: INTERNAL MEDICINE

## 2022-08-18 PROCEDURE — 99999 PR PBB SHADOW E&M-EST. PATIENT-LVL V: CPT | Mod: PBBFAC,,, | Performed by: INTERNAL MEDICINE

## 2022-08-18 PROCEDURE — 3074F PR MOST RECENT SYSTOLIC BLOOD PRESSURE < 130 MM HG: ICD-10-PCS | Mod: CPTII,S$GLB,, | Performed by: INTERNAL MEDICINE

## 2022-08-18 PROCEDURE — 99999 PR PBB SHADOW E&M-EST. PATIENT-LVL V: ICD-10-PCS | Mod: PBBFAC,,, | Performed by: INTERNAL MEDICINE

## 2022-08-18 PROCEDURE — 3078F DIAST BP <80 MM HG: CPT | Mod: CPTII,S$GLB,, | Performed by: INTERNAL MEDICINE

## 2022-08-18 PROCEDURE — 3074F SYST BP LT 130 MM HG: CPT | Mod: CPTII,S$GLB,, | Performed by: INTERNAL MEDICINE

## 2022-08-18 PROCEDURE — 1159F MED LIST DOCD IN RCRD: CPT | Mod: CPTII,S$GLB,, | Performed by: INTERNAL MEDICINE

## 2022-08-18 PROCEDURE — 99214 PR OFFICE/OUTPT VISIT, EST, LEVL IV, 30-39 MIN: ICD-10-PCS | Mod: S$GLB,,, | Performed by: INTERNAL MEDICINE

## 2022-08-18 PROCEDURE — 1101F PR PT FALLS ASSESS DOC 0-1 FALLS W/OUT INJ PAST YR: ICD-10-PCS | Mod: CPTII,S$GLB,, | Performed by: INTERNAL MEDICINE

## 2022-08-18 PROCEDURE — 1111F DSCHRG MED/CURRENT MED MERGE: CPT | Mod: CPTII,S$GLB,, | Performed by: INTERNAL MEDICINE

## 2022-08-18 PROCEDURE — 1160F RVW MEDS BY RX/DR IN RCRD: CPT | Mod: CPTII,S$GLB,, | Performed by: INTERNAL MEDICINE

## 2022-08-18 PROCEDURE — 3078F PR MOST RECENT DIASTOLIC BLOOD PRESSURE < 80 MM HG: ICD-10-PCS | Mod: CPTII,S$GLB,, | Performed by: INTERNAL MEDICINE

## 2022-08-18 NOTE — PROGRESS NOTES
[id number]     SUBJECTIVE:     History of Present Illness:  Patient is a 77 y.o. female presents with CAD. Since last visit, no chest pains at all. Had TKR one month ago without issues   Primary Diagnosis:   1. Hypertension  2. DM: none  3. Past smoker: over 10 years ago   4. Heart disease: In 2000, had one stent placed at [facility]. IN 2005, had 2 stents placed. - unknown anatomy     ROS     Since last visit in 1/22  A. No NTG use at all   B. No shortness of breath at rest; no PND or orthoopnea   C. Rare palpitations   D. Blood pressures normal at home   Review of patient's allergies indicates:   Allergen Reactions    Iodinated contrast media Anaphylaxis and Other (See Comments)    Phenergan [promethazine]      Vomiting       Past Medical History:   Diagnosis Date    CKD (chronic kidney disease) stage 4, GFR 15-29 ml/min     Coronary artery disease     Edema     Epilepsy     Hematuria, unspecified     Hyperlipidemia     Hypertension     Normocytic anemia        Past Surgical History:   Procedure Laterality Date    APPENDECTOMY      BACK SURGERY      CORONARY STENT PLACEMENT      HYSTERECTOMY      REVISION OF KNEE ARTHROPLASTY Left 7/18/2022    Procedure: REVISION, ARTHROPLASTY, KNEE;  Surgeon: Stan Catalan MD;  Location: [facility] OR;  Service: Orthopedics;  Laterality: Left;    TONSILLECTOMY         Family History   Problem Relation Age of Onset    Heart disease Mother     Heart disease Father        Social History     Tobacco Use    Smoking status: Never Smoker    Smokeless tobacco: Never Used   Substance Use Topics    Alcohol use: No    Drug use: No        Past Hospitalization:   3/22: weakness and diarrhea  7/22: surgery for left knee    7/30/22: ER visit for left knee pain and swelling ; fell in the ER   Home meds:  Current Outpatient Medications on File Prior to Visit   Medication Sig Dispense Refill    aspirin (ECOTRIN) 81 MG EC tablet Take 1 tablet (81 mg total) by mouth 2 (two) times a  day. 84 tablet 0    butalbital-acetaminophen-caff -40 mg Cap TAKE ONE CAPSULE BY MOUTH ONCE TO TWICE DAILY AS NEEDED FOR MIGRAINE headaches      calcium carbonate/vitamin D3 (VITAMIN D-3 ORAL) Take by mouth once daily.      clotrimazole-betamethasone 1-0.05% (LOTRISONE) cream Apply topically 2 (two) times daily. 45 g 0    diclofenac (VOLTAREN) 75 MG EC tablet Take 1 tablet (75 mg total) by mouth 2 (two) times daily. 84 tablet 0    docusate sodium (COLACE) 100 MG capsule Take by mouth every 6 (six) hours. 168 capsule 0    DULoxetine (CYMBALTA) 60 MG capsule Take 1 capsule (60 mg total) by mouth once daily. 90 capsule 1    famotidine (PEPCID) 20 MG tablet Take 1 tablet (20 mg total) by mouth 2 (two) times daily as needed for Heartburn. 84 tablet 0    fluticasone propionate (FLONASE) 50 mcg/actuation nasal spray 2 sprays (100 mcg total) by Each Nostril route once daily. 15.8 mL 2    furosemide (LASIX) 40 MG tablet Take 1 tablet (40 mg total) by mouth once daily. 90 tablet 1    gabapentin (NEURONTIN) 600 MG tablet Take 600 mg by mouth 3 (three) times daily.      hydrOXYzine HCL (ATARAX) 25 MG tablet   See Instructions, 30 tab, 1, Substitution Allowed, TAKE 1 TABLET BY MOUTH DAILY AS NEEDED FOR ITCHING, 30, Route to Pharmacy Electronically, [id number], [id number], Instructions Replace Required Details, cm,...      irbesartan (AVAPRO) 300 MG tablet Take 1 tablet (300 mg total) by mouth every evening. 90 tablet 1    isosorbide mononitrate (IMDUR) 60 MG 24 hr tablet Take 1 tablet (60 mg total) by mouth once daily. 90 tablet 1    linaCLOtide (LINZESS) 72 mcg Cap capsule Take 72 mcg by mouth before breakfast.      metoprolol succinate (TOPROL-XL) 25 MG 24 hr tablet Take 1 tablet (25 mg total) by mouth once daily. 90 tablet 1    multivitamin capsule Take 1 capsule by mouth once daily.      mupirocin (BACTROBAN) 2 % ointment Apply topically once daily. apply to  affected area 22 g 0    nitroGLYCERIN (NITROSTAT) 0.3 MG SL tablet PLACE ONE TABLET UNDER THE TONGUE EVERY 5 MINUTES FOR UP TO 3 doses IF NEEDED FOR CHEST PAIN. call 911 IF PAIN persists 30 tablet 0    omeprazole (PRILOSEC) 20 MG capsule Take 20 mg by mouth once daily.      oxyCODONE (ROXICODONE) 10 mg Tab immediate release tablet oxycodone 10 mg tablet   Take 1 tablet 3 times a day by oral route as needed for 7 days.      polyethylene glycol (GLYCOLAX) 17 gram PwPk Take 17 g by mouth once daily. 90 packet 3    rosuvastatin (CRESTOR) 40 MG Tab Take 1 tablet (40 mg total) by mouth once daily. 90 tablet 3    valACYclovir (VALTREX) 1000 MG tablet Take 1 tablet (1,000 mg total) by mouth once daily. As needed for outbreak for 5 days 5 tablet 0     No current facility-administered medications on file prior to visit.       Cardiac meds:  Metoprolol succinate 25 mg  Imdur 60 mg  Irbesartan 300 mg  Lasix 40 mg  rosuvastatin 40 mg   ASA 81 mg       OBJECTIVE:     Vital Signs (Most Recent)  Pulse: 75 (08/18/22 1428)  BP: 119/71 (08/18/22 1428)      Physical Exam:    Neck: decreased  Carotids upstroke ,basilar  bruits; normal JVP  Lungs :clear  Heart: RR, normal S1,S2, systolic ejection murmur base to LSB, no gallops  Abd: no masses; no bruits;   Exts: normal DP and PT pulses bilaterally, trace  edema noted           LABS      CBC  Hemoglobin (g/dL)   Date Value   08/12/2022 7.9 (L)   07/30/2022 7.5 (L)   07/30/2022 3.7 (LL)     Hematocrit (%)   Date Value   08/12/2022 25.6 (L)   07/30/2022 23.3 (L)   07/30/2022 11.5 (LL)          BMP  Sodium (mmol/L)   Date Value   08/12/2022 140   07/30/2022 137   07/21/2022 140     Potassium (mmol/L)   Date Value   08/12/2022 4.7   07/30/2022 4.6   07/21/2022 4.3     CO2 (mmol/L)   Date Value   08/12/2022 25   07/30/2022 26   07/21/2022 27     Chloride (mmol/L)   Date Value   08/12/2022 103   07/30/2022 100   07/21/2022 102     BUN (mg/dL)   Date Value   08/12/2022 15   07/30/2022 33  (H)   2022 34 (H)     Creatinine (mg/dL)   Date Value   2022 0.9   2022 1.8 (H)   2022 1.5 (H)     Glucose (mg/dL)   Date Value   2022 110   2022 88   2022 87     eGFR if non African American (mL/min/1.73 m^2)   Date Value   2022 27 (A)   2022 33 (A)   2022 49 (A)       BNP  BNP (pg/mL)   Date Value   2021 110 (H)   2013 175 (H)       Troponin panel:   No results found for: TROPONIN      COAGS  INR (no units)   Date Value   2022 1.0   03/10/2022 1.0   2013 1.0     aPTT (sec)   Date Value   2013 25.7         Lipid panel:    Lab Results   Component Value Date    CHOL 179 2022    CHOL 207 (H) 2021     Lab Results   Component Value Date    HDL 47 2022    HDL 55 2021     Lab Results   Component Value Date    LDLCALC 98.4 2022    LDLCALC 111.4 2021     Lab Results   Component Value Date    TRIG 168 (H) 2022    TRIG 203 (H) 2021     Lab Results   Component Value Date    CHOLHDL 26.3 2022    CHOLHDL 26.6 2021       Diagnostic Results:    1.EK21: NSR; normal   2.Echo: 21: normal EF, diastolic dysfunction; mild TR; PAS 43; MIGDALIA 1.18 cm2 with aortic sclerosis   3.Nuclear stress: : negative for ischemia; normal EF   3b.Nuclear stress : EF normal; negative for ischemia     Chart review:  GFR 27 1 month ago    ASSESSMENT/PLAN:     1. CAD: s/p stent placement - asymptomatic now   2. Chronic diastolic dysfunction  3. Hypertension controlled  4. Moderate aortic stenosis at the most  5. Last set of lipids - .4;   6. CKD 3 - improved GFR to normal;   7. Anemia with Hgb 7.9 - was 11 yvon in April   Plan: 1. Probably needs GI evaluation with her AS; she wishes referral to Dermatology   2. Continue the same medications  3. Return 3 months at which time will schedule echo to check aortic valve   Patricia Rogers MD

## 2022-08-19 ENCOUNTER — CLINICAL SUPPORT (OUTPATIENT)
Dept: REHABILITATION | Facility: HOSPITAL | Age: 77
End: 2022-08-19
Payer: MEDICARE

## 2022-08-19 DIAGNOSIS — M25.562 ACUTE PAIN OF LEFT KNEE: Primary | ICD-10-CM

## 2022-08-19 DIAGNOSIS — R29.898 DECREASED STRENGTH INVOLVING KNEE JOINT: ICD-10-CM

## 2022-08-19 DIAGNOSIS — M25.662 DECREASED RANGE OF MOTION (ROM) OF LEFT KNEE: ICD-10-CM

## 2022-08-19 PROCEDURE — 97110 THERAPEUTIC EXERCISES: CPT | Mod: PN,CQ

## 2022-08-19 PROCEDURE — 97140 MANUAL THERAPY 1/> REGIONS: CPT | Mod: PN,CQ

## 2022-08-19 NOTE — PROGRESS NOTES
"[facility]   Physical Therapy Treatment Note     Name: Enedelia García  Clinic Number: [id number]    Therapy Diagnosis:   Encounter Diagnoses   Name Primary?    Acute pain of left knee Yes    Decreased range of motion (ROM) of left knee     Decreased strength involving knee joint      Physician: Stan Catalan MD    Visit Date: 8/19/2022    Physician Orders: PT Eval and Treat   Medical Diagnosis from Referral: Z96.652 (ICD-10-CM) - Status post revision of total replacement of left knee  Evaluation Date: 8/15/2022      Authorization Period Expiration: 09/12/2022  Plan of Care Expiration: 8/15/2022 to 09/30/2022  Visit # / Visits authorized: 2/ 12 FOTO: 2/5  PTA Visit #: 1/5     Time In: 2:05  Time Out: 3:00  Total Billable Time: 55 minutes (3TE, 1MT)    Precautions: TKA revision, Fall risk,     SUBJECTIVE     Pt reports: Increased stiffness in the morning that improves after walking around. Minimal pain.  She was not given home exercise program.  Response to previous treatment: Eval only  Functional change: Ongoing    Pain: 3/10  Location: left knee    OBJECTIVE     Objective Measures updated at progress report unless specified.     Treatment     Leticia received the treatments listed below:      therapeutic exercises to develop strength, endurance, ROM, flexibility, posture and core stabilization for 40 minutes including:  - bike x 7' at L2  - Quad set 5" x 15  - SAQ 2 x 10  - Heel slides 5" x 20  - Straight leg raise 2 x 10  - LAQ 2 x 10 2#  - leg press Double leg 3x10 at 4 plates 2 x 10  - Walking loop 150'x2 (2 trials)      manual therapy techniques: Joint mobilizations, Myofacial release, Manual Lymphatic Drainage and Soft tissue Mobilization were applied to the: Left knee for 15 minutes, including:  STM/MFR left knee  patellar mobs grade I-III  Physiological knee flex/ext     neuromuscular re-education activities to improve: Balance, Coordination, Kinesthetic, Sense, Proprioception " and Posture for 00 minutes. The following activities were included:  Not today    Patient Education and Home Exercises     Home Exercises Provided and Patient Education Provided     Education provided:   - home exercise program continue for HHPT; will update next visit  - PT diagnosis and recommended treatment course.     Written Home Exercises Provided: No, will be given at next session.     ASSESSMENT     Enedelia is a 77 y.o. female referred to outpatient Physical Therapy with a medical diagnosis of 1) Status post revision of total replacement of left knee. Patient presents for first follow up appointment with mild to moderate swelling in left lower extremity and minimal pain. Good quad contraction and knee range of motion. Completed therex with appropriate fatigue and no increased pain from baseline. Continue to progress as appropriate.     Leticia Is progressing well towards her goals.   Pt prognosis is Excellent.     Pt will continue to benefit from skilled outpatient physical therapy to address the deficits listed in the problem list box on initial evaluation, provide pt/family education and to maximize pt's level of independence in the home and community environment.     Pt's spiritual, cultural and educational needs considered and pt agreeable to plan of care and goals.     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 Pt will be independent with HEP supplement PT in improving functional mobility. (Ongoing, not met)  2. Pt will improve LLE strength to at least 75% of RLE strength as measured via MicroFet handheld dynamometer in order to improve functional mobility (Ongoing, not met)  3. Pt will improve left knee AROM to at least 0 - 115 degrees in order to improve gait. (Ongoing, not met)     Long Term Goals: 6 weeks  1. Pt will be independent with updated HEP supplement PT in improving functional mobility. (Ongoing, not met)  2. Pt will  improve LLE strength to at least 90% of LLE strength as measured via MicroFet handheld dynamometer in order to improve functional mobility (Ongoing, not met)  3. Pt will improve L knee AROM to at least 0 - 120 degrees in order to improve gait and ability to perform ADLs (Ongoing, not met)  4. Pt will perform TUG in < 14 seconds without AD in order to demo improved gait speed (Ongoing, not met)  5. Pt will perform at least 7 sit to stands without UE support on 30 second sit to stand test in order to demo improved ability to perform transfers (Ongoing, not met)    PLAN     Plan of care Certification: 8/15/2022 to 09/30/2022.  Continue with treatment per POC; LE strengthening/stretching, postural education, balance and gait training    Barbara Cyr, PTA

## 2022-08-22 ENCOUNTER — LAB VISIT (OUTPATIENT)
Dept: LAB | Facility: HOSPITAL | Age: 77
End: 2022-08-22
Attending: INTERNAL MEDICINE
Payer: MEDICARE

## 2022-08-22 DIAGNOSIS — N17.8 ACUTE RENAL FAILURE WITH OTHER SPECIFIED PATHOLOGICAL LESION IN KIDNEY: ICD-10-CM

## 2022-08-22 DIAGNOSIS — D50.0 IRON DEFICIENCY ANEMIA DUE TO CHRONIC BLOOD LOSS: ICD-10-CM

## 2022-08-22 DIAGNOSIS — N18.31 STAGE 3A CHRONIC KIDNEY DISEASE: ICD-10-CM

## 2022-08-22 DIAGNOSIS — N25.81 SECONDARY HYPERPARATHYROIDISM OF RENAL ORIGIN: ICD-10-CM

## 2022-08-22 LAB
ALBUMIN SERPL BCP-MCNC: 3 G/DL (ref 3.5–5.2)
ANION GAP SERPL CALC-SCNC: 14 MMOL/L (ref 8–16)
BASOPHILS # BLD AUTO: 0.08 K/UL (ref 0–0.2)
BASOPHILS NFR BLD: 0.8 % (ref 0–1.9)
BUN SERPL-MCNC: 20 MG/DL (ref 8–23)
CALCIUM SERPL-MCNC: 9.5 MG/DL (ref 8.7–10.5)
CHLORIDE SERPL-SCNC: 101 MMOL/L (ref 95–110)
CO2 SERPL-SCNC: 27 MMOL/L (ref 23–29)
CREAT SERPL-MCNC: 1.6 MG/DL (ref 0.5–1.4)
DIFFERENTIAL METHOD: ABNORMAL
EOSINOPHIL # BLD AUTO: 1.2 K/UL (ref 0–0.5)
EOSINOPHIL NFR BLD: 11.4 % (ref 0–8)
ERYTHROCYTE [DISTWIDTH] IN BLOOD BY AUTOMATED COUNT: 13.1 % (ref 11.5–14.5)
EST. GFR  (NO RACE VARIABLE): 33 ML/MIN/1.73 M^2
GLUCOSE SERPL-MCNC: 80 MG/DL (ref 70–110)
HCT VFR BLD AUTO: 25.1 % (ref 37–48.5)
HGB BLD-MCNC: 7.7 G/DL (ref 12–16)
IMM GRANULOCYTES # BLD AUTO: 0.03 K/UL (ref 0–0.04)
IMM GRANULOCYTES NFR BLD AUTO: 0.3 % (ref 0–0.5)
LYMPHOCYTES # BLD AUTO: 2.2 K/UL (ref 1–4.8)
LYMPHOCYTES NFR BLD: 21.5 % (ref 18–48)
MCH RBC QN AUTO: 28.2 PG (ref 27–31)
MCHC RBC AUTO-ENTMCNC: 30.7 G/DL (ref 32–36)
MCV RBC AUTO: 92 FL (ref 82–98)
MONOCYTES # BLD AUTO: 1.5 K/UL (ref 0.3–1)
MONOCYTES NFR BLD: 14.6 % (ref 4–15)
NEUTROPHILS # BLD AUTO: 5.4 K/UL (ref 1.8–7.7)
NEUTROPHILS NFR BLD: 51.4 % (ref 38–73)
NRBC BLD-RTO: 0 /100 WBC
PHOSPHATE SERPL-MCNC: 4 MG/DL (ref 2.7–4.5)
PLATELET # BLD AUTO: 370 K/UL (ref 150–450)
PMV BLD AUTO: 9 FL (ref 9.2–12.9)
POTASSIUM SERPL-SCNC: 3.9 MMOL/L (ref 3.5–5.1)
PTH-INTACT SERPL-MCNC: 115.1 PG/ML (ref 9–77)
RBC # BLD AUTO: 2.73 M/UL (ref 4–5.4)
SODIUM SERPL-SCNC: 142 MMOL/L (ref 136–145)
WBC # BLD AUTO: 10.39 K/UL (ref 3.9–12.7)

## 2022-08-22 PROCEDURE — 85025 COMPLETE CBC W/AUTO DIFF WBC: CPT | Performed by: INTERNAL MEDICINE

## 2022-08-22 PROCEDURE — 83970 ASSAY OF PARATHORMONE: CPT | Performed by: INTERNAL MEDICINE

## 2022-08-22 PROCEDURE — 84550 ASSAY OF BLOOD/URIC ACID: CPT | Performed by: INTERNAL MEDICINE

## 2022-08-22 PROCEDURE — 86704 HEP B CORE ANTIBODY TOTAL: CPT | Performed by: INTERNAL MEDICINE

## 2022-08-22 PROCEDURE — 86706 HEP B SURFACE ANTIBODY: CPT | Performed by: INTERNAL MEDICINE

## 2022-08-22 PROCEDURE — 82306 VITAMIN D 25 HYDROXY: CPT | Performed by: INTERNAL MEDICINE

## 2022-08-22 PROCEDURE — 87340 HEPATITIS B SURFACE AG IA: CPT | Performed by: INTERNAL MEDICINE

## 2022-08-22 PROCEDURE — 82728 ASSAY OF FERRITIN: CPT | Performed by: INTERNAL MEDICINE

## 2022-08-22 PROCEDURE — 84466 ASSAY OF TRANSFERRIN: CPT | Performed by: INTERNAL MEDICINE

## 2022-08-22 PROCEDURE — 36415 COLL VENOUS BLD VENIPUNCTURE: CPT | Performed by: INTERNAL MEDICINE

## 2022-08-22 PROCEDURE — 86803 HEPATITIS C AB TEST: CPT | Performed by: INTERNAL MEDICINE

## 2022-08-22 PROCEDURE — 80069 RENAL FUNCTION PANEL: CPT | Performed by: INTERNAL MEDICINE

## 2022-08-23 LAB
25(OH)D3+25(OH)D2 SERPL-MCNC: 50 NG/ML (ref 30–96)
FERRITIN SERPL-MCNC: 103 NG/ML (ref 20–300)
HBV CORE AB SERPL QL IA: NEGATIVE
HBV SURFACE AB SER-ACNC: NEGATIVE M[IU]/ML
HBV SURFACE AG SERPL QL IA: NEGATIVE
HCV AB SERPL QL IA: NEGATIVE
IRON SERPL-MCNC: 28 UG/DL (ref 30–160)
SATURATED IRON: 7 % (ref 20–50)
TOTAL IRON BINDING CAPACITY: 417 UG/DL (ref 250–450)
TRANSFERRIN SERPL-MCNC: 282 MG/DL (ref 200–375)
URATE SERPL-MCNC: 7 MG/DL (ref 2.4–5.7)

## 2022-08-25 ENCOUNTER — EXTERNAL HOME HEALTH (OUTPATIENT)
Dept: HOME HEALTH SERVICES | Facility: HOSPITAL | Age: 77
End: 2022-08-25
Payer: MEDICARE

## 2022-08-26 ENCOUNTER — CLINICAL SUPPORT (OUTPATIENT)
Dept: REHABILITATION | Facility: HOSPITAL | Age: 77
End: 2022-08-26
Payer: MEDICARE

## 2022-08-26 DIAGNOSIS — R29.898 DECREASED STRENGTH INVOLVING KNEE JOINT: ICD-10-CM

## 2022-08-26 DIAGNOSIS — M25.662 DECREASED RANGE OF MOTION (ROM) OF LEFT KNEE: ICD-10-CM

## 2022-08-26 DIAGNOSIS — M25.562 ACUTE PAIN OF LEFT KNEE: Primary | ICD-10-CM

## 2022-08-26 PROCEDURE — 97110 THERAPEUTIC EXERCISES: CPT | Mod: PN,CQ

## 2022-08-26 PROCEDURE — 97140 MANUAL THERAPY 1/> REGIONS: CPT | Mod: PN,CQ

## 2022-08-26 NOTE — PROGRESS NOTES
"[facility]   Physical Therapy Treatment Note     Name: Enedelia García  Clinic Number: [id number]    Therapy Diagnosis:   Encounter Diagnoses   Name Primary?    Acute pain of left knee Yes    Decreased range of motion (ROM) of left knee     Decreased strength involving knee joint      Physician: Stan Cataaln MD    Visit Date: 8/26/2022    Physician Orders: PT Eval and Treat   Medical Diagnosis from Referral: Z96.652 (ICD-10-CM) - Status post revision of total replacement of left knee  Evaluation Date: 8/15/2022      Authorization Period Expiration: 09/12/2022  Plan of Care Expiration: 8/15/2022 to 09/30/2022  Visit # / Visits authorized: 3/ 12 FOTO: 3/5  PTA Visit #: 2/5     Time In: 2:05  Time Out: 3:00  Total Billable Time: 55 minutes (2TE, 2MT)    Precautions: TKA revision, Fall risk,     SUBJECTIVE     Pt reports: Increased swelling in bilateral lower extremities. 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  Response to previous treatment: Eval only  Functional change: Ongoing    Pain: 5/10  Location: left knee    OBJECTIVE     Objective Measures updated at progress report unless specified.     Treatment     Leticia received the treatments listed below:      therapeutic exercises to develop strength, endurance, ROM, flexibility, posture and core stabilization for 35 minutes including:  - bike x 7' at L2  - Quad set 5" x 15  - SAQ 2 x 10  - Heel slides 5" x 20  - Straight leg raise 2 x 10  - LAQ 2 x 10 2#  - leg press Double leg 3x10 at 4 plates 2 x 10  - Walking loop 150'x2 (2 trials)      manual therapy techniques: Joint mobilizations, Myofacial release, Manual Lymphatic Drainage and Soft tissue Mobilization were applied to the: Left knee for 20 minutes, including:  STM/MFR left knee  patellar mobs grade I-III  Physiological knee flex/ext   Effleurage massage for edema " management    neuromuscular re-education activities to improve: Balance, Coordination, Kinesthetic, Sense, Proprioception and Posture for 00 minutes. The following activities were included:  Not today    Patient Education and Home Exercises     Home Exercises Provided and Patient Education Provided     Education provided:   - home exercise program  - PT diagnosis and recommended treatment course.     Written Home Exercises Provided: yes. Exercises were reviewed and Leticia was able to demonstrate them prior to the end of the session.  Leticia demonstrated good  understanding of the education provided. See EMR under Patient Instructions for exercises provided during therapy sessions    ASSESSMENT     Enedelia is a 77 y.o. female referred to outpatient Physical Therapy with a medical diagnosis of 1) Status post revision of total replacement of left knee. Patient presents with moderate swelling in left lower extremity and minimal pain. Good quad contraction and knee range of motion. Patient voices increased stressors at home affecting her ability to complete home exercises and rest at home. 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 Multiple lesions noted on right side of face, patient reports that she has follow up with dermatologist is regards to this. Overall progressing well with PT. Continue to progress as appropriate.     Leticia Is progressing well towards her goals.   Pt prognosis is Excellent.     Pt will continue to benefit from skilled outpatient physical therapy to address the deficits listed in the problem list box on initial evaluation, provide pt/family education and to maximize pt's level of independence in the home and community environment.     Pt's spiritual, cultural and educational needs considered and pt agreeable to plan of care and goals.     Anticipated barriers to physical therapy: primary caregiver for  spouse    Goals:  Short Term Goals: 3 weeks  1. Pt will be independent with HEP supplement PT in improving functional mobility. (Ongoing, not met)  2. Pt will improve LLE strength to at least 75% of RLE strength as measured via MicroFet handheld dynamometer in order to improve functional mobility (Ongoing, not met)  3. Pt will improve left knee AROM to at least 0 - 115 degrees in order to improve gait. (Ongoing, not met)     Long Term Goals: 6 weeks  1. Pt will be independent with updated HEP supplement PT in improving functional mobility. (Ongoing, not met)  2. Pt will improve LLE strength to at least 90% of LLE strength as measured via MicroFet handheld dynamometer in order to improve functional mobility (Ongoing, not met)  3. Pt will improve L knee AROM to at least 0 - 120 degrees in order to improve gait and ability to perform ADLs (Ongoing, not met)  4. Pt will perform TUG in < 14 seconds without AD in order to demo improved gait speed (Ongoing, not met)  5. Pt will perform at least 7 sit to stands without UE support on 30 second sit to stand test in order to demo improved ability to perform transfers (Ongoing, not met)    PLAN     Plan of care Certification: 8/15/2022 to 09/30/2022.  Continue with treatment per POC; LE strengthening/stretching, postural education, balance and gait training    Barbara Cyr, PTA

## 2022-08-27 ENCOUNTER — HOSPITAL ENCOUNTER (EMERGENCY)
Facility: HOSPITAL | Age: 77
Discharge: HOME | End: 2022-08-27
Attending: EMERGENCY MEDICINE
Payer: MEDICARE

## 2022-08-27 VITALS
RESPIRATION RATE: 18 BRPM | HEART RATE: 76 BPM | TEMPERATURE: 98 F | WEIGHT: 175 LBS | OXYGEN SATURATION: 98 % | BODY MASS INDEX: 29.88 KG/M2 | SYSTOLIC BLOOD PRESSURE: 141 MMHG | HEIGHT: 64 IN | DIASTOLIC BLOOD PRESSURE: 64 MMHG

## 2022-08-27 DIAGNOSIS — L29.9 PRURITUS: Primary | ICD-10-CM

## 2022-08-27 DIAGNOSIS — F41.9 ANXIETY: ICD-10-CM

## 2022-08-27 PROCEDURE — 99283 EMERGENCY DEPT VISIT LOW MDM: CPT

## 2022-08-27 RX ORDER — HYDROXYZINE HYDROCHLORIDE 25 MG/1
25 TABLET, FILM COATED ORAL EVERY 6 HOURS PRN
Qty: 20 TABLET | Refills: 0 | Status: SHIPPED | OUTPATIENT
Start: 2022-08-27 | End: 2022-10-24 | Stop reason: SDUPTHER

## 2022-08-27 RX ORDER — ACETAMINOPHEN 500 MG
500 TABLET ORAL EVERY 6 HOURS PRN
COMMUNITY
End: 2024-01-04 | Stop reason: CLARIF

## 2022-08-27 RX ORDER — ZINC OXIDE 15 MG
1 TABLET,DISINTEGRATING ORAL DAILY
Status: ON HOLD | COMMUNITY
End: 2022-09-11 | Stop reason: HOSPADM

## 2022-08-27 RX ORDER — AMOXICILLIN AND CLAVULANATE POTASSIUM 875; 125 MG/1; MG/1
TABLET, FILM COATED ORAL
COMMUNITY
Start: 2022-08-11 | End: 2022-08-27

## 2022-08-27 RX ORDER — MULTIVIT,IRON,MINERALS/LUTEIN
1 TABLET ORAL DAILY
COMMUNITY

## 2022-08-27 RX ORDER — UBIDECARENONE 30 MG
30 CAPSULE ORAL DAILY
COMMUNITY
End: 2024-01-18

## 2022-08-27 NOTE — PHARMACY MED REC
"  Admission Medication History     The home medication history was taken by Lucy Carbajal CPhT.    Medication history obtained from, Patient Verified    You may go to "Admission" then "Reconcile Home Medications" tabs to review and/or act upon these items.     The home medication list has been updated by the Pharmacy department.   Please read ALL comments highlighted in yellow.   Please address this information as you see fit.    Feel free to contact us if you have any questions or require assistance.      The medications listed below were removed from the home medication list.  Please reorder if appropriate:  Patient reports no longer taking the following medication(s):  Lotrisone 1-0.05% cream  Flonase 50 mcg  Linzess 72 mcg  Glycolax 17 gram  Valtrex 1000 mg  Augmentin 875-125 mg        Lucy Carbajal CPhT.  Ext [phone]             .        "

## 2022-08-27 NOTE — ED PROVIDER NOTES
Encounter Date: 8/27/2022       History     Chief Complaint   Patient presents with    Rash     Pt c/o rash, bumps with drainage reports she thinks she saw a bug come out one of the affected areas x 5 days ago       Patient is a 77-year-old female who complains of diffuse itching.  Patient is worried that she may have some sort of microscopic bugs on her skin.  Symptoms have been present for the past 5 days.  She denies any rash.  No fever or chills.  Patient reports being under extreme amount of stress lately.    Review of patient's allergies indicates:   Allergen Reactions    Iodinated contrast media Anaphylaxis and Other (See Comments)    Phenergan [promethazine]      Vomiting     Past Medical History:   Diagnosis Date    CKD (chronic kidney disease) stage 4, GFR 15-29 ml/min     Coronary artery disease     Edema     Epilepsy     Hematuria, unspecified     Hematuria, unspecified     Hyperlipidemia     Hypertension     Iron deficiency anemia     Normocytic anemia      Past Surgical History:   Procedure Laterality Date    APPENDECTOMY      BACK SURGERY      CORONARY STENT PLACEMENT      HYSTERECTOMY      REVISION OF KNEE ARTHROPLASTY Left 7/18/2022    Procedure: REVISION, ARTHROPLASTY, KNEE;  Surgeon: Stan Catalan MD;  Location: [facility];  Service: Orthopedics;  Laterality: Left;    TONSILLECTOMY       Family History   Problem Relation Age of Onset    Heart disease Mother     Heart disease Father      Social History     Tobacco Use    Smoking status: Never    Smokeless tobacco: Never   Substance Use Topics    Alcohol use: No    Drug use: No     Review of Systems   Constitutional:  Negative for chills and fever.   Gastrointestinal:  Negative for nausea and vomiting.   Skin:  Negative for color change and rash.        Itching.     Physical Exam     Initial Vitals [08/27/22 1306]   BP Pulse Resp Temp SpO2   (!) 141/64 76 18 97.9 °F (36.6 °C) 98 %      MAP       --         Physical Exam    Nursing note and vitals  reviewed.  Constitutional: No distress.   HENT:   Head: Atraumatic.   Eyes: EOM are normal.   Neck: Neck supple.   Cardiovascular:  Normal rate, regular rhythm and normal heart sounds.           Pulmonary/Chest: Breath sounds normal.   Musculoskeletal:      Cervical back: Neck supple.     Neurological: She is alert and oriented to person, place, and time.   Skin: Skin is warm and dry.   There are scattered small skin excoriations around the right here.  Similar lesions are noted to the upper back.  No pustules or vesicles.   Psychiatric: She has a normal mood and affect. Thought content normal.       ED Course   Procedures  Labs Reviewed - No data to display       Imaging Results    None          Medications - No data to display  Medical Decision Making:   ED Management:  I have reassured the patient and I see no bugs on her skin.  Symptoms may be due to anxiety.  I will place on hydroxyzine for this.  I have urged close follow-up with the primary physician as soon as able for recheck.  She may return to the ED for any possible worsening.                    Clinical Impression:   Final diagnoses:  [L29.9] Pruritus (Primary)  [F41.9] Anxiety        ED Disposition Condition    Discharge Stable          ED Prescriptions       Medication Sig Dispense Start Date End Date Auth. Provider    hydrOXYzine HCL (ATARAX) 25 MG tablet Take 1 tablet (25 mg total) by mouth every 6 (six) hours as needed for Itching. 20 tablet 8/27/2022 -- Bernard Avila MD          Follow-up Information       Follow up With Specialties Details Why Contact Info    Marichuy Hummel MD Family Medicine Schedule an appointment as soon as possible for a visit   [address] [facility] [id number] [phone]      [facility] Emergency Dept Emergency Medicine  If symptoms worsen [address]  [facility] [id number] [phone]             Bernard Avila MD  08/27/22 7173

## 2022-08-27 NOTE — ED NOTES
"Patient arrives for evaluation of "bug bites" that are itching over past few days - patient has areas around face, hairline, ears, abdomen, and top of back that are open sores (none larger than 0.25 cm round with jagged edges) - no signs of infection noted - no bugs noted - when questioned more, patient states she "has jars and jars of them at home" - she then said she had "some in my purse" - I asked to see the containers with the bugs and she pulled out 2 medicine bottles that looks like small pieces of skin and scabs - she said she picked dorota off of herself - I asked if this has ever happened before and she said about 12 years ago, she was sent to [place] to the "[facility]" - patient seems distraught and very upset that she does not want to be admitted to a [facility] hospital - Dr. Prater made aware of conversation - patient denies wanting to hurt herself  "

## 2022-08-31 ENCOUNTER — CLINICAL SUPPORT (OUTPATIENT)
Dept: REHABILITATION | Facility: HOSPITAL | Age: 77
End: 2022-08-31
Payer: MEDICARE

## 2022-08-31 DIAGNOSIS — M25.562 ACUTE PAIN OF LEFT KNEE: Primary | ICD-10-CM

## 2022-08-31 DIAGNOSIS — R29.898 DECREASED STRENGTH INVOLVING KNEE JOINT: ICD-10-CM

## 2022-08-31 DIAGNOSIS — M25.662 DECREASED RANGE OF MOTION (ROM) OF LEFT KNEE: ICD-10-CM

## 2022-08-31 PROCEDURE — 97140 MANUAL THERAPY 1/> REGIONS: CPT | Mod: PN,CQ

## 2022-08-31 PROCEDURE — 97110 THERAPEUTIC EXERCISES: CPT | Mod: PN,CQ

## 2022-08-31 NOTE — PROGRESS NOTES
"[facility]   Physical Therapy Treatment Note     Name: Enedelia García  Clinic Number: [id number]    Therapy Diagnosis:   Encounter Diagnoses   Name Primary?    Acute pain of left knee Yes    Decreased range of motion (ROM) of left knee     Decreased strength involving knee joint      Physician: Stan Catalan MD    Visit Date: 8/31/2022    Physician Orders: PT Eval and Treat   Medical Diagnosis from Referral: Z96.652 (ICD-10-CM) - Status post revision of total replacement of left knee  Evaluation Date: 8/15/2022      Authorization Period Expiration: 09/12/2022  Plan of Care Expiration: 8/15/2022 to 09/30/2022  Visit # / Visits authorized: 4/ 12 FOTO: 4/5  PTA Visit #: 3/5     Time In: 2:05  Time Out: 3:00  Total Billable Time: 30 minutes (1TE, 1MT)    Precautions: TKA revision, Fall risk,     SUBJECTIVE     Pt reports: Continues to have bilateral lower extremity swelling but notes improvement. Minimal pain in knee but moderate tenderness in left calf due to swelling.   She was not given home exercise program.  Response to previous treatment: Eval only  Functional change: Ongoing    Pain: 5/10  Location: left knee    OBJECTIVE     Objective Measures updated at progress report unless specified.     Knee PROM: 0-129    Treatment     Leticia received the treatments listed below:      therapeutic exercises to develop strength, endurance, ROM, flexibility, posture and core stabilization for 20 minutes 1:1 and 30 minutes supervised including:  - bike x 7' at L2  - Quad set 5" x 15  - SAQ 3 x 10  - Heel slides 5" x 20  - Straight leg raise 3 x 10  - LAQ 3 x 10 2#  - leg press Double leg 3x10 at 4 plates 2 x 10  - Walking loop 150'x2 (2 trials)      manual therapy techniques: Joint mobilizations, Myofacial release, Manual Lymphatic Drainage and Soft tissue Mobilization were applied to the: Left knee for 10 minutes, including:  STM/MFR left knee  patellar mobs grade I-III  Physiological knee " flex/ext   Effleurage massage for edema management    neuromuscular re-education activities to improve: Balance, Coordination, Kinesthetic, Sense, Proprioception and Posture for 00 minutes. The following activities were included:  Not today, next    Patient Education and Home Exercises     Home Exercises Provided and Patient Education Provided     Education provided:   - home exercise program  - PT diagnosis and recommended treatment course.     Written Home Exercises Provided: Patient instructed to cont prior HEP. Exercises were reviewed and Leticia was able to demonstrate them prior to the end of the session.  Leticia demonstrated good  understanding of the education provided. See EMR under Patient Instructions for exercises provided during therapy sessions    ASSESSMENT     Enedelia is a 77 y.o. female referred to outpatient Physical Therapy with a medical diagnosis of 1) Status post revision of total replacement of left knee. Patient presents with improved swelling in left lower extremity and minimal pain. Good quad contraction and knee range of motion. Overall progressing well with PT. Will implement more standing/balance therex next visit.     Leticia Is progressing well towards her goals.   Pt prognosis is Excellent.     Pt will continue to benefit from skilled outpatient physical therapy to address the deficits listed in the problem list box on initial evaluation, provide pt/family education and to maximize pt's level of independence in the home and community environment.     Pt's spiritual, cultural and educational needs considered and pt agreeable to plan of care and goals.     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 Pt will be independent with HEP supplement PT in improving functional mobility. (Ongoing, not met)  2. Pt will improve LLE strength to at least 75% of RLE strength as measured via MicroFet handheld dynamometer in order to  improve functional mobility (Ongoing, not met)  3. Pt will improve left knee AROM to at least 0 - 115 degrees in order to improve gait. (Ongoing, not met)     Long Term Goals: 6 weeks  1. Pt will be independent with updated HEP supplement PT in improving functional mobility. (Ongoing, not met)  2. Pt will improve LLE strength to at least 90% of LLE strength as measured via MicroFet handheld dynamometer in order to improve functional mobility (Ongoing, not met)  3. Pt will improve L knee AROM to at least 0 - 120 degrees in order to improve gait and ability to perform ADLs (Ongoing, not met)  4. Pt will perform TUG in < 14 seconds without AD in order to demo improved gait speed (Ongoing, not met)  5. Pt will perform at least 7 sit to stands without UE support on 30 second sit to stand test in order to demo improved ability to perform transfers (Ongoing, not met)    PLAN     Plan of care Certification: 8/15/2022 to 09/30/2022.  Continue with treatment per POC; LE strengthening/stretching, postural education, balance and gait training    Barbara Cyr, PTA

## 2022-09-01 PROBLEM — D63.8 ANEMIA OF CHRONIC DISEASE: Status: ACTIVE | Noted: 2022-09-01

## 2022-09-02 ENCOUNTER — CLINICAL SUPPORT (OUTPATIENT)
Dept: REHABILITATION | Facility: HOSPITAL | Age: 77
End: 2022-09-02
Payer: MEDICARE

## 2022-09-02 DIAGNOSIS — M25.562 ACUTE PAIN OF LEFT KNEE: Primary | ICD-10-CM

## 2022-09-02 DIAGNOSIS — R29.898 DECREASED STRENGTH INVOLVING KNEE JOINT: ICD-10-CM

## 2022-09-02 DIAGNOSIS — M25.662 DECREASED RANGE OF MOTION (ROM) OF LEFT KNEE: ICD-10-CM

## 2022-09-02 PROCEDURE — 97110 THERAPEUTIC EXERCISES: CPT | Mod: PN,CQ

## 2022-09-02 PROCEDURE — 97140 MANUAL THERAPY 1/> REGIONS: CPT | Mod: PN,CQ

## 2022-09-02 NOTE — PROGRESS NOTES
"[facility]   Physical Therapy Treatment Note     Name: Enedelia García  Clinic Number: [id number]    Therapy Diagnosis:   Encounter Diagnoses   Name Primary?    Acute pain of left knee Yes    Decreased range of motion (ROM) of left knee     Decreased strength involving knee joint        Physician: Stan Catalan MD    Visit Date: 9/2/2022    Physician Orders: PT Eval and Treat   Medical Diagnosis from Referral: Z96.652 (ICD-10-CM) - Status post revision of total replacement of left knee  Evaluation Date: 8/15/2022      Authorization Period Expiration: 09/12/2022  Plan of Care Expiration: 8/15/2022 to 09/30/2022  Visit # / Visits authorized: 5/ 12 FOTO: 5/5 DONE 9/2/2022  PTA Visit #: 4/5     Time In: 2:00  Time Out: 2:53  Total Billable Time: 53 minutes (3TE, 1MT)    Precautions: TKA revision, Fall risk,     SUBJECTIVE     Pt reports: "Really don't have any pain, just times if I move a certain way"   She was not given home exercise program.  Response to previous treatment: Eval only  Functional change: Ongoing    Pain: 4/10  Location: left knee    OBJECTIVE     Objective Measures updated at progress report unless specified.     Knee PROM: 0-129    Treatment     Leticia received the treatments listed below:      therapeutic exercises to develop strength, endurance, ROM, flexibility, posture and core stabilization for 45 minutes 1:1 including:  - bike x 3' at L2 - then held due to medial knee "pulling"   - Quad set 5" x 15  - SAQ 3 x 10 >> standing TKE next  - Heel slides 5" x 20  - Straight leg raise 3 x 10  - LAQ 3 x 10 2#  - leg press Double leg 3x10 at 4 plates 2 x 10  - Walking loop 150'x2 (2 trials)  - Heel raises 2 x 10  - step up 4" step 2 x 10  - Lateral walking with yellow band 2 trials in // bar      manual therapy techniques: Joint mobilizations, Myofacial release, Manual Lymphatic Drainage and Soft tissue Mobilization were applied to the: Left knee for 8 minutes, " including:  STM/MFR left knee  patellar mobs grade I-III  Physiological knee flex/ext   Effleurage massage for edema management not today    neuromuscular re-education activities to improve: Balance, Coordination, Kinesthetic, Sense, Proprioception and Posture for 00 minutes. The following activities were included:  Not today, next    Patient Education and Home Exercises     Home Exercises Provided and Patient Education Provided     Education provided:   - home exercise program  - PT diagnosis and recommended treatment course.     Written Home Exercises Provided: Patient instructed to cont prior HEP. Exercises were reviewed and Leticia was able to demonstrate them prior to the end of the session.  Leticia demonstrated good  understanding of the education provided. See EMR under Patient Instructions for exercises provided during therapy sessions    ASSESSMENT     Enedelia is a 77 y.o. female referred to outpatient Physical Therapy with a medical diagnosis of 1) Status post revision of total replacement of left knee. Patient presents with improved swelling in left lower extremity and minimal pain. Good quad contraction and knee range of motion. Progressed more standing therex this date with good tolerance and no increased pain reported. Gait continues to be mildly unsteady at times but no LOB noted. Unable to complete bike at end of session due to pain/pulling at medial knee. Pain resolved upon cessation of activity. Monitor patient response and progress as appropriate.     Leticia Is progressing well towards her goals.   Pt prognosis is Excellent.     Pt will continue to benefit from skilled outpatient physical therapy to address the deficits listed in the problem list box on initial evaluation, provide pt/family education and to maximize pt's level of independence in the home and community environment.     Pt's spiritual, cultural and educational needs considered and pt agreeable to plan of care and goals.      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 Pt will be independent with HEP supplement PT in improving functional mobility. (Ongoing, not met)  2. Pt will improve LLE strength to at least 75% of RLE strength as measured via MicroFet handheld dynamometer in order to improve functional mobility (Ongoing, not met)  3. Pt will improve left knee AROM to at least 0 - 115 degrees in order to improve gait. (Ongoing, not met)     Long Term Goals: 6 weeks  1. Pt will be independent with updated HEP supplement PT in improving functional mobility. (Ongoing, not met)  2. Pt will improve LLE strength to at least 90% of LLE strength as measured via MicroFet handheld dynamometer in order to improve functional mobility (Ongoing, not met)  3. Pt will improve L knee AROM to at least 0 - 120 degrees in order to improve gait and ability to perform ADLs (Ongoing, not met)  4. Pt will perform TUG in < 14 seconds without AD in order to demo improved gait speed (Ongoing, not met)  5. Pt will perform at least 7 sit to stands without UE support on 30 second sit to stand test in order to demo improved ability to perform transfers (Ongoing, not met)    PLAN     Plan of care Certification: 8/15/2022 to 09/30/2022.  Continue with treatment per POC; LE strengthening/stretching, postural education, balance and gait training    Barbara Cyr, PTA

## 2022-09-04 ENCOUNTER — PATIENT MESSAGE (OUTPATIENT)
Dept: FAMILY MEDICINE | Facility: CLINIC | Age: 77
End: 2022-09-04
Payer: MEDICARE

## 2022-09-04 DIAGNOSIS — D50.0 ANEMIA DUE TO BLOOD LOSS: Primary | ICD-10-CM

## 2022-09-04 DIAGNOSIS — D50.8 OTHER IRON DEFICIENCY ANEMIA: ICD-10-CM

## 2022-09-04 DIAGNOSIS — D63.8 ANEMIA, CHRONIC DISEASE: ICD-10-CM

## 2022-09-04 RX ORDER — FERROUS SULFATE 325(65) MG
TABLET, DELAYED RELEASE (ENTERIC COATED) ORAL
Qty: 90 TABLET | Refills: 0 | Status: SHIPPED | OUTPATIENT
Start: 2022-09-04 | End: 2022-09-09 | Stop reason: SDUPTHER

## 2022-09-05 LAB
ACID FAST MOD KINY STN SPEC: NORMAL
MYCOBACTERIUM SPEC QL CULT: NORMAL

## 2022-09-06 ENCOUNTER — TELEPHONE (OUTPATIENT)
Dept: FAMILY MEDICINE | Facility: CLINIC | Age: 77
End: 2022-09-06
Payer: MEDICARE

## 2022-09-06 ENCOUNTER — TELEPHONE (OUTPATIENT)
Dept: ENDOSCOPY | Facility: HOSPITAL | Age: 77
End: 2022-09-06
Payer: MEDICARE

## 2022-09-06 NOTE — TELEPHONE ENCOUNTER
Spoke to Enedelia García declined to schedule at this time states has so much going on in her life right now tia with the iron infusion.  States will call when she is ready to schedule

## 2022-09-09 ENCOUNTER — HOSPITAL ENCOUNTER (INPATIENT)
Facility: HOSPITAL | Age: 77
LOS: 2 days | Discharge: HOME | DRG: 862 | End: 2022-09-11
Attending: EMERGENCY MEDICINE | Admitting: FAMILY MEDICINE
Payer: MEDICARE

## 2022-09-09 ENCOUNTER — HOSPITAL ENCOUNTER (OUTPATIENT)
Dept: WOUND CARE | Facility: HOSPITAL | Age: 77
Discharge: HOME | End: 2022-09-09
Attending: PREVENTIVE MEDICINE
Payer: MEDICARE

## 2022-09-09 VITALS
WEIGHT: 169 LBS | DIASTOLIC BLOOD PRESSURE: 85 MMHG | BODY MASS INDEX: 28.85 KG/M2 | TEMPERATURE: 98 F | HEART RATE: 72 BPM | SYSTOLIC BLOOD PRESSURE: 204 MMHG | HEIGHT: 64 IN

## 2022-09-09 DIAGNOSIS — Z96.652 STATUS POST REVISION OF TOTAL REPLACEMENT OF LEFT KNEE: ICD-10-CM

## 2022-09-09 DIAGNOSIS — M00.9 INFECTION OF LEFT KNEE: ICD-10-CM

## 2022-09-09 DIAGNOSIS — R23.9 ALTERATION IN SKIN INTEGRITY RELATED TO SURGICAL INCISION: Primary | ICD-10-CM

## 2022-09-09 DIAGNOSIS — R68.83 SHAKING CHILLS: ICD-10-CM

## 2022-09-09 DIAGNOSIS — M18.0 PRIMARY OSTEOARTHRITIS OF BOTH FIRST CARPOMETACARPAL JOINTS: ICD-10-CM

## 2022-09-09 DIAGNOSIS — M25.662 DECREASED RANGE OF MOTION (ROM) OF LEFT KNEE: ICD-10-CM

## 2022-09-09 DIAGNOSIS — L08.9 FOREIGN BODY OF LEFT KNEE WITH INFECTION: ICD-10-CM

## 2022-09-09 DIAGNOSIS — S80.252A FOREIGN BODY OF LEFT KNEE WITH INFECTION: ICD-10-CM

## 2022-09-09 DIAGNOSIS — R07.9 CHEST PAIN: ICD-10-CM

## 2022-09-09 DIAGNOSIS — A41.9 SEPSIS, DUE TO UNSPECIFIED ORGANISM, UNSPECIFIED WHETHER ACUTE ORGAN DYSFUNCTION PRESENT: Primary | ICD-10-CM

## 2022-09-09 DIAGNOSIS — R09.02 HYPOXIA: ICD-10-CM

## 2022-09-09 LAB
ALBUMIN SERPL BCP-MCNC: 3.2 G/DL (ref 3.5–5.2)
ALBUMIN SERPL BCP-MCNC: 3.3 G/DL (ref 3.5–5.2)
ALBUMIN SERPL BCP-MCNC: 3.3 G/DL (ref 3.5–5.2)
ALP SERPL-CCNC: 95 U/L (ref 55–135)
ALP SERPL-CCNC: 97 U/L (ref 55–135)
ALP SERPL-CCNC: 97 U/L (ref 55–135)
ALT SERPL W/O P-5'-P-CCNC: 13 U/L (ref 10–44)
ANION GAP SERPL CALC-SCNC: 8 MMOL/L (ref 8–16)
AST SERPL-CCNC: 20 U/L (ref 10–40)
AST SERPL-CCNC: 21 U/L (ref 10–40)
AST SERPL-CCNC: 21 U/L (ref 10–40)
BASOPHILS # BLD AUTO: 0.12 K/UL (ref 0–0.2)
BASOPHILS NFR BLD: 0.7 % (ref 0–1.9)
BILIRUB SERPL-MCNC: 0.3 MG/DL (ref 0.1–1)
BILIRUB UR QL STRIP: NEGATIVE
BNP SERPL-MCNC: 165 PG/ML (ref 0–99)
BUN SERPL-MCNC: 13 MG/DL (ref 8–23)
CALCIUM SERPL-MCNC: 9.6 MG/DL (ref 8.7–10.5)
CALCIUM SERPL-MCNC: 9.6 MG/DL (ref 8.7–10.5)
CALCIUM SERPL-MCNC: 9.7 MG/DL (ref 8.7–10.5)
CHLORIDE SERPL-SCNC: 100 MMOL/L (ref 95–110)
CHLORIDE SERPL-SCNC: 100 MMOL/L (ref 95–110)
CHLORIDE SERPL-SCNC: 101 MMOL/L (ref 95–110)
CLARITY UR: CLEAR
CO2 SERPL-SCNC: 30 MMOL/L (ref 23–29)
CO2 SERPL-SCNC: 31 MMOL/L (ref 23–29)
CO2 SERPL-SCNC: 31 MMOL/L (ref 23–29)
COLOR UR: COLORLESS
CREAT SERPL-MCNC: 1.1 MG/DL (ref 0.5–1.4)
CRP SERPL-MCNC: 25.9 MG/L (ref 0–8.2)
CTP QC/QA: YES
DIFFERENTIAL METHOD: ABNORMAL
EOSINOPHIL # BLD AUTO: 0.9 K/UL (ref 0–0.5)
EOSINOPHIL NFR BLD: 5.3 % (ref 0–8)
ERYTHROCYTE [DISTWIDTH] IN BLOOD BY AUTOMATED COUNT: 14.8 % (ref 11.5–14.5)
EST. GFR  (NO RACE VARIABLE): 52 ML/MIN/1.73 M^2
GLUCOSE SERPL-MCNC: 100 MG/DL (ref 70–110)
GLUCOSE SERPL-MCNC: 108 MG/DL (ref 70–110)
GLUCOSE SERPL-MCNC: 108 MG/DL (ref 70–110)
GLUCOSE UR QL STRIP: NEGATIVE
HCT VFR BLD AUTO: 31.1 % (ref 37–48.5)
HGB BLD-MCNC: 9.5 G/DL (ref 12–16)
HGB UR QL STRIP: NEGATIVE
IMM GRANULOCYTES # BLD AUTO: 0.05 K/UL (ref 0–0.04)
IMM GRANULOCYTES NFR BLD AUTO: 0.3 % (ref 0–0.5)
KETONES UR QL STRIP: NEGATIVE
LACTATE SERPL-SCNC: 1.9 MMOL/L (ref 0.5–2.2)
LACTATE SERPL-SCNC: 3.6 MMOL/L (ref 0.5–2.2)
LEUKOCYTE ESTERASE UR QL STRIP: NEGATIVE
LYMPHOCYTES # BLD AUTO: 4.4 K/UL (ref 1–4.8)
LYMPHOCYTES NFR BLD: 26.7 % (ref 18–48)
MCH RBC QN AUTO: 27.7 PG (ref 27–31)
MCHC RBC AUTO-ENTMCNC: 30.5 G/DL (ref 32–36)
MCV RBC AUTO: 91 FL (ref 82–98)
MONOCYTES # BLD AUTO: 1.8 K/UL (ref 0.3–1)
MONOCYTES NFR BLD: 11 % (ref 4–15)
NEUTROPHILS # BLD AUTO: 9.2 K/UL (ref 1.8–7.7)
NEUTROPHILS NFR BLD: 56 % (ref 38–73)
NITRITE UR QL STRIP: NEGATIVE
NRBC BLD-RTO: 0 /100 WBC
PH UR STRIP: 5 [PH] (ref 5–8)
PLATELET # BLD AUTO: 441 K/UL (ref 150–450)
PMV BLD AUTO: 9.8 FL (ref 9.2–12.9)
POTASSIUM SERPL-SCNC: 4 MMOL/L (ref 3.5–5.1)
POTASSIUM SERPL-SCNC: 4 MMOL/L (ref 3.5–5.1)
POTASSIUM SERPL-SCNC: 4.6 MMOL/L (ref 3.5–5.1)
PROT SERPL-MCNC: 6.9 G/DL (ref 6–8.4)
PROT SERPL-MCNC: 7 G/DL (ref 6–8.4)
PROT SERPL-MCNC: 7 G/DL (ref 6–8.4)
PROT UR QL STRIP: NEGATIVE
RBC # BLD AUTO: 3.43 M/UL (ref 4–5.4)
SARS-COV-2 RDRP RESP QL NAA+PROBE: NEGATIVE
SODIUM SERPL-SCNC: 139 MMOL/L (ref 136–145)
SP GR UR STRIP: 1.01 (ref 1–1.03)
TROPONIN I SERPL DL<=0.01 NG/ML-MCNC: 0.01 NG/ML (ref 0–0.03)
URN SPEC COLLECT METH UR: ABNORMAL
UROBILINOGEN UR STRIP-ACNC: NEGATIVE EU/DL
WBC # BLD AUTO: 16.53 K/UL (ref 3.9–12.7)

## 2022-09-09 PROCEDURE — 96361 HYDRATE IV INFUSION ADD-ON: CPT

## 2022-09-09 PROCEDURE — 87070 CULTURE OTHR SPECIMN AEROBIC: CPT | Mod: 59 | Performed by: ORTHOPAEDIC SURGERY

## 2022-09-09 PROCEDURE — 99285 EMERGENCY DEPT VISIT HI MDM: CPT | Mod: 25,27

## 2022-09-09 PROCEDURE — 25000003 PHARM REV CODE 250: Performed by: EMERGENCY MEDICINE

## 2022-09-09 PROCEDURE — 87206 SMEAR FLUORESCENT/ACID STAI: CPT | Performed by: ORTHOPAEDIC SURGERY

## 2022-09-09 PROCEDURE — 87205 SMEAR GRAM STAIN: CPT | Performed by: ORTHOPAEDIC SURGERY

## 2022-09-09 PROCEDURE — 87102 FUNGUS ISOLATION CULTURE: CPT | Performed by: ORTHOPAEDIC SURGERY

## 2022-09-09 PROCEDURE — 87070 CULTURE OTHR SPECIMN AEROBIC: CPT | Performed by: PREVENTIVE MEDICINE

## 2022-09-09 PROCEDURE — 96367 TX/PROPH/DG ADDL SEQ IV INF: CPT

## 2022-09-09 PROCEDURE — 87040 BLOOD CULTURE FOR BACTERIA: CPT | Performed by: EMERGENCY MEDICINE

## 2022-09-09 PROCEDURE — 80053 COMPREHEN METABOLIC PANEL: CPT | Mod: 91 | Performed by: EMERGENCY MEDICINE

## 2022-09-09 PROCEDURE — 93005 ELECTROCARDIOGRAM TRACING: CPT

## 2022-09-09 PROCEDURE — 96375 TX/PRO/DX INJ NEW DRUG ADDON: CPT

## 2022-09-09 PROCEDURE — 87077 CULTURE AEROBIC IDENTIFY: CPT | Performed by: PREVENTIVE MEDICINE

## 2022-09-09 PROCEDURE — 89060 EXAM SYNOVIAL FLUID CRYSTALS: CPT | Performed by: ORTHOPAEDIC SURGERY

## 2022-09-09 PROCEDURE — 83605 ASSAY OF LACTIC ACID: CPT | Performed by: EMERGENCY MEDICINE

## 2022-09-09 PROCEDURE — 89051 BODY FLUID CELL COUNT: CPT | Performed by: ORTHOPAEDIC SURGERY

## 2022-09-09 PROCEDURE — 87075 CULTR BACTERIA EXCEPT BLOOD: CPT | Mod: 59 | Performed by: ORTHOPAEDIC SURGERY

## 2022-09-09 PROCEDURE — 87076 CULTURE ANAEROBE IDENT EACH: CPT | Performed by: PREVENTIVE MEDICINE

## 2022-09-09 PROCEDURE — 99213 OFFICE O/P EST LOW 20 MIN: CPT | Mod: 25

## 2022-09-09 PROCEDURE — 83880 ASSAY OF NATRIURETIC PEPTIDE: CPT | Performed by: EMERGENCY MEDICINE

## 2022-09-09 PROCEDURE — 36415 COLL VENOUS BLD VENIPUNCTURE: CPT | Performed by: FAMILY MEDICINE

## 2022-09-09 PROCEDURE — 84484 ASSAY OF TROPONIN QUANT: CPT | Performed by: EMERGENCY MEDICINE

## 2022-09-09 PROCEDURE — 86140 C-REACTIVE PROTEIN: CPT | Performed by: ORTHOPAEDIC SURGERY

## 2022-09-09 PROCEDURE — 96365 THER/PROPH/DIAG IV INF INIT: CPT

## 2022-09-09 PROCEDURE — U0002 COVID-19 LAB TEST NON-CDC: HCPCS | Performed by: EMERGENCY MEDICINE

## 2022-09-09 PROCEDURE — 63600175 PHARM REV CODE 636 W HCPCS: Performed by: EMERGENCY MEDICINE

## 2022-09-09 PROCEDURE — 63600175 PHARM REV CODE 636 W HCPCS: Performed by: FAMILY MEDICINE

## 2022-09-09 PROCEDURE — 25000003 PHARM REV CODE 250: Performed by: ORTHOPAEDIC SURGERY

## 2022-09-09 PROCEDURE — 93010 ELECTROCARDIOGRAM REPORT: CPT | Mod: ,,, | Performed by: INTERNAL MEDICINE

## 2022-09-09 PROCEDURE — 87186 SC STD MICRODIL/AGAR DIL: CPT | Performed by: PREVENTIVE MEDICINE

## 2022-09-09 PROCEDURE — 93010 EKG 12-LEAD: ICD-10-PCS | Mod: ,,, | Performed by: INTERNAL MEDICINE

## 2022-09-09 PROCEDURE — 87075 CULTR BACTERIA EXCEPT BLOOD: CPT | Performed by: PREVENTIVE MEDICINE

## 2022-09-09 PROCEDURE — 85652 RBC SED RATE AUTOMATED: CPT | Performed by: FAMILY MEDICINE

## 2022-09-09 PROCEDURE — 25000003 PHARM REV CODE 250: Performed by: NURSE PRACTITIONER

## 2022-09-09 PROCEDURE — 11000001 HC ACUTE MED/SURG PRIVATE ROOM

## 2022-09-09 PROCEDURE — 96366 THER/PROPH/DIAG IV INF ADDON: CPT

## 2022-09-09 PROCEDURE — 85025 COMPLETE CBC W/AUTO DIFF WBC: CPT | Performed by: EMERGENCY MEDICINE

## 2022-09-09 PROCEDURE — 81003 URINALYSIS AUTO W/O SCOPE: CPT | Performed by: EMERGENCY MEDICINE

## 2022-09-09 RX ORDER — ATORVASTATIN CALCIUM 40 MG/1
80 TABLET, FILM COATED ORAL DAILY
Refills: 3 | Status: DISCONTINUED | OUTPATIENT
Start: 2022-09-10 | End: 2022-09-11 | Stop reason: HOSPADM

## 2022-09-09 RX ORDER — LINEZOLID 600 MG/1
600 TABLET, FILM COATED ORAL EVERY 12 HOURS
Qty: 14 TABLET | Refills: 0 | Status: ON HOLD | OUTPATIENT
Start: 2022-09-09 | End: 2022-09-11 | Stop reason: HOSPADM

## 2022-09-09 RX ORDER — SODIUM CHLORIDE 0.9 % (FLUSH) 0.9 %
10 SYRINGE (ML) INJECTION EVERY 12 HOURS PRN
Status: DISCONTINUED | OUTPATIENT
Start: 2022-09-09 | End: 2022-09-11 | Stop reason: HOSPADM

## 2022-09-09 RX ORDER — GABAPENTIN 600 MG/1
600 TABLET ORAL 3 TIMES DAILY
Status: CANCELLED | OUTPATIENT
Start: 2022-09-09

## 2022-09-09 RX ORDER — HEPARIN SODIUM 5000 [USP'U]/ML
5000 INJECTION, SOLUTION INTRAVENOUS; SUBCUTANEOUS EVERY 8 HOURS
Status: DISCONTINUED | OUTPATIENT
Start: 2022-09-09 | End: 2022-09-11 | Stop reason: HOSPADM

## 2022-09-09 RX ORDER — FAMOTIDINE 20 MG/1
20 TABLET, FILM COATED ORAL 2 TIMES DAILY
Status: CANCELLED | OUTPATIENT
Start: 2022-09-09

## 2022-09-09 RX ORDER — ONDANSETRON 2 MG/ML
4 INJECTION INTRAMUSCULAR; INTRAVENOUS EVERY 6 HOURS PRN
Status: DISCONTINUED | OUTPATIENT
Start: 2022-09-09 | End: 2022-09-11 | Stop reason: HOSPADM

## 2022-09-09 RX ORDER — TRIAMCINOLONE ACETONIDE 1 MG/G
CREAM TOPICAL 2 TIMES DAILY PRN
COMMUNITY
Start: 2022-09-07 | End: 2022-09-16

## 2022-09-09 RX ORDER — ISOSORBIDE MONONITRATE 30 MG/1
60 TABLET, EXTENDED RELEASE ORAL DAILY
Status: DISCONTINUED | OUTPATIENT
Start: 2022-09-10 | End: 2022-09-11 | Stop reason: HOSPADM

## 2022-09-09 RX ORDER — FERROUS SULFATE TAB 325 MG (65 MG ELEMENTAL FE) 325 (65 FE) MG
325 TAB ORAL DAILY
COMMUNITY
Start: 2022-08-29

## 2022-09-09 RX ORDER — CEFEPIME HYDROCHLORIDE 1 G/50ML
1 INJECTION, SOLUTION INTRAVENOUS
Status: CANCELLED | OUTPATIENT
Start: 2022-09-09

## 2022-09-09 RX ORDER — IRBESARTAN 300 MG/1
300 TABLET ORAL NIGHTLY
COMMUNITY
End: 2023-05-29 | Stop reason: SDUPTHER

## 2022-09-09 RX ORDER — ONDANSETRON 2 MG/ML
4 INJECTION INTRAMUSCULAR; INTRAVENOUS
Status: COMPLETED | OUTPATIENT
Start: 2022-09-09 | End: 2022-09-09

## 2022-09-09 RX ORDER — ASPIRIN 81 MG/1
81 TABLET ORAL DAILY
COMMUNITY

## 2022-09-09 RX ORDER — GABAPENTIN 300 MG/1
600 CAPSULE ORAL 3 TIMES DAILY
Status: DISCONTINUED | OUTPATIENT
Start: 2022-09-09 | End: 2022-09-11 | Stop reason: HOSPADM

## 2022-09-09 RX ORDER — OXYCODONE HYDROCHLORIDE 15 MG/1
15 TABLET ORAL 4 TIMES DAILY PRN
Status: ON HOLD | COMMUNITY
Start: 2022-09-08 | End: 2022-09-11 | Stop reason: HOSPADM

## 2022-09-09 RX ORDER — LIDOCAINE HYDROCHLORIDE 10 MG/ML
5 INJECTION, SOLUTION EPIDURAL; INFILTRATION; INTRACAUDAL; PERINEURAL
Status: COMPLETED | OUTPATIENT
Start: 2022-09-09 | End: 2022-09-09

## 2022-09-09 RX ORDER — FUROSEMIDE 40 MG/1
40 TABLET ORAL 2 TIMES DAILY PRN
Status: CANCELLED | OUTPATIENT
Start: 2022-09-09

## 2022-09-09 RX ORDER — OXYCODONE HYDROCHLORIDE 5 MG/1
15 TABLET ORAL 4 TIMES DAILY PRN
Status: DISCONTINUED | OUTPATIENT
Start: 2022-09-09 | End: 2022-09-11 | Stop reason: HOSPADM

## 2022-09-09 RX ORDER — ACETAMINOPHEN 325 MG/1
650 TABLET ORAL EVERY 6 HOURS PRN
Status: DISCONTINUED | OUTPATIENT
Start: 2022-09-09 | End: 2022-09-11 | Stop reason: HOSPADM

## 2022-09-09 RX ORDER — NALOXONE HCL 0.4 MG/ML
0.02 VIAL (ML) INJECTION
Status: DISCONTINUED | OUTPATIENT
Start: 2022-09-09 | End: 2022-09-11 | Stop reason: HOSPADM

## 2022-09-09 RX ORDER — FUROSEMIDE 40 MG/1
40 TABLET ORAL 2 TIMES DAILY PRN
Status: DISCONTINUED | OUTPATIENT
Start: 2022-09-09 | End: 2022-09-11 | Stop reason: HOSPADM

## 2022-09-09 RX ORDER — ACETAMINOPHEN 500 MG
1000 TABLET ORAL
Status: COMPLETED | OUTPATIENT
Start: 2022-09-09 | End: 2022-09-09

## 2022-09-09 RX ORDER — FAMOTIDINE 20 MG/1
20 TABLET, FILM COATED ORAL 2 TIMES DAILY
Status: DISCONTINUED | OUTPATIENT
Start: 2022-09-09 | End: 2022-09-09

## 2022-09-09 RX ORDER — FAMOTIDINE 20 MG/1
20 TABLET, FILM COATED ORAL 2 TIMES DAILY PRN
COMMUNITY
End: 2023-06-22

## 2022-09-09 RX ORDER — FAMOTIDINE 20 MG/1
20 TABLET, FILM COATED ORAL DAILY
Status: DISCONTINUED | OUTPATIENT
Start: 2022-09-10 | End: 2022-09-11 | Stop reason: HOSPADM

## 2022-09-09 RX ORDER — LOSARTAN POTASSIUM 50 MG/1
100 TABLET ORAL DAILY
Status: DISCONTINUED | OUTPATIENT
Start: 2022-09-10 | End: 2022-09-11 | Stop reason: HOSPADM

## 2022-09-09 RX ORDER — DULOXETIN HYDROCHLORIDE 30 MG/1
60 CAPSULE, DELAYED RELEASE ORAL DAILY
Status: CANCELLED | OUTPATIENT
Start: 2022-09-10

## 2022-09-09 RX ORDER — ASPIRIN 81 MG/1
81 TABLET ORAL 2 TIMES DAILY
Status: CANCELLED | OUTPATIENT
Start: 2022-09-09

## 2022-09-09 RX ORDER — DULOXETIN HYDROCHLORIDE 30 MG/1
60 CAPSULE, DELAYED RELEASE ORAL DAILY
Status: DISCONTINUED | OUTPATIENT
Start: 2022-09-10 | End: 2022-09-11 | Stop reason: HOSPADM

## 2022-09-09 RX ORDER — ISOSORBIDE MONONITRATE 30 MG/1
60 TABLET, EXTENDED RELEASE ORAL DAILY
Status: CANCELLED | OUTPATIENT
Start: 2022-09-10

## 2022-09-09 RX ORDER — CEFEPIME HYDROCHLORIDE 1 G/50ML
2 INJECTION, SOLUTION INTRAVENOUS
Status: COMPLETED | OUTPATIENT
Start: 2022-09-09 | End: 2022-09-09

## 2022-09-09 RX ORDER — HYDROXYZINE HYDROCHLORIDE 25 MG/1
25 TABLET, FILM COATED ORAL EVERY 6 HOURS PRN
Status: DISCONTINUED | OUTPATIENT
Start: 2022-09-09 | End: 2022-09-11 | Stop reason: HOSPADM

## 2022-09-09 RX ADMIN — ACETAMINOPHEN 1000 MG: 500 TABLET ORAL at 12:09

## 2022-09-09 RX ADMIN — LIDOCAINE HYDROCHLORIDE 50 MG: 10 INJECTION, SOLUTION EPIDURAL; INFILTRATION; INTRACAUDAL at 09:09

## 2022-09-09 RX ADMIN — SODIUM CHLORIDE, SODIUM LACTATE, POTASSIUM CHLORIDE, AND CALCIUM CHLORIDE 2301 ML: .6; .31; .03; .02 INJECTION, SOLUTION INTRAVENOUS at 12:09

## 2022-09-09 RX ADMIN — HEPARIN SODIUM 5000 UNITS: 5000 INJECTION INTRAVENOUS; SUBCUTANEOUS at 10:09

## 2022-09-09 RX ADMIN — CEFEPIME 2 G: 2 INJECTION, POWDER, FOR SOLUTION INTRAVENOUS at 02:09

## 2022-09-09 RX ADMIN — ONDANSETRON 4 MG: 2 INJECTION INTRAMUSCULAR; INTRAVENOUS at 12:09

## 2022-09-09 RX ADMIN — ACETAMINOPHEN 650 MG: 325 TABLET, FILM COATED ORAL at 10:09

## 2022-09-09 RX ADMIN — VANCOMYCIN HYDROCHLORIDE 2000 MG: 500 INJECTION, POWDER, LYOPHILIZED, FOR SOLUTION INTRAVENOUS at 04:09

## 2022-09-09 NOTE — ED NOTES
Lost IV access. Upon starting Iv antibiotics. Charge nurse notified after 2 more attempts for IV access were made by myself.

## 2022-09-09 NOTE — HPI
Romymckenzie Enedelia García is a 77 7/0 F with PMH of CKD, CAD, epilepsy, hyperlipidemia, hypertension, iron deficiency anemia, persistent non healing wound - s/p left knee total arthroplasty, , present with 1 weeks history of fatigue and fever. There is associated confusions and chills in the past few days. She denies cough, chest pain, SOB, dysuria, and frequency. Patient reported wound drainage since after surgery. Patient was seen at the clinic today   In the ED lactic acid 3.6, , wbc 16.53, H/H 9.5/31.1

## 2022-09-09 NOTE — PROGRESS NOTES
"                     [facility]    Subjective:       Patient ID: Enedelia García is a 77 y.o. female.    Chief Complaint: Non-healing Wound Follow Up (Left knee)    Patient's left knee incision red and warm. B/P elevated. Patient c/o of feeling "cold" and "not right x 3 days. Afebrile at this time. Culture done. Mupirocin and bordered foam applied. Rx for Zyvox sent to pharmacy. Will f/u with patient in clinic 9/12/22.  Review of Systems   All other systems reviewed and are negative.      Objective:      Physical Exam       Incision/Site 09/09/22 1100 Left Knee (Active)   09/09/22 1100   Present Prior to Hospital Arrival?: Yes   Side: Left   Location: Knee   Orientation:    Incision Type:    Closure Method:    Additional Comments:    Removal Indication and Assessment:    Wound Outcome:    Removal Indications:    Wound Image   09/09/22 1300   Dressing Appearance Moist drainage 09/09/22 1300   Drainage Amount Small 09/09/22 1300   Drainage Characteristics/Odor Serosanguineous 09/09/22 1300   Appearance Red;Moist 09/09/22 1300   Red (%), Wound Tissue Color 100 % 09/09/22 1300   Periwound Area Redness;Warm;Swelling 09/09/22 1300   Wound Edges Dehisced 09/09/22 1300   Wound Length (cm) 1 cm 09/09/22 1300   Wound Width (cm) 0.5 cm 09/09/22 1300   Wound Depth (cm) 1.5 cm 09/09/22 1300   Wound Volume (cm^3) 0.75 cm^3 09/09/22 1300   Wound Surface Area (cm^2) 0.5 cm^2 09/09/22 1300   Care Cleansed with:;Sterile normal saline 09/09/22 1300   Dressing Applied;Island/border;Other (comment) 09/09/22 1300   Periwound Care Skin barrier film applied 09/09/22 1300   Dressing Change Due 09/10/22 09/09/22 1300         Assessment:         ICD-10-CM ICD-9-CM   1. Alteration in skin integrity related to surgical incision  R23.9 782.9   2. Shaking chills  R68.83 780.64   3. Status post revision of total replacement of left knee  Z96.652 V43.65   4. Foreign body of left knee with infection  S80.252A 916.7 "    L08.9    5. Primary osteoarthritis of both first carpometacarpal joints  M18.0 715.14         Plan:   Tissue pathology and/or culture taken:  [x] Yes [] No   Sharp debridement performed:   [] Yes [x] No   Labs ordered this visit:   [] Yes [x] No   Imaging ordered this visit:   [] Yes [x] No           Orders Placed This Encounter   Procedures    Aerobic culture          Anaerobic culture          Change dressing     Left knee surgical wound   Cleanse wound with: Normal saline   Lidocaine: prn   Silver nitrate: prn   Primary dressing: Mupirocin   Secondary dressing: 3 x 3 bordered foam or bandaid, Tubigrip F BLE   Elevate as much as possible   Frequency: Daily per patient   Follow-up: Dr. Farias 9/12/22    Other: Culture 9/9/22. Rx for Zyvox 9/9/22. Transferred to ER 9/9/22.  Right LE wound is resolved        Follow up in about 3 days (around 9/12/2022), or Transferred to ER.

## 2022-09-09 NOTE — SUBJECTIVE & OBJECTIVE
Past Medical History:   Diagnosis Date    CKD (chronic kidney disease) stage 4, GFR 15-29 ml/min     Coronary artery disease     Edema     Epilepsy     Hematuria, unspecified     Hematuria, unspecified     Hyperlipidemia     Hypertension     Iron deficiency anemia     Normocytic anemia        Past Surgical History:   Procedure Laterality Date    APPENDECTOMY      BACK SURGERY      CORONARY STENT PLACEMENT      HYSTERECTOMY      REVISION OF KNEE ARTHROPLASTY Left 7/18/2022    Procedure: REVISION, ARTHROPLASTY, KNEE;  Surgeon: Stan Catalan MD;  Location: [facility];  Service: Orthopedics;  Laterality: Left;    TONSILLECTOMY         Review of patient's allergies indicates:   Allergen Reactions    Iodinated contrast media Anaphylaxis and Other (See Comments)    Phenergan [promethazine]      Vomiting       No current facility-administered medications on file prior to encounter.     Current Outpatient Medications on File Prior to Encounter   Medication Sig    acetaminophen (TYLENOL) 500 MG tablet Take 500 mg by mouth every 6 (six) hours as needed for Pain.    aspirin (ECOTRIN) 81 MG EC tablet Take 81 mg by mouth 2 (two) times daily.    butalbital-acetaminophen-caff -40 mg Cap Take 1 capsule by mouth as needed (migraine). Take once to twice daily    calcium carbonate/vitamin D3 (VITAMIN D-3 ORAL) Take 1 tablet by mouth once daily.    co-enzyme Q-10 30 mg capsule Take 30 mg by mouth once daily.    DULoxetine (CYMBALTA) 60 MG capsule Take 1 capsule (60 mg total) by mouth once daily.    famotidine (PEPCID) 20 MG tablet Take 20 mg by mouth 2 (two) times daily as needed for Heartburn.    furosemide (LASIX) 40 MG tablet Take 1 tablet (40 mg total) by mouth 2 (two) times daily as needed (swelling).    gabapentin (NEURONTIN) 600 MG tablet Take 600 mg by mouth 3 (three) times daily.    hydrOXYzine HCL (ATARAX) 25 MG tablet Take 1 tablet (25 mg total) by mouth every 6 (six) hours as needed for Itching.    irbesartan (AVAPRO) 300  MG tablet Take 300 mg by mouth every evening.    isosorbide mononitrate (IMDUR) 60 MG 24 hr tablet Take 1 tablet (60 mg total) by mouth once daily.    metoprolol succinate (TOPROL-XL) 25 MG 24 hr tablet Take 1 tablet (25 mg total) by mouth once daily.    multivit-min-iron-FA-lutein (CENTRUM SILVER WOMEN) 8 mg iron-400 mcg-300 mcg Tab Take 1 tablet by mouth once daily.    mupirocin (BACTROBAN) 2 % ointment Apply topically once daily. apply to affected area    nitroGLYCERIN (NITROSTAT) 0.3 MG SL tablet PLACE ONE TABLET UNDER THE TONGUE EVERY 5 MINUTES FOR UP TO 3 doses IF NEEDED FOR CHEST PAIN. call 911 IF PAIN persists (Patient taking differently: 0.3 mg every 5 (five) minutes as needed for Chest pain. call 911 IF PAIN persists)    omeprazole (PRILOSEC) 20 MG capsule Take 20 mg by mouth once daily.    oxyCODONE (ROXICODONE) 15 MG Tab Take 15 mg by mouth 4 (four) times daily as needed for Pain.    rosuvastatin (CRESTOR) 40 MG Tab Take 1 tablet (40 mg total) by mouth once daily.    triamcinolone acetonide 0.1% (KENALOG) 0.1 % cream Apply topically 2 (two) times daily as needed.    vitamin C-biotin (HAIR-SKIN-NAILS, VIT C-BIOTIN,) 50 mg -1,250 mcg Chew Take 1 tablet by mouth Daily.    FEROSUL 325 mg (65 mg iron) Tab tablet Take 325 mg by mouth 2 (two) times daily.    irbesartan (AVAPRO) 150 MG tablet Take 1 tablet (150 mg total) by mouth every evening. (Patient not taking: Reported on 9/9/2022)    linezolid (ZYVOX) 600 mg Tab Take 1 tablet (600 mg total) by mouth every 12 (twelve) hours.    oxyCODONE (ROXICODONE) 10 mg Tab immediate release tablet Take 10 mg by mouth 3 (three) times daily.    [DISCONTINUED] aspirin (ECOTRIN) 81 MG EC tablet Take 1 tablet (81 mg total) by mouth 2 (two) times a day.    [DISCONTINUED] famotidine (PEPCID) 20 MG tablet Take 1 tablet (20 mg total) by mouth 2 (two) times daily as needed for Heartburn.    [DISCONTINUED] ferrous sulfate 325 (65 FE) MG EC tablet Take 1 tab po daily on an empty  stomach 30 min prior to breakfast daily. It may cause some hard and dark stools, so increase your water and fiber (fruits and vegetables) intake to help with any constipation. (Patient taking differently: 325 mg 2 (two) times daily. It may cause some hard and dark stools, so increase your water and fiber (fruits and vegetables) intake to help with any constipation.)     Family History       Problem Relation (Age of Onset)    Heart disease Mother, Father          Tobacco Use    Smoking status: Never    Smokeless tobacco: Never   Substance and Sexual Activity    Alcohol use: No    Drug use: No    Sexual activity: Not Currently     Review of Systems   Constitutional:  Positive for activity change, chills, fatigue and fever.   HENT:  Negative for congestion.    Eyes:  Negative for photophobia and visual disturbance.   Respiratory:  Negative for shortness of breath and wheezing.    Cardiovascular:  Negative for chest pain.   Gastrointestinal:  Negative for abdominal distention and abdominal pain.   Endocrine: Negative for polyphagia and polyuria.   Genitourinary:  Negative for dysuria and urgency.   Musculoskeletal:  Positive for arthralgias and joint swelling. Negative for back pain.   Skin:  Positive for color change and wound.   Neurological:  Negative for numbness and headaches.   Psychiatric/Behavioral:  Positive for confusion. Negative for agitation.    Objective:     Vital Signs (Most Recent):  Temp: 100.3 °F (37.9 °C) (09/09/22 1323)  Pulse: 76 (09/09/22 1357)  Resp: (!) 24 (09/09/22 1357)  BP: (!) 173/72 (09/09/22 1316)  SpO2: 99 % (09/09/22 1357)   Vital Signs (24h Range):  Temp:  [98.2 °F (36.8 °C)-101.1 °F (38.4 °C)] 100.3 °F (37.9 °C)  Pulse:  [72-84] 76  Resp:  [19-24] 24  SpO2:  [89 %-99 %] 99 %  BP: (133-204)/(72-91) 173/72     Weight: 76.7 kg (169 lb)  Body mass index is 29.01 kg/m².    Physical Exam  HENT:      Head: Normocephalic and atraumatic.      Nose: Nose normal. No congestion or rhinorrhea.    Eyes:      General:         Right eye: No discharge.   Cardiovascular:      Rate and Rhythm: Normal rate.      Pulses: Normal pulses.   Pulmonary:      Effort: Pulmonary effort is normal.      Breath sounds: No wheezing or rales.   Abdominal:      General: Bowel sounds are normal. There is no distension.      Palpations: There is no mass.   Musculoskeletal:         General: Normal range of motion.      Cervical back: Normal range of motion. No rigidity.      Right lower leg: No edema.      Left lower leg: No edema.      Comments: Left knee with erythema, swollen with a healing vertical scar with drainage just below the knee cap.   Good ROM.    Skin:     Coloration: Skin is not jaundiced or pale.      Findings: Erythema present.   Neurological:      General: No focal deficit present.      Mental Status: She is alert.   Psychiatric:         Mood and Affect: Mood normal.         Behavior: Behavior normal.           Significant Labs: A1C:   Recent Labs   Lab 06/29/22  1253 08/12/22  1026   HGBA1C 6.4* 5.3     ABGs: No results for input(s): PH, PCO2, HCO3, POCSATURATED, BE, TOTALHB, COHB, METHB, O2HB, POCFIO2, PO2 in the last 48 hours.  Blood Culture: No results for input(s): LABBLOO in the last 48 hours.  CBC:   Recent Labs   Lab 09/09/22  1233   WBC 16.53*   HGB 9.5*   HCT 31.1*        CMP:   Recent Labs   Lab 09/09/22  1323 09/09/22  1346     139 139   K 4.0  4.0 4.6     100 101   CO2 31*  31* 30*     108 100   BUN 13  13 13   CREATININE 1.1  1.1 1.1   CALCIUM 9.6  9.6 9.7   PROT 7.0  7.0 6.9   ALBUMIN 3.3*  3.3* 3.2*   BILITOT 0.3  0.3 0.3   ALKPHOS 97  97 95   AST 21  21 20   ALT 13  13 13   ANIONGAP 8  8 8     Lactic Acid:   Recent Labs   Lab 09/09/22  1233   LACTATE 3.6*     Lipase: No results for input(s): LIPASE in the last 48 hours.  Lipid Panel: No results for input(s): CHOL, HDL, LDLCALC, TRIG, CHOLHDL in the last 48 hours.  Magnesium: No results for input(s): MG in  the last 48 hours.  Troponin:   Recent Labs   Lab 09/09/22  1233   TROPONINI 0.009     TSH:   Recent Labs   Lab 04/14/22  0240   TSH 1.991     Urine Culture: No results for input(s): LABURIN in the last 48 hours.  Urine Studies:   Recent Labs   Lab 09/09/22  1317   COLORU Colorless*   APPEARANCEUA Clear   PHUR 5.0   SPECGRAV 1.010   PROTEINUA Negative   GLUCUA Negative   KETONESU Negative   BILIRUBINUA Negative   OCCULTUA Negative   NITRITE Negative   UROBILINOGEN Negative   LEUKOCYTESUR Negative       Significant Imaging: I have reviewed all pertinent imaging results/findings within the past 24 hours.

## 2022-09-09 NOTE — ED PROVIDER NOTES
Encounter Date: 9/9/2022       History     Chief Complaint   Patient presents with    Wound Infection     Pt was sent to ed from wound care clinic for infection in left knee,wound clinic staff states pt became confused and having chills, pt then wheeled to ED      The patient is a 77-year-old with the below past medical history was sent to the ED from the wound care clinic after developing confusion and chills.  Her  reports that she has felt unwell for the past week with fever and fatigue.  She has a persistent wound to the anterior surface of the left knee since undergoing left knee total arthroplasty on 7/18 which is why she was being seen in Wound Care Clinic today. She is not having increased pain from the wound but has noticed drainage from the lower part of the wound after a scab dislodged while showering a few days ago. She denies dysuria and frequency. She denies cough and shortness of breath.    The history is provided by the patient and the spouse. The history is limited by the condition of the patient. No  was used.   Review of patient's allergies indicates:   Allergen Reactions    Iodinated contrast media Anaphylaxis and Other (See Comments)    Phenergan [promethazine]      Vomiting     Past Medical History:   Diagnosis Date    CKD (chronic kidney disease) stage 4, GFR 15-29 ml/min     Coronary artery disease     Edema     Epilepsy     Hematuria, unspecified     Hematuria, unspecified     Hyperlipidemia     Hypertension     Iron deficiency anemia     Normocytic anemia      Past Surgical History:   Procedure Laterality Date    APPENDECTOMY      BACK SURGERY      CORONARY STENT PLACEMENT      HYSTERECTOMY      REVISION OF KNEE ARTHROPLASTY Left 7/18/2022    Procedure: REVISION, ARTHROPLASTY, KNEE;  Surgeon: Stan Catalan MD;  Location: [facility] OR;  Service: Orthopedics;  Laterality: Left;    TONSILLECTOMY       Family History   Problem Relation Age of Onset    Heart disease Mother      Heart disease Father      Social History     Tobacco Use    Smoking status: Never    Smokeless tobacco: Never   Substance Use Topics    Alcohol use: No    Drug use: No     Review of Systems   Constitutional:  Positive for fatigue and fever.   HENT:  Negative for congestion, rhinorrhea and sore throat.    Eyes:  Negative for visual disturbance.   Respiratory:  Negative for cough and shortness of breath.    Cardiovascular:  Negative for chest pain.   Gastrointestinal:  Negative for abdominal pain, diarrhea, nausea and vomiting.   Endocrine: Negative for polydipsia and polyuria.   Genitourinary:  Negative for dysuria.   Musculoskeletal:  Negative for arthralgias, myalgias, neck pain and neck stiffness.   Skin:  Positive for wound.   Neurological:  Negative for dizziness and headaches.   Psychiatric/Behavioral:  Positive for confusion.      Physical Exam     Initial Vitals [09/09/22 1156]   BP Pulse Resp Temp SpO2   138/77 80 (!) 24 (!) 101.1 °F (38.4 °C) (!) 94 %      MAP       --         Physical Exam    Nursing note and vitals reviewed.  Constitutional: She is not diaphoretic. No distress.   HENT:   Head: Normocephalic and atraumatic.   Eyes: Conjunctivae are normal. No scleral icterus.   Cardiovascular:  Normal rate, regular rhythm and normal heart sounds.           No murmur heard.  Pulmonary/Chest: No stridor. No respiratory distress. She has no wheezes. She has no rhonchi. She has no rales.   Abdominal: Abdomen is soft. She exhibits no distension. There is no abdominal tenderness.   Musculoskeletal:      Cervical back: No rigidity.      Comments: Left knee:  Vertical wound extending from the distal thigh has to the proximal lower leg. Small opening at lower wound with serous drainage. Skin surrounding wound is mildly erythematous. Full passive ROM. Mild discomfort with ranging.     Neurological: She is alert and oriented to person, place, and time. GCS score is 15. GCS eye subscore is 4. GCS verbal subscore is  5. GCS motor subscore is 6.   Skin: Skin is warm and dry. No pallor.         ED Course   Procedures  Labs Reviewed   CBC W/ AUTO DIFFERENTIAL - Abnormal; Notable for the following components:       Result Value    WBC 16.53 (*)     RBC 3.43 (*)     Hemoglobin 9.5 (*)     Hematocrit 31.1 (*)     MCHC 30.5 (*)     RDW 14.8 (*)     Gran # (ANC) 9.2 (*)     Immature Grans (Abs) 0.05 (*)     Mono # 1.8 (*)     Eos # 0.9 (*)     All other components within normal limits   LACTIC ACID, PLASMA - Abnormal; Notable for the following components:    Lactate (Lactic Acid) 3.6 (*)     All other components within normal limits    Narrative:      LA  critical result(s) called and verbal readback obtained from   BASIM WALLACE RN. by [facility] 09/09/2022 13:15   URINALYSIS, REFLEX TO URINE CULTURE - Abnormal; Notable for the following components:    Color, UA Colorless (*)     All other components within normal limits    Narrative:     Specimen Source->Urine   COMPREHENSIVE METABOLIC PANEL - Abnormal; Notable for the following components:    CO2 31 (*)     Albumin 3.3 (*)     eGFR 52 (*)     All other components within normal limits   COMPREHENSIVE METABOLIC PANEL - Abnormal; Notable for the following components:    CO2 31 (*)     Albumin 3.3 (*)     eGFR 52 (*)     All other components within normal limits   COMPREHENSIVE METABOLIC PANEL - Abnormal; Notable for the following components:    CO2 30 (*)     Albumin 3.2 (*)     eGFR 52 (*)     All other components within normal limits   B-TYPE NATRIURETIC PEPTIDE - Abnormal; Notable for the following components:     (*)     All other components within normal limits   TROPONIN I   C-REACTIVE PROTEIN   LACTIC ACID, PLASMA   SARS-COV-2 RDRP GENE    Narrative:     This test utilizes isothermal nucleic acid amplification   technology to detect the SARS-CoV-2 RdRp nucleic acid segment.   The analytical sensitivity (limit of detection) is 125 genome   equivalents/mL.   A POSITIVE result  implies infection with the SARS-CoV-2 virus;   the patient is presumed to be contagious.     A NEGATIVE result means that SARS-CoV-2 nucleic acids are not   present above the limit of detection. A NEGATIVE result should be   treated as presumptive. It does not rule out the possibility of   COVID-19 and should not be the sole basis for treatment decisions.   If COVID-19 is strongly suspected based on clinical and exposure   history, re-testing using an alternate molecular assay should be   considered.   This test is only for use under the Food and Drug   Administration s Emergency Use Authorization (EUA).   Commercial kits are provided by [email or web address].   Performance characteristics of the EUA have been independently   verified by [facility] Department of   Pathology and Laboratory Medicine.   _________________________________________________________________   The authorized Fact Sheet for Healthcare Providers and the authorized Fact   Sheet for Patients of the ID NOW COVID-19 are available on the FDA   website:     https://www.fda.gov/media/765998/download  https://www.fda.gov/media/575118/download             EKG Readings: (Independently Interpreted)   09/09/2022 1221  Normal sinus rhythm. Ventricular rate 81 bpm.  Normal axis.  Normal QRS and QT intervals.  No ST segment elevation or depression.  Normal T-wave morphology. Overall impression:  Normal EKG.       Imaging Results              X-Ray Knee 1 or 2 View Left (Final result)  Result time 09/10/22 10:36:48      Final result by Jamin Toure MD (09/10/22 10:36:48)                   Impression:      Left knee prosthesis.  No evidence for hardware failure.    Mild soft tissue swelling.      Electronically signed by: Jamin Toure MD  Date:    09/10/2022  Time:    10:36               Narrative:    EXAMINATION:  XR KNEE 1 OR 2 VIEW LEFT    CLINICAL HISTORY:  ap and lateral knee;  Presence of left artificial knee joint    TECHNIQUE:  AP and  lateral radiographs of the left knee were obtained.    COMPARISON:  07/30/2022.    FINDINGS:  The patient is s/p left knee arthroplasty.  The prosthesis appears in satisfactory position.  There is no plain film evidence for hardware failure.  There is no evidence for acute fracture.  No bone destruction identified to strongly suggest osteomyelitis.  There appears to be mild soft tissue swelling.                                       X-Ray Chest AP Portable (Final result)  Result time 09/09/22 13:21:07      Final result by Garrett Hernandez MD (09/09/22 13:21:07)                   Impression:      Overall findings may reflect sequela of worsening pulmonary edema or nonspecific interstitial type pneumonia.  No large consolidation.      Electronically signed by: Garrett Hernandez MD  Date:    09/09/2022  Time:    13:21               Narrative:    EXAMINATION:  XR CHEST AP PORTABLE    CLINICAL HISTORY:  Hypoxia;    TECHNIQUE:  Single frontal view of the chest was performed.    COMPARISON:  Chest radiograph 07/30/2022    FINDINGS:  Monitoring leads overlie the chest.  Patient is rotated.  Left lung apex is obscured by overlying chin soft tissue and osseous structures and also neck jewelry.    Cardiomediastinal silhouette is midline and stable noting calcification and slight tortuosity of the aorta.  Heart does not appear enlarged.  Hilar contours are within normal limits.  There is slight prominence of the central pulmonary vasculature with bilateral mild diffuse nonspecific interstitial coarsening slightly increased from prior.  Scattered areas of platelike scarring versus atelectasis.  The lungs are otherwise well expanded without large consolidation, pleural effusion or pneumothorax definitively seen.    No acute osseous process seen.  PA and lateral views can be obtained.                                       Medications   lactated ringers bolus 2,301 mL (0 mLs Intravenous Stopped 9/9/22 1310)   acetaminophen tablet 1,000  "mg (1,000 mg Oral Given 9/9/22 1234)   ondansetron injection 4 mg (4 mg Intravenous Given 9/9/22 1234)   vancomycin (VANCOCIN) 2,000 mg in dextrose 5 % 500 mL IVPB (0 mg Intravenous Stopped 9/9/22 2041)   cefepime in dextrose 5 % IVPB 2 g (0 g Intravenous Stopped 9/9/22 1610)   LIDOcaine (PF) 10 mg/ml (1%) injection 50 mg (50 mg Infiltration Given by Other 9/9/22 2108)     Medical Decision Making:   Clinical Tests:   Sepsis Perfusion Assessment: "I attest a sepsis perfusion exam was performed within 6 hours of sepsis, severe sepsis, or septic shock presentation, following fluid resuscitation."  ED Management:  Presentation and workup concerning for sepsis. Aggressive IV fluids were not given because blood pressure remained elevated, heart rate remained normal, and she has congestive heart failure.          Attending Attestation:         Attending Critical Care:   Critical Care Times:   Direct Patient Care (initial evaluation, reassessments, and time considering the case)................................................................20 minutes.   Additional History from reviewing old medical records or taking additional history from the family, EMS, PCP, etc.......................3 minutes.   Ordering, Reviewing, and Interpreting Diagnostic Studies...............................................................................................................3 minutes.   Documentation..................................................................................................................................................................................7 minutes.   Consultation with other Physicians. .................................................................................................................................................3 minutes.   ==============================================================  Total Critical Care Time - exclusive of procedural time: 36 " minutes.  ==============================================================  Critical care was necessary to treat or prevent imminent or life-threatening deterioration of the following conditions: sepsis.   Critical care was time spent personally by me on the following activities: obtaining history from patient or relative, examination of patient, review of old charts, ordering lab, x-rays, and/or EKG, development of treatment plan with patient or relative, ordering and performing treatments and interventions, evaluation of patient's response to treatment, discussion with consultants, interpretation of cardiac measurements and re-evaluation of patient's conition.   Critical Care Condition: potentially life-threatening   Critical Care Comments:   This was an emergent evaluation.  The patient required close monitoring due to concern for deterioration of condition.  Presentation was extremely suspicious for sepsis.  Suspicion was furthered by her elevated initial serum lactate level.  Source of infection was unclear.  There was mild erythema overlying her left knee but she and her  reported that this was chronic and unchanged.  Furthermore, she denied acute pain associated with this joint.  Possible interstitial pneumonia or pulmonary edema on chest x-ray. She did not appear to be in respiratory distress. White count was elevated.  UA results were not consistent with UTI.  IV vancomycin and cefepime were administered.  The patient was admitted to [facility] for further evaluation and management.         ED Course as of 09/27/22 0240   Fri Sep 09, 2022   1555 Consulted [facility]. Called and discussed with Dr. Bray. He agrees to admit. [LP]      ED Course User Index  [LP] Shan Caputo III, MD             Clinical Impression:   Final diagnoses:  [R09.02] Hypoxia  [A41.9] Sepsis, due to unspecified organism, unspecified whether acute organ dysfunction present (Primary)        ED Disposition Condition     Admit                 Shan Caputo III, MD  09/27/22 2429

## 2022-09-09 NOTE — ASSESSMENT & PLAN NOTE
Infected left knee  Wound culture taken in the clinic  Blood culture  Lactic acid 3.6  Wbc 16.63  Cefepime  Vanc  Consult Orthopedic

## 2022-09-09 NOTE — Clinical Note
Diagnosis: Sepsis, due to unspecified organism, unspecified whether acute organ dysfunction present [[id number]]   Future Attending Provider: FRANCHESCA MARES [[id number]]   Admitting Provider:: FRANCHESCA MARES [[id number]]   Special Needs:: No Special Needs [1]

## 2022-09-09 NOTE — H&P
[facility] Emergency [facility] Medicine  History & Physical    Patient Name: Enedelia García  MRN: [id number]  Patient Class: Emergency  Admission Date: 9/9/2022  Attending Physician: Vilma Abad MD  Primary Care Provider: Marichuy Hummel MD         Patient information was obtained from patient and ER records.     Subjective:     Principal Problem:Sepsis    Chief Complaint:   Chief Complaint   Patient presents with    Wound Infection     Pt was sent to ed from wound care clinic for infection in left knee,wound clinic staff states pt became confused and having chills, pt then wheeled to ED         HPI: Enedelia Booker is a 77 7/0 F with PMH of CKD, CAD, epilepsy, hyperlipidemia, hypertension, iron deficiency anemia, persistent non healing wound - s/p left knee total arthroplasty, , present with 1 weeks history of fatigue and fever. There is associated confusions and chills in the past few days. She denies cough, chest pain, SOB, dysuria, and frequency. Patient reported wound drainage since after surgery. Patient was seen at the clinic today   In the ED lactic acid 3.6, , wbc 16.53, H/H 9.5/31.1      Past Medical History:   Diagnosis Date    CKD (chronic kidney disease) stage 4, GFR 15-29 ml/min     Coronary artery disease     Edema     Epilepsy     Hematuria, unspecified     Hematuria, unspecified     Hyperlipidemia     Hypertension     Iron deficiency anemia     Normocytic anemia        Past Surgical History:   Procedure Laterality Date    APPENDECTOMY      BACK SURGERY      CORONARY STENT PLACEMENT      HYSTERECTOMY      REVISION OF KNEE ARTHROPLASTY Left 7/18/2022    Procedure: REVISION, ARTHROPLASTY, KNEE;  Surgeon: Stan Catalan MD;  Location: [facility] OR;  Service: Orthopedics;  Laterality: Left;    TONSILLECTOMY         Review of patient's allergies indicates:   Allergen Reactions    Iodinated contrast media Anaphylaxis and Other (See Comments)    Phenergan  [promethazine]      Vomiting       No current facility-administered medications on file prior to encounter.     Current Outpatient Medications on File Prior to Encounter   Medication Sig    acetaminophen (TYLENOL) 500 MG tablet Take 500 mg by mouth every 6 (six) hours as needed for Pain.    aspirin (ECOTRIN) 81 MG EC tablet Take 81 mg by mouth 2 (two) times daily.    butalbital-acetaminophen-caff -40 mg Cap Take 1 capsule by mouth as needed (migraine). Take once to twice daily    calcium carbonate/vitamin D3 (VITAMIN D-3 ORAL) Take 1 tablet by mouth once daily.    co-enzyme Q-10 30 mg capsule Take 30 mg by mouth once daily.    DULoxetine (CYMBALTA) 60 MG capsule Take 1 capsule (60 mg total) by mouth once daily.    famotidine (PEPCID) 20 MG tablet Take 20 mg by mouth 2 (two) times daily as needed for Heartburn.    furosemide (LASIX) 40 MG tablet Take 1 tablet (40 mg total) by mouth 2 (two) times daily as needed (swelling).    gabapentin (NEURONTIN) 600 MG tablet Take 600 mg by mouth 3 (three) times daily.    hydrOXYzine HCL (ATARAX) 25 MG tablet Take 1 tablet (25 mg total) by mouth every 6 (six) hours as needed for Itching.    irbesartan (AVAPRO) 300 MG tablet Take 300 mg by mouth every evening.    isosorbide mononitrate (IMDUR) 60 MG 24 hr tablet Take 1 tablet (60 mg total) by mouth once daily.    metoprolol succinate (TOPROL-XL) 25 MG 24 hr tablet Take 1 tablet (25 mg total) by mouth once daily.    multivit-min-iron-FA-lutein (CENTRUM SILVER WOMEN) 8 mg iron-400 mcg-300 mcg Tab Take 1 tablet by mouth once daily.    mupirocin (BACTROBAN) 2 % ointment Apply topically once daily. apply to affected area    nitroGLYCERIN (NITROSTAT) 0.3 MG SL tablet PLACE ONE TABLET UNDER THE TONGUE EVERY 5 MINUTES FOR UP TO 3 doses IF NEEDED FOR CHEST PAIN. call 911 IF PAIN persists (Patient taking differently: 0.3 mg every 5 (five) minutes as needed for Chest pain. call 911 IF PAIN persists)    omeprazole  (PRILOSEC) 20 MG capsule Take 20 mg by mouth once daily.    oxyCODONE (ROXICODONE) 15 MG Tab Take 15 mg by mouth 4 (four) times daily as needed for Pain.    rosuvastatin (CRESTOR) 40 MG Tab Take 1 tablet (40 mg total) by mouth once daily.    triamcinolone acetonide 0.1% (KENALOG) 0.1 % cream Apply topically 2 (two) times daily as needed.    vitamin C-biotin (HAIR-SKIN-NAILS, VIT C-BIOTIN,) 50 mg -1,250 mcg Chew Take 1 tablet by mouth Daily.    FEROSUL 325 mg (65 mg iron) Tab tablet Take 325 mg by mouth 2 (two) times daily.    irbesartan (AVAPRO) 150 MG tablet Take 1 tablet (150 mg total) by mouth every evening. (Patient not taking: Reported on 9/9/2022)    linezolid (ZYVOX) 600 mg Tab Take 1 tablet (600 mg total) by mouth every 12 (twelve) hours.    oxyCODONE (ROXICODONE) 10 mg Tab immediate release tablet Take 10 mg by mouth 3 (three) times daily.    [DISCONTINUED] aspirin (ECOTRIN) 81 MG EC tablet Take 1 tablet (81 mg total) by mouth 2 (two) times a day.    [DISCONTINUED] famotidine (PEPCID) 20 MG tablet Take 1 tablet (20 mg total) by mouth 2 (two) times daily as needed for Heartburn.    [DISCONTINUED] ferrous sulfate 325 (65 FE) MG EC tablet Take 1 tab po daily on an empty stomach 30 min prior to breakfast daily. It may cause some hard and dark stools, so increase your water and fiber (fruits and vegetables) intake to help with any constipation. (Patient taking differently: 325 mg 2 (two) times daily. It may cause some hard and dark stools, so increase your water and fiber (fruits and vegetables) intake to help with any constipation.)     Family History       Problem Relation (Age of Onset)    Heart disease Mother, Father          Tobacco Use    Smoking status: Never    Smokeless tobacco: Never   Substance and Sexual Activity    Alcohol use: No    Drug use: No    Sexual activity: Not Currently     Review of Systems   Constitutional:  Positive for activity change, chills, fatigue and fever.   HENT:   Negative for congestion.    Eyes:  Negative for photophobia and visual disturbance.   Respiratory:  Negative for shortness of breath and wheezing.    Cardiovascular:  Negative for chest pain.   Gastrointestinal:  Negative for abdominal distention and abdominal pain.   Endocrine: Negative for polyphagia and polyuria.   Genitourinary:  Negative for dysuria and urgency.   Musculoskeletal:  Positive for arthralgias and joint swelling. Negative for back pain.   Skin:  Positive for color change and wound.   Neurological:  Negative for numbness and headaches.   Psychiatric/Behavioral:  Positive for confusion. Negative for agitation.    Objective:     Vital Signs (Most Recent):  Temp: 100.3 °F (37.9 °C) (09/09/22 1323)  Pulse: 76 (09/09/22 1357)  Resp: (!) 24 (09/09/22 1357)  BP: (!) 173/72 (09/09/22 1316)  SpO2: 99 % (09/09/22 1357)   Vital Signs (24h Range):  Temp:  [98.2 °F (36.8 °C)-101.1 °F (38.4 °C)] 100.3 °F (37.9 °C)  Pulse:  [72-84] 76  Resp:  [19-24] 24  SpO2:  [89 %-99 %] 99 %  BP: (133-204)/(72-91) 173/72     Weight: 76.7 kg (169 lb)  Body mass index is 29.01 kg/m².    Physical Exam  HENT:      Head: Normocephalic and atraumatic.      Nose: Nose normal. No congestion or rhinorrhea.   Eyes:      General:         Right eye: No discharge.   Cardiovascular:      Rate and Rhythm: Normal rate.      Pulses: Normal pulses.   Pulmonary:      Effort: Pulmonary effort is normal.      Breath sounds: No wheezing or rales.   Abdominal:      General: Bowel sounds are normal. There is no distension.      Palpations: There is no mass.   Musculoskeletal:         General: Normal range of motion.      Cervical back: Normal range of motion. No rigidity.      Right lower leg: No edema.      Left lower leg: No edema.      Comments: Left knee with erythema, swollen with a healing vertical scar with drainage just below the knee cap.   Good ROM.    Skin:     Coloration: Skin is not jaundiced or pale.      Findings: Erythema present.    Neurological:      General: No focal deficit present.      Mental Status: She is alert.   Psychiatric:         Mood and Affect: Mood normal.         Behavior: Behavior normal.           Significant Labs: A1C:   Recent Labs   Lab 06/29/22  1253 08/12/22  1026   HGBA1C 6.4* 5.3     ABGs: No results for input(s): PH, PCO2, HCO3, POCSATURATED, BE, TOTALHB, COHB, METHB, O2HB, POCFIO2, PO2 in the last 48 hours.  Blood Culture: No results for input(s): LABBLOO in the last 48 hours.  CBC:   Recent Labs   Lab 09/09/22  1233   WBC 16.53*   HGB 9.5*   HCT 31.1*        CMP:   Recent Labs   Lab 09/09/22  1323 09/09/22  1346     139 139   K 4.0  4.0 4.6     100 101   CO2 31*  31* 30*     108 100   BUN 13  13 13   CREATININE 1.1  1.1 1.1   CALCIUM 9.6  9.6 9.7   PROT 7.0  7.0 6.9   ALBUMIN 3.3*  3.3* 3.2*   BILITOT 0.3  0.3 0.3   ALKPHOS 97  97 95   AST 21  21 20   ALT 13  13 13   ANIONGAP 8  8 8     Lactic Acid:   Recent Labs   Lab 09/09/22  1233   LACTATE 3.6*     Lipase: No results for input(s): LIPASE in the last 48 hours.  Lipid Panel: No results for input(s): CHOL, HDL, LDLCALC, TRIG, CHOLHDL in the last 48 hours.  Magnesium: No results for input(s): MG in the last 48 hours.  Troponin:   Recent Labs   Lab 09/09/22  1233   TROPONINI 0.009     TSH:   Recent Labs   Lab 04/14/22  0240   TSH 1.991     Urine Culture: No results for input(s): LABURIN in the last 48 hours.  Urine Studies:   Recent Labs   Lab 09/09/22  1317   COLORU Colorless*   APPEARANCEUA Clear   PHUR 5.0   SPECGRAV 1.010   PROTEINUA Negative   GLUCUA Negative   KETONESU Negative   BILIRUBINUA Negative   OCCULTUA Negative   NITRITE Negative   UROBILINOGEN Negative   LEUKOCYTESUR Negative       Significant Imaging: I have reviewed all pertinent imaging results/findings within the past 24 hours.    Assessment/Plan:     * Sepsis  Infected left knee  Wound culture taken in the clinic  Blood culture  Lactic acid 3.6  Wbc  16.63  Cefepime  Vanc  Consult Orthopedic        Anemia of chronic disease  H/H stable on admit  H/H 9.5/31.1    Prediabetes  HbAa1c 5.3  Diet control  accuchecks        Stage 4 chronic kidney disease  Stable  Bun 13 / 1.1 on admit      Generalized anxiety disorder  Fibromyalgia  Chronic low back pain  Continue  duloxetine, oxycodone, gabapentin, Atarax      Chronic low back pain  On Narcotic      HTN (hypertension)  Continue Metoprolol, Lasix,  Avapro, (Losartan)    GERD (gastroesophageal reflux disease)  PPI      CAD (coronary artery disease)  Aortic Valve stenosis  Hyperlipidemia   continue ASA, Lipitor        VTE Risk Mitigation (From admission, onward)         Ordered     heparin (porcine) injection 5,000 Units  Every 8 hours         09/09/22 9682                   Vilma Abad MD  Department of Hospital Medicine   [place] - Emergency Dept

## 2022-09-09 NOTE — PHARMACY MED REC
"  [facility]           Pharmacy  Admission Medication History     The home medication history was taken by Jessica Mccartney.      Medication history obtained from Medications listed below were obtained from: Patient/family    Based on information gathered for medication list, you may go to "Admission" then "Reconcile Home Medications" tabs to review and/or act upon those items.     The home medication list has been updated by the Pharmacy department.   Please read ALL comments highlighted in yellow.   Please address this information as you see fit.    Feel free to contact us if you have any questions or require assistance.      No current facility-administered medications on file prior to encounter.     Current Outpatient Medications on File Prior to Encounter   Medication Sig Dispense Refill    acetaminophen (TYLENOL) 500 MG tablet Take 500 mg by mouth every 6 (six) hours as needed for Pain.      aspirin (ECOTRIN) 81 MG EC tablet Take 81 mg by mouth 2 (two) times daily.      butalbital-acetaminophen-caff -40 mg Cap Take 1 capsule by mouth as needed (migraine). Take once to twice daily      calcium carbonate/vitamin D3 (VITAMIN D-3 ORAL) Take 1 tablet by mouth once daily.      co-enzyme Q-10 30 mg capsule Take 30 mg by mouth once daily.      DULoxetine (CYMBALTA) 60 MG capsule Take 1 capsule (60 mg total) by mouth once daily. 90 capsule 1    famotidine (PEPCID) 20 MG tablet Take 20 mg by mouth 2 (two) times daily as needed for Heartburn.      furosemide (LASIX) 40 MG tablet Take 1 tablet (40 mg total) by mouth 2 (two) times daily as needed (swelling). 90 tablet 1    gabapentin (NEURONTIN) 600 MG tablet Take 600 mg by mouth 3 (three) times daily.      hydrOXYzine HCL (ATARAX) 25 MG tablet Take 1 tablet (25 mg total) by mouth every 6 (six) hours as needed for Itching. 20 tablet 0    irbesartan (AVAPRO) 300 MG tablet Take 300 mg by mouth every evening.      isosorbide mononitrate (IMDUR) 60 MG " 24 hr tablet Take 1 tablet (60 mg total) by mouth once daily. 90 tablet 1    metoprolol succinate (TOPROL-XL) 25 MG 24 hr tablet Take 1 tablet (25 mg total) by mouth once daily. 90 tablet 1    multivit-min-iron-FA-lutein (CENTRUM SILVER WOMEN) 8 mg iron-400 mcg-300 mcg Tab Take 1 tablet by mouth once daily.      mupirocin (BACTROBAN) 2 % ointment Apply topically once daily. apply to affected area 22 g 0    nitroGLYCERIN (NITROSTAT) 0.3 MG SL tablet PLACE ONE TABLET UNDER THE TONGUE EVERY 5 MINUTES FOR UP TO 3 doses IF NEEDED FOR CHEST PAIN. call 911 IF PAIN persists (Patient taking differently: 0.3 mg every 5 (five) minutes as needed for Chest pain. call 911 IF PAIN persists) 30 tablet 0    omeprazole (PRILOSEC) 20 MG capsule Take 20 mg by mouth once daily.      oxyCODONE (ROXICODONE) 15 MG Tab Take 15 mg by mouth 4 (four) times daily as needed for Pain.      rosuvastatin (CRESTOR) 40 MG Tab Take 1 tablet (40 mg total) by mouth once daily. 90 tablet 3    triamcinolone acetonide 0.1% (KENALOG) 0.1 % cream Apply topically 2 (two) times daily as needed.      vitamin C-biotin (HAIR-SKIN-NAILS, VIT C-BIOTIN,) 50 mg -1,250 mcg Chew Take 1 tablet by mouth Daily.      FEROSUL 325 mg (65 mg iron) Tab tablet Take 325 mg by mouth 2 (two) times daily.      irbesartan (AVAPRO) 150 MG tablet Take 1 tablet (150 mg total) by mouth every evening. (Patient not taking: Reported on 9/9/2022) 90 tablet 3    linezolid (ZYVOX) 600 mg Tab Take 1 tablet (600 mg total) by mouth every 12 (twelve) hours. 14 tablet 0    oxyCODONE (ROXICODONE) 10 mg Tab immediate release tablet Take 10 mg by mouth 3 (three) times daily.      [DISCONTINUED] aspirin (ECOTRIN) 81 MG EC tablet Take 1 tablet (81 mg total) by mouth 2 (two) times a day. 84 tablet 0    [DISCONTINUED] famotidine (PEPCID) 20 MG tablet Take 1 tablet (20 mg total) by mouth 2 (two) times daily as needed for Heartburn. 84 tablet 0    [DISCONTINUED] ferrous sulfate 325 (65 FE) MG EC tablet  Take 1 tab po daily on an empty stomach 30 min prior to breakfast daily. It may cause some hard and dark stools, so increase your water and fiber (fruits and vegetables) intake to help with any constipation. (Patient taking differently: 325 mg 2 (two) times daily. It may cause some hard and dark stools, so increase your water and fiber (fruits and vegetables) intake to help with any constipation.) 90 tablet 0       Please address this information as you see fit.  Feel free to contact us if you have any questions or require assistance.    Jessica Mccartney  [phone]                .

## 2022-09-09 NOTE — PROGRESS NOTES
9/9/22 11:45 am. Observed patient and spouse in lobby purchasing soda at [facility]. Patient pale, sob, shivering, and stuttering. Dr. Farias to lobby to assess. Obtained w/c and transferred patient to ER. Spouse accompanied patient to ER.

## 2022-09-10 LAB
ACID FAST MOD KINY STN SPEC: NORMAL
APPEARANCE FLD: NORMAL
BODY FLD TYPE: NORMAL
BODY FLD TYPE: NORMAL
COLOR FLD: NORMAL
CRYSTALS FLD MICRO: NEGATIVE
ERYTHROCYTE [SEDIMENTATION RATE] IN BLOOD BY WESTERGREN METHOD: 75 MM/HR (ref 0–20)
GRAM STN SPEC: NORMAL
GRAM STN SPEC: NORMAL
LYMPHOCYTES NFR FLD MANUAL: 63 %
MONOS+MACROS NFR FLD MANUAL: 15 %
NEUTROPHILS NFR FLD MANUAL: 22 %
WBC # FLD: 370 /CU MM

## 2022-09-10 PROCEDURE — 11000001 HC ACUTE MED/SURG PRIVATE ROOM

## 2022-09-10 PROCEDURE — 63600175 PHARM REV CODE 636 W HCPCS: Performed by: FAMILY MEDICINE

## 2022-09-10 PROCEDURE — 25000003 PHARM REV CODE 250: Performed by: FAMILY MEDICINE

## 2022-09-10 RX ORDER — ACETAMINOPHEN 325 MG/1
650 TABLET ORAL EVERY 6 HOURS PRN
Status: DISCONTINUED | OUTPATIENT
Start: 2022-09-10 | End: 2022-09-11 | Stop reason: HOSPADM

## 2022-09-10 RX ADMIN — FAMOTIDINE 20 MG: 20 TABLET ORAL at 08:09

## 2022-09-10 RX ADMIN — HEPARIN SODIUM 5000 UNITS: 5000 INJECTION INTRAVENOUS; SUBCUTANEOUS at 02:09

## 2022-09-10 RX ADMIN — GABAPENTIN 600 MG: 300 CAPSULE ORAL at 08:09

## 2022-09-10 RX ADMIN — GABAPENTIN 600 MG: 300 CAPSULE ORAL at 02:09

## 2022-09-10 RX ADMIN — HEPARIN SODIUM 5000 UNITS: 5000 INJECTION INTRAVENOUS; SUBCUTANEOUS at 10:09

## 2022-09-10 RX ADMIN — DULOXETINE 60 MG: 30 CAPSULE, DELAYED RELEASE ORAL at 08:09

## 2022-09-10 RX ADMIN — OXYCODONE HYDROCHLORIDE 15 MG: 5 TABLET ORAL at 05:09

## 2022-09-10 RX ADMIN — ISOSORBIDE MONONITRATE 60 MG: 30 TABLET, EXTENDED RELEASE ORAL at 08:09

## 2022-09-10 RX ADMIN — OXYCODONE HYDROCHLORIDE 15 MG: 5 TABLET ORAL at 02:09

## 2022-09-10 RX ADMIN — LOSARTAN POTASSIUM 100 MG: 50 TABLET, FILM COATED ORAL at 08:09

## 2022-09-10 RX ADMIN — HEPARIN SODIUM 5000 UNITS: 5000 INJECTION INTRAVENOUS; SUBCUTANEOUS at 05:09

## 2022-09-10 RX ADMIN — ATORVASTATIN CALCIUM 80 MG: 40 TABLET, FILM COATED ORAL at 08:09

## 2022-09-10 RX ADMIN — OXYCODONE HYDROCHLORIDE 15 MG: 5 TABLET ORAL at 08:09

## 2022-09-10 NOTE — ED NOTES
Pt SpO2 decreased to 75% RA, placed on 3L NC SpO2 increased to >95%.  Pt lethargic, denies taking any medications.  Daughter requesting pain medications.  MD notified. Will continue to monitor.

## 2022-09-10 NOTE — PLAN OF CARE
ORTHOPEDIC SURGERY PLAN OF CARE    77-year-old female with ESRD, CAD, UTI, s/p L knee revision arthroplasty and chronic wound over distal incision    -left knee aspiration results not consistent with infection of prosthetic joint  -wound over distal incision is a chronic non-healing wound    -recommend continued wound care  -will discuss results with Ms. García's family  -we appreciate management of her medical issues by her primary team  -Plan discussed with staff    Dr. Carlee Scott

## 2022-09-10 NOTE — ED NOTES
Fever noted of 101.4 orally, blankets removed from patient.  [place] notified, told to notify admit team.  Admit team paged.  Will continue to monitor patient.

## 2022-09-10 NOTE — ASSESSMENT & PLAN NOTE
Infected left knee  Wound culture taken in the clinic  Blood culture  Lactic acid 3.6  Wbc 16.63  Cefepime  Vanc  Consult Orthopedic-left knee aspiration results not consistent with infection of prosthetic joint  -wound over distal incision is a chronic non-healing wound    -recommend continued wound care  - consult wound care

## 2022-09-10 NOTE — CONSULTS
[facility]    Reason for Consult:  Left knee pain status post left knee revision arthroplasty    HPI:  This is a 77-year-old female status post left knee revision arthroplasty on 07/18/2022 evaluated in the emergency department for left knee wound.  Patient was brought to the emergency department this evening after going to a wound care appointment, when she developed lightheadedness, confusion, and chills.  She says that since her surgery, she has had some delayed wound healing at the bottom of her incision which has been improving.  The scab fell off yesterday and there was some drainage from the distal aspect of her knee.  She has been living at home with her , ambulating without issue, has not had any increased knee pain, and has progressed well with physical therapy.  She has a complex medical history including chronic kidney disease, coronary artery disease, anemia.  She denies any new numbness or weakness in her lower extremities.  In the emergency department, she was febrile to 101, she has a positive urinalysis for infection.  The Internal Medicine team has admitted her for sepsis workup.    PMH:   Past Medical History:   Diagnosis Date    CKD (chronic kidney disease) stage 4, GFR 15-29 ml/min     Coronary artery disease     Edema     Epilepsy     Hematuria, unspecified     Hematuria, unspecified     Hyperlipidemia     Hypertension     Iron deficiency anemia     Normocytic anemia        PSH:   Past Surgical History:   Procedure Laterality Date    APPENDECTOMY      BACK SURGERY      CORONARY STENT PLACEMENT      HYSTERECTOMY      REVISION OF KNEE ARTHROPLASTY Left 7/18/2022    Procedure: REVISION, ARTHROPLASTY, KNEE;  Surgeon: Stan Catalan MD;  Location: [facility] OR;  Service: Orthopedics;  Laterality: Left;    TONSILLECTOMY         Med:   No current facility-administered medications on file prior to encounter.     Current Outpatient Medications on File Prior to Encounter   Medication Sig Dispense  Refill    acetaminophen (TYLENOL) 500 MG tablet Take 500 mg by mouth every 6 (six) hours as needed for Pain.      aspirin (ECOTRIN) 81 MG EC tablet Take 81 mg by mouth 2 (two) times daily.      butalbital-acetaminophen-caff -40 mg Cap Take 1 capsule by mouth as needed (migraine). Take once to twice daily      calcium carbonate/vitamin D3 (VITAMIN D-3 ORAL) Take 1 tablet by mouth once daily.      co-enzyme Q-10 30 mg capsule Take 30 mg by mouth once daily.      DULoxetine (CYMBALTA) 60 MG capsule Take 1 capsule (60 mg total) by mouth once daily. 90 capsule 1    famotidine (PEPCID) 20 MG tablet Take 20 mg by mouth 2 (two) times daily as needed for Heartburn.      furosemide (LASIX) 40 MG tablet Take 1 tablet (40 mg total) by mouth 2 (two) times daily as needed (swelling). 90 tablet 1    gabapentin (NEURONTIN) 600 MG tablet Take 600 mg by mouth 3 (three) times daily.      hydrOXYzine HCL (ATARAX) 25 MG tablet Take 1 tablet (25 mg total) by mouth every 6 (six) hours as needed for Itching. 20 tablet 0    irbesartan (AVAPRO) 300 MG tablet Take 300 mg by mouth every evening.      isosorbide mononitrate (IMDUR) 60 MG 24 hr tablet Take 1 tablet (60 mg total) by mouth once daily. 90 tablet 1    metoprolol succinate (TOPROL-XL) 25 MG 24 hr tablet Take 1 tablet (25 mg total) by mouth once daily. 90 tablet 1    multivit-min-iron-FA-lutein (CENTRUM SILVER WOMEN) 8 mg iron-400 mcg-300 mcg Tab Take 1 tablet by mouth once daily.      mupirocin (BACTROBAN) 2 % ointment Apply topically once daily. apply to affected area 22 g 0    nitroGLYCERIN (NITROSTAT) 0.3 MG SL tablet PLACE ONE TABLET UNDER THE TONGUE EVERY 5 MINUTES FOR UP TO 3 doses IF NEEDED FOR CHEST PAIN. call 911 IF PAIN persists (Patient taking differently: 0.3 mg every 5 (five) minutes as needed for Chest pain. call 911 IF PAIN persists) 30 tablet 0    omeprazole (PRILOSEC) 20 MG capsule Take 20 mg by mouth once daily.      oxyCODONE (ROXICODONE) 15 MG Tab Take 15 mg  by mouth 4 (four) times daily as needed for Pain.      rosuvastatin (CRESTOR) 40 MG Tab Take 1 tablet (40 mg total) by mouth once daily. 90 tablet 3    triamcinolone acetonide 0.1% (KENALOG) 0.1 % cream Apply topically 2 (two) times daily as needed.      vitamin C-biotin (HAIR-SKIN-NAILS, VIT C-BIOTIN,) 50 mg -1,250 mcg Chew Take 1 tablet by mouth Daily.      FEROSUL 325 mg (65 mg iron) Tab tablet Take 325 mg by mouth 2 (two) times daily.      irbesartan (AVAPRO) 150 MG tablet Take 1 tablet (150 mg total) by mouth every evening. (Patient not taking: Reported on 9/9/2022) 90 tablet 3    linezolid (ZYVOX) 600 mg Tab Take 1 tablet (600 mg total) by mouth every 12 (twelve) hours. 14 tablet 0    oxyCODONE (ROXICODONE) 10 mg Tab immediate release tablet Take 10 mg by mouth 3 (three) times daily.      [DISCONTINUED] aspirin (ECOTRIN) 81 MG EC tablet Take 1 tablet (81 mg total) by mouth 2 (two) times a day. 84 tablet 0    [DISCONTINUED] famotidine (PEPCID) 20 MG tablet Take 1 tablet (20 mg total) by mouth 2 (two) times daily as needed for Heartburn. 84 tablet 0    [DISCONTINUED] ferrous sulfate 325 (65 FE) MG EC tablet Take 1 tab po daily on an empty stomach 30 min prior to breakfast daily. It may cause some hard and dark stools, so increase your water and fiber (fruits and vegetables) intake to help with any constipation. (Patient taking differently: 325 mg 2 (two) times daily. It may cause some hard and dark stools, so increase your water and fiber (fruits and vegetables) intake to help with any constipation.) 90 tablet 0       Allergies:   Review of patient's allergies indicates:   Allergen Reactions    Iodinated contrast media Anaphylaxis and Other (See Comments)    Phenergan [promethazine]      Vomiting       SH:   Social History     Socioeconomic History    Marital status: Significant Other   Tobacco Use    Smoking status: Never    Smokeless tobacco: Never   Substance and Sexual Activity    Alcohol use: No    Drug  use: No    Sexual activity: Not Currently     Social Determinants of Health     Financial Resource Strain: Medium Risk    Difficulty of Paying Living Expenses: Somewhat hard   Food Insecurity: No Food Insecurity    Worried About Running Out of Food in the Last Year: Never true    Ran Out of Food in the Last Year: Never true   Transportation Needs: No Transportation Needs    Lack of Transportation (Medical): No    Lack of Transportation (Non-Medical): No   Physical Activity: Insufficiently Active    Days of Exercise per Week: 3 days    Minutes of Exercise per Session: 20 min   Stress: Stress Concern Present    Feeling of Stress : To some extent   Social Connections: Moderately Isolated    Frequency of Communication with Friends and Family: More than three times a week    Frequency of Social Gatherings with Friends and Family: More than three times a week    Attends Congregation Services: Never    Active Member of Clubs or Organizations: No    Attends Club or Organization Meetings: Never    Marital Status: Living with partner   Housing Stability: Low Risk     Unable to Pay for Housing in the Last Year: No    Number of Places Lived in the Last Year: 1    Unstable Housing in the Last Year: No       FH:    ROS: otherwise negative except indicated in HPI     BP (!) 184/73   Pulse 82   Temp 99.1 °F (37.3 °C)   Resp 19   Wt 76.7 kg (169 lb)   SpO2 100%   BMI 29.01 kg/m²   NAD, Alert, Awake, oriented to person, place, time, but has trouble maintaining focus  HEENT: atraumatic, normal cephalic, neg ecchymosis, lacerations   CV: No increased work of breathing   Pulm: Normal work of breathing   Skin: No cutaneous rash, no open wounds   Vascular: 2+ RP, wwp, bcr   Left lower extremity   Anterior knee incision healed well proximally, there is a 0.5 cm wound at the very distal terminal aspect of her incision over the proximal tibia.  There is some expressible clear fluid.  There is no significant surrounding erythema or  fluctuance.  She has a knee effusion.  Knee is stable to varus/valgus stress  Knee range of motion 0-100 degrees   Foot is warm and well perfused with palpable dorsalis pedis pulse   Sensation intact to light touch over dorsum and plantar aspect of foot   Tibialis anterior 5/5   EHL/FHL 5/5    A/P:   77-year-old female with ESRD, CAD, UTI, s/p L knee revision arthroplasty and chronic wound over distal incision    -patient is admitted to the internal medicine service for treatment of her UTI and sepsis workup  -in regards to her left knee, we will workup for prosthetic joint infection.  Aspiration was completed today in the emergency department 30 cc of cloudy fluid aspirated and sent for culture and cell count  -x-rays were ordered   -inflammatory lab work ordered   -recommendations to follow results, NPO after midnight for now  -plan discussed with staff    Procedure note:  Left knee aspiration joint   Consented via telephone with her daughter and witnessed , discussed risks, benefits, alternative treatments of left knee aspiration.  Using sterile procedure, local anesthetic was administered 5 cc lidocaine 1%.  Next, an 18 gauge sharp needle was used to aspirate 30 cc of cloudy fluid friom a lateral suprapatellar approach which was sent off for culture and cell count.  The patient tolerated the procedure well.      Dr. Carlee Scott

## 2022-09-10 NOTE — SUBJECTIVE & OBJECTIVE
Interval History: awake and alert, complained with headache.   Had 100.1 fever overnight    Review of Systems   Constitutional:  Negative for activity change, chills, fatigue and fever.   Respiratory:  Negative for shortness of breath and wheezing.    Cardiovascular:  Negative for chest pain.   Gastrointestinal:  Negative for abdominal distention and abdominal pain.   Genitourinary:  Negative for dysuria and urgency.   Musculoskeletal:  Positive for arthralgias and joint swelling. Negative for back pain.   Skin:  Positive for color change and wound.   Neurological:  Negative for numbness and headaches.   Psychiatric/Behavioral:  Negative for agitation and confusion.    Objective:     Vital Signs (Most Recent):  Temp: (!) 100.5 °F (38.1 °C) (09/10/22 0804)  Pulse: 79 (09/10/22 0804)  Resp: 15 (09/10/22 0804)  BP: (!) 140/61 (09/10/22 0842)  SpO2: 95 % (09/10/22 0804)   Vital Signs (24h Range):  Temp:  [98.2 °F (36.8 °C)-102.8 °F (39.3 °C)] 100.5 °F (38.1 °C)  Pulse:  [72-91] 79  Resp:  [14-34] 15  SpO2:  [78 %-100 %] 95 %  BP: (133-204)/(61-91) 140/61     Weight: 76.7 kg (169 lb)  Body mass index is 29.01 kg/m².    Intake/Output Summary (Last 24 hours) at 9/10/2022 1001  Last data filed at 9/9/2022 1610  Gross per 24 hour   Intake 50 ml   Output --   Net 50 ml      Physical Exam    Significant Labs: A1C:   Recent Labs   Lab 06/29/22  1253 08/12/22  1026   HGBA1C 6.4* 5.3     ABGs: No results for input(s): PH, PCO2, HCO3, POCSATURATED, BE, TOTALHB, COHB, METHB, O2HB, POCFIO2, PO2 in the last 48 hours.  Blood Culture:   Recent Labs   Lab 09/09/22  1233   LABBLOO No Growth to date  No Growth to date     CBC:   Recent Labs   Lab 09/09/22  1233   WBC 16.53*   HGB 9.5*   HCT 31.1*        CMP:   Recent Labs   Lab 09/09/22  1323 09/09/22  1346     139 139   K 4.0  4.0 4.6     100 101   CO2 31*  31* 30*     108 100   BUN 13  13 13   CREATININE 1.1  1.1 1.1   CALCIUM 9.6  9.6 9.7   PROT 7.0   7.0 6.9   ALBUMIN 3.3*  3.3* 3.2*   BILITOT 0.3  0.3 0.3   ALKPHOS 97  97 95   AST 21  21 20   ALT 13  13 13   ANIONGAP 8  8 8     Lactic Acid:   Recent Labs   Lab 09/09/22  1233 09/09/22  1623   LACTATE 3.6* 1.9     Lipase: No results for input(s): LIPASE in the last 48 hours.  Lipid Panel: No results for input(s): CHOL, HDL, LDLCALC, TRIG, CHOLHDL in the last 48 hours.  Magnesium: No results for input(s): MG in the last 48 hours.  Troponin:   Recent Labs   Lab 09/09/22  1233   TROPONINI 0.009     TSH:   Recent Labs   Lab 04/14/22  0240   TSH 1.991     Urine Culture: No results for input(s): LABURIN in the last 48 hours.  Urine Studies:   Recent Labs   Lab 09/09/22  1317   COLORU Colorless*   APPEARANCEUA Clear   PHUR 5.0   SPECGRAV 1.010   PROTEINUA Negative   GLUCUA Negative   KETONESU Negative   BILIRUBINUA Negative   OCCULTUA Negative   NITRITE Negative   UROBILINOGEN Negative   LEUKOCYTESUR Negative       Significant Imaging: I have reviewed all pertinent imaging results/findings within the past 24 hours.

## 2022-09-10 NOTE — PROGRESS NOTES
[facility] Medicine  Progress Note    Patient Name: Enedelia García  MRN: [id number]  Patient Class: IP- Inpatient   Admission Date: 9/9/2022  Length of Stay: 1 days  Attending Physician: Vilma Abad*  Primary Care Provider: Marichuy Hummel MD        Subjective:     Principal Problem:Sepsis        HPI:  Enedelia Booker is a 77 7/0 F with PMH of CKD, CAD, epilepsy, hyperlipidemia, hypertension, iron deficiency anemia, persistent non healing wound - s/p left knee total arthroplasty, , present with 1 weeks history of fatigue and fever. There is associated confusions and chills in the past few days. She denies cough, chest pain, SOB, dysuria, and frequency. Patient reported wound drainage since after surgery. Patient was seen at the clinic today   In the ED lactic acid 3.6, , wbc 16.53, H/H 9.5/31.1      Overview/Hospital Course:  No notes on file    Interval History: awake and alert, complained with headache.   Had 100.1 fever overnight    Review of Systems   Constitutional:  Negative for activity change, chills, fatigue and fever.   Respiratory:  Negative for shortness of breath and wheezing.    Cardiovascular:  Negative for chest pain.   Gastrointestinal:  Negative for abdominal distention and abdominal pain.   Genitourinary:  Negative for dysuria and urgency.   Musculoskeletal:  Positive for arthralgias and joint swelling. Negative for back pain.   Skin:  Positive for color change and wound.   Neurological:  Negative for numbness and headaches.   Psychiatric/Behavioral:  Negative for agitation and confusion.    Objective:     Vital Signs (Most Recent):  Temp: 98.1 °F (36.7 °C) (09/11/22 0803)  Pulse: 83 (09/11/22 0803)  Resp: 17 (09/11/22 0911)  BP: 125/65 (09/11/22 0803)  SpO2: (!) 94 % (09/11/22 0803)   Vital Signs (24h Range):  Temp:  [97.3 °F (36.3 °C)-98.9 °F (37.2 °C)] 98.1 °F (36.7 °C)  Pulse:  [74-85] 83  Resp:  [14-18] 17  SpO2:  [89 %-94 %] 94 %  BP:  (102-149)/(53-68) 125/65     Weight: 76.7 kg (169 lb)  Body mass index is 29.01 kg/m².  No intake or output data in the 24 hours ending 09/11/22 0958     Physical Exam  HENT:      Head: Normocephalic and atraumatic.      Nose: Nose normal. No congestion or rhinorrhea.   Eyes:      General:         Right eye: No discharge.   Cardiovascular:      Rate and Rhythm: Normal rate.      Pulses: Normal pulses.   Pulmonary:      Effort: Pulmonary effort is normal.      Breath sounds: No wheezing or rales.   Abdominal:      General: Bowel sounds are normal. There is no distension.      Palpations: There is no mass.   Musculoskeletal:         General: Normal range of motion.      Cervical back: Normal range of motion. No rigidity.      Right lower leg: No edema.      Left lower leg: No edema.      Comments: Left knee with erythema, swollen with a healing vertical scar with drainage just below the knee cap.   Good ROM.    Skin:     Coloration: Skin is not jaundiced or pale.      Findings: Erythema present.   Neurological:      General: No focal deficit present.      Mental Status: She is alert.   Psychiatric:         Mood and Affect: Mood normal.         Behavior: Behavior normal.       Significant Labs: A1C:   Recent Labs   Lab 06/29/22  1253 08/12/22  1026   HGBA1C 6.4* 5.3       ABGs: No results for input(s): PH, PCO2, HCO3, POCSATURATED, BE, TOTALHB, COHB, METHB, O2HB, POCFIO2, PO2 in the last 48 hours.  Blood Culture:   Recent Labs   Lab 09/09/22  1233   LABBLOO No Growth to date  No Growth to date  No Growth to date  No Growth to date       CBC:   Recent Labs   Lab 09/09/22  1233   WBC 16.53*   HGB 9.5*   HCT 31.1*          CMP:   Recent Labs   Lab 09/09/22  1323 09/09/22  1346     139 139   K 4.0  4.0 4.6     100 101   CO2 31*  31* 30*     108 100   BUN 13  13 13   CREATININE 1.1  1.1 1.1   CALCIUM 9.6  9.6 9.7   PROT 7.0  7.0 6.9   ALBUMIN 3.3*  3.3* 3.2*   BILITOT 0.3  0.3 0.3    ALKPHOS 97  97 95   AST 21  21 20   ALT 13  13 13   ANIONGAP 8  8 8       Lactic Acid:   Recent Labs   Lab 09/09/22  1233 09/09/22  1623   LACTATE 3.6* 1.9       Lipase: No results for input(s): LIPASE in the last 48 hours.  Lipid Panel: No results for input(s): CHOL, HDL, LDLCALC, TRIG, CHOLHDL in the last 48 hours.  Magnesium: No results for input(s): MG in the last 48 hours.  Troponin:   Recent Labs   Lab 09/09/22  1233   TROPONINI 0.009       TSH:   Recent Labs   Lab 04/14/22  0240   TSH 1.991       Urine Culture: No results for input(s): LABURIN in the last 48 hours.  Urine Studies:   Recent Labs   Lab 09/09/22  1317   COLORU Colorless*   APPEARANCEUA Clear   PHUR 5.0   SPECGRAV 1.010   PROTEINUA Negative   GLUCUA Negative   KETONESU Negative   BILIRUBINUA Negative   OCCULTUA Negative   NITRITE Negative   UROBILINOGEN Negative   LEUKOCYTESUR Negative         Significant Imaging: I have reviewed all pertinent imaging results/findings within the past 24 hours.    Assessment/Plan:      * Sepsis  Infected left knee  Wound culture taken in the clinic  Blood culture  Lactic acid 3.6  Wbc 16.63  Cefepime  Vanc  Consult Orthopedic-left knee aspiration results not consistent with infection of prosthetic joint  -wound over distal incision is a chronic non-healing wound    -recommend continued wound care  - consult wound care        Anemia of chronic disease  H/H stable on admit  H/H 9.5/31.1    Prediabetes  HbAa1c 5.3  Diet control  accuchecks        Stage 4 chronic kidney disease  Stable  Bun 13 / 1.1 on admit      Generalized anxiety disorder  Fibromyalgia  Chronic low back pain  Continue  duloxetine, oxycodone, gabapentin, Atarax      Chronic low back pain  On Narcotic      HTN (hypertension)  Continue Metoprolol, Lasix,  Avapro, (Losartan)    GERD (gastroesophageal reflux disease)  PPI      CAD (coronary artery disease)  Aortic Valve stenosis  Hyperlipidemia   continue ASA, Lipitor        VTE Risk Mitigation  (From admission, onward)           Ordered     heparin (porcine) injection 5,000 Units  Every 8 hours         09/09/22 7851                    Discharge Planning   ISAURA:      Code Status: Full Code   Is the patient medically ready for discharge?:     Reason for patient still in hospital (select all that apply): Patient trending condition                     Vilma Abad MD  Department of [facility] Medicine   [facility]

## 2022-09-10 NOTE — ED NOTES
Assumed care from VI savage. Pt AAO x 3. Respirations even and unlabored. Updated patient on POC.  Will continue to monitor.

## 2022-09-10 NOTE — NURSING
Report received from Eulalia the ER nurse at 2205. Patient in bed in low position. Side rails up x 3. Call light within reach. Patient oriented to room. Vital signs taken. Patient AAOx2. Reoriented to time. Patient currently febrile and BP elevated. Tylenol 650mg po administered. Will continue to monitor patient.

## 2022-09-11 VITALS
SYSTOLIC BLOOD PRESSURE: 156 MMHG | BODY MASS INDEX: 28.85 KG/M2 | DIASTOLIC BLOOD PRESSURE: 67 MMHG | HEIGHT: 64 IN | RESPIRATION RATE: 18 BRPM | OXYGEN SATURATION: 95 % | HEART RATE: 84 BPM | WEIGHT: 169 LBS | TEMPERATURE: 98 F

## 2022-09-11 LAB
ANISOCYTOSIS BLD QL SMEAR: SLIGHT
BASOPHILS # BLD AUTO: 0.08 K/UL (ref 0–0.2)
BASOPHILS # BLD AUTO: 0.08 K/UL (ref 0–0.2)
BASOPHILS NFR BLD: 0.7 % (ref 0–1.9)
BASOPHILS NFR BLD: 0.9 % (ref 0–1.9)
DIFFERENTIAL METHOD: ABNORMAL
DIFFERENTIAL METHOD: ABNORMAL
EOSINOPHIL # BLD AUTO: 0.4 K/UL (ref 0–0.5)
EOSINOPHIL # BLD AUTO: 0.4 K/UL (ref 0–0.5)
EOSINOPHIL NFR BLD: 3.6 % (ref 0–8)
EOSINOPHIL NFR BLD: 4.1 % (ref 0–8)
ERYTHROCYTE [DISTWIDTH] IN BLOOD BY AUTOMATED COUNT: 15.1 % (ref 11.5–14.5)
ERYTHROCYTE [DISTWIDTH] IN BLOOD BY AUTOMATED COUNT: 15.3 % (ref 11.5–14.5)
HCT VFR BLD AUTO: 25.6 % (ref 37–48.5)
HCT VFR BLD AUTO: 30.1 % (ref 37–48.5)
HGB BLD-MCNC: 7.7 G/DL (ref 12–16)
HGB BLD-MCNC: 9.1 G/DL (ref 12–16)
IMM GRANULOCYTES # BLD AUTO: 0.03 K/UL (ref 0–0.04)
IMM GRANULOCYTES # BLD AUTO: 0.04 K/UL (ref 0–0.04)
IMM GRANULOCYTES NFR BLD AUTO: 0.3 % (ref 0–0.5)
IMM GRANULOCYTES NFR BLD AUTO: 0.3 % (ref 0–0.5)
LYMPHOCYTES # BLD AUTO: 2.1 K/UL (ref 1–4.8)
LYMPHOCYTES # BLD AUTO: 2.9 K/UL (ref 1–4.8)
LYMPHOCYTES NFR BLD: 22.7 % (ref 18–48)
LYMPHOCYTES NFR BLD: 24.4 % (ref 18–48)
MCH RBC QN AUTO: 27.1 PG (ref 27–31)
MCH RBC QN AUTO: 27.1 PG (ref 27–31)
MCHC RBC AUTO-ENTMCNC: 30.1 G/DL (ref 32–36)
MCHC RBC AUTO-ENTMCNC: 30.2 G/DL (ref 32–36)
MCV RBC AUTO: 90 FL (ref 82–98)
MCV RBC AUTO: 90 FL (ref 82–98)
MONOCYTES # BLD AUTO: 1.3 K/UL (ref 0.3–1)
MONOCYTES # BLD AUTO: 1.7 K/UL (ref 0.3–1)
MONOCYTES NFR BLD: 13.4 % (ref 4–15)
MONOCYTES NFR BLD: 14 % (ref 4–15)
NEUTROPHILS # BLD AUTO: 5.5 K/UL (ref 1.8–7.7)
NEUTROPHILS # BLD AUTO: 6.8 K/UL (ref 1.8–7.7)
NEUTROPHILS NFR BLD: 57 % (ref 38–73)
NEUTROPHILS NFR BLD: 58.6 % (ref 38–73)
NRBC BLD-RTO: 0 /100 WBC
NRBC BLD-RTO: 0 /100 WBC
PLATELET # BLD AUTO: 245 K/UL (ref 150–450)
PLATELET # BLD AUTO: 294 K/UL (ref 150–450)
PLATELET BLD QL SMEAR: ABNORMAL
PMV BLD AUTO: 9.1 FL (ref 9.2–12.9)
PMV BLD AUTO: 9.7 FL (ref 9.2–12.9)
RBC # BLD AUTO: 2.84 M/UL (ref 4–5.4)
RBC # BLD AUTO: 3.36 M/UL (ref 4–5.4)
WBC # BLD AUTO: 11.89 K/UL (ref 3.9–12.7)
WBC # BLD AUTO: 9.38 K/UL (ref 3.9–12.7)

## 2022-09-11 PROCEDURE — 85025 COMPLETE CBC W/AUTO DIFF WBC: CPT | Performed by: FAMILY MEDICINE

## 2022-09-11 PROCEDURE — 25000003 PHARM REV CODE 250: Performed by: FAMILY MEDICINE

## 2022-09-11 PROCEDURE — 63600175 PHARM REV CODE 636 W HCPCS: Performed by: FAMILY MEDICINE

## 2022-09-11 PROCEDURE — 36415 COLL VENOUS BLD VENIPUNCTURE: CPT | Performed by: FAMILY MEDICINE

## 2022-09-11 RX ORDER — CLINDAMYCIN HYDROCHLORIDE 300 MG/1
300 CAPSULE ORAL 3 TIMES DAILY
Qty: 21 CAPSULE | Refills: 0 | Status: CANCELLED | OUTPATIENT
Start: 2022-09-11 | End: 2022-09-18

## 2022-09-11 RX ADMIN — DULOXETINE 60 MG: 30 CAPSULE, DELAYED RELEASE ORAL at 09:09

## 2022-09-11 RX ADMIN — HYDROXYZINE HYDROCHLORIDE 25 MG: 25 TABLET ORAL at 12:09

## 2022-09-11 RX ADMIN — LOSARTAN POTASSIUM 100 MG: 50 TABLET, FILM COATED ORAL at 09:09

## 2022-09-11 RX ADMIN — FAMOTIDINE 20 MG: 20 TABLET ORAL at 09:09

## 2022-09-11 RX ADMIN — ATORVASTATIN CALCIUM 80 MG: 40 TABLET, FILM COATED ORAL at 09:09

## 2022-09-11 RX ADMIN — ISOSORBIDE MONONITRATE 60 MG: 30 TABLET, EXTENDED RELEASE ORAL at 09:09

## 2022-09-11 RX ADMIN — GABAPENTIN 600 MG: 300 CAPSULE ORAL at 09:09

## 2022-09-11 RX ADMIN — HEPARIN SODIUM 5000 UNITS: 5000 INJECTION INTRAVENOUS; SUBCUTANEOUS at 06:09

## 2022-09-11 RX ADMIN — OXYCODONE HYDROCHLORIDE 15 MG: 5 TABLET ORAL at 09:09

## 2022-09-11 RX ADMIN — GABAPENTIN 600 MG: 300 CAPSULE ORAL at 03:09

## 2022-09-11 RX ADMIN — HYDROXYZINE HYDROCHLORIDE 25 MG: 25 TABLET ORAL at 03:09

## 2022-09-11 RX ADMIN — OXYCODONE HYDROCHLORIDE 15 MG: 5 TABLET ORAL at 03:09

## 2022-09-11 RX ADMIN — HEPARIN SODIUM 5000 UNITS: 5000 INJECTION INTRAVENOUS; SUBCUTANEOUS at 03:09

## 2022-09-11 NOTE — PLAN OF CARE
POC reviewed with patient, pt AAOx4 lying in bed with no signs of distress noted. Pt complained of pain and was given prn pain medication per order. Pt denied working with PT/OT due to being in generalized pain. Pt was found later walking around room stating she is feeling better, will continue to monitor.  Problem: Adult Inpatient Plan of Care  Goal: Plan of Care Review  Outcome: Ongoing, Progressing  Goal: Patient-Specific Goal (Individualized)  Outcome: Ongoing, Progressing  Goal: Absence of Hospital-Acquired Illness or Injury  Outcome: Ongoing, Progressing

## 2022-09-11 NOTE — PROGRESS NOTES
[facility] Medicine  Progress Note    Patient Name: Enedelia García  MRN: [id number]  Patient Class: IP- Inpatient   Admission Date: 9/9/2022  Length of Stay: 2 days  Attending Physician: Vilma Abad*  Primary Care Provider: Marichuy Hummel MD        Subjective:     Principal Problem:Sepsis        HPI:  Enedelia Booker is a 77 7/0 F with PMH of CKD, CAD, epilepsy, hyperlipidemia, hypertension, iron deficiency anemia, persistent non healing wound - s/p left knee total arthroplasty, , present with 1 weeks history of fatigue and fever. There is associated confusions and chills in the past few days. She denies cough, chest pain, SOB, dysuria, and frequency. Patient reported wound drainage since after surgery. 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   Aerobic cx with laura sp.   Consult ID for bx mgt  Possible discharge home on PO abx      Review of Systems   Constitutional:  Negative for activity change, chills, fatigue and fever.   Respiratory:  Negative for shortness of breath and wheezing.    Cardiovascular:  Negative for chest pain.   Gastrointestinal:  Negative for abdominal distention and abdominal pain.   Genitourinary:  Negative for dysuria and urgency.   Musculoskeletal:  Positive for arthralgias and joint swelling. Negative for back pain.   Skin:  Positive for color change and wound.   Neurological:  Negative for numbness and headaches.   Psychiatric/Behavioral:  Negative for agitation and confusion.    Objective:     Vital Signs (Most Recent):  Temp: 98.1 °F (36.7 °C) (09/11/22 0803)  Pulse: 83 (09/11/22 0803)  Resp: 17 (09/11/22 0911)  BP: 125/65 (09/11/22 0803)  SpO2: (!) 94 % (09/11/22 0803)   Vital  Signs (24h Range):  Temp:  [97.3 °F (36.3 °C)-98.9 °F (37.2 °C)] 98.1 °F (36.7 °C)  Pulse:  [74-85] 83  Resp:  [14-18] 17  SpO2:  [89 %-94 %] 94 %  BP: (102-149)/(53-68) 125/65     Weight: 76.7 kg (169 lb)  Body mass index is 29.01 kg/m².  No intake or output data in the 24 hours ending 09/11/22 0958     Physical Exam  HENT:      Head: Normocephalic and atraumatic.      Nose: Nose normal. No congestion or rhinorrhea.   Eyes:      General:         Right eye: No discharge.   Cardiovascular:      Rate and Rhythm: Normal rate.      Pulses: Normal pulses.   Pulmonary:      Effort: Pulmonary effort is normal.      Breath sounds: No wheezing or rales.   Abdominal:      General: Bowel sounds are normal. There is no distension.      Palpations: There is no mass.   Musculoskeletal:         General: Normal range of motion.      Cervical back: Normal range of motion. No rigidity.      Right lower leg: No edema.      Left lower leg: No edema.      Comments: Left knee with erythema, swollen with a healing vertical scar with drainage just below the knee cap.   Good ROM.    Skin:     Coloration: Skin is not jaundiced or pale.      Findings: Erythema present.   Neurological:      General: No focal deficit present.      Mental Status: She is alert.   Psychiatric:         Mood and Affect: Mood normal.         Behavior: Behavior normal.       Significant Labs: A1C:   Recent Labs   Lab 06/29/22  1253 08/12/22  1026   HGBA1C 6.4* 5.3       ABGs: No results for input(s): PH, PCO2, HCO3, POCSATURATED, BE, TOTALHB, COHB, METHB, O2HB, POCFIO2, PO2 in the last 48 hours.  Blood Culture:   Recent Labs   Lab 09/09/22  1233   LABBLOO No Growth to date  No Growth to date  No Growth to date  No Growth to date       CBC:   Recent Labs   Lab 09/09/22  1233   WBC 16.53*   HGB 9.5*   HCT 31.1*          CMP:   Recent Labs   Lab 09/09/22  1323 09/09/22  1346     139 139   K 4.0  4.0 4.6     100 101   CO2 31*  31* 30*      108 100   BUN 13  13 13   CREATININE 1.1  1.1 1.1   CALCIUM 9.6  9.6 9.7   PROT 7.0  7.0 6.9   ALBUMIN 3.3*  3.3* 3.2*   BILITOT 0.3  0.3 0.3   ALKPHOS 97  97 95   AST 21  21 20   ALT 13  13 13   ANIONGAP 8  8 8       Lactic Acid:   Recent Labs   Lab 09/09/22  1233 09/09/22  1623   LACTATE 3.6* 1.9       Lipase: No results for input(s): LIPASE in the last 48 hours.  Lipid Panel: No results for input(s): CHOL, HDL, LDLCALC, TRIG, CHOLHDL in the last 48 hours.  Magnesium: No results for input(s): MG in the last 48 hours.  Troponin:   Recent Labs   Lab 09/09/22  1233   TROPONINI 0.009       TSH:   Recent Labs   Lab 04/14/22  0240   TSH 1.991       Urine Culture: No results for input(s): LABURIN in the last 48 hours.  Urine Studies:   Recent Labs   Lab 09/09/22  1317   COLORU Colorless*   APPEARANCEUA Clear   PHUR 5.0   SPECGRAV 1.010   PROTEINUA Negative   GLUCUA Negative   KETONESU Negative   BILIRUBINUA Negative   OCCULTUA Negative   NITRITE Negative   UROBILINOGEN Negative   LEUKOCYTESUR Negative         Significant Imaging: I have reviewed all pertinent imaging results/findings within the past 24 hours.      Assessment/Plan:      * Sepsis  Infected left knee  Wound culture taken in the clinic  Blood culture  Lactic acid 3.6  Wbc 16.63  Cefepime  Vanc  Consult Orthopedic-left knee aspiration results not consistent with infection of prosthetic joint  -wound over distal incision is a chronic non-healing wound    -recommend continued wound care  - consult wound care  Consult ID for abx mgt        Anemia of chronic disease  H/H stable on admit  H/H 9.5/31.1    Prediabetes  HbAa1c 5.3  Diet control  accuchecks        Stage 4 chronic kidney disease  Stable  Bun 13 / 1.1 on admit      Generalized anxiety disorder  Fibromyalgia  Chronic low back pain  Continue  duloxetine, oxycodone, gabapentin, Atarax      Chronic low back pain  On Narcotic      HTN (hypertension)  Continue Metoprolol, Lasix,  Avapro,  (Losartan)    GERD (gastroesophageal reflux disease)  PPI      CAD (coronary artery disease)  Aortic Valve stenosis  Hyperlipidemia   continue ASA, Lipitor        VTE Risk Mitigation (From admission, onward)         Ordered     heparin (porcine) injection 5,000 Units  Every 8 hours         09/09/22 5065                Discharge Planning   ISAURA:      Code Status: Full Code   Is the patient medically ready for discharge?:     Reason for patient still in hospital (select all that apply): Pending disposition                     Vilma Abad MD  Department of [facility] Medicine   [facility]

## 2022-09-11 NOTE — DISCHARGE SUMMARY
[facility] Medicine  Discharge Summary      Patient Name: Enedelia García  MRN: [id number]  Patient Class: IP- Inpatient  Admission Date: 9/9/2022  Hospital Length of Stay: 2 days  Discharge Date and Time: 9/11/2022  7:41 PM  Attending Physician: Vilma Abad*   Discharging Provider: Vilma Abad MD  Primary Care Provider: Marichuy Hummel MD      HPI:   Enedelia Booker is a 77 7/0 F with PMH of CKD, CAD, epilepsy, hyperlipidemia, hypertension, iron deficiency anemia, persistent non healing wound - s/p left knee total arthroplasty, , present with 1 weeks history of fatigue and fever. There is associated confusions and chills in the past few days. She denies cough, chest pain, SOB, dysuria, and frequency. Patient reported wound drainage since after surgery. Patient was seen at the clinic today   In the ED lactic acid 3.6, , wbc 16.53, H/H 9.5/31.1      * No surgery found *      Hospital Course:   No notes on file     Goals of Care Treatment Preferences:  Code Status: Full Code      Consults:   Consults (From admission, onward)          Status Ordering Provider     Inpatient consult to Infectious Diseases  Once        Provider:  (Not yet assigned)    Acknowledged INNSANTIAGO RAMACHANDRANOMA SHEMAR     IP consult to case management  Once        Provider:  (Not yet assigned)    Acknowledged INNKAYODE-VILMA HUSTON NVilla     Inpatient consult to Orthopedic Surgery  Once        Provider:  (Not yet assigned)    Acknowledged INNKAYODE-FERNANDO HUSTONEOMA N.            * Sepsis  Infected left knee  Wound culture taken in the clinic  Blood culture  Lactic acid 3.6  Wbc 16.63  Cefepime  Vanc  Consult Orthopedic-left knee aspiration results not consistent with infection of prosthetic joint  -wound over distal incision is a chronic non-healing wound    -recommend continued wound care  - consult wound care  Consult ID for abx mgt        Anemia of chronic disease  H/H stable on admit  H/H  9.5/31.1    Prediabetes  HbAa1c 5.3  Diet control  accuchecks        Stage 4 chronic kidney disease  Stable  Bun 13 / 1.1 on admit      Generalized anxiety disorder  Fibromyalgia  Chronic low back pain  Continue  duloxetine, oxycodone, gabapentin, Atarax      HTN (hypertension)  Continue Metoprolol, Lasix,  Avapro, (Losartan)    GERD (gastroesophageal reflux disease)  PPI      CAD (coronary artery disease)  Aortic Valve stenosis  Hyperlipidemia   continue ASA, Lipitor        Final Active Diagnoses:    Diagnosis Date Noted POA    PRINCIPAL PROBLEM:  Sepsis [A41.9] 09/09/2022 Yes    Infection of left knee [M00.9] 09/09/2022 Yes    Anemia of chronic disease [D63.8] 09/01/2022 Yes    Prediabetes [R73.03] 02/24/2022 Yes    Chronic low back pain [M54.50, G89.29] 12/20/2021 Yes    Generalized anxiety disorder [F41.1] 12/20/2021 Yes    HTN (hypertension) [I10] 12/20/2021 Yes    Stage 4 chronic kidney disease [N18.4] 12/20/2021 Yes    Aortic valve stenosis [I35.0] 12/20/2021 Yes    GERD (gastroesophageal reflux disease) [K21.9] 11/18/2013 Yes    CAD (coronary artery disease) [I25.10] 11/17/2013 Yes    Fibromyalgia [M79.7] 11/17/2013 Yes    Hyperlipidemia [E78.5] 11/17/2013 Yes      Problems Resolved During this Admission:       Discharged Condition: stable    Disposition:     Follow Up:    Patient Instructions:   No discharge procedures on file.        Pending Diagnostic Studies:       Procedure Component Value Units Date/Time    CBC Auto Differential [[id number]]     Order Status: Sent Lab Status: No result     Specimen: Blood     X-Ray Knee 1 or 2 View Left [[id number]] Resulted: 09/10/22 0809    Order Status: Sent Lab Status: In process Updated: 09/10/22 0810           Medications:  Reconciled Home Medications:      Medication List        CHANGE how you take these medications      irbesartan 300 MG tablet  Commonly known as: AVAPRO  Take 300 mg by mouth every evening.  What changed: Another medication with the same name was  removed. Continue taking this medication, and follow the directions you see here.     nitroGLYCERIN 0.3 MG SL tablet  Commonly known as: NITROSTAT  PLACE ONE TABLET UNDER THE TONGUE EVERY 5 MINUTES FOR UP TO 3 doses IF NEEDED FOR CHEST PAIN. call 911 IF PAIN persists  What changed:   how much to take  when to take this  reasons to take this  additional instructions     oxyCODONE 10 mg Tab immediate release tablet  Commonly known as: ROXICODONE  Take 10 mg by mouth 3 (three) times daily.  What changed: Another medication with the same name was removed. Continue taking this medication, and follow the directions you see here.            CONTINUE taking these medications      acetaminophen 500 MG tablet  Commonly known as: TYLENOL  Take 500 mg by mouth every 6 (six) hours as needed for Pain.     aspirin 81 MG EC tablet  Commonly known as: ECOTRIN  Take 81 mg by mouth 2 (two) times daily.     butalbital-acetaminophen-caff -40 mg Cap  Take 1 capsule by mouth as needed (migraine). Take once to twice daily     CENTRUM SILVER WOMEN 8 mg iron-400 mcg-300 mcg Tab  Generic drug: multivit-min-iron-FA-lutein  Take 1 tablet by mouth once daily.     co-enzyme Q-10 30 mg capsule  Take 30 mg by mouth once daily.     DULoxetine 60 MG capsule  Commonly known as: CYMBALTA  Take 1 capsule (60 mg total) by mouth once daily.     famotidine 20 MG tablet  Commonly known as: PEPCID  Take 20 mg by mouth 2 (two) times daily as needed for Heartburn.     FeroSuL 325 mg (65 mg iron) Tab tablet  Generic drug: ferrous sulfate  Take 325 mg by mouth 2 (two) times daily.     furosemide 40 MG tablet  Commonly known as: LASIX  Take 1 tablet (40 mg total) by mouth 2 (two) times daily as needed (swelling).     gabapentin 600 MG tablet  Commonly known as: NEURONTIN  Take 600 mg by mouth 3 (three) times daily.     hydrOXYzine HCL 25 MG tablet  Commonly known as: ATARAX  Take 1 tablet (25 mg total) by mouth every 6 (six) hours as needed for Itching.      isosorbide mononitrate 60 MG 24 hr tablet  Commonly known as: IMDUR  Take 1 tablet (60 mg total) by mouth once daily.     metoprolol succinate 25 MG 24 hr tablet  Commonly known as: TOPROL-XL  Take 1 tablet (25 mg total) by mouth once daily.     omeprazole 20 MG capsule  Commonly known as: PRILOSEC  Take 20 mg by mouth once daily.     rosuvastatin 40 MG Tab  Commonly known as: CRESTOR  Take 1 tablet (40 mg total) by mouth once daily.     triamcinolone acetonide 0.1% 0.1 % cream  Commonly known as: KENALOG  Apply topically 2 (two) times daily as needed.     VITAMIN D-3 ORAL  Take 1 tablet by mouth once daily.            STOP taking these medications      HAIR-SKIN-NAILS (VIT C-BIOTIN) 50 mg -1,250 mcg Chew  Generic drug: vitamin C-biotin     linezolid 600 mg Tab  Commonly known as: ZYVOX     mupirocin 2 % ointment  Commonly known as: BACTROBAN              Indwelling Lines/Drains at time of discharge:   Lines/Drains/Airways       None                   Time spent on the discharge of patient: 35 minutes         Vilma Abad MD  Department of Hospital Medicine  [facility]

## 2022-09-11 NOTE — PLAN OF CARE
Problem: Adult Inpatient Plan of Care  Goal: Plan of Care Review  9/11/2022 1857 by Evelyn Mosquera RN  Outcome: Met  9/11/2022 0729 by Evelyn Mosquera RN  Outcome: Ongoing, Progressing  Goal: Patient-Specific Goal (Individualized)  Outcome: Met  Goal: Absence of Hospital-Acquired Illness or Injury  Outcome: Met  Goal: Optimal Comfort and Wellbeing  Outcome: Met  Goal: Readiness for Transition of Care  Outcome: Met

## 2022-09-11 NOTE — NURSING
discharge instruction given to patient, patient verbalized understanding, PIV & tely removed, all belongings packed and ready, family will transport patient home.

## 2022-09-11 NOTE — PT/OT/SLP PROGRESS
Occupational Therapy      Patient Name:  Enedelia García   MRN:  [id number]    0915 & 0945: Patient not seen today secondary to significant back pain  (8/10)- medication provided by nurse at first attempt - returned 30 min later and still in pain.  Pt asking for evaluation to take place tomorrow, but states she has been getting up and using the bathroom. Will follow-up at next scheduled visit.    Roseanne Ryan, OT  9/11/2022

## 2022-09-11 NOTE — PLAN OF CARE
Problem: Adult Inpatient Plan of Care  Goal: Plan of Care Review  Outcome: Ongoing, Progressing     Problem: Adult Inpatient Plan of Care  Goal: Patient-Specific Goal (Individualized)  Outcome: Ongoing, Progressing     Problem: Adult Inpatient Plan of Care  Goal: Absence of Hospital-Acquired Illness or Injury  Outcome: Ongoing, Progressing     Problem: Adult Inpatient Plan of Care  Goal: Optimal Comfort and Wellbeing  Outcome: Ongoing, Progressing     Problem: Adult Inpatient Plan of Care  Goal: Readiness for Transition of Care  Outcome: Ongoing, Progressing     Problem: Adjustment to Illness (Sepsis/Septic Shock)  Goal: Optimal Coping  Outcome: Ongoing, Progressing     Problem: Bleeding (Sepsis/Septic Shock)  Goal: Absence of Bleeding  Outcome: Ongoing, Progressing     Problem: Glycemic Control Impaired (Sepsis/Septic Shock)  Goal: Blood Glucose Level Within Desired Range  Outcome: Ongoing, Progressing     Problem: Infection Progression (Sepsis/Septic Shock)  Goal: Absence of Infection Signs and Symptoms  Outcome: Ongoing, Progressing     Problem: Nutrition Impaired (Sepsis/Septic Shock)  Goal: Optimal Nutrition Intake  Outcome: Ongoing, Progressing     Problem: Fall Injury Risk  Goal: Absence of Fall and Fall-Related Injury  Outcome: Ongoing, Progressing     Problem: Hypertension Comorbidity  Goal: Blood Pressure in Desired Range  Outcome: Ongoing, Progressing     Problem: Pain Chronic (Persistent) (Comorbidity Management)  Goal: Acceptable Pain Control and Functional Ability  Outcome: Ongoing, Progressing     Problem: Seizure Disorder Comorbidity  Goal: Maintenance of Seizure Control  Outcome: Ongoing, Progressing     Problem: Skin Injury Risk Increased  Goal: Skin Health and Integrity  Outcome: Ongoing, Progressing    Care plan, labs, and progress notes reviewed.

## 2022-09-11 NOTE — SUBJECTIVE & OBJECTIVE
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   Aerobic cx with laura leonard.   Consult ID for bx mgt  Possible discharge home on PO abx      Review of Systems   Constitutional:  Negative for activity change, chills, fatigue and fever.   Respiratory:  Negative for shortness of breath and wheezing.    Cardiovascular:  Negative for chest pain.   Gastrointestinal:  Negative for abdominal distention and abdominal pain.   Genitourinary:  Negative for dysuria and urgency.   Musculoskeletal:  Positive for arthralgias and joint swelling. Negative for back pain.   Skin:  Positive for color change and wound.   Neurological:  Negative for numbness and headaches.   Psychiatric/Behavioral:  Negative for agitation and confusion.    Objective:     Vital Signs (Most Recent):  Temp: 98.1 °F (36.7 °C) (09/11/22 0803)  Pulse: 83 (09/11/22 0803)  Resp: 17 (09/11/22 0911)  BP: 125/65 (09/11/22 0803)  SpO2: (!) 94 % (09/11/22 0803)   Vital Signs (24h Range):  Temp:  [97.3 °F (36.3 °C)-98.9 °F (37.2 °C)] 98.1 °F (36.7 °C)  Pulse:  [74-85] 83  Resp:  [14-18] 17  SpO2:  [89 %-94 %] 94 %  BP: (102-149)/(53-68) 125/65     Weight: 76.7 kg (169 lb)  Body mass index is 29.01 kg/m².  No intake or output data in the 24 hours ending 09/11/22 0958     Physical Exam  HENT:      Head: Normocephalic and atraumatic.      Nose: Nose normal. No congestion or rhinorrhea.   Eyes:      General:         Right eye: No discharge.   Cardiovascular:      Rate and Rhythm: Normal rate.      Pulses: Normal pulses.   Pulmonary:      Effort: Pulmonary effort is normal.      Breath sounds: No wheezing or rales.   Abdominal:      General: Bowel sounds are normal. There is no distension.      Palpations: There is no mass.   Musculoskeletal:         General: Normal range of motion.      Cervical back: Normal range of motion. No rigidity.      Right lower leg:  No edema.      Left lower leg: No edema.      Comments: Left knee with erythema, swollen with a healing vertical scar with drainage just below the knee cap.   Good ROM.    Skin:     Coloration: Skin is not jaundiced or pale.      Findings: Erythema present.   Neurological:      General: No focal deficit present.      Mental Status: She is alert.   Psychiatric:         Mood and Affect: Mood normal.         Behavior: Behavior normal.       Significant Labs: A1C:   Recent Labs   Lab 06/29/22  1253 08/12/22  1026   HGBA1C 6.4* 5.3       ABGs: No results for input(s): PH, PCO2, HCO3, POCSATURATED, BE, TOTALHB, COHB, METHB, O2HB, POCFIO2, PO2 in the last 48 hours.  Blood Culture:   Recent Labs   Lab 09/09/22  1233   LABBLOO No Growth to date  No Growth to date  No Growth to date  No Growth to date       CBC:   Recent Labs   Lab 09/09/22  1233   WBC 16.53*   HGB 9.5*   HCT 31.1*          CMP:   Recent Labs   Lab 09/09/22  1323 09/09/22  1346     139 139   K 4.0  4.0 4.6     100 101   CO2 31*  31* 30*     108 100   BUN 13  13 13   CREATININE 1.1  1.1 1.1   CALCIUM 9.6  9.6 9.7   PROT 7.0  7.0 6.9   ALBUMIN 3.3*  3.3* 3.2*   BILITOT 0.3  0.3 0.3   ALKPHOS 97  97 95   AST 21  21 20   ALT 13  13 13   ANIONGAP 8  8 8       Lactic Acid:   Recent Labs   Lab 09/09/22  1233 09/09/22  1623   LACTATE 3.6* 1.9       Lipase: No results for input(s): LIPASE in the last 48 hours.  Lipid Panel: No results for input(s): CHOL, HDL, LDLCALC, TRIG, CHOLHDL in the last 48 hours.  Magnesium: No results for input(s): MG in the last 48 hours.  Troponin:   Recent Labs   Lab 09/09/22  1233   TROPONINI 0.009       TSH:   Recent Labs   Lab 04/14/22  0240   TSH 1.991       Urine Culture: No results for input(s): LABURIN in the last 48 hours.  Urine Studies:   Recent Labs   Lab 09/09/22  1317   COLORU Colorless*   APPEARANCEUA Clear   PHUR 5.0   SPECGRAV 1.010   PROTEINUA Negative   GLUCUA Negative   KETONESU  Negative   BILIRUBINUA Negative   OCCULTUA Negative   NITRITE Negative   UROBILINOGEN Negative   LEUKOCYTESUR Negative         Significant Imaging: I have reviewed all pertinent imaging results/findings within the past 24 hours.

## 2022-09-11 NOTE — ASSESSMENT & PLAN NOTE
Infected left knee  Wound culture taken in the clinic  Blood culture  Lactic acid 3.6  Wbc 16.63  Cefepime  Vanc  Consult Orthopedic-left knee aspiration results not consistent with infection of prosthetic joint  -wound over distal incision is a chronic non-healing wound    -recommend continued wound care  - consult wound care  Consult ID for abx mgt

## 2022-09-11 NOTE — PROGRESS NOTES
Discharge orders noted. Additional clinical references attached. Patient's discharge instructions given by bedside RN and reviewed via this VN.  Education provided on new and previous medications, diagnosis, and follow-up appointments.   Patient verbalized understanding and teach back method was used. Patient's ride/transportation home at bedside. All questions answered. Transport to [facility] requested. Floor nurse notified.            09/11/22 4378   Admission   Communication Issues? None   Shift   Virtual Nurse - Rounding Complete  (Discharge)   Virtual Nurse - Patient Verbalized Approval Of Camera Use   Safety/Activity   Patient Rounds bed in low position;call light in patient/parent reach;clutter free environment maintained;visualized patient   Safety Promotion/Fall Prevention side rails raised x 2   Activity Management Sitting at edge of bed - L2   Positioning   Head of Bed (HOB) Positioning HOB elevated

## 2022-09-11 NOTE — PT/OT/SLP PROGRESS
Physical Therapy      Patient Name:  Enedelia García   MRN:  [id number]    Patient not seen today secondary to Other (Comment) (PT/OT attempted to see patient x 2 attempts at 9:15 am and at 9:45 am. Patient refusing to complete PT/OT evaluations today due to being in too much pain at a 8/10 in left knee and low back region. Will attempt tomorrow as able/appropriate.).

## 2022-09-11 NOTE — CONSULTS
[facility] Diseases Consult Note    Primary Attending Physician: Vilma Abad*     Reason for Consult:   Antibiotic management    Assessment (see problem list below):     Enedelia García is a 77 y.o. female with:  1. Left prosthetic knee joint with some signs of inflammation and positive draining sinus wound culture for Staph epidermidis.  Currently off antibiotics at this time.  Patient did receive 1 dose of vancomycin and cefepime.  Patient had prior dosing of vancomycin and piperacillin tazobactam soon after surgery during an emergency department visit.  2. CAD, hypertension, hyperlipidemia, aortic stenosis, seizure, CKD, anemia, status post revision left knee replacement.      Plan:     1. Since patient is set to go home, would recommend no antibiotics at this time with follow-up in Wound Care tomorrow morning as scheduled.  At that time hopefully we will have antibiotic susceptibilities  2. Appointment with Orthopedic surgery as soon as possible to evaluate need for further investigation possible surgery  3. Monitor vitals while at home.  If fever develops, returned to the emergency department.    Discussed at length with patient and family present in the room    Thank you for allowing us to participate in the care of this patient. Please contact me if you have any questions regarding this consult.    Maximo Falcon  [facility] ID  Pager: [phone]    Subjective:      History of Present Illness:  Enedelia García is a 77 y.o. female with CAD, hypertension, hyperlipidemia, aortic stenosis, seizure, CKD, anemia, S/P revision left knee replacement 07/18/2022 presents with left knee wound infection with confusion and chills    07/30/2022 patient presented to emergency department with knee pain.  Patient had 3 day history of the pain with blistering redness and some hallucinations.  Patient had a white count of 18.6.  Patient had hemoglobin of 3.7.  Patient was given 4 units of packed red  blood cells.  Patient was given 1 dose of vancomycin and piperacillin tazobactam.  Patient was evaluated by orthopedics did not feel that this was an infection.  Patient was discharged with follow-up.    08/02/2022 patient seen in orthopedic clinic.  At that time, she was noted to be improved.  Patient was discharged with a 3 month follow-up and compression stocking after staple removal    08/08/2022 patient is seen in Wound Care for right lower extremity wound that was resolved.  The left knee was without evidence of infection.    08/12/2022 patient seen in family Medicine Clinic.  Noted that the left knee was improving.  Also noted that she was struggling with depression and grief processing.    08/22/2022 patient seen in renal clinic.  Noted to have lower extremity swelling and excoriations on her skin.  No mention signs of knee infection.    08/27/2022 patient presented to the emergency department with diffuse itching and rash for several days.  She was worried about bugs.  Patient was evaluated and noted to have some skin excoriations but no other lesions.  Patient was given hydroxyzine and discharged    08/29/2022 patient seen in renal clinic without significant changes documented    09/09/2022 patient seen in Wound Care.  Noted to have left knee incision that was red and warm.  Patient had culture of wound and prescribed linezolid.  According to the , patient has been with fever and fatigue over the past week.  At that time, the patient was noted to have confusion and chills.  Patient had superficial wound culture which is now growing Staph epidermidis.  Because of confusion, she was sent to the emergency department and subsequently admitted.  Blood cultures and joint fluid cultures in process but negative so far.  Patient given 1 dose of vancomycin and cefepime.    Infectious disease was consulted for antibiotic management    Past Medical History:  Past Medical History:   Diagnosis Date    CKD  (chronic kidney disease) stage 4, GFR 15-29 ml/min     Coronary artery disease     Edema     Epilepsy     Hematuria, unspecified     Hematuria, unspecified     Hyperlipidemia     Hypertension     Iron deficiency anemia     Normocytic anemia        Past Surgical History:  Past Surgical History:   Procedure Laterality Date    APPENDECTOMY      BACK SURGERY      CORONARY STENT PLACEMENT      HYSTERECTOMY      REVISION OF KNEE ARTHROPLASTY Left 2022    Procedure: REVISION, ARTHROPLASTY, KNEE;  Surgeon: Stan Catalan MD;  Location: [facility];  Service: Orthopedics;  Laterality: Left;    TONSILLECTOMY         Allergies:  Review of patient's allergies indicates:   Allergen Reactions    Iodinated contrast media Anaphylaxis and Other (See Comments)    Phenergan [promethazine]      Vomiting       Medications:  Reviewed  Antibiotics  Status post vancomycin and cefepime  Family History:  Family History   Problem Relation Age of Onset    Heart disease Mother     Heart disease Father        Social History:  Social History     Tobacco Use    Smoking status: Never    Smokeless tobacco: Never   Substance Use Topics    Alcohol use: No    Drug use: No       Review of Systems   Musculoskeletal:         Left knee drainage   All other systems reviewed and are negative.    Objective:   Last 24 Hour Vital Signs:  BP  Min: 108/53  Max: 156/67  Temp  Av.2 °F (36.8 °C)  Min: 97.3 °F (36.3 °C)  Max: 98.9 °F (37.2 °C)  Pulse  Av  Min: 73  Max: 85  Resp  Av  Min: 15  Max: 18  SpO2  Av.7 %  Min: 89 %  Max: 95 %  No intake/output data recorded.    Physical Exam  Vitals and nursing note reviewed.   HENT:      Head: Normocephalic and atraumatic.      Nose: Nose normal. No congestion or rhinorrhea.      Mouth/Throat:      Mouth: Mucous membranes are moist.      Pharynx: Oropharynx is clear. No oropharyngeal exudate.   Eyes:      Extraocular Movements: Extraocular movements intact.      Conjunctiva/sclera: Conjunctivae normal.       Pupils: Pupils are equal, round, and reactive to light.   Cardiovascular:      Rate and Rhythm: Normal rate.      Heart sounds: Murmur (systolic murmur best heard right upper sternal border) heard.     No friction rub. No gallop.      Comments: Decreased pedal pulses  Pulmonary:      Effort: Pulmonary effort is normal.      Breath sounds: Normal breath sounds. No rales.   Abdominal:      General: Bowel sounds are normal. There is no distension.      Palpations: Abdomen is soft.      Tenderness: There is no abdominal tenderness. There is no guarding.   Genitourinary:     Comments: No Hobbs  Musculoskeletal:      Cervical back: Normal range of motion and neck supple.      Right lower leg: Edema present.      Left lower leg: Edema present.      Comments: Bilateral lower extremity edema left greater than right.  Left knee with draining sinus in the inferior aspect of the original incision site.  Purulent material.  No foul smell.  No erythema or tenderness noted.   Lymphadenopathy:      Cervical: No cervical adenopathy.   Skin:     Findings: Erythema (around the draining sinus) and lesion present.      Comments: Multiple areas of excoriation   Neurological:      General: No focal deficit present.      Mental Status: She is alert and oriented to person, place, and time. Mental status is at baseline.      Sensory: No sensory deficit.      Motor: No weakness.      Coordination: Coordination normal.   Psychiatric:      Comments: Alert and cooperative appropriate     Laboratory Results: reviewed  Most Recent Data:  CBC:   Lab Results   Component Value Date    WBC 11.89 09/11/2022    HGB 9.1 (L) 09/11/2022    HCT 30.1 (L) 09/11/2022     09/11/2022    MCV 90 09/11/2022    RDW 15.1 (H) 09/11/2022     BMP:   Lab Results   Component Value Date     09/09/2022    K 4.6 09/09/2022     09/09/2022    CO2 30 (H) 09/09/2022    BUN 13 09/09/2022     09/09/2022    CALCIUM 9.7 09/09/2022    MG 2.8 (H) 05/22/2021     PHOS 4.0 08/22/2022     LFTs:   Lab Results   Component Value Date    PROT 6.9 09/09/2022    ALBUMIN 3.2 (L) 09/09/2022    BILITOT 0.3 09/09/2022    AST 20 09/09/2022    ALKPHOS 95 09/09/2022    ALT 13 09/09/2022     Urinalysis:   Lab Results   Component Value Date    LABURIN No growth 02/23/2022    COLORU Colorless (A) 09/09/2022    SPECGRAV 1.010 09/09/2022    NITRITE Negative 09/09/2022    KETONESU Negative 09/09/2022    UROBILINOGEN Negative 09/09/2022       Trended Lab Data:  Recent Labs   Lab 09/09/22  1233 09/09/22  1323 09/09/22  1346 09/11/22  1041 09/11/22  1309   WBC 16.53*  --   --  9.38 11.89   HGB 9.5*  --   --  7.7* 9.1*   HCT 31.1*  --   --  25.6* 30.1*     --   --  245 294   MCV 91  --   --  90 90   RDW 14.8*  --   --  15.3* 15.1*   NA  --  139  139 139  --   --    K  --  4.0  4.0 4.6  --   --    CL  --  100  100 101  --   --    CO2  --  31*  31* 30*  --   --    BUN  --  13  13 13  --   --    GLU  --  108  108 100  --   --    PROT  --  7.0  7.0 6.9  --   --    ALBUMIN  --  3.3*  3.3* 3.2*  --   --    BILITOT  --  0.3  0.3 0.3  --   --    AST  --  21  21 20  --   --    ALKPHOS  --  97  97 95  --   --    ALT  --  13  13 13  --   --        Microbiology Data:  Left knee incision site 09/09/2022 Staph epidermidis  Blood culture 09/09/2022 no growth  Left knee fluid 09/09/2022 no growth so far    Other Results:    Radiology:  Reviewed  Left knee x-ray 09/10/2022 left knee prosthesis with mild soft tissue swelling.  No evidence of osteomyelitis  Chest x-ray 09/09/2022 worsening pulmonary edema with interstitial infiltrates    Problem List  Patient Active Problem List   Diagnosis    CAD (coronary artery disease)    H/O esophageal spasm    Hyperlipidemia    Fibromyalgia    GERD (gastroesophageal reflux disease)    Moderate episode of recurrent major depressive disorder    Alteration in skin integrity related to surgical incision    Presence of stent in coronary artery in patient with  coronary artery disease    Caregiver with fatigue    Status post revision of total replacement of left knee    Acquired scoliosis    Aortic valve stenosis    Arthritis    HTN (hypertension)    Osteoarthritis of knee    Chronic low back pain    Generalized anxiety disorder    Stage 4 chronic kidney disease    Traumatic ulcer of right lower leg    Primary osteoarthritis of both first carpometacarpal joints    Risk for falls    Positive MELISA (antinuclear antibody)    Generalized osteoarthritis    Prediabetes    Narcotic drug use    Osteopenia of multiple sites    Blister of right leg without infection, initial encounter    Acute pain of left knee    Decreased range of motion (ROM) of left knee    Decreased strength involving knee joint    Anemia of chronic disease    Sepsis    Infection of left knee     See above for impression and recommendations.

## 2022-09-12 ENCOUNTER — PATIENT OUTREACH (OUTPATIENT)
Dept: ADMINISTRATIVE | Facility: CLINIC | Age: 77
End: 2022-09-12
Payer: MEDICARE

## 2022-09-12 ENCOUNTER — HOSPITAL ENCOUNTER (OUTPATIENT)
Dept: WOUND CARE | Facility: HOSPITAL | Age: 77
Discharge: HOME | End: 2022-09-12
Attending: PREVENTIVE MEDICINE
Payer: MEDICARE

## 2022-09-12 VITALS
SYSTOLIC BLOOD PRESSURE: 161 MMHG | HEART RATE: 79 BPM | BODY MASS INDEX: 28.85 KG/M2 | DIASTOLIC BLOOD PRESSURE: 70 MMHG | WEIGHT: 169 LBS | TEMPERATURE: 98 F | HEIGHT: 64 IN

## 2022-09-12 DIAGNOSIS — Z96.652 STATUS POST REVISION OF TOTAL REPLACEMENT OF LEFT KNEE: ICD-10-CM

## 2022-09-12 DIAGNOSIS — Z96.652 S/P TOTAL KNEE ARTHROPLASTY, LEFT: Primary | ICD-10-CM

## 2022-09-12 DIAGNOSIS — Z96.652 S/P TOTAL KNEE ARTHROPLASTY, LEFT: ICD-10-CM

## 2022-09-12 DIAGNOSIS — A41.9 SEPSIS, DUE TO UNSPECIFIED ORGANISM, UNSPECIFIED WHETHER ACUTE ORGAN DYSFUNCTION PRESENT: ICD-10-CM

## 2022-09-12 DIAGNOSIS — R23.9 ALTERATION IN SKIN INTEGRITY RELATED TO SURGICAL INCISION: Primary | ICD-10-CM

## 2022-09-12 PROBLEM — L08.9: Status: ACTIVE | Noted: 2022-09-12

## 2022-09-12 PROBLEM — S80.252A: Status: ACTIVE | Noted: 2022-09-12

## 2022-09-12 LAB — BACTERIA SPEC AEROBE CULT: ABNORMAL

## 2022-09-12 PROCEDURE — 11042 DBRDMT SUBQ TIS 1ST 20SQCM/<: CPT

## 2022-09-12 NOTE — PROGRESS NOTES
[facility]    Subjective:       Patient ID: Enedelia García is a 77 y.o. female.    Chief Complaint: Non-healing Wound (Left knee)    Patient hospitalized 9/9/22- 9/11/22 for sepsis due to infection of left knee incision. Received 1 dose of Cefepime and 1 of Vancomycin IV. Left knee less inflamed today. Incision debrided today. Patient to begin Zyvox po today and call Ortho clinic for earlier appt. Than scheduled. Wound packed with Iodoform gauze and covered with bordered foam. Will request  wound care services for dressing changes 3 x weekly.  Review of Systems   All other systems reviewed and are negative.      Objective:      Physical Exam       Incision/Site 09/09/22 1100 Left Knee (Active)   09/09/22 1100   Present Prior to Hospital Arrival?: Yes   Side: Left   Location: Knee   Orientation:    Incision Type:    Closure Method:    Additional Comments:    Removal Indication and Assessment:    Wound Outcome:    Removal Indications:    Wound Image   09/12/22 1100   Dressing Appearance Moist drainage 09/12/22 1100   Drainage Amount Moderate 09/12/22 1100   Drainage Characteristics/Odor Serous 09/12/22 1100   Appearance Yellow;Slough;Fibrin;Moist 09/12/22 1100   Yellow (%), Wound Tissue Color 100 % 09/12/22 1100   Periwound Area Swelling;Scar tissue;Pink;Dry 09/12/22 1100   Wound Edges Dehisced 09/12/22 1100   Wound Length (cm) 1 cm 09/12/22 1100   Wound Width (cm) 0.5 cm 09/12/22 1100   Wound Depth (cm) 3.1 cm 09/12/22 1100   Wound Volume (cm^3) 1.55 cm^3 09/12/22 1100   Wound Surface Area (cm^2) 0.5 cm^2 09/12/22 1100   Care Cleansed with:;Sterile normal saline;Debrided 09/12/22 1100   Dressing Applied;Island/border 09/12/22 1100   Packing packed with;gauze, iodoform 09/12/22 1100   Packing Inserted  1 09/12/22 1100   Dressing Change Due 09/14/22 09/12/22 1100         Assessment:         ICD-10-CM ICD-9-CM   1. Alteration in skin integrity related to surgical  incision  R23.9 782.9   2. S/P total knee arthroplasty, left  Z96.652 V43.65   3. Status post revision of total replacement of left knee  Z96.652 V43.65   Patient feeling better today. Short hospital stay with IV antibiotics was needed.      Plan:   Tissue pathology and/or culture taken:  [] Yes [x] No   Sharp debridement performed:   [x] Yes [] No   Labs ordered this visit:   [] Yes [x] No   Imaging ordered this visit:   [] Yes [x] No           Orders Placed This Encounter   Procedures    Change dressing     Left knee surgical wound   Cleanse wound with: Normal saline   Lidocaine: prn   Silver nitrate: prn   Primary dressing: Iodoform packing, Aquacel extra ( CA alginate).  Secondary dressing: 4 x 4 bordered foam  Elevate as much as possible   Frequency: Monday, Wednesday, Friday  Follow-up: Dr. Farias 9/19/22  Home Health: Admit for wound care. Orders as above. Change dressing Monday, Wednesday, Friday, and prn except when in clinic Next visit 9/19/22.    Other:  Rx for Zyvox 9/9/22. Begin Zyvox 9/12/22.  Right LE wound is resolved        Follow up in about 1 week (around 9/19/2022).

## 2022-09-12 NOTE — PROGRESS NOTES
C3 nurse attempted to contact Enedelia García  for a TCC post hospital discharge follow up call. No answer. Left voicemail with callback information. The patient does not have a scheduled HOSFU appointment. Message sent to PCP staff for assistance with scheduling visit with patient.

## 2022-09-13 ENCOUNTER — OFFICE VISIT (OUTPATIENT)
Dept: ORTHOPEDICS | Facility: CLINIC | Age: 77
End: 2022-09-13
Payer: MEDICARE

## 2022-09-13 VITALS
BODY MASS INDEX: 28.85 KG/M2 | HEART RATE: 73 BPM | HEIGHT: 64 IN | SYSTOLIC BLOOD PRESSURE: 112 MMHG | WEIGHT: 169 LBS | DIASTOLIC BLOOD PRESSURE: 50 MMHG

## 2022-09-13 DIAGNOSIS — A41.9 SEPSIS, DUE TO UNSPECIFIED ORGANISM, UNSPECIFIED WHETHER ACUTE ORGAN DYSFUNCTION PRESENT: ICD-10-CM

## 2022-09-13 DIAGNOSIS — T81.89XA NON-HEALING SURGICAL WOUND, INITIAL ENCOUNTER: Primary | ICD-10-CM

## 2022-09-13 LAB — BACTERIA SPEC AEROBE CULT: NO GROWTH

## 2022-09-13 PROCEDURE — 3078F DIAST BP <80 MM HG: CPT | Mod: CPTII,S$GLB,, | Performed by: ORTHOPAEDIC SURGERY

## 2022-09-13 PROCEDURE — 99999 PR PBB SHADOW E&M-EST. PATIENT-LVL IV: CPT | Mod: PBBFAC,,, | Performed by: ORTHOPAEDIC SURGERY

## 2022-09-13 PROCEDURE — 1101F PR PT FALLS ASSESS DOC 0-1 FALLS W/OUT INJ PAST YR: ICD-10-PCS | Mod: CPTII,S$GLB,, | Performed by: ORTHOPAEDIC SURGERY

## 2022-09-13 PROCEDURE — 99999 PR PBB SHADOW E&M-EST. PATIENT-LVL IV: ICD-10-PCS | Mod: PBBFAC,,, | Performed by: ORTHOPAEDIC SURGERY

## 2022-09-13 PROCEDURE — 1159F PR MEDICATION LIST DOCUMENTED IN MEDICAL RECORD: ICD-10-PCS | Mod: CPTII,S$GLB,, | Performed by: ORTHOPAEDIC SURGERY

## 2022-09-13 PROCEDURE — 99024 PR POST-OP FOLLOW-UP VISIT: ICD-10-PCS | Mod: S$GLB,,, | Performed by: ORTHOPAEDIC SURGERY

## 2022-09-13 PROCEDURE — 99024 POSTOP FOLLOW-UP VISIT: CPT | Mod: S$GLB,,, | Performed by: ORTHOPAEDIC SURGERY

## 2022-09-13 PROCEDURE — 3074F SYST BP LT 130 MM HG: CPT | Mod: CPTII,S$GLB,, | Performed by: ORTHOPAEDIC SURGERY

## 2022-09-13 PROCEDURE — 1125F PR PAIN SEVERITY QUANTIFIED, PAIN PRESENT: ICD-10-PCS | Mod: CPTII,S$GLB,, | Performed by: ORTHOPAEDIC SURGERY

## 2022-09-13 PROCEDURE — 3074F PR MOST RECENT SYSTOLIC BLOOD PRESSURE < 130 MM HG: ICD-10-PCS | Mod: CPTII,S$GLB,, | Performed by: ORTHOPAEDIC SURGERY

## 2022-09-13 PROCEDURE — 3078F PR MOST RECENT DIASTOLIC BLOOD PRESSURE < 80 MM HG: ICD-10-PCS | Mod: CPTII,S$GLB,, | Performed by: ORTHOPAEDIC SURGERY

## 2022-09-13 PROCEDURE — 3288F PR FALLS RISK ASSESSMENT DOCUMENTED: ICD-10-PCS | Mod: CPTII,S$GLB,, | Performed by: ORTHOPAEDIC SURGERY

## 2022-09-13 PROCEDURE — 1159F MED LIST DOCD IN RCRD: CPT | Mod: CPTII,S$GLB,, | Performed by: ORTHOPAEDIC SURGERY

## 2022-09-13 PROCEDURE — 1125F AMNT PAIN NOTED PAIN PRSNT: CPT | Mod: CPTII,S$GLB,, | Performed by: ORTHOPAEDIC SURGERY

## 2022-09-13 PROCEDURE — 1101F PT FALLS ASSESS-DOCD LE1/YR: CPT | Mod: CPTII,S$GLB,, | Performed by: ORTHOPAEDIC SURGERY

## 2022-09-13 PROCEDURE — 3288F FALL RISK ASSESSMENT DOCD: CPT | Mod: CPTII,S$GLB,, | Performed by: ORTHOPAEDIC SURGERY

## 2022-09-13 NOTE — PROGRESS NOTES
3rd Attempt made to reach patient for TCC call. Left voicemail please call [phone] leave first name, last name, and  for Aleida I will return your call.       pain management emailed and informed of medications, dosage and provider

## 2022-09-13 NOTE — PROGRESS NOTES
Subjective:      Patient ID: Enedelia García is a 77 y.o. female.    Chief Complaint: Post-op Evaluation and Pain of the Left Knee    Patient is approximately 2 months out from left revision total knee replacement.  She is doing well physical therapy.  There is an area of the distal most aspect of her incision which has not been healing well.  She is in wound care for this.  She was recently admitted to the hospital for UTI as well.    Social History     Occupational History    Not on file   Tobacco Use    Smoking status: Never    Smokeless tobacco: Never   Substance and Sexual Activity    Alcohol use: No    Drug use: No    Sexual activity: Not Currently      Review of Systems   Constitutional: Negative for diaphoresis.   HENT:  Negative for ear discharge, nosebleeds and stridor.    Eyes:  Negative for photophobia.   Cardiovascular:  Negative for syncope.   Respiratory:  Negative for hemoptysis, shortness of breath and wheezing.    Neurological:  Negative for tremors.   Psychiatric/Behavioral: Negative.         Objective:    Ortho/SPM Exam  Left knee:  Well-healed incision proximally.  There was about 1 cm area of poorly here leaving incision inferiorly.  She has pain-free range of motion.      Assessment:       1. Non-healing surgical wound, initial encounter    2. Sepsis, due to unspecified organism, unspecified whether acute organ dysfunction present          Plan:       Aspiration left knee done in the emergency room this past weekend show no evidence of infection.  I would like her to continue her local wound care.  We will see her back approximately 1 month for a repeat check.

## 2022-09-14 LAB — BACTERIA SPEC ANAEROBE CULT: ABNORMAL

## 2022-09-15 LAB
BACTERIA BLD CULT: NORMAL
BACTERIA BLD CULT: NORMAL

## 2022-09-16 ENCOUNTER — HOSPITAL ENCOUNTER (EMERGENCY)
Facility: HOSPITAL | Age: 77
Discharge: LEFT BEFORE DISCHARGE | End: 2022-09-16
Attending: EMERGENCY MEDICINE
Payer: MEDICARE

## 2022-09-16 ENCOUNTER — TELEPHONE (OUTPATIENT)
Dept: ADMINISTRATIVE | Facility: CLINIC | Age: 77
End: 2022-09-16
Payer: MEDICARE

## 2022-09-16 VITALS
SYSTOLIC BLOOD PRESSURE: 144 MMHG | OXYGEN SATURATION: 95 % | RESPIRATION RATE: 20 BRPM | HEART RATE: 55 BPM | TEMPERATURE: 99 F | DIASTOLIC BLOOD PRESSURE: 65 MMHG | WEIGHT: 160 LBS | BODY MASS INDEX: 27.31 KG/M2 | HEIGHT: 64 IN

## 2022-09-16 DIAGNOSIS — R55 SYNCOPE: ICD-10-CM

## 2022-09-16 DIAGNOSIS — R55 SYNCOPE, UNSPECIFIED SYNCOPE TYPE: Primary | ICD-10-CM

## 2022-09-16 LAB
ALBUMIN SERPL BCP-MCNC: 2.8 G/DL (ref 3.5–5.2)
ALP SERPL-CCNC: 78 U/L (ref 55–135)
ALT SERPL W/O P-5'-P-CCNC: 13 U/L (ref 10–44)
AMPHET+METHAMPHET UR QL: NEGATIVE
ANION GAP SERPL CALC-SCNC: 9 MMOL/L (ref 8–16)
AST SERPL-CCNC: 28 U/L (ref 10–40)
BARBITURATES UR QL SCN>200 NG/ML: NEGATIVE
BASOPHILS # BLD AUTO: 0.05 K/UL (ref 0–0.2)
BASOPHILS NFR BLD: 0.7 % (ref 0–1.9)
BENZODIAZ UR QL SCN>200 NG/ML: NEGATIVE
BILIRUB SERPL-MCNC: 0.1 MG/DL (ref 0.1–1)
BILIRUB UR QL STRIP: NEGATIVE
BUN SERPL-MCNC: 18 MG/DL (ref 8–23)
BZE UR QL SCN: NEGATIVE
CALCIUM SERPL-MCNC: 9.3 MG/DL (ref 8.7–10.5)
CANNABINOIDS UR QL SCN: NEGATIVE
CHLORIDE SERPL-SCNC: 102 MMOL/L (ref 95–110)
CLARITY UR: CLEAR
CO2 SERPL-SCNC: 28 MMOL/L (ref 23–29)
COLOR UR: YELLOW
CREAT SERPL-MCNC: 1.2 MG/DL (ref 0.5–1.4)
CREAT UR-MCNC: 65.3 MG/DL (ref 15–325)
CTP QC/QA: YES
DIFFERENTIAL METHOD: ABNORMAL
EOSINOPHIL # BLD AUTO: 0.5 K/UL (ref 0–0.5)
EOSINOPHIL NFR BLD: 6.3 % (ref 0–8)
ERYTHROCYTE [DISTWIDTH] IN BLOOD BY AUTOMATED COUNT: 15.6 % (ref 11.5–14.5)
EST. GFR  (NO RACE VARIABLE): 47 ML/MIN/1.73 M^2
GLUCOSE SERPL-MCNC: 93 MG/DL (ref 70–110)
GLUCOSE UR QL STRIP: NEGATIVE
HCT VFR BLD AUTO: 26 % (ref 37–48.5)
HGB BLD-MCNC: 7.7 G/DL (ref 12–16)
HGB UR QL STRIP: NEGATIVE
IMM GRANULOCYTES # BLD AUTO: 0.02 K/UL (ref 0–0.04)
IMM GRANULOCYTES NFR BLD AUTO: 0.3 % (ref 0–0.5)
KETONES UR QL STRIP: NEGATIVE
LEUKOCYTE ESTERASE UR QL STRIP: NEGATIVE
LYMPHOCYTES # BLD AUTO: 2.2 K/UL (ref 1–4.8)
LYMPHOCYTES NFR BLD: 29.9 % (ref 18–48)
MCH RBC QN AUTO: 27 PG (ref 27–31)
MCHC RBC AUTO-ENTMCNC: 29.6 G/DL (ref 32–36)
MCV RBC AUTO: 91 FL (ref 82–98)
METHADONE UR QL SCN>300 NG/ML: NEGATIVE
MONOCYTES # BLD AUTO: 1.1 K/UL (ref 0.3–1)
MONOCYTES NFR BLD: 14.5 % (ref 4–15)
NEUTROPHILS # BLD AUTO: 3.5 K/UL (ref 1.8–7.7)
NEUTROPHILS NFR BLD: 48.3 % (ref 38–73)
NITRITE UR QL STRIP: NEGATIVE
NRBC BLD-RTO: 0 /100 WBC
OPIATES UR QL SCN: ABNORMAL
PCP UR QL SCN>25 NG/ML: NEGATIVE
PH UR STRIP: 7 [PH] (ref 5–8)
PLATELET # BLD AUTO: 320 K/UL (ref 150–450)
PMV BLD AUTO: 9.1 FL (ref 9.2–12.9)
POTASSIUM SERPL-SCNC: 4.6 MMOL/L (ref 3.5–5.1)
PROT SERPL-MCNC: 6.4 G/DL (ref 6–8.4)
PROT UR QL STRIP: NEGATIVE
RBC # BLD AUTO: 2.85 M/UL (ref 4–5.4)
SARS-COV-2 RDRP RESP QL NAA+PROBE: NEGATIVE
SODIUM SERPL-SCNC: 139 MMOL/L (ref 136–145)
SP GR UR STRIP: 1.01 (ref 1–1.03)
TOXICOLOGY INFORMATION: ABNORMAL
TROPONIN I SERPL DL<=0.01 NG/ML-MCNC: <0.006 NG/ML (ref 0–0.03)
URN SPEC COLLECT METH UR: NORMAL
UROBILINOGEN UR STRIP-ACNC: NEGATIVE EU/DL
WBC # BLD AUTO: 7.25 K/UL (ref 3.9–12.7)

## 2022-09-16 PROCEDURE — 80053 COMPREHEN METABOLIC PANEL: CPT | Performed by: EMERGENCY MEDICINE

## 2022-09-16 PROCEDURE — 93010 EKG 12-LEAD: ICD-10-PCS | Mod: ,,, | Performed by: INTERNAL MEDICINE

## 2022-09-16 PROCEDURE — 93010 ELECTROCARDIOGRAM REPORT: CPT | Mod: ,,, | Performed by: INTERNAL MEDICINE

## 2022-09-16 PROCEDURE — 81003 URINALYSIS AUTO W/O SCOPE: CPT | Mod: 59 | Performed by: EMERGENCY MEDICINE

## 2022-09-16 PROCEDURE — 93005 ELECTROCARDIOGRAM TRACING: CPT

## 2022-09-16 PROCEDURE — 99285 EMERGENCY DEPT VISIT HI MDM: CPT | Mod: 25

## 2022-09-16 PROCEDURE — 80307 DRUG TEST PRSMV CHEM ANLYZR: CPT | Performed by: EMERGENCY MEDICINE

## 2022-09-16 PROCEDURE — 85025 COMPLETE CBC W/AUTO DIFF WBC: CPT | Performed by: EMERGENCY MEDICINE

## 2022-09-16 PROCEDURE — U0002 COVID-19 LAB TEST NON-CDC: HCPCS | Performed by: EMERGENCY MEDICINE

## 2022-09-16 PROCEDURE — 84484 ASSAY OF TROPONIN QUANT: CPT | Performed by: EMERGENCY MEDICINE

## 2022-09-16 NOTE — TELEPHONE ENCOUNTER
Returned call from [facility], patient states she saw Dr. Birmingham on the 12 th and she asked about Home Health for wound care. Patient wanted to know when home health was coming out. She was told by SYDNIE Birmingham to contact N. Patient have an appt today with Dr Birmingham and will speak with him about the HH order. I informed patient that outpt HH was ordered at discharge with Dr Birmingham. Patient will follow up today and decide if she want outpt  wound care or HH wound care.

## 2022-09-16 NOTE — ED PROVIDER NOTES
Encounter Date: 9/16/2022    SCRIBE #1 NOTE: I, Ion Scott Jr, am scribing for, and in the presence of,  Arlene Keller MD. I have scribed the following portions of the note - Other sections scribed: HPI, ROS.     History     Chief Complaint   Patient presents with    Loss of Consciousness     Pt had a syncopal episode at home today. Pt had a recent knee replacement. Pt reports she got weak and dizzy prior to passing out and did hit her head. Pt arrives awake and alert and no complaint of pain on arrival.      Enedelia García is a 77 y.o. female who has a past medical history of chronic kidney disease, coronary artery disease, epilepsy, hyperlipidemia, hypertension, iron deficiency anemia, and normocytic anemia.The patient presents to the ED due to a syncopal episode; onset this morning. Associated symptoms include dizziness and weakness. The patient reports that she was attempting to retrieve something from the refrigerator, when she experienced a syncopal episode; unknown how long the patient was unconscious. Patient hit her head. She mentioned feeling dizziness and weakness prior to the syncopal episode, but no longer has these symptoms. Patient's only complaint is that she is dehydrated. She denies chest pain, cough, fever, and shortness of breath. Patient is allergic to iodinated contrast media and phenergan.       The history is provided by the patient. No  was used.   Review of patient's allergies indicates:   Allergen Reactions    Iodinated contrast media Anaphylaxis and Other (See Comments)    Phenergan [promethazine]      Vomiting     Past Medical History:   Diagnosis Date    CKD (chronic kidney disease) stage 4, GFR 15-29 ml/min     Coronary artery disease     Edema     Epilepsy     Hematuria, unspecified     Hematuria, unspecified     Hyperlipidemia     Hypertension     Iron deficiency anemia     Normocytic anemia      Past Surgical History:   Procedure Laterality Date     APPENDECTOMY      BACK SURGERY      CORONARY STENT PLACEMENT      HYSTERECTOMY      REVISION OF KNEE ARTHROPLASTY Left 7/18/2022    Procedure: REVISION, ARTHROPLASTY, KNEE;  Surgeon: Stan Catalan MD;  Location: [facility];  Service: Orthopedics;  Laterality: Left;    TONSILLECTOMY       Family History   Problem Relation Age of Onset    Heart disease Mother     Heart disease Father      Social History     Tobacco Use    Smoking status: Never    Smokeless tobacco: Never   Substance Use Topics    Alcohol use: No    Drug use: No     Review of Systems   Constitutional:  Negative for fever.   HENT:  Negative for sore throat.    Respiratory:  Negative for cough and shortness of breath.    Cardiovascular:  Negative for chest pain.   Gastrointestinal:  Negative for nausea.   Genitourinary:  Negative for dysuria.   Musculoskeletal:  Negative for back pain.   Skin:  Negative for rash.   Neurological:  Positive for dizziness, syncope and weakness.   Hematological:  Does not bruise/bleed easily.     Physical Exam     Initial Vitals [09/16/22 1259]   BP Pulse Resp Temp SpO2   (!) 99/44 (!) 50 18 98.7 °F (37.1 °C) 95 %      MAP       --         Physical Exam    Nursing note and vitals reviewed.  Constitutional: She appears well-developed and well-nourished.   HENT:   Head: Normocephalic and atraumatic.   Mouth/Throat: Oropharynx is clear and moist.   Eyes: Conjunctivae and EOM are normal. Pupils are equal, round, and reactive to light.   Neck: Neck supple.   Normal range of motion.  Cardiovascular:  Normal rate, regular rhythm, normal heart sounds and intact distal pulses.     Exam reveals no gallop and no friction rub.       No murmur heard.  Pulmonary/Chest: Breath sounds normal.   Abdominal: Abdomen is soft. Bowel sounds are normal. She exhibits no distension. There is no abdominal tenderness. There is no rebound and no guarding.   Musculoskeletal:         General: No tenderness or edema. Normal range of motion.      Cervical back:  Normal range of motion and neck supple.      Comments: Left lower leg with multiple scars and a healing wound to her left knee with packing she the inferior aspect of the wound.  There is some mild erythema around the wound.     Lymphadenopathy:     She has no cervical adenopathy.   Neurological: She is alert and oriented to person, place, and time. She has normal strength and normal reflexes.   Skin: Skin is warm and dry.   Psychiatric: She has a normal mood and affect. Her behavior is normal. Judgment and thought content normal.       ED Course   Procedures  Labs Reviewed   CBC W/ AUTO DIFFERENTIAL - Abnormal; Notable for the following components:       Result Value    RBC 2.85 (*)     Hemoglobin 7.7 (*)     Hematocrit 26.0 (*)     MCHC 29.6 (*)     RDW 15.6 (*)     MPV 9.1 (*)     Mono # 1.1 (*)     All other components within normal limits   COMPREHENSIVE METABOLIC PANEL - Abnormal; Notable for the following components:    Albumin 2.8 (*)     eGFR 47 (*)     All other components within normal limits   DRUG SCREEN PANEL, URINE EMERGENCY - Abnormal; Notable for the following components:    Opiate Scrn, Ur Presumptive Positive (*)     All other components within normal limits    Narrative:     Specimen Source->Urine   TROPONIN I   URINALYSIS, REFLEX TO URINE CULTURE    Narrative:     Specimen Source->Urine   SARS-COV-2 RDRP GENE     EKG Readings: (Independently Interpreted)   Initial: 1312. Rhythm: Normal Sinus Rhythm. Heart Rate: 53. Ectopy: No Ectopy. Conduction: Normal. ST Segments: Normal ST Segments. T Waves: Normal. Clinical Impression: Normal Sinus Rhythm   ECG Results              EKG 12-lead (In process)  Result time 09/16/22 14:25:48      In process by Interface, Lab In [facility] (09/16/22 14:25:48)                   Narrative:    Test Reason : R55,    Vent. Rate : 053 BPM     Atrial Rate : 053 BPM     P-R Int : 182 ms          QRS Dur : 082 ms      QT Int : 470 ms       P-R-T Axes : 051 050 023 degrees      QTc Int : 441 ms    Sinus bradycardia  Otherwise normal ECG  When compared with ECG of 09-SEP-2022 12:21,  Vent. rate has decreased BY  28 BPM    Referred By: [email or web address]   SELF           Confirmed By:                                   Imaging Results              X-Ray Chest AP Portable (Final result)  Result time 09/16/22 14:40:54      Final result by Lui Davis III, MD (09/16/22 14:40:54)                   Impression:    Impression: No acute process seen.      Electronically signed by: Lui Davis MD  Date:    09/16/2022  Time:    14:40               Narrative:    EXAMINATION:  XR CHEST AP PORTABLE    CLINICAL HISTORY:  syncope;    FINDINGS:  Chest one view portable.    Heart size is normal.  There is aortic plaque.  The lungs are clear.  There is DJD.                                       CT Cervical Spine Without Contrast (Final result)  Result time 09/16/22 14:35:46      Final result by Lui Davis III, MD (09/16/22 14:35:46)                   Impression:      No acute process seen.    DJD as above.      Electronically signed by: Lui Davis MD  Date:    09/16/2022  Time:    14:35               Narrative:    EXAMINATION:  CT CERVICAL SPINE WITHOUT CONTRAST    CLINICAL HISTORY:  fall;    FINDINGS:  Intracranial structures are normal.  No soft tissue masses or adenopathy is seen.  There are small thyroid nodules.  Odontoid prevertebral soft tissues and posterior elements are intact.  The craniocervical junction is unremarkable.  Spinous processes are intact.  Facets are well aligned.  Skull base and temporal bones are unremarkable.  No fracture dislocation bone destruction seen.  No acute trauma seen.  The upper lungs are clear.    C2-C3 demonstrates a broad-based disc bulge.  The neural foramina are patent.    C3-C4 demonstrates a mild disc bulge.  The neural foramina are patent.    C4-C5 demonstrates a mild disc bulge.  The neural foramina are are patent on the left with mild narrowing on the  right.    C5-C6 demonstrates disc osteophyte complex at lateralizes towards the right with mild right neural foraminal narrowing.    C6-C7 demonstrates posterior disc osteophyte complex at lateralizes towards the right.  There is moderate right mild left neural foraminal narrowing.    C7-T1 and upper thoracic levels are unremarkable.                                       CT Head Without Contrast (Final result)  Result time 09/16/22 14:33:57      Final result by Kirit White MD (09/16/22 14:33:57)                   Impression:      1. Allowing for motion artifact, no convincing acute intracranial abnormalities.      Electronically signed by: Kirit White MD  Date:    09/16/2022  Time:    14:33               Narrative:    EXAMINATION:  CT HEAD WITHOUT CONTRAST    CLINICAL HISTORY:  Mental status change, unknown cause;    TECHNIQUE:  Low dose axial images were obtained through the head.  Coronal and sagittal reformations were also performed. Contrast was not administered.    COMPARISON:  None.    FINDINGS:  There is motion artifact.    There is generalized cerebral volume loss.  There is hypoattenuation in a periventricular fashion, likely sequela of chronic microvascular ischemic change.  There is no evidence of acute major vascular territory infarct, hemorrhage, or mass.  There is no hydrocephalus.  There are no abnormal extra-axial fluid collections.  The paranasal sinuses and mastoid air cells are clear, and there is no evidence of calvarial fracture.  The visualized soft tissues are unremarkable.                                       Medications - No data to display           Scribe Attestation:   Scribe #1: I performed the above scribed service and the documentation accurately describes the services I performed. I attest to the accuracy of the note.      ED Course as of 09/16/22 1517   Fri Sep 16, 2022   1517 [facility] hospitalist service consulted for admission for syncope.  Wound care nurse consulted for  wound care treatment of her left lower leg [ST]      ED Course User Index  [ST] Arlene Keller MD               I, Arlene Keller, personally performed the services described in this documentation. All medical record entries made by the scribe were at my direction and in my presence.  I have reviewed the chart and agree that the record reflects my personal performance and is accurate and complete. Arlene Keller M.D. 3:17 PM09/16/2022       Clinical Impression:   Final diagnoses:  [R55] Syncope               Arlene Keller MD  09/16/22 0231

## 2022-09-16 NOTE — PROCEDURES
"Debridement    Date/Time: 9/12/2022 10:58 AM  Performed by: Pineda Farias MD  Authorized by: Pineda Farias MD     Time out: Immediately prior to procedure a "time out" was called to verify the correct patient, procedure, equipment, support staff and site/side marked as required.    Consent Done?:  Yes (Written)  Local anesthesia used?: Yes    Local anesthetic:  Topical anesthetic    Wound Details:    Location:  Left knee    Type of Debridement:  Excisional       Length (cm):  1       Area (sq cm):  0.5       Width (cm):  0.5       Percent Debrided (%):  100       Depth (cm):  3.1       Total Area Debrided (sq cm):  0.5    Depth of debridement:  Subcutaneous tissue    Tissue debrided:  Subcutaneous    Devitalized tissue debrided:  Slough, Fibrin and Exudate    Instruments:  Curette    Bleeding:  Minimal  Hemostasis Achieved: Yes    Method Used:  Pressure  Patient tolerance:  Patient tolerated the procedure well with no immediate complications  "

## 2022-09-16 NOTE — H&P
[facility] Emergency [facility] Medicine  History & Physical    Patient Name: Enedelia García  MRN: [id number]  Patient Class: Emergency  Admission Date: 9/16/2022  Attending Physician: No att. providers found   Primary Care Provider: Marichuy Hummel MD         Patient information was obtained from ER records.     Subjective:     Principal Problem:Syncopal episodes    Chief Complaint:   Chief Complaint   Patient presents with    Loss of Consciousness     Pt had a syncopal episode at home today. Pt had a recent knee replacement. Pt reports she got weak and dizzy prior to passing out and did hit her head. Pt arrives awake and alert and no complaint of pain on arrival.         HPI: This is a 76 yo female with a hx of HTN, Stage 4 CKD, Anemia of chronic inflammation who was recently discharged from our facility a few days ago who presented to our facility after a syncopal episode. She is here today with her  who is anxious and slightly confused. From my understanding of events she was bending forward when she passed out and hit her head, lost consciousness. She is unsure of how long she was on the floor. In the ED and CT scan of her Head was done which did not show any acute intracranial abnormalities, her Hgb was significant for 7.7, her HR was in the 70s, EKG did not show evidence of ST elevations or reciprocal changes. The ED provider thought she should be admitted for further workup of her syncopal episode and I accepted the patient to our service for a blood transfusion, and further workup of her syncopal episode. She was adamant about being sent home and said she has another transfusion scheduled for next week. I did explain to her the risks of leaving and given her multiple co-morbidities that it would be her best interest to stay for further workup and evaluation. Risks that were explained was the possibility of another fall, breaking of hip/arm/leg etc, head bleed, cardiac arrest. She voiced her  understanding of these risks and elected to leave AGAINST MEDICAL ADVICE.  I did encourage her to followup closely with her PCP and to come back if she had any other fevers, chills, night sweats, dizziness, headaches, falls, or other acute changes to her health. An AMA form was signed and the patient will not be admitted according to her wishes.        Patient leaving AMA    Assessment/Plan:     * Syncopal episodes  This is a 78 yo female with a hx of HTN, Stage 4 CKD, Anemia of chronic inflammation who was recently discharged from our facility a few days ago who presented to our facility after a syncopal episode. She is here today with her  who is anxious and slightly confused. From my understanding of events she was bending forward when she passed out and hit her head, lost consciousness. She is unsure of how long she was on the floor. In the ED and CT scan of her Head was done which did not show any acute intracranial abnormalities, her Hgb was significant for 7.7, her HR was in the 70s, EKG did not show evidence of ST elevations or reciprocal changes. The ED provider thought she should be admitted for further workup of her syncopal episode and I accepted the patient to our service for a blood transfusion, and further workup of her syncopal episode. She was adamant about being sent home and said she has another transfusion scheduled for next week. I did explain to her the risks of leaving and given her multiple co-morbidities that it would be her best interest to stay for further workup and evaluation. Risks that were explained was the possibility of another fall, breaking of hip/arm/leg etc, head bleed, cardiac arrest. She voiced her understanding of these risks and elected to leave AGAINST MEDICAL ADVICE.  I did encourage her to followup closely with her PCP and to come back if she had any other fevers, chills, night sweats, dizziness, headaches, falls, or other acute changes to her health. An AMA form  was signed and the patient will not be admitted according to her wishes.        VTE Risk Mitigation (From admission, onward)    None             Tenzin Beck DO  Department of Hospital Medicine   [facility] - Emergency Dept

## 2022-09-16 NOTE — HPI
This is a 76 yo female with a hx of HTN, Stage 4 CKD, Anemia of chronic inflammation who was recently discharged from our facility a few days ago who presented to our facility after a syncopal episode. She is here today with her  who is anxious and slightly confused. From my understanding of events she was bending forward when she passed out and hit her head, lost consciousness. She is unsure of how long she was on the floor. In the ED and CT scan of her Head was done which did not show any acute intracranial abnormalities, her Hgb was significant for 7.7, her HR was in the 70s, EKG did not show evidence of ST elevations or reciprocal changes. The ED provider thought she should be admitted for further workup of her syncopal episode and I accepted the patient to our service for a blood transfusion, and further workup of her syncopal episode. She was adamant about being sent home and said she has another transfusion scheduled for next week. I did explain to her the risks of leaving and given her multiple co-morbidities that it would be her best interest to stay for further workup and evaluation. Risks that were explained was the possibility of another fall, breaking of hip/arm/leg etc, head bleed, cardiac arrest. She voiced her understanding of these risks and elected to leave AGAINST MEDICAL ADVICE.  I did encourage her to followup closely with her PCP and to come back if she had any other fevers, chills, night sweats, dizziness, headaches, falls, or other acute changes to her health. An AMA form was signed and the patient will not be admitted according to her wishes.

## 2022-09-16 NOTE — PHARMACY MED REC
"Admission Medication History     The home medication history was taken by Lucy Carbajal CPhT.    Medication history obtained from, Patient Verified    You may go to "Admission" then "Reconcile Home Medications" tabs to review and/or act upon these items.     The home medication list has been updated by the Pharmacy department.   Please read ALL comments highlighted in yellow.   Please address this information as you see fit.    Feel free to contact us if you have any questions or require assistance.      The medications listed below were removed from the home medication list.  Please reorder if appropriate:  Patient reports no longer taking the following medication(s):  Triamcinolone 0.1% cream      Lucy Carbajal CPhT.  Ext [phone]               .        "

## 2022-09-16 NOTE — PROGRESS NOTES
Requested by Dr. Keller to perform wound care to patient's L knee. Pt is established at outpatient wound care with Dr. Farias and treatment plan in place.  Provided wound care as per Dr. Farias- cleansed L knee wound with saline and packed wound with iodoform packing strip, covering with foam dressing.  Pt tolerated dressing change well.  Will place wound care orders for dressing changes 3x/week.

## 2022-09-16 NOTE — ED NOTES
Patient presents to ED via EMS with c/o syncopal episode in which she hit her head this morning. Patient is now fully awake, alert, and oriented with stable vital signs. Patient is ambulatory @ baseline but was recently hospitalized for a left knee replacement. Patient states she felt dizzy prior to the fall.

## 2022-09-16 NOTE — DISCHARGE SUMMARY
This is a 78 yo female with a hx of HTN, Stage 4 CKD, Anemia of chronic inflammation who was recently discharged from our facility a few days ago who presented to our facility after a syncopal episode. She is here today with her  who is anxious and slightly confused. From my understanding of events she was bending forward when she passed out and hit her head, lost consciousness. She is unsure of how long she was on the floor. In the ED and CT scan of her Head was done which did not show any acute intracranial abnormalities, her Hgb was significant for 7.7, her HR was in the 70s, EKG did not show evidence of ST elevations or reciprocal changes. The ED provider thought she should be admitted for further workup of her syncopal episode and I accepted the patient to our service for a blood transfusion, and further workup of her syncopal episode. She was adamant about being sent home and said she has another transfusion scheduled for next week. I did explain to her the risks of leaving and given her multiple co-morbidities that it would be her best interest to stay for further workup and evaluation. Risks that were explained was the possibility of another fall, breaking of hip/arm/leg etc, head bleed, cardiac arrest. She voiced her understanding of these risks and elected to leave AGAINST MEDICAL ADVICE.  I did encourage her to followup closely with her PCP and to come back if she had any other fevers, chills, night sweats, dizziness, headaches, falls, or other acute changes to her health. An AMA form was signed and the patient will not be admitted according to her wishes.

## 2022-09-19 ENCOUNTER — TELEPHONE (OUTPATIENT)
Dept: ADMINISTRATIVE | Facility: CLINIC | Age: 77
End: 2022-09-19
Payer: MEDICARE

## 2022-09-19 ENCOUNTER — TELEPHONE (OUTPATIENT)
Dept: ADMINISTRATIVE | Facility: OTHER | Age: 77
End: 2022-09-19
Payer: MEDICARE

## 2022-09-19 ENCOUNTER — PATIENT OUTREACH (OUTPATIENT)
Dept: ADMINISTRATIVE | Facility: OTHER | Age: 77
End: 2022-09-19
Payer: MEDICARE

## 2022-09-19 LAB — BACTERIA SPEC ANAEROBE CULT: NORMAL

## 2022-09-19 NOTE — PROGRESS NOTES
CHW - Unable to Contact    Community Health Worker to close episode at this time due to three missed attempts for caregiver contact.

## 2022-09-19 NOTE — PROGRESS NOTES
CHW - Unable to Contact    Community Health Worker to close episode at this time due to three missed attempts for patient contact.

## 2022-09-19 NOTE — PROGRESS NOTES
C3 nurse spoke with Enedelia García  for a TCC post hospital discharge follow up call. The patient does not have a scheduled HOSFU appointment with Marichuy Hummel MD  within 5-7 days post hospital discharge date 9/11/22. C3 nurse was unable to schedule HOSFU appointment in [facility].    Message sent to PCP staff requesting they contact patient and schedule follow up appointment.

## 2022-09-20 ENCOUNTER — TELEPHONE (OUTPATIENT)
Dept: INTERNAL MEDICINE | Facility: CLINIC | Age: 77
End: 2022-09-20
Payer: MEDICARE

## 2022-09-21 ENCOUNTER — HOSPITAL ENCOUNTER (OUTPATIENT)
Dept: WOUND CARE | Facility: HOSPITAL | Age: 77
Discharge: HOME | End: 2022-09-21
Attending: PREVENTIVE MEDICINE
Payer: MEDICARE

## 2022-09-21 DIAGNOSIS — T81.89XD SURGICAL WOUND, NON HEALING, SUBSEQUENT ENCOUNTER: Primary | ICD-10-CM

## 2022-09-21 PROCEDURE — 99213 OFFICE O/P EST LOW 20 MIN: CPT

## 2022-09-21 NOTE — PROGRESS NOTES
[facility]    Subjective:       Patient ID: Enedelia García is a 77 y.o. female.    Chief Complaint: Wound Care    Nurse visit for dressing change. No complaints. Continued with plan of care as ordered.  Review of Systems      Objective:      Physical Exam       Incision/Site 09/09/22 1100 Left Knee (Active)   09/09/22 1100   Present Prior to Hospital Arrival?: Yes   Side: Left   Location: Knee   Orientation:    Incision Type:    Closure Method:    Additional Comments:    Removal Indication and Assessment:    Wound Outcome:    Removal Indications:    Dressing Appearance Intact;Moist drainage 09/21/22 1100   Drainage Amount Moderate 09/21/22 1100   Drainage Characteristics/Odor Serosanguineous 09/21/22 1100   Appearance Yellow;Moist;Fibrin 09/21/22 1100   Yellow (%), Wound Tissue Color 100 % 09/21/22 1100   Periwound Area Intact;Dry;Edematous 09/21/22 1100   Wound Edges Open 09/21/22 1100   Wound Length (cm) 1 cm 09/21/22 1100   Wound Width (cm) 0.5 cm 09/21/22 1100   Wound Depth (cm) 3.1 cm 09/21/22 1100   Wound Volume (cm^3) 1.55 cm^3 09/21/22 1100   Wound Surface Area (cm^2) 0.5 cm^2 09/21/22 1100   Care Cleansed with:;Sterile normal saline 09/21/22 1100   Dressing Applied;Island/border 09/21/22 1100   Packing packed with;gauze, iodoform 09/21/22 1100   Packing Inserted  1 09/21/22 1100   Packing Removed 1 09/21/22 1100   Periwound Care Absorptive dressing applied;Dry periwound area maintained 09/21/22 1100   Dressing Change Due 09/23/22 09/21/22 1100         Assessment:       No diagnosis found.      Plan:   Tissue pathology and/or culture taken:  [] Yes [x] No   Sharp debridement performed:   [] Yes [x] No   Labs ordered this visit:   [] Yes [x] No   Imaging ordered this visit:   [] Yes [x] No     Left knee surgical wound   Cleanse wound with: Normal saline   Lidocaine: prn   Silver nitrate: prn   Primary dressing: Iodoform packing, Aquacel extra ( [place]  alginate).   Secondary dressing: 4 x 4 bordered foam   Elevate as much as possible   Frequency: Monday, Wednesday, Friday   Follow-up: Dr. Farias 9/19/22   Home Health: Admit for wound care. Orders as above. Change dressing Monday, Wednesday, Friday, and prn except when in clinic Next visit 9/19/22.     Other:  Rx for Zyvox 9/9/22. Begin Zyvox 9/12/22.   Right LE wound is resolved      No orders of the defined types were placed in this encounter.       Follow up in about 2 days (around 9/23/2022) for .

## 2022-09-26 ENCOUNTER — HOSPITAL ENCOUNTER (OUTPATIENT)
Dept: WOUND CARE | Facility: HOSPITAL | Age: 77
Discharge: HOME | End: 2022-09-26
Attending: PREVENTIVE MEDICINE
Payer: MEDICARE

## 2022-09-26 VITALS
HEART RATE: 69 BPM | DIASTOLIC BLOOD PRESSURE: 63 MMHG | BODY MASS INDEX: 27.31 KG/M2 | SYSTOLIC BLOOD PRESSURE: 113 MMHG | TEMPERATURE: 98 F | HEIGHT: 64 IN | WEIGHT: 160 LBS

## 2022-09-26 DIAGNOSIS — T81.89XD SURGICAL WOUND, NON HEALING, SUBSEQUENT ENCOUNTER: ICD-10-CM

## 2022-09-26 DIAGNOSIS — T81.89XD NON-HEALING SURGICAL WOUND, SUBSEQUENT ENCOUNTER: Primary | ICD-10-CM

## 2022-09-26 DIAGNOSIS — Z96.652 S/P TOTAL KNEE ARTHROPLASTY, LEFT: ICD-10-CM

## 2022-09-26 PROCEDURE — 11043 DBRDMT MUSC&/FSCA 1ST 20/<: CPT

## 2022-09-26 PROCEDURE — 11042 DBRDMT SUBQ TIS 1ST 20SQCM/<: CPT

## 2022-09-26 RX ORDER — COLLAGENASE SANTYL 250 [ARB'U]/G
OINTMENT TOPICAL DAILY
Qty: 30 G | Refills: 1 | Status: SHIPPED | OUTPATIENT
Start: 2022-09-26 | End: 2023-06-22

## 2022-09-26 RX ORDER — LINEZOLID 600 MG/1
600 TABLET, FILM COATED ORAL EVERY 12 HOURS
Qty: 20 TABLET | Refills: 0 | Status: SHIPPED | OUTPATIENT
Start: 2022-09-26 | End: 2022-10-06

## 2022-09-26 NOTE — PROGRESS NOTES
Wound care supply order form and Medical necessity form [facility] and [facility]

## 2022-09-26 NOTE — PROGRESS NOTES
[facility]    Subjective:       Patient ID: Enedelia García is a 77 y.o. female.    Chief Complaint: Non-healing Wound (Left knee)    Wound depth improved. Sharp debridement perform. Santyl and iodoform gauze packed to wound and covered with 4 x 4 bordered foam. Rx for Santyl and Zyvox sent to pharmacy. Demonstrated wound care to patient for daily dressing changes. Verbalizes understanding. Will submit supply order to N. Return in 1 week.  Review of Systems   All other systems reviewed and are negative.      Objective:      Physical Exam       Incision/Site 09/09/22 1100 Left Knee (Active)   09/09/22 1100   Present Prior to Hospital Arrival?: Yes   Side: Left   Location: Knee   Orientation:    Incision Type:    Closure Method:    Additional Comments:    Removal Indication and Assessment:    Wound Outcome:    Removal Indications:    Wound Image   09/26/22 1100   Dressing Appearance Moist drainage 09/26/22 1100   Drainage Amount Moderate 09/26/22 1100   Drainage Characteristics/Odor Serosanguineous 09/26/22 1100   Appearance Red;Yellow;Moist;Slough;Fibrin 09/26/22 1100   Red (%), Wound Tissue Color 10 % 09/26/22 1100   Yellow (%), Wound Tissue Color 90 % 09/26/22 1100   Periwound Area Dry 09/26/22 1100   Wound Edges Dehisced 09/26/22 1100   Wound Length (cm) 1.4 cm 09/26/22 1100   Wound Width (cm) 0.8 cm 09/26/22 1100   Wound Depth (cm) 2.3 cm 09/26/22 1100   Wound Volume (cm^3) 2.576 cm^3 09/26/22 1100   Wound Surface Area (cm^2) 1.12 cm^2 09/26/22 1100   Care Cleansed with:;Sterile normal saline;Debrided 09/26/22 1100   Dressing Applied;Island/border;Other (comment) 09/26/22 1100   Packing packed with;gauze, iodoform 09/26/22 1100   Packing Inserted  1 09/26/22 1100   Packing Removed 1 09/26/22 1100   Periwound Care Skin barrier film applied 09/26/22 1100   Dressing Change Due 09/27/22 09/26/22 1100         Assessment:         ICD-10-CM ICD-9-CM   1.  Non-healing surgical wound, subsequent encounter  T81.89XD V58.89     998.83   2. S/P total knee arthroplasty, left  Z96.652 V43.65   3. Surgical wound, non healing, subsequent encounter  T81.89XD V58.89     998.83       Cellulitis is resolving.  Plan:   Tissue pathology and/or culture taken:  [] Yes [x] No   Sharp debridement performed:   [x] Yes [] No   Labs ordered this visit:   [] Yes [x] No   Imaging ordered this visit:   [] Yes [x] No           Orders Placed This Encounter   Procedures    Change dressing     Left knee surgical wound   Cleanse wound with: Normal saline   Lidocaine: prn   Silver nitrate: prn   Primary dressing: Santyl and Iodoform packing ( mixed in medicine cup)  Secondary dressing: 4 x 4 bordered foam   Elevate as much as possible   Frequency: Daily per patient  Follow-up:  10/3/22      Other:  Rx Zyvox and Santyl 9/26/22. Patient has Iodosorb gauze.    Supplies needed for daily dressing change:  Gloves  4 x 4 gauze  Normal saline  4 x 4 bordered foam  Cotton tipped applicators        Follow up in about 1 week (around 10/3/2022).

## 2022-09-27 ENCOUNTER — TELEPHONE (OUTPATIENT)
Dept: WOUND CARE | Facility: HOSPITAL | Age: 77
End: 2022-09-27
Payer: MEDICARE

## 2022-09-27 NOTE — TELEPHONE ENCOUNTER
Received a voicemail message from Hai with [facility] informing staff that patient is with [facility] home health

## 2022-09-28 ENCOUNTER — TELEPHONE (OUTPATIENT)
Dept: REHABILITATION | Facility: HOSPITAL | Age: 77
End: 2022-09-28
Payer: MEDICARE

## 2022-09-28 NOTE — TELEPHONE ENCOUNTER
Called pt and spoke to her in regards to missed appt today. Pt has a lot going on in her personal life and would like to cancel remaining appts. She will call back if/when she is ready to continue with therapy.

## 2022-09-30 ENCOUNTER — TELEPHONE (OUTPATIENT)
Dept: ADMINISTRATIVE | Facility: CLINIC | Age: 77
End: 2022-09-30
Payer: MEDICARE

## 2022-09-30 ENCOUNTER — PATIENT OUTREACH (OUTPATIENT)
Dept: ADMINISTRATIVE | Facility: OTHER | Age: 77
End: 2022-09-30
Payer: MEDICARE

## 2022-09-30 NOTE — PROCEDURES
"Debridement    Date/Time: 9/26/2022 10:54 AM  Performed by: Pineda Farias MD  Authorized by: Pineda Farias MD     Time out: Immediately prior to procedure a "time out" was called to verify the correct patient, procedure, equipment, support staff and site/side marked as required.    Consent Done?:  Yes (Written)  Local anesthesia used?: Yes    Local anesthetic:  Topical anesthetic    Wound Details:    Location:  Left knee    Type of Debridement:  Excisional       Length (cm):  1.4       Area (sq cm):  1.12       Width (cm):  0.8       Percent Debrided (%):  100       Depth (cm):  2.3       Total Area Debrided (sq cm):  1.12    Depth of debridement:  Muscle/fascia/tendon    Tissue debrided:  Muscle and Subcutaneous    Devitalized tissue debrided:  Slough, Fibrin, Exudate and Callus    Instruments:  Curette    Bleeding:  Minimal  Hemostasis Achieved: Yes    Method Used:  Pressure  Patient tolerance:  Patient tolerated the procedure well with no immediate complications  "

## 2022-10-03 ENCOUNTER — HOSPITAL ENCOUNTER (OUTPATIENT)
Dept: WOUND CARE | Facility: HOSPITAL | Age: 77
Discharge: HOME | End: 2022-10-03
Attending: PREVENTIVE MEDICINE
Payer: MEDICARE

## 2022-10-03 DIAGNOSIS — T81.89XD NON-HEALING SURGICAL WOUND, SUBSEQUENT ENCOUNTER: Primary | ICD-10-CM

## 2022-10-03 DIAGNOSIS — Z96.652 S/P TOTAL KNEE ARTHROPLASTY, LEFT: ICD-10-CM

## 2022-10-03 DIAGNOSIS — T81.89XD SURGICAL WOUND, NON HEALING, SUBSEQUENT ENCOUNTER: ICD-10-CM

## 2022-10-03 PROCEDURE — 11042 DBRDMT SUBQ TIS 1ST 20SQCM/<: CPT

## 2022-10-03 NOTE — PROGRESS NOTES
[facility]    Subjective:       Patient ID: Enedelia García is a 77 y.o. female.    Chief Complaint: Wound Care    Follow up visit. Wound improved. Patient states that she finished antibiotics.  Discussed requesting wound vac with patient to assist wound closure. She voiced understanding. Will continue with same dressing orders until wound vac. F/U in 1 week.  Review of Systems   All other systems reviewed and are negative.      Objective:      Physical Exam       Incision/Site 09/09/22 1100 Left Knee (Active)   09/09/22 1100   Present Prior to Hospital Arrival?: Yes   Side: Left   Location: Knee   Orientation:    Incision Type:    Closure Method:    Additional Comments:    Removal Indication and Assessment:    Wound Outcome:    Removal Indications:    Dressing Appearance Intact;Moist drainage 10/03/22 1157   Drainage Amount Moderate 10/03/22 1157   Drainage Characteristics/Odor Serosanguineous 10/03/22 1157   Appearance Red;Yellow 10/03/22 1157   Red (%), Wound Tissue Color 50 % 10/03/22 1157   Yellow (%), Wound Tissue Color 50 % 10/03/22 1157   Periwound Area Intact;Dry 10/03/22 1157   Wound Edges Irregular 10/03/22 1157   Wound Length (cm) 1.3 cm 10/03/22 1157   Wound Width (cm) 1.1 cm 10/03/22 1157   Wound Depth (cm) 1.7 cm 10/03/22 1157   Wound Volume (cm^3) 2.431 cm^3 10/03/22 1157   Wound Surface Area (cm^2) 1.43 cm^2 10/03/22 1157   Tunneling (depth (cm)/location) 2.8@ 12 10/03/22 1157   Care Cleansed with:;Sterile normal saline 10/03/22 1157   Dressing Applied;Island/border 10/03/22 1157   Packing strip gauze 10/03/22 1157   Periwound Care Dry periwound area maintained 10/03/22 1157   Dressing Change Due 10/05/22 10/03/22 1157         Assessment:         ICD-10-CM ICD-9-CM   1. Non-healing surgical wound, subsequent encounter  T81.89XD V58.89     998.83   2. S/P total knee arthroplasty, left  Z96.652 V43.65   3. Surgical wound, non healing, subsequent  encounter  T81.89XD V58.89     998.83         Plan:   Tissue pathology and/or culture taken:  [] Yes [x] No   Sharp debridement performed:   [x] Yes [] No   Labs ordered this visit:   [] Yes [x] No   Imaging ordered this visit:   [] Yes [x] No           Orders Placed This Encounter   Procedures    Change dressing     Left knee surgical wound   Cleanse wound with: Normal saline   Lidocaine: prn   Silver nitrate: prn   Primary dressing: Santyl and Iodoform packing ( mixed in medicine cup)   Secondary dressing: 4 x 4 bordered foam   Elevate as much as possible   Frequency: Daily per patient   Follow-up:       Request wound vac    Change dressing     Left knee surgical wound:    Cleanse wound with: Normal saline   Lidocaine: prn   Silver nitrate: prn   Primary dressing: Santyl and Iodoform packing ( mixed in medicine cup)   Secondary dressing: 4 x 4 bordered foam   Elevate as much as possible   Frequency: Mon., Wed., Fri. And PRN   Follow-up:  1 week    [facility] to change dressing as ordered above.      Request wound vac        Follow up in about 1 week (around 10/10/2022).

## 2022-10-07 NOTE — PROCEDURES
"Debridement    Date/Time: 10/3/2022 11:00 AM  Performed by: Pineda Farias MD  Authorized by: Pineda Farias MD     Time out: Immediately prior to procedure a "time out" was called to verify the correct patient, procedure, equipment, support staff and site/side marked as required.    Consent Done?:  Yes (Written)  Local anesthesia used?: Yes    Local anesthetic:  Topical anesthetic    Wound Details:    Location:  Left knee    Type of Debridement:  Excisional       Length (cm):  1.3       Area (sq cm):  1.43       Width (cm):  1.1       Percent Debrided (%):  100       Depth (cm):  1.7       Total Area Debrided (sq cm):  1.43    Depth of debridement:  Subcutaneous tissue    Tissue debrided:  Subcutaneous    Devitalized tissue debrided:  Slough, Fibrin, Exudate and Callus    Instruments:  Curette    Bleeding:  Minimal  Hemostasis Achieved: Yes    Method Used:  Pressure  Patient tolerance:  Patient tolerated the procedure well with no immediate complications  "

## 2022-10-10 ENCOUNTER — HOSPITAL ENCOUNTER (OUTPATIENT)
Dept: WOUND CARE | Facility: HOSPITAL | Age: 77
Discharge: HOME | End: 2022-10-10
Attending: SURGERY
Payer: MEDICARE

## 2022-10-10 VITALS
HEIGHT: 64 IN | HEART RATE: 54 BPM | TEMPERATURE: 98 F | BODY MASS INDEX: 27.31 KG/M2 | WEIGHT: 160 LBS | DIASTOLIC BLOOD PRESSURE: 57 MMHG | SYSTOLIC BLOOD PRESSURE: 160 MMHG

## 2022-10-10 DIAGNOSIS — T81.89XA NON-HEALING SURGICAL WOUND, INITIAL ENCOUNTER: Primary | ICD-10-CM

## 2022-10-10 DIAGNOSIS — M00.9 INFECTION OF LEFT KNEE: ICD-10-CM

## 2022-10-10 DIAGNOSIS — T81.89XA SURGICAL WOUND, NON HEALING: ICD-10-CM

## 2022-10-10 DIAGNOSIS — M79.7 FIBROMYALGIA: ICD-10-CM

## 2022-10-10 DIAGNOSIS — K21.9 GASTROESOPHAGEAL REFLUX DISEASE WITHOUT ESOPHAGITIS: ICD-10-CM

## 2022-10-10 DIAGNOSIS — Z96.652 S/P TOTAL KNEE ARTHROPLASTY, LEFT: ICD-10-CM

## 2022-10-10 DIAGNOSIS — I25.10 CORONARY ARTERY DISEASE INVOLVING NATIVE CORONARY ARTERY OF NATIVE HEART WITHOUT ANGINA PECTORIS: ICD-10-CM

## 2022-10-10 PROCEDURE — 11042 DBRDMT SUBQ TIS 1ST 20SQCM/<: CPT

## 2022-10-10 NOTE — PROGRESS NOTES
[facility]    Subjective:       Patient ID: Enedelia García is a 77 y.o. female.    Chief Complaint: Wound Care    HPI  Wound care with Dr. Nielsen for non-healing wound to left knee.  Patient forgot wound vac today.  [facility]  Debrided today.      Review of Systems   All systems were reviewed and are negative, except that mentioned in the HPI.    Objective:      Physical Exam  Constitutional:       Appearance: She is well-developed.   HENT:      Head: Normocephalic and atraumatic.   Eyes:      Pupils: Pupils are equal, round, and reactive to light.   Cardiovascular:      Rate and Rhythm: Normal rate.   Pulmonary:      Effort: Pulmonary effort is normal. No respiratory distress.      Breath sounds: No stridor.   Abdominal:      General: There is no distension.   Musculoskeletal:      Cervical back: Normal range of motion.   Skin:     Comments: See Synopsis for wound details   Neurological:      Mental Status: She is alert and oriented to person, place, and time.          Incision/Site 09/09/22 1100 Left Knee (Active)   09/09/22 1100   Present Prior to Hospital Arrival?: Yes   Side: Left   Location: Knee   Orientation:    Incision Type:    Closure Method:    Additional Comments:    Removal Indication and Assessment:    Wound Outcome:    Removal Indications:    Wound Image   10/10/22 1116   Dressing Appearance Moist drainage 10/10/22 1116   Drainage Amount Small 10/10/22 1116   Drainage Characteristics/Odor Serosanguineous 10/10/22 1116   Red (%), Wound Tissue Color 25 % 10/10/22 1116   Yellow (%), Wound Tissue Color 75 % 10/10/22 1116   Periwound Area Intact;Contracted 10/10/22 1116   Wound Length (cm) 1.2 cm 10/10/22 1116   Wound Width (cm) 1.2 cm 10/10/22 1116   Wound Depth (cm) 0.9 cm 10/10/22 1116   Wound Volume (cm^3) 1.296 cm^3 10/10/22 1116   Wound Surface Area (cm^2) 1.44 cm^2 10/10/22 1116    Undermining (depth (cm)/location) 1.6 @ 12 10/10/22 1116   Care Cleansed with:;Sterile normal saline 10/10/22 1116   Dressing Calcium alginate;Island/border 10/10/22 1116   Periwound Care Absorptive dressing applied 10/10/22 1116   Dressing Change Due 10/17/22 10/10/22 1116         Assessment:         ICD-10-CM ICD-9-CM   1. Non-healing surgical wound, initial encounter  T81.89XA 998.83   2. S/P total knee arthroplasty, left  Z96.652 V43.65   3. Coronary artery disease involving native coronary artery of native heart without angina pectoris  I25.10 414.01   4. Fibromyalgia  M79.7 729.1   5. Gastroesophageal reflux disease without esophagitis  K21.9 530.81   6. Surgical wound, non healing  T81.89XA 998.83   7. Infection of left knee  M00.9 686.9         Plan:   Tissue pathology and/or culture taken:  [] Yes [x] No   Sharp debridement performed:   [x] Yes [] No   Labs ordered this visit:   [] Yes [x] No   Imaging ordered this visit:   [] Yes [x] No           Orders Placed This Encounter   Procedures    Change dressing     Left knee surgical wound:     Cleanse wound with: Normal saline   Lidocaine: prn   Silver nitrate: prn   Primary dressing: Santyl and plain alginate rope  Secondary dressing: 4 x 4 bordered foam   Elevate as much as possible   Frequency:   Follow-up:  1 week 10-17-22    [facility] to apply Wound Vac on Wednesday 10-12-22  Black foam to tunneling and wound bed.    125mmhg continuous therapy      Wound and tunneling decreasing in size.  Follow up in about 1 week (around 10/17/2022).- with Dr. Eller as planned.

## 2022-10-11 LAB — FUNGUS SPEC CULT: NORMAL

## 2022-10-11 NOTE — PROCEDURES
"Debridement    Date/Time: 10/10/2022 10:47 AM  Performed by: Marilee Nielsen DO  Authorized by: Marilee Nielsen DO     Time out: Immediately prior to procedure a "time out" was called to verify the correct patient, procedure, equipment, support staff and site/side marked as required.    Consent Done?:  Yes (Written)  Local anesthesia used?: Yes    Local anesthetic:  Topical anesthetic    Wound Details:    Location:  Left knee    Type of Debridement:  Excisional       Length (cm):  1.2       Area (sq cm):  1.44       Width (cm):  1.2       Percent Debrided (%):  100       Depth (cm):  0.9       Total Area Debrided (sq cm):  1.44    Depth of debridement:  Subcutaneous tissue    Tissue debrided:  Subcutaneous    Devitalized tissue debrided:  Slough, Fibrin, Exudate and Biofilm    Instruments:  Curette    Bleeding:  Minimal  Hemostasis Achieved: Yes    Patient tolerance:  Patient tolerated the procedure well with no immediate complications  "

## 2022-10-17 ENCOUNTER — HOSPITAL ENCOUNTER (OUTPATIENT)
Dept: WOUND CARE | Facility: HOSPITAL | Age: 77
Discharge: HOME | End: 2022-10-17
Attending: PREVENTIVE MEDICINE
Payer: MEDICARE

## 2022-10-17 VITALS
DIASTOLIC BLOOD PRESSURE: 69 MMHG | TEMPERATURE: 99 F | HEIGHT: 64 IN | WEIGHT: 160 LBS | HEART RATE: 69 BPM | BODY MASS INDEX: 27.31 KG/M2 | SYSTOLIC BLOOD PRESSURE: 103 MMHG

## 2022-10-17 DIAGNOSIS — M00.9 INFECTION OF LEFT KNEE: ICD-10-CM

## 2022-10-17 DIAGNOSIS — Z96.652 S/P TOTAL KNEE ARTHROPLASTY, LEFT: ICD-10-CM

## 2022-10-17 DIAGNOSIS — T81.89XD NON-HEALING SURGICAL WOUND, SUBSEQUENT ENCOUNTER: Primary | ICD-10-CM

## 2022-10-17 PROCEDURE — 11042 DBRDMT SUBQ TIS 1ST 20SQCM/<: CPT

## 2022-10-17 PROCEDURE — 97605 NEG PRS WND THER DME<=50SQCM: CPT

## 2022-10-17 NOTE — PROGRESS NOTES
[facility]    Subjective:       Patient ID: Enedelia García is a 77 y.o. female.    Chief Complaint: Wound Care    Follow up related to left knee surgical wound.  No complaints, patient and spouse noted wound improvement. Tolerating wound vac.  Review of Systems   All other systems reviewed and are negative.      Objective:      Physical Exam       Negative Pressure Wound Therapy  10/17/22 1000 Left anterior (Active)   10/17/22 1000   Side: Left   Orientation: anterior   Location: Knee   Additional Comments:    Removal Indication and Assessment:    Location:    SDO Location:    NPWT Type Vacuum Therapy 10/17/22 1000   Therapy Setting NPWT Continuous therapy 10/17/22 1000   Pressure Setting NPWT 125 mmHg 10/17/22 1000   Sponges Inserted NPWT 1 10/17/22 1000   Sponges Removed NPWT 1 10/17/22 1000   General Output (mL) 0 10/17/22 1000            Incision/Site 09/09/22 1100 Left Knee (Active)   09/09/22 1100   Present Prior to Hospital Arrival?: Yes   Side: Left   Location: Knee   Orientation:    Incision Type:    Closure Method:    Additional Comments:    Removal Indication and Assessment:    Wound Outcome:    Removal Indications:    Wound Image   10/17/22 1000   Dressing Appearance Intact;Moist drainage 10/17/22 1000   Drainage Amount Moderate 10/17/22 1000   Drainage Characteristics/Odor Serosanguineous 10/17/22 1000   Appearance Red;Yellow;Moist 10/17/22 1000   Red (%), Wound Tissue Color 30 % 10/17/22 1000   Yellow (%), Wound Tissue Color 70 % 10/17/22 1000   Periwound Area Intact;Scar tissue;Dry 10/17/22 1000   Wound Length (cm) 1.1 cm 10/17/22 1000   Wound Width (cm) 1.1 cm 10/17/22 1000   Wound Depth (cm) 0.4 cm 10/17/22 1000   Wound Volume (cm^3) 0.484 cm^3 10/17/22 1000   Wound Surface Area (cm^2) 1.21 cm^2 10/17/22 1000   Undermining (depth (cm)/location) 2.8cm@1oclock 10/17/22 1000   Care Cleansed with:;Soap and water;Sterile normal saline;Debrided  10/17/22 1000   Dressing Applied;Transparent film;Cast padding;Elastic bandage 10/17/22 1000   Periwound Care Skin barrier film applied 10/17/22 1000   Dressing Change Due 10/19/22 10/17/22 1000         Assessment/Plan:             ICD-10-CM ICD-9-CM   1. Non-healing surgical wound, initial encounter  T81.89XA 998.83   2. Infection of left knee  M00.9 686.9           Tissue pathology and/or culture taken:  [] Yes [x] No   Sharp debridement performed:   [x] Yes [] No   Labs ordered this visit:   [] Yes [x] No   Imaging ordered this visit:   [] Yes [x] No     Wound is improving. Continue NPWT      Orders Placed This Encounter   Procedures    Change dressing     Left knee surgical wound:     Cleanse wound with: Normal saline   Lidocaine: prn   Silver nitrate: prn   Periwound: benzoin, vac drape  Primary dressing: black sponge to tunneling at 1oclock and wound bed  Secondary dressing: vac drape, wound vac, gently secure with cast padding and ace wrap  Wound vac 125mmHg continuous therapy  Elevate as much as possible   Frequency: Monday Wednesday and Friday  Follow-up:  1 week 10-24-22     Other orders: apply border dressing to upper left knee incision site for protection    Home health: continue wound care as above Monday Wednesday and Friday except when patient is in clinic.  Thank you.        Follow up in 1 week (on 10/24/2022) for Dr Farias.                10/17/2022 Nurse's note.  Follow up with Dr Farias related to left knee surgical wound.  No complaints, patient and spouse noted wound improvement.  MD noted wound improvement. Patient tolerated sharp debridement well.  Plan of care updated and reviewed with patient and spouse.  Orders faxed to home health.  Follow up with Dr Farias in 1 week on 10/24/2022.

## 2022-10-20 ENCOUNTER — TELEPHONE (OUTPATIENT)
Dept: FAMILY MEDICINE | Facility: CLINIC | Age: 77
End: 2022-10-20
Payer: MEDICARE

## 2022-10-20 NOTE — TELEPHONE ENCOUNTER
----- Message from Craig Gracia sent at 10/19/2022  3:16 PM CDT -----  Contact: p  Villa[place]:  Needs Medical Advice    Who Called: pt  Would the patient rather a call back or a response via [facility]? Call back  Best Call Back Number: [phone]  Additional Information: Pt. Would like to establish care with the provider pt. Of Dr. Hummel.  And her  sees Dr. Brown

## 2022-10-21 NOTE — PROCEDURES
"Debridement    Date/Time: 10/17/2022 9:45 AM  Performed by: Pineda Farias MD  Authorized by: Pineda Farias MD     Time out: Immediately prior to procedure a "time out" was called to verify the correct patient, procedure, equipment, support staff and site/side marked as required.    Consent Done?:  Yes (Written)  Local anesthesia used?: Yes    Local anesthetic:  Topical anesthetic    Wound Details:    Location:  Left knee    Type of Debridement:  Excisional       Length (cm):  1.1       Area (sq cm):  1.21       Width (cm):  1.1       Percent Debrided (%):  100       Depth (cm):  0.4       Total Area Debrided (sq cm):  1.21    Depth of debridement:  Subcutaneous tissue    Tissue debrided:  Subcutaneous    Devitalized tissue debrided:  Slough, Fibrin, Exudate and Callus    Bleeding:  Minimal  Hemostasis Achieved: Yes    Method Used:  Pressure  Patient tolerance:  Patient tolerated the procedure well with no immediate complications  "

## 2022-10-24 ENCOUNTER — HOSPITAL ENCOUNTER (OUTPATIENT)
Dept: WOUND CARE | Facility: HOSPITAL | Age: 77
Discharge: HOME | End: 2022-10-24
Attending: PREVENTIVE MEDICINE
Payer: MEDICARE

## 2022-10-24 ENCOUNTER — TELEPHONE (OUTPATIENT)
Dept: CARDIOLOGY | Facility: CLINIC | Age: 77
End: 2022-10-24
Payer: MEDICARE

## 2022-10-24 ENCOUNTER — OFFICE VISIT (OUTPATIENT)
Dept: FAMILY MEDICINE | Facility: CLINIC | Age: 77
End: 2022-10-24
Payer: MEDICARE

## 2022-10-24 VITALS
HEART RATE: 69 BPM | DIASTOLIC BLOOD PRESSURE: 68 MMHG | OXYGEN SATURATION: 95 % | TEMPERATURE: 99 F | SYSTOLIC BLOOD PRESSURE: 134 MMHG | WEIGHT: 156.94 LBS | HEIGHT: 64 IN | BODY MASS INDEX: 26.79 KG/M2

## 2022-10-24 VITALS
TEMPERATURE: 97 F | SYSTOLIC BLOOD PRESSURE: 132 MMHG | HEIGHT: 64 IN | DIASTOLIC BLOOD PRESSURE: 62 MMHG | WEIGHT: 156 LBS | HEART RATE: 80 BPM | BODY MASS INDEX: 26.63 KG/M2

## 2022-10-24 DIAGNOSIS — F43.21 ADJUSTMENT DISORDER WITH DEPRESSED MOOD: ICD-10-CM

## 2022-10-24 DIAGNOSIS — Z96.652 STATUS POST REVISION OF TOTAL REPLACEMENT OF LEFT KNEE: ICD-10-CM

## 2022-10-24 DIAGNOSIS — R73.03 PREDIABETES: ICD-10-CM

## 2022-10-24 DIAGNOSIS — I10 PRIMARY HYPERTENSION: Primary | ICD-10-CM

## 2022-10-24 DIAGNOSIS — I25.10 PRESENCE OF STENT IN CORONARY ARTERY IN PATIENT WITH CORONARY ARTERY DISEASE: ICD-10-CM

## 2022-10-24 DIAGNOSIS — Z95.5 PRESENCE OF STENT IN CORONARY ARTERY IN PATIENT WITH CORONARY ARTERY DISEASE: ICD-10-CM

## 2022-10-24 DIAGNOSIS — T81.89XD NON-HEALING SURGICAL WOUND, SUBSEQUENT ENCOUNTER: Primary | ICD-10-CM

## 2022-10-24 DIAGNOSIS — E78.2 MIXED HYPERLIPIDEMIA: ICD-10-CM

## 2022-10-24 DIAGNOSIS — R07.9 CHEST PAIN, UNSPECIFIED TYPE: ICD-10-CM

## 2022-10-24 DIAGNOSIS — Z96.652 S/P TOTAL KNEE ARTHROPLASTY, LEFT: ICD-10-CM

## 2022-10-24 DIAGNOSIS — D50.8 OTHER IRON DEFICIENCY ANEMIA: ICD-10-CM

## 2022-10-24 DIAGNOSIS — M00.9 INFECTION OF LEFT KNEE: ICD-10-CM

## 2022-10-24 PROCEDURE — 3078F PR MOST RECENT DIASTOLIC BLOOD PRESSURE < 80 MM HG: ICD-10-PCS | Mod: CPTII,S$GLB,, | Performed by: INTERNAL MEDICINE

## 2022-10-24 PROCEDURE — 99999 PR PBB SHADOW E&M-EST. PATIENT-LVL V: ICD-10-PCS | Mod: PBBFAC,,, | Performed by: INTERNAL MEDICINE

## 2022-10-24 PROCEDURE — 99499 UNLISTED E&M SERVICE: CPT | Mod: S$GLB,,, | Performed by: INTERNAL MEDICINE

## 2022-10-24 PROCEDURE — 3288F FALL RISK ASSESSMENT DOCD: CPT | Mod: CPTII,S$GLB,, | Performed by: INTERNAL MEDICINE

## 2022-10-24 PROCEDURE — 1125F AMNT PAIN NOTED PAIN PRSNT: CPT | Mod: CPTII,S$GLB,, | Performed by: INTERNAL MEDICINE

## 2022-10-24 PROCEDURE — 1159F PR MEDICATION LIST DOCUMENTED IN MEDICAL RECORD: ICD-10-PCS | Mod: CPTII,S$GLB,, | Performed by: INTERNAL MEDICINE

## 2022-10-24 PROCEDURE — 3288F PR FALLS RISK ASSESSMENT DOCUMENTED: ICD-10-PCS | Mod: CPTII,S$GLB,, | Performed by: INTERNAL MEDICINE

## 2022-10-24 PROCEDURE — 99214 OFFICE O/P EST MOD 30 MIN: CPT | Mod: S$GLB,,, | Performed by: INTERNAL MEDICINE

## 2022-10-24 PROCEDURE — 1160F RVW MEDS BY RX/DR IN RCRD: CPT | Mod: CPTII,S$GLB,, | Performed by: INTERNAL MEDICINE

## 2022-10-24 PROCEDURE — 1159F MED LIST DOCD IN RCRD: CPT | Mod: CPTII,S$GLB,, | Performed by: INTERNAL MEDICINE

## 2022-10-24 PROCEDURE — 1125F PR PAIN SEVERITY QUANTIFIED, PAIN PRESENT: ICD-10-PCS | Mod: CPTII,S$GLB,, | Performed by: INTERNAL MEDICINE

## 2022-10-24 PROCEDURE — 3078F DIAST BP <80 MM HG: CPT | Mod: CPTII,S$GLB,, | Performed by: INTERNAL MEDICINE

## 2022-10-24 PROCEDURE — 99214 PR OFFICE/OUTPT VISIT, EST, LEVL IV, 30-39 MIN: ICD-10-PCS | Mod: S$GLB,,, | Performed by: INTERNAL MEDICINE

## 2022-10-24 PROCEDURE — 1101F PR PT FALLS ASSESS DOC 0-1 FALLS W/OUT INJ PAST YR: ICD-10-PCS | Mod: CPTII,S$GLB,, | Performed by: INTERNAL MEDICINE

## 2022-10-24 PROCEDURE — 3075F SYST BP GE 130 - 139MM HG: CPT | Mod: CPTII,S$GLB,, | Performed by: INTERNAL MEDICINE

## 2022-10-24 PROCEDURE — 3075F PR MOST RECENT SYSTOLIC BLOOD PRESS GE 130-139MM HG: ICD-10-PCS | Mod: CPTII,S$GLB,, | Performed by: INTERNAL MEDICINE

## 2022-10-24 PROCEDURE — 1101F PT FALLS ASSESS-DOCD LE1/YR: CPT | Mod: CPTII,S$GLB,, | Performed by: INTERNAL MEDICINE

## 2022-10-24 PROCEDURE — 1160F PR REVIEW ALL MEDS BY PRESCRIBER/CLIN PHARMACIST DOCUMENTED: ICD-10-PCS | Mod: CPTII,S$GLB,, | Performed by: INTERNAL MEDICINE

## 2022-10-24 PROCEDURE — 11042 DBRDMT SUBQ TIS 1ST 20SQCM/<: CPT

## 2022-10-24 PROCEDURE — 99999 PR PBB SHADOW E&M-EST. PATIENT-LVL V: CPT | Mod: PBBFAC,,, | Performed by: INTERNAL MEDICINE

## 2022-10-24 RX ORDER — NITROGLYCERIN 0.3 MG/1
TABLET SUBLINGUAL
Qty: 30 TABLET | Refills: 0 | Status: SHIPPED | OUTPATIENT
Start: 2022-10-24 | End: 2022-11-16

## 2022-10-24 RX ORDER — HYDROXYZINE HYDROCHLORIDE 25 MG/1
25 TABLET, FILM COATED ORAL EVERY 6 HOURS PRN
Qty: 20 TABLET | Refills: 0 | OUTPATIENT
Start: 2022-10-24 | End: 2022-10-25 | Stop reason: SDUPTHER

## 2022-10-24 RX ORDER — ROSUVASTATIN CALCIUM 40 MG/1
40 TABLET, COATED ORAL DAILY
Qty: 90 TABLET | Refills: 3 | Status: SHIPPED | OUTPATIENT
Start: 2022-10-24 | End: 2024-01-08

## 2022-10-24 RX ORDER — DULOXETIN HYDROCHLORIDE 60 MG/1
60 CAPSULE, DELAYED RELEASE ORAL DAILY
Qty: 90 CAPSULE | Refills: 1 | Status: SHIPPED | OUTPATIENT
Start: 2022-10-24 | End: 2023-07-20

## 2022-10-24 RX ORDER — TIZANIDINE 4 MG/1
4 TABLET ORAL 2 TIMES DAILY PRN
Qty: 60 TABLET | Refills: 2 | Status: SHIPPED | OUTPATIENT
Start: 2022-10-24 | End: 2022-12-22

## 2022-10-24 NOTE — PROGRESS NOTES
[facility]    Subjective:       Patient ID: Enedelia García is a 77 y.o. female.    Chief Complaint: Non-healing Wound (Left knee)    Incision improving. Wound vac d/c'd. Site debrided. Iodoform gauze lightly packed, Aquacel AG, and 5 x 5 bordered foam to incision today. Orders sent to  to change dressing 3 x weekly.  Review of Systems   All other systems reviewed and are negative.      Objective:      Physical Exam       Incision/Site 09/09/22 1100 Left Knee (Active)   09/09/22 1100   Present Prior to Hospital Arrival?: Yes   Side: Left   Location: Knee   Orientation:    Incision Type:    Closure Method:    Additional Comments:    Removal Indication and Assessment:    Wound Outcome:    Removal Indications:    Wound Image   10/24/22 1151   Dressing Appearance Moist drainage 10/24/22 1151   Drainage Amount Moderate 10/24/22 1151   Drainage Characteristics/Odor Serosanguineous 10/24/22 1151   Appearance Red;Yellow;Moist 10/24/22 1151   Red (%), Wound Tissue Color 80 % 10/24/22 1151   Yellow (%), Wound Tissue Color 20 % 10/24/22 1151   Periwound Area Intact;Scar tissue;Pink 10/24/22 1151   Wound Length (cm) 0.8 cm 10/24/22 1151   Wound Width (cm) 0.4 cm 10/24/22 1151   Wound Depth (cm) 0.5 cm 10/24/22 1151   Wound Volume (cm^3) 0.16 cm^3 10/24/22 1151   Wound Surface Area (cm^2) 0.32 cm^2 10/24/22 1151   Undermining (depth (cm)/location) 4 @ 12 10/24/22 1151   Care Cleansed with:;Sterile normal saline;Debrided 10/24/22 1151   Dressing Applied;Calcium alginate;Silver;Island/border 10/24/22 1151   Packing packed with;gauze, iodoform 10/24/22 1151   Periwound Care Skin barrier film applied 10/24/22 1151   Dressing Change Due 10/26/22 10/24/22 1151         Assessment/Plan:         ICD-10-CM ICD-9-CM   1. Non-healing surgical wound, subsequent encounter  T81.89XD V58.89     998.83   2. S/P total knee arthroplasty, left  Z96.652 V43.65   3. Infection of left knee   M00.9 686.9           Tissue pathology and/or culture taken:  [] Yes [x] No   Sharp debridement performed:   [x] Yes [] No   Labs ordered this visit:   [] Yes [x] No   Imaging ordered this visit:   [] Yes [x] No           Orders Placed This Encounter   Procedures    Change dressing     Left knee surgical wound:     Cleanse wound with: Normal saline   Lidocaine: prn   Silver nitrate: prn   Periwound: Cavilon  Primary dressing: Iodoform gauze lightly packed, Aquacel AG  Secondary dressing: 5 x 5 bordered foam  Elevate as much as possible   Frequency: Monday Wednesday and Friday   Follow-up:  11/7/22    Other orders: apply border dressing to upper left knee incision site for protection     Home health: [facility] continue wound care as above Monday Wednesday and Friday except when patient is in clinic. Next visit 11/7/22  Other: Wound vac d/c'd 10/24/22        Follow up in about 2 weeks (around 11/7/2022).

## 2022-10-24 NOTE — PROGRESS NOTES
Subjective:       Patient ID: Enedelia García is a 77 y.o. female.    Chief Complaint: Medication Refill      HPI  Enedelia García is a 77 y.o. female with chronic conditions of CAD, AS, HLD, ALEXANDRO, HTN, CKD-4, Anemia, Prediabetes, Fibromyalgia, OA, S/P Knee replacement and infection needing wound vac who presents today for medication refill.    The patient as her PCP is not available and needs refills.     She had knee replacement and became infected needing wound vac. Wound is improving and hopes to have it removed soon.  CRP was improving.    Had chest pain a few days ago and took 1 NTG. Later was followed by vomiting and diarrhea. She does use NTG occasioally and feels her chest pains are more anxiety related. Her cardiologist is not at [facility] and would like to have doctors in the same system.    Diabetes has been stable with last A1c within normal limits.    Her cardiologist is outside [facility].  Has been having occasional chest pains using nitroglycerin that she releases more associated to anxiety.  Would like to have all doctors at [facility].  His no shortness of breath or leg swelling.    Last labs with anemia. Denies any evidence of bleeding. Has received iron infusions and feels is probably better.    Health Maintenance:  Health Maintenance   Topic Date Due    DEXA Scan  01/20/2025    Lipid Panel  02/23/2027    TETANUS VACCINE  09/09/2031    Hepatitis C Screening  Completed       Review of Systems   Constitutional: Negative.  Negative for fever and unexpected weight change.   HENT: Negative.     Respiratory: Negative.     Cardiovascular:  Positive for chest pain.   Gastrointestinal: Negative.    Genitourinary:  Negative for difficulty urinating.   Musculoskeletal:  Positive for arthralgias.   Integumentary:  Negative.   Neurological: Negative.    Psychiatric/Behavioral:  The patient is nervous/anxious.     Past Medical History:   Diagnosis Date    CKD (chronic kidney disease) stage 4, GFR 15-29  ml/min     Coronary artery disease     Edema     Epilepsy     Hematuria, unspecified     Hematuria, unspecified     Hyperlipidemia     Hypertension     Iron deficiency anemia     Normocytic anemia        Past Surgical History:   Procedure Laterality Date    APPENDECTOMY      BACK SURGERY      CORONARY STENT PLACEMENT      HYSTERECTOMY      REVISION OF KNEE ARTHROPLASTY Left 7/18/2022    Procedure: REVISION, ARTHROPLASTY, KNEE;  Surgeon: Stan Catalan MD;  Location: [facility];  Service: Orthopedics;  Laterality: Left;    TONSILLECTOMY         Family History   Problem Relation Age of Onset    Heart disease Mother     Heart disease Father        Social History     Socioeconomic History    Marital status: Significant Other   Tobacco Use    Smoking status: Never    Smokeless tobacco: Never   Substance and Sexual Activity    Alcohol use: No    Drug use: No    Sexual activity: Not Currently     Social Determinants of Health     Financial Resource Strain: Medium Risk    Difficulty of Paying Living Expenses: Somewhat hard   Food Insecurity: No Food Insecurity    Worried About Running Out of Food in the Last Year: Never true    Ran Out of Food in the Last Year: Never true   Transportation Needs: No Transportation Needs    Lack of Transportation (Medical): No    Lack of Transportation (Non-Medical): No   Physical Activity: Insufficiently Active    Days of Exercise per Week: 3 days    Minutes of Exercise per Session: 20 min   Stress: Stress Concern Present    Feeling of Stress : To some extent   Social Connections: Moderately Isolated    Frequency of Communication with Friends and Family: More than three times a week    Frequency of Social Gatherings with Friends and Family: More than three times a week    Attends Judaism Services: Never    Active Member of Clubs or Organizations: No    Attends Club or Organization Meetings: Never    Marital Status: Living with partner   Housing Stability: Low Risk     Unable to Pay for Housing  in the Last Year: No    Number of Places Lived in the Last Year: 1    Unstable Housing in the Last Year: No       Current Outpatient Medications   Medication Sig Dispense Refill    acetaminophen (TYLENOL) 500 MG tablet Take 500 mg by mouth every 6 (six) hours as needed for Pain.      aspirin (ECOTRIN) 81 MG EC tablet Take 81 mg by mouth once daily.      butalbital-acetaminophen-caff -40 mg Cap Take 1 capsule by mouth as needed (migraine). Take once to twice daily      calcium carbonate/vitamin D3 (VITAMIN D-3 ORAL) Take 1 tablet by mouth once daily.      co-enzyme Q-10 30 mg capsule Take 30 mg by mouth once daily.      collagenase (SANTYL) ointment Apply topically once daily. 30 g 1    DULoxetine (CYMBALTA) 60 MG capsule Take 1 capsule (60 mg total) by mouth once daily. 90 capsule 1    famotidine (PEPCID) 20 MG tablet Take 20 mg by mouth 2 (two) times daily as needed for Heartburn.      FEROSUL 325 mg (65 mg iron) Tab tablet Take 325 mg by mouth 2 (two) times daily.      furosemide (LASIX) 40 MG tablet Take 1 tablet (40 mg total) by mouth 2 (two) times daily as needed (swelling). 90 tablet 1    gabapentin (NEURONTIN) 600 MG tablet Take 600 mg by mouth 3 (three) times daily.      hydrOXYzine HCL (ATARAX) 25 MG tablet Take 1 tablet (25 mg total) by mouth every 6 (six) hours as needed for Itching. 20 tablet 0    irbesartan (AVAPRO) 300 MG tablet Take 300 mg by mouth every evening.      isosorbide mononitrate (IMDUR) 60 MG 24 hr tablet Take 1 tablet (60 mg total) by mouth once daily. 90 tablet 1    metoprolol succinate (TOPROL-XL) 25 MG 24 hr tablet Take 1 tablet (25 mg total) by mouth once daily. 90 tablet 1    multivit-min-iron-FA-lutein (CENTRUM SILVER WOMEN) 8 mg iron-400 mcg-300 mcg Tab Take 1 tablet by mouth once daily.      nitroGLYCERIN (NITROSTAT) 0.3 MG SL tablet PLACE ONE TABLET UNDER THE TONGUE EVERY 5 MINUTES FOR UP TO 3 doses IF NEEDED FOR CHEST PAIN. call 911 IF PAIN persists (Patient taking  differently: 0.3 mg every 5 (five) minutes as needed for Chest pain. call 911 IF PAIN persists) 30 tablet 0    omeprazole (PRILOSEC) 20 MG capsule Take 20 mg by mouth once daily.      oxyCODONE (ROXICODONE) 10 mg Tab immediate release tablet Take 10 mg by mouth 3 (three) times daily.      rosuvastatin (CRESTOR) 40 MG Tab Take 1 tablet (40 mg total) by mouth once daily. 90 tablet 3     No current facility-administered medications for this visit.       Review of patient's allergies indicates:   Allergen Reactions    Iodinated contrast media Anaphylaxis and Other (See Comments)    Phenergan [promethazine]      Vomiting         Objective:       Last 3 sets of Vitals    Vitals - 1 value per visit 10/17/2022 10/24/2022 10/24/2022   SYSTOLIC 103 - 134   DIASTOLIC 69 - 68   Pulse 69 - 69   Temp 98.5 - 99.4   Resp - - -   SPO2 - - 95   Weight (lb) 160 - 156.97   Weight (kg) 72.576 - 71.2   Height 64 - 64   BMI (Calculated) 27.5 - 26.9   VISIT REPORT - - -   Pain Score  - 7 -   Some recent data might be hidden   Physical Exam  Constitutional:       General: She is not in acute distress.     Appearance: Normal appearance.   HENT:      Head: Normocephalic.   Eyes:      General: No scleral icterus.     Extraocular Movements: Extraocular movements intact.      Conjunctiva/sclera: Conjunctivae normal.   Neck:      Vascular: No carotid bruit (Detectable heart murmur in Carotid fields.).      Comments: No goiter.  Cardiovascular:      Rate and Rhythm: Normal rate and regular rhythm.      Pulses: Normal pulses.      Heart sounds: Murmur heard.   Pulmonary:      Effort: Pulmonary effort is normal.      Breath sounds: Normal breath sounds.   Abdominal:      General: Bowel sounds are normal. There is no distension.      Palpations: Abdomen is soft. There is no mass.      Tenderness: There is no abdominal tenderness.   Musculoskeletal:         General: No swelling. Normal range of motion.      Comments: Wound vac in place on left knee.    Lymphadenopathy:      Cervical: No cervical adenopathy.   Skin:     General: Skin is warm and dry.   Neurological:      General: No focal deficit present.      Mental Status: She is alert and oriented to person, place, and time.   Psychiatric:         Mood and Affect: Mood normal.         Behavior: Behavior normal.         CBC:  Recent Labs   Lab 09/11/22  1041 09/11/22  1309 09/16/22  1351   WBC 9.38 11.89 7.25   RBC 2.84 L 3.36 L 2.85 L   Hemoglobin 7.7 L 9.1 L 7.7 L   Hematocrit 25.6 L 30.1 L 26.0 L   Platelets 245 294 320   MCV 90 90 91   MCH 27.1 27.1 27.0   MCHC 30.1 L 30.2 L 29.6 L     CMP:  Recent Labs   Lab 09/09/22  1323 09/09/22  1346 09/16/22  1351   Glucose 108  108 100 93   Calcium 9.6  9.6 9.7 9.3   Albumin 3.3 L  3.3 L 3.2 L 2.8 L   Total Protein 7.0  7.0 6.9 6.4   Sodium 139  139 139 139   Potassium 4.0  4.0 4.6 4.6   CO2 31 H  31 H 30 H 28   Chloride 100  100 101 102   BUN 13  13 13 18   Creatinine 1.1  1.1 1.1 1.2   Alkaline Phosphatase 97  97 95 78   ALT 13  13 13 13   AST 21  21 20 28   Total Bilirubin 0.3  0.3 0.3 0.1     URINALYSIS:  Recent Labs   Lab 07/04/21  1803 02/23/22  1352 04/10/22  0010 04/26/22  1418 07/31/22  0003 08/29/22  1527 09/09/22  1317 09/16/22  1404   Color, UA Yellow   < > Colorless A   < > Yellow Yellow   < > Yellow   Specific Gravity, UA 1.030   < > 1.005   < > 1.010 1.020   < > 1.010   pH, UA 5.0   < > 5.0   < > 6.0 6.0   < > 7.0   Protein, UA 2+ A   < > Negative   < > Negative Negative   < > Negative   Bacteria Few A  --  Rare  --  Rare Rare  --   --    Nitrite, UA Negative   < > Negative   < > Negative Negative   < > Negative   Leukocytes, UA 2+ A   < > 1+ A   < > 1+ A Trace A   < > Negative   Urobilinogen, UA Negative  --  Negative  --  Negative Negative   < > Negative   Hyaline Casts, UA 0  --  8 A  --  5 A  --   --   --     < > = values in this interval not displayed.      LIPIDS:  Recent Labs   Lab 05/22/21  0447 02/23/22  1402 04/14/22  0240   TSH  1.009 1.299 1.991   HDL 55 47  --    Cholesterol 207 H 179  --    Triglycerides 203 H 168 H  --    LDL Cholesterol 111.4 98.4  --    HDL/Cholesterol Ratio 26.6 26.3  --    Non-HDL Cholesterol 152 132  --    Total Cholesterol/HDL Ratio 3.8 3.8  --      TSH:  Recent Labs   Lab 05/22/21  0447 02/23/22  1402 04/14/22  0240   TSH 1.009 1.299 1.991       A1C:  Recent Labs   Lab 05/22/21  0447 02/23/22  1402 03/10/22  1629 06/29/22  1253 08/12/22  1026   Hemoglobin A1C 5.7 H 5.8 H 5.7 H 6.4 H 5.3       Imaging:  X-Ray Chest AP Portable  Narrative: EXAMINATION:  XR CHEST AP PORTABLE    CLINICAL HISTORY:  syncope;    FINDINGS:  Chest one view portable.    Heart size is normal.  There is aortic plaque.  The lungs are clear.  There is DJD.  Impression: Impression: No acute process seen.    Electronically signed by: Lui Davis MD  Date:    09/16/2022  Time:    14:40  CT Cervical Spine Without Contrast  Narrative: EXAMINATION:  CT CERVICAL SPINE WITHOUT CONTRAST    CLINICAL HISTORY:  fall;    FINDINGS:  Intracranial structures are normal.  No soft tissue masses or adenopathy is seen.  There are small thyroid nodules.  Odontoid prevertebral soft tissues and posterior elements are intact.  The craniocervical junction is unremarkable.  Spinous processes are intact.  Facets are well aligned.  Skull base and temporal bones are unremarkable.  No fracture dislocation bone destruction seen.  No acute trauma seen.  The upper lungs are clear.    C2-C3 demonstrates a broad-based disc bulge.  The neural foramina are patent.    C3-C4 demonstrates a mild disc bulge.  The neural foramina are patent.    C4-C5 demonstrates a mild disc bulge.  The neural foramina are are patent on the left with mild narrowing on the right.    C5-C6 demonstrates disc osteophyte complex at lateralizes towards the right with mild right neural foraminal narrowing.    C6-C7 demonstrates posterior disc osteophyte complex at lateralizes towards the right.  There is  moderate right mild left neural foraminal narrowing.    C7-T1 and upper thoracic levels are unremarkable.  Impression: No acute process seen.    DJD as above.    Electronically signed by: Lui Davis MD  Date:    09/16/2022  Time:    14:35  CT Head Without Contrast  Narrative: EXAMINATION:  CT HEAD WITHOUT CONTRAST    CLINICAL HISTORY:  Mental status change, unknown cause;    TECHNIQUE:  Low dose axial images were obtained through the head.  Coronal and sagittal reformations were also performed. Contrast was not administered.    COMPARISON:  None.    FINDINGS:  There is motion artifact.    There is generalized cerebral volume loss.  There is hypoattenuation in a periventricular fashion, likely sequela of chronic microvascular ischemic change.  There is no evidence of acute major vascular territory infarct, hemorrhage, or mass.  There is no hydrocephalus.  There are no abnormal extra-axial fluid collections.  The paranasal sinuses and mastoid air cells are clear, and there is no evidence of calvarial fracture.  The visualized soft tissues are unremarkable.  Impression: 1. Allowing for motion artifact, no convincing acute intracranial abnormalities.    Electronically signed by: Kirit White MD  Date:    09/16/2022  Time:    14:33      Assessment:       1. Primary hypertension    2. Prediabetes    3. Mixed hyperlipidemia    4. Chest pain, unspecified type    5. Presence of stent in coronary artery in patient with coronary artery disease    6. Adjustment disorder with depressed mood    7. Other iron deficiency anemia    8. Status post revision of total replacement of left knee            Plan:       1. Primary hypertension  -     CBC Auto Differential; Future; Expected date: 10/24/2022  -     Comprehensive Metabolic Panel; Future; Expected date: 10/24/2022  -     Lipid Panel; Future; Expected date: 10/24/2022  - stable.  Continue same treatment.    2. Prediabetes  -     Hemoglobin A1C; Future; Expected date:  10/24/2022  -     TSH; Future; Expected date: 10/24/2022  - not on hypoglycemics and controlling with diet.  She lost weight.    3. Mixed hyperlipidemia   - On Crestor.    4. Chest pain, unspecified type  -     atypical and not exertional.  Associates it to anxiety.  - has history of CAD, aortic stenosis, fibromyalgia, anemia.  - nitroGLYCERIN (NITROSTAT) 0.3 MG SL tablet; PLACE ONE TABLET UNDER THE TONGUE EVERY 5 MINUTES FOR UP TO 3 doses IF NEEDED FOR CHEST PAIN. call 911 IF PAIN persists  Dispense: 30 tablet; Refill: 0  -     Ambulatory referral/consult to Cardiology; Future; Expected date: 10/24/2022  -     Lipid Panel; Future; Expected date: 10/24/2022  -     TSH; Future; Expected date: 10/24/2022    5. Presence of stent in coronary artery in patient with coronary artery disease  -     nitroGLYCERIN (NITROSTAT) 0.3 MG SL tablet; PLACE ONE TABLET UNDER THE TONGUE EVERY 5 MINUTES FOR UP TO 3 doses IF NEEDED FOR CHEST PAIN. call 911 IF PAIN persists  Dispense: 30 tablet; Refill: 0  -     rosuvastatin (CRESTOR) 40 MG Tab; Take 1 tablet (40 mg total) by mouth once daily.  Dispense: 90 tablet; Refill: 3  - cardiology evaluation    6. Adjustment disorder with depressed mood  -     DULoxetine (CYMBALTA) 60 MG capsule; Take 1 capsule (60 mg total) by mouth once daily.  Dispense: 90 capsule; Refill: 1  -     hydrOXYzine HCL (ATARAX) 25 MG tablet; Take 1 tablet (25 mg total) by mouth every 6 (six) hours as needed for Itching.  Dispense: 20 tablet; Refill: 0    7. Other iron deficiency anemia  -     CBC Auto Differential; Future; Expected date: 10/24/2022  - reports has been receiving iron infusions    8. Status post revision of total replacement of left knee  -     tiZANidine (ZANAFLEX) 4 MG tablet; Take 1 tablet (4 mg total) by mouth 2 (two) times daily as needed (spasms).  Dispense: 60 tablet; Refill: 2     Health Maintenance Due   Topic Date Due    Shingles Vaccine (2 of 2) 05/04/2022    COVID-19 Vaccine (5 - Booster  for Pfizer series) 06/29/2022    Influenza Vaccine (1) 09/01/2022        Return to clinic as needed or 2 months.    Lauren Brown MD  [facility]  Disclaimer:  This note has been generated using voice-recognition software. There may be grammatical or spelling errors that have been missed during proof-reading

## 2022-10-24 NOTE — TELEPHONE ENCOUNTER
----- Message from Jovita Butler sent at 10/24/2022 12:00 PM CDT -----  Patient has a referral placed by Dr. Brown, can you please assist in scheduling, diagnosis is    I25.10 (ICD-10-CM) - Coronary artery disease involving native coronary artery of native heart without angina pectoris  R07.9 (ICD-10-CM) - Chest pain, unspecified type

## 2022-10-25 DIAGNOSIS — F43.21 ADJUSTMENT DISORDER WITH DEPRESSED MOOD: ICD-10-CM

## 2022-10-25 RX ORDER — HYDROXYZINE HYDROCHLORIDE 25 MG/1
25 TABLET, FILM COATED ORAL EVERY 6 HOURS PRN
Qty: 20 TABLET | Refills: 0 | Status: SHIPPED | OUTPATIENT
Start: 2022-10-25 | End: 2023-01-03

## 2022-10-25 NOTE — TELEPHONE ENCOUNTER
----- Message from Nelly De La Garza sent at 10/25/2022  9:31 AM CDT -----  Type:  RX Refill Request    Who Called: pt  Refill or New Rx:refill   RX Name and Strength:hydrOXYzine HCL (ATARAX) 25 MG tablet  Preferred Pharmacy with phone number:[facility] - [place], [place] - [phone]   Local or Mail Order:local   Ordering Provider:guerda  Would the patient rather a call back or a response via [facility]? Call back  Best Call Back Number:[phone]  Additional Information:     Caller stated the pharmacy don't have it

## 2022-10-28 ENCOUNTER — TELEPHONE (OUTPATIENT)
Dept: ENDOSCOPY | Facility: HOSPITAL | Age: 77
End: 2022-10-28
Payer: MEDICARE

## 2022-10-28 NOTE — TELEPHONE ENCOUNTER
We are trying to schedule a colonoscopy for Enedelia García and she states she is taking a blood thinner but does not know the name, before we can schedule we would need to hold the blood thinner she is on with the recommended number of days. Sent to PCP Lauren Brown

## 2022-10-31 ENCOUNTER — OFFICE VISIT (OUTPATIENT)
Dept: CARDIOLOGY | Facility: CLINIC | Age: 77
End: 2022-10-31
Payer: MEDICARE

## 2022-10-31 VITALS
HEIGHT: 64 IN | BODY MASS INDEX: 28.17 KG/M2 | SYSTOLIC BLOOD PRESSURE: 115 MMHG | HEART RATE: 76 BPM | WEIGHT: 165 LBS | DIASTOLIC BLOOD PRESSURE: 74 MMHG | OXYGEN SATURATION: 98 %

## 2022-10-31 DIAGNOSIS — R09.89 BILATERAL CAROTID BRUITS: ICD-10-CM

## 2022-10-31 DIAGNOSIS — I25.10 CORONARY ARTERY DISEASE INVOLVING NATIVE CORONARY ARTERY OF NATIVE HEART WITHOUT ANGINA PECTORIS: Primary | ICD-10-CM

## 2022-10-31 DIAGNOSIS — I50.32 CHRONIC DIASTOLIC HEART FAILURE: ICD-10-CM

## 2022-10-31 DIAGNOSIS — I35.0 NONRHEUMATIC AORTIC VALVE STENOSIS: ICD-10-CM

## 2022-10-31 DIAGNOSIS — R07.9 CHEST PAIN, UNSPECIFIED TYPE: ICD-10-CM

## 2022-10-31 PROCEDURE — 3078F DIAST BP <80 MM HG: CPT | Mod: CPTII,S$GLB,, | Performed by: INTERNAL MEDICINE

## 2022-10-31 PROCEDURE — 3074F PR MOST RECENT SYSTOLIC BLOOD PRESSURE < 130 MM HG: ICD-10-PCS | Mod: CPTII,S$GLB,, | Performed by: INTERNAL MEDICINE

## 2022-10-31 PROCEDURE — 1159F PR MEDICATION LIST DOCUMENTED IN MEDICAL RECORD: ICD-10-PCS | Mod: CPTII,S$GLB,, | Performed by: INTERNAL MEDICINE

## 2022-10-31 PROCEDURE — 3074F SYST BP LT 130 MM HG: CPT | Mod: CPTII,S$GLB,, | Performed by: INTERNAL MEDICINE

## 2022-10-31 PROCEDURE — 99499 UNLISTED E&M SERVICE: CPT | Mod: S$GLB,,, | Performed by: INTERNAL MEDICINE

## 2022-10-31 PROCEDURE — 99214 OFFICE O/P EST MOD 30 MIN: CPT | Mod: S$GLB,,, | Performed by: INTERNAL MEDICINE

## 2022-10-31 PROCEDURE — 99214 PR OFFICE/OUTPT VISIT, EST, LEVL IV, 30-39 MIN: ICD-10-PCS | Mod: S$GLB,,, | Performed by: INTERNAL MEDICINE

## 2022-10-31 PROCEDURE — 1160F RVW MEDS BY RX/DR IN RCRD: CPT | Mod: CPTII,S$GLB,, | Performed by: INTERNAL MEDICINE

## 2022-10-31 PROCEDURE — 99999 PR PBB SHADOW E&M-EST. PATIENT-LVL IV: CPT | Mod: PBBFAC,,, | Performed by: INTERNAL MEDICINE

## 2022-10-31 PROCEDURE — 1160F PR REVIEW ALL MEDS BY PRESCRIBER/CLIN PHARMACIST DOCUMENTED: ICD-10-PCS | Mod: CPTII,S$GLB,, | Performed by: INTERNAL MEDICINE

## 2022-10-31 PROCEDURE — 99999 PR PBB SHADOW E&M-EST. PATIENT-LVL IV: ICD-10-PCS | Mod: PBBFAC,,, | Performed by: INTERNAL MEDICINE

## 2022-10-31 PROCEDURE — 1159F MED LIST DOCD IN RCRD: CPT | Mod: CPTII,S$GLB,, | Performed by: INTERNAL MEDICINE

## 2022-10-31 PROCEDURE — 3078F PR MOST RECENT DIASTOLIC BLOOD PRESSURE < 80 MM HG: ICD-10-PCS | Mod: CPTII,S$GLB,, | Performed by: INTERNAL MEDICINE

## 2022-10-31 NOTE — PROGRESS NOTES
[facility]     SUBJECTIVE:     History of Present Illness:  Patient is a 77 y.o. female presents with CAD. Since last visit, dealing with left knee infection and low blood count   Primary Diagnosis:   1. Hypertension  2. DM: none  3. Past smoker: over 10 years ago   4. Heart disease: In 2000, had one stent placed at [facility]. IN 2005, had 2 stents placed. - unknown anatomy     ROS     Since last visit in 8/22  A. Last week was stressed out; was lying in bed and felt something and took a NTG.   B. No shortness of breath at rest; no PND or orthoopnea   C. Rare palpitations   D. Blood pressures normal at home   E. No chest pains   Review of patient's allergies indicates:   Allergen Reactions    Iodinated contrast media Anaphylaxis and Other (See Comments)    Phenergan [promethazine]      Vomiting       Past Medical History:   Diagnosis Date    CKD (chronic kidney disease) stage 4, GFR 15-29 ml/min     Coronary artery disease     Edema     Epilepsy     Hematuria, unspecified     Hematuria, unspecified     Hyperlipidemia     Hypertension     Iron deficiency anemia     Normocytic anemia        Past Surgical History:   Procedure Laterality Date    APPENDECTOMY      BACK SURGERY      CORONARY STENT PLACEMENT      HYSTERECTOMY      REVISION OF KNEE ARTHROPLASTY Left 7/18/2022    Procedure: REVISION, ARTHROPLASTY, KNEE;  Surgeon: Stan Catalan MD;  Location: [facility] OR;  Service: Orthopedics;  Laterality: Left;    TONSILLECTOMY         Family History   Problem Relation Age of Onset    Heart disease Mother     Heart disease Father        Social History     Tobacco Use    Smoking status: Never    Smokeless tobacco: Never   Substance Use Topics    Alcohol use: No    Drug use: No        Past Hospitalization:   3/22: weakness and diarrhea  7/22: surgery for left knee    7/30/22: ER visit for left knee pain and swelling ; fell in the ER   8/27/22: itching   9/9/22: infection left knee of tissue   9/16/22: syncope ; BP low ; Hgb  7.7  Home meds:  Current Outpatient Medications on File Prior to Visit   Medication Sig Dispense Refill    acetaminophen (TYLENOL) 500 MG tablet Take 500 mg by mouth every 6 (six) hours as needed for Pain.      aspirin (ECOTRIN) 81 MG EC tablet Take 81 mg by mouth once daily.      butalbital-acetaminophen-caff -40 mg Cap Take 1 capsule by mouth as needed (migraine). Take once to twice daily      calcium carbonate/vitamin D3 (VITAMIN D-3 ORAL) Take 1 tablet by mouth once daily.      co-enzyme Q-10 30 mg capsule Take 30 mg by mouth once daily.      collagenase (SANTYL) ointment Apply topically once daily. 30 g 1    DULoxetine (CYMBALTA) 60 MG capsule Take 1 capsule (60 mg total) by mouth once daily. 90 capsule 1    famotidine (PEPCID) 20 MG tablet Take 20 mg by mouth 2 (two) times daily as needed for Heartburn.      FEROSUL 325 mg (65 mg iron) Tab tablet Take 325 mg by mouth 2 (two) times daily.      furosemide (LASIX) 40 MG tablet Take 1 tablet (40 mg total) by mouth 2 (two) times daily as needed (swelling). 90 tablet 1    gabapentin (NEURONTIN) 600 MG tablet Take 600 mg by mouth 3 (three) times daily.      hydrOXYzine HCL (ATARAX) 25 MG tablet Take 1 tablet (25 mg total) by mouth every 6 (six) hours as needed for Itching. 20 tablet 0    irbesartan (AVAPRO) 300 MG tablet Take 300 mg by mouth every evening.      isosorbide mononitrate (IMDUR) 60 MG 24 hr tablet Take 1 tablet (60 mg total) by mouth once daily. 90 tablet 1    metoprolol succinate (TOPROL-XL) 25 MG 24 hr tablet Take 1 tablet (25 mg total) by mouth once daily. 90 tablet 1    multivit-min-iron-FA-lutein (CENTRUM SILVER WOMEN) 8 mg iron-400 mcg-300 mcg Tab Take 1 tablet by mouth once daily.      nitroGLYCERIN (NITROSTAT) 0.3 MG SL tablet PLACE ONE TABLET UNDER THE TONGUE EVERY 5 MINUTES FOR UP TO 3 doses IF NEEDED FOR CHEST PAIN. call 911 IF PAIN persists 30 tablet 0    omeprazole (PRILOSEC) 20 MG capsule Take 20 mg by mouth once daily.      oxyCODONE  (ROXICODONE) 10 mg Tab immediate release tablet Take 10 mg by mouth 3 (three) times daily.      rosuvastatin (CRESTOR) 40 MG Tab Take 1 tablet (40 mg total) by mouth once daily. 90 tablet 3    tiZANidine (ZANAFLEX) 4 MG tablet Take 1 tablet (4 mg total) by mouth 2 (two) times daily as needed (spasms). 60 tablet 2     No current facility-administered medications on file prior to visit.       Cardiac meds:  Metoprolol succinate 25 mg  Imdur 60 mg  Irbesartan 300 mg  Lasix 40 mg  rosuvastatin 40 mg   ASA 81 mg       OBJECTIVE:     Vital Signs (Most Recent)  Pulse: 76 (10/31/22 1058)  BP: 115/74 (10/31/22 1058)  SpO2: 98 % (10/31/22 1058)      Physical Exam:    Neck: decreased  Carotids upstroke ,basilar  bruits; normal JVP  Lungs :clear  Heart: RR, normal S1,S2, systolic ejection murmur base to LSB, no gallops  Abd: no masses; no bruits;   Exts: normal DP and PT pulses bilaterally, trace  edema noted           LABS      CBC  Hemoglobin (g/dL)   Date Value   09/16/2022 7.7 (L)   09/11/2022 9.1 (L)   09/11/2022 7.7 (L)     Hematocrit (%)   Date Value   09/16/2022 26.0 (L)   09/11/2022 30.1 (L)   09/11/2022 25.6 (L)          BMP  Sodium (mmol/L)   Date Value   09/16/2022 139   09/09/2022 139   09/09/2022 139   09/09/2022 139     Potassium (mmol/L)   Date Value   09/16/2022 4.6   09/09/2022 4.6   09/09/2022 4.0   09/09/2022 4.0     CO2 (mmol/L)   Date Value   09/16/2022 28   09/09/2022 30 (H)   09/09/2022 31 (H)   09/09/2022 31 (H)     Chloride (mmol/L)   Date Value   09/16/2022 102   09/09/2022 101   09/09/2022 100   09/09/2022 100     BUN (mg/dL)   Date Value   09/16/2022 18   09/09/2022 13   09/09/2022 13   09/09/2022 13     Creatinine (mg/dL)   Date Value   09/16/2022 1.2   09/09/2022 1.1   09/09/2022 1.1   09/09/2022 1.1     Glucose (mg/dL)   Date Value   09/16/2022 93   09/09/2022 100   09/09/2022 108   09/09/2022 108     eGFR if non African American (mL/min/1.73 m^2)   Date Value   07/30/2022 27 (A)   07/21/2022 33  (A)   2022 49 (A)       BNP  BNP (pg/mL)   Date Value   2022 165 (H)   2021 110 (H)   2013 175 (H)       Troponin panel:   No results found for: TROPONIN      COAGS  INR (no units)   Date Value   2022 1.0   03/10/2022 1.0   2013 1.0     aPTT (sec)   Date Value   2013 25.7         Lipid panel:    Lab Results   Component Value Date    CHOL 179 2022    CHOL 207 (H) 2021     Lab Results   Component Value Date    HDL 47 2022    HDL 55 2021     Lab Results   Component Value Date    LDLCALC 98.4 2022    LDLCALC 111.4 2021     Lab Results   Component Value Date    TRIG 168 (H) 2022    TRIG 203 (H) 2021     Lab Results   Component Value Date    CHOLHDL 26.3 2022    CHOLHDL 26.6 2021     GFR : GFR 47; Hgb: 7 yvon   Diagnostic Results:    1.EK21: NSR; normal   1b. EK/22: NSR: normal     2.Echo: 21: normal EF, diastolic dysfunction; mild TR; PAS 43; MIGDALIA 1.18 cm2 with aortic sclerosis     3.Nuclear stress: : negative for ischemia; normal EF   3b.Nuclear stress : EF normal; negative for ischemia     Chart review:  GFR 27 1 month ago    ASSESSMENT/PLAN:     1. CAD: s/p stent placement - asymptomatic now - recent negative stress test   2. Chronic diastolic dysfunction  3. Hypertension controlled  4. Moderate aortic stenosis at the most  5. Last set of lipids - .4;   6. CKD 3 - improved GFR with latest one being high 40's   7. Anemia with Hgb 7.9 - was 11 yvon in April   Plan: 1.echocardiogram   2. US carotids for bruits  3. Continue all the medications  4. Return 4 months        Patricia Rogers MD

## 2022-11-02 ENCOUNTER — TELEPHONE (OUTPATIENT)
Dept: ENDOSCOPY | Facility: HOSPITAL | Age: 77
End: 2022-11-02
Payer: MEDICARE

## 2022-11-04 ENCOUNTER — TELEPHONE (OUTPATIENT)
Dept: ENDOSCOPY | Facility: HOSPITAL | Age: 77
End: 2022-11-04
Payer: MEDICARE

## 2022-11-04 NOTE — PROCEDURES
"Debridement    Date/Time: 10/24/2022 11:00 AM  Performed by: Pineda Farias MD  Authorized by: Pineda Farias MD     Time out: Immediately prior to procedure a "time out" was called to verify the correct patient, procedure, equipment, support staff and site/side marked as required.    Consent Done?:  Yes (Written)  Local anesthesia used?: Yes    Local anesthetic:  Topical anesthetic    Wound Details:    Location:  Left knee    Type of Debridement:  Excisional       Length (cm):  0.8       Area (sq cm):  0.32       Width (cm):  0.4       Percent Debrided (%):  100       Depth (cm):  0.5       Total Area Debrided (sq cm):  0.32    Depth of debridement:  Subcutaneous tissue    Tissue debrided:  Subcutaneous    Devitalized tissue debrided:  Slough, Fibrin and Exudate    Instruments:  Curette    Bleeding:  Minimal  Hemostasis Achieved: Yes    Method Used:  Pressure  Patient tolerance:  Patient tolerated the procedure well with no immediate complications  "

## 2022-11-07 ENCOUNTER — HOSPITAL ENCOUNTER (OUTPATIENT)
Dept: WOUND CARE | Facility: HOSPITAL | Age: 77
Discharge: HOME | End: 2022-11-07
Attending: PREVENTIVE MEDICINE
Payer: MEDICARE

## 2022-11-07 VITALS
HEART RATE: 60 BPM | BODY MASS INDEX: 28.17 KG/M2 | TEMPERATURE: 98 F | WEIGHT: 165 LBS | SYSTOLIC BLOOD PRESSURE: 161 MMHG | HEIGHT: 64 IN | DIASTOLIC BLOOD PRESSURE: 67 MMHG

## 2022-11-07 DIAGNOSIS — M00.9 INFECTION OF LEFT KNEE: ICD-10-CM

## 2022-11-07 DIAGNOSIS — Z96.652 S/P TOTAL KNEE ARTHROPLASTY, LEFT: ICD-10-CM

## 2022-11-07 DIAGNOSIS — T81.89XD NON-HEALING SURGICAL WOUND, SUBSEQUENT ENCOUNTER: Primary | ICD-10-CM

## 2022-11-07 PROCEDURE — 99212 OFFICE O/P EST SF 10 MIN: CPT

## 2022-11-07 NOTE — PROGRESS NOTES
[facility]    Subjective:       Patient ID: Enedelia García is a 77 y.o. female.    Chief Complaint: Wound Care    Patient to wound care center for left knee wound, improving.  No new concerns, continue with same plan of care.   Home Health discharged, patient supply order requested.     Review of Systems   All other systems reviewed and are negative.      Objective:      Physical Exam       Incision/Site 09/09/22 1100 Left Knee (Active)   09/09/22 1100   Present Prior to Hospital Arrival?: Yes   Side: Left   Location: Knee   Orientation:    Incision Type:    Closure Method:    Additional Comments:    Removal Indication and Assessment:    Wound Outcome:    Removal Indications:    Wound Image   11/07/22 1433   Dressing Appearance Intact;Moist drainage 11/07/22 1433   Drainage Amount Moderate 11/07/22 1433   Drainage Characteristics/Odor Serosanguineous 11/07/22 1433   Appearance Intact;Pink;Red;Moist 11/07/22 1433   Red (%), Wound Tissue Color 100 % 11/07/22 1433   Periwound Area Intact 11/07/22 1433   Wound Length (cm) 1.1 cm 11/07/22 1433   Wound Width (cm) 1 cm 11/07/22 1433   Wound Depth (cm) 0.4 cm 11/07/22 1433   Wound Volume (cm^3) 0.44 cm^3 11/07/22 1433   Wound Surface Area (cm^2) 1.1 cm^2 11/07/22 1433   Undermining (depth (cm)/location) 2.8 @ 1 11/07/22 1433   Care Cleansed with:;Antimicrobial agent;Soap and water;Sterile normal saline 11/07/22 1433   Dressing Applied;Changed;Island/border 11/07/22 1433   Packing other (see comment) 11/07/22 1433   Packing Inserted  1 11/07/22 1433   Packing Removed 1 11/07/22 1433   Periwound Care Absorptive dressing applied;Skin barrier film applied 11/07/22 1433   Dressing Change Due 11/16/22 11/07/22 1433         Assessment/Plan:         ICD-10-CM ICD-9-CM   1. Non-healing surgical wound, subsequent encounter  T81.89XD V58.89     998.83   2. S/P total knee arthroplasty, left  Z96.652 V43.65   3. Infection of left  knee  M00.9 686.9       Wound improving. No signs of infection or necrosis.    Tissue pathology and/or culture taken:  [] Yes [x] No   Sharp debridement performed:   [] Yes [x] No   Labs ordered this visit:   [] Yes [x] No   Imaging ordered this visit:   [] Yes [x] No           Orders Placed This Encounter   Procedures    Change dressing     Left knee surgical wound:     Cleanse wound with: Normal saline   Lidocaine: prn   Silver nitrate: prn   Periwound: Cavilon   Primary dressing: Iodoform gauze or antimicrobial packing lightly packed, Aquacel AG   Secondary dressing: 5 x 5 bordered foam   Elevate as much as possible   Frequency: Monday Wednesday and Friday   Follow-up:  1121/22         Home health: [facility], patient educated on dressing changes.       Supplies needed for at home use:    Saline  Gauze  Iodoform 1/4 inch packing  Aquacel AG  Border Foam  Cotton tipped applicators        Follow up in about 2 weeks (around 11/21/2022).

## 2022-11-15 ENCOUNTER — TELEPHONE (OUTPATIENT)
Dept: ENDOSCOPY | Facility: HOSPITAL | Age: 77
End: 2022-11-15
Payer: MEDICARE

## 2022-11-15 NOTE — TELEPHONE ENCOUNTER
Endoscopy Scheduling Questionnaire:         Are you taking any blood thinners? No               If Yes  Have you been on them for longer than one year?     2. Have you been diagnosed with Diverticulitis in past three months?      3. Are you on dialysis or have Kidney Disease? Yes    4. Previous Colonoscopy?  Yes          If yes Do you have a history of colon polyps?  No       6. Are you a diabetic?  No    7. Do you have a history of constipation?  Yes      Procedure scheduled with Dr. Ridley  on  1/17/2022    The prep being used is Extended Golytely Prep     The patient's prep instructions were sent by Mail

## 2022-11-17 ENCOUNTER — LAB VISIT (OUTPATIENT)
Dept: LAB | Facility: HOSPITAL | Age: 77
End: 2022-11-17
Attending: INTERNAL MEDICINE
Payer: MEDICARE

## 2022-11-17 DIAGNOSIS — R07.9 CHEST PAIN, UNSPECIFIED TYPE: ICD-10-CM

## 2022-11-17 DIAGNOSIS — D50.8 OTHER IRON DEFICIENCY ANEMIA: ICD-10-CM

## 2022-11-17 DIAGNOSIS — R73.03 PREDIABETES: ICD-10-CM

## 2022-11-17 DIAGNOSIS — I10 PRIMARY HYPERTENSION: ICD-10-CM

## 2022-11-17 LAB
ALBUMIN SERPL BCP-MCNC: 3.6 G/DL (ref 3.5–5.2)
ALP SERPL-CCNC: 77 U/L (ref 55–135)
ALT SERPL W/O P-5'-P-CCNC: 17 U/L (ref 10–44)
ANION GAP SERPL CALC-SCNC: 6 MMOL/L (ref 8–16)
AST SERPL-CCNC: 17 U/L (ref 10–40)
BASOPHILS # BLD AUTO: 0.02 K/UL (ref 0–0.2)
BASOPHILS NFR BLD: 0.2 % (ref 0–1.9)
BILIRUB SERPL-MCNC: 0.3 MG/DL (ref 0.1–1)
BUN SERPL-MCNC: 24 MG/DL (ref 8–23)
CALCIUM SERPL-MCNC: 10.2 MG/DL (ref 8.7–10.5)
CHLORIDE SERPL-SCNC: 104 MMOL/L (ref 95–110)
CHOLEST SERPL-MCNC: 193 MG/DL (ref 120–199)
CHOLEST/HDLC SERPL: 3.9 {RATIO} (ref 2–5)
CO2 SERPL-SCNC: 30 MMOL/L (ref 23–29)
CREAT SERPL-MCNC: 0.9 MG/DL (ref 0.5–1.4)
DIFFERENTIAL METHOD: ABNORMAL
EOSINOPHIL # BLD AUTO: 0 K/UL (ref 0–0.5)
EOSINOPHIL NFR BLD: 0 % (ref 0–8)
ERYTHROCYTE [DISTWIDTH] IN BLOOD BY AUTOMATED COUNT: 17.9 % (ref 11.5–14.5)
EST. GFR  (NO RACE VARIABLE): >60 ML/MIN/1.73 M^2
ESTIMATED AVG GLUCOSE: 114 MG/DL (ref 68–131)
GLUCOSE SERPL-MCNC: 95 MG/DL (ref 70–110)
HBA1C MFR BLD: 5.6 % (ref 4–5.6)
HCT VFR BLD AUTO: 36.2 % (ref 37–48.5)
HDLC SERPL-MCNC: 50 MG/DL (ref 40–75)
HDLC SERPL: 25.9 % (ref 20–50)
HGB BLD-MCNC: 11.4 G/DL (ref 12–16)
IMM GRANULOCYTES # BLD AUTO: 0.01 K/UL (ref 0–0.04)
IMM GRANULOCYTES NFR BLD AUTO: 0.1 % (ref 0–0.5)
LDLC SERPL CALC-MCNC: 112.8 MG/DL (ref 63–159)
LYMPHOCYTES # BLD AUTO: 1.9 K/UL (ref 1–4.8)
LYMPHOCYTES NFR BLD: 21.9 % (ref 18–48)
MCH RBC QN AUTO: 27.9 PG (ref 27–31)
MCHC RBC AUTO-ENTMCNC: 31.5 G/DL (ref 32–36)
MCV RBC AUTO: 89 FL (ref 82–98)
MONOCYTES # BLD AUTO: 0.9 K/UL (ref 0.3–1)
MONOCYTES NFR BLD: 9.7 % (ref 4–15)
NEUTROPHILS # BLD AUTO: 6 K/UL (ref 1.8–7.7)
NEUTROPHILS NFR BLD: 68.1 % (ref 38–73)
NONHDLC SERPL-MCNC: 143 MG/DL
NRBC BLD-RTO: 0 /100 WBC
PLATELET # BLD AUTO: 300 K/UL (ref 150–450)
PMV BLD AUTO: 9 FL (ref 9.2–12.9)
POTASSIUM SERPL-SCNC: 4.4 MMOL/L (ref 3.5–5.1)
PROT SERPL-MCNC: 7.1 G/DL (ref 6–8.4)
RBC # BLD AUTO: 4.08 M/UL (ref 4–5.4)
SODIUM SERPL-SCNC: 140 MMOL/L (ref 136–145)
TRIGL SERPL-MCNC: 151 MG/DL (ref 30–150)
TSH SERPL DL<=0.005 MIU/L-ACNC: 0.5 UIU/ML (ref 0.4–4)
WBC # BLD AUTO: 8.74 K/UL (ref 3.9–12.7)

## 2022-11-17 PROCEDURE — 36415 COLL VENOUS BLD VENIPUNCTURE: CPT | Performed by: INTERNAL MEDICINE

## 2022-11-17 PROCEDURE — 80053 COMPREHEN METABOLIC PANEL: CPT | Performed by: INTERNAL MEDICINE

## 2022-11-17 PROCEDURE — 84443 ASSAY THYROID STIM HORMONE: CPT | Performed by: INTERNAL MEDICINE

## 2022-11-17 PROCEDURE — 85025 COMPLETE CBC W/AUTO DIFF WBC: CPT | Performed by: INTERNAL MEDICINE

## 2022-11-17 PROCEDURE — 80061 LIPID PANEL: CPT | Performed by: INTERNAL MEDICINE

## 2022-11-17 PROCEDURE — 83036 HEMOGLOBIN GLYCOSYLATED A1C: CPT | Performed by: INTERNAL MEDICINE

## 2022-11-21 ENCOUNTER — HOSPITAL ENCOUNTER (OUTPATIENT)
Dept: WOUND CARE | Facility: HOSPITAL | Age: 77
Discharge: HOME | End: 2022-11-21
Attending: PREVENTIVE MEDICINE
Payer: MEDICARE

## 2022-11-21 VITALS
TEMPERATURE: 98 F | SYSTOLIC BLOOD PRESSURE: 137 MMHG | HEART RATE: 63 BPM | HEIGHT: 64 IN | BODY MASS INDEX: 28.17 KG/M2 | WEIGHT: 165 LBS | DIASTOLIC BLOOD PRESSURE: 63 MMHG

## 2022-11-21 DIAGNOSIS — M00.9 INFECTION OF LEFT KNEE: ICD-10-CM

## 2022-11-21 DIAGNOSIS — T81.89XD NON-HEALING SURGICAL WOUND, SUBSEQUENT ENCOUNTER: Primary | ICD-10-CM

## 2022-11-21 DIAGNOSIS — Z96.652 S/P TOTAL KNEE ARTHROPLASTY, LEFT: ICD-10-CM

## 2022-11-21 PROCEDURE — 99213 OFFICE O/P EST LOW 20 MIN: CPT

## 2022-11-21 NOTE — PROGRESS NOTES
[facility]    Subjective:       Patient ID: Enedelia García is a 77 y.o. female.    Chief Complaint: Non-healing Wound (Left knee)     Measurements improved. Nicol to wound bed applied. Instructed patient on dressing changes 3 x weekly.  Review of Systems   All other systems reviewed and are negative.      Objective:      Physical Exam       Incision/Site 09/09/22 1100 Left Knee (Active)   09/09/22 1100   Present Prior to Hospital Arrival?: Yes   Side: Left   Location: Knee   Orientation:    Incision Type:    Closure Method:    Additional Comments:    Removal Indication and Assessment:    Wound Outcome:    Removal Indications:    Wound Image   11/21/22 1315   Dressing Appearance Moist drainage 11/21/22 1315   Drainage Amount Moderate 11/21/22 1315   Drainage Characteristics/Odor Serosanguineous 11/21/22 1315   Appearance Red;Moist 11/21/22 1315   Red (%), Wound Tissue Color 100 % 11/21/22 1315   Periwound Area Dry 11/21/22 1315   Wound Edges Dehisced 11/21/22 1315   Wound Length (cm) 0.5 cm 11/21/22 1315   Wound Width (cm) 0.4 cm 11/21/22 1315   Wound Depth (cm) 0.3 cm 11/21/22 1315   Wound Volume (cm^3) 0.06 cm^3 11/21/22 1315   Wound Surface Area (cm^2) 0.2 cm^2 11/21/22 1315   Undermining (depth (cm)/location) 0.5 @ 12 11/21/22 1315   Care Cleansed with:;Sterile normal saline 11/21/22 1315   Dressing Applied;Collagen;Silver 11/21/22 1315   Packing other (see comment) 11/21/22 1315   Packing Inserted  1 11/21/22 1315   Packing Removed 1 11/21/22 1315   Periwound Care Dry periwound area maintained 11/21/22 1315   Dressing Change Due 11/23/22 11/21/22 1315         Assessment/Plan:         ICD-10-CM ICD-9-CM   1. Non-healing surgical wound, subsequent encounter  T81.89XD V58.89     998.83   2. S/P total knee arthroplasty, left  Z96.652 V43.65   3. Infection of left knee  M00.9 686.9       Wound improving. Unable to pack.    Tissue pathology and/or culture taken:   [] Yes [x] No   Sharp debridement performed:   [] Yes [x] No   Labs ordered this visit:   [] Yes [x] No   Imaging ordered this visit:   [] Yes [x] No     Orders Placed This Encounter   Procedures    Change dressing     Left knee surgical wound:     Cleanse wound with: Normal saline   Lidocaine: prn   Silver nitrate: prn   Periwound: Cavilon   Primary dressing: Nicol  Secondary dressing: 5 x 5 bordered foam   Elevate as much as possible   Frequency: Monday Wednesday and Friday   Follow-up:  12/5/22        Home health: [facility], patient educated on dressing changes.       Supplies needed for at home use:     Saline   Gauze   Iodoform 1/4 inch packing   Aquacel AG   Border Foam   Cotton tipped applicators    Change dressing     Left knee surgical wound:     Cleanse wound with: Normal saline   Lidocaine: prn   Silver nitrate: prn   Periwound: Cavilon   Primary dressing: Nicol   Secondary dressing: 5 x 5 bordered foam   Elevate as much as possible   Frequency: Monday Wednesday and Friday   Follow-up:  12/5/22         Home health: [facility], patient educated on dressing changes.       Supplies needed for at home use:     Saline   Gauze  Nicol AG  Border Foam   Cotton tipped applicators              Follow up in about 2 weeks (around 12/5/2022).

## 2022-11-22 ENCOUNTER — OFFICE VISIT (OUTPATIENT)
Dept: NEUROLOGY | Facility: CLINIC | Age: 77
End: 2022-11-22
Payer: MEDICARE

## 2022-11-22 ENCOUNTER — TELEPHONE (OUTPATIENT)
Dept: NEUROLOGY | Facility: CLINIC | Age: 77
End: 2022-11-22
Payer: MEDICARE

## 2022-11-22 ENCOUNTER — TELEPHONE (OUTPATIENT)
Dept: NEUROLOGY | Facility: CLINIC | Age: 77
End: 2022-11-22

## 2022-11-22 DIAGNOSIS — G43.709 CHRONIC MIGRAINE WITHOUT AURA WITHOUT STATUS MIGRAINOSUS, NOT INTRACTABLE: Primary | ICD-10-CM

## 2022-11-22 DIAGNOSIS — R55 SYNCOPE, UNSPECIFIED SYNCOPE TYPE: ICD-10-CM

## 2022-11-22 DIAGNOSIS — R56.9 SEIZURE-LIKE ACTIVITY: ICD-10-CM

## 2022-11-22 PROCEDURE — 1159F PR MEDICATION LIST DOCUMENTED IN MEDICAL RECORD: ICD-10-PCS | Mod: CPTII,95,, | Performed by: PSYCHIATRY & NEUROLOGY

## 2022-11-22 PROCEDURE — 99204 OFFICE O/P NEW MOD 45 MIN: CPT | Mod: 95,,, | Performed by: PSYCHIATRY & NEUROLOGY

## 2022-11-22 PROCEDURE — 99204 PR OFFICE/OUTPT VISIT, NEW, LEVL IV, 45-59 MIN: ICD-10-PCS | Mod: 95,,, | Performed by: PSYCHIATRY & NEUROLOGY

## 2022-11-22 PROCEDURE — 1160F RVW MEDS BY RX/DR IN RCRD: CPT | Mod: CPTII,95,, | Performed by: PSYCHIATRY & NEUROLOGY

## 2022-11-22 PROCEDURE — 1159F MED LIST DOCD IN RCRD: CPT | Mod: CPTII,95,, | Performed by: PSYCHIATRY & NEUROLOGY

## 2022-11-22 PROCEDURE — 1160F PR REVIEW ALL MEDS BY PRESCRIBER/CLIN PHARMACIST DOCUMENTED: ICD-10-PCS | Mod: CPTII,95,, | Performed by: PSYCHIATRY & NEUROLOGY

## 2022-11-22 NOTE — TELEPHONE ENCOUNTER
I left a VM asking patient to call back to schedule her f/u appt and discuss EEG scheduling instructions.    ----- Message from Tru Curry DO sent at 11/22/2022  1:32 PM CST -----  Regarding: scheduling  Please reach out to this patient by phone and help schedule a 2 month follow-up via telemedicine with me.    She also has an EEG ordered.  Please provide her with more information about how that scheduling works while on the phone with her.

## 2022-11-22 NOTE — TELEPHONE ENCOUNTER
I called Leticia and gave her the number to call for EEG scheduling [phone].    ----- Message from Liz Alvares sent at 11/22/2022  3:17 PM CST -----  Type:  Patient Returning Call    Who Called:Pt  Who Left Message for Patient:Roseanne Gross  Does the patient know what this is regarding?:appointment EEG  Would the patient rather a call back or a response via [facility]? call  Best Call Back Number:[phone]  Additional Information:

## 2022-11-22 NOTE — PROGRESS NOTES
[facility] - HEADACHE MEDICINE    CHIEF COMPLAINT?   Ms. Enedelia García chief concern is uncontrolled headache.     Name of the referring physician Self, Aaareferral     Name of primary care provider Lauren Brown MD     TELEMEDICINE ENCOUNTER  Patient verbally confirmed being in the State of [place] at the time of the encounter  Provider was located in the Neurology office at [facility].      Subjective:    HPI:  Ms. Enedelia García presents today to discuss their ongoing uncontrolled headaches.     Age of onset: 20+ yrs  When did they become more of a problem: <1 yr  # of Total headache days per month: 30  # of Migraine or severe days per month: 15+  Intensity of average and most severe headaches: 6-7/10, 10/10  Duration of headache pain: >4 hrs untreated  Quality of pain: constant ache  Laterality: bilateral   Location: neck, into occipital regions, radiation forward to frontal regions   Photophobia and phonophobia: yes  Nausea and vomiting: no  Causes avoidance of physical activity: yes  Worsened by physical activity: yes  Aura: no  Autonomic symptoms: no  Family Hx of migraines: mother with migraines  Birth Control: n/a    Preventive Medications failed: duloxetine (>3 months, ineffective), gabapentin (>3 months, ineffective), irbesartan (>3 months, ineffective), metoprolol (>3 months, ineffective)  Acute medications failed: fioricet (ineffective), zanaflex     OTC Medications: ibuprofen (<10 days in the last month)  Is medication overuse contributing to the patient's current migraine burden: no    Hx of (Bolded items are positive): depression, anxiety, fibromyalgia, autoimmune disorder, head trauma (concussion decades ago), neurological infection, glaucoma, asthma, nephrolithiasis, MI (has three stents), Stroke, Raynaud's phen.    Current HA Regimen:  Ibuprofen prn  Duloxetine  Gabapentin  Metoprolol  Irbesartan    ========    Separately, we discussed her history of seizure-like activity  versus syncope.  The patient shares that her 1st episode was approximately 40 years ago.  She is had extensive testing outside our system over the years she reports.  Thinks that she may have been on seizure medicine for short time in the past but does not recall the name.  Recently suffered an episode where she was bending over for several seconds and then suddenly lost consciousness.  Unknown time frame for loss of consciousness.  No bowel/bladder incontinence or tongue biting reported.  Witnessed by her significant other.  Unsure of witnessed shaking or stiffening.  This was the 1st such episode in about 2 years; previous episodes described as presyncopal symptoms with clammy hands, profuse sweating, lightheadedness, then loss of consciousness.  The patient did not mention head trauma, but chart review of emergency department documentation highlights the fact that the patient hit her head on a piece of the refrigerator prior to the loss of consciousness.   Does not recall whether she is ever had an EEG in the past.    Goes to pain management for chronic neck and back pain; recently received injection in the neck.    LATEST IMAGIN2022 CT cervical spine without contrast:  FINDINGS:  Intracranial structures are normal.  No soft tissue masses or adenopathy is seen.  There are small thyroid nodules.  Odontoid prevertebral soft tissues and posterior elements are intact.  The craniocervical junction is unremarkable.  Spinous processes are intact.  Facets are well aligned.  Skull base and temporal bones are unremarkable.  No fracture dislocation bone destruction seen.  No acute trauma seen.  The upper lungs are clear.  C2-C3 demonstrates a broad-based disc bulge.  The neural foramina are patent.  C3-C4 demonstrates a mild disc bulge.  The neural foramina are patent.  C4-C5 demonstrates a mild disc bulge.  The neural foramina are are patent on the left with mild narrowing on the right.  C5-C6 demonstrates disc  "osteophyte complex at lateralizes towards the right with mild right neural foraminal narrowing.  C6-C7 demonstrates posterior disc osteophyte complex at lateralizes towards the right.  There is moderate right mild left neural foraminal narrowing.  C7-T1 and upper thoracic levels are unremarkable.  Impression:  No acute process seen.  DJD as above."    09/16/2022 CT head without contrast:  FINDINGS:  There is motion artifact.  There is generalized cerebral volume loss.  There is hypoattenuation in a periventricular fashion, likely sequela of chronic microvascular ischemic change.  There is no evidence of acute major vascular territory infarct, hemorrhage, or mass.  There is no hydrocephalus.  There are no abnormal extra-axial fluid collections.  The paranasal sinuses and mastoid air cells are clear, and there is no evidence of calvarial fracture.  The visualized soft tissues are unremarkable.  Impression:  1. Allowing for motion artifact, no convincing acute intracranial abnormalities."    PAST MEDICAL HISTORY:  Past Medical History:   Diagnosis Date    CKD (chronic kidney disease) stage 4, GFR 15-29 ml/min     Coronary artery disease     Edema     Epilepsy     Hematuria, unspecified     Hematuria, unspecified     Hyperlipidemia     Hypertension     Iron deficiency anemia     Normocytic anemia        PAST SURGICAL HISTORY:  Past Surgical History:   Procedure Laterality Date    APPENDECTOMY      BACK SURGERY      CORONARY STENT PLACEMENT      HYSTERECTOMY      REVISION OF KNEE ARTHROPLASTY Left 7/18/2022    Procedure: REVISION, ARTHROPLASTY, KNEE;  Surgeon: Stan Catalan MD;  Location: [facility];  Service: Orthopedics;  Laterality: Left;    TONSILLECTOMY         CURRENT MEDS:  Current Outpatient Medications   Medication Sig Dispense Refill    acetaminophen (TYLENOL) 500 MG tablet Take 500 mg by mouth every 6 (six) hours as needed for Pain.      aspirin (ECOTRIN) 81 MG EC tablet Take 81 mg by mouth once daily.      " butalbital-acetaminophen-caff -40 mg Cap Take 1 capsule by mouth as needed (migraine). Take once to twice daily      calcium carbonate/vitamin D3 (VITAMIN D-3 ORAL) Take 1 tablet by mouth once daily.      co-enzyme Q-10 30 mg capsule Take 30 mg by mouth once daily.      collagenase (SANTYL) ointment Apply topically once daily. 30 g 1    DULoxetine (CYMBALTA) 60 MG capsule Take 1 capsule (60 mg total) by mouth once daily. 90 capsule 1    famotidine (PEPCID) 20 MG tablet Take 20 mg by mouth 2 (two) times daily as needed for Heartburn.      FEROSUL 325 mg (65 mg iron) Tab tablet Take 325 mg by mouth 2 (two) times daily.      furosemide (LASIX) 40 MG tablet Take 1 tablet (40 mg total) by mouth 2 (two) times daily as needed (swelling). 90 tablet 1    gabapentin (NEURONTIN) 600 MG tablet Take 600 mg by mouth 3 (three) times daily.      galcanezumab-gnlm 120 mg/mL PnIj Inject 240 mg into the skin once. 240 mg loading dose (administered as two consecutive injections of 120 mg each) for 1 dose 2 each 0    galcanezumab-gnlm 120 mg/mL PnIj Inject 120 mg into the skin every 28 days. maintenance dose 1 each 11    hydrOXYzine HCL (ATARAX) 25 MG tablet Take 1 tablet (25 mg total) by mouth every 6 (six) hours as needed for Itching. 20 tablet 0    irbesartan (AVAPRO) 300 MG tablet Take 300 mg by mouth every evening.      isosorbide mononitrate (IMDUR) 60 MG 24 hr tablet Take 1 tablet (60 mg total) by mouth once daily. 90 tablet 1    metoprolol succinate (TOPROL-XL) 25 MG 24 hr tablet Take 1 tablet (25 mg total) by mouth once daily. 90 tablet 1    multivit-min-iron-FA-lutein (CENTRUM SILVER WOMEN) 8 mg iron-400 mcg-300 mcg Tab Take 1 tablet by mouth once daily.      nitroGLYCERIN (NITROSTAT) 0.3 MG SL tablet PLACE 1 TABLET UNDER THE TONGUE EVERY 5 MINUTES FOR UP TO 3 DOSES IF NEEDED FOR CHEST PAIN. CALL 911 IF PAIN PERSISTS 25 tablet 0    omeprazole (PRILOSEC) 20 MG capsule Take 20 mg by mouth once daily.      oxyCODONE  (ROXICODONE) 10 mg Tab immediate release tablet Take 10 mg by mouth 3 (three) times daily.      rosuvastatin (CRESTOR) 40 MG Tab Take 1 tablet (40 mg total) by mouth once daily. 90 tablet 3    tiZANidine (ZANAFLEX) 4 MG tablet Take 1 tablet (4 mg total) by mouth 2 (two) times daily as needed (spasms). 60 tablet 2     No current facility-administered medications for this visit.       ALLERGIES:  Review of patient's allergies indicates:   Allergen Reactions    Iodinated contrast media Anaphylaxis and Other (See Comments)    Phenergan [promethazine]      Vomiting       FAMILY HISTORY:  Family History   Problem Relation Age of Onset    Heart disease Mother     Heart disease Father        SOCIAL HISTORY:  Social History     Tobacco Use    Smoking status: Never    Smokeless tobacco: Never   Substance Use Topics    Alcohol use: No    Drug use: No       Review of Systems:  Gen: no fever, no chills, no generalized feeling of weakness   HEENT: no double vision, no blurred vision, no eye pain, no eye exudates.    Heart: no chest pain, no SOB    Lungs: no SOB, no cough    MSK: no weakness of legs, intact ROM    ABD: no abd pain, no N/V/D/C, no difficulty with defecation .    Extremities: No leg pain, no edema.    Objective:  PHYSICAL EXAMINATION??   No vitals due to nature of telemedicine encounter.     GENERAL: Pleasant, well-appearing in no acute distress.     HEENT: Head normorcephalic, atraumatic.     PULMONARY: Breathing comfortably on room air.    NEURO:   Mental status: Awake, alert, and oriented to person, place, time, and situation. Speech clear, fluent.     Cranial nerves:     III/IV/VI: EOMI. No nystagmus.     VII: Facial expressions full and symmetric.     VIII: Hearing intact to voice.     Motor exam: Moves all four extremities spontaneously and symmetrically against gravity    ADDITIONAL DATA:?   Reviewed medical notes from outside providers including September 2022 emergency department visit and subsequent  workup    REVIEW   We reviewed their current medical history, medications, allergies, past medical history, surgical history, social history, family history, and review of systems, and updated all the information.     ASSESSMENT AND PLAN   Tomy is a very pleasant, 77 y.o. with complicated diagnosis of     1. Chronic migraine without aura without status migrainosus, not intractable  galcanezumab-gnlm 120 mg/mL PnIj    galcanezumab-gnlm 120 mg/mL PnIj      2. Seizure-like activity  EEG,w/awake & drowsy record      3. Syncope, unspecified syncope type          Enedelia has worsening headaches that meet International Headache Society Criteria for chronic migraine without aura. Medication overuse is not likely contributing to this patient's headaches.   The seizures described with the patient align more closely with syncope.  As the patient has had no workup from a neurological perspective outside of imaging, we will initiate EEG for further case definition.  The patient was counseled on importance safety topics related to sudden loss of consciousness and encouraged to reach out to our clinic if these events occur in the future.    Patient feels not only severe disabling pain, but is also unable to function due to headaches when they are present.     My approach to every patient is multimodal.   I focused our discussion on addressing modifiable risk factors and trying to decrease them. After discussion with the patient, I recommend the followin. Preventive medications; these medications have to be taken every single day to decrease headache frequency and severity; it takes at least minimum of few weeks to see any results; and have to be taken for at least 6-12 months. The medication should be started at a small dose and increased if there are no significant side effects. Patient compliance is key for effectiveness of the preventive medications. (we discussed the options of beta-blockers, calcium  channel blockers, SSRIs, SNRIs, neuron modulating agents)   Failed:   duloxetine (>3 months, ineffective), gabapentin (>3 months, ineffective), irbesartan (>3 months, ineffective), metoprolol (>3 months, ineffective)    START: Emgality  Emgality (galcanezumap-gnlm) 120 mg once a month subcutaneous injection is a prescription medication (calcitonin gene-related peptide receptor antagonist)  indicated for preventive treatment of chronic migraine in adults.  Emgality is a human monoclonal antibody that binds to the calcitonin gene-related peptide (CGRP) and antagonizes CGRP receptor function.  Injection may be to thigh, abdomen, upper back of the arm.   Store Emgality in the refrigerator between 36F and 46F. Keep Emgality in the original carton. Do not freeze. Do not shake. Keep Emgality and all medications out of reach of children.  Prior to subcutaneous administration allow Emgality to sit at a room temperature for at least 30 minutes protected from direct sunlight. Do not heat up using a hear source or a microwave.  The most common adverse reactions (around 2%) in the migraine studies were injection site reaction. There have been reported hypersensitivity reactions including rash, itching or difficulty breathing. The reaction can occur days after administration and may be prolonged, seek urgent care if you develop a rash.  You will receive two injection pens for your first dose, then one pen per month thereafter.    We also discussed future treatment options including Botox for chronic migraine if she fails Emgality.     2. Acute (abortive) medications:    Continue current acute regimen with fioricet.     3. Natural approaches to increasing brain energy and serotonin:    SAMe 200 mg a day    Vitamin B2 400 mg daily    Vitamin B12 1000 mcg daily    Getting early morning light to increase natural serotonin, melatonin. Could also consider light therapy box, 10,000 LUX.     4. Headache prevention and acute treatment  over-the counter supplements    Boswellia 800 mg 2-3 times per day, example brand Amelia's, natural anti inflammatory herb    Sleep? modulation- melatonin at night 5 to 10 mg 30 minutes prior to sleep    Feverfew Tea 1-3 cups per day. Can get from [employer] or [employer]- A Natural anti inflammatory approach that does not cause further sensitization of the system.    Magnesium Citrate 250 mg daily, slowly increase up to 500-1000 mg daily. Side effect may include diarrhea, so we recommend stopping at whatever dose is comfortable for your body without causing this side effect. This supplement can be very helpful for preventing headache, preventing migraine aura, calming nerves, muscles and even mood. Recommend starting slowly, as it can also lead to increased bowel movements or diarrhea.?     5. DIET: I recommend a diet that heavily favors fruits and vegetables while reducing carbs. More information is available upon request.     6. EXERCISE: Gentle movement exercises, concentrate on combination of muscle building and aerobic. I also recommend adding gentle Yoga therapy. The goal is 150 minutes per week of moderate to rigorous exercise, but you should start at your own pace and progress slowly and safely as discussed today.    7. ANXIETY/STRESS- Control stressors with yoga, meditation, counseling, or other methods of mindfulness.     8. SLEEP- aim for 7-8 hrs of restful sleep per night.     9. EEG for further evaluation of reported seizures and recent loss of consciousness event.  When considering all case specifics, no antiepileptic drugs will be initiated at this time as the last 2 such events more closely align with syncope.    Benefits, side effects, and alternatives were discussed extensively with the patient.?     Enedelia verbally endorsed understanding of all the recommendations.?     she is going to follow up with with our clinic in 2 months or sooner as needed?     Distant Site Telemedicine  Encounter   I conducted this encounter from [facility] facilities via secure, live, face-to-face video conference with pauline who was at their home. Prior to the interview, the risks and benefits of telemedicine were discussed with the patient and verbal consent was obtained.? All participants were identified and introduced.Patient was notified that they may decline to receive medical services by telemedicine and may withdraw from such care at any time. Patient's location within the [facility] was confirmed verbally.    I spent a total of 45 minutes preparing for the visit, documenting the visit, and direct face to face (telehealth) time.?     A dictation device was used to produce this documentation which can sometimes result in grammatical errors or the replacement of similar sounding words.    Tru Curry, DO  Headache Medicine

## 2022-11-23 ENCOUNTER — TELEPHONE (OUTPATIENT)
Dept: FAMILY MEDICINE | Facility: CLINIC | Age: 77
End: 2022-11-23
Payer: MEDICARE

## 2022-11-23 ENCOUNTER — TELEPHONE (OUTPATIENT)
Dept: PHARMACY | Facility: CLINIC | Age: 77
End: 2022-11-23
Payer: MEDICARE

## 2022-11-23 NOTE — TELEPHONE ENCOUNTER
The patient was called with results and call went to voicemail.  Message was left on her box.  Anemia is better, diabetes is control, and the rest of the labs are stable.  No need to change treatments.

## 2022-11-30 ENCOUNTER — HOSPITAL ENCOUNTER (OUTPATIENT)
Dept: CARDIOLOGY | Facility: HOSPITAL | Age: 77
Discharge: HOME | End: 2022-11-30
Attending: INTERNAL MEDICINE
Payer: MEDICARE

## 2022-11-30 VITALS — WEIGHT: 165 LBS | HEIGHT: 64 IN | BODY MASS INDEX: 28.17 KG/M2

## 2022-11-30 DIAGNOSIS — I35.0 NONRHEUMATIC AORTIC VALVE STENOSIS: ICD-10-CM

## 2022-11-30 DIAGNOSIS — R09.89 BILATERAL CAROTID BRUITS: ICD-10-CM

## 2022-11-30 PROCEDURE — 93880 EXTRACRANIAL BILAT STUDY: CPT

## 2022-11-30 PROCEDURE — 93306 TTE W/DOPPLER COMPLETE: CPT

## 2022-11-30 PROCEDURE — 93306 ECHO (CUPID ONLY): ICD-10-PCS | Mod: 26,,, | Performed by: INTERNAL MEDICINE

## 2022-11-30 PROCEDURE — 93880 CV US DOPPLER CAROTID (CUPID ONLY): ICD-10-PCS | Mod: 26,,, | Performed by: INTERNAL MEDICINE

## 2022-11-30 PROCEDURE — 93880 EXTRACRANIAL BILAT STUDY: CPT | Mod: 26,,, | Performed by: INTERNAL MEDICINE

## 2022-11-30 PROCEDURE — 93306 TTE W/DOPPLER COMPLETE: CPT | Mod: 26,,, | Performed by: INTERNAL MEDICINE

## 2022-12-01 ENCOUNTER — HOSPITAL ENCOUNTER (OUTPATIENT)
Dept: RADIOLOGY | Facility: HOSPITAL | Age: 77
Discharge: HOME | End: 2022-12-01
Attending: ORTHOPAEDIC SURGERY
Payer: MEDICARE

## 2022-12-01 ENCOUNTER — OFFICE VISIT (OUTPATIENT)
Dept: ORTHOPEDICS | Facility: CLINIC | Age: 77
End: 2022-12-01
Payer: MEDICARE

## 2022-12-01 VITALS
HEART RATE: 65 BPM | SYSTOLIC BLOOD PRESSURE: 169 MMHG | BODY MASS INDEX: 27.43 KG/M2 | DIASTOLIC BLOOD PRESSURE: 72 MMHG | HEIGHT: 64 IN | WEIGHT: 160.69 LBS

## 2022-12-01 DIAGNOSIS — Z96.652 STATUS POST REVISION OF TOTAL REPLACEMENT OF LEFT KNEE: ICD-10-CM

## 2022-12-01 DIAGNOSIS — Z96.652 STATUS POST REVISION OF TOTAL REPLACEMENT OF LEFT KNEE: Primary | ICD-10-CM

## 2022-12-01 LAB
AV INDEX (PROSTH): 0.28
AV MEAN GRADIENT: 31 MMHG
AV PEAK GRADIENT: 47 MMHG
AV REGURGITATION PRESSURE HALF TIME: 570.18 MS
AV VALVE AREA: 0.59 CM2
AV VELOCITY RATIO: 0.26
BSA FOR ECHO PROCEDURE: 1.84 M2
CV ECHO LV RWT: 0.3 CM
DOP CALC AO PEAK VEL: 3.44 M/S
DOP CALC AO VTI: 91 CM
DOP CALC LVOT AREA: 2.1 CM2
DOP CALC LVOT DIAMETER: 1.65 CM
DOP CALC LVOT PEAK VEL: 0.91 M/S
DOP CALC LVOT STROKE VOLUME: 53.64 CM3
DOP CALC MV VTI: 35.3 CM
DOP CALCLVOT PEAK VEL VTI: 25.1 CM
E WAVE DECELERATION TIME: 160.32 MSEC
E/A RATIO: 1.18
E/E' RATIO: 13.08 M/S
ECHO LV POSTERIOR WALL: 0.68 CM (ref 0.6–1.1)
EJECTION FRACTION: 55 %
FRACTIONAL SHORTENING: 44 % (ref 28–44)
INTERVENTRICULAR SEPTUM: 0.76 CM (ref 0.6–1.1)
IVC DIAMETER: 1.66 CM
IVRT: 87.54 MSEC
LA MAJOR: 5.05 CM
LA MINOR: 5.32 CM
LEFT ATRIUM SIZE: 3.73 CM
LEFT ATRIUM VOLUME INDEX MOD: 27.5 ML/M2
LEFT ATRIUM VOLUME MOD: 49.54 CM3
LEFT CCA DIST DIAS: 8 CM/S
LEFT CCA DIST SYS: 39 CM/S
LEFT CCA PROX DIAS: 5 CM/S
LEFT CCA PROX SYS: 37 CM/S
LEFT ECA DIAS: 16 CM/S
LEFT ECA SYS: 102 CM/S
LEFT INTERNAL DIMENSION IN SYSTOLE: 2.48 CM (ref 2.1–4)
LEFT VENTRICLE DIASTOLIC VOLUME INDEX: 50.34 ML/M2
LEFT VENTRICLE DIASTOLIC VOLUME: 90.61 ML
LEFT VENTRICLE MASS INDEX: 54 G/M2
LEFT VENTRICLE SYSTOLIC VOLUME INDEX: 12.2 ML/M2
LEFT VENTRICLE SYSTOLIC VOLUME: 21.94 ML
LEFT VENTRICULAR INTERNAL DIMENSION IN DIASTOLE: 4.46 CM (ref 3.5–6)
LEFT VENTRICULAR MASS: 97.66 G
LEFT VERTEBRAL DIAS: 19 CM/S
LEFT VERTEBRAL SYS: 52 CM/S
LV LATERAL E/E' RATIO: 14.17 M/S
LV SEPTAL E/E' RATIO: 12.14 M/S
LVOT MG: 1.9 MMHG
LVOT MV: 0.66 CM/S
MV MEAN GRADIENT: 1 MMHG
MV PEAK A VEL: 0.72 M/S
MV PEAK E VEL: 0.85 M/S
MV PEAK GRADIENT: 5 MMHG
MV STENOSIS PRESSURE HALF TIME: 46.49 MS
MV VALVE AREA BY CONTINUITY EQUATION: 1.52 CM2
MV VALVE AREA P 1/2 METHOD: 4.73 CM2
OHS CV CAROTID RIGHT ICA EDV HIGHEST: 42
OHS CV CAROTID ULTRASOUND RIGHT ICA/CCA RATIO: 1.79
OHS CV PV CAROTID LEFT HIGHEST CCA: 39
OHS CV PV CAROTID RIGHT HIGHEST CCA: 81
OHS CV PV CAROTID RIGHT HIGHEST ICA: 131
PISA AR MAX VEL: 4.45 M/S
PISA MRMAX VEL: 4.28 M/S
PISA TR MAX VEL: 3.32 M/S
PULM VEIN S/D RATIO: 1.31
PV PEAK D VEL: 0.35 M/S
PV PEAK S VEL: 0.46 M/S
PV PEAK VELOCITY: 0.9 CM/S
RA MAJOR: 4.43 CM
RA PRESSURE: 3 MMHG
RA WIDTH: 3.27 CM
RIGHT CCA DIST DIAS: 23 CM/S
RIGHT CCA DIST SYS: 73 CM/S
RIGHT CCA PROX DIAS: 21 CM/S
RIGHT CCA PROX SYS: 81 CM/S
RIGHT ECA SYS: 108 CM/S
RIGHT ICA DIST DIAS: 42 CM/S
RIGHT ICA DIST SYS: 131 CM/S
RIGHT ICA MID DIAS: 23 CM/S
RIGHT ICA MID SYS: 83 CM/S
RIGHT ICA PROX DIAS: 24 CM/S
RIGHT ICA PROX SYS: 75 CM/S
RIGHT VERTEBRAL SYS: 68 CM/S
RV TISSUE DOPPLER FREE WALL SYSTOLIC VELOCITY 1 (APICAL 4 CHAMBER VIEW): 0.02 CM/S
TDI LATERAL: 0.06 M/S
TDI SEPTAL: 0.07 M/S
TDI: 0.07 M/S
TR MAX PG: 44 MMHG
TRICUSPID ANNULAR PLANE SYSTOLIC EXCURSION: 3.08 CM
TV REST PULMONARY ARTERY PRESSURE: 47 MMHG

## 2022-12-01 PROCEDURE — 99999 PR PBB SHADOW E&M-EST. PATIENT-LVL V: ICD-10-PCS | Mod: PBBFAC,,, | Performed by: ORTHOPAEDIC SURGERY

## 2022-12-01 PROCEDURE — 1101F PR PT FALLS ASSESS DOC 0-1 FALLS W/OUT INJ PAST YR: ICD-10-PCS | Mod: CPTII,S$GLB,, | Performed by: ORTHOPAEDIC SURGERY

## 2022-12-01 PROCEDURE — 77073 XR HIP TO ANKLE: ICD-10-PCS | Mod: 26,,, | Performed by: STUDENT IN AN ORGANIZED HEALTH CARE EDUCATION/TRAINING PROGRAM

## 2022-12-01 PROCEDURE — 73562 XR KNEE 3 VIEW LEFT: ICD-10-PCS | Mod: 26,59,LT, | Performed by: STUDENT IN AN ORGANIZED HEALTH CARE EDUCATION/TRAINING PROGRAM

## 2022-12-01 PROCEDURE — 73562 X-RAY EXAM OF KNEE 3: CPT | Mod: TC,FY,LT

## 2022-12-01 PROCEDURE — 3288F PR FALLS RISK ASSESSMENT DOCUMENTED: ICD-10-PCS | Mod: CPTII,S$GLB,, | Performed by: ORTHOPAEDIC SURGERY

## 2022-12-01 PROCEDURE — 1125F PR PAIN SEVERITY QUANTIFIED, PAIN PRESENT: ICD-10-PCS | Mod: CPTII,S$GLB,, | Performed by: ORTHOPAEDIC SURGERY

## 2022-12-01 PROCEDURE — 3077F SYST BP >= 140 MM HG: CPT | Mod: CPTII,S$GLB,, | Performed by: ORTHOPAEDIC SURGERY

## 2022-12-01 PROCEDURE — 1101F PT FALLS ASSESS-DOCD LE1/YR: CPT | Mod: CPTII,S$GLB,, | Performed by: ORTHOPAEDIC SURGERY

## 2022-12-01 PROCEDURE — 1125F AMNT PAIN NOTED PAIN PRSNT: CPT | Mod: CPTII,S$GLB,, | Performed by: ORTHOPAEDIC SURGERY

## 2022-12-01 PROCEDURE — 3288F FALL RISK ASSESSMENT DOCD: CPT | Mod: CPTII,S$GLB,, | Performed by: ORTHOPAEDIC SURGERY

## 2022-12-01 PROCEDURE — 77073 BONE LENGTH STUDIES: CPT | Mod: 26,,, | Performed by: STUDENT IN AN ORGANIZED HEALTH CARE EDUCATION/TRAINING PROGRAM

## 2022-12-01 PROCEDURE — 77073 BONE LENGTH STUDIES: CPT | Mod: TC,FY

## 2022-12-01 PROCEDURE — 73562 X-RAY EXAM OF KNEE 3: CPT | Mod: 26,59,LT, | Performed by: STUDENT IN AN ORGANIZED HEALTH CARE EDUCATION/TRAINING PROGRAM

## 2022-12-01 PROCEDURE — 99214 PR OFFICE/OUTPT VISIT, EST, LEVL IV, 30-39 MIN: ICD-10-PCS | Mod: S$GLB,,, | Performed by: ORTHOPAEDIC SURGERY

## 2022-12-01 PROCEDURE — 1159F MED LIST DOCD IN RCRD: CPT | Mod: CPTII,S$GLB,, | Performed by: ORTHOPAEDIC SURGERY

## 2022-12-01 PROCEDURE — 3078F DIAST BP <80 MM HG: CPT | Mod: CPTII,S$GLB,, | Performed by: ORTHOPAEDIC SURGERY

## 2022-12-01 PROCEDURE — 3077F PR MOST RECENT SYSTOLIC BLOOD PRESSURE >= 140 MM HG: ICD-10-PCS | Mod: CPTII,S$GLB,, | Performed by: ORTHOPAEDIC SURGERY

## 2022-12-01 PROCEDURE — 99214 OFFICE O/P EST MOD 30 MIN: CPT | Mod: S$GLB,,, | Performed by: ORTHOPAEDIC SURGERY

## 2022-12-01 PROCEDURE — 3078F PR MOST RECENT DIASTOLIC BLOOD PRESSURE < 80 MM HG: ICD-10-PCS | Mod: CPTII,S$GLB,, | Performed by: ORTHOPAEDIC SURGERY

## 2022-12-01 PROCEDURE — 1159F PR MEDICATION LIST DOCUMENTED IN MEDICAL RECORD: ICD-10-PCS | Mod: CPTII,S$GLB,, | Performed by: ORTHOPAEDIC SURGERY

## 2022-12-01 PROCEDURE — 99999 PR PBB SHADOW E&M-EST. PATIENT-LVL V: CPT | Mod: PBBFAC,,, | Performed by: ORTHOPAEDIC SURGERY

## 2022-12-01 NOTE — PROGRESS NOTES
Subjective:      Patient ID: Enedelia García is a 77 y.o. female.    Chief Complaint: Pain of the Left Knee    Patient is 5 months s/p  left revision total knee replacement  Anterior knee pain: no  Has improved pain  Is in physical therapy  no  Problems w incision  yes, small area of granulation tissue being tx'ed in wound care  Is  happy with result  yes  Opiod free: yes       Social History     Occupational History    Not on file   Tobacco Use    Smoking status: Never    Smokeless tobacco: Never   Substance and Sexual Activity    Alcohol use: No    Drug use: No    Sexual activity: Not Currently      Review of Systems   Constitutional: Negative for diaphoresis.   HENT:  Negative for ear discharge, nosebleeds and stridor.    Eyes:  Negative for photophobia.   Cardiovascular:  Negative for syncope.   Respiratory:  Negative for hemoptysis, shortness of breath and wheezing.    Neurological:  Negative for tremors.   Psychiatric/Behavioral: Negative.         Objective:    General    Constitutional: She is oriented to person, place, and time. She appears well-developed and well-nourished.   HENT:   Head: Normocephalic and atraumatic.   Eyes: EOM are normal.   Pulmonary/Chest: Effort normal.   Neurological: She is alert and oriented to person, place, and time.   Psychiatric: She has a normal mood and affect. Her behavior is normal. Judgment and thought content normal.     General Musculoskeletal Exam   Gait: normal         Left Knee Exam     Inspection   Erythema: absent  Scars: present  Swelling: absent  Effusion: absent  Deformity: absent  Bruising: absent    Tenderness   The patient is experiencing no tenderness.     Range of Motion   Extension:  0   Flexion:  100     Tests   Stability   PCL-Posterior Drawer: normal (0 to 2mm)  MCL - Valgus: normal (0 to 2mm)  LCL - Varus: normal (0 to 2mm)  Patella   Passive Patellar Tilt: neutral  Patellar Tracking: normal    Other   Sensation: normal    Comments:  Incision well  healed       Assessment:       1. Status post revision of total replacement of left knee          Plan:       Overall patient appears to be doing well and is happy with the result of the knee arthroplasty.  She is dancing and performing activities that she has not been able to do since well before her partial knee replacement.  They can continue activities as tolerated avoiding high impact activities.  I would like to see the patient back In 7 months with xrays.

## 2022-12-05 ENCOUNTER — HOSPITAL ENCOUNTER (OUTPATIENT)
Dept: WOUND CARE | Facility: HOSPITAL | Age: 77
Discharge: HOME | End: 2022-12-05
Attending: PREVENTIVE MEDICINE
Payer: MEDICARE

## 2022-12-05 VITALS
HEART RATE: 60 BPM | BODY MASS INDEX: 27.31 KG/M2 | HEIGHT: 64 IN | DIASTOLIC BLOOD PRESSURE: 63 MMHG | SYSTOLIC BLOOD PRESSURE: 147 MMHG | TEMPERATURE: 98 F | WEIGHT: 160 LBS

## 2022-12-05 DIAGNOSIS — Z96.652 S/P TOTAL KNEE ARTHROPLASTY, LEFT: ICD-10-CM

## 2022-12-05 DIAGNOSIS — M00.9 INFECTION OF LEFT KNEE: ICD-10-CM

## 2022-12-05 DIAGNOSIS — T81.89XD NON-HEALING SURGICAL WOUND, SUBSEQUENT ENCOUNTER: Primary | ICD-10-CM

## 2022-12-05 DIAGNOSIS — Q82.8 POROKERATOSIS: ICD-10-CM

## 2022-12-05 PROCEDURE — 99212 OFFICE O/P EST SF 10 MIN: CPT

## 2022-12-05 NOTE — PROGRESS NOTES
[facility]    Subjective:       Patient ID: Enedelia García is a 77 y.o. female.    Chief Complaint: Wound Care    Patient to wound care center for LLE wound. Wound is now healed.  No new concerns, follow up in 1 month.    Review of Systems   All other systems reviewed and are negative.      Objective:      Physical Exam       Incision/Site 09/09/22 1100 Left Knee (Active)   09/09/22 1100   Present Prior to Hospital Arrival?: Yes   Side: Left   Location: Knee   Orientation:    Incision Type:    Closure Method:    Additional Comments:    Removal Indication and Assessment:    Wound Outcome:    Removal Indications:    Wound Image   12/05/22 1353   Dressing Appearance Intact 12/05/22 1353   Appearance Intact 12/05/22 1353   Periwound Area Intact 12/05/22 1353   Wound Length (cm) 0 cm 12/05/22 1353   Wound Width (cm) 0 cm 12/05/22 1353   Wound Depth (cm) 0 cm 12/05/22 1353   Wound Volume (cm^3) 0 cm^3 12/05/22 1353   Wound Surface Area (cm^2) 0 cm^2 12/05/22 1353   Care Antimicrobial agent;Soap and water 12/05/22 1353   Dressing Applied;Changed 12/05/22 1353   Periwound Care Moisturizer applied 12/05/22 1353         Assessment/Plan:         ICD-10-CM ICD-9-CM   1. Non-healing surgical wound, subsequent encounter  T81.89XD V58.89     998.83   2. S/P total knee arthroplasty, left  Z96.652 V43.65   3. Infection of left knee  M00.9 686.9   4. Porokeratosis  Q82.8 757.39           Tissue pathology and/or culture taken:  [] Yes [x] No   Sharp debridement performed:   [] Yes [x] No   Labs ordered this visit:   [] Yes [x] No   Imaging ordered this visit:   [] Yes [x] No           Orders Placed This Encounter   Procedures    Change dressing     Healed Wound Instructions:Your wound is healed, it is extremely fragile at this stage; protect it from friction, wash it gently and pat dry.  If the wound should re-open, please call [phone] for furthur instructions.        Follow  up in about 1 month (around 1/5/2023).

## 2022-12-12 PROBLEM — A41.9 SEPSIS: Status: RESOLVED | Noted: 2022-09-09 | Resolved: 2022-12-12

## 2022-12-13 ENCOUNTER — TELEPHONE (OUTPATIENT)
Dept: FAMILY MEDICINE | Facility: CLINIC | Age: 77
End: 2022-12-13
Payer: MEDICARE

## 2022-12-13 NOTE — TELEPHONE ENCOUNTER
----- Message from Desiree Hamilton MA sent at 12/13/2022  4:52 PM CST -----    ----- Message -----  From: Deb Kline Patient Care Assistant  Sent: 12/13/2022  12:51 PM CST  To: Stephanie Aguilar Staff    Type:  Sooner Apoointment Request    Caller is requesting a sooner appointment.  Caller declined first available appointment listed below.  Caller will not accept being placed on the waitlist and is requesting a message be sent to doctor.  Name of Caller: pt  When is the first available appointment? 03/03/23  Symptoms: follow up / discuss anxiety medication   Would the patient rather a call back or a response via [facility]?  Call   Best Call Back Number: [phone]  Additional Information:

## 2022-12-14 ENCOUNTER — TELEPHONE (OUTPATIENT)
Dept: FAMILY MEDICINE | Facility: CLINIC | Age: 77
End: 2022-12-14
Payer: MEDICARE

## 2022-12-14 NOTE — TELEPHONE ENCOUNTER
----- Message from Anabel Campa sent at 12/14/2022  3:12 PM CST -----  Type:  Sooner Apoointment Request    Caller is requesting a sooner appointment.  Caller declined first available appointment listed below.  Caller will not accept being placed on the waitlist and is requesting a message be sent to doctor.  Name of Caller: PT  When is the first available appointment?  Symptoms:HEADACHES  Would the patient rather a call back or a response via [facility]? CALLS  Best Call Back Number:[phone]  Additional Information:

## 2022-12-22 ENCOUNTER — OFFICE VISIT (OUTPATIENT)
Dept: FAMILY MEDICINE | Facility: CLINIC | Age: 77
End: 2022-12-22
Payer: MEDICARE

## 2022-12-22 ENCOUNTER — LAB VISIT (OUTPATIENT)
Dept: LAB | Facility: HOSPITAL | Age: 77
End: 2022-12-22
Attending: INTERNAL MEDICINE
Payer: MEDICARE

## 2022-12-22 VITALS
WEIGHT: 162.69 LBS | BODY MASS INDEX: 27.77 KG/M2 | SYSTOLIC BLOOD PRESSURE: 144 MMHG | DIASTOLIC BLOOD PRESSURE: 66 MMHG | OXYGEN SATURATION: 96 % | HEIGHT: 64 IN | HEART RATE: 67 BPM

## 2022-12-22 DIAGNOSIS — G44.86 HEADACHE, CERVICOGENIC: Primary | ICD-10-CM

## 2022-12-22 DIAGNOSIS — I10 PRIMARY HYPERTENSION: ICD-10-CM

## 2022-12-22 DIAGNOSIS — K59.00 CONSTIPATION, UNSPECIFIED CONSTIPATION TYPE: ICD-10-CM

## 2022-12-22 DIAGNOSIS — Z96.652 STATUS POST REVISION OF TOTAL REPLACEMENT OF LEFT KNEE: ICD-10-CM

## 2022-12-22 DIAGNOSIS — R61 UNEXPLAINED NIGHT SWEATS: ICD-10-CM

## 2022-12-22 DIAGNOSIS — R73.03 PREDIABETES: ICD-10-CM

## 2022-12-22 DIAGNOSIS — R51.9 FREQUENT HEADACHES: ICD-10-CM

## 2022-12-22 DIAGNOSIS — G44.86 HEADACHE, CERVICOGENIC: ICD-10-CM

## 2022-12-22 DIAGNOSIS — N18.4 STAGE 4 CHRONIC KIDNEY DISEASE: ICD-10-CM

## 2022-12-22 LAB
CRP SERPL-MCNC: 9.5 MG/L (ref 0–8.2)
T4 FREE SERPL-MCNC: 0.87 NG/DL (ref 0.71–1.51)
TSH SERPL DL<=0.005 MIU/L-ACNC: 1.55 UIU/ML (ref 0.4–4)

## 2022-12-22 PROCEDURE — 1160F RVW MEDS BY RX/DR IN RCRD: CPT | Mod: CPTII,S$GLB,, | Performed by: INTERNAL MEDICINE

## 2022-12-22 PROCEDURE — 1101F PR PT FALLS ASSESS DOC 0-1 FALLS W/OUT INJ PAST YR: ICD-10-PCS | Mod: CPTII,S$GLB,, | Performed by: INTERNAL MEDICINE

## 2022-12-22 PROCEDURE — 3288F PR FALLS RISK ASSESSMENT DOCUMENTED: ICD-10-PCS | Mod: CPTII,S$GLB,, | Performed by: INTERNAL MEDICINE

## 2022-12-22 PROCEDURE — 99214 PR OFFICE/OUTPT VISIT, EST, LEVL IV, 30-39 MIN: ICD-10-PCS | Mod: S$GLB,,, | Performed by: INTERNAL MEDICINE

## 2022-12-22 PROCEDURE — 3077F SYST BP >= 140 MM HG: CPT | Mod: CPTII,S$GLB,, | Performed by: INTERNAL MEDICINE

## 2022-12-22 PROCEDURE — 84443 ASSAY THYROID STIM HORMONE: CPT | Performed by: INTERNAL MEDICINE

## 2022-12-22 PROCEDURE — 1101F PT FALLS ASSESS-DOCD LE1/YR: CPT | Mod: CPTII,S$GLB,, | Performed by: INTERNAL MEDICINE

## 2022-12-22 PROCEDURE — 86140 C-REACTIVE PROTEIN: CPT | Performed by: INTERNAL MEDICINE

## 2022-12-22 PROCEDURE — 1159F PR MEDICATION LIST DOCUMENTED IN MEDICAL RECORD: ICD-10-PCS | Mod: CPTII,S$GLB,, | Performed by: INTERNAL MEDICINE

## 2022-12-22 PROCEDURE — 3077F PR MOST RECENT SYSTOLIC BLOOD PRESSURE >= 140 MM HG: ICD-10-PCS | Mod: CPTII,S$GLB,, | Performed by: INTERNAL MEDICINE

## 2022-12-22 PROCEDURE — 3078F DIAST BP <80 MM HG: CPT | Mod: CPTII,S$GLB,, | Performed by: INTERNAL MEDICINE

## 2022-12-22 PROCEDURE — 1159F MED LIST DOCD IN RCRD: CPT | Mod: CPTII,S$GLB,, | Performed by: INTERNAL MEDICINE

## 2022-12-22 PROCEDURE — 84439 ASSAY OF FREE THYROXINE: CPT | Performed by: INTERNAL MEDICINE

## 2022-12-22 PROCEDURE — 1160F PR REVIEW ALL MEDS BY PRESCRIBER/CLIN PHARMACIST DOCUMENTED: ICD-10-PCS | Mod: CPTII,S$GLB,, | Performed by: INTERNAL MEDICINE

## 2022-12-22 PROCEDURE — 99999 PR PBB SHADOW E&M-EST. PATIENT-LVL V: CPT | Mod: PBBFAC,,, | Performed by: INTERNAL MEDICINE

## 2022-12-22 PROCEDURE — 3078F PR MOST RECENT DIASTOLIC BLOOD PRESSURE < 80 MM HG: ICD-10-PCS | Mod: CPTII,S$GLB,, | Performed by: INTERNAL MEDICINE

## 2022-12-22 PROCEDURE — 99214 OFFICE O/P EST MOD 30 MIN: CPT | Mod: S$GLB,,, | Performed by: INTERNAL MEDICINE

## 2022-12-22 PROCEDURE — 1126F AMNT PAIN NOTED NONE PRSNT: CPT | Mod: CPTII,S$GLB,, | Performed by: INTERNAL MEDICINE

## 2022-12-22 PROCEDURE — 36415 COLL VENOUS BLD VENIPUNCTURE: CPT | Performed by: INTERNAL MEDICINE

## 2022-12-22 PROCEDURE — 1126F PR PAIN SEVERITY QUANTIFIED, NO PAIN PRESENT: ICD-10-PCS | Mod: CPTII,S$GLB,, | Performed by: INTERNAL MEDICINE

## 2022-12-22 PROCEDURE — 99999 PR PBB SHADOW E&M-EST. PATIENT-LVL V: ICD-10-PCS | Mod: PBBFAC,,, | Performed by: INTERNAL MEDICINE

## 2022-12-22 PROCEDURE — 3288F FALL RISK ASSESSMENT DOCD: CPT | Mod: CPTII,S$GLB,, | Performed by: INTERNAL MEDICINE

## 2022-12-22 RX ORDER — ASPIRIN 81 MG
200 TABLET, DELAYED RELEASE (ENTERIC COATED) ORAL 2 TIMES DAILY PRN
Qty: 360 TABLET | Refills: 3 | Status: SHIPPED | OUTPATIENT
Start: 2022-12-22 | End: 2023-06-22

## 2022-12-22 RX ORDER — TIZANIDINE 4 MG/1
4 TABLET ORAL EVERY 8 HOURS PRN
Qty: 60 TABLET | Refills: 2 | Status: SHIPPED | OUTPATIENT
Start: 2022-12-22 | End: 2023-09-11 | Stop reason: SDUPTHER

## 2022-12-22 NOTE — PROGRESS NOTES
Subjective:       Patient ID: Enedelia García is a 77 y.o. female.    Chief Complaint: No chief complaint on file.      HPI  Enedelia García is a 77 y.o. female with chronic conditions of CAD, AS, HLD, ALEXANDRO, HTN, CKD-4, Anemia, Prediabetes, Fibromyalgia, OA, S/P Knee replacement and infection who presents today for headaches.    Reports has been having headaches for over a month.  The headaches at every day.  They from upper neck to frontal area.  Pain management gave her Fioricet that helps for a short time but headache returns.  Does report some photophobia and mild nasal congestion.  Sometimes takes Tylenol and just helps her friend hour.    Went to the ophthalmologist and will need change in eyeglasses.  And thought headaches possibly associated to decreased vision.    Also has been having sweats at different times of the day.  Feels like his having hot flashes.  Reports occasional palpitations.  Has been drinking coffee to help her headache.    She has been walking independently immobility has improved as the knee heals.  Still has some knee pains and felt to be expected with the surgery.    Has trouble sleeping since she has frequent urination and nocturia.  No dysuria.    Recent labs shows stable mild anemia and normal iron.  Metabolic panel looks within normal limits    Health Maintenance:  Health Maintenance   Topic Date Due    DEXA Scan  01/20/2025    Lipid Panel  11/17/2027    TETANUS VACCINE  09/09/2031    Hepatitis C Screening  Completed       Review of Systems   Constitutional:  Negative for fever.   HENT:  Positive for nasal congestion.    Eyes:  Positive for photophobia and visual disturbance.   Cardiovascular:  Positive for palpitations.   Gastrointestinal: Negative.    Genitourinary:  Positive for frequency, hot flashes and nocturia.   Musculoskeletal:  Positive for neck pain.   Neurological:  Positive for headaches.   Psychiatric/Behavioral:  Positive for sleep disturbance. The patient is  nervous/anxious.     Past Medical History:   Diagnosis Date    CKD (chronic kidney disease) stage 4, GFR 15-29 ml/min     Coronary artery disease     Edema     Epilepsy     Hematuria, unspecified     Hematuria, unspecified     Hyperlipidemia     Hypertension     Iron deficiency anemia     Normocytic anemia        Past Surgical History:   Procedure Laterality Date    APPENDECTOMY      BACK SURGERY      CORONARY STENT PLACEMENT      HYSTERECTOMY      REVISION OF KNEE ARTHROPLASTY Left 7/18/2022    Procedure: REVISION, ARTHROPLASTY, KNEE;  Surgeon: Stan Catalan MD;  Location: [facility];  Service: Orthopedics;  Laterality: Left;    TONSILLECTOMY         Family History   Problem Relation Age of Onset    Heart disease Mother     Heart disease Father        Social History     Socioeconomic History    Marital status: Significant Other   Tobacco Use    Smoking status: Never    Smokeless tobacco: Never   Substance and Sexual Activity    Alcohol use: No    Drug use: No    Sexual activity: Not Currently     Social Determinants of Health     Financial Resource Strain: Medium Risk    Difficulty of Paying Living Expenses: Somewhat hard   Food Insecurity: No Food Insecurity    Worried About Running Out of Food in the Last Year: Never true    Ran Out of Food in the Last Year: Never true   Transportation Needs: No Transportation Needs    Lack of Transportation (Medical): No    Lack of Transportation (Non-Medical): No   Physical Activity: Insufficiently Active    Days of Exercise per Week: 3 days    Minutes of Exercise per Session: 20 min   Stress: Stress Concern Present    Feeling of Stress : To some extent   Social Connections: Moderately Isolated    Frequency of Communication with Friends and Family: More than three times a week    Frequency of Social Gatherings with Friends and Family: More than three times a week    Attends Yazidi Services: Never    Active Member of Clubs or Organizations: No    Attends Club or Organization  Meetings: Never    Marital Status: Living with partner   Housing Stability: Low Risk     Unable to Pay for Housing in the Last Year: No    Number of Places Lived in the Last Year: 1    Unstable Housing in the Last Year: No       Current Outpatient Medications   Medication Sig Dispense Refill    acetaminophen (TYLENOL) 500 MG tablet Take 500 mg by mouth every 6 (six) hours as needed for Pain.      aspirin (ECOTRIN) 81 MG EC tablet Take 81 mg by mouth once daily.      butalbital-acetaminophen-caff -40 mg Cap Take 1 capsule by mouth as needed (migraine). Take once to twice daily      calcium carbonate/vitamin D3 (VITAMIN D-3 ORAL) Take 1 tablet by mouth once daily.      co-enzyme Q-10 30 mg capsule Take 30 mg by mouth once daily.      collagenase (SANTYL) ointment Apply topically once daily. 30 g 1    DULoxetine (CYMBALTA) 60 MG capsule Take 1 capsule (60 mg total) by mouth once daily. 90 capsule 1    famotidine (PEPCID) 20 MG tablet Take 20 mg by mouth 2 (two) times daily as needed for Heartburn.      FEROSUL 325 mg (65 mg iron) Tab tablet Take 325 mg by mouth 2 (two) times daily.      furosemide (LASIX) 40 MG tablet Take 1 tablet (40 mg total) by mouth 2 (two) times daily as needed (swelling). 90 tablet 1    gabapentin (NEURONTIN) 600 MG tablet Take 600 mg by mouth 3 (three) times daily.      hydrOXYzine HCL (ATARAX) 25 MG tablet Take 1 tablet (25 mg total) by mouth every 6 (six) hours as needed for Itching. 20 tablet 0    irbesartan (AVAPRO) 300 MG tablet Take 300 mg by mouth every evening.      isosorbide mononitrate (IMDUR) 60 MG 24 hr tablet Take 1 tablet (60 mg total) by mouth once daily. 90 tablet 1    metoprolol succinate (TOPROL-XL) 25 MG 24 hr tablet Take 1 tablet (25 mg total) by mouth once daily. 90 tablet 1    multivit-min-iron-FA-lutein (CENTRUM SILVER WOMEN) 8 mg iron-400 mcg-300 mcg Tab Take 1 tablet by mouth once daily.      nitroGLYCERIN (NITROSTAT) 0.3 MG SL tablet PLACE 1 TABLET UNDER THE  TONGUE EVERY 5 MINUTES FOR UP TO 3 DOSES IF NEEDED FOR CHEST PAIN. CALL 911 IF PAIN PERSISTS 25 tablet 0    omeprazole (PRILOSEC) 20 MG capsule Take 20 mg by mouth once daily.      oxyCODONE (ROXICODONE) 10 mg Tab immediate release tablet Take 10 mg by mouth 3 (three) times daily.      rosuvastatin (CRESTOR) 40 MG Tab Take 1 tablet (40 mg total) by mouth once daily. 90 tablet 3    tiZANidine (ZANAFLEX) 4 MG tablet Take 1 tablet (4 mg total) by mouth 2 (two) times daily as needed (spasms). 60 tablet 2     No current facility-administered medications for this visit.       Review of patient's allergies indicates:   Allergen Reactions    Iodinated contrast media Anaphylaxis and Other (See Comments)    Phenergan [promethazine]      Vomiting         Objective:       Last 3 sets of Vitals    Vitals - 1 value per visit 12/1/2022 12/5/2022 12/5/2022   SYSTOLIC 169 147 170   DIASTOLIC 72 63 70   Pulse 65 60 55   Temp - 98.1 98   Resp - - 15   SPO2 - - 96   Weight (lb) 160.72 160 162   Weight (kg) 72.9 72.576 73.483   Height 64 64 64   BMI (Calculated) 27.6 27.5 27.8   VISIT REPORT - - -   Pain Score  - - -   Some recent data might be hidden   Physical Exam  Constitutional:       General: She is not in acute distress.  HENT:      Head: Normocephalic.      Right Ear: Tympanic membrane, ear canal and external ear normal.      Left Ear: Tympanic membrane, ear canal and external ear normal.      Nose: Nose normal.      Mouth/Throat:      Mouth: Mucous membranes are moist.   Eyes:      General: No scleral icterus.     Extraocular Movements: Extraocular movements intact.      Conjunctiva/sclera: Conjunctivae normal.   Neck:      Vascular: No carotid bruit.      Comments: No goiter.  Cardiovascular:      Rate and Rhythm: Normal rate and regular rhythm.      Pulses: Normal pulses.      Heart sounds: Murmur heard.   Pulmonary:      Effort: Pulmonary effort is normal.      Breath sounds: Normal breath sounds.   Abdominal:      General:  Bowel sounds are normal. There is no distension.      Palpations: Abdomen is soft. There is no mass.      Tenderness: There is no abdominal tenderness.   Musculoskeletal:         General: No swelling.      Cervical back: Normal range of motion and neck supple. No tenderness.      Comments: Spasms   Lymphadenopathy:      Cervical: No cervical adenopathy.   Skin:     General: Skin is warm and dry.   Neurological:      General: No focal deficit present.      Mental Status: She is alert and oriented to person, place, and time.      Gait: Gait normal.   Psychiatric:         Mood and Affect: Mood normal.         Behavior: Behavior normal.         CBC:  Recent Labs   Lab 09/16/22  1351 11/17/22  1332 12/05/22  1452   WBC 7.25 8.74 10.26   RBC 2.85 L 4.08 3.98 L   Hemoglobin 7.7 L 11.4 L 11.4 L   Hematocrit 26.0 L 36.2 L 36.9 L   Platelets 320 300 258   MCV 91 89 93   MCH 27.0 27.9 28.6   MCHC 29.6 L 31.5 L 30.9 L     CMP:  Recent Labs   Lab 09/09/22  1346 09/16/22  1351 11/17/22  1332 12/05/22  1452   Glucose 100 93 95 84   Calcium 9.7 9.3 10.2 10.2   Albumin 3.2 L 2.8 L 3.6 3.6   Total Protein 6.9 6.4 7.1  --    Sodium 139 139 140 140   Potassium 4.6 4.6 4.4 4.8   CO2 30 H 28 30 H 29   Chloride 101 102 104 102   BUN 13 18 24 H 20   Creatinine 1.1 1.2 0.9 0.9   Alkaline Phosphatase 95 78 77  --    ALT 13 13 17  --    AST 20 28 17  --    Total Bilirubin 0.3 0.1 0.3  --      URINALYSIS:  Recent Labs   Lab 04/10/22  0010 04/26/22  1418 07/31/22  0003 08/29/22  1527 09/09/22  1317 12/05/22  1437   Color, UA Colorless A   < > Yellow Yellow   < > Yellow   Specific Gravity, UA 1.005   < > 1.010 1.020   < > 1.030   pH, UA 5.0   < > 6.0 6.0   < > 6.0   Protein, UA Negative   < > Negative Negative   < > Negative   Bacteria Rare  --  Rare Rare  --   --    Nitrite, UA Negative   < > Negative Negative   < > Negative   Leukocytes, UA 1+ A   < > 1+ A Trace A   < > 1+ A   Urobilinogen, UA Negative  --  Negative Negative   < > Negative    Hyaline Casts, UA 8 A  --  5 A  --   --  4 A    < > = values in this interval not displayed.      LIPIDS:  Recent Labs   Lab 05/22/21  0447 02/23/22  1402 04/14/22  0240 11/17/22  1332   TSH 1.009 1.299 1.991 0.501   HDL 55 47  --  50   Cholesterol 207 H 179  --  193   Triglycerides 203 H 168 H  --  151 H   LDL Cholesterol 111.4 98.4  --  112.8   HDL/Cholesterol Ratio 26.6 26.3  --  25.9   Non-HDL Cholesterol 152 132  --  143   Total Cholesterol/HDL Ratio 3.8 3.8  --  3.9     TSH:  Recent Labs   Lab 02/23/22  1402 04/14/22  0240 11/17/22  1332   TSH 1.299 1.991 0.501       A1C:  Recent Labs   Lab 05/22/21  0447 02/23/22  1402 03/10/22  1629 06/29/22  1253 08/12/22  1026 11/17/22  1332   Hemoglobin A1C 5.7 H 5.8 H 5.7 H 6.4 H 5.3 5.6       Imaging:  CV Ultrasound Bilateral Doppler Carotid  · Occluded lt ICA  · <50% stenosis Rt ICA  · Antegrade vertebral flow bilateral     X-Ray Hip to Ankle  Narrative: EXAMINATION:  XR HIP TO ANKLE    CLINICAL HISTORY:  Presence of left artificial knee joint    TECHNIQUE:  Hip to ankle series    COMPARISON:  06/29/2022    FINDINGS:  Bilateral lower extremity length measurements were obtained using electronic calibers from the superior acetabulum to the talar dome.  Right lower extremity measures 80.3 cm.  Left lower extremity measures 80.5 cm.    Status post left knee arthroplasty.  Hardware projects in appropriate position.  No evidence of hardware complication.  Right knee medial tibiofemoral compartment joint space narrowing.  Hyperdensity within the right knee joint space consistent with chondrocalcinosis.  Mild bilateral hip DJD.  No acute fracture, dislocation, or osseous destruction.  Impression: As above.    Electronically signed by: Trev Paz  Date:    12/01/2022  Time:    17:02  X-Ray Knee 3 View Left  Narrative: EXAMINATION:  XR KNEE 3 VIEW LEFT    CLINICAL HISTORY:  pain; Presence of left artificial knee joint    TECHNIQUE:  AP, lateral, and Merchant views of the  left knee were performed.    COMPARISON:  09/10/2022    FINDINGS:  Status post left knee arthroplasty.  Hardware appears intact and projects in appropriate position.  No evidence of hardware complication.  No acute fracture, dislocation, or osseous destruction.  Suprapatellar joint effusion noted.  Soft tissues are unremarkable.  Impression: As above.    Electronically signed by: Trev Paz  Date:    12/01/2022  Time:    16:56  Echo  · The estimated ejection fraction is 55%.  · Indeterminate left ventricular diastolic function.  · Mild left atrial enlargement.  · Mild aortic regurgitation.  · Mild tricuspid regurgitation.  · Mild mitral regurgitation.  · Normal right ventricular size with normal right ventricular systolic   function.  · Normal systolic function.  · There is moderate-to-severe aortic valve stenosis.  · Aortic valve area is 0.59 cm2; peak velocity is 3.44 m/s; mean gradient   is 31 mmHg.  · There is moderate pulmonary hypertension.  · Normal central venous pressure (3 mmHg).  · The estimated PA systolic pressure is 47 mmHg.         Assessment:       1. Headache, cervicogenic    2. Unexplained night sweats    3. Primary hypertension    4. Prediabetes    5. Stage 4 chronic kidney disease    6. Status post revision of total replacement of left knee    7. Frequent headaches    8. Constipation, unspecified constipation type            Plan:       1. Headache, cervicogenic  -     sounds more tension-type headaches.  Has history of neck pains and cervicogenic to be considered.  Will try muscle relaxers since she is already on gabapentin.  Will consult Neurology.  Continue Fioricet as needed.  If headaches persist will consider medications like Imitrex due to the photophobia but doubt that is migraine at this moment.  - improved sleep will help.  Consider physical therapy.  -  TSH; Future; Expected date: 12/22/2022  -     Ambulatory referral/consult to Neurology; Future; Expected date: 12/22/2022  -     CT  Head Without Contrast; Future; Expected date: 12/22/2022  -     C-Reactive Protein; Future; Expected date: 12/22/2022    2. Unexplained night sweats  -     TSH; Future; Expected date: 12/22/2022  -     T4, Free; Future; Expected date: 12/22/2022  - decrease caffeine    3. Primary hypertension   - systolic blood pressure mildly elevated but reports blood pressures have been stable.  Will check her blood pressure with the headaches.    4. Prediabetes   - stable labs continue healthy diet and increase activity as tolerated    5. Stage 4 chronic kidney disease   - lab with improved kidney function    6. Status post revision of total replacement of left knee  -     follows with pain management    7. Frequent headaches  -     Ambulatory referral/consult to Physical/Occupational Therapy; Future; Expected date: 12/22/2022  - tiZANidine (ZANAFLEX) 4 MG tablet; Take 1 tablet (4 mg total) by mouth every 8 (eight) hours as needed (spasms).  Dispense: 60 tablet; Refill: 2    8. Constipation, unspecified constipation type  -     docusate sodium 100 mg capsule; Take 200 mg by mouth 2 (two) times daily as needed for Constipation.  Dispense: 360 tablet; Refill: 3  - encourage hydration       Health Maintenance Due   Topic Date Due    Shingles Vaccine (2 of 2) 05/04/2022            Return to clinic in 1 months.    Lauren Brown MD  [facility]  Disclaimer:  This note has been generated using voice-recognition software. There may be grammatical or spelling errors that have been missed during proof-reading

## 2022-12-23 ENCOUNTER — PATIENT MESSAGE (OUTPATIENT)
Dept: FAMILY MEDICINE | Facility: CLINIC | Age: 77
End: 2022-12-23
Payer: MEDICARE

## 2022-12-28 ENCOUNTER — HOSPITAL ENCOUNTER (OUTPATIENT)
Dept: RADIOLOGY | Facility: HOSPITAL | Age: 77
Discharge: HOME | End: 2022-12-28
Attending: INTERNAL MEDICINE
Payer: MEDICARE

## 2022-12-28 ENCOUNTER — TELEPHONE (OUTPATIENT)
Dept: NEUROLOGY | Facility: CLINIC | Age: 77
End: 2022-12-28
Payer: MEDICARE

## 2022-12-28 DIAGNOSIS — G44.86 HEADACHE, CERVICOGENIC: ICD-10-CM

## 2022-12-28 PROCEDURE — 70450 CT HEAD/BRAIN W/O DYE: CPT | Mod: TC

## 2022-12-28 PROCEDURE — 70450 CT HEAD/BRAIN W/O DYE: CPT | Mod: 26,,, | Performed by: RADIOLOGY

## 2022-12-28 PROCEDURE — 70450 CT HEAD WITHOUT CONTRAST: ICD-10-PCS | Mod: 26,,, | Performed by: RADIOLOGY

## 2023-01-03 ENCOUNTER — OFFICE VISIT (OUTPATIENT)
Dept: NEUROLOGY | Facility: CLINIC | Age: 78
End: 2023-01-03
Payer: MEDICARE

## 2023-01-03 VITALS
SYSTOLIC BLOOD PRESSURE: 139 MMHG | BODY MASS INDEX: 27.77 KG/M2 | DIASTOLIC BLOOD PRESSURE: 71 MMHG | HEIGHT: 64 IN | WEIGHT: 162.69 LBS | HEART RATE: 59 BPM

## 2023-01-03 DIAGNOSIS — G43.709 CHRONIC MIGRAINE WITHOUT AURA WITHOUT STATUS MIGRAINOSUS, NOT INTRACTABLE: Primary | ICD-10-CM

## 2023-01-03 DIAGNOSIS — G44.86 HEADACHE, CERVICOGENIC: ICD-10-CM

## 2023-01-03 PROCEDURE — 3288F PR FALLS RISK ASSESSMENT DOCUMENTED: ICD-10-PCS | Mod: CPTII,S$GLB,,

## 2023-01-03 PROCEDURE — 99999 PR PBB SHADOW E&M-EST. PATIENT-LVL V: ICD-10-PCS | Mod: PBBFAC,,,

## 2023-01-03 PROCEDURE — 3078F DIAST BP <80 MM HG: CPT | Mod: CPTII,S$GLB,,

## 2023-01-03 PROCEDURE — 3288F FALL RISK ASSESSMENT DOCD: CPT | Mod: CPTII,S$GLB,,

## 2023-01-03 PROCEDURE — 1101F PR PT FALLS ASSESS DOC 0-1 FALLS W/OUT INJ PAST YR: ICD-10-PCS | Mod: CPTII,S$GLB,,

## 2023-01-03 PROCEDURE — 1160F RVW MEDS BY RX/DR IN RCRD: CPT | Mod: CPTII,S$GLB,,

## 2023-01-03 PROCEDURE — 3075F SYST BP GE 130 - 139MM HG: CPT | Mod: CPTII,S$GLB,,

## 2023-01-03 PROCEDURE — 99214 PR OFFICE/OUTPT VISIT, EST, LEVL IV, 30-39 MIN: ICD-10-PCS | Mod: S$GLB,,,

## 2023-01-03 PROCEDURE — 1125F AMNT PAIN NOTED PAIN PRSNT: CPT | Mod: CPTII,S$GLB,,

## 2023-01-03 PROCEDURE — 1159F MED LIST DOCD IN RCRD: CPT | Mod: CPTII,S$GLB,,

## 2023-01-03 PROCEDURE — 1101F PT FALLS ASSESS-DOCD LE1/YR: CPT | Mod: CPTII,S$GLB,,

## 2023-01-03 PROCEDURE — 99214 OFFICE O/P EST MOD 30 MIN: CPT | Mod: S$GLB,,,

## 2023-01-03 PROCEDURE — 1160F PR REVIEW ALL MEDS BY PRESCRIBER/CLIN PHARMACIST DOCUMENTED: ICD-10-PCS | Mod: CPTII,S$GLB,,

## 2023-01-03 PROCEDURE — 1125F PR PAIN SEVERITY QUANTIFIED, PAIN PRESENT: ICD-10-PCS | Mod: CPTII,S$GLB,,

## 2023-01-03 PROCEDURE — 3078F PR MOST RECENT DIASTOLIC BLOOD PRESSURE < 80 MM HG: ICD-10-PCS | Mod: CPTII,S$GLB,,

## 2023-01-03 PROCEDURE — 99999 PR PBB SHADOW E&M-EST. PATIENT-LVL V: CPT | Mod: PBBFAC,,,

## 2023-01-03 PROCEDURE — 3075F PR MOST RECENT SYSTOLIC BLOOD PRESS GE 130-139MM HG: ICD-10-PCS | Mod: CPTII,S$GLB,,

## 2023-01-03 PROCEDURE — 99499 UNLISTED E&M SERVICE: CPT | Mod: S$GLB,,,

## 2023-01-03 PROCEDURE — 1159F PR MEDICATION LIST DOCUMENTED IN MEDICAL RECORD: ICD-10-PCS | Mod: CPTII,S$GLB,,

## 2023-01-03 PROCEDURE — 99499 RISK ADDL DX/OHS AUDIT: ICD-10-PCS | Mod: S$GLB,,,

## 2023-01-03 NOTE — PROGRESS NOTES
[facility] NEUROLOGY  [facility], [place]    Date: 1/3/23  Patient Name: Enedelia García   MRN: [id number]   PCP: Lauren Brown  Referring Provider: Lauren Brown MD    Chief Complaint: Follow up headache  Subjective:   Patient seen in consultation at the request of Lauren Brown MD for the evaluation of Headache. A copy of this note will be sent to the referring physician.        HPI:   Ms. Enedelia García is a 77 y.o. female presenting for follow up headache. Patient describes headaches that occur every day. Description is consistent with that described in last visit with Dr. Curry. Patient describes her headaches as pulsating/throbbing headache that occur bilaterally and last greater than 4 hours untreated. Endorses light and sound sensitivity and that headaches are worsened by physical activity. Reports 15+/30 severe days that reach >8/10 pain. Notes some neck pain that may be triggering her headaches. Has seen pain specialist for chronic neck and back pain and completed injections. At last visit, patient meets criteria for chronic migraine with plan to start Emgality, but this has not yet been initiated.    Preventive Medications failed: duloxetine (>3 months, ineffective), gabapentin (>3 months, ineffective), irbesartan (>3 months, ineffective), metoprolol (>3 months, ineffective)  Acute medications failed: fioricet (ineffective), zanaflex     ===========================================  Per documentation by Dr. Tru Curry,  on 11/22/22:    Ms. Enedelia García presents today to discuss their ongoing uncontrolled headaches.     Age of onset: 20+ yrs  When did they become more of a problem: <1 yr  # of Total headache days per month: 30  # of Migraine or severe days per month: 15+  Intensity of average and most severe headaches: 6-7/10, 10/10  Duration of headache pain: >4 hrs untreated  Quality of pain: constant ache  Laterality: bilateral   Location: neck, into occipital  regions, radiation forward to frontal regions   Photophobia and phonophobia: yes  Nausea and vomiting: no  Causes avoidance of physical activity: yes  Worsened by physical activity: yes  Aura: no  Autonomic symptoms: no  Family Hx of migraines: mother with migraines  Birth Control: n/a    Preventive Medications failed: duloxetine (>3 months, ineffective), gabapentin (>3 months, ineffective), irbesartan (>3 months, ineffective), metoprolol (>3 months, ineffective)  Acute medications failed: fioricet (ineffective), zanaflex     OTC Medications: ibuprofen (<10 days in the last month)  Is medication overuse contributing to the patient's current migraine burden: no    Hx of (Bolded items are positive): depression, anxiety, fibromyalgia, autoimmune disorder, head trauma (concussion decades ago), neurological infection, glaucoma, asthma, nephrolithiasis, MI (has three stents), Stroke, Raynaud's phen.    Current HA Regimen:  Ibuprofen prn  Duloxetine  Gabapentin  Metoprolol  Irbesartan    ========    Separately, we discussed her history of seizure-like activity versus syncope.  The patient shares that her 1st episode was approximately 40 years ago.  She is had extensive testing outside our system over the years she reports.  Thinks that she may have been on seizure medicine for short time in the past but does not recall the name.  Recently suffered an episode where she was bending over for several seconds and then suddenly lost consciousness.  Unknown time frame for loss of consciousness.  No bowel/bladder incontinence or tongue biting reported.  Witnessed by her significant other.  Unsure of witnessed shaking or stiffening.  This was the 1st such episode in about 2 years; previous episodes described as presyncopal symptoms with clammy hands, profuse sweating, lightheadedness, then loss of consciousness.  The patient did not mention head trauma, but chart review of emergency department documentation highlights the fact that  "the patient hit her head on a piece of the refrigerator prior to the loss of consciousness.   Does not recall whether she is ever had an EEG in the past.    Goes to pain management for chronic neck and back pain; recently received injection in the neck.    LATEST IMAGIN2022 CT cervical spine without contrast:  FINDINGS:  Intracranial structures are normal.  No soft tissue masses or adenopathy is seen.  There are small thyroid nodules.  Odontoid prevertebral soft tissues and posterior elements are intact.  The craniocervical junction is unremarkable.  Spinous processes are intact.  Facets are well aligned.  Skull base and temporal bones are unremarkable.  No fracture dislocation bone destruction seen.  No acute trauma seen.  The upper lungs are clear.  C2-C3 demonstrates a broad-based disc bulge.  The neural foramina are patent.  C3-C4 demonstrates a mild disc bulge.  The neural foramina are patent.  C4-C5 demonstrates a mild disc bulge.  The neural foramina are are patent on the left with mild narrowing on the right.  C5-C6 demonstrates disc osteophyte complex at lateralizes towards the right with mild right neural foraminal narrowing.  C6-C7 demonstrates posterior disc osteophyte complex at lateralizes towards the right.  There is moderate right mild left neural foraminal narrowing.  C7-T1 and upper thoracic levels are unremarkable.  Impression:  No acute process seen.  DJD as above."    2022 CT head without contrast:  FINDINGS:  There is motion artifact.  There is generalized cerebral volume loss.  There is hypoattenuation in a periventricular fashion, likely sequela of chronic microvascular ischemic change.  There is no evidence of acute major vascular territory infarct, hemorrhage, or mass.  There is no hydrocephalus.  There are no abnormal extra-axial fluid collections.  The paranasal sinuses and mastoid air cells are clear, and there is no evidence of calvarial fracture.  The visualized soft " "tissues are unremarkable.  Impression:  1. Allowing for motion artifact, no convincing acute intracranial abnormalities."    PAST MEDICAL HISTORY:  Past Medical History:   Diagnosis Date    CKD (chronic kidney disease) stage 4, GFR 15-29 ml/min     Coronary artery disease     Edema     Epilepsy     Hematuria, unspecified     Hematuria, unspecified     Hyperlipidemia     Hypertension     Iron deficiency anemia     Normocytic anemia        PAST SURGICAL HISTORY:  Past Surgical History:   Procedure Laterality Date    APPENDECTOMY      BACK SURGERY      CORONARY STENT PLACEMENT      HYSTERECTOMY      REVISION OF KNEE ARTHROPLASTY Left 7/18/2022    Procedure: REVISION, ARTHROPLASTY, KNEE;  Surgeon: Stan Catalan MD;  Location: [facility];  Service: Orthopedics;  Laterality: Left;    TONSILLECTOMY         CURRENT MEDS:  Current Outpatient Medications   Medication Sig Dispense Refill    aspirin (ECOTRIN) 81 MG EC tablet Take 81 mg by mouth once daily.      butalbital-acetaminophen-caff -40 mg Cap Take 1 capsule by mouth as needed (migraine). Take once to twice daily      calcium carbonate/vitamin D3 (VITAMIN D-3 ORAL) Take 1 tablet by mouth once daily.      co-enzyme Q-10 30 mg capsule Take 30 mg by mouth once daily.      collagenase (SANTYL) ointment Apply topically once daily. 30 g 1    docusate sodium 100 mg capsule Take 200 mg by mouth 2 (two) times daily as needed for Constipation. 360 tablet 3    DULoxetine (CYMBALTA) 60 MG capsule Take 1 capsule (60 mg total) by mouth once daily. 90 capsule 1    famotidine (PEPCID) 20 MG tablet Take 20 mg by mouth 2 (two) times daily as needed for Heartburn.      FEROSUL 325 mg (65 mg iron) Tab tablet Take 325 mg by mouth 2 (two) times daily.      furosemide (LASIX) 40 MG tablet Take 1 tablet (40 mg total) by mouth 2 (two) times daily as needed (swelling). 90 tablet 1    gabapentin (NEURONTIN) 600 MG tablet Take 600 mg by mouth 3 (three) times daily.      hydrOXYzine HCL (ATARAX) " 25 MG tablet TAKE ONE TABLET BY MOUTH EVERY 6 HOURS AS NEEDED FOR ITCHING 20 tablet 0    irbesartan (AVAPRO) 300 MG tablet Take 300 mg by mouth every evening.      isosorbide mononitrate (IMDUR) 60 MG 24 hr tablet Take 1 tablet (60 mg total) by mouth once daily. 90 tablet 1    metoprolol succinate (TOPROL-XL) 25 MG 24 hr tablet Take 1 tablet (25 mg total) by mouth once daily. 90 tablet 1    multivit-min-iron-FA-lutein (CENTRUM SILVER WOMEN) 8 mg iron-400 mcg-300 mcg Tab Take 1 tablet by mouth once daily.      nitroGLYCERIN (NITROSTAT) 0.3 MG SL tablet PLACE 1 TABLET UNDER THE TONGUE EVERY 5 MINUTES FOR UP TO 3 DOSES IF NEEDED FOR CHEST PAIN. CALL 911 IF PAIN PERSISTS 25 tablet 0    omeprazole (PRILOSEC) 20 MG capsule Take 20 mg by mouth once daily.      oxyCODONE (ROXICODONE) 10 mg Tab immediate release tablet Take 10 mg by mouth 3 (three) times daily.      rosuvastatin (CRESTOR) 40 MG Tab Take 1 tablet (40 mg total) by mouth once daily. 90 tablet 3    tiZANidine (ZANAFLEX) 4 MG tablet Take 1 tablet (4 mg total) by mouth every 8 (eight) hours as needed (spasms). 60 tablet 2    acetaminophen (TYLENOL) 500 MG tablet Take 500 mg by mouth every 6 (six) hours as needed for Pain.      galcanezumab-gnlm 120 mg/mL PnIj Inject 120 mg into the skin every 28 days. maintenance dose 1 each 11     No current facility-administered medications for this visit.       ALLERGIES:  Review of patient's allergies indicates:   Allergen Reactions    Iodinated contrast media Anaphylaxis and Other (See Comments)    Phenergan [promethazine]      Vomiting       FAMILY HISTORY:  Family History   Problem Relation Age of Onset    Heart disease Mother     Heart disease Father        SOCIAL HISTORY:  Social History     Tobacco Use    Smoking status: Never    Smokeless tobacco: Never   Substance Use Topics    Alcohol use: No    Drug use: No       Review of Systems:  Gen: no fever, no chills, no generalized feeling of weakness   HEENT: no double  "vision, no blurred vision, no eye pain, no eye exudates. no nasal congestion,   no traumatic injury of head, no neck pain, no neck stiffness. no photophobia or phonophobia at this time. ?    Heart: no chest pain, no SOB    Lungs: no SOB, no cough    MSK: no weakness of legs, intact ROM    ABD: no abd pain, no N/V/D/C, no difficulty with defecation.    Extremities: No leg pain, no edema.       Objective:     Vitals:    01/03/23 1434   BP: 139/71   BP Location: Left arm   Patient Position: Sitting   Pulse: (!) 59   Weight: 73.8 kg (162 lb 11.2 oz)   Height: 5' 4" (1.626 m)       General: Female in NAD, alert and awake, Aox3, well groomed. ?    ? ?    HEENT: Head is NC/AT EOMI, pupil size: 4 mm B/L, no nystagmus noted; hearing grossly intact b/l.      Neck: Supple. no nuchal rigidity.      Cardiovascular: well perfused, no cyanosis        Respiratory: Symmetric chest rise noted       Musculoskeletal: Muscle tone noted to be adequate for patient age, muscle mass is WNL. No spontaneous movements or fasciculations noted during this examination.       Extremities: No pedal edema or calf tenderness. No cogwheel rigidity noted on B/L UE extremities.       Neurological Examination.    Mental status: AA&O x3; Affect/mood is euthymic/congruent; no aphasia noted during examination. Patient answers simple questions appropriately & follows simple commands; no dysarthria or expressive aphasia; no camron-neglect or extinction. Vocabulary/word finding: excellent.       Cranial Nerves: II-XII grossly intact.     Muscle Function: Tone WNL and Muscle bulk WNL. Symmetric use of bilateral upper and lower extremities against gravity.     Gait: adequate casual gait with stride length and arm swing WNL. Tandem gait and walking on toes and heels are WNL       Assessment:   Enedelia García is a 77 y.o. female presenting for follow up chronic migraine. Will initiate Emgality per plan at prior visit. May consider Botox injections for migraine " prevention pending response.     Plan:     Problem List Items Addressed This Visit    None  Visit Diagnoses       Chronic migraine without aura without status migrainosus, not intractable    -  Primary    Relevant Medications    galcanezumab-gnlm 120 mg/mL PnIj    Headache, cervicogenic                1. Preventive medications; these medications have to be taken every single day to decrease headache frequency and severity; it takes at least minimum of few weeks to see any results; and have to be taken for at least 6-12 months. The medication should be started at a small dose and increased if there are no significant side effects. Patient compliance is key for effectiveness of the preventive medications. (we discussed the options of beta-blockers, calcium channel blockers, SSRIs, SNRIs, neuron modulating agents)   Failed:   duloxetine (>3 months, ineffective), gabapentin (>3 months, ineffective), irbesartan (>3 months, ineffective), metoprolol (>3 months, ineffective)    START: Emgality  Emgality (galcanezumap-gnlm) 120 mg once a month subcutaneous injection is a prescription medication (calcitonin gene-related peptide receptor antagonist)  indicated for preventive treatment of chronic migraine in adults.  Emgality is a human monoclonal antibody that binds to the calcitonin gene-related peptide (CGRP) and antagonizes CGRP receptor function.  Injection may be to thigh, abdomen, upper back of the arm.   Store Emgality in the refrigerator between 36F and 46F. Keep Emgality in the original carton. Do not freeze. Do not shake. Keep Emgality and all medications out of reach of children.  Prior to subcutaneous administration allow Emgality to sit at a room temperature for at least 30 minutes protected from direct sunlight. Do not heat up using a hear source or a microwave.  The most common adverse reactions (around 2%) in the migraine studies were injection site reaction. There have been reported hypersensitivity reactions  including rash, itching or difficulty breathing. The reaction can occur days after administration and may be prolonged, seek urgent care if you develop a rash.  You will receive two injection pens for your first dose, then one pen per month thereafter.     2. Acute (abortive) medications:    May utilize Fioricet or OTC medications limiting usage to less than 10 days per month.    3. Natural approaches to increasing brain energy and serotonin:    SAMe 200 mg a day    Vitamin B2 400 mg daily    Vitamin B12 1000 mcg daily    Getting early morning light to increase natural serotonin, melatonin. Could also consider light therapy box, 10,000 LUX.     4. Headache prevention and acute treatment over-the counter supplements    Boswellia 800 mg 2-3 times per day, example brand Cisneros's, natural anti inflammatory herb    Sleep? modulation- melatonin at night 5 to 10 mg 30 minutes prior to sleep    Feverfew Tea 1-3 cups per day. Can get from [employer] or [email or web address]- A Natural anti inflammatory approach that does not cause further sensitization of the system.    Magnesium Citrate 250 mg daily, slowly increase up to 500-1000 mg daily. Side effect may include diarrhea, so we recommend stopping at whatever dose is comfortable for your body without causing this side effect. This supplement can be very helpful for preventing headache, preventing migraine aura, calming nerves, muscles and even mood. Recommend starting slowly, as it can also lead to increased bowel movements or diarrhea.?     5. DIET: I recommend a diet that heavily favors fruits and vegetables while reducing carbs. More information is available upon request.     6. EXERCISE: Gentle movement exercises, concentrate on combination of muscle building and aerobic. I also recommend adding gentle Yoga therapy. The goal is 150 minutes per week of moderate to rigorous exercise, but you should start at your own pace and progress slowly and safely as discussed  today.    7. ANXIETY/STRESS- Control stressors with yoga, meditation, counseling, or other methods of mindfulness.     8. SLEEP- aim for 7-8 hrs of restful sleep per night.     Follow up in 6 weeks.    I spent a total of 35 minutes on the day of the visit. This includes face to face time and non-face to face time preparing to see the patient (eg, review of tests), obtaining and/or reviewing separately obtained history, documenting clinical information in the electronic or other health record, independently interpreting results and communicating results to the patient/family/caregiver, or care coordinator. Tru Curry DO was available in clinic throughout this encounter.     April Starr PA-C

## 2023-01-09 ENCOUNTER — HOSPITAL ENCOUNTER (OUTPATIENT)
Dept: WOUND CARE | Facility: HOSPITAL | Age: 78
Discharge: HOME | End: 2023-01-09
Attending: PREVENTIVE MEDICINE
Payer: MEDICARE

## 2023-01-09 VITALS
BODY MASS INDEX: 27.66 KG/M2 | SYSTOLIC BLOOD PRESSURE: 204 MMHG | WEIGHT: 162 LBS | TEMPERATURE: 97 F | DIASTOLIC BLOOD PRESSURE: 82 MMHG | HEIGHT: 64 IN | HEART RATE: 63 BPM

## 2023-01-09 DIAGNOSIS — Q82.8 POROKERATOSIS: ICD-10-CM

## 2023-01-09 DIAGNOSIS — T81.89XD NON-HEALING SURGICAL WOUND, SUBSEQUENT ENCOUNTER: Primary | ICD-10-CM

## 2023-01-09 DIAGNOSIS — Z96.652 S/P TOTAL KNEE ARTHROPLASTY, LEFT: ICD-10-CM

## 2023-01-09 PROCEDURE — 99212 OFFICE O/P EST SF 10 MIN: CPT

## 2023-01-09 NOTE — PROGRESS NOTES
[facility]    Subjective:       Patient ID: Enedelia García is a 77 y.o. female.    Chief Complaint: Wound Care    Patient to wound care center for LLE wound, progressing well. Lower leg lesion is still present. Has derm follow up. No new complaints today.    Review of Systems   All other systems reviewed and are negative.      Objective:     Vitals:    01/09/23 1322   BP: (!) 204/82   Pulse: 63   Temp: 97.2 °F (36.2 °C)         Physical Exam       Altered Skin Integrity 01/09/23 1335 Left anterior;lower Leg (Active)   01/09/23 1335   Altered Skin Integrity Present on Admission: yes   Side: Left   Orientation: anterior;lower   Location: Leg   Wound Number:    Is this injury device related?:    Primary Wound Type:    Description of Altered Skin Integrity:    Ankle-Brachial Index:    Pulses:    Removal Indication and Assessment:    Wound Outcome:    (Retired) Wound Length (cm):    (Retired) Wound Width (cm):    (Retired) Depth (cm):    Wound Description (Comments):    Removal Indications:    Wound Image   01/09/23 1336   Dressing Appearance Open to air 01/09/23 1336   Drainage Amount None 01/09/23 1336   Tissue loss description Not applicable 01/09/23 1336   Periwound Area Intact;Dry 01/09/23 1336   Wound Length (cm) 0.2 cm 01/09/23 1336   Wound Width (cm) 0.1 cm 01/09/23 1336   Wound Depth (cm) 0.1 cm 01/09/23 1336   Wound Volume (cm^3) 0.002 cm^3 01/09/23 1336   Wound Surface Area (cm^2) 0.02 cm^2 01/09/23 1336   Care Cleansed with:;Antimicrobial agent;Soap and water;Sterile normal saline 01/09/23 1336   Periwound Care Moisturizer applied 01/09/23 1336   Dressing Change Due 01/13/23 01/09/23 1336         Assessment/Plan:         ICD-10-CM ICD-9-CM   1. Non-healing surgical wound, subsequent encounter  T81.89XD V58.89     998.83   2. S/P total knee arthroplasty, left  Z96.652 V43.65   3. Porokeratosis  Q82.8 757.39       Lower leg wound with minimal improvement.  May need punch biopsy.    Tissue pathology and/or culture taken:  [] Yes [x] No   Sharp debridement performed:   [] Yes [x] No   Labs ordered this visit:   [] Yes [x] No   Imaging ordered this visit:   [] Yes [x] No           Orders Placed This Encounter   Procedures    Change dressing     Left knee surgical wound:     Cleanse wound with: Normal saline   Lidocaine: prn   Silver nitrate: prn   Periwound: Cavilon   Primary dressing: Duoderm  Secondary dressing:   Elevate as much as possible   Frequency: Monday, Thursday  Follow-up:  in 2 weeks 01/23/23        Follow up in about 2 weeks (around 1/23/2023).

## 2023-01-12 ENCOUNTER — TELEPHONE (OUTPATIENT)
Dept: ENDOSCOPY | Facility: HOSPITAL | Age: 78
End: 2023-01-12
Payer: MEDICARE

## 2023-01-12 RX ORDER — POLYETHYLENE GLYCOL 3350, SODIUM SULFATE ANHYDROUS, SODIUM BICARBONATE, SODIUM CHLORIDE, POTASSIUM CHLORIDE 236; 22.74; 6.74; 5.86; 2.97 G/4L; G/4L; G/4L; G/4L; G/4L
4 POWDER, FOR SOLUTION ORAL ONCE
Qty: 4000 ML | Refills: 0 | Status: SHIPPED | OUTPATIENT
Start: 2023-01-12 | End: 2023-01-12

## 2023-01-12 NOTE — TELEPHONE ENCOUNTER
Spoke with patient about arrival time @ 1300.   Covid test = boosted    Prep instructions reviewed: the day before the procedure, follow a clear liquid diet all day, then start the first 1/2 of prep at 5pm and take 2nd 1/2 of prep @ 0800.  Pt must be completely NPO when prep completed @ 1000.  Golytely instructions sent to e-mail address and rx sent to pharmacy.            Medications: Do not take Insulin or oral diabetic medications the day of the procedure.  Take as prescribed: heart, seizure and blood pressure medication in the morning with a sip of water (less than an ounce).  Take any breathing medications and bring inhalers to hospital with you Leave all valuables and jewelry at home.     Wear comfortable clothes to procedure to change into hospital gown You cannot drive for 24 hours after your procedure because you will receive sedation for your procedure to make you comfortable.  A ride must be provided at discharge.

## 2023-01-23 ENCOUNTER — HOSPITAL ENCOUNTER (OUTPATIENT)
Dept: WOUND CARE | Facility: HOSPITAL | Age: 78
Discharge: HOME | End: 2023-01-23
Attending: PREVENTIVE MEDICINE
Payer: MEDICARE

## 2023-01-23 DIAGNOSIS — Z96.652 S/P TOTAL KNEE ARTHROPLASTY, LEFT: ICD-10-CM

## 2023-01-23 DIAGNOSIS — T81.89XD NON-HEALING SURGICAL WOUND, SUBSEQUENT ENCOUNTER: ICD-10-CM

## 2023-01-23 DIAGNOSIS — S80.821A: ICD-10-CM

## 2023-01-23 DIAGNOSIS — Q82.8 POROKERATOSIS: Primary | ICD-10-CM

## 2023-01-23 PROCEDURE — 99211 OFF/OP EST MAY X REQ PHY/QHP: CPT

## 2023-01-23 NOTE — PROGRESS NOTES
[facility]    Subjective:       Patient ID: Enedelia García is a 77 y.o. female.    Chief Complaint: Wound Care    Patient presents for follow up for her left leg lesion. Slight improvement noted.  Review of Systems   All other systems reviewed and are negative.      Objective:     Vitals:    01/23/23 1305   BP: (!) 177/77   Pulse: 62   Temp: 98.1 °F (36.7 °C)         Physical Exam       Altered Skin Integrity 01/09/23 1335 Left anterior;lower Leg (Active)   01/09/23 1335   Altered Skin Integrity Present on Admission: yes   Side: Left   Orientation: anterior;lower   Location: Leg   Wound Number:    Is this injury device related?:    Primary Wound Type:    Description of Altered Skin Integrity:    Ankle-Brachial Index:    Pulses:    Removal Indication and Assessment:    Wound Outcome:    (Retired) Wound Length (cm):    (Retired) Wound Width (cm):    (Retired) Depth (cm):    Wound Description (Comments):    Removal Indications:    Wound Image   01/23/23 1356   Dressing Appearance Open to air 01/23/23 1356   Wound Length (cm) 0 cm 01/23/23 1356   Wound Width (cm) 0 cm 01/23/23 1356   Wound Depth (cm) 0 cm 01/23/23 1356   Wound Volume (cm^3) 0 cm^3 01/23/23 1356   Wound Surface Area (cm^2) 0 cm^2 01/23/23 1356   Care Cleansed with:;Sterile normal saline 01/23/23 1356   Periwound Care Moisturizer applied 01/23/23 1356         Assessment/Plan:         ICD-10-CM ICD-9-CM   1. Porokeratosis  Q82.8 757.39   2. Non-healing surgical wound, subsequent encounter  T81.89XD V58.89     998.83   3. S/P total knee arthroplasty, left  Z96.652 V43.65   4. edema  S80.821A 916.2       Follow up with Dermatology    Tissue pathology and/or culture taken:  [] Yes [x] No   Sharp debridement performed:   [] Yes [x] No   Labs ordered this visit:   [] Yes [x] No   Imaging ordered this visit:   [] Yes [x] No           Orders Placed This Encounter   Procedures    Change dressing     Healed  Wound Instructions:Your wound is healed, it is extremely fragile at this stage; protect it from friction, wash it gently and pat dry.  If the wound should re-open, please call [phone] for further instructions.        Follow up if symptoms worsen or fail to improve.

## 2023-01-24 VITALS
HEIGHT: 64 IN | SYSTOLIC BLOOD PRESSURE: 177 MMHG | DIASTOLIC BLOOD PRESSURE: 77 MMHG | BODY MASS INDEX: 27.66 KG/M2 | HEART RATE: 62 BPM | WEIGHT: 162 LBS | TEMPERATURE: 98 F

## 2023-02-09 ENCOUNTER — TELEPHONE (OUTPATIENT)
Dept: NEUROLOGY | Facility: CLINIC | Age: 78
End: 2023-02-09
Payer: MEDICARE

## 2023-03-22 ENCOUNTER — OFFICE VISIT (OUTPATIENT)
Dept: FAMILY MEDICINE | Facility: CLINIC | Age: 78
End: 2023-03-22
Payer: MEDICARE

## 2023-03-22 VITALS
OXYGEN SATURATION: 97 % | HEIGHT: 64 IN | HEART RATE: 61 BPM | SYSTOLIC BLOOD PRESSURE: 142 MMHG | DIASTOLIC BLOOD PRESSURE: 74 MMHG | BODY MASS INDEX: 28.79 KG/M2 | WEIGHT: 168.63 LBS

## 2023-03-22 DIAGNOSIS — E78.5 HYPERLIPIDEMIA, UNSPECIFIED HYPERLIPIDEMIA TYPE: ICD-10-CM

## 2023-03-22 DIAGNOSIS — I10 PRIMARY HYPERTENSION: ICD-10-CM

## 2023-03-22 DIAGNOSIS — R73.03 PREDIABETES: ICD-10-CM

## 2023-03-22 DIAGNOSIS — G47.00 INSOMNIA, UNSPECIFIED TYPE: ICD-10-CM

## 2023-03-22 DIAGNOSIS — R20.0 NUMBNESS IN FEET: ICD-10-CM

## 2023-03-22 DIAGNOSIS — G44.86 HEADACHE, CERVICOGENIC: ICD-10-CM

## 2023-03-22 DIAGNOSIS — R51.9 ACUTE INTRACTABLE HEADACHE, UNSPECIFIED HEADACHE TYPE: Primary | ICD-10-CM

## 2023-03-22 PROCEDURE — 99999 PR PBB SHADOW E&M-EST. PATIENT-LVL V: CPT | Mod: PBBFAC,,, | Performed by: INTERNAL MEDICINE

## 2023-03-22 PROCEDURE — 1126F PR PAIN SEVERITY QUANTIFIED, NO PAIN PRESENT: ICD-10-PCS | Mod: CPTII,S$GLB,, | Performed by: INTERNAL MEDICINE

## 2023-03-22 PROCEDURE — 1101F PR PT FALLS ASSESS DOC 0-1 FALLS W/OUT INJ PAST YR: ICD-10-PCS | Mod: CPTII,S$GLB,, | Performed by: INTERNAL MEDICINE

## 2023-03-22 PROCEDURE — 3288F PR FALLS RISK ASSESSMENT DOCUMENTED: ICD-10-PCS | Mod: CPTII,S$GLB,, | Performed by: INTERNAL MEDICINE

## 2023-03-22 PROCEDURE — 99215 OFFICE O/P EST HI 40 MIN: CPT | Mod: S$GLB,,, | Performed by: INTERNAL MEDICINE

## 2023-03-22 PROCEDURE — 1101F PT FALLS ASSESS-DOCD LE1/YR: CPT | Mod: CPTII,S$GLB,, | Performed by: INTERNAL MEDICINE

## 2023-03-22 PROCEDURE — 3077F PR MOST RECENT SYSTOLIC BLOOD PRESSURE >= 140 MM HG: ICD-10-PCS | Mod: CPTII,S$GLB,, | Performed by: INTERNAL MEDICINE

## 2023-03-22 PROCEDURE — 99499 UNLISTED E&M SERVICE: CPT | Mod: S$GLB,,, | Performed by: INTERNAL MEDICINE

## 2023-03-22 PROCEDURE — 1159F PR MEDICATION LIST DOCUMENTED IN MEDICAL RECORD: ICD-10-PCS | Mod: CPTII,S$GLB,, | Performed by: INTERNAL MEDICINE

## 2023-03-22 PROCEDURE — 1159F MED LIST DOCD IN RCRD: CPT | Mod: CPTII,S$GLB,, | Performed by: INTERNAL MEDICINE

## 2023-03-22 PROCEDURE — 99215 PR OFFICE/OUTPT VISIT, EST, LEVL V, 40-54 MIN: ICD-10-PCS | Mod: S$GLB,,, | Performed by: INTERNAL MEDICINE

## 2023-03-22 PROCEDURE — 1160F RVW MEDS BY RX/DR IN RCRD: CPT | Mod: CPTII,S$GLB,, | Performed by: INTERNAL MEDICINE

## 2023-03-22 PROCEDURE — 3077F SYST BP >= 140 MM HG: CPT | Mod: CPTII,S$GLB,, | Performed by: INTERNAL MEDICINE

## 2023-03-22 PROCEDURE — 99499 RISK ADDL DX/OHS AUDIT: ICD-10-PCS | Mod: S$GLB,,, | Performed by: INTERNAL MEDICINE

## 2023-03-22 PROCEDURE — 1160F PR REVIEW ALL MEDS BY PRESCRIBER/CLIN PHARMACIST DOCUMENTED: ICD-10-PCS | Mod: CPTII,S$GLB,, | Performed by: INTERNAL MEDICINE

## 2023-03-22 PROCEDURE — 3078F DIAST BP <80 MM HG: CPT | Mod: CPTII,S$GLB,, | Performed by: INTERNAL MEDICINE

## 2023-03-22 PROCEDURE — 99999 PR PBB SHADOW E&M-EST. PATIENT-LVL V: ICD-10-PCS | Mod: PBBFAC,,, | Performed by: INTERNAL MEDICINE

## 2023-03-22 PROCEDURE — 3288F FALL RISK ASSESSMENT DOCD: CPT | Mod: CPTII,S$GLB,, | Performed by: INTERNAL MEDICINE

## 2023-03-22 PROCEDURE — 1126F AMNT PAIN NOTED NONE PRSNT: CPT | Mod: CPTII,S$GLB,, | Performed by: INTERNAL MEDICINE

## 2023-03-22 PROCEDURE — 3078F PR MOST RECENT DIASTOLIC BLOOD PRESSURE < 80 MM HG: ICD-10-PCS | Mod: CPTII,S$GLB,, | Performed by: INTERNAL MEDICINE

## 2023-03-22 RX ORDER — AMITRIPTYLINE HYDROCHLORIDE 10 MG/1
10 TABLET, FILM COATED ORAL NIGHTLY PRN
Qty: 30 TABLET | Refills: 11 | Status: SHIPPED | OUTPATIENT
Start: 2023-03-22 | End: 2023-06-22

## 2023-03-22 NOTE — PROGRESS NOTES
Subjective:       Patient ID: Enedelia García is a 77 y.o. female.    Chief Complaint: No chief complaint on file.      HPI  Enedelia García is a 77 y.o. female with chronic conditions of CAD, AS, HLD, ALEXANDRO, HTN, CKD-4, Anemia, Prediabetes, Fibromyalgia, OA, S/P Knee replacement and infection who presents today for follow-up.    Reports has been having a headache every day, mostly over her left eye.  No fever or chills.  Blood pressure has been high at home that she associates to stress but has not checked her blood pressure when she has a her headache.  Describes photophobia no phonophobia clear auras.  Takes 1 Fioricet until Ibuprofens to improve her headache.      Has trouble sleeping. No other neurological change with headaches.  Has been noticing toe numbness with no discoloration but legs feel tired with or without exertion.  The symptoms may happen at rest or walking.  Did not get to see the neurologist due to having other appointment at that time    She is followed by pain management for her radiculopathy and takes gabapentin, tizanidine, and oxycodone.    Does not smoke.  Lives with her  that has dementia due to strokes.  Daughter lives close by.  She is a retired .    Up-to-date with vaccines except for shingles.      Health Maintenance:  Health Maintenance   Topic Date Due    DEXA Scan  01/20/2025    Lipid Panel  11/17/2027    TETANUS VACCINE  09/09/2031    Hepatitis C Screening  Completed       Review of Systems   Constitutional: Negative.  Negative for fever and unexpected weight change.   HENT: Negative.  Negative for sore throat.         Occasional ear itching   Respiratory: Negative.  Negative for cough.    Cardiovascular: Negative.    Gastrointestinal: Negative.    Genitourinary:  Negative for difficulty urinating.   Musculoskeletal:  Positive for arthralgias and back pain.   Integumentary:  Negative.   Neurological:  Positive for numbness (Of toes). Headaches: Left  frontal area.  Psychiatric/Behavioral:  Positive for decreased concentration and sleep disturbance. The patient is nervous/anxious.     Past Medical History:   Diagnosis Date    CKD (chronic kidney disease) stage 4, GFR 15-29 ml/min     Coronary artery disease     Edema     Epilepsy     Hematuria, unspecified     Hematuria, unspecified     Hyperlipidemia     Hypertension     Iron deficiency anemia     Normocytic anemia        Past Surgical History:   Procedure Laterality Date    APPENDECTOMY      BACK SURGERY      CORONARY STENT PLACEMENT      HYSTERECTOMY      REVISION OF KNEE ARTHROPLASTY Left 7/18/2022    Procedure: REVISION, ARTHROPLASTY, KNEE;  Surgeon: Stan Catalan MD;  Location: [facility];  Service: Orthopedics;  Laterality: Left;    TONSILLECTOMY         Family History   Problem Relation Age of Onset    Heart disease Mother     Heart disease Father        Social History     Socioeconomic History    Marital status: Significant Other   Tobacco Use    Smoking status: Never    Smokeless tobacco: Never   Substance and Sexual Activity    Alcohol use: No    Drug use: No    Sexual activity: Not Currently     Social Determinants of Health     Financial Resource Strain: Medium Risk    Difficulty of Paying Living Expenses: Somewhat hard   Food Insecurity: No Food Insecurity    Worried About Running Out of Food in the Last Year: Never true    Ran Out of Food in the Last Year: Never true   Transportation Needs: No Transportation Needs    Lack of Transportation (Medical): No    Lack of Transportation (Non-Medical): No   Physical Activity: Insufficiently Active    Days of Exercise per Week: 3 days    Minutes of Exercise per Session: 20 min   Stress: Stress Concern Present    Feeling of Stress : To some extent   Social Connections: Moderately Isolated    Frequency of Communication with Friends and Family: More than three times a week    Frequency of Social Gatherings with Friends and Family: More than three times a week     Attends Orthodox Services: Never    Active Member of Clubs or Organizations: No    Attends Club or Organization Meetings: Never    Marital Status: Living with partner   Housing Stability: Low Risk     Unable to Pay for Housing in the Last Year: No    Number of Places Lived in the Last Year: 1    Unstable Housing in the Last Year: No       Current Outpatient Medications   Medication Sig Dispense Refill    acetaminophen (TYLENOL) 500 MG tablet Take 500 mg by mouth every 6 (six) hours as needed for Pain.      aspirin (ECOTRIN) 81 MG EC tablet Take 81 mg by mouth once daily.      butalbital-acetaminophen-caff -40 mg Cap Take 1 capsule by mouth as needed (migraine). Take once to twice daily      calcium carbonate/vitamin D3 (VITAMIN D-3 ORAL) Take 1 tablet by mouth once daily.      co-enzyme Q-10 30 mg capsule Take 30 mg by mouth once daily.      collagenase (SANTYL) ointment Apply topically once daily. 30 g 1    docusate sodium 100 mg capsule Take 200 mg by mouth 2 (two) times daily as needed for Constipation. 360 tablet 3    DULoxetine (CYMBALTA) 60 MG capsule Take 1 capsule (60 mg total) by mouth once daily. 90 capsule 1    famotidine (PEPCID) 20 MG tablet Take 20 mg by mouth 2 (two) times daily as needed for Heartburn.      FEROSUL 325 mg (65 mg iron) Tab tablet Take 325 mg by mouth 2 (two) times daily.      furosemide (LASIX) 40 MG tablet Take 1 tablet (40 mg total) by mouth 2 (two) times daily as needed (swelling). 90 tablet 1    gabapentin (NEURONTIN) 600 MG tablet Take 600 mg by mouth 3 (three) times daily.      galcanezumab-gnlm 120 mg/mL PnIj Inject 120 mg into the skin every 28 days. maintenance dose 1 each 11    hydrOXYzine HCL (ATARAX) 25 MG tablet TAKE ONE TABLET BY MOUTH EVERY 6 HOURS AS NEEDED FOR ITCHING 20 tablet 0    irbesartan (AVAPRO) 300 MG tablet Take 300 mg by mouth every evening.      isosorbide mononitrate (IMDUR) 60 MG 24 hr tablet Take 1 tablet (60 mg total) by mouth once daily. 90  tablet 1    metoprolol succinate (TOPROL-XL) 25 MG 24 hr tablet Take 1 tablet (25 mg total) by mouth once daily. 90 tablet 1    multivit-min-iron-FA-lutein (CENTRUM SILVER WOMEN) 8 mg iron-400 mcg-300 mcg Tab Take 1 tablet by mouth once daily.      nitroGLYCERIN (NITROSTAT) 0.3 MG SL tablet PLACE 1 TABLET UNDER THE TONGUE EVERY 5 MINUTES FOR UP TO 3 DOSES IF NEEDED FOR CHEST PAIN. CALL 911 IF PAIN PERSISTS 25 tablet 0    omeprazole (PRILOSEC) 20 MG capsule Take 20 mg by mouth once daily.      oxyCODONE (ROXICODONE) 10 mg Tab immediate release tablet Take 10 mg by mouth 3 (three) times daily.      rosuvastatin (CRESTOR) 40 MG Tab Take 1 tablet (40 mg total) by mouth once daily. 90 tablet 3    tiZANidine (ZANAFLEX) 4 MG tablet Take 1 tablet (4 mg total) by mouth every 8 (eight) hours as needed (spasms). 60 tablet 2     No current facility-administered medications for this visit.       Review of patient's allergies indicates:   Allergen Reactions    Iodinated contrast media Anaphylaxis and Other (See Comments)    Phenergan [promethazine]      Vomiting         Objective:       Last 3 sets of Vitals    Vitals - 1 value per visit 1/3/2023 1/9/2023 1/23/2023   SYSTOLIC 139 204 177   DIASTOLIC 71 82 77   Pulse 59 63 62   Temp - 97.2 98.1   Resp - - -   SPO2 - - -   Weight (lb) 162.7 162 162   Weight (kg) 73.8 73.483 73.483   Height 64 64 64   BMI (Calculated) 27.9 27.8 27.8   VISIT REPORT - - -   Pain Score  - 0 0   Some recent data might be hidden   Physical Exam  Constitutional:       General: She is not in acute distress.     Appearance: Normal appearance.   HENT:      Head: Normocephalic.      Right Ear: Tympanic membrane, ear canal and external ear normal.      Left Ear: Tympanic membrane, ear canal and external ear normal.      Nose: Nose normal. Rhinorrhea: postnasal drip.      Mouth/Throat:      Mouth: Mucous membranes are moist.   Eyes:      General: No scleral icterus.     Extraocular Movements: Extraocular  movements intact.      Conjunctiva/sclera: Conjunctivae normal.   Neck:      Vascular: No carotid bruit.      Comments: No goiter.  Cardiovascular:      Rate and Rhythm: Normal rate and regular rhythm.      Pulses: Normal pulses.      Heart sounds: Normal heart sounds.   Pulmonary:      Effort: Pulmonary effort is normal.      Breath sounds: Normal breath sounds.   Abdominal:      General: Bowel sounds are normal. There is no distension.      Palpations: Abdomen is soft. There is no mass.      Tenderness: There is no abdominal tenderness.   Musculoskeletal:         General: No swelling. Normal range of motion.   Lymphadenopathy:      Cervical: No cervical adenopathy.   Skin:     General: Skin is warm and dry.   Neurological:      General: No focal deficit present.      Mental Status: She is alert and oriented to person, place, and time.   Psychiatric:         Mood and Affect: Mood normal.         Behavior: Behavior normal.         CBC:  Recent Labs   Lab 09/16/22  1351 11/17/22  1332 12/05/22  1452   WBC 7.25 8.74 10.26   RBC 2.85 L 4.08 3.98 L   Hemoglobin 7.7 L 11.4 L 11.4 L   Hematocrit 26.0 L 36.2 L 36.9 L   Platelets 320 300 258   MCV 91 89 93   MCH 27.0 27.9 28.6   MCHC 29.6 L 31.5 L 30.9 L     CMP:  Recent Labs   Lab 09/09/22  1346 09/16/22  1351 11/17/22  1332 12/05/22  1452   Glucose 100 93 95 84   Calcium 9.7 9.3 10.2 10.2   Albumin 3.2 L 2.8 L 3.6 3.6   Total Protein 6.9 6.4 7.1  --    Sodium 139 139 140 140   Potassium 4.6 4.6 4.4 4.8   CO2 30 H 28 30 H 29   Chloride 101 102 104 102   BUN 13 18 24 H 20   Creatinine 1.1 1.2 0.9 0.9   Alkaline Phosphatase 95 78 77  --    ALT 13 13 17  --    AST 20 28 17  --    Total Bilirubin 0.3 0.1 0.3  --      URINALYSIS:  Recent Labs   Lab 04/10/22  0010 04/26/22  1418 07/31/22  0003 08/29/22  1527 09/09/22  1317 12/05/22  1437   Color, UA Colorless A   < > Yellow Yellow   < > Yellow   Specific Gravity, UA 1.005   < > 1.010 1.020   < > 1.030   pH, UA 5.0   < > 6.0 6.0    < > 6.0   Protein, UA Negative   < > Negative Negative   < > Negative   Bacteria Rare  --  Rare Rare  --   --    Nitrite, UA Negative   < > Negative Negative   < > Negative   Leukocytes, UA 1+ A   < > 1+ A Trace A   < > 1+ A   Urobilinogen, UA Negative  --  Negative Negative   < > Negative   Hyaline Casts, UA 8 A  --  5 A  --   --  4 A    < > = values in this interval not displayed.      LIPIDS:  Recent Labs   Lab 05/22/21  0447 02/23/22  1402 04/14/22  0240 11/17/22  1332 12/22/22  1020   TSH 1.009 1.299 1.991 0.501 1.549   HDL 55 47  --  50  --    Cholesterol 207 H 179  --  193  --    Triglycerides 203 H 168 H  --  151 H  --    LDL Cholesterol 111.4 98.4  --  112.8  --    HDL/Cholesterol Ratio 26.6 26.3  --  25.9  --    Non-HDL Cholesterol 152 132  --  143  --    Total Cholesterol/HDL Ratio 3.8 3.8  --  3.9  --      TSH:  Recent Labs   Lab 04/14/22  0240 11/17/22  1332 12/22/22  1020   TSH 1.991 0.501 1.549       A1C:  Recent Labs   Lab 05/22/21  0447 02/23/22  1402 03/10/22  1629 06/29/22  1253 08/12/22  1026 11/17/22  1332   Hemoglobin A1C 5.7 H 5.8 H 5.7 H 6.4 H 5.3 5.6       Imaging:  CT Head Without Contrast  Narrative: EXAMINATION:  CT HEAD WITHOUT CONTRAST    CLINICAL HISTORY:  Headache, chronic, new features or increased frequency; Cervicogenic headache    TECHNIQUE:  Low dose axial CT images obtained throughout the head without intravenous contrast. Sagittal and coronal reconstructions were performed.    COMPARISON:  09/16/2022    FINDINGS:  No evidence for major vascular distribution infarct, intracranial hemorrhage, or extra-axial collection.  No midline shift or mass effect.  No hydrocephalus.  Visualized paranasal sinuses and bilateral mastoid air cells are clear.  Impression: No acute intracranial abnormality.    Electronically signed by: Trev Cole  Date:    12/28/2022  Time:    15:51      Assessment:       1. Acute intractable headache, unspecified headache type    2. Headache, cervicogenic     3. Primary hypertension    4. Numbness in feet    5. Hyperlipidemia, unspecified hyperlipidemia type    6. Prediabetes    7. Insomnia, unspecified type            Plan:       1. Acute intractable headache, unspecified headache type  -     CT Head Without Contrast; Future; Expected date: 03/22/2023  -     amitriptyline (ELAVIL) 10 MG tablet; Take 1 tablet (10 mg total) by mouth nightly as needed for Insomnia.  Dispense: 30 tablet; Refill: 11  -     CBC Auto Differential; Future; Expected date: 03/22/2023  -     TSH; Future; Expected date: 03/22/2023  - monitor blood pressure at home    2. Headache, cervicogenic  -     amitriptyline (ELAVIL) 10 MG tablet; Take 1 tablet (10 mg total) by mouth nightly as needed for Insomnia.  Dispense: 30 tablet; Refill: 11  - on gabapentin and followed by pain management    3. Primary hypertension  -     Hypertension Digital Medicine ([facility]) Enrollment Order  -     Hypertension Digital Medicine ([facility]): Assign Onboarding Questionnaires  -     Comprehensive Metabolic Panel; Future; Expected date: 03/22/2023  -     Lipid Panel; Future; Expected date: 03/22/2023  -     TSH; Future; Expected date: 03/22/2023    4. Numbness in feet  -     Ankle Brachial Indices (PORSHA); Future; Expected date: 03/22/2023    5. Hyperlipidemia, unspecified hyperlipidemia type  -     Lipid Panel; Future; Expected date: 03/22/2023  - lifestyle acceptable    6. Prediabetes  -     Hemoglobin A1C; Future; Expected date: 03/22/2023  - last A1c was 5.6    7. Insomnia, unspecified type  -     amitriptyline (ELAVIL) 10 MG tablet; Take 1 tablet (10 mg total) by mouth nightly as needed for Insomnia.  Dispense: 30 tablet; Refill: 11       Health Maintenance Due   Topic Date Due    Shingles Vaccine (2 of 2) 05/04/2022            Return to clinic in 1 months.    Lauren Brown MD  [facility]  Disclaimer:  This note has been generated using voice-recognition software. There may be grammatical or spelling errors that have  been missed during proof-reading

## 2023-03-23 NOTE — PROGRESS NOTES
Patient, Enedelia García (MRN #[id number]), presented with a recent Estimated Glumerular Filtration Rate (EGFR) between 15 and 29 consistent with the definition of chronic kidney disease stage 4 (ICD10 - N18.4).      The patient's chronic kidney disease stage 4 was monitored, evaluated, addressed and/or treated. This addendum to the medical record is made on 03/23/2023.

## 2023-03-31 ENCOUNTER — LAB VISIT (OUTPATIENT)
Dept: LAB | Facility: HOSPITAL | Age: 78
End: 2023-03-31
Payer: MEDICARE

## 2023-03-31 DIAGNOSIS — E83.9 CHRONIC KIDNEY DISEASE-MINERAL AND BONE DISORDER: ICD-10-CM

## 2023-03-31 DIAGNOSIS — E55.9 VITAMIN D DEFICIENCY: ICD-10-CM

## 2023-03-31 DIAGNOSIS — D63.1 ANEMIA IN STAGE 2 CHRONIC KIDNEY DISEASE: ICD-10-CM

## 2023-03-31 DIAGNOSIS — N18.2 CKD (CHRONIC KIDNEY DISEASE) STAGE 2, GFR 60-89 ML/MIN: ICD-10-CM

## 2023-03-31 DIAGNOSIS — N18.2 ANEMIA IN STAGE 2 CHRONIC KIDNEY DISEASE: ICD-10-CM

## 2023-03-31 DIAGNOSIS — M89.9 CHRONIC KIDNEY DISEASE-MINERAL AND BONE DISORDER: ICD-10-CM

## 2023-03-31 DIAGNOSIS — D50.8 OTHER IRON DEFICIENCY ANEMIA: ICD-10-CM

## 2023-03-31 DIAGNOSIS — D50.0 ANEMIA DUE TO BLOOD LOSS: ICD-10-CM

## 2023-03-31 DIAGNOSIS — D63.8 ANEMIA, CHRONIC DISEASE: ICD-10-CM

## 2023-03-31 DIAGNOSIS — N18.9 CHRONIC KIDNEY DISEASE-MINERAL AND BONE DISORDER: ICD-10-CM

## 2023-03-31 LAB
25(OH)D3+25(OH)D2 SERPL-MCNC: 52 NG/ML (ref 30–96)
ALBUMIN SERPL BCP-MCNC: 4 G/DL (ref 3.5–5.2)
ANION GAP SERPL CALC-SCNC: 10 MMOL/L (ref 8–16)
BASOPHILS # BLD AUTO: 0.01 K/UL (ref 0–0.2)
BASOPHILS # BLD AUTO: 0.01 K/UL (ref 0–0.2)
BASOPHILS NFR BLD: 0.1 % (ref 0–1.9)
BASOPHILS NFR BLD: 0.1 % (ref 0–1.9)
BUN SERPL-MCNC: 23 MG/DL (ref 8–23)
CALCIUM SERPL-MCNC: 10 MG/DL (ref 8.7–10.5)
CHLORIDE SERPL-SCNC: 102 MMOL/L (ref 95–110)
CO2 SERPL-SCNC: 28 MMOL/L (ref 23–29)
CREAT SERPL-MCNC: 0.9 MG/DL (ref 0.5–1.4)
DIFFERENTIAL METHOD: ABNORMAL
DIFFERENTIAL METHOD: ABNORMAL
EOSINOPHIL # BLD AUTO: 0 K/UL (ref 0–0.5)
EOSINOPHIL # BLD AUTO: 0 K/UL (ref 0–0.5)
EOSINOPHIL NFR BLD: 0 % (ref 0–8)
EOSINOPHIL NFR BLD: 0 % (ref 0–8)
ERYTHROCYTE [DISTWIDTH] IN BLOOD BY AUTOMATED COUNT: 11.9 % (ref 11.5–14.5)
ERYTHROCYTE [DISTWIDTH] IN BLOOD BY AUTOMATED COUNT: 11.9 % (ref 11.5–14.5)
EST. GFR  (NO RACE VARIABLE): >60 ML/MIN/1.73 M^2
FERRITIN SERPL-MCNC: 94 NG/ML (ref 20–300)
GLUCOSE SERPL-MCNC: 111 MG/DL (ref 70–110)
HCT VFR BLD AUTO: 39 % (ref 37–48.5)
HCT VFR BLD AUTO: 39 % (ref 37–48.5)
HGB BLD-MCNC: 12.8 G/DL (ref 12–16)
HGB BLD-MCNC: 12.8 G/DL (ref 12–16)
IMM GRANULOCYTES # BLD AUTO: 0.05 K/UL (ref 0–0.04)
IMM GRANULOCYTES # BLD AUTO: 0.05 K/UL (ref 0–0.04)
IMM GRANULOCYTES NFR BLD AUTO: 0.4 % (ref 0–0.5)
IMM GRANULOCYTES NFR BLD AUTO: 0.4 % (ref 0–0.5)
IRON SERPL-MCNC: 66 UG/DL (ref 30–160)
LYMPHOCYTES # BLD AUTO: 1.9 K/UL (ref 1–4.8)
LYMPHOCYTES # BLD AUTO: 1.9 K/UL (ref 1–4.8)
LYMPHOCYTES NFR BLD: 14.7 % (ref 18–48)
LYMPHOCYTES NFR BLD: 14.7 % (ref 18–48)
MCH RBC QN AUTO: 31.4 PG (ref 27–31)
MCH RBC QN AUTO: 31.4 PG (ref 27–31)
MCHC RBC AUTO-ENTMCNC: 32.8 G/DL (ref 32–36)
MCHC RBC AUTO-ENTMCNC: 32.8 G/DL (ref 32–36)
MCV RBC AUTO: 96 FL (ref 82–98)
MCV RBC AUTO: 96 FL (ref 82–98)
MONOCYTES # BLD AUTO: 0.8 K/UL (ref 0.3–1)
MONOCYTES # BLD AUTO: 0.8 K/UL (ref 0.3–1)
MONOCYTES NFR BLD: 5.9 % (ref 4–15)
MONOCYTES NFR BLD: 5.9 % (ref 4–15)
NEUTROPHILS # BLD AUTO: 10.4 K/UL (ref 1.8–7.7)
NEUTROPHILS # BLD AUTO: 10.4 K/UL (ref 1.8–7.7)
NEUTROPHILS NFR BLD: 78.9 % (ref 38–73)
NEUTROPHILS NFR BLD: 78.9 % (ref 38–73)
NRBC BLD-RTO: 0 /100 WBC
NRBC BLD-RTO: 0 /100 WBC
PHOSPHATE SERPL-MCNC: 2.5 MG/DL (ref 2.7–4.5)
PLATELET # BLD AUTO: 269 K/UL (ref 150–450)
PLATELET # BLD AUTO: 269 K/UL (ref 150–450)
PMV BLD AUTO: 10 FL (ref 9.2–12.9)
PMV BLD AUTO: 10 FL (ref 9.2–12.9)
POTASSIUM SERPL-SCNC: 4.4 MMOL/L (ref 3.5–5.1)
PTH-INTACT SERPL-MCNC: 126.1 PG/ML (ref 9–77)
RBC # BLD AUTO: 4.07 M/UL (ref 4–5.4)
RBC # BLD AUTO: 4.07 M/UL (ref 4–5.4)
SATURATED IRON: 17 % (ref 20–50)
SODIUM SERPL-SCNC: 140 MMOL/L (ref 136–145)
TOTAL IRON BINDING CAPACITY: 386 UG/DL (ref 250–450)
TRANSFERRIN SERPL-MCNC: 261 MG/DL (ref 200–375)
URATE SERPL-MCNC: 4.3 MG/DL (ref 2.4–5.7)
WBC # BLD AUTO: 13.12 K/UL (ref 3.9–12.7)
WBC # BLD AUTO: 13.12 K/UL (ref 3.9–12.7)

## 2023-03-31 PROCEDURE — 80069 RENAL FUNCTION PANEL: CPT | Performed by: INTERNAL MEDICINE

## 2023-03-31 PROCEDURE — 36415 COLL VENOUS BLD VENIPUNCTURE: CPT | Performed by: INTERNAL MEDICINE

## 2023-03-31 PROCEDURE — 82728 ASSAY OF FERRITIN: CPT | Performed by: INTERNAL MEDICINE

## 2023-03-31 PROCEDURE — 84550 ASSAY OF BLOOD/URIC ACID: CPT | Performed by: INTERNAL MEDICINE

## 2023-03-31 PROCEDURE — 85025 COMPLETE CBC W/AUTO DIFF WBC: CPT | Performed by: FAMILY MEDICINE

## 2023-03-31 PROCEDURE — 82306 VITAMIN D 25 HYDROXY: CPT | Performed by: INTERNAL MEDICINE

## 2023-03-31 PROCEDURE — 83970 ASSAY OF PARATHORMONE: CPT | Performed by: INTERNAL MEDICINE

## 2023-03-31 PROCEDURE — 84466 ASSAY OF TRANSFERRIN: CPT | Performed by: INTERNAL MEDICINE

## 2023-04-03 ENCOUNTER — HOSPITAL ENCOUNTER (OUTPATIENT)
Dept: CARDIOLOGY | Facility: HOSPITAL | Age: 78
Discharge: HOME | End: 2023-04-03
Attending: INTERNAL MEDICINE
Payer: MEDICARE

## 2023-04-03 DIAGNOSIS — R20.0 NUMBNESS IN FEET: ICD-10-CM

## 2023-04-03 PROCEDURE — 93924 LWR XTR VASC STDY BILAT: CPT | Mod: 26,,, | Performed by: INTERNAL MEDICINE

## 2023-04-03 PROCEDURE — 93924 LWR XTR VASC STDY BILAT: CPT

## 2023-04-03 PROCEDURE — 93924 ANKLE BRACHIAL INDICES (ABI): ICD-10-PCS | Mod: 26,,, | Performed by: INTERNAL MEDICINE

## 2023-04-05 ENCOUNTER — HOSPITAL ENCOUNTER (OUTPATIENT)
Dept: RADIOLOGY | Facility: HOSPITAL | Age: 78
Discharge: HOME | End: 2023-04-05
Attending: INTERNAL MEDICINE
Payer: MEDICARE

## 2023-04-05 ENCOUNTER — TELEPHONE (OUTPATIENT)
Dept: FAMILY MEDICINE | Facility: CLINIC | Age: 78
End: 2023-04-05
Payer: MEDICARE

## 2023-04-05 DIAGNOSIS — R51.9 ACUTE INTRACTABLE HEADACHE, UNSPECIFIED HEADACHE TYPE: ICD-10-CM

## 2023-04-05 PROCEDURE — 70450 CT HEAD/BRAIN W/O DYE: CPT | Mod: TC

## 2023-04-05 PROCEDURE — 70450 CT HEAD WITHOUT CONTRAST: ICD-10-PCS | Mod: 26,,, | Performed by: RADIOLOGY

## 2023-04-05 PROCEDURE — 70450 CT HEAD/BRAIN W/O DYE: CPT | Mod: 26,,, | Performed by: RADIOLOGY

## 2023-04-06 LAB
IMMEDIATE ARM BP: 255 MMHG
IMMEDIATE LEFT ABI: 1
IMMEDIATE LEFT TIBIAL: 255 MMHG
IMMEDIATE RIGHT ABI: 1
IMMEDIATE RIGHT TIBIAL: 255 MMHG
LEFT ABI: 1
LEFT ARM BP: 202 MMHG
LEFT DORSALIS PEDIS: 215 MMHG
LEFT POSTERIOR TIBIAL: 200 MMHG
LEFT TBI: 0.82
LEFT TOE PRESSURE: 178 MMHG
POST1 ARM BP: 255 MMHG
POST1 LEFT ABI: 1
POST1 LEFT TIBIAL: 255 MMHG
POST1 RIGHT ABI: 1
POST1 RIGHT TIBIAL: 255 MMHG
POST2 ARM BP: 220 MMHG
POST2 LEFT ABI: 0.93
POST2 LEFT TIBIAL: 205 MMHG
POST2 RIGHT ABI: 1
POST2 RIGHT TIBIAL: 219 MMHG
RIGHT ABI: 1.14
RIGHT ARM BP: 216 MMHG
RIGHT DORSALIS PEDIS: 246 MMHG
RIGHT POSTERIOR TIBIAL: 215 MMHG
RIGHT TBI: 0.51
RIGHT TOE PRESSURE: 110 MMHG
TREADMILL GRADE: 10 %
TREADMILL SPEED: 2 MPH
TREADMILL TIME: 2.4 MIN

## 2023-04-08 ENCOUNTER — PATIENT MESSAGE (OUTPATIENT)
Dept: FAMILY MEDICINE | Facility: CLINIC | Age: 78
End: 2023-04-08
Payer: MEDICARE

## 2023-04-27 DIAGNOSIS — F43.21 ADJUSTMENT DISORDER WITH DEPRESSED MOOD: ICD-10-CM

## 2023-04-27 RX ORDER — HYDROXYZINE HYDROCHLORIDE 25 MG/1
TABLET, FILM COATED ORAL
Qty: 20 TABLET | Refills: 0 | Status: SHIPPED | OUTPATIENT
Start: 2023-04-27 | End: 2023-05-12 | Stop reason: SDUPTHER

## 2023-04-27 NOTE — TELEPHONE ENCOUNTER
----- Message from Cary Leyva sent at 4/27/2023  2:39 PM CDT -----  Regarding: call back  Contact: [phone]  Type:  Patient Returning Call    Who Called: PT   Who Left Message for Patient: Nurse   Does the patient know what this is regarding?: Yes   Would the patient rather a call back or a response via [facility]? Call back   Best Call Back Number: [phone]  Additional Information:

## 2023-04-27 NOTE — TELEPHONE ENCOUNTER
----- Message from Yisel Fuentes MA sent at 4/26/2023  5:01 PM CDT -----  Regarding: FW: call back  Contact: [phone]    ----- Message -----  From: Cary Leyva  Sent: 4/26/2023   9:52 AM CDT  To: Stephanie Aguilar Staff  Subject: call back                                        Type:  Patient Returning Call    Who Called: PT   Who Left Message for Patient: Nurse   Does the patient know what this is regarding?: Yes   Would the patient rather a call back or a response via [facility]? Call back   Best Call Back Number: [phone]  Additional Information:

## 2023-05-03 ENCOUNTER — PATIENT MESSAGE (OUTPATIENT)
Dept: ADMINISTRATIVE | Facility: OTHER | Age: 78
End: 2023-05-03
Payer: MEDICARE

## 2023-05-11 ENCOUNTER — TELEPHONE (OUTPATIENT)
Dept: FAMILY MEDICINE | Facility: CLINIC | Age: 78
End: 2023-05-11
Payer: MEDICARE

## 2023-05-11 NOTE — TELEPHONE ENCOUNTER
Informed patient she needed to been seen because of her symptoms appt. Has been scheduled to be seen tomorrow

## 2023-05-11 NOTE — TELEPHONE ENCOUNTER
----- Message from Will Maldonado sent at 5/11/2023 12:22 PM CDT -----  Contact: pt  Type: Requesting to speak with nurse        Who Called: PT  Regarding: coughing up green/brownish flem for over two weeks, was taking muninex and it helped but now its back.. Pt would like antibiotics.  Would the patient rather a call back or a response via [facility]? Call back  Best Call Back Number: [phone]  Pharmacy Information:  [facility] - [place] [id number]    Phone: [phone]  Fax:  [phone]

## 2023-05-12 ENCOUNTER — OFFICE VISIT (OUTPATIENT)
Dept: FAMILY MEDICINE | Facility: CLINIC | Age: 78
End: 2023-05-12
Attending: FAMILY MEDICINE
Payer: MEDICARE

## 2023-05-12 ENCOUNTER — HOSPITAL ENCOUNTER (OUTPATIENT)
Dept: RADIOLOGY | Facility: HOSPITAL | Age: 78
Discharge: HOME | End: 2023-05-12
Attending: FAMILY MEDICINE
Payer: MEDICARE

## 2023-05-12 VITALS
BODY MASS INDEX: 30.07 KG/M2 | TEMPERATURE: 98 F | OXYGEN SATURATION: 94 % | DIASTOLIC BLOOD PRESSURE: 70 MMHG | HEIGHT: 64 IN | SYSTOLIC BLOOD PRESSURE: 172 MMHG | WEIGHT: 176.13 LBS | HEART RATE: 56 BPM

## 2023-05-12 DIAGNOSIS — F43.21 ADJUSTMENT DISORDER WITH DEPRESSED MOOD: ICD-10-CM

## 2023-05-12 DIAGNOSIS — B96.89 ACUTE BACTERIAL BRONCHITIS: Primary | ICD-10-CM

## 2023-05-12 DIAGNOSIS — R05.9 COUGH, UNSPECIFIED TYPE: ICD-10-CM

## 2023-05-12 DIAGNOSIS — J20.8 ACUTE BACTERIAL BRONCHITIS: Primary | ICD-10-CM

## 2023-05-12 DIAGNOSIS — I10 PRIMARY HYPERTENSION: ICD-10-CM

## 2023-05-12 PROCEDURE — 1101F PR PT FALLS ASSESS DOC 0-1 FALLS W/OUT INJ PAST YR: ICD-10-PCS | Mod: CPTII,S$GLB,, | Performed by: FAMILY MEDICINE

## 2023-05-12 PROCEDURE — 3078F PR MOST RECENT DIASTOLIC BLOOD PRESSURE < 80 MM HG: ICD-10-PCS | Mod: CPTII,S$GLB,, | Performed by: FAMILY MEDICINE

## 2023-05-12 PROCEDURE — 1159F MED LIST DOCD IN RCRD: CPT | Mod: CPTII,S$GLB,, | Performed by: FAMILY MEDICINE

## 2023-05-12 PROCEDURE — 1101F PT FALLS ASSESS-DOCD LE1/YR: CPT | Mod: CPTII,S$GLB,, | Performed by: FAMILY MEDICINE

## 2023-05-12 PROCEDURE — 3078F DIAST BP <80 MM HG: CPT | Mod: CPTII,S$GLB,, | Performed by: FAMILY MEDICINE

## 2023-05-12 PROCEDURE — 99999 PR PBB SHADOW E&M-EST. PATIENT-LVL III: CPT | Mod: PBBFAC,,, | Performed by: FAMILY MEDICINE

## 2023-05-12 PROCEDURE — 3288F FALL RISK ASSESSMENT DOCD: CPT | Mod: CPTII,S$GLB,, | Performed by: FAMILY MEDICINE

## 2023-05-12 PROCEDURE — 71046 XR CHEST PA AND LATERAL: ICD-10-PCS | Mod: 26,,, | Performed by: RADIOLOGY

## 2023-05-12 PROCEDURE — 99999 PR PBB SHADOW E&M-EST. PATIENT-LVL III: ICD-10-PCS | Mod: PBBFAC,,, | Performed by: FAMILY MEDICINE

## 2023-05-12 PROCEDURE — 99214 OFFICE O/P EST MOD 30 MIN: CPT | Mod: S$GLB,,, | Performed by: FAMILY MEDICINE

## 2023-05-12 PROCEDURE — 71046 X-RAY EXAM CHEST 2 VIEWS: CPT | Mod: 26,,, | Performed by: RADIOLOGY

## 2023-05-12 PROCEDURE — 3288F PR FALLS RISK ASSESSMENT DOCUMENTED: ICD-10-PCS | Mod: CPTII,S$GLB,, | Performed by: FAMILY MEDICINE

## 2023-05-12 PROCEDURE — 3077F PR MOST RECENT SYSTOLIC BLOOD PRESSURE >= 140 MM HG: ICD-10-PCS | Mod: CPTII,S$GLB,, | Performed by: FAMILY MEDICINE

## 2023-05-12 PROCEDURE — 1125F PR PAIN SEVERITY QUANTIFIED, PAIN PRESENT: ICD-10-PCS | Mod: CPTII,S$GLB,, | Performed by: FAMILY MEDICINE

## 2023-05-12 PROCEDURE — 1159F PR MEDICATION LIST DOCUMENTED IN MEDICAL RECORD: ICD-10-PCS | Mod: CPTII,S$GLB,, | Performed by: FAMILY MEDICINE

## 2023-05-12 PROCEDURE — 1160F RVW MEDS BY RX/DR IN RCRD: CPT | Mod: CPTII,S$GLB,, | Performed by: FAMILY MEDICINE

## 2023-05-12 PROCEDURE — 99214 PR OFFICE/OUTPT VISIT, EST, LEVL IV, 30-39 MIN: ICD-10-PCS | Mod: S$GLB,,, | Performed by: FAMILY MEDICINE

## 2023-05-12 PROCEDURE — 1160F PR REVIEW ALL MEDS BY PRESCRIBER/CLIN PHARMACIST DOCUMENTED: ICD-10-PCS | Mod: CPTII,S$GLB,, | Performed by: FAMILY MEDICINE

## 2023-05-12 PROCEDURE — 3077F SYST BP >= 140 MM HG: CPT | Mod: CPTII,S$GLB,, | Performed by: FAMILY MEDICINE

## 2023-05-12 PROCEDURE — 71046 X-RAY EXAM CHEST 2 VIEWS: CPT | Mod: TC,FY

## 2023-05-12 PROCEDURE — 1125F AMNT PAIN NOTED PAIN PRSNT: CPT | Mod: CPTII,S$GLB,, | Performed by: FAMILY MEDICINE

## 2023-05-12 RX ORDER — HYDRALAZINE HYDROCHLORIDE 25 MG/1
25 TABLET, FILM COATED ORAL EVERY 8 HOURS
Qty: 90 TABLET | Refills: 2 | Status: SHIPPED | OUTPATIENT
Start: 2023-05-12 | End: 2023-06-30 | Stop reason: SDUPTHER

## 2023-05-12 RX ORDER — DOXYCYCLINE 100 MG/1
100 CAPSULE ORAL 2 TIMES DAILY
Qty: 20 CAPSULE | Refills: 0 | Status: SHIPPED | OUTPATIENT
Start: 2023-05-12 | End: 2023-06-22

## 2023-05-12 RX ORDER — HYDROXYZINE HYDROCHLORIDE 25 MG/1
TABLET, FILM COATED ORAL
Qty: 30 TABLET | Refills: 2 | Status: SHIPPED | OUTPATIENT
Start: 2023-05-12 | End: 2023-07-07

## 2023-05-12 NOTE — PROGRESS NOTES
Subjective     Patient ID: Enedelia García is a 78 y.o. female.    Chief Complaint: Cough (2 weeks)    78 yr old pleasant female with hypertension, anxiety/depression, CKD, GERD, CAD, and other co morbidities presents today as new patient to me and for evaluation of cough. [place] 3 weeks agoa dn worsening. Associated with chills and SOB and also copious sputum.     HTN - uncontrolled - on multiple meds - asymptomatic    Anxiety - worsening with insomnia - on atarax and need refill    History as below - reviewed     Cough  This is a recurrent problem. The current episode started more than 1 month ago. The problem has been rapidly worsening. The problem occurs constantly. The cough is Productive of sputum and productive of purulent sputum. Associated symptoms include shortness of breath. Pertinent negatives include no chest pain, headaches, myalgias, rhinorrhea, sore throat or wheezing. Nothing aggravates the symptoms. She has tried OTC cough suppressant for the symptoms. The treatment provided mild relief. Her past medical history is significant for environmental allergies.   Review of Systems   Constitutional: Negative.  Negative for activity change, diaphoresis and unexpected weight change.   HENT: Negative.  Negative for nasal congestion, ear discharge, hearing loss, rhinorrhea, sore throat and voice change.    Eyes: Negative.  Negative for pain, discharge and visual disturbance.   Respiratory:  Positive for cough and shortness of breath. Negative for chest tightness and wheezing.    Cardiovascular: Negative.  Negative for chest pain.   Gastrointestinal: Negative.  Negative for abdominal distention, anal bleeding, constipation and nausea.   Endocrine: Negative.  Negative for cold intolerance, polydipsia and polyuria.   Genitourinary: Negative.  Negative for decreased urine volume, difficulty urinating, dysuria, frequency, menstrual problem and vaginal pain.   Musculoskeletal: Negative.  Negative for  arthralgias, gait problem and myalgias.   Integumentary:  Negative for color change, pallor and wound. Negative.   Allergic/Immunologic: Positive for environmental allergies. Negative for immunocompromised state.   Neurological: Negative.  Negative for dizziness, tremors, seizures, speech difficulty and headaches.   Hematological: Negative.  Negative for adenopathy. Does not bruise/bleed easily.   Psychiatric/Behavioral: Negative.  Negative for agitation, confusion, decreased concentration, hallucinations, self-injury and suicidal ideas. The patient is not nervous/anxious.      Past Medical History:   Diagnosis Date    CKD (chronic kidney disease) stage 4, GFR 15-29 ml/min     Coronary artery disease     Edema     Epilepsy     Hematuria, unspecified     Hematuria, unspecified     Hyperlipidemia     Hypertension     Iron deficiency anemia     Normocytic anemia        Past Surgical History:   Procedure Laterality Date    APPENDECTOMY      BACK SURGERY      CORONARY STENT PLACEMENT      HYSTERECTOMY      REVISION OF KNEE ARTHROPLASTY Left 7/18/2022    Procedure: REVISION, ARTHROPLASTY, KNEE;  Surgeon: Stan Catalan MD;  Location: [facility];  Service: Orthopedics;  Laterality: Left;    TONSILLECTOMY         Family History   Problem Relation Age of Onset    Heart disease Mother     Heart disease Father        Social History     Socioeconomic History    Marital status: Significant Other   Tobacco Use    Smoking status: Never    Smokeless tobacco: Never   Substance and Sexual Activity    Alcohol use: No    Drug use: No    Sexual activity: Not Currently     Social Determinants of Health     Financial Resource Strain: Medium Risk    Difficulty of Paying Living Expenses: Somewhat hard   Food Insecurity: No Food Insecurity    Worried About Running Out of Food in the Last Year: Never true    Ran Out of Food in the Last Year: Never true   Transportation Needs: No Transportation Needs    Lack of Transportation (Medical): No    Lack  of Transportation (Non-Medical): No   Physical Activity: Insufficiently Active    Days of Exercise per Week: 3 days    Minutes of Exercise per Session: 20 min   Stress: Stress Concern Present    Feeling of Stress : To some extent   Social Connections: Moderately Isolated    Frequency of Communication with Friends and Family: More than three times a week    Frequency of Social Gatherings with Friends and Family: More than three times a week    Attends Faith Services: Never    Active Member of Clubs or Organizations: No    Attends Club or Organization Meetings: Never    Marital Status: Living with partner   Housing Stability: Low Risk     Unable to Pay for Housing in the Last Year: No    Number of Places Lived in the Last Year: 1    Unstable Housing in the Last Year: No       Current Outpatient Medications   Medication Sig Dispense Refill    acetaminophen (TYLENOL) 500 MG tablet Take 500 mg by mouth every 6 (six) hours as needed for Pain.      amitriptyline (ELAVIL) 10 MG tablet Take 1 tablet (10 mg total) by mouth nightly as needed for Insomnia. 30 tablet 11    aspirin (ECOTRIN) 81 MG EC tablet Take 81 mg by mouth once daily.      butalbital-acetaminophen-caff -40 mg Cap Take 1 capsule by mouth as needed (migraine). Take once to twice daily      calcium carbonate/vitamin D3 (VITAMIN D-3 ORAL) Take 1 tablet by mouth once daily.      co-enzyme Q-10 30 mg capsule Take 30 mg by mouth once daily.      collagenase (SANTYL) ointment Apply topically once daily. 30 g 1    docusate sodium 100 mg capsule Take 200 mg by mouth 2 (two) times daily as needed for Constipation. 360 tablet 3    DULoxetine (CYMBALTA) 60 MG capsule Take 1 capsule (60 mg total) by mouth once daily. 90 capsule 1    famotidine (PEPCID) 20 MG tablet Take 20 mg by mouth 2 (two) times daily as needed for Heartburn.      FEROSUL 325 mg (65 mg iron) Tab tablet Take 325 mg by mouth 2 (two) times daily.      furosemide (LASIX) 40 MG tablet Take 1 tablet  (40 mg total) by mouth 2 (two) times daily as needed (swelling). 90 tablet 1    gabapentin (NEURONTIN) 600 MG tablet Take 600 mg by mouth 3 (three) times daily.      galcanezumab-gnlm 120 mg/mL PnIj Inject 120 mg into the skin every 28 days. maintenance dose 1 each 11    hydrOXYzine HCL (ATARAX) 25 MG tablet TAKE ONE TABLET BY MOUTH EVERY 6 HOURS AS NEEDED FOR ITCHING 30 tablet 2    irbesartan (AVAPRO) 300 MG tablet Take 300 mg by mouth every evening.      isosorbide mononitrate (IMDUR) 60 MG 24 hr tablet Take 1 tablet (60 mg total) by mouth once daily. 90 tablet 1    metoprolol succinate (TOPROL-XL) 25 MG 24 hr tablet Take 1 tablet (25 mg total) by mouth once daily. 90 tablet 1    multivit-min-iron-FA-lutein (CENTRUM SILVER WOMEN) 8 mg iron-400 mcg-300 mcg Tab Take 1 tablet by mouth once daily.      nitroGLYCERIN (NITROSTAT) 0.3 MG SL tablet PLACE 1 TABLET UNDER THE TONGUE EVERY 5 MINUTES FOR UP TO 3 DOSES IF NEEDED FOR CHEST PAIN. CALL 911 IF PAIN PERSISTS 25 tablet 0    omeprazole (PRILOSEC) 20 MG capsule Take 20 mg by mouth once daily.      oxyCODONE (ROXICODONE) 10 mg Tab immediate release tablet Take 10 mg by mouth 3 (three) times daily.      rosuvastatin (CRESTOR) 40 MG Tab Take 1 tablet (40 mg total) by mouth once daily. 90 tablet 3    tiZANidine (ZANAFLEX) 4 MG tablet Take 1 tablet (4 mg total) by mouth every 8 (eight) hours as needed (spasms). 60 tablet 2    doxycycline (MONODOX) 100 MG capsule Take 1 capsule (100 mg total) by mouth 2 (two) times daily. 20 capsule 0    hydrALAZINE (APRESOLINE) 25 MG tablet Take 1 tablet (25 mg total) by mouth every 8 (eight) hours. 90 tablet 2     No current facility-administered medications for this visit.       Review of patient's allergies indicates:   Allergen Reactions    Iodinated contrast media Anaphylaxis and Other (See Comments)    Phenergan [promethazine]      Vomiting          Objective   Vitals:    05/12/23 0955   BP: (!) 172/70   Pulse: (!) 56   Temp: 98 °F  (36.7 °C)       Physical Exam  Constitutional:       General: She is not in acute distress.     Appearance: She is well-developed. She is not diaphoretic.   HENT:      Head: Normocephalic and atraumatic.      Right Ear: External ear normal.      Left Ear: External ear normal.      Nose: Nose normal.      Mouth/Throat:      Pharynx: No oropharyngeal exudate.   Eyes:      General: No scleral icterus.        Right eye: No discharge.         Left eye: No discharge.      Conjunctiva/sclera: Conjunctivae normal.      Pupils: Pupils are equal, round, and reactive to light.   Neck:      Thyroid: No thyromegaly.      Vascular: No JVD.      Trachea: No tracheal deviation.   Cardiovascular:      Rate and Rhythm: Normal rate and regular rhythm.      Heart sounds: Normal heart sounds. No murmur heard.    No friction rub. No gallop.   Pulmonary:      Effort: Pulmonary effort is normal.      Breath sounds: No stridor. Wheezing, rhonchi and rales present.      Comments: B/L rales  Chest:      Chest wall: No tenderness.   Abdominal:      General: Bowel sounds are normal. There is no distension.      Palpations: Abdomen is soft. There is no mass.      Tenderness: There is no abdominal tenderness. There is no guarding or rebound.      Hernia: No hernia is present.   Musculoskeletal:         General: No tenderness. Normal range of motion.      Cervical back: Normal range of motion and neck supple.   Lymphadenopathy:      Cervical: No cervical adenopathy.   Skin:     General: Skin is warm and dry.      Coloration: Skin is not pale.      Findings: No erythema or rash.   Neurological:      Mental Status: She is alert and oriented to person, place, and time.      Cranial Nerves: No cranial nerve deficit.      Motor: No abnormal muscle tone.      Coordination: Coordination normal.      Deep Tendon Reflexes: Reflexes are normal and symmetric. Reflexes normal.   Psychiatric:         Behavior: Behavior normal.         Thought Content: Thought  content normal.         Judgment: Judgment normal.          Assessment and Plan     Problem List Items Addressed This Visit       HTN (hypertension)    Relevant Medications    hydrALAZINE (APRESOLINE) 25 MG tablet     Other Visit Diagnoses       Acute bacterial bronchitis    -  Primary    Relevant Medications    doxycycline (MONODOX) 100 MG capsule    Cough, unspecified type        Relevant Medications    doxycycline (MONODOX) 100 MG capsule    Other Relevant Orders    X-Ray Chest PA And Lateral    Adjustment disorder with depressed mood        Relevant Medications    hydrOXYzine HCL (ATARAX) 25 MG tablet            Enedelia was seen today for cough.    Diagnoses and all orders for this visit:    Acute bacterial bronchitis  -     doxycycline (MONODOX) 100 MG capsule; Take 1 capsule (100 mg total) by mouth 2 (two) times daily.    Cough, unspecified type  -     X-Ray Chest PA And Lateral; Future  -     doxycycline (MONODOX) 100 MG capsule; Take 1 capsule (100 mg total) by mouth 2 (two) times daily.    Primary hypertension  -     hydrALAZINE (APRESOLINE) 25 MG tablet; Take 1 tablet (25 mg total) by mouth every 8 (eight) hours.    Adjustment disorder with depressed mood  -     hydrOXYzine HCL (ATARAX) 25 MG tablet; TAKE ONE TABLET BY MOUTH EVERY 6 HOURS AS NEEDED FOR ITCHING      Ac bac bronchitis/cough  -duration more than 2-3 weeks  -CXR to r/po PNA    HTN  -uncontrolled  -adding hydralazine     Anxiety  -atarax nightly    A.de    40 minutes spent during this visit of which greater than 50% devoted to face-face counseling and coordination of care regarding diagnosis and management plan    Follow up in about 1 month (around 6/14/2023), or if symptoms worsen or fail to improve.

## 2023-05-29 NOTE — TELEPHONE ENCOUNTER
No care due was identified.  [facility] Embedded Care Due Messages. Reference number: [id number].   5/29/2023 10:04:39 AM CDT

## 2023-05-30 RX ORDER — IRBESARTAN 300 MG/1
300 TABLET ORAL NIGHTLY
Qty: 30 TABLET | Refills: 30 | Status: SHIPPED | OUTPATIENT
Start: 2023-05-30

## 2023-05-30 NOTE — TELEPHONE ENCOUNTER
Refill Routing Note   Medication(s) are not appropriate for processing by [facility] for the following reason(s):      Required vitals abnormal: 172/70  No active prescription written by PCP    [facility] action(s):  Defer Care Due:  None identified3          Appointments  past 12m or future 3m with PCP    Date Provider   Last Visit   3/22/2023 Lauren Brown MD   Next Visit   6/14/2023 Lauren Brown MD   ED visits in past 90 days: 0        Note composed:7:55 PM 05/29/2023

## 2023-06-12 ENCOUNTER — TELEPHONE (OUTPATIENT)
Dept: FAMILY MEDICINE | Facility: CLINIC | Age: 78
End: 2023-06-12
Payer: MEDICARE

## 2023-06-12 ENCOUNTER — LAB VISIT (OUTPATIENT)
Dept: LAB | Facility: HOSPITAL | Age: 78
End: 2023-06-12
Attending: INTERNAL MEDICINE
Payer: MEDICARE

## 2023-06-12 DIAGNOSIS — N18.2 CKD (CHRONIC KIDNEY DISEASE) STAGE 2, GFR 60-89 ML/MIN: ICD-10-CM

## 2023-06-12 DIAGNOSIS — R82.90 URINE ABNORMALITY: ICD-10-CM

## 2023-06-12 DIAGNOSIS — R51.9 FREQUENT HEADACHES: Primary | ICD-10-CM

## 2023-06-12 LAB
BILIRUB UR QL STRIP: NEGATIVE
CLARITY UR: CLEAR
COLOR UR: YELLOW
GLUCOSE UR QL STRIP: NEGATIVE
HGB UR QL STRIP: NEGATIVE
KETONES UR QL STRIP: NEGATIVE
LEUKOCYTE ESTERASE UR QL STRIP: ABNORMAL
MICROSCOPIC COMMENT: NORMAL
NITRITE UR QL STRIP: NEGATIVE
PH UR STRIP: 5 [PH] (ref 5–8)
PROT UR QL STRIP: NEGATIVE
SP GR UR STRIP: 1.02 (ref 1–1.03)
URN SPEC COLLECT METH UR: ABNORMAL
UROBILINOGEN UR STRIP-ACNC: NEGATIVE EU/DL
WBC #/AREA URNS HPF: 2 /HPF (ref 0–5)

## 2023-06-12 PROCEDURE — 81000 URINALYSIS NONAUTO W/SCOPE: CPT | Performed by: INTERNAL MEDICINE

## 2023-06-14 PROBLEM — I50.32 CHRONIC DIASTOLIC HEART FAILURE: Status: ACTIVE | Noted: 2023-06-14

## 2023-06-14 PROBLEM — R56.9 SEIZURE-LIKE ACTIVITY: Status: ACTIVE | Noted: 2023-06-14

## 2023-06-14 PROBLEM — I70.0 AORTIC ATHEROSCLEROSIS: Status: ACTIVE | Noted: 2023-06-14

## 2023-06-22 ENCOUNTER — HOSPITAL ENCOUNTER (OUTPATIENT)
Facility: HOSPITAL | Age: 78
Discharge: HOME | End: 2023-06-24
Attending: EMERGENCY MEDICINE | Admitting: EMERGENCY MEDICINE
Payer: MEDICARE

## 2023-06-22 DIAGNOSIS — R55 SYNCOPE AND COLLAPSE: ICD-10-CM

## 2023-06-22 DIAGNOSIS — D64.9 ANEMIA, UNSPECIFIED TYPE: ICD-10-CM

## 2023-06-22 DIAGNOSIS — S81.812A TEAR OF SKIN OF MULTIPLE SITES OF LEFT LOWER EXTREMITY, INITIAL ENCOUNTER: Primary | ICD-10-CM

## 2023-06-22 DIAGNOSIS — R55 SYNCOPE: ICD-10-CM

## 2023-06-22 DIAGNOSIS — I35.0 AORTIC VALVE STENOSIS, ETIOLOGY OF CARDIAC VALVE DISEASE UNSPECIFIED: Chronic | ICD-10-CM

## 2023-06-22 DIAGNOSIS — R29.898 DECREASED STRENGTH INVOLVING KNEE JOINT: ICD-10-CM

## 2023-06-22 LAB
ALBUMIN SERPL BCP-MCNC: 3.7 G/DL (ref 3.5–5.2)
ALP SERPL-CCNC: 87 U/L (ref 55–135)
ALT SERPL W/O P-5'-P-CCNC: 20 U/L (ref 10–44)
ANION GAP SERPL CALC-SCNC: 10 MMOL/L (ref 8–16)
AST SERPL-CCNC: 28 U/L (ref 10–40)
BACTERIA #/AREA URNS HPF: ABNORMAL /HPF
BASOPHILS # BLD AUTO: 0.08 K/UL (ref 0–0.2)
BASOPHILS NFR BLD: 1.1 % (ref 0–1.9)
BILIRUB SERPL-MCNC: 0.3 MG/DL (ref 0.1–1)
BILIRUB UR QL STRIP: NEGATIVE
BNP SERPL-MCNC: 91 PG/ML (ref 0–99)
BUN SERPL-MCNC: 19 MG/DL (ref 8–23)
CALCIUM SERPL-MCNC: 9.6 MG/DL (ref 8.7–10.5)
CHLORIDE SERPL-SCNC: 105 MMOL/L (ref 95–110)
CLARITY UR: CLEAR
CO2 SERPL-SCNC: 27 MMOL/L (ref 23–29)
COLOR UR: YELLOW
CREAT SERPL-MCNC: 1.3 MG/DL (ref 0.5–1.4)
CTP QC/QA: YES
DIFFERENTIAL METHOD: ABNORMAL
EOSINOPHIL # BLD AUTO: 0.6 K/UL (ref 0–0.5)
EOSINOPHIL NFR BLD: 8.8 % (ref 0–8)
ERYTHROCYTE [DISTWIDTH] IN BLOOD BY AUTOMATED COUNT: 13 % (ref 11.5–14.5)
EST. GFR  (NO RACE VARIABLE): 42 ML/MIN/1.73 M^2
GLUCOSE SERPL-MCNC: 99 MG/DL (ref 70–110)
GLUCOSE UR QL STRIP: NEGATIVE
HCT VFR BLD AUTO: 33.5 % (ref 37–48.5)
HGB BLD-MCNC: 10.7 G/DL (ref 12–16)
HGB UR QL STRIP: NEGATIVE
HYALINE CASTS #/AREA URNS LPF: 23 /LPF
IMM GRANULOCYTES # BLD AUTO: 0.01 K/UL (ref 0–0.04)
IMM GRANULOCYTES NFR BLD AUTO: 0.1 % (ref 0–0.5)
KETONES UR QL STRIP: NEGATIVE
LEUKOCYTE ESTERASE UR QL STRIP: ABNORMAL
LYMPHOCYTES # BLD AUTO: 2.3 K/UL (ref 1–4.8)
LYMPHOCYTES NFR BLD: 32.4 % (ref 18–48)
MCH RBC QN AUTO: 31.5 PG (ref 27–31)
MCHC RBC AUTO-ENTMCNC: 31.9 G/DL (ref 32–36)
MCV RBC AUTO: 99 FL (ref 82–98)
MICROSCOPIC COMMENT: ABNORMAL
MONOCYTES # BLD AUTO: 1.1 K/UL (ref 0.3–1)
MONOCYTES NFR BLD: 14.8 % (ref 4–15)
NEUTROPHILS # BLD AUTO: 3.1 K/UL (ref 1.8–7.7)
NEUTROPHILS NFR BLD: 42.8 % (ref 38–73)
NITRITE UR QL STRIP: NEGATIVE
NRBC BLD-RTO: 0 /100 WBC
PH UR STRIP: 5 [PH] (ref 5–8)
PLATELET # BLD AUTO: 265 K/UL (ref 150–450)
PMV BLD AUTO: 9.8 FL (ref 9.2–12.9)
POCT GLUCOSE: 104 MG/DL (ref 70–110)
POTASSIUM SERPL-SCNC: 4 MMOL/L (ref 3.5–5.1)
PROT SERPL-MCNC: 6.3 G/DL (ref 6–8.4)
PROT UR QL STRIP: NEGATIVE
RBC # BLD AUTO: 3.4 M/UL (ref 4–5.4)
RBC #/AREA URNS HPF: 1 /HPF (ref 0–4)
SARS-COV-2 RDRP RESP QL NAA+PROBE: NEGATIVE
SODIUM SERPL-SCNC: 142 MMOL/L (ref 136–145)
SP GR UR STRIP: 1.02 (ref 1–1.03)
SQUAMOUS #/AREA URNS HPF: 1 /HPF
TROPONIN I SERPL DL<=0.01 NG/ML-MCNC: 0.01 NG/ML (ref 0–0.03)
TROPONIN I SERPL DL<=0.01 NG/ML-MCNC: 0.01 NG/ML (ref 0–0.03)
TROPONIN I SERPL DL<=0.01 NG/ML-MCNC: <0.006 NG/ML (ref 0–0.03)
URN SPEC COLLECT METH UR: ABNORMAL
UROBILINOGEN UR STRIP-ACNC: NEGATIVE EU/DL
WBC # BLD AUTO: 7.14 K/UL (ref 3.9–12.7)
WBC #/AREA URNS HPF: 4 /HPF (ref 0–5)

## 2023-06-22 PROCEDURE — 87635 SARS-COV-2 COVID-19 AMP PRB: CPT | Performed by: EMERGENCY MEDICINE

## 2023-06-22 PROCEDURE — 99285 EMERGENCY DEPT VISIT HI MDM: CPT | Mod: 25

## 2023-06-22 PROCEDURE — 84484 ASSAY OF TROPONIN QUANT: CPT | Mod: 91 | Performed by: NURSE PRACTITIONER

## 2023-06-22 PROCEDURE — 83880 ASSAY OF NATRIURETIC PEPTIDE: CPT | Performed by: NURSE PRACTITIONER

## 2023-06-22 PROCEDURE — G0378 HOSPITAL OBSERVATION PER HR: HCPCS

## 2023-06-22 PROCEDURE — 93010 EKG 12-LEAD: ICD-10-PCS | Mod: ,,, | Performed by: INTERNAL MEDICINE

## 2023-06-22 PROCEDURE — 25000003 PHARM REV CODE 250: Performed by: EMERGENCY MEDICINE

## 2023-06-22 PROCEDURE — 85025 COMPLETE CBC W/AUTO DIFF WBC: CPT | Performed by: NURSE PRACTITIONER

## 2023-06-22 PROCEDURE — 93010 ELECTROCARDIOGRAM REPORT: CPT | Mod: ,,, | Performed by: INTERNAL MEDICINE

## 2023-06-22 PROCEDURE — 84484 ASSAY OF TROPONIN QUANT: CPT | Performed by: EMERGENCY MEDICINE

## 2023-06-22 PROCEDURE — 93005 ELECTROCARDIOGRAM TRACING: CPT

## 2023-06-22 PROCEDURE — 81000 URINALYSIS NONAUTO W/SCOPE: CPT | Performed by: NURSE PRACTITIONER

## 2023-06-22 PROCEDURE — 82962 GLUCOSE BLOOD TEST: CPT

## 2023-06-22 PROCEDURE — 25000003 PHARM REV CODE 250: Performed by: FAMILY MEDICINE

## 2023-06-22 PROCEDURE — 80053 COMPREHEN METABOLIC PANEL: CPT | Performed by: NURSE PRACTITIONER

## 2023-06-22 RX ORDER — LANOLIN ALCOHOL/MO/W.PET/CERES
1 CREAM (GRAM) TOPICAL DAILY
Status: DISCONTINUED | OUTPATIENT
Start: 2023-06-23 | End: 2023-06-24 | Stop reason: HOSPADM

## 2023-06-22 RX ORDER — ASPIRIN 81 MG/1
81 TABLET ORAL DAILY
Status: DISCONTINUED | OUTPATIENT
Start: 2023-06-23 | End: 2023-06-24 | Stop reason: HOSPADM

## 2023-06-22 RX ORDER — FUROSEMIDE 40 MG/1
40 TABLET ORAL DAILY
Status: DISCONTINUED | OUTPATIENT
Start: 2023-06-23 | End: 2023-06-23

## 2023-06-22 RX ORDER — OXYCODONE HYDROCHLORIDE 5 MG/1
10 TABLET ORAL EVERY 6 HOURS PRN
Status: DISCONTINUED | OUTPATIENT
Start: 2023-06-22 | End: 2023-06-23

## 2023-06-22 RX ORDER — BUTALBITAL, ACETAMINOPHEN AND CAFFEINE 50; 325; 40 MG/1; MG/1; MG/1
1 TABLET ORAL EVERY 8 HOURS PRN
COMMUNITY
Start: 2023-06-15 | End: 2024-01-04

## 2023-06-22 RX ORDER — HYDRALAZINE HYDROCHLORIDE 25 MG/1
25 TABLET, FILM COATED ORAL ONCE
Status: COMPLETED | OUTPATIENT
Start: 2023-06-22 | End: 2023-06-22

## 2023-06-22 RX ORDER — ERENUMAB-AOOE 70 MG/ML
70 INJECTION SUBCUTANEOUS
COMMUNITY
Start: 2023-06-15

## 2023-06-22 RX ORDER — TIZANIDINE 4 MG/1
4 TABLET ORAL EVERY 8 HOURS PRN
Status: DISCONTINUED | OUTPATIENT
Start: 2023-06-22 | End: 2023-06-24 | Stop reason: HOSPADM

## 2023-06-22 RX ORDER — HYDROXYZINE HYDROCHLORIDE 25 MG/1
25 TABLET, FILM COATED ORAL EVERY 6 HOURS PRN
Status: DISCONTINUED | OUTPATIENT
Start: 2023-06-22 | End: 2023-06-24 | Stop reason: HOSPADM

## 2023-06-22 RX ORDER — FUROSEMIDE 40 MG/1
40 TABLET ORAL
Status: COMPLETED | OUTPATIENT
Start: 2023-06-22 | End: 2023-06-22

## 2023-06-22 RX ORDER — BUTALBITAL, ACETAMINOPHEN AND CAFFEINE 50; 325; 40 MG/1; MG/1; MG/1
1 TABLET ORAL EVERY 8 HOURS PRN
Status: DISCONTINUED | OUTPATIENT
Start: 2023-06-22 | End: 2023-06-24 | Stop reason: HOSPADM

## 2023-06-22 RX ORDER — CHOLECALCIFEROL (VITAMIN D3) 25 MCG
5000 TABLET ORAL DAILY
Status: DISCONTINUED | OUTPATIENT
Start: 2023-06-23 | End: 2023-06-24 | Stop reason: HOSPADM

## 2023-06-22 RX ORDER — ACETAMINOPHEN 325 MG/1
650 TABLET ORAL EVERY 6 HOURS PRN
Status: DISCONTINUED | OUTPATIENT
Start: 2023-06-22 | End: 2023-06-24 | Stop reason: HOSPADM

## 2023-06-22 RX ORDER — ATORVASTATIN CALCIUM 40 MG/1
40 TABLET, FILM COATED ORAL DAILY
Status: DISCONTINUED | OUTPATIENT
Start: 2023-06-23 | End: 2023-06-24 | Stop reason: HOSPADM

## 2023-06-22 RX ORDER — GABAPENTIN 300 MG/1
600 CAPSULE ORAL ONCE
Status: COMPLETED | OUTPATIENT
Start: 2023-06-22 | End: 2023-06-22

## 2023-06-22 RX ORDER — HYDRALAZINE HYDROCHLORIDE 25 MG/1
25 TABLET, FILM COATED ORAL EVERY 8 HOURS
Status: DISCONTINUED | OUTPATIENT
Start: 2023-06-22 | End: 2023-06-24 | Stop reason: HOSPADM

## 2023-06-22 RX ORDER — VALSARTAN 160 MG/1
160 TABLET ORAL DAILY
Status: DISCONTINUED | OUTPATIENT
Start: 2023-06-23 | End: 2023-06-23

## 2023-06-22 RX ORDER — AMOXICILLIN 500 MG
2 CAPSULE ORAL DAILY
COMMUNITY

## 2023-06-22 RX ORDER — OXYCODONE AND ACETAMINOPHEN 10; 325 MG/1; MG/1
1 TABLET ORAL ONCE
Status: COMPLETED | OUTPATIENT
Start: 2023-06-22 | End: 2023-06-22

## 2023-06-22 RX ORDER — LINACLOTIDE 72 UG/1
72 CAPSULE, GELATIN COATED ORAL EVERY MORNING
COMMUNITY
Start: 2023-05-26 | End: 2023-06-29

## 2023-06-22 RX ORDER — DULOXETIN HYDROCHLORIDE 30 MG/1
60 CAPSULE, DELAYED RELEASE ORAL DAILY
Status: DISCONTINUED | OUTPATIENT
Start: 2023-06-23 | End: 2023-06-24 | Stop reason: HOSPADM

## 2023-06-22 RX ORDER — CLONIDINE HYDROCHLORIDE 0.1 MG/1
0.2 TABLET ORAL EVERY 8 HOURS PRN
Status: DISCONTINUED | OUTPATIENT
Start: 2023-06-22 | End: 2023-06-24 | Stop reason: HOSPADM

## 2023-06-22 RX ORDER — METOPROLOL SUCCINATE 25 MG/1
25 TABLET, EXTENDED RELEASE ORAL DAILY
Status: DISCONTINUED | OUTPATIENT
Start: 2023-06-23 | End: 2023-06-23

## 2023-06-22 RX ORDER — ISOSORBIDE MONONITRATE 30 MG/1
60 TABLET, EXTENDED RELEASE ORAL DAILY
Status: DISCONTINUED | OUTPATIENT
Start: 2023-06-23 | End: 2023-06-24 | Stop reason: HOSPADM

## 2023-06-22 RX ORDER — OXYCODONE AND ACETAMINOPHEN 10; 325 MG/1; MG/1
1 TABLET ORAL EVERY 6 HOURS PRN
COMMUNITY
Start: 2023-06-15 | End: 2023-06-22

## 2023-06-22 RX ORDER — GABAPENTIN 300 MG/1
600 CAPSULE ORAL NIGHTLY
Status: DISCONTINUED | OUTPATIENT
Start: 2023-06-22 | End: 2023-06-24 | Stop reason: HOSPADM

## 2023-06-22 RX ADMIN — OXYCODONE HYDROCHLORIDE AND ACETAMINOPHEN 1 TABLET: 10; 325 TABLET ORAL at 08:06

## 2023-06-22 RX ADMIN — CLONIDINE HYDROCHLORIDE 0.2 MG: 0.1 TABLET ORAL at 10:06

## 2023-06-22 RX ADMIN — GABAPENTIN 600 MG: 300 CAPSULE ORAL at 08:06

## 2023-06-22 RX ADMIN — BACITRACIN ZINC, NEOMYCIN, POLYMYXIN B 1 EACH: 400; 3.5; 5 OINTMENT TOPICAL at 08:06

## 2023-06-22 RX ADMIN — FUROSEMIDE 40 MG: 40 TABLET ORAL at 08:06

## 2023-06-22 RX ADMIN — HYDRALAZINE HYDROCHLORIDE 25 MG: 25 TABLET, FILM COATED ORAL at 10:06

## 2023-06-22 RX ADMIN — HYDROXYZINE HYDROCHLORIDE 25 MG: 25 TABLET ORAL at 10:06

## 2023-06-22 RX ADMIN — HYDRALAZINE HYDROCHLORIDE 25 MG: 25 TABLET, FILM COATED ORAL at 08:06

## 2023-06-22 NOTE — ED TRIAGE NOTES
States she was putting boxes of cat food out to feed feral cats in the neighborhood when she thinks she lost her balance and fell forward over [place] statue. She does not quite remember if she fell or passed out but denies feeling weak or dizzy prior to fall. Presents awake, alert with c/o skin tear to LLE and lower back pain.

## 2023-06-22 NOTE — Clinical Note
Diagnosis: Syncope [206001]   Future Attending Provider: DIEGO SAGE [[id number]]   Admitting Provider:: CAROLE MANJARREZ [[id number]]

## 2023-06-22 NOTE — FIRST PROVIDER EVALUATION
Emergency Department TeleTriage Encounter Note      CHIEF COMPLAINT    Chief Complaint   Patient presents with    Laceration     Pt states fall outside unknown if she loss consciousness or not. Pt advised is on blood thinners. Pt has very large skin tears to the right leg      Back Pain     Pt also states back pain       VITAL SIGNS   Initial Vitals   BP Pulse Resp Temp SpO2   06/22/23 1549 06/22/23 1549 06/22/23 1547 06/22/23 1547 06/22/23 1549   (!) 106/52 61 16 98.4 °F (36.9 °C) (!) 93 %      MAP       --                   ALLERGIES    Review of patient's allergies indicates:   Allergen Reactions    Iodinated contrast media Anaphylaxis and Other (See Comments)    Nsaids (non-steroidal anti-inflammatory drug)      ADWOA    Phenergan [promethazine]      Vomiting       PROVIDER TRIAGE NOTE  Verbal consent for the teletriage evaluation was given by the patient at the start of the evaluation.  All efforts will be made to maintain patient's privacy during the evaluation.      This is a teletriage evaluation of a 78 y.o. female presenting to the ED with c/o bending over and falling with passing out approximately 30 minutes ago.  Patient states she woke up on the ground with left leg pain and wounds with bleeding. Reports neck and back pain that is chronic and not new since the fall.  Limited physical exam via telehealth: The patient is awake, alert, answering questions appropriately and is not in respiratory distress.  As the Teletriage provider, I performed an initial assessment and ordered appropriate labs and imaging studies, if any, to facilitate the patient's care once placed in the ED. Once a room is available, care and a full evaluation will be completed by an alternate ED provider.  Any additional orders and the final disposition will be determined by that provider.  All imaging and labs will not be followed-up by the Teletriage Team, including myself.          ORDERS  Labs Reviewed - No data to display    ED  Orders (720h ago, onward)      Start Ordered     Status Ordering Provider    06/22/23 1600 06/22/23 1600  CT Head Without Contrast  1 time imaging         Ordered KAILA PANTOJA    06/22/23 1600 06/22/23 1600  Orthostatic vital signs  Once         Ordered KAILA PANTOJA    06/22/23 1600 06/22/23 1600  X-Ray Tibia Fibula 2 View Left  1 time imaging         Ordered KAILA PANTOJA    06/22/23 1559 06/22/23 1600  Vital signs  Every 15 min         Ordered KAILA PANTOJA    06/22/23 1559 06/22/23 1600  Cardiac Monitoring - Adult  Continuous        Comments: Notify Physician If:    Ordered KAILA PANTOJA    06/22/23 1559 06/22/23 1600  Pulse Oximetry Continuous  Continuous         Ordered KAILA PANTOJA    06/22/23 1559 06/22/23 1600  Diet NPO  Diet effective now         Ordered KAILA PANTOJA    06/22/23 1559 06/22/23 1600  Saline lock IV  Once         Ordered KAILA PANTOJA    06/22/23 1559 06/22/23 1600  EKG 12-lead  Once        Comments: Do not perform if previously done during this visit/ triage    Ordered KAILA PANTOJA              Virtual Visit Note: The provider triage portion of this emergency department evaluation and documentation was performed via [employer], a HIPAA-compliant telemedicine application, in concert with a tele-presenter in the room. A face to face patient evaluation with one of my colleagues will occur once the patient is placed in an emergency department room.      DISCLAIMER: This note was prepared with [employer] voice recognition transcription software. Garbled syntax, mangled pronouns, and other bizarre constructions may be attributed to that software system.

## 2023-06-23 PROBLEM — I35.0 AORTIC VALVE STENOSIS: Chronic | Status: ACTIVE | Noted: 2021-12-20

## 2023-06-23 LAB
AV INDEX (PROSTH): 0.25
AV MEAN GRADIENT: 39 MMHG
AV PEAK GRADIENT: 52 MMHG
AV REGURGITATION PRESSURE HALF TIME: 407.78 MS
AV VALVE AREA: 0.74 CM2
AV VELOCITY RATIO: 0.31
BSA FOR ECHO PROCEDURE: 1.86 M2
CV ECHO LV RWT: 0.54 CM
DOP CALC AO PEAK VEL: 3.6 M/S
DOP CALC AO VTI: 105.9 CM
DOP CALC LVOT AREA: 3 CM2
DOP CALC LVOT DIAMETER: 1.95 CM
DOP CALC LVOT PEAK VEL: 1.1 M/S
DOP CALC LVOT STROKE VOLUME: 78.8 CM3
DOP CALC MV VTI: 30.1 CM
DOP CALCLVOT PEAK VEL VTI: 26.4 CM
E WAVE DECELERATION TIME: 202.15 MSEC
E/A RATIO: 0.9
E/E' RATIO: 12.88 M/S
ECHO LV POSTERIOR WALL: 0.97 CM (ref 0.6–1.1)
EJECTION FRACTION: 70 %
FRACTIONAL SHORTENING: 50 % (ref 28–44)
INTERVENTRICULAR SEPTUM: 1.17 CM (ref 0.6–1.1)
IVC DIAMETER: 2.02 CM
LA MAJOR: 5.27 CM
LA MINOR: 5.52 CM
LA WIDTH: 4 CM
LEFT ATRIUM SIZE: 3.64 CM
LEFT ATRIUM VOLUME INDEX MOD: 26.5 ML/M2
LEFT ATRIUM VOLUME INDEX: 36.7 ML/M2
LEFT ATRIUM VOLUME MOD: 48.3 CM3
LEFT ATRIUM VOLUME: 66.73 CM3
LEFT INTERNAL DIMENSION IN SYSTOLE: 1.81 CM (ref 2.1–4)
LEFT VENTRICLE DIASTOLIC VOLUME INDEX: 29.85 ML/M2
LEFT VENTRICLE DIASTOLIC VOLUME: 54.32 ML
LEFT VENTRICLE MASS INDEX: 65 G/M2
LEFT VENTRICLE SYSTOLIC VOLUME INDEX: 5.4 ML/M2
LEFT VENTRICLE SYSTOLIC VOLUME: 9.87 ML
LEFT VENTRICULAR INTERNAL DIMENSION IN DIASTOLE: 3.6 CM (ref 3.5–6)
LEFT VENTRICULAR MASS: 119.13 G
LV LATERAL E/E' RATIO: 11.44 M/S
LV SEPTAL E/E' RATIO: 14.71 M/S
LVOT MG: 2.35 MMHG
LVOT MV: 0.7 CM/S
MV MEAN GRADIENT: 1 MMHG
MV PEAK A VEL: 1.14 M/S
MV PEAK E VEL: 1.03 M/S
MV PEAK GRADIENT: 5 MMHG
MV STENOSIS PRESSURE HALF TIME: 58.62 MS
MV VALVE AREA BY CONTINUITY EQUATION: 2.62 CM2
MV VALVE AREA P 1/2 METHOD: 3.75 CM2
OHS LV EJECTION FRACTION SIMPSONS BIPLANE MOD: 7 %
PISA AR MAX VEL: 4.06 M/S
PISA TR MAX VEL: 3.28 M/S
RA MAJOR: 4.87 CM
RA PRESSURE: 5 MMHG
RIGHT VENTRICULAR END-DIASTOLIC DIMENSION: 2.7 CM
RV TISSUE DOPPLER FREE WALL SYSTOLIC VELOCITY 1 (APICAL 4 CHAMBER VIEW): 16.89 CM/S
TDI LATERAL: 0.09 M/S
TDI SEPTAL: 0.07 M/S
TDI: 0.08 M/S
TR MAX PG: 43 MMHG
TRICUSPID ANNULAR PLANE SYSTOLIC EXCURSION: 2.1 CM
TV REST PULMONARY ARTERY PRESSURE: 48 MMHG

## 2023-06-23 PROCEDURE — 25000003 PHARM REV CODE 250: Performed by: FAMILY MEDICINE

## 2023-06-23 PROCEDURE — G0378 HOSPITAL OBSERVATION PER HR: HCPCS

## 2023-06-23 PROCEDURE — 25000003 PHARM REV CODE 250: Performed by: STUDENT IN AN ORGANIZED HEALTH CARE EDUCATION/TRAINING PROGRAM

## 2023-06-23 RX ORDER — HYDROMORPHONE HYDROCHLORIDE 1 MG/ML
1 INJECTION, SOLUTION INTRAMUSCULAR; INTRAVENOUS; SUBCUTANEOUS ONCE
Status: COMPLETED | OUTPATIENT
Start: 2023-06-24 | End: 2023-06-24

## 2023-06-23 RX ORDER — VALSARTAN 40 MG/1
80 TABLET ORAL DAILY
Status: DISCONTINUED | OUTPATIENT
Start: 2023-06-24 | End: 2023-06-24 | Stop reason: HOSPADM

## 2023-06-23 RX ORDER — HYDROMORPHONE HYDROCHLORIDE 2 MG/ML
INJECTION, SOLUTION INTRAMUSCULAR; INTRAVENOUS; SUBCUTANEOUS
Status: DISPENSED
Start: 2023-06-23 | End: 2023-06-24

## 2023-06-23 RX ORDER — ENOXAPARIN SODIUM 100 MG/ML
40 INJECTION SUBCUTANEOUS EVERY 24 HOURS
Status: DISCONTINUED | OUTPATIENT
Start: 2023-06-24 | End: 2023-06-24 | Stop reason: HOSPADM

## 2023-06-23 RX ORDER — OXYCODONE HYDROCHLORIDE 5 MG/1
5 TABLET ORAL EVERY 6 HOURS PRN
Status: DISCONTINUED | OUTPATIENT
Start: 2023-06-23 | End: 2023-06-24

## 2023-06-23 RX ADMIN — OXYCODONE HYDROCHLORIDE 10 MG: 5 TABLET ORAL at 05:06

## 2023-06-23 RX ADMIN — HYDRALAZINE HYDROCHLORIDE 25 MG: 25 TABLET, FILM COATED ORAL at 06:06

## 2023-06-23 RX ADMIN — VALSARTAN 160 MG: 160 TABLET, FILM COATED ORAL at 09:06

## 2023-06-23 RX ADMIN — TIZANIDINE 4 MG: 4 TABLET ORAL at 09:06

## 2023-06-23 RX ADMIN — FUROSEMIDE 40 MG: 40 TABLET ORAL at 09:06

## 2023-06-23 RX ADMIN — OXYCODONE HYDROCHLORIDE 10 MG: 5 TABLET ORAL at 01:06

## 2023-06-23 RX ADMIN — ASPIRIN 81 MG: 81 TABLET, COATED ORAL at 09:06

## 2023-06-23 RX ADMIN — HYDROXYZINE HYDROCHLORIDE 25 MG: 25 TABLET ORAL at 08:06

## 2023-06-23 RX ADMIN — ACETAMINOPHEN 650 MG: 325 TABLET ORAL at 05:06

## 2023-06-23 RX ADMIN — OXYCODONE HYDROCHLORIDE 5 MG: 5 TABLET ORAL at 08:06

## 2023-06-23 RX ADMIN — ATORVASTATIN CALCIUM 40 MG: 40 TABLET, FILM COATED ORAL at 09:06

## 2023-06-23 RX ADMIN — GABAPENTIN 600 MG: 300 CAPSULE ORAL at 08:06

## 2023-06-23 RX ADMIN — CHOLECALCIFEROL TAB 25 MCG (1000 UNIT) 5000 UNITS: 25 TAB at 09:06

## 2023-06-23 RX ADMIN — DULOXETINE 60 MG: 30 CAPSULE, DELAYED RELEASE ORAL at 09:06

## 2023-06-23 RX ADMIN — FERROUS SULFATE TAB 325 MG (65 MG ELEMENTAL FE) 1 EACH: 325 (65 FE) TAB at 09:06

## 2023-06-23 RX ADMIN — METOPROLOL SUCCINATE 25 MG: 25 TABLET, EXTENDED RELEASE ORAL at 09:06

## 2023-06-23 RX ADMIN — ISOSORBIDE MONONITRATE 60 MG: 30 TABLET, EXTENDED RELEASE ORAL at 09:06

## 2023-06-23 NOTE — H&P
[facility] Emergency [facility] Medicine  History & Physical    Patient Name: Enedelia García  MRN: [id number]  Patient Class: OP- Observation  Admission Date: 6/22/2023  Attending Physician: Alexandria Hernandez MD  Primary Care Provider: Lauren Brown MD      Patient information was obtained from patient, past medical records and ER records.     Subjective:     Principal Problem:Syncopal episodes    Chief Complaint:   Chief Complaint   Patient presents with    Laceration     Pt states fall outside unknown if she loss consciousness or not. Pt advised is on blood thinners. Pt has very large skin tears to the right leg      Back Pain     Pt also states back pain        HPI: 77 yo F w PMHx including CAD s/p stent placement, chronic diastolic dysfunction, HTN, moderate aortic stenosis who presents after witnessed syncopal episode.  Patient says that she was outside, had just finished feeding cast and went to stand up and then thinks that she fell from that position.  Her daughter at bedside says that there were some workers that witnessed her fall from just outside the window and it appeared she did lose consciousness. Patient does Not recall losing consciousness.  Denies any preceding chest pain, abdominal pain, vomiting, diarrhea.  Reports that she has been taking all her medications as prescribed.     Chart review shows that patient was ordered for a stress echo done when she saw her nephrologist on 6/12/23 that has not yet been completed.     In the ED, CT head without acute pathology. No STEMI. Previous EKG: Compared with most recent EKG Rhythm: Sinus Bradycardia. Heart Rate: 51. XR anterior tibia suggests focal hematoma. HM consulted to facilitate admission.       Past Medical History:   Diagnosis Date    CKD (chronic kidney disease) stage 4, GFR 15-29 ml/min     Coronary artery disease     Edema     Epilepsy     Hematuria, unspecified     Hematuria, unspecified     Hyperlipidemia     Hypertension     Iron  deficiency anemia     Normocytic anemia        Past Surgical History:   Procedure Laterality Date    APPENDECTOMY      BACK SURGERY      CORONARY STENT PLACEMENT      HYSTERECTOMY      REVISION OF KNEE ARTHROPLASTY Left 7/18/2022    Procedure: REVISION, ARTHROPLASTY, KNEE;  Surgeon: Stan Catalan MD;  Location: [facility] OR;  Service: Orthopedics;  Laterality: Left;    TONSILLECTOMY         Review of patient's allergies indicates:   Allergen Reactions    Iodinated contrast media Anaphylaxis and Other (See Comments)    Nsaids (non-steroidal anti-inflammatory drug)      ADWOA    Phenergan [promethazine]      Vomiting       No current facility-administered medications on file prior to encounter.     Current Outpatient Medications on File Prior to Encounter   Medication Sig    acetaminophen (TYLENOL) 500 MG tablet Take 500 mg by mouth every 6 (six) hours as needed for Pain.    AIMOVIG AUTOINJECTOR 70 mg/mL autoinjector Inject 70 mg into the skin every 30 days.    aspirin (ECOTRIN) 81 MG EC tablet Take 81 mg by mouth once daily.    butalbital-acetaminophen-caffeine -40 mg (FIORICET, ESGIC) -40 mg per tablet Take 1 tablet by mouth every 8 (eight) hours as needed.    calcium carbonate/vitamin D3 (VITAMIN D-3 ORAL) Take 1 tablet by mouth once daily.    co-enzyme Q-10 30 mg capsule Take 30 mg by mouth once daily.    DULoxetine (CYMBALTA) 60 MG capsule Take 1 capsule (60 mg total) by mouth once daily.    FEROSUL 325 mg (65 mg iron) Tab tablet Take 325 mg by mouth once daily.    furosemide (LASIX) 40 MG tablet Take 2 tablets (80 mg total) by mouth 2 (two) times daily as needed (swelling). (Patient taking differently: Take 40 mg by mouth once daily.)    gabapentin (NEURONTIN) 600 MG tablet Take 600 mg by mouth 3 (three) times daily.    hydrALAZINE (APRESOLINE) 25 MG tablet Take 1 tablet (25 mg total) by mouth every 8 (eight) hours.    hydrOXYzine HCL (ATARAX) 25 MG tablet TAKE ONE TABLET BY MOUTH EVERY 6 HOURS AS NEEDED FOR  ITCHING (Patient taking differently: Take 25 mg by mouth every 6 (six) hours as needed.)    irbesartan (AVAPRO) 300 MG tablet Take 1 tablet (300 mg total) by mouth every evening.    isosorbide mononitrate (IMDUR) 60 MG 24 hr tablet Take 1 tablet (60 mg total) by mouth once daily.    LINZESS 72 mcg Cap capsule Take 72 mcg by mouth every morning.    metoprolol succinate (TOPROL-XL) 25 MG 24 hr tablet Take 1 tablet (25 mg total) by mouth once daily.    multivit-min-iron-FA-lutein (CENTRUM SILVER WOMEN) 8 mg iron-400 mcg-300 mcg Tab Take 1 tablet by mouth once daily.    nitroGLYCERIN (NITROSTAT) 0.3 MG SL tablet PLACE 1 TABLET UNDER THE TONGUE EVERY 5 MINUTES FOR UP TO 3 DOSES IF NEEDED FOR CHEST PAIN. CALL 911 IF PAIN PERSISTS    omega-3 fatty acids/fish oil (FISH OIL-OMEGA-3 FATTY ACIDS) 300-1,000 mg capsule Take 2 capsules by mouth once daily.    omeprazole (PRILOSEC) 20 MG capsule Take 20 mg by mouth once daily.    oxyCODONE (ROXICODONE) 10 mg Tab immediate release tablet Take 10 mg by mouth 4 (four) times daily.    rosuvastatin (CRESTOR) 40 MG Tab Take 1 tablet (40 mg total) by mouth once daily.    tiZANidine (ZANAFLEX) 4 MG tablet Take 1 tablet (4 mg total) by mouth every 8 (eight) hours as needed (spasms).    galcanezumab-gnlm 120 mg/mL PnIj Inject 120 mg into the skin every 28 days. maintenance dose    [DISCONTINUED] amitriptyline (ELAVIL) 10 MG tablet Take 1 tablet (10 mg total) by mouth nightly as needed for Insomnia.    [DISCONTINUED] butalbital-acetaminophen-caff -40 mg Cap Take 1 capsule by mouth as needed (migraine). Take once to twice daily    [DISCONTINUED] collagenase (SANTYL) ointment Apply topically once daily.    [DISCONTINUED] docusate sodium 100 mg capsule Take 200 mg by mouth 2 (two) times daily as needed for Constipation.    [DISCONTINUED] doxycycline (MONODOX) 100 MG capsule Take 1 capsule (100 mg total) by mouth 2 (two) times daily.    [DISCONTINUED] famotidine (PEPCID) 20 MG tablet Take  20 mg by mouth 2 (two) times daily as needed for Heartburn.    [DISCONTINUED] linaclotide (LINZESS ORAL) Take by mouth. WILL GO HOME TO LOOK AT MG AND CALL OFFICE    [DISCONTINUED] oxyCODONE-acetaminophen (PERCOCET)  mg per tablet Take 1 tablet by mouth every 6 (six) hours as needed.     Family History       Problem Relation (Age of Onset)    Heart disease Mother, Father          Tobacco Use    Smoking status: Never    Smokeless tobacco: Never   Substance and Sexual Activity    Alcohol use: No    Drug use: No    Sexual activity: Not Currently     Review of Systems   Constitutional:  Positive for fatigue (chronic). Negative for activity change, appetite change, chills, diaphoresis and fever.   HENT:  Negative for congestion, sinus pressure, sore throat and tinnitus.    Eyes:  Negative for visual disturbance.   Respiratory:  Negative for cough, chest tightness, shortness of breath and wheezing.    Cardiovascular:  Negative for chest pain, palpitations and leg swelling.   Gastrointestinal:  Negative for abdominal distention, abdominal pain, nausea and vomiting.   Endocrine: Negative for polydipsia, polyphagia and polyuria.   Genitourinary:  Negative for dysuria.   Musculoskeletal:  Negative for arthralgias and back pain.        Having pain in L leg from fall   Skin:  Negative for rash and wound.   Neurological:  Negative for dizziness, syncope, light-headedness and headaches.   Psychiatric/Behavioral:  Negative for confusion.         High stress-- only caregiver for her      Objective:     Vital Signs (Most Recent):  Temp: 98.4 °F (36.9 °C) (06/22/23 1547)  Pulse: (!) 56 (06/22/23 2110)  Resp: 18 (06/22/23 2110)  BP: (!) 192/79 (06/22/23 2110)  SpO2: 95 % (06/22/23 2110) Vital Signs (24h Range):  Temp:  [98.4 °F (36.9 °C)] 98.4 °F (36.9 °C)  Pulse:  [54-62] 56  Resp:  [16-20] 18  SpO2:  [93 %-99 %] 95 %  BP: (106-207)/(52-83) 192/79     Weight: 74.8 kg (165 lb)  Body mass index is 28.32 kg/m².     Physical  Exam  Vitals and nursing note reviewed.   Constitutional:       General: She is not in acute distress.     Appearance: Normal appearance. She is well-developed. She is not ill-appearing or toxic-appearing.   HENT:      Head: Normocephalic and atraumatic.      Right Ear: External ear normal.      Left Ear: External ear normal.      Nose: Nose normal.      Mouth/Throat:      Pharynx: Uvula midline.   Eyes:      General: Lids are normal.      Extraocular Movements: EOM normal.      Conjunctiva/sclera: Conjunctivae normal.      Pupils: Pupils are equal, round, and reactive to light.   Neck:      Thyroid: No thyroid mass.      Vascular: Carotid bruit present. No JVD.      Trachea: Trachea normal.   Cardiovascular:      Rate and Rhythm: Normal rate and regular rhythm.      Heart sounds: Normal heart sounds, S1 normal and S2 normal.   Pulmonary:      Effort: Pulmonary effort is normal.      Breath sounds: Normal breath sounds.   Abdominal:      General: Bowel sounds are normal. There is no distension.      Palpations: Abdomen is soft.      Tenderness: There is no abdominal tenderness.   Musculoskeletal:      Cervical back: Full passive range of motion without pain, normal range of motion and neck supple.      Right lower leg: No edema.      Left lower leg: No edema.      Comments: L mid lower leg wrapped in gauze-- saturated with mild blood   Neurological:      Mental Status: She is alert.      Cranial Nerves: No cranial nerve deficit.      Sensory: No sensory deficit.   Psychiatric:         Speech: Speech normal.         Behavior: Behavior normal. Behavior is cooperative.         Thought Content: Thought content normal.            CRANIAL NERVES     CN III, IV, VI   Pupils are equal, round, and reactive to light.  Extraocular motions are normal.      Significant Labs: All pertinent labs within the past 24 hours have been reviewed.  Recent Lab Results  (Last 5 results in the past 24 hours)        06/22/23 2016    06/22/23  1855   06/22/23  1854   06/22/23  1846   06/22/23  1624        Albumin         3.7       Alkaline Phosphatase         87       ALT         20       Anion Gap         10       Appearance, UA       Clear         AST         28       Bacteria, UA       None         Baso #         0.08       Basophil %         1.1       Bilirubin (UA)       Negative         BILIRUBIN TOTAL         0.3  Comment: For infants and newborns, interpretation of results should be based  on gestational age, weight and in agreement with clinical  observations.    Premature Infant recommended reference ranges:  Up to 24 hours.............<8.0 mg/dL  Up to 48 hours............<12.0 mg/dL  3-5 days..................<15.0 mg/dL  6-29 days.................<15.0 mg/dL         BNP         91  Comment: Values of less than 100 pg/ml are consistent with non-CHF populations.       BUN         19       Calcium         9.6       Chloride         105       CO2         27       Color, UA       Yellow         Creatinine         1.3       Differential Method         Automated       eGFR         42       Eos #         0.6       Eosinophil %         8.8       Glucose         99       Glucose, UA       Negative         Gran # (ANC)         3.1       Gran %         42.8       Hematocrit         33.5       Hemoglobin         10.7       Hyaline Casts, UA       23         Immature Grans (Abs)         0.01  Comment: Mild elevation in immature granulocytes is non specific and   can be seen in a variety of conditions including stress response,   acute inflammation, trauma and pregnancy. Correlation with other   laboratory and clinical findings is essential.         Immature Granulocytes         0.1       Ketones, UA       Negative         Leukocytes, UA       1+         Lymph #         2.3       Lymph %         32.4       MCH         31.5       MCHC         31.9       MCV         99       Microscopic Comment       SEE COMMENT  Comment: Other formed elements not  mentioned in the report are not   present in the microscopic examination.            Mono #         1.1       Mono %         14.8       MPV         9.8       NITRITE UA       Negative         nRBC         0       Occult Blood UA       Negative         pH, UA       5.0         Platelets         265       POCT Glucose     104           Potassium         4.0       PROTEIN TOTAL         6.3       Protein, UA       Negative  Comment: Recommend a 24 hour urine protein or a urine   protein/creatinine ratio if globulin induced proteinuria is  clinically suspected.            Acceptable Yes               RBC         3.40       RBC, UA       1         RDW         13.0       SARS-CoV-2 RNA, Amplification, Qual Negative               Sodium         142       Specific McGuffey, UA       1.025         Specimen UA       Urine, Clean Catch         Squam Epithel, UA       1         Troponin I   <0.006  Comment: The reference interval for Troponin I represents the 99th percentile   cutoff   for our facility and is consistent with 3rd generation assay   performance.         0.007  Comment: The reference interval for Troponin I represents the 99th percentile   cutoff   for our facility and is consistent with 3rd generation assay   performance.                  0.007  Comment: The reference interval for Troponin I represents the 99th percentile   cutoff   for our facility and is consistent with 3rd generation assay   performance.         UROBILINOGEN UA       Negative         WBC, UA       4         WBC         7.14                              Significant Imaging: I have reviewed all pertinent imaging results/findings within the past 24 hours.  Assessment/Plan:     Syncope and collapse  - check echo/ carotid doppler  - monitor on telemetry    CAD: s/p stent placement   Chronic diastolic dysfunction   Moderate aortic stenosis   - continue chronic home meds     CKD stage  3   - baseline cr    renally dose meds for Estimated  Creatinine Clearance: 35.3 mL/min (based on SCr of 1.3 mg/dL).     Hypertension   Resume home meds and adjust prn    Anemia of chronic disease  Hgb stable. Monitor.       VTE Risk Mitigation (From admission, onward)      None            On 06/22/2023, patient should be placed in hospital observation services under my care.      Alexandria Hernandez MD  Department of Hospital Medicine  [place] - Emergency Dept

## 2023-06-23 NOTE — SUBJECTIVE & OBJECTIVE
Past Medical History:   Diagnosis Date    CKD (chronic kidney disease) stage 4, GFR 15-29 ml/min     Coronary artery disease     Edema     Epilepsy     Hematuria, unspecified     Hematuria, unspecified     Hyperlipidemia     Hypertension     Iron deficiency anemia     Normocytic anemia        Past Surgical History:   Procedure Laterality Date    APPENDECTOMY      BACK SURGERY      CORONARY STENT PLACEMENT      HYSTERECTOMY      REVISION OF KNEE ARTHROPLASTY Left 7/18/2022    Procedure: REVISION, ARTHROPLASTY, KNEE;  Surgeon: Stan Catalan MD;  Location: [facility] OR;  Service: Orthopedics;  Laterality: Left;    TONSILLECTOMY         Review of patient's allergies indicates:   Allergen Reactions    Iodinated contrast media Anaphylaxis and Other (See Comments)    Nsaids (non-steroidal anti-inflammatory drug)      ADWOA    Phenergan [promethazine]      Vomiting       No current facility-administered medications on file prior to encounter.     Current Outpatient Medications on File Prior to Encounter   Medication Sig    acetaminophen (TYLENOL) 500 MG tablet Take 500 mg by mouth every 6 (six) hours as needed for Pain.    AIMOVIG AUTOINJECTOR 70 mg/mL autoinjector Inject 70 mg into the skin every 30 days.    aspirin (ECOTRIN) 81 MG EC tablet Take 81 mg by mouth once daily.    butalbital-acetaminophen-caffeine -40 mg (FIORICET, ESGIC) -40 mg per tablet Take 1 tablet by mouth every 8 (eight) hours as needed.    calcium carbonate/vitamin D3 (VITAMIN D-3 ORAL) Take 1 tablet by mouth once daily.    co-enzyme Q-10 30 mg capsule Take 30 mg by mouth once daily.    DULoxetine (CYMBALTA) 60 MG capsule Take 1 capsule (60 mg total) by mouth once daily.    FEROSUL 325 mg (65 mg iron) Tab tablet Take 325 mg by mouth once daily.    furosemide (LASIX) 40 MG tablet Take 2 tablets (80 mg total) by mouth 2 (two) times daily as needed (swelling). (Patient taking differently: Take 40 mg by mouth once daily.)    gabapentin (NEURONTIN) 600 MG  tablet Take 600 mg by mouth 3 (three) times daily.    hydrALAZINE (APRESOLINE) 25 MG tablet Take 1 tablet (25 mg total) by mouth every 8 (eight) hours.    hydrOXYzine HCL (ATARAX) 25 MG tablet TAKE ONE TABLET BY MOUTH EVERY 6 HOURS AS NEEDED FOR ITCHING (Patient taking differently: Take 25 mg by mouth every 6 (six) hours as needed.)    irbesartan (AVAPRO) 300 MG tablet Take 1 tablet (300 mg total) by mouth every evening.    isosorbide mononitrate (IMDUR) 60 MG 24 hr tablet Take 1 tablet (60 mg total) by mouth once daily.    LINZESS 72 mcg Cap capsule Take 72 mcg by mouth every morning.    metoprolol succinate (TOPROL-XL) 25 MG 24 hr tablet Take 1 tablet (25 mg total) by mouth once daily.    multivit-min-iron-FA-lutein (CENTRUM SILVER WOMEN) 8 mg iron-400 mcg-300 mcg Tab Take 1 tablet by mouth once daily.    nitroGLYCERIN (NITROSTAT) 0.3 MG SL tablet PLACE 1 TABLET UNDER THE TONGUE EVERY 5 MINUTES FOR UP TO 3 DOSES IF NEEDED FOR CHEST PAIN. CALL 911 IF PAIN PERSISTS    omega-3 fatty acids/fish oil (FISH OIL-OMEGA-3 FATTY ACIDS) 300-1,000 mg capsule Take 2 capsules by mouth once daily.    omeprazole (PRILOSEC) 20 MG capsule Take 20 mg by mouth once daily.    oxyCODONE (ROXICODONE) 10 mg Tab immediate release tablet Take 10 mg by mouth 4 (four) times daily.    rosuvastatin (CRESTOR) 40 MG Tab Take 1 tablet (40 mg total) by mouth once daily.    tiZANidine (ZANAFLEX) 4 MG tablet Take 1 tablet (4 mg total) by mouth every 8 (eight) hours as needed (spasms).    galcanezumab-gnlm 120 mg/mL PnIj Inject 120 mg into the skin every 28 days. maintenance dose    [DISCONTINUED] amitriptyline (ELAVIL) 10 MG tablet Take 1 tablet (10 mg total) by mouth nightly as needed for Insomnia.    [DISCONTINUED] butalbital-acetaminophen-caff -40 mg Cap Take 1 capsule by mouth as needed (migraine). Take once to twice daily    [DISCONTINUED] collagenase (SANTYL) ointment Apply topically once daily.    [DISCONTINUED] docusate sodium 100 mg  capsule Take 200 mg by mouth 2 (two) times daily as needed for Constipation.    [DISCONTINUED] doxycycline (MONODOX) 100 MG capsule Take 1 capsule (100 mg total) by mouth 2 (two) times daily.    [DISCONTINUED] famotidine (PEPCID) 20 MG tablet Take 20 mg by mouth 2 (two) times daily as needed for Heartburn.    [DISCONTINUED] linaclotide (LINZESS ORAL) Take by mouth. WILL GO HOME TO LOOK AT MG AND CALL OFFICE    [DISCONTINUED] oxyCODONE-acetaminophen (PERCOCET)  mg per tablet Take 1 tablet by mouth every 6 (six) hours as needed.     Family History       Problem Relation (Age of Onset)    Heart disease Mother, Father          Tobacco Use    Smoking status: Never    Smokeless tobacco: Never   Substance and Sexual Activity    Alcohol use: No    Drug use: No    Sexual activity: Not Currently     Review of Systems   Constitutional:  Positive for fatigue (chronic). Negative for activity change, appetite change, chills, diaphoresis and fever.   HENT:  Negative for congestion, sinus pressure, sore throat and tinnitus.    Eyes:  Negative for visual disturbance.   Respiratory:  Negative for cough, chest tightness, shortness of breath and wheezing.    Cardiovascular:  Negative for chest pain, palpitations and leg swelling.   Gastrointestinal:  Negative for abdominal distention, abdominal pain, nausea and vomiting.   Endocrine: Negative for polydipsia, polyphagia and polyuria.   Genitourinary:  Negative for dysuria.   Musculoskeletal:  Negative for arthralgias and back pain.        Having pain in L leg from fall   Skin:  Negative for rash and wound.   Neurological:  Negative for dizziness, syncope, light-headedness and headaches.   Psychiatric/Behavioral:  Negative for confusion.         High stress-- only caregiver for her      Objective:     Vital Signs (Most Recent):  Temp: 98.4 °F (36.9 °C) (06/22/23 1547)  Pulse: (!) 56 (06/22/23 2110)  Resp: 18 (06/22/23 2110)  BP: (!) 192/79 (06/22/23 2110)  SpO2: 95 %  (06/22/23 2110) Vital Signs (24h Range):  Temp:  [98.4 °F (36.9 °C)] 98.4 °F (36.9 °C)  Pulse:  [54-62] 56  Resp:  [16-20] 18  SpO2:  [93 %-99 %] 95 %  BP: (106-207)/(52-83) 192/79     Weight: 74.8 kg (165 lb)  Body mass index is 28.32 kg/m².     Physical Exam  Vitals and nursing note reviewed.   Constitutional:       General: She is not in acute distress.     Appearance: Normal appearance. She is well-developed. She is not ill-appearing or toxic-appearing.   HENT:      Head: Normocephalic and atraumatic.      Right Ear: External ear normal.      Left Ear: External ear normal.      Nose: Nose normal.      Mouth/Throat:      Pharynx: Uvula midline.   Eyes:      General: Lids are normal.      Extraocular Movements: EOM normal.      Conjunctiva/sclera: Conjunctivae normal.      Pupils: Pupils are equal, round, and reactive to light.   Neck:      Thyroid: No thyroid mass.      Vascular: Carotid bruit present. No JVD.      Trachea: Trachea normal.   Cardiovascular:      Rate and Rhythm: Normal rate and regular rhythm.      Heart sounds: Normal heart sounds, S1 normal and S2 normal.   Pulmonary:      Effort: Pulmonary effort is normal.      Breath sounds: Normal breath sounds.   Abdominal:      General: Bowel sounds are normal. There is no distension.      Palpations: Abdomen is soft.      Tenderness: There is no abdominal tenderness.   Musculoskeletal:      Cervical back: Full passive range of motion without pain, normal range of motion and neck supple.      Right lower leg: No edema.      Left lower leg: No edema.      Comments: L mid lower leg wrapped in gauze-- saturated with mild blood   Neurological:      Mental Status: She is alert.      Cranial Nerves: No cranial nerve deficit.      Sensory: No sensory deficit.   Psychiatric:         Speech: Speech normal.         Behavior: Behavior normal. Behavior is cooperative.         Thought Content: Thought content normal.            CRANIAL NERVES     CN III, IV, VI    Pupils are equal, round, and reactive to light.  Extraocular motions are normal.      Significant Labs: All pertinent labs within the past 24 hours have been reviewed.  Recent Lab Results  (Last 5 results in the past 24 hours)        06/22/23 2016 06/22/23  1855   06/22/23  1854   06/22/23  1846   06/22/23  1624        Albumin         3.7       Alkaline Phosphatase         87       ALT         20       Anion Gap         10       Appearance, UA       Clear         AST         28       Bacteria, UA       None         Baso #         0.08       Basophil %         1.1       Bilirubin (UA)       Negative         BILIRUBIN TOTAL         0.3  Comment: For infants and newborns, interpretation of results should be based  on gestational age, weight and in agreement with clinical  observations.    Premature Infant recommended reference ranges:  Up to 24 hours.............<8.0 mg/dL  Up to 48 hours............<12.0 mg/dL  3-5 days..................<15.0 mg/dL  6-29 days.................<15.0 mg/dL         BNP         91  Comment: Values of less than 100 pg/ml are consistent with non-CHF populations.       BUN         19       Calcium         9.6       Chloride         105       CO2         27       Color, UA       Yellow         Creatinine         1.3       Differential Method         Automated       eGFR         42       Eos #         0.6       Eosinophil %         8.8       Glucose         99       Glucose, UA       Negative         Gran # (ANC)         3.1       Gran %         42.8       Hematocrit         33.5       Hemoglobin         10.7       Hyaline Casts, UA       23         Immature Grans (Abs)         0.01  Comment: Mild elevation in immature granulocytes is non specific and   can be seen in a variety of conditions including stress response,   acute inflammation, trauma and pregnancy. Correlation with other   laboratory and clinical findings is essential.         Immature Granulocytes         0.1       Ketones,  UA       Negative         Leukocytes, UA       1+         Lymph #         2.3       Lymph %         32.4       MCH         31.5       MCHC         31.9       MCV         99       Microscopic Comment       SEE COMMENT  Comment: Other formed elements not mentioned in the report are not   present in the microscopic examination.            Mono #         1.1       Mono %         14.8       MPV         9.8       NITRITE UA       Negative         nRBC         0       Occult Blood UA       Negative         pH, UA       5.0         Platelets         265       POCT Glucose     104           Potassium         4.0       PROTEIN TOTAL         6.3       Protein, UA       Negative  Comment: Recommend a 24 hour urine protein or a urine   protein/creatinine ratio if globulin induced proteinuria is  clinically suspected.            Acceptable Yes               RBC         3.40       RBC, UA       1         RDW         13.0       SARS-CoV-2 RNA, Amplification, Qual Negative               Sodium         142       Specific [place], UA       1.025         Specimen UA       Urine, Clean Catch         Squam Epithel, UA       1         Troponin I   <0.006  Comment: The reference interval for Troponin I represents the 99th percentile   cutoff   for our facility and is consistent with 3rd generation assay   performance.         0.007  Comment: The reference interval for Troponin I represents the 99th percentile   cutoff   for our facility and is consistent with 3rd generation assay   performance.                  0.007  Comment: The reference interval for Troponin I represents the 99th percentile   cutoff   for our facility and is consistent with 3rd generation assay   performance.         UROBILINOGEN UA       Negative         WBC, UA       4         WBC         7.14                              Significant Imaging: I have reviewed all pertinent imaging results/findings within the past 24 hours.

## 2023-06-23 NOTE — HPI
79 yo F w PMHx including CAD s/p stent placement, chronic diastolic dysfunction, HTN, moderate aortic stenosis who presents after witnessed syncopal episode.  Patient says that she was outside, had just finished feeding cast and went to stand up and then thinks that she fell from that position.  Her daughter at bedside says that there were some workers that witnessed her fall from just outside the window and it appeared she did lose consciousness. Patient does Not recall losing consciousness.  Denies any preceding chest pain, abdominal pain, vomiting, diarrhea.  Reports that she has been taking all her medications as prescribed.     Chart review shows that patient was ordered for a stress echo done when she saw her nephrologist on 6/12/23 that has not yet been completed.     In the ED, CT head without acute pathology. No STEMI. Previous EKG: Compared with most recent EKG Rhythm: Sinus Bradycardia. Heart Rate: 51. XR anterior tibia suggests focal hematoma. HM consulted to facilitate admission.

## 2023-06-23 NOTE — CONSULTS
[facility] Surg  Wound Care    Patient Name:  Enedelia García   MRN:  [id number]  Date: 6/23/2023  Diagnosis: Syncopal episodes    History:     Past Medical History:   Diagnosis Date    CKD (chronic kidney disease) stage 4, GFR 15-29 ml/min     Coronary artery disease     Edema     Epilepsy     Hematuria, unspecified     Hematuria, unspecified     Hyperlipidemia     Hypertension     Iron deficiency anemia     Normocytic anemia        Social History     Socioeconomic History    Marital status: Significant Other   Tobacco Use    Smoking status: Never    Smokeless tobacco: Never   Substance and Sexual Activity    Alcohol use: No    Drug use: No    Sexual activity: Not Currently     Social Determinants of Health     Financial Resource Strain: Medium Risk    Difficulty of Paying Living Expenses: Somewhat hard   Food Insecurity: No Food Insecurity    Worried About Running Out of Food in the Last Year: Never true    Ran Out of Food in the Last Year: Never true   Transportation Needs: No Transportation Needs    Lack of Transportation (Medical): No    Lack of Transportation (Non-Medical): No   Physical Activity: Insufficiently Active    Days of Exercise per Week: 3 days    Minutes of Exercise per Session: 20 min   Stress: Stress Concern Present    Feeling of Stress : To some extent   Social Connections: Moderately Isolated    Frequency of Communication with Friends and Family: More than three times a week    Frequency of Social Gatherings with Friends and Family: More than three times a week    Attends Orthodoxy Services: Never    Active Member of Clubs or Organizations: No    Attends Club or Organization Meetings: Never    Marital Status: Living with partner   Housing Stability: Low Risk     Unable to Pay for Housing in the Last Year: No    Number of Places Lived in the Last Year: 1    Unstable Housing in the Last Year: No       Precautions:     Allergies as of 06/22/2023 - Reviewed 06/22/2023   Allergen Reaction Noted     Iodinated contrast media Anaphylaxis and Other (See Comments) 11/06/2013    Nsaids (non-steroidal anti-inflammatory drug)  06/12/2023    Phenergan [promethazine]  01/15/2020       WO Assessment Details/Treatment       LLE- unroofed hematoma related to fall at home- [id number]-  minimal skin flap intact    Recommendations discussed with pt, nurse and Dr. Teran:  - Pressure injury prevention interventions   - Xeroform dressing daily to LLE    06/23/2023

## 2023-06-23 NOTE — PLAN OF CARE
Patient received from ER. AAOx4. No complaints of pain or N/V. LLE wound cleansed and dressing changed. Tele monitored. BP monitored. Safety maintained. Instructed to call for assistance.

## 2023-06-23 NOTE — PHARMACY MED REC
"  Admission Medication History     The home medication history was taken by Lucy Carbajal CPhT.    Medication history obtained from, Patient Verified    You may go to "Admission" then "Reconcile Home Medications" tabs to review and/or act upon these items.     The home medication list has been updated by the Pharmacy department.   Please read ALL comments highlighted in yellow.   Please address this information as you see fit.    Feel free to contact us if you have any questions or require assistance.      The medications listed below were removed from the home medication list.  Please reorder if appropriate:  Patient reports no longer taking the following medication(s):  Amitriptyline 10 mg  Santyl ointment  Colace 100 mg  Doxycycline 100 mg  Famotidine 20 mg  Percocet  mg        Lucy Carbajal CPhT.  Ext [phone]             .        "

## 2023-06-23 NOTE — ED NOTES
Pt care assumed. Report received from VI Ramírez. Pt in stretcher in low and locked position, side rails raised x2, call light in reach. Pt awaiting blood pressure to decrease to be transferred to 505.

## 2023-06-23 NOTE — CONSULTS
[facility] Initial Consultation Note    Reason for Consultation:  Syncope and aortic stenosis.    HPI:  78 y.o. with known CAD, s/p coronary stenting, diastolic heart failure, hypertension who is admitted after sustaining a syncopal episode with laceration to right leg.  She lost consciousness after rising up to stand while outside.  No antecedent chest pain, dyspnea or palpitations.  EKG negative for ACS.  BNP 91.  Echo shows severe aortic stenosis with MIGDALIA < 1.0 cm².  Troponin negative.    Past Medical History:   Diagnosis Date    CKD (chronic kidney disease) stage 4, GFR 15-29 ml/min     Coronary artery disease     Edema     Epilepsy     Hematuria, unspecified     Hematuria, unspecified     Hyperlipidemia     Hypertension     Iron deficiency anemia     Normocytic anemia    Back surgery in 2020 (diskectomy)      Social History     Socioeconomic History    Marital status: Significant Other   Tobacco Use    Smoking status: Never    Smokeless tobacco: Never   Substance and Sexual Activity    Alcohol use: No    Drug use: No    Sexual activity: Not Currently     Social Determinants of Health     Financial Resource Strain: Low Risk     Difficulty of Paying Living Expenses: Not hard at all   Food Insecurity: No Food Insecurity    Worried About Running Out of Food in the Last Year: Never true    Ran Out of Food in the Last Year: Never true   Transportation Needs: No Transportation Needs    Lack of Transportation (Medical): No    Lack of Transportation (Non-Medical): No   Physical Activity: Insufficiently Active    Days of Exercise per Week: 3 days    Minutes of Exercise per Session: 20 min   Stress: Stress Concern Present    Feeling of Stress : To some extent   Social Connections: Moderately Isolated    Frequency of Communication with Friends and Family: More than three times a week    Frequency of Social Gatherings with Friends and Family: More than three times a week    Attends Taoist Services: Never    Active  Member of Clubs or Organizations: No    Attends Club or Organization Meetings: Never    Marital Status: Living with partner   Housing Stability: Low Risk     Unable to Pay for Housing in the Last Year: No    Number of Places Lived in the Last Year: 1    Unstable Housing in the Last Year: No     Family History   Problem Relation Age of Onset    Heart disease Mother     Heart disease Father      Review of patient's allergies indicates:   Allergen Reactions    Iodinated contrast media Anaphylaxis and Other (See Comments)    Nsaids (non-steroidal anti-inflammatory drug)      ADWOA    Phenergan [promethazine]      Vomiting     ROS:  No fever, chills, cough, bleeding on anticoagulation.  Other systems reviewed and were negative.      Current Hospital Medications  Prior to Admission medications    Medication Sig Start Date End Date Taking? Authorizing Provider   acetaminophen (TYLENOL) 500 MG tablet Take 500 mg by mouth every 6 (six) hours as needed for Pain.   Yes Historical Provider   AIMOVIG AUTOINJECTOR 70 mg/mL autoinjector Inject 70 mg into the skin every 30 days. 6/15/23  Yes Historical Provider   aspirin (ECOTRIN) 81 MG EC tablet Take 81 mg by mouth once daily.   Yes Historical Provider   butalbital-acetaminophen-caffeine -40 mg (FIORICET, ESGIC) -40 mg per tablet Take 1 tablet by mouth every 8 (eight) hours as needed. 6/15/23  Yes Historical Provider   calcium carbonate/vitamin D3 (VITAMIN D-3 ORAL) Take 1 tablet by mouth once daily.   Yes Historical Provider   co-enzyme Q-10 30 mg capsule Take 30 mg by mouth once daily.   Yes Historical Provider   DULoxetine (CYMBALTA) 60 MG capsule Take 1 capsule (60 mg total) by mouth once daily. 10/24/22  Yes Lauren Brown MD   FEROSUL 325 mg (65 mg iron) Tab tablet Take 325 mg by mouth once daily. 8/29/22  Yes Historical Provider   furosemide (LASIX) 40 MG tablet Take 2 tablets (80 mg total) by mouth 2 (two) times daily as needed (swelling).  Patient taking  differently: Take 40 mg by mouth once daily. 6/12/23  Yes Althea Allen MD   gabapentin (NEURONTIN) 600 MG tablet Take 600 mg by mouth 3 (three) times daily.   Yes Historical Provider   hydrALAZINE (APRESOLINE) 25 MG tablet Take 1 tablet (25 mg total) by mouth every 8 (eight) hours. 5/12/23 5/11/24 Yes Jenaro Harmon MD   hydrOXYzine HCL (ATARAX) 25 MG tablet TAKE ONE TABLET BY MOUTH EVERY 6 HOURS AS NEEDED FOR ITCHING  Patient taking differently: Take 25 mg by mouth every 6 (six) hours as needed. 5/12/23  Yes Jenaro Harmon MD   irbesartan (AVAPRO) 300 MG tablet Take 1 tablet (300 mg total) by mouth every evening. 5/30/23  Yes Lauren Brown MD   isosorbide mononitrate (IMDUR) 60 MG 24 hr tablet Take 1 tablet (60 mg total) by mouth once daily. 8/12/22  Yes Marichuy Hummel MD   LINZESS 72 mcg Cap capsule Take 72 mcg by mouth every morning. 5/26/23  Yes Historical Provider   metoprolol succinate (TOPROL-XL) 25 MG 24 hr tablet Take 1 tablet (25 mg total) by mouth once daily. 8/12/22  Yes Marichuy Hummel MD   multivit-min-iron-FA-lutein (CENTRUM SILVER WOMEN) 8 mg iron-400 mcg-300 mcg Tab Take 1 tablet by mouth once daily.   Yes Historical Provider   nitroGLYCERIN (NITROSTAT) 0.3 MG SL tablet PLACE 1 TABLET UNDER THE TONGUE EVERY 5 MINUTES FOR UP TO 3 DOSES IF NEEDED FOR CHEST PAIN. CALL 911 IF PAIN PERSISTS 11/16/22  Yes Lauren Brown MD   omega-3 fatty acids/fish oil (FISH OIL-OMEGA-3 FATTY ACIDS) 300-1,000 mg capsule Take 2 capsules by mouth once daily.   Yes Historical Provider   omeprazole (PRILOSEC) 20 MG capsule Take 20 mg by mouth once daily. 3/28/22  Yes Historical Provider   oxyCODONE (ROXICODONE) 10 mg Tab immediate release tablet Take 10 mg by mouth 4 (four) times daily.   Yes Historical Provider   rosuvastatin (CRESTOR) 40 MG Tab Take 1 tablet (40 mg total) by mouth once daily. 10/24/22 10/24/23 Yes Lauren Brown MD   tiZANidine (ZANAFLEX) 4 MG tablet Take 1 tablet (4 mg total) by mouth every 8 (eight) hours as  "needed (spasms). 12/22/22  Yes Lauren Brown MD   galcanezumab-gnlm 120 mg/mL PnIj Inject 120 mg into the skin every 28 days. maintenance dose 1/3/23   April Starr PA-C       Scheduled Meds:   aspirin  81 mg Oral Daily    atorvastatin  40 mg Oral Daily    DULoxetine  60 mg Oral Daily    ferrous sulfate  1 tablet Oral Daily    gabapentin  600 mg Oral QHS    hydrALAZINE  25 mg Oral Q8H    isosorbide mononitrate  60 mg Oral Daily    metoprolol succinate  25 mg Oral Daily    valsartan  160 mg Oral Daily    vitamin D  5,000 Units Oral Daily     Continuous Infusions:  PRN: acetaminophen, butalbital-acetaminophen-caffeine -40 mg, cloNIDine, hydrOXYzine HCL, oxyCODONE, tiZANidine    VITAL SIGNS  /67   Pulse 61   Temp 98.3 °F (36.8 °C)   Resp 16   Ht 5' 4" (1.626 m)   Wt 76.7 kg (169 lb)   SpO2 (!) 94%   BMI 29.01 kg/m²     Intake/Output Summary (Last 24 hours) at 6/23/2023 1810  Last data filed at 6/23/2023 1804  Gross per 24 hour   Intake 240 ml   Output 650 ml   Net -410 ml       OBJECTIVE    Physical Exam  Temp:  [97.9 °F (36.6 °C)-98.5 °F (36.9 °C)] 98.3 °F (36.8 °C)  Pulse:  [45-68] 61  Resp:  [15-20] 16  SpO2:  [92 %-99 %] 94 %  BP: ()/(46-83) 102/67    Gen: Patient awake and alert, in NAD  Eyes: Pupils equal and round.  Sclerae anicteric, noninjected conjunctivae.  HENT: NC/AT, nasal septum midline, MMM, OP clear and without exudates.  CV: Regular rhythm.  Normal S1, S2.  III/VI SALEEM.  JVP normal.  Chest:  Symmetric chest wall expansion.  Good air movement.  No wheezes or crackles.  Abd:  Soft, Non-tender.  Non distended.  Normoactive bowel sounds, no rebound  Ext: +2 radial pulses, no C/C/E, warm to touch  Skin: intact, no lesions or rashes noted.  No decubitus ulcer.  Neuro:  Moves all extremities grossly.  Tongue midline.  Psych: Appropriate affect    Lab Results  No results for input(s): GLUCOSE, NA, K, CL, CO2, BUN, CREATININE, MG in the last 24 hours.    Invalid input(s):  " CALCIUM  Recent Labs   Lab 06/22/23  1855   TROPONINI <0.006     No results for input(s): WBC, RBC, HGB, HCT, PLT, MCV, MCH, MCHC in the last 24 hours.  No results for input(s): PT, INR, APTT in the last 24 hours.    Nuclear stress test 5/6/22:  Negative for ischemia.  LVEF >75%.      EKG 6/22/23:  Sinus bradycardia at 51 bpm.      Echo 6/23/23:  There is severe aortic valve stenosis.  Aortic valve area is 0.97 cm2; peak velocity is 3.6 m/s; mean gradient is 35 mmHg.  Mild aortic regurgitation.  Concentric remodeling and normal systolic function.  The estimated ejection fraction is 70%.  Grade II left ventricular diastolic dysfunction.  Mild left atrial enlargement.  The estimated PA systolic pressure is 48 mmHg (mild to moderate pulmonary hypertension).  With normal right ventricular systolic function.  Mild tricuspid regurgitation.  Normal central venous pressure (3 mmHg).      Carotid Doppler 11/30/22:  Occluded left ICA.  <50% right ICA.       ASSESSMENT/PLAN    1.  Syncope:  Patient with witnessed syncopal episode while standing up.  Orthostatic hypotension, cerebral hypoperfusion due to vascular disease or aortic valve stenosis are the possibilities for the episode.  --  Hydration has helped.    2.  Aortic stenosis:  Patient has severe aortic stenosis with low gradient but normal cardiac flow/output by echo.  These patients benefit from aortic valve replacement (eg, TAVR) and an outpatient work up for this can be undertaken.  CT angiography would be anticipated and ensuring renal protection is to be considered.  It is not exactly clear that her aortic stenosis resulted in syncope as this is most typically seen post-exertion which cannot be substantiated in this case prior to standing up.  She may have had bendopnea which points to diastolic heart failure.  Her LVEF is elevated which speaks to some element of volume depletion.  --  No additional inpatient cardiac work up is required at this point.    3.   Carotid stenosis:  She has known occlusion of the left internal carotid artery.  --  Continue statin therapy.    4.  Diastolic dysfunction:  Present on current echo.  BNP was 91 which is OK given her age and gender and renal dysfunction (pointing away from active heart failure).    5.  HTN:  BP is not elevated at this time but has been elevated in May 2023.  BP Readings from Last 3 Encounters:   06/23/23 102/67   06/12/23 (!) 124/50   05/12/23 (!) 172/70   --  May want to back off on some home antihypertensives until BP improves (eg, metoprolol)    6.  CKD:  Stage 2/3.  Current eGFR of 42 ml/min.  --  Avoid nephrotoxic agents.         Rodo Warren MD  [facility]

## 2023-06-23 NOTE — ED PROVIDER NOTES
Encounter Date: 6/22/2023       History     Chief Complaint   Patient presents with    Laceration     Pt states fall outside unknown if she loss consciousness or not. Pt advised is on blood thinners. Pt has very large skin tears to the right leg      Back Pain     Pt also states back pain     79 yo F w PMHx including CAD s/p stent placement, chronic diastolic dysfunction, HTN, moderate aortic stenosis who presents after witnessed syncopal episode.  Patient says that she was outside, had just finished feeding cast and went to stand up and then thinks that she fell from that position.  Her daughter at bedside says that there were some workers that witnessed her fall from just outside the window and it appeared she did lose consciousness.  Patient does not recall losing consciousness.  Denies any preceding chest pain, abdominal pain, vomiting, diarrhea.  Reports that she has been taking all her medications as prescribed.    The history is provided by the patient and a relative. No  was used.   Review of patient's allergies indicates:   Allergen Reactions    Iodinated contrast media Anaphylaxis and Other (See Comments)    Nsaids (non-steroidal anti-inflammatory drug)      ADWOA    Phenergan [promethazine]      Vomiting     Past Medical History:   Diagnosis Date    CKD (chronic kidney disease) stage 4, GFR 15-29 ml/min     Coronary artery disease     Edema     Epilepsy     Hematuria, unspecified     Hematuria, unspecified     Hyperlipidemia     Hypertension     Iron deficiency anemia     Normocytic anemia      Past Surgical History:   Procedure Laterality Date    APPENDECTOMY      BACK SURGERY      CORONARY STENT PLACEMENT      HYSTERECTOMY      REVISION OF KNEE ARTHROPLASTY Left 7/18/2022    Procedure: REVISION, ARTHROPLASTY, KNEE;  Surgeon: Stan Catalan MD;  Location: [facility] OR;  Service: Orthopedics;  Laterality: Left;    TONSILLECTOMY       Family History   Problem Relation Age of Onset    Heart  disease Mother     Heart disease Father      Social History     Tobacco Use    Smoking status: Never    Smokeless tobacco: Never   Substance Use Topics    Alcohol use: No    Drug use: No     Review of Systems    Physical Exam     Initial Vitals   BP Pulse Resp Temp SpO2   06/22/23 1549 06/22/23 1549 06/22/23 1547 06/22/23 1547 06/22/23 1549   (!) 106/52 61 16 98.4 °F (36.9 °C) (!) 93 %      MAP       --                Physical Exam    Nursing note and vitals reviewed.  Constitutional: She appears well-developed. She is not diaphoretic. No distress.   HENT:   Head: Normocephalic and atraumatic.   Eyes: EOM are normal. Pupils are equal, round, and reactive to light.   Neck:   Normal range of motion.  Cardiovascular:  Normal rate.           Pulmonary/Chest: No respiratory distress.   Abdominal: Abdomen is soft. She exhibits no distension. There is no abdominal tenderness.   Musculoskeletal:         General: Normal range of motion.      Cervical back: Normal range of motion.     Neurological: She is alert and oriented to person, place, and time.   Skin: Skin is warm and dry.   Multiple large skin tears over L shin.    Psychiatric: She has a normal mood and affect.       ED Course   Procedures  Labs Reviewed   CBC W/ AUTO DIFFERENTIAL - Abnormal; Notable for the following components:       Result Value    RBC 3.40 (*)     Hemoglobin 10.7 (*)     Hematocrit 33.5 (*)     MCV 99 (*)     MCH 31.5 (*)     MCHC 31.9 (*)     Mono # 1.1 (*)     Eos # 0.6 (*)     Eosinophil % 8.8 (*)     All other components within normal limits   COMPREHENSIVE METABOLIC PANEL - Abnormal; Notable for the following components:    eGFR 42 (*)     All other components within normal limits   URINALYSIS, REFLEX TO URINE CULTURE - Abnormal; Notable for the following components:    Leukocytes, UA 1+ (*)     All other components within normal limits    Narrative:     Specimen Source->Urine   URINALYSIS MICROSCOPIC - Abnormal; Notable for the following  components:    Hyaline Casts, UA 23 (*)     All other components within normal limits    Narrative:     Specimen Source->Urine   TROPONIN I   B-TYPE NATRIURETIC PEPTIDE   TROPONIN I   TROPONIN I   SARS-COV-2 RDRP GENE   POCT GLUCOSE   POCT GLUCOSE MONITORING CONTINUOUS     EKG Readings: (Independently Interpreted)   Initial Reading: No STEMI. Previous EKG: Compared with most recent EKG Rhythm: Sinus Bradycardia. Heart Rate: 51. Ectopy: No Ectopy.     Imaging Results              X-Ray Chest AP Portable (In process)                      CT Head Without Contrast (Final result)  Result time 06/22/23 18:29:12      Final result by Russel Chatterjee MD (06/22/23 18:29:12)                   Impression:      No acute abnormality.      Electronically signed by: Russel Chatterjee  Date:    06/22/2023  Time:    18:29               Narrative:    EXAMINATION:  CT HEAD WITHOUT CONTRAST    CLINICAL HISTORY:  Dizziness, persistent/recurrent, cardiac or vascular cause suspected;syncope;    TECHNIQUE:  Low dose axial CT images obtained throughout the head without intravenous contrast. Sagittal and coronal reconstructions were performed.    COMPARISON:  04/05/2023    FINDINGS:  Intracranial compartment:    Ventricles and sulci are normal in size for age without evidence of hydrocephalus. No extra-axial blood or fluid collections.    The brain parenchyma appears normal. No parenchymal mass, hemorrhage, edema or major vascular distribution infarct.    Skull/extracranial contents (limited evaluation): No fracture. Mastoid air cells and paranasal sinuses are essentially clear.                                       X-Ray Tibia Fibula 2 View Left (Final result)  Result time 06/22/23 18:16:40      Final result by Russel Chatterjee MD (06/22/23 18:16:40)                   Impression:      Revision of knee arthroplasty as described with new soft tissue swelling over the anterior tibia.  Correlate for hematoma versus  infection.      Electronically signed by: Russel Chatterjee  Date:    06/22/2023  Time:    18:16               Narrative:    EXAMINATION:  XR TIBIA FIBULA 2 VIEW LEFT    CLINICAL HISTORY:  Injury, unspecified, initial encounter    TECHNIQUE:  AP and lateral views of the left tibia and fibula were performed.    COMPARISON:  09/09/2021    FINDINGS:  There is been revision of the hemiarthroplasty to a complete arthroplasty with large tibial stem.  No fracture or loosening.  Previous soft tissue lucency over the medial aspect of the calf is no longer visualized.  Mild soft tissue swelling over the anterior tibia suggests focal hematoma.                                       Medications   neomycin-bacitracnZn-polymyxnB packet 1 each (has no administration in time range)   furosemide tablet 40 mg (has no administration in time range)   gabapentin capsule 600 mg (600 mg Oral Given 6/22/23 2010)   hydrALAZINE tablet 25 mg (25 mg Oral Given 6/22/23 2010)   oxyCODONE-acetaminophen  mg per tablet 1 tablet (1 tablet Oral Given 6/22/23 2010)     Medical Decision Making:   History:   Old Records Summarized: records from clinic visits.       <> Summary of Records: Last seen 6/12/23 and had stress echo ordered   Differential Diagnosis:   Arrhythmia, anemia, ADWOA  Clinical Tests:   Lab Tests: Ordered and Reviewed  Radiological Study: Ordered and Reviewed  Medical Tests: Ordered and Reviewed  ED Management:  78-year-old female with past medical history as above presents after syncopal episode.  Upon arrival she was afebrile, stable vital signs.  Chart review shows that patient was ordered for a stress echo done when she saw her nephrologist on 6/12/23 that has not yet been completed.  CT head without acute pathology, independently interpreted and reviewed by me. Labs notable for anemia.  EKG sinus bradycardia, appears to be her baseline.  Tetanus is already up-to-date.  Found to be hypertensive on recheck, ordered her home BP  medications. Irbesartan not available here. Discussed with [facility], admitting to obs for syncope.  Other:   I have discussed this case with another health care provider.           ED Course as of 06/22/23 2026   Thu Jun 22, 2023   1919 10/31/22 TTE: · The estimated ejection fraction is 55%.  · Indeterminate left ventricular diastolic function.   [AT]   1943 Tdap last administered 4/5/2020 per chart review  [AT]   2021 Troponin I: <0.006  Normal  [AT]   2021 Creatinine: 1.3  Normal  [AT]   2021 Potassium: 4.0  Normal  [AT]   2021 Hemoglobin(!): 10.7  Anemia  [AT]      ED Course User Index  [AT] Annette Perla MD                 Clinical Impression:   Final diagnoses:  [R55] Syncope  [S81.812A] Tear of skin of multiple sites of left lower extremity, initial encounter (Primary)  [D64.9] Anemia, unspecified type        ED Disposition Condition    Observation Stable                Annette Perla MD  06/22/23 2026

## 2023-06-23 NOTE — PLAN OF CARE
JASON communicated with pt to discuss dc planning Pt answered questions to best of her ability. Pt presented sleepy during encounter. SW contacted pts daughter to confirm all assessment questions. Pt resides in home with her s/o. Pt is normally independent in ADLs. Pts daughter reports as of late pt has been extremely sleepy. Per pts daughter pt cares for her s/o. Pts daughter reports that she visits her mothers home everyday to check on both pt and her step father. Pt has rw and bsc. Upon dc pt will be transported home by her daughter. SW will continue to follow pt throughout her transitions of care and assist with any dc needs.     Laya Garcia (Daughter)   [phone]     Future Appointments   Date Time Provider Department Center   6/27/2023  1:00 PM CARDIOLOGY, STRESS/TILT TABLE/[facility] C[facility]   7/3/2023  1:30 PM Althea Allen MD [facility]   7/5/2023  9:30 AM Margie Zamora MD [facility]        06/23/23 1344   Discharge Assessment   Assessment Type Discharge Planning Assessment   Confirmed/corrected address, phone number and insurance Yes   Confirmed Demographics Correct on Facesheet   Source of Information patient   Communicated ISAURA with patient/caregiver Yes   Reason For Admission Syncopal episodes   People in Home significant other   Do you expect to return to your current living situation? Yes   Do you have help at home or someone to help you manage your care at home? Yes   Who are your caregiver(s) and their phone number(s)? Laya Garcia (Daughter)   [phone]   Prior to hospitilization cognitive status: Alert/Oriented   Current cognitive status: Alert/Oriented   Equipment Currently Used at Home none   Readmission within 30 days? No   Patient currently being followed by outpatient case management? No   Do you currently have service(s) that help you manage your care at home? No   Do you take prescription medications? Yes   Do you have prescription coverage?  Yes   Coverage Syncopal episodes   Do you have any problems affording any of your prescribed medications? No   Is the patient taking medications as prescribed? yes   Who is going to help you get home at discharge? Laya Garcia (Daughter)   [phone]   How do you get to doctors appointments? car, drives self;family or friend will provide   Are you on dialysis? No   Do you take coumadin? No   Discharge Plan A Home with family   DME Needed Upon Discharge  none   Discharge Plan discussed with: Patient;Adult children   Name(s) and Number(s) Laya Garcia (Daughter)   [phone]   Transition of Care Barriers None   Financial Resource Strain   How hard is it for you to pay for the very basics like food, housing, medical care, and heating? Not hard   Housing Stability   In the last 12 months, was there a time when you were not able to pay the mortgage or rent on time? N   In the last 12 months, was there a time when you did not have a steady place to sleep or slept in a shelter (including now)? N   Transportation Needs   In the past 12 months, has lack of transportation kept you from medical appointments or from getting medications? no   In the past 12 months, has lack of transportation kept you from meetings, work, or from getting things needed for daily living? No   Food Insecurity   Within the past 12 months, you worried that your food would run out before you got the money to buy more. Never true   Within the past 12 months, the food you bought just didn't last and you didn't have money to get more. Never true   Alcohol Use   Q1: How often do you have a drink containing alcohol? Never   Q2: How many drinks containing alcohol do you have on a typical day when you are drinking? None   Q3: How often do you have six or more drinks on one occasion? Never   OTHER   Name(s) of People in Home s/o

## 2023-06-23 NOTE — NURSING
[facility] RN met with patient and pt's regarding [facility] hospital follow up upon discharge. Pt agreeable to hospital follow up .RN informed pt of scheduled appointment and that appointment will also appear on d/c AVS. Patient informed to bring portable home O2 if used and all medication bottles to [facility] follow up appointment.  [facility] information handout, appointment letter and folder provided to patient. Pt's daughter will provide transportation to appointment.    Patient Contact info: [phone]    Person providing transportation contact info: Laya Garcia (Daughter)   [phone] (Home Phone)    Barriers to attending [facility] visit: pt's daughter is a Chemo patient    Future Appointments   Date Time Provider Department Center   6/27/2023  1:00 PM CARDIOLOGY, STRESS/TILT TABLE/[facility]   7/3/2023  1:30 PM Althea Allen MD [facility]   7/6/2023  1:30 PM Margie Zamora MD [facility]

## 2023-06-24 VITALS
DIASTOLIC BLOOD PRESSURE: 80 MMHG | OXYGEN SATURATION: 93 % | HEIGHT: 64 IN | HEART RATE: 64 BPM | BODY MASS INDEX: 28.85 KG/M2 | TEMPERATURE: 99 F | SYSTOLIC BLOOD PRESSURE: 189 MMHG | WEIGHT: 169 LBS | RESPIRATION RATE: 16 BRPM

## 2023-06-24 PROBLEM — R55 SYNCOPAL EPISODES: Status: RESOLVED | Noted: 2022-09-16 | Resolved: 2023-06-24

## 2023-06-24 PROCEDURE — 25000003 PHARM REV CODE 250: Performed by: STUDENT IN AN ORGANIZED HEALTH CARE EDUCATION/TRAINING PROGRAM

## 2023-06-24 PROCEDURE — G0378 HOSPITAL OBSERVATION PER HR: HCPCS

## 2023-06-24 PROCEDURE — 97162 PT EVAL MOD COMPLEX 30 MIN: CPT | Performed by: PHYSICAL THERAPIST

## 2023-06-24 PROCEDURE — 25000003 PHARM REV CODE 250: Performed by: FAMILY MEDICINE

## 2023-06-24 PROCEDURE — 97116 GAIT TRAINING THERAPY: CPT | Performed by: PHYSICAL THERAPIST

## 2023-06-24 PROCEDURE — 97165 OT EVAL LOW COMPLEX 30 MIN: CPT

## 2023-06-24 PROCEDURE — 63600175 PHARM REV CODE 636 W HCPCS: Performed by: NURSE PRACTITIONER

## 2023-06-24 PROCEDURE — 96374 THER/PROPH/DIAG INJ IV PUSH: CPT

## 2023-06-24 RX ORDER — VALSARTAN 40 MG/1
80 TABLET ORAL ONCE
Status: COMPLETED | OUTPATIENT
Start: 2023-06-24 | End: 2023-06-24

## 2023-06-24 RX ORDER — OXYCODONE HYDROCHLORIDE 5 MG/1
10 TABLET ORAL EVERY 6 HOURS PRN
Status: DISCONTINUED | OUTPATIENT
Start: 2023-06-24 | End: 2023-06-24 | Stop reason: HOSPADM

## 2023-06-24 RX ORDER — GABAPENTIN 300 MG/1
300 CAPSULE ORAL ONCE
Status: COMPLETED | OUTPATIENT
Start: 2023-06-24 | End: 2023-06-24

## 2023-06-24 RX ADMIN — DULOXETINE 60 MG: 30 CAPSULE, DELAYED RELEASE ORAL at 09:06

## 2023-06-24 RX ADMIN — OXYCODONE HYDROCHLORIDE 10 MG: 5 TABLET ORAL at 11:06

## 2023-06-24 RX ADMIN — HYDROMORPHONE HYDROCHLORIDE 1 MG: 1 INJECTION, SOLUTION INTRAMUSCULAR; INTRAVENOUS; SUBCUTANEOUS at 12:06

## 2023-06-24 RX ADMIN — ATORVASTATIN CALCIUM 40 MG: 40 TABLET, FILM COATED ORAL at 09:06

## 2023-06-24 RX ADMIN — CHOLECALCIFEROL TAB 25 MCG (1000 UNIT) 5000 UNITS: 25 TAB at 09:06

## 2023-06-24 RX ADMIN — HYDROXYZINE HYDROCHLORIDE 25 MG: 25 TABLET ORAL at 01:06

## 2023-06-24 RX ADMIN — ASPIRIN 81 MG: 81 TABLET, COATED ORAL at 09:06

## 2023-06-24 RX ADMIN — VALSARTAN 80 MG: 40 TABLET, FILM COATED ORAL at 11:06

## 2023-06-24 RX ADMIN — HYDRALAZINE HYDROCHLORIDE 25 MG: 25 TABLET, FILM COATED ORAL at 01:06

## 2023-06-24 RX ADMIN — VALSARTAN 80 MG: 40 TABLET, FILM COATED ORAL at 09:06

## 2023-06-24 RX ADMIN — GABAPENTIN 300 MG: 300 CAPSULE ORAL at 09:06

## 2023-06-24 RX ADMIN — OXYCODONE HYDROCHLORIDE 5 MG: 5 TABLET ORAL at 05:06

## 2023-06-24 RX ADMIN — ISOSORBIDE MONONITRATE 60 MG: 30 TABLET, EXTENDED RELEASE ORAL at 09:06

## 2023-06-24 RX ADMIN — TIZANIDINE 4 MG: 4 TABLET ORAL at 02:06

## 2023-06-24 RX ADMIN — FERROUS SULFATE TAB 325 MG (65 MG ELEMENTAL FE) 1 EACH: 325 (65 FE) TAB at 09:06

## 2023-06-24 RX ADMIN — HYDRALAZINE HYDROCHLORIDE 25 MG: 25 TABLET, FILM COATED ORAL at 12:06

## 2023-06-24 RX ADMIN — HYDRALAZINE HYDROCHLORIDE 25 MG: 25 TABLET, FILM COATED ORAL at 05:06

## 2023-06-24 NOTE — PROGRESS NOTES
The sw received a call from Beatrice on call with [facility] who states she's going to try to staff the pt today but it may be tomorrow. She states someone from the  agency will call the pt with a time and date of when they will arrive to admit her to their agency. The sw gave her the pt's cellphone [phone] to notify her of this info and she states the  agency will inform the pt of this info. She is aware the pt's about to be discharged.

## 2023-06-24 NOTE — PLAN OF CARE
Occupational therapy evaluation completed, skilled acute OT indicated. Overlap evaluation with physical therapy in consideration for low activity tolerance. At baseline patient is usually functioning at independence to modified independence level. Patient on evaluation limited by pain in LLE and thus limited by low activity tolerance; attempt to enter bathroom terminated after short distance functional mobility in room. Initiated education and instruction on adaptive ADL techniques and safe functional mobility with rolling walker. Anticipate future d/c to low intensity therapy (Home health OT / PT) upon medically stable. Recommend transport wheelchair and tub transfer bench.         Problem: Occupational Therapy  Goal: Occupational Therapy Goal  Description: Goals to be met by: 7/22/2023     Patient will increase functional independence with ADLs by performing:    LE Dressing with Modified Sheldon.  Toileting from toilet with Modified Sheldon and least restrictive devices as needed for hygiene and clothing management.   Toilet transfer to toilet with Modified Sheldon and least restrictive device.  Upper extremity exercise program 3x10 reps per handout, with independence.  100% reciprocation of positioning/icing/elevation regimen and task / activity modifications 2/2 LLE wound to maximize ease of reintegration to home environment          6/24/2023 1254 by AMELIA Duncan  Outcome: Ongoing, Progressing

## 2023-06-24 NOTE — PT/OT/SLP EVAL
Physical Therapy Evaluation    Patient Name:  Enedelia García   MRN:  [id number]    Recommendations:     Discharge Recommendations: home health PT   Discharge Equipment Recommendations: bath bench (transport w/c)   Barriers to discharge: None    Assessment:     Enedelia García is a 78 y.o. female admitted with a medical diagnosis of Syncopal episodes.  She presents with the following impairments/functional limitations: weakness, impaired endurance, impaired balance, impaired self care skills, impaired functional mobility, decreased lower extremity function, gait instability, impaired cardiopulmonary response to activity, pain, impaired skin.    Rehab Prognosis: Good; patient would benefit from acute skilled PT services to address these deficits and reach maximum level of function.    Recent Surgery: * No surgery found *      Plan:     During this hospitalization, patient to be seen   to address the identified rehab impairments via gait training, therapeutic activities, therapeutic exercises, neuromuscular re-education and progress toward the following goals:    Plan of Care Expires:  07/20/23    Subjective     Chief Complaint: left anterior calf pain  Patient/Family Comments/goals: go home  Pain/Comfort:  Pain Rating 1: 9/10  Location - Side 1: Left  Location 1:  (anterior lower leg skin\)  Pain Addressed 1: Pre-medicate for activity, Reposition, Distraction, Cessation of Activity  Pain Rating Post-Intervention 1: 10/10    Patients cultural, spiritual, Alevism conflicts given the current situation: no    Living Environment:  Pt lives with her  (was his caregiver)  Prior to admission, patients level of function was Mod Ind, used RW at times.  Equipment used at home: walker, rolling, shower chair.  DME owned (not currently used): bedside commode.  Upon discharge, patient will have assistance from family.    Objective:     Communicated with nurse prior to session.  Patient found HOB elevated with  peripheral IV, telemetry, PureWick  upon PT entry to room.    General Precautions: Standard, fall  Orthopedic Precautions:    Braces:    Respiratory Status: Room air    Exams:  Pt is oriented x 4. Pt has BLE AROM WFL with decreased LLE ankle AROM in PF primarily.  Pt has 4 to 5/5 RLE strength.  LLE strength testing deferred.      Functional Mobility:  Pt went supine to sit with supervision.  Pt ambulated 30' with RW with CG/supervision for appropriate sequence.        AM-PAC 6 CLICK MOBILITY  Total Score:21       Treatment & Education:      Patient left HOB elevated with all lines intact.    GOALS:   Multidisciplinary Problems       Physical Therapy Goals          Problem: Physical Therapy    Goal Priority Disciplines Outcome Goal Variances Interventions   Physical Therapy Goal     PT, PT/OT Ongoing, Progressing     Description: Goals to be met by: 23     Patient will increase functional independence with mobility by performin.  Ambulate 150' with RW with supervision  2.  Up in chair 2 hours  3.  Bed to/from chair with RW with supervision  4.  Up/down 1 step with RW with supervision                         History:     Past Medical History:   Diagnosis Date    CKD (chronic kidney disease) stage 4, GFR 15-29 ml/min     Coronary artery disease     Edema     Epilepsy     Hematuria, unspecified     Hematuria, unspecified     Hyperlipidemia     Hypertension     Iron deficiency anemia     Normocytic anemia        Past Surgical History:   Procedure Laterality Date    APPENDECTOMY      BACK SURGERY      CORONARY STENT PLACEMENT      HYSTERECTOMY      REVISION OF KNEE ARTHROPLASTY Left 2022    Procedure: REVISION, ARTHROPLASTY, KNEE;  Surgeon: Stan Catalan MD;  Location: [facility];  Service: Orthopedics;  Laterality: Left;    TONSILLECTOMY         Time Tracking:     PT Received On: 23  PT Start Time: 1109     PT Stop Time: 1136  PT Total Time (min): 27 min     Billable Minutes: Evaluation 15 and Gait  Training 12      06/24/2023

## 2023-06-24 NOTE — PLAN OF CARE
VN reviewed discharge instructions with pt. And daughter. Using teach back method.  AVS printed and handed to pt by bedside nurse.  Reviewed follow-up appointments, medications, diet, and importance of medication compliance.  Reviewed home care instructions, treatment plan, self-management, and when to seek medical attention.  Allowed time for questions.  All questions answered.  Patient and daughter verbalized complete understanding of discharge instructions and voices no concerns.     Discharge instructions complete.    Transport/wheelchair requested.  Bedside nurse notified.

## 2023-06-24 NOTE — PROGRESS NOTES
[facility]  Progress Note    Patient Name: Enedelai García  MRN: [id number]  Patient Class: OP- Observation   Admission Date: 6/22/2023  Length of Stay: 0 days  Attending Physician: Yohana Teran MD  Primary Care Provider: Lauren Brown MD        Subjective:     Principal Problem:Syncopal episodes        HPI:  79 yo F w PMHx including CAD s/p stent placement, chronic diastolic dysfunction, HTN, moderate aortic stenosis who presents after witnessed syncopal episode.  Patient says that she was outside, had just finished feeding cast and went to stand up and then thinks that she fell from that position.  Her daughter at bedside says that there were some workers that witnessed her fall from just outside the window and it appeared she did lose consciousness. Patient does Not recall losing consciousness.  Denies any preceding chest pain, abdominal pain, vomiting, diarrhea.  Reports that she has been taking all her medications as prescribed.     Chart review shows that patient was ordered for a stress echo done when she saw her nephrologist on 6/12/23 that has not yet been completed.     In the ED, CT head without acute pathology. No STEMI. Previous EKG: Compared with most recent EKG Rhythm: Sinus Bradycardia. Heart Rate: 51. XR anterior tibia suggests focal hematoma. HM consulted to facilitate admission.         Overview/Hospital Course:  No notes on file    Interval History:  Patient denies passing out.  States she bent down to feed her pet when she fell over.  Reports intermittent palpitation, lightheadedness, dyspnea on exertion.  Denies any shortness of breath or chest pain at rest.  Reports worse than normal lower back pain since fall.  Lumbar x-ray ordered that showed no acute fractures.    Review of Systems   Respiratory:  Positive for shortness of breath (ASKEW).    Cardiovascular:  Positive for palpitations. Negative for chest pain.   Musculoskeletal:  Positive for back pain (lower).    Skin:  Positive for wound.   Neurological:  Positive for light-headedness (intermittent while ambulating).   Objective:     Vital Signs (Most Recent):  Temp: 98.3 °F (36.8 °C) (06/23/23 1521)  Pulse: 61 (06/23/23 1543)  Resp: 16 (06/23/23 1521)  BP: 102/67 (06/23/23 1521)  SpO2: (!) 94 % (06/23/23 1521) Vital Signs (24h Range):  Temp:  [97.9 °F (36.6 °C)-98.5 °F (36.9 °C)] 98.3 °F (36.8 °C)  Pulse:  [45-68] 61  Resp:  [15-20] 16  SpO2:  [92 %-99 %] 94 %  BP: ()/(46-83) 102/67     Weight: 76.7 kg (169 lb)  Body mass index is 29.01 kg/m².    Intake/Output Summary (Last 24 hours) at 6/23/2023 1903  Last data filed at 6/23/2023 1804  Gross per 24 hour   Intake 240 ml   Output 850 ml   Net -610 ml         Physical Exam  Vitals and nursing note reviewed.   Constitutional:       General: She is not in acute distress.     Comments: Eyes closed but awakens to verbal commands and communicates appropriately   HENT:      Mouth/Throat:      Mouth: Mucous membranes are moist.   Eyes:      General: No scleral icterus.  Cardiovascular:      Rate and Rhythm: Regular rhythm. Bradycardia present.      Pulses: Normal pulses.      Heart sounds: Murmur heard.   Pulmonary:      Effort: Pulmonary effort is normal. No respiratory distress.      Breath sounds: Normal breath sounds. No wheezing.   Abdominal:      Palpations: Abdomen is soft.      Tenderness: There is no abdominal tenderness.   Musculoskeletal:      Right lower leg: No edema.      Left lower leg: No edema.   Skin:     General: Skin is warm and dry.      Findings: Bruising present. No rash.   Neurological:      General: No focal deficit present.      Mental Status: She is alert and oriented to person, place, and time.   Psychiatric:         Mood and Affect: Mood normal.           Significant Labs: All pertinent labs within the past 24 hours have been reviewed.    Significant Imaging: I have reviewed all pertinent imaging results/findings within the past 24  hours.      Assessment/Plan:      * Syncopal episodes  Unclear if these are true syncopal episodes or lightheadedness with near-syncope  Echo showed severe aortic stenosis, EKG shows bradycardia   Cardiology consulted -further workup for aortic stenosis can be done as an outpatient.  Known occlusion of left internal carotid artery.  Will defer further CTA of neck.  Continue statin.  Hold metoprolol, decreased dose of valsartan to half  Telemetry    Daughter concerned about increased sleepiness at home.  Appears to be on gabapentin, oxycodone 10 mg p.r.n., tizanidine at home which can cause somnolence.  Decrease dose of oxycodone.  May need to cut back on the sedating medications    PT/OT consult      Primary hypertension  Borderline low blood pressures   Antihypertensives as above      Aortic valve stenosis  Echo shows severe aortic stenosis  Cardiology consult as above      Chronic pain  Documented history of fibromyalgia   X-ray lumbar spine done due to reported increased worsening of pain, shortness no acute fractures  Multimodal pain control approach        VTE Risk Mitigation (From admission, onward)         Ordered     enoxaparin injection 40 mg  Every 24 hours         06/23/23 1912                Discharge Planning   ISAURA:      Code Status: Full Code   Is the patient medically ready for discharge?:     Reason for patient still in hospital (select all that apply): Patient trending condition, Treatment and PT / OT recommendations  Discharge Plan A: Home with family              Yohana Teran MD  Department of Hospital Medicine   [facility]

## 2023-06-24 NOTE — PROGRESS NOTES
The sw spoke to the pt via her room phone and both her dtr's are at bedside. They will transport the pt home at d/c. The sw informed her the dr ordered hh and it has been sent to [facility] to get it arranged. The sw also informed her the transport w/c has to be approved and they will deliver it to the home or give the sw permission to pull it from the depot if it's in stock. The sw spoke to the pt about her f/u appt's and stressed the importance. The pt acknowledged understanding and states she will comply.     1:36pm The sw was notified by Elida with [facility] that the pt has a walker & rollator her insurance will not approve the transport w/c. The sw informed the pt and she states she will wait on the transport if it's not covered by [facility]. The sw informed her the hh orders have been sent to [facility] and they will call the pt or the hh agency will call to inform the pt of a start of care date. The sw called again to [facility] [phone] spoke to Andrei and requested the on call person call the pt with the name of the agency selected b/c the pt's about to d/c. The sw confirmed the pt's cellphone number with him [phone] and he states he will pass it along to the on call person to follow up with the pt.     [facility]  Discharge Final Note    Primary Care Provider: Lauren Brown MD    Expected Discharge Date: 6/24/2023    Final Discharge Note (most recent)       Final Note - 06/24/23 1307          Post-Acute Status    Post-Acute Authorization [facility] Status Referrals Sent                     Important Message from Medicare             Contact Info       Margie Zamora MD   Specialty: Internal Medicine    [address]  [address]  [address] [address]   Phone: [phone]       Next Steps: Follow up on 7/6/2023    Instructions: 1:30pm [facility] F/U    Althea Allen MD   Specialty: Nephrology   Relationship: Consulting Physician    [address]  [address]  [facility]  [address] [address]   Phone:  [phone]       Next Steps: Follow up on 7/3/2023    Instructions: 1:30pm    Patricia Rogers MD   Specialty: Cardiology, Interventional Cardiology    [address]  [address]  [facility] [address]   Phone: [phone]       Next Steps: Follow up    Instructions: The sw requested a follow up with the schedulers and they will call the pt with the appointment.

## 2023-06-24 NOTE — ASSESSMENT & PLAN NOTE
Documented history of fibromyalgia   X-ray lumbar spine done due to reported increased worsening of pain, shortness no acute fractures  Multimodal pain control approach     [facility]  OB/GYN  [address] [place] [address]  Phone: [phone]  Fax:   Follow Up Time:

## 2023-06-24 NOTE — PLAN OF CARE
Problem: Physical Therapy  Goal: Physical Therapy Goal  Description: Goals to be met by: 23     Patient will increase functional independence with mobility by performin.  Ambulate 150' with RW with supervision  2.  Up in chair 2 hours  3.  Bed to/from chair with RW with supervision  4.  Up/down 1 step with RW with supervision    Outcome: Ongoing, Progressing

## 2023-06-24 NOTE — PLAN OF CARE
[facility]      HOME HEALTH ORDERS  FACE TO FACE ENCOUNTER    Patient Name: Enedelia García  YOB: 1945    PCP: Lauren Brown MD   PCP Address: [address] / [address] [address]  PCP Phone Number: [phone]  PCP Fax: [phone]    Encounter Date: 6/22/23    Admit to Home Health    Diagnoses:  Active Hospital Problems    Diagnosis  POA    Aortic valve stenosis [I35.0]  Yes     Chronic    Primary hypertension [I10]  Yes    Chronic pain [G89.29]  Yes      Resolved Hospital Problems    Diagnosis Date Resolved POA    *Syncopal episodes [R55] 06/24/2023 Yes       Follow Up Appointments:  Future Appointments   Date Time Provider Department Center   6/27/2023  1:00 PM [facility]ARDIOLOGY[facility]ESS[facility]   7/3/2023  1:30 PM Althea Allen MD [facility]   7/6/2023  1:30 PM Margie Zamora MD [facility]       Allergies:  Review of patient's allergies indicates:   Allergen Reactions    Iodinated contrast media Anaphylaxis and Other (See Comments)    Nsaids (non-steroidal anti-inflammatory drug)      ADWOA    Phenergan [promethazine]      Vomiting       Medications: Review discharge medications with patient and family and provide education.    Current Facility-Administered Medications   Medication Dose Route Frequency Provider Last Rate Last Admin    acetaminophen tablet 650 mg  650 mg Oral Q6H PRN Alexandria Hernandez MD   650 mg at 06/23/23 1728    aspirin EC tablet 81 mg  81 mg Oral Daily Alexandria Hernandez MD   81 mg at 06/24/23 0911    atorvastatin tablet 40 mg  40 mg Oral Daily Alexandria Hernandez MD   40 mg at 06/24/23 0911    butalbital-acetaminophen-caffeine -40 mg per tablet 1 tablet  1 tablet Oral Q8H PRN Alexandria Hernandez MD        cloNIDine tablet 0.2 mg  0.2 mg Oral Q8H PRN Alexandria Hernandez MD   0.2 mg at 06/22/23 2246    DULoxetine DR capsule 60 mg  60 mg Oral Daily Alexandria Hernandez MD   60 mg at 06/24/23 0910     enoxaparin injection 40 mg  40 mg Subcutaneous Q24H (prophylaxis, 1700) Yohana Teran MD        ferrous sulfate tablet 1 each  1 tablet Oral Daily Alexandria Hernandez MD   1 each at 06/24/23 0910    gabapentin capsule 600 mg  600 mg Oral QHS Alexandria Hernandez MD   600 mg at 06/23/23 2002    hydrALAZINE tablet 25 mg  25 mg Oral Q8H Alexandria Hernandez MD   25 mg at 06/24/23 0524    hydrOXYzine HCL tablet 25 mg  25 mg Oral Q6H PRN Alexandria Hernandez MD   25 mg at 06/23/23 2002    isosorbide mononitrate 24 hr tablet 60 mg  60 mg Oral Daily Alexandria Hernandez MD   60 mg at 06/24/23 0910    oxyCODONE immediate release tablet 10 mg  10 mg Oral Q6H PRN Yohana Teran MD   10 mg at 06/24/23 1105    tiZANidine tablet 4 mg  4 mg Oral Q8H PRN Alexandria Hernandez MD   4 mg at 06/24/23 0231    valsartan tablet 80 mg  80 mg Oral Daily Yohana Teran MD   80 mg at 06/24/23 0911    vitamin D 1000 units tablet 5,000 Units  5,000 Units Oral Daily Alexandria Hernandez MD   5,000 Units at 06/24/23 0911     Current Discharge Medication List        CONTINUE these medications which have NOT CHANGED    Details   acetaminophen (TYLENOL) 500 MG tablet Take 500 mg by mouth every 6 (six) hours as needed for Pain.      AIMOVIG AUTOINJECTOR 70 mg/mL autoinjector Inject 70 mg into the skin every 30 days.      aspirin (ECOTRIN) 81 MG EC tablet Take 81 mg by mouth once daily.      butalbital-acetaminophen-caffeine -40 mg (FIORICET, ESGIC) -40 mg per tablet Take 1 tablet by mouth every 8 (eight) hours as needed.      calcium carbonate/vitamin D3 (VITAMIN D-3 ORAL) Take 1 tablet by mouth once daily.      co-enzyme Q-10 30 mg capsule Take 30 mg by mouth once daily.      DULoxetine (CYMBALTA) 60 MG capsule Take 1 capsule (60 mg total) by mouth once daily.  Qty: 90 capsule, Refills: 1    Associated Diagnoses: Adjustment disorder with depressed mood      FEROSUL 325 mg (65 mg iron) Tab tablet Take 325 mg by mouth once daily.      furosemide  (LASIX) 40 MG tablet Take 2 tablets (80 mg total) by mouth 2 (two) times daily as needed (swelling).  Qty: 360 tablet, Refills: 3      gabapentin (NEURONTIN) 600 MG tablet Take 600 mg by mouth 3 (three) times daily.      hydrALAZINE (APRESOLINE) 25 MG tablet Take 1 tablet (25 mg total) by mouth every 8 (eight) hours.  Qty: 90 tablet, Refills: 2    Comments: .  Associated Diagnoses: Primary hypertension      hydrOXYzine HCL (ATARAX) 25 MG tablet TAKE ONE TABLET BY MOUTH EVERY 6 HOURS AS NEEDED FOR ITCHING  Qty: 30 tablet, Refills: 2    Associated Diagnoses: Adjustment disorder with depressed mood      irbesartan (AVAPRO) 300 MG tablet Take 1 tablet (300 mg total) by mouth every evening.  Qty: 30 tablet, Refills: 30    Comments: .      isosorbide mononitrate (IMDUR) 60 MG 24 hr tablet Take 1 tablet (60 mg total) by mouth once daily.  Qty: 90 tablet, Refills: 1    Associated Diagnoses: Primary hypertension      LINZESS 72 mcg Cap capsule Take 72 mcg by mouth every morning.      multivit-min-iron-FA-lutein (CENTRUM SILVER WOMEN) 8 mg iron-400 mcg-300 mcg Tab Take 1 tablet by mouth once daily.      nitroGLYCERIN (NITROSTAT) 0.3 MG SL tablet PLACE 1 TABLET UNDER THE TONGUE EVERY 5 MINUTES FOR UP TO 3 DOSES IF NEEDED FOR CHEST PAIN. CALL 911 IF PAIN PERSISTS  Qty: 25 tablet, Refills: 0    Comments: PLEASE ensure this medication is not inappropriately on autofill  Associated Diagnoses: Presence of stent in coronary artery in patient with coronary artery disease; Chest pain, unspecified type      omega-3 fatty acids/fish oil (FISH OIL-OMEGA-3 FATTY ACIDS) 300-1,000 mg capsule Take 2 capsules by mouth once daily.      omeprazole (PRILOSEC) 20 MG capsule Take 20 mg by mouth once daily.      oxyCODONE (ROXICODONE) 10 mg Tab immediate release tablet Take 10 mg by mouth 4 (four) times daily.      rosuvastatin (CRESTOR) 40 MG Tab Take 1 tablet (40 mg total) by mouth once daily.  Qty: 90 tablet, Refills: 3    Associated Diagnoses:  Presence of stent in coronary artery in patient with coronary artery disease      tiZANidine (ZANAFLEX) 4 MG tablet Take 1 tablet (4 mg total) by mouth every 8 (eight) hours as needed (spasms).  Qty: 60 tablet, Refills: 2    Associated Diagnoses: Status post revision of total replacement of left knee      galcanezumab-gnlm 120 mg/mL PnIj Inject 120 mg into the skin every 28 days. maintenance dose  Qty: 1 each, Refills: 11    Associated Diagnoses: Chronic migraine without aura without status migrainosus, not intractable           STOP taking these medications       metoprolol succinate (TOPROL-XL) 25 MG 24 hr tablet Comments:   Reason for Stopping:                 I have seen and examined this patient within the last 30 days. My clinical findings that support the need for the home health skilled services and home bound status are the following:no   Weakness/numbness causing balance and gait disturbance due to Weakness/Debility making it taxing to leave home.  Requiring assistive device to leave home due to unsteady gait caused by  Weakness/Debility.     Diet:   cardiac diet      Referrals/ Consults  Physical Therapy to evaluate and treat. Evaluate for home safety and equipment needs; Establish/upgrade home exercise program. Perform / instruct on therapeutic exercises, gait training, transfer training, and Range of Motion.  Occupational Therapy to evaluate and treat. Evaluate home environment for safety and equipment needs. Perform/Instruct on transfers, ADL training, ROM, and therapeutic exercises.    Activities:   activity as tolerated    Nursing:   Agency to admit patient within 24 hours of hospital discharge unless specified on physician order or at patient request    SN to complete comprehensive assessment including routine vital signs. Instruct on disease process and s/s of complications to report to MD. Review/verify medication list sent home with the patient at time of discharge  and instruct patient/caregiver  as needed. Frequency may be adjusted depending on start of care date.     Skilled nurse to perform up to 3 visits PRN for symptoms related to diagnosis    Notify MD if SBP > 160 or < 90; DBP > 90 or < 50; HR > 120 or < 50; Temp > 101; O2 < 88%    Ok to schedule additional visits based on staff availability and patient request on consecutive days within the home health episode.    When multiple disciplines ordered:    Start of Care occurs on Sunday - Wednesday schedule remaining discipline evaluations as ordered on separate consecutive days following the start of care.    Thursday SOC -schedule subsequent evaluations Friday and Monday the following week.     Friday - Saturday SOC - schedule subsequent discipline evaluations on consecutive days starting Monday of the following week.    For all post-discharge communication and subsequent orders please contact patient's primary care physician.      Home Health Aide:  Nursing Three times weekly, Physical Therapy Three times weekly, and Occupational Therapy Three times weekly    Wound Care Orders  Nursing to cleanse LLE wound with Vashe wound cleanser and apply Xeroform dressing to wound. Cover with abd pads and secure with Kerlix/ lite ace- 3x week.    I certify that this patient is confined to her home and needs intermittent skilled nursing care, physical therapy, and occupational therapy.

## 2023-06-24 NOTE — NURSING
Wound care completed at beside per patients request prior to leaving. Pt tolerated well. IV and tele monitor removed. AVS packet printed and delivered to patient and daughter at bedside. VN notified.

## 2023-06-24 NOTE — ASSESSMENT & PLAN NOTE
Unclear if these are true syncopal episodes or lightheadedness with near-syncope  Echo showed severe aortic stenosis, EKG shows bradycardia   Cardiology consulted -further workup for aortic stenosis can be done as an outpatient.  Known occlusion of left internal carotid artery.  Will defer further CTA of neck.  Continue statin.  Hold metoprolol, decreased dose of valsartan to half  Telemetry    Daughter concerned about increased sleepiness at home.  Appears to be on gabapentin, oxycodone 10 mg p.r.n., tizanidine at home which can cause somnolence.  Decrease dose of oxycodone.  May need to cut back on the sedating medications    PT/OT consult

## 2023-06-24 NOTE — PLAN OF CARE
The sw spoke to Marianela with [facility] [phone] and added her to a secure chat informing her the pt will need a transport w/c. She states she will review it and respond to the sw via secure chat once a determination is made.        06/24/23 1307   Post-Acute Status   Post-Acute Authorization [facility] Status Referrals Sent

## 2023-06-24 NOTE — SUBJECTIVE & OBJECTIVE
Interval History:  Patient denies passing out.  States she bent down to feed her pet when she fell over.  Reports intermittent palpitation, lightheadedness, dyspnea on exertion.  Denies any shortness of breath or chest pain at rest.  Reports worse than normal lower back pain since fall.  Lumbar x-ray ordered that showed no acute fractures.    Review of Systems   Respiratory:  Positive for shortness of breath (ASKEW).    Cardiovascular:  Positive for palpitations. Negative for chest pain.   Musculoskeletal:  Positive for back pain (lower).   Skin:  Positive for wound.   Neurological:  Positive for light-headedness (intermittent while ambulating).   Objective:     Vital Signs (Most Recent):  Temp: 98.3 °F (36.8 °C) (06/23/23 1521)  Pulse: 61 (06/23/23 1543)  Resp: 16 (06/23/23 1521)  BP: 102/67 (06/23/23 1521)  SpO2: (!) 94 % (06/23/23 1521) Vital Signs (24h Range):  Temp:  [97.9 °F (36.6 °C)-98.5 °F (36.9 °C)] 98.3 °F (36.8 °C)  Pulse:  [45-68] 61  Resp:  [15-20] 16  SpO2:  [92 %-99 %] 94 %  BP: ()/(46-83) 102/67     Weight: 76.7 kg (169 lb)  Body mass index is 29.01 kg/m².    Intake/Output Summary (Last 24 hours) at 6/23/2023 1903  Last data filed at 6/23/2023 1804  Gross per 24 hour   Intake 240 ml   Output 850 ml   Net -610 ml         Physical Exam  Vitals and nursing note reviewed.   Constitutional:       General: She is not in acute distress.     Comments: Eyes closed but awakens to verbal commands and communicates appropriately   HENT:      Mouth/Throat:      Mouth: Mucous membranes are moist.   Eyes:      General: No scleral icterus.  Cardiovascular:      Rate and Rhythm: Regular rhythm. Bradycardia present.      Pulses: Normal pulses.      Heart sounds: Murmur heard.   Pulmonary:      Effort: Pulmonary effort is normal. No respiratory distress.      Breath sounds: Normal breath sounds. No wheezing.   Abdominal:      Palpations: Abdomen is soft.      Tenderness: There is no abdominal tenderness.    Musculoskeletal:      Right lower leg: No edema.      Left lower leg: No edema.   Skin:     General: Skin is warm and dry.      Findings: Bruising present. No rash.   Neurological:      General: No focal deficit present.      Mental Status: She is alert and oriented to person, place, and time.   Psychiatric:         Mood and Affect: Mood normal.           Significant Labs: All pertinent labs within the past 24 hours have been reviewed.    Significant Imaging: I have reviewed all pertinent imaging results/findings within the past 24 hours.

## 2023-06-24 NOTE — NURSING
Pt and daughters at bedside upset due to patients home pain regimen not being ordered for patient. Pt crying in pain. Advised patient and daughters that I will contact Dr. Teran.

## 2023-06-24 NOTE — NURSING
Pt AAOx3, called complaining of burning to her LLE. Pt crying in 10/10, severe pain. Dressing removed to assess laceration. NP Ander notified, up to bedside to assess. Dilaudid 1mg IV administered once. Wound care completed; elevated extremity on pillow. Pt with relief from pain medication. Encouraged to call with questions/concerns/assistance. Safety.

## 2023-06-24 NOTE — PT/OT/SLP EVAL
Occupational Therapy   Evaluation and Treatment    Name: Enedelia García  MRN: [id number]  Admitting Diagnosis: Syncopal episodes  Recent Surgery: * No surgery found *      Recommendations:     Discharge Recommendations: other (see comments) (Low intensity therapy)  Discharge Equipment Recommendations:  other (see comments) (tub transfer bench; transport wheelchair)  Barriers to discharge:  Other (Comment) (Decreased caregiver support; fall risk)    Assessment:     Enedelia García is a 78 y.o. female with a medical diagnosis of Syncopal episodes.  She presents with ..The primary encounter diagnosis was Tear of skin of multiple sites of left lower extremity, initial encounter. Diagnoses of Syncope, Anemia, unspecified type, Syncope and collapse, Aortic valve stenosis, etiology of cardiac valve disease unspecified, and Decreased strength involving knee joint were also pertinent to this visit. . Performance deficits affecting function: weakness, impaired endurance, impaired self care skills, impaired functional mobility, gait instability, decreased lower extremity function, pain, impaired skin, impaired cardiopulmonary response to activity.      Occupational therapy evaluation completed, skilled acute OT indicated. Overlap evaluation with physical therapy in consideration for low activity tolerance. At baseline patient is usually functioning at independence to modified independence level. Patient on evaluation limited by pain in LLE and thus limited by low activity tolerance; attempt to enter bathroom terminated after short distance functional mobility in room. Initiated education and instruction on adaptive ADL techniques and safe functional mobility with rolling walker. Anticipate future d/c to low intensity therapy (Home health OT / PT) upon medically stable. Recommend transport wheelchair and tub transfer bench.      Rehab Prognosis: Good; patient would benefit from acute skilled OT services to address these  deficits and reach maximum level of function.       Plan:     Patient to be seen 3 x/week to address the above listed problems via self-care/home management, therapeutic activities, therapeutic exercises  Plan of Care Expires: 07/22/23  Plan of Care Reviewed with:      Subjective     Chief Complaint: Pain in LLE  Patient/Family Comments/goals: Patient and family goals to maintain pain medication schedule     Occupational Profile:  Living Environment: Patient resides with  (dementia) in a single story home with a threshold to enter.   Previous level of function/Roles and Routines: Prior to admit patient was independent to modified independence in ADL / IADL and functional mobility. Retired nurse  Equipment Used at Home: other (see comments), shower chair (Walker, rolling (night use only))  Assistance upon Discharge: Patient will have support from family upon discharge. Patient is primary caregiver for  (who has dementia)    Pain/Comfort:  Pain Rating 1: 9/10  Location - Side 1: Left  Location - Orientation 1: lower  Location 1: leg  Pain Addressed 1: Nurse notified, Pre-medicate for activity, Distraction  Pain Rating Post-Intervention 1: 10/10      Objective:     Communicated with: nursing prior to session.  Patient found HOB elevated with telemetry, PureWick, peripheral IV upon OT entry to room.    General Precautions: Standard, fall  Orthopedic Precautions: N/A  Braces: N/A  Respiratory Status: Room air    Occupational Performance:    Bed Mobility:    Patient completed Supine to Sit with supervision and with side rail  Patient completed Sit to Supine with contact guard assistance ; 2/2 pain in LLE    Functional Mobility/Transfers:  Patient completed Sit <> Stand Transfer with contact guard assistance  with  rolling walker   Functional Mobility: Patient with contact guard assist for short distance functional mobility in room; visual cueing for sequencing with RW.    Activities of Daily  Living:  Bathing: Inferred minimal assist; education on used of cast cover and tub transfer bench    Lower Body Dressing: Inferred minimal assist; visual demonstration only with education for adaptive dressing (LLE in through leg first)    Toileting: total assistance ; attempt to enter bathroom or transfer to bedside commode terminated 2/2 increased pain in LLE    Cognitive/Visual Perceptual:  Cognitive/Psychosocial Skills:     -       Oriented to: Person, Place, Time, and Situation   -       Follows Commands/attention:Follows multistep  commands  -       Communication: clear/fluent  -       Safety awareness/insight to disability: Impaired safety awareness; intact insight to disability   -       Mood/Affect/Coping skills/emotional control: Tearful    Physical Exam:  Balance:    -       Standing Balance: 2 (supports self with BUE) on [facility] Standing Balance Scale)  Postural examination/scapula alignment:    -       No postural abnormalities identified  Skin integrity: LLE Wound dressing intact  Upper Extremity Range of Motion:  -       Right Upper Extremity: WFL  -       Left Upper Extremity: WFL    AMPAC 6 Click ADL:  AMPAC Total Score: 20    Treatment & Education:  Patient agreeable to OT evaluation; overlap evaluation with physical therapy in consideration for low activity tolerance. Brief assessment performed and occupational profile developed in collaboration with patient and daughter. At rest, /67, 51 bpm; sitting EOB /69, 56 bpm. No dizziness reported throughout session, however increased LLE pain at with activity; cued for deep breathing. Bed mobility and functional mobility as above. Attempt to complete toilet transfer terminated 2/2 Low activity tolerance. Education for adaptive dressing / task modifications initiated inclusive of use of cast cover for showering; use of tub transfer bench.     Patient left supine with all lines intact, call button in reach, bed alarm on, nursing  notified, and daughter present    GOALS:   Multidisciplinary Problems       Occupational Therapy Goals          Problem: Occupational Therapy    Goal Priority Disciplines Outcome Interventions   Occupational Therapy Goal     OT, PT/OT Ongoing, Progressing    Description: Goals to be met by: 7/22/2023     Patient will increase functional independence with ADLs by performing:    LE Dressing with Modified [place].  Toileting from toilet with Modified [place] and least restrictive devices as needed for hygiene and clothing management.   Toilet transfer to toilet with Modified [place] and least restrictive device.  Upper extremity exercise program 3x10 reps per handout, with independence.  100% reciprocation of positioning/icing/elevation regimen and task / activity modifications 2/2 LLE wound to maximize ease of reintegration to home environment                               History:     Past Medical History:   Diagnosis Date    CKD (chronic kidney disease) stage 4, GFR 15-29 ml/min     Coronary artery disease     Edema     Epilepsy     Hematuria, unspecified     Hematuria, unspecified     Hyperlipidemia     Hypertension     Iron deficiency anemia     Normocytic anemia          Past Surgical History:   Procedure Laterality Date    APPENDECTOMY      BACK SURGERY      CORONARY STENT PLACEMENT      HYSTERECTOMY      REVISION OF KNEE ARTHROPLASTY Left 7/18/2022    Procedure: REVISION, ARTHROPLASTY, KNEE;  Surgeon: Stan Catalan MD;  Location: [facility];  Service: Orthopedics;  Laterality: Left;    TONSILLECTOMY         Time Tracking:     OT Date of Treatment: 06/24/23  OT Start Time: 1110  OT Stop Time: 1134  OT Total Time (min): 24 min total time; co-evaluation with physical therapy    Billable Minutes:Evaluation 14 min  Therapeutic Activity 10 min    6/24/2023

## 2023-06-24 NOTE — PLAN OF CARE
The sw called [phone],spoke to the on call  who states she will forward the call to the office and someone will call the sw back. The sw faxed the pt's hh orders and info to [facility] at [phone] requesting someone return the call with the name of the hh agency selected for this pt.        06/24/23 1259   Post-Acute Status   Post-Acute Authorization Home Health   Home Health Status Referrals Sent

## 2023-06-25 NOTE — DISCHARGE SUMMARY
[facility] Medicine  Discharge Summary      Patient Name: Enedelia García  MRN: [id number]  JENNY: [id number]  Patient Class: OP- Observation  Admission Date: 6/22/2023  Hospital Length of Stay: 0 days  Discharge Date and Time: 6/24/2023  3:42 PM  Attending Physician: No att. providers found   Discharging Provider: Yohana Teran MD  Primary Care Provider: Lauren Brown MD    Primary Care Team: Networked reference to record PCT     HPI:   79 yo F w PMHx including CAD s/p stent placement, chronic diastolic dysfunction, HTN, moderate aortic stenosis who presents after witnessed syncopal episode.  Patient says that she was outside, had just finished feeding cast and went to stand up and then thinks that she fell from that position.  Her daughter at bedside says that there were some workers that witnessed her fall from just outside the window and it appeared she did lose consciousness. Patient does Not recall losing consciousness.  Denies any preceding chest pain, abdominal pain, vomiting, diarrhea.  Reports that she has been taking all her medications as prescribed.     Chart review shows that patient was ordered for a stress echo done when she saw her nephrologist on 6/12/23 that has not yet been completed.     In the ED, CT head without acute pathology. No STEMI. Previous EKG: Compared with most recent EKG Rhythm: Sinus Bradycardia. Heart Rate: 51. XR anterior tibia suggests focal hematoma.  consulted to facilitate admission.         * No surgery found *      Hospital Course:   78-year-old female with PMH of CAD s/p PCI, chronic diastolic dysfunction, aortic stenosis, hypertension reported to have presented with witnessed syncope but patient reports she remember the event and bent over more than she could handle following which fell. Has intermittent palpitations, lightheadedness and dyspnea on exertion.  Repeat echocardiogram showed worsening severe aortic stenosis.  Bilateral carotid ultrasound  showed decreased left internal carotid velocity.  Patient was seen by [facility] -recommended outpatient workup for aortic valve replacement.  Left internal carotid artery occlusion known and recommended statin therapy.  Per  cardiology near-syncope or syncopal episode was in setting of cerebral hypoperfusion secondary to vascular disease or aortic valve stenosis but IV hydration has helped and further workup could be done in the outpatient setting.  Metoprolol held at discharge given bradycardia.  Patient advised to cut back on narcotics, muscle relaxants as these could be contributing to somnolence.  She is reluctant to reduce her pain medications and would like to continue her current regimen at discharge.  Patient worked with Physical therapy and recommended home health.  Home health PT/OT/RN for wound care ordered.  PCP, Cardiology referrals placed prior to discharge.    Physical Exam  Vitals and nursing note reviewed.   Constitutional:       General: She is not in acute distress.     Comments: alert  Cardiovascular:      Rate and Rhythm: Regular rhythm. Bradycardia present.      Heart sounds: systolic murmur heard radiating to R carotid artery   Pulmonary:      Effort: Pulmonary effort is normal. No respiratory distress.      Breath sounds: Normal breath sounds. No wheezing.   Abdominal:      Palpations: Abdomen is soft.      Tenderness: There is no abdominal tenderness.   Musculoskeletal:      no LE edema  Skin:     General: Skin is warm and dry.      Findings: left leg bruising dressed  Neurological:      General: No focal deficit present.      Mental Status: She is alert        Goals of Care Treatment Preferences:  Code Status: Full Code      Consults:   Consults (From admission, onward)        Status Ordering Provider     Inpatient consult to Cardiology-[facility]  Once        Provider:  (Not yet assigned)    Completed TENZIN KONG            Final Active Diagnoses:    Diagnosis Date Noted POA    Aortic  "valve stenosis [I35.0] 12/20/2021 Yes     Chronic    Primary hypertension [I10] 12/20/2021 Yes    Chronic pain [G89.29] 11/17/2013 Yes      Problems Resolved During this Admission:    Diagnosis Date Noted Date Resolved POA    PRINCIPAL PROBLEM:  Syncopal episodes [R55] 09/16/2022 06/24/2023 Yes       Discharged Condition: stable    Disposition: Home or Self Care    Follow Up:   Follow-up Information     Margie Zamora MD Follow up on 7/6/2023.    Specialty: Internal Medicine  Why: 1:30pm HSP F/U  Contact information:  [address]  [address]  [place] [address]  [phone]             Althea Allen MD Follow up on 7/3/2023.    Specialty: Nephrology  Why: 1:30pm  Contact information:  [address]  [address]  [facility]  [address] [address]  [phone]             Patricia Rogers MD Follow up.    Specialties: Cardiology, Interventional Cardiology  Why: The sw requested a follow up with the schedulers and they will call the pt with the appointment.  Contact information:  [address]  [address]  [place] [address] [address]  [phone]                       Patient Instructions:      WHEELCHAIR FOR HOME USE   Order Comments: FOR TRANSPORT     Order Specific Question Answer Comments   Hours in W/C per day: 12    Type of Wheelchair: Lightweight    Patient unable to propel in Standard wheelchair? Yes    Size(Width): 18"(STD adult)    Leg Support: STD footrests    Leg Support: Elevating leg rests    Lap Belt: Velcro    Accessories: Anti-tippers    Cushion: Basic    Reclining Back No    Height: 5' 4" (1.626 m)    Weight: 76.7 kg (169 lb)    Does patient have medical equipment at home? walker, rolling    Does patient have medical equipment at home? shower chair    Length of need (1-99 months): 99    Please check all that apply: Caregiver is capable and willing to operate wheelchair safely.    Please check all that apply: Patient mobility limitations cannot be sufficiently resolved by the use " of other ambulatory therapies.      Ambulatory referral/consult to Cardiology   Standing Status: Future   Referral Priority: Routine Referral Type: Consultation   Referral Reason: Specialty Services Required   Referred to Provider: LUX VELAZQUEZ Requested Specialty: Cardiology   Number of Visits Requested: 1       Significant Diagnostic Studies: Labs: BMP: No results for input(s): GLU, NA, K, CL, CO2, BUN, CREATININE, CALCIUM, MG in the last 48 hours. and CBC No results for input(s): WBC, HGB, HCT, PLT in the last 48 hours.    Pending Diagnostic Studies:     None         Medications:  Reconciled Home Medications:      Medication List      CHANGE how you take these medications    hydrOXYzine HCL 25 MG tablet  Commonly known as: ATARAX  TAKE ONE TABLET BY MOUTH EVERY 6 HOURS AS NEEDED FOR ITCHING  What changed:   · how much to take  · how to take this  · when to take this  · reasons to take this  · additional instructions        CONTINUE taking these medications    acetaminophen 500 MG tablet  Commonly known as: TYLENOL  Take 500 mg by mouth every 6 (six) hours as needed for Pain.     AIMOVIG AUTOINJECTOR 70 mg/mL autoinjector  Generic drug: erenumab-aooe  Inject 70 mg into the skin every 30 days.     aspirin 81 MG EC tablet  Commonly known as: ECOTRIN  Take 81 mg by mouth once daily.     butalbital-acetaminophen-caffeine -40 mg -40 mg per tablet  Commonly known as: FIORICET, ESGIC  Take 1 tablet by mouth every 8 (eight) hours as needed.     CENTRUM SILVER WOMEN 8 mg iron-400 mcg-300 mcg Tab  Generic drug: multivit-min-iron-FA-lutein  Take 1 tablet by mouth once daily.     co-enzyme Q-10 30 mg capsule  Take 30 mg by mouth once daily.     DULoxetine 60 MG capsule  Commonly known as: CYMBALTA  Take 1 capsule (60 mg total) by mouth once daily.     FeroSuL 325 mg (65 mg iron) Tab tablet  Generic drug: ferrous sulfate  Take 325 mg by mouth once daily.     fish oil-omega-3 fatty acids 300-1,000 mg  capsule  Take 2 capsules by mouth once daily.     furosemide 40 MG tablet  Commonly known as: LASIX  Take 2 tablets (80 mg total) by mouth 2 (two) times daily as needed (swelling).     gabapentin 600 MG tablet  Commonly known as: NEURONTIN  Take 600 mg by mouth 3 (three) times daily.     galcanezumab-gnlm 120 mg/mL Pnij  Inject 120 mg into the skin every 28 days. maintenance dose     hydrALAZINE 25 MG tablet  Commonly known as: APRESOLINE  Take 1 tablet (25 mg total) by mouth every 8 (eight) hours.     irbesartan 300 MG tablet  Commonly known as: AVAPRO  Take 1 tablet (300 mg total) by mouth every evening.     isosorbide mononitrate 60 MG 24 hr tablet  Commonly known as: IMDUR  Take 1 tablet (60 mg total) by mouth once daily.     LINZESS 72 mcg Cap capsule  Generic drug: linaCLOtide  Take 72 mcg by mouth every morning.     nitroGLYCERIN 0.3 MG SL tablet  Commonly known as: NITROSTAT  PLACE 1 TABLET UNDER THE TONGUE EVERY 5 MINUTES FOR UP TO 3 DOSES IF NEEDED FOR CHEST PAIN. CALL 911 IF PAIN PERSISTS     omeprazole 20 MG capsule  Commonly known as: PRILOSEC  Take 20 mg by mouth once daily.     oxyCODONE 10 mg Tab immediate release tablet  Commonly known as: ROXICODONE  Take 10 mg by mouth 4 (four) times daily.     rosuvastatin 40 MG Tab  Commonly known as: CRESTOR  Take 1 tablet (40 mg total) by mouth once daily.     tiZANidine 4 MG tablet  Commonly known as: ZANAFLEX  Take 1 tablet (4 mg total) by mouth every 8 (eight) hours as needed (spasms).     VITAMIN D-3 ORAL  Take 1 tablet by mouth once daily.        STOP taking these medications    metoprolol succinate 25 MG 24 hr tablet  Commonly known as: TOPROL-XL            Indwelling Lines/Drains at time of discharge:   Lines/Drains/Airways     None                 Time spent on the discharge of patient: 40 minutes         Yohana Teran MD  Department of Hospital Medicine  [facility]

## 2023-06-25 NOTE — HOSPITAL COURSE
78-year-old female with PMH of CAD s/p PCI, chronic diastolic dysfunction, aortic stenosis, hypertension reported to have presented with witnessed syncope but patient reports she remember the event and bent over more than she could handle following which fell. Has intermittent palpitations, lightheadedness and dyspnea on exertion.  Repeat echocardiogram showed worsening severe aortic stenosis.  Bilateral carotid ultrasound showed decreased left internal carotid velocity.  Patient was seen by [facility] -recommended outpatient workup for aortic valve replacement.  Left internal carotid artery occlusion known and recommended statin therapy.  Per  cardiology near-syncope or syncopal episode was in setting of cerebral hypoperfusion secondary to vascular disease or aortic valve stenosis but IV hydration has helped and further workup could be done in the outpatient setting.  Metoprolol held at discharge given bradycardia.  Patient advised to cut back on narcotics, muscle relaxants as these could be contributing to somnolence.  She is reluctant to reduce her pain medications and would like to continue her current regimen at discharge.  Patient worked with Physical therapy and recommended home health.  Home health PT/OT/RN for wound care ordered.  PCP, Cardiology referrals placed prior to discharge.    Physical Exam  Vitals and nursing note reviewed.   Constitutional:       General: She is not in acute distress.     Comments: alert  Cardiovascular:      Rate and Rhythm: Regular rhythm. Bradycardia present.      Heart sounds: systolic murmur heard radiating to R carotid artery   Pulmonary:      Effort: Pulmonary effort is normal. No respiratory distress.      Breath sounds: Normal breath sounds. No wheezing.   Abdominal:      Palpations: Abdomen is soft.      Tenderness: There is no abdominal tenderness.   Musculoskeletal:      no LE edema  Skin:     General: Skin is warm and dry.      Findings: left leg bruising  dressed  Neurological:      General: No focal deficit present.      Mental Status: She is alert

## 2023-06-26 ENCOUNTER — HOSPITAL ENCOUNTER (OUTPATIENT)
Dept: WOUND CARE | Facility: HOSPITAL | Age: 78
Discharge: HOME | End: 2023-06-26
Attending: PREVENTIVE MEDICINE
Payer: MEDICARE

## 2023-06-26 ENCOUNTER — NURSE TRIAGE (OUTPATIENT)
Dept: ADMINISTRATIVE | Facility: CLINIC | Age: 78
End: 2023-06-26
Payer: MEDICARE

## 2023-06-26 DIAGNOSIS — S81.819A SKIN TEAR OF LOWER LEG WITHOUT COMPLICATION, INITIAL ENCOUNTER: Primary | ICD-10-CM

## 2023-06-26 DIAGNOSIS — T14.8XXA SKIN AVULSION: ICD-10-CM

## 2023-06-26 PROCEDURE — 99213 OFFICE O/P EST LOW 20 MIN: CPT

## 2023-06-26 NOTE — TELEPHONE ENCOUNTER
Pt states that she is seeing wound care today. Cancelled triage. Encounter routed to provider.   Reason for Disposition   Caller has cancelled the call before the first contact    Protocols used: No Contact or Duplicate Contact Call-A-OH

## 2023-06-26 NOTE — PROGRESS NOTES
[facility]    Subjective:       Patient ID: Enedelia García is a 78 y.o. female.    Chief Complaint: Non-healing Wound    Readmit to wound care with traumatic skin tears to LLE.  Reports fall 6/22/23 with hospital admission discharged 6/24/23.  Currently not on any antibiotics.  Complaints of burning and pain 8/10 with dressing removal. Concerned leg is infected due to swelling and redness. Denies fever or foul drainage.   Review of Systems      Objective:         Physical Exam       Altered Skin Integrity 06/26/23 1300 Left anterior;lower Leg Traumatic Full thickness tissue loss. Subcutaneous fat may be visible but bone, tendon or muscle are not exposed (Active)   06/26/23 1300   Altered Skin Integrity Present on Admission - Did Patient arrive to the hospital with altered skin?: yes   Side: Left   Orientation: anterior;lower   Location: Leg   Wound Number:    Is this injury device related?: No   Primary Wound Type: Traumatic   Description of Altered Skin Integrity: Full thickness tissue loss. Subcutaneous fat may be visible but bone, tendon or muscle are not exposed   Ankle-Brachial Index:    Pulses:    Removal Indication and Assessment:    Wound Outcome:    (Retired) Wound Length (cm):    (Retired) Wound Width (cm):    (Retired) Depth (cm):    Wound Description (Comments):    Removal Indications:    Wound Image   06/26/23 1354   Description of Altered Skin Integrity Partial thickness tissue loss. Shallow open ulcer with a red or pink wound bed, without slough. Intact or Open/Ruptured Serum-filled blister. 06/26/23 1354   Dressing Appearance Intact;Dried drainage 06/26/23 1354   Drainage Amount Large 06/26/23 1354   Appearance Red;Moist;Ecchymotic;Bleeding 06/26/23 1354   Tissue loss description Full thickness 06/26/23 1354   Red (%), Wound Tissue Color 100 % 06/26/23 1354   Periwound Area Ecchymotic;Edematous;Redness 06/26/23 1354   Wound Edges Irregular;Open  06/26/23 1354   Wound Length (cm) 11 cm 06/26/23 1354   Wound Width (cm) 9.6 cm 06/26/23 1354   Wound Depth (cm) 0.2 cm 06/26/23 1354   Wound Volume (cm^3) 21.12 cm^3 06/26/23 1354   Wound Surface Area (cm^2) 105.6 cm^2 06/26/23 1354   Care Cleansed with:;Sterile normal saline 06/26/23 1354   Dressing Applied;Non-adherent;Absorptive Pad;Rolled gauze;Tubular bandage 06/26/23 1354   Periwound Care Dry periwound area maintained;Absorptive dressing applied 06/26/23 1354   Compression Tubular elasticized bandage 06/26/23 1354   Dressing Change Due 06/28/23 06/26/23 1354            Altered Skin Integrity 06/26/23 1300 Left lower;lateral Leg Traumatic (Active)   06/26/23 1300   Altered Skin Integrity Present on Admission - Did Patient arrive to the hospital with altered skin?:    Side: Left   Orientation: lower;lateral   Location: Leg   Wound Number:    Is this injury device related?:    Primary Wound Type: Traumatic   Description of Altered Skin Integrity:    Ankle-Brachial Index:    Pulses:    Removal Indication and Assessment:    Wound Outcome:    (Retired) Wound Length (cm):    (Retired) Wound Width (cm):    (Retired) Depth (cm):    Wound Description (Comments):    Removal Indications:    Wound Image   06/26/23 1354   Description of Altered Skin Integrity Partial thickness tissue loss. Shallow open ulcer with a red or pink wound bed, without slough. Intact or Open/Ruptured Serum-filled blister. 06/26/23 1354   Dressing Appearance Intact;Dried drainage 06/26/23 1354   Drainage Amount Large 06/26/23 1354   Drainage Characteristics/Odor Other (see comments) 06/26/23 1354   Appearance Red;Moist;Maroon 06/26/23 1354   Tissue loss description Full thickness 06/26/23 1354   Red (%), Wound Tissue Color 100 % 06/26/23 1354   Periwound Area Edematous;Ecchymotic;Redness 06/26/23 1354   Wound Edges Open;Irregular 06/26/23 1354   Wound Length (cm) 9.1 cm 06/26/23 1354   Wound Width (cm) 1.8 cm 06/26/23 1354   Wound Depth (cm) 0.2  cm 06/26/23 1354   Wound Volume (cm^3) 3.276 cm^3 06/26/23 1354   Wound Surface Area (cm^2) 16.38 cm^2 06/26/23 1354   Care Cleansed with:;Sterile normal saline 06/26/23 1354   Dressing Applied;Non-adherent;Absorptive Pad;Rolled gauze;Tubular bandage 06/26/23 1354   Periwound Care Absorptive dressing applied;Dry periwound area maintained 06/26/23 1354   Compression Tubular elasticized bandage 06/26/23 1354   Dressing Change Due 06/28/23 06/26/23 1354         Assessment/Plan:         ICD-10-CM ICD-9-CM   1. Skin tear of lower leg without complication, initial encounter  S81.819A 891.0   2. Skin avulsion  T14.8XXA 879.8           Tissue pathology and/or culture taken:  [] Yes [x] No   Sharp debridement performed:   [] Yes [x] No   Labs ordered this visit:   [] Yes [x] No   Imaging ordered this visit:   [] Yes [x] No           Orders Placed This Encounter   Procedures    Change dressing     Left Lower Leg Skin Tears    Cleanse wound with:Normal Saline  Lidocaine:PRN   Silver nitrate:PRN  Primary dressing:Xeroform - Soak for 5-10 minutes before removing.   Secondary dressing:Large ABD Pad, Kerlix flexi net   Edema control: Tubi  F toe to knee  Frequency:Monday (when not seen in clinic), Wednesday, Friday and Prn per [place] [facility].     Follow-up:Weekly with     [facility]:Admit patient. Provide wound care and dressing changes as above. Soak Xeroform for 5-10 minutes before removing.        Follow up in about 1 week (around 7/3/2023) for .

## 2023-06-27 ENCOUNTER — HOSPITAL ENCOUNTER (OUTPATIENT)
Dept: CARDIOLOGY | Facility: HOSPITAL | Age: 78
Discharge: HOME | End: 2023-06-27
Attending: INTERNAL MEDICINE
Payer: MEDICARE

## 2023-06-27 ENCOUNTER — TELEPHONE (OUTPATIENT)
Dept: CARDIOLOGY | Facility: CLINIC | Age: 78
End: 2023-06-27
Payer: MEDICARE

## 2023-06-27 VITALS — HEIGHT: 64 IN | BODY MASS INDEX: 28.85 KG/M2 | WEIGHT: 169 LBS

## 2023-06-27 DIAGNOSIS — I20.89 STABLE ANGINA PECTORIS: ICD-10-CM

## 2023-06-27 PROCEDURE — 93351 STRESS ECHO (CUPID ONLY): ICD-10-PCS | Mod: 26,,, | Performed by: INTERNAL MEDICINE

## 2023-06-27 PROCEDURE — G0180 MD CERTIFICATION HHA PATIENT: HCPCS | Mod: ,,, | Performed by: INTERNAL MEDICINE

## 2023-06-27 PROCEDURE — 63600175 PHARM REV CODE 636 W HCPCS: Performed by: NURSE PRACTITIONER

## 2023-06-27 PROCEDURE — G0180 PR HOME HEALTH MD CERTIFICATION: ICD-10-PCS | Mod: ,,, | Performed by: INTERNAL MEDICINE

## 2023-06-27 PROCEDURE — 25000003 PHARM REV CODE 250: Performed by: NURSE PRACTITIONER

## 2023-06-27 PROCEDURE — 93351 STRESS TTE COMPLETE: CPT | Mod: 26,,, | Performed by: INTERNAL MEDICINE

## 2023-06-27 PROCEDURE — 93351 STRESS TTE COMPLETE: CPT

## 2023-06-27 RX ORDER — DOBUTAMINE HYDROCHLORIDE 400 MG/100ML
0-20 INJECTION, SOLUTION INTRAVENOUS ONCE
Status: COMPLETED | OUTPATIENT
Start: 2023-06-27 | End: 2023-06-27

## 2023-06-27 RX ADMIN — DOBUTAMINE 10 MCG/KG/MIN: 12.5 INJECTION, SOLUTION, CONCENTRATE INTRAVENOUS at 01:06

## 2023-06-27 NOTE — TELEPHONE ENCOUNTER
I received a message that this pt needed a HFU  so I was able to get the pt scheduled for a 2 week appointment so I sent it over to Roz and I will also  OUT AN APPOINTMENT REMINDER

## 2023-06-27 NOTE — TELEPHONE ENCOUNTER
----- Message from Yamileth Ware RN sent at 6/26/2023  5:41 PM CDT -----  Regarding: FW: HFU    ----- Message -----  From: Natalya Lin  Sent: 6/26/2023   5:10 PM CDT  To: [facility] Staff  Subject: HFU                                              Patient will be discharging from [facility] and a HFU was requested with Dr Rogers by DC provider.  Please schedule and message me back so DC can relay appointment information to patient prior to discharge. A referral has been entered.  Patient is established.    DX: Syncopal episodes    Roz Haines  Physician Referral Specialist/Discharge

## 2023-06-27 NOTE — NURSING NOTE
1337: Pt on table, ready for test. Pt states NKDA and denies hx of glaucoma.    1337: Connected to monitor EKG and NIBP;  53; HR 71.    1347: PHILLIP Sexton, at bedside to explain procedure and obtain consent.    1350: Test started per protocol, see EKG sheet for VS.    1350: Dobutamine started at 10mcg to increase HR, target .    1353: Dobutamine increased to 20mcg, pt tolerating well. Pt given hand ball to squeeze and instructed to do leg exercises.     1356: Dobutamine increased to 30mcg.    1359: Dobutamine increased to 40mcg.    1403: Target HR not obtained. , /43. Pt tolerated test well. Denied any c/o chest pain and/or SOB. PHILLIP Sexton ordered to stop dobutamine and begin IV fluids NS to gravity.    1403: Testing phase complete.    1413: Recovery phase completed. Pt states feels normal, no distress, NS d/c'd, approx  200cc infused. IV d/c'd, pt released to cardio.

## 2023-06-28 LAB
BSA FOR ECHO PROCEDURE: 1.86 M2
CV STRESS BASE HR: 73 BPM
DIASTOLIC BLOOD PRESSURE: 53 MMHG
EJECTION FRACTION: 65 %
OHS CV CPX 1 MINUTE RECOVERY HEART RATE: 101 BPM
OHS CV CPX 85 PERCENT MAX PREDICTED HEART RATE MALE: 117
OHS CV CPX MAX PREDICTED HEART RATE: 137
OHS CV CPX PATIENT IS FEMALE: 1
OHS CV CPX PATIENT IS MALE: 0
OHS CV CPX PEAK DIASTOLIC BLOOD PRESSURE: 43 MMHG
OHS CV CPX PEAK HEAR RATE: 117 BPM
OHS CV CPX PEAK RATE PRESSURE PRODUCT: NORMAL
OHS CV CPX PEAK SYSTOLIC BLOOD PRESSURE: 210 MMHG
OHS CV CPX PERCENT MAX PREDICTED HEART RATE ACHIEVED: 85
OHS CV CPX RATE PRESSURE PRODUCT PRESENTING: 7811
STRESS ECHO POST EXERCISE DUR MIN: 13 MINUTES
STRESS ECHO POST EXERCISE DUR SEC: 4 SECONDS
SYSTOLIC BLOOD PRESSURE: 107 MMHG

## 2023-06-29 DIAGNOSIS — K59.04 CHRONIC IDIOPATHIC CONSTIPATION: ICD-10-CM

## 2023-06-29 RX ORDER — LINACLOTIDE 72 UG/1
CAPSULE, GELATIN COATED ORAL
Qty: 30 CAPSULE | Refills: 3 | Status: SHIPPED | OUTPATIENT
Start: 2023-06-29 | End: 2024-01-04

## 2023-06-30 ENCOUNTER — LAB VISIT (OUTPATIENT)
Dept: LAB | Facility: HOSPITAL | Age: 78
End: 2023-06-30
Attending: INTERNAL MEDICINE
Payer: MEDICARE

## 2023-06-30 DIAGNOSIS — E78.5 HYPERLIPIDEMIA, UNSPECIFIED HYPERLIPIDEMIA TYPE: ICD-10-CM

## 2023-06-30 DIAGNOSIS — I10 PRIMARY HYPERTENSION: ICD-10-CM

## 2023-06-30 DIAGNOSIS — R51.9 ACUTE INTRACTABLE HEADACHE, UNSPECIFIED HEADACHE TYPE: ICD-10-CM

## 2023-06-30 DIAGNOSIS — R73.03 PREDIABETES: ICD-10-CM

## 2023-06-30 DIAGNOSIS — F43.21 ADJUSTMENT DISORDER WITH DEPRESSED MOOD: ICD-10-CM

## 2023-06-30 DIAGNOSIS — N18.2 CKD (CHRONIC KIDNEY DISEASE) STAGE 2, GFR 60-89 ML/MIN: ICD-10-CM

## 2023-06-30 PROBLEM — S81.819A SKIN TEAR OF LOWER LEG WITHOUT COMPLICATION, INITIAL ENCOUNTER: Status: ACTIVE | Noted: 2023-06-30

## 2023-06-30 LAB
ALBUMIN SERPL BCP-MCNC: 3.1 G/DL (ref 3.5–5.2)
ALBUMIN SERPL BCP-MCNC: 3.1 G/DL (ref 3.5–5.2)
ALP SERPL-CCNC: 67 U/L (ref 55–135)
ALT SERPL W/O P-5'-P-CCNC: 20 U/L (ref 10–44)
ANION GAP SERPL CALC-SCNC: 10 MMOL/L (ref 8–16)
ANION GAP SERPL CALC-SCNC: 10 MMOL/L (ref 8–16)
AST SERPL-CCNC: 26 U/L (ref 10–40)
BASOPHILS # BLD AUTO: 0.07 K/UL (ref 0–0.2)
BASOPHILS NFR BLD: 0.9 % (ref 0–1.9)
BILIRUB SERPL-MCNC: 0.4 MG/DL (ref 0.1–1)
BUN SERPL-MCNC: 18 MG/DL (ref 8–23)
BUN SERPL-MCNC: 18 MG/DL (ref 8–23)
CALCIUM SERPL-MCNC: 10.2 MG/DL (ref 8.7–10.5)
CALCIUM SERPL-MCNC: 10.2 MG/DL (ref 8.7–10.5)
CHLORIDE SERPL-SCNC: 104 MMOL/L (ref 95–110)
CHLORIDE SERPL-SCNC: 104 MMOL/L (ref 95–110)
CHOLEST SERPL-MCNC: 147 MG/DL (ref 120–199)
CHOLEST/HDLC SERPL: 2.9 {RATIO} (ref 2–5)
CO2 SERPL-SCNC: 26 MMOL/L (ref 23–29)
CO2 SERPL-SCNC: 26 MMOL/L (ref 23–29)
CREAT SERPL-MCNC: 1 MG/DL (ref 0.5–1.4)
CREAT SERPL-MCNC: 1 MG/DL (ref 0.5–1.4)
DIFFERENTIAL METHOD: ABNORMAL
EOSINOPHIL # BLD AUTO: 0.6 K/UL (ref 0–0.5)
EOSINOPHIL NFR BLD: 7.8 % (ref 0–8)
ERYTHROCYTE [DISTWIDTH] IN BLOOD BY AUTOMATED COUNT: 12.7 % (ref 11.5–14.5)
EST. GFR  (NO RACE VARIABLE): 58 ML/MIN/1.73 M^2
EST. GFR  (NO RACE VARIABLE): 58 ML/MIN/1.73 M^2
ESTIMATED AVG GLUCOSE: 103 MG/DL (ref 68–131)
GLUCOSE SERPL-MCNC: 113 MG/DL (ref 70–110)
GLUCOSE SERPL-MCNC: 113 MG/DL (ref 70–110)
HBA1C MFR BLD: 5.2 % (ref 4–5.6)
HCT VFR BLD AUTO: 32.4 % (ref 37–48.5)
HDLC SERPL-MCNC: 50 MG/DL (ref 40–75)
HDLC SERPL: 34 % (ref 20–50)
HGB BLD-MCNC: 10.4 G/DL (ref 12–16)
IMM GRANULOCYTES # BLD AUTO: 0.01 K/UL (ref 0–0.04)
IMM GRANULOCYTES NFR BLD AUTO: 0.1 % (ref 0–0.5)
LDLC SERPL CALC-MCNC: 77.2 MG/DL (ref 63–159)
LYMPHOCYTES # BLD AUTO: 2.1 K/UL (ref 1–4.8)
LYMPHOCYTES NFR BLD: 26.3 % (ref 18–48)
MAGNESIUM SERPL-MCNC: 2.1 MG/DL (ref 1.6–2.6)
MCH RBC QN AUTO: 31.2 PG (ref 27–31)
MCHC RBC AUTO-ENTMCNC: 32.1 G/DL (ref 32–36)
MCV RBC AUTO: 97 FL (ref 82–98)
MONOCYTES # BLD AUTO: 1.1 K/UL (ref 0.3–1)
MONOCYTES NFR BLD: 13.7 % (ref 4–15)
NEUTROPHILS # BLD AUTO: 4 K/UL (ref 1.8–7.7)
NEUTROPHILS NFR BLD: 51.2 % (ref 38–73)
NONHDLC SERPL-MCNC: 97 MG/DL
NRBC BLD-RTO: 0 /100 WBC
PHOSPHATE SERPL-MCNC: 2.8 MG/DL (ref 2.7–4.5)
PLATELET # BLD AUTO: 349 K/UL (ref 150–450)
PMV BLD AUTO: 9.4 FL (ref 9.2–12.9)
POTASSIUM SERPL-SCNC: 4.6 MMOL/L (ref 3.5–5.1)
POTASSIUM SERPL-SCNC: 4.6 MMOL/L (ref 3.5–5.1)
PROT SERPL-MCNC: 7 G/DL (ref 6–8.4)
RBC # BLD AUTO: 3.33 M/UL (ref 4–5.4)
SODIUM SERPL-SCNC: 140 MMOL/L (ref 136–145)
SODIUM SERPL-SCNC: 140 MMOL/L (ref 136–145)
TRIGL SERPL-MCNC: 99 MG/DL (ref 30–150)
TSH SERPL DL<=0.005 MIU/L-ACNC: 0.51 UIU/ML (ref 0.4–4)
WBC # BLD AUTO: 7.79 K/UL (ref 3.9–12.7)

## 2023-06-30 PROCEDURE — 80069 RENAL FUNCTION PANEL: CPT | Performed by: INTERNAL MEDICINE

## 2023-06-30 PROCEDURE — 36415 COLL VENOUS BLD VENIPUNCTURE: CPT | Performed by: INTERNAL MEDICINE

## 2023-06-30 PROCEDURE — 83735 ASSAY OF MAGNESIUM: CPT | Performed by: INTERNAL MEDICINE

## 2023-06-30 PROCEDURE — 84100 ASSAY OF PHOSPHORUS: CPT | Performed by: INTERNAL MEDICINE

## 2023-06-30 PROCEDURE — 84443 ASSAY THYROID STIM HORMONE: CPT | Performed by: INTERNAL MEDICINE

## 2023-06-30 PROCEDURE — 83036 HEMOGLOBIN GLYCOSYLATED A1C: CPT | Performed by: INTERNAL MEDICINE

## 2023-06-30 PROCEDURE — 80053 COMPREHEN METABOLIC PANEL: CPT | Performed by: INTERNAL MEDICINE

## 2023-06-30 PROCEDURE — 80061 LIPID PANEL: CPT | Performed by: INTERNAL MEDICINE

## 2023-06-30 PROCEDURE — 85025 COMPLETE CBC W/AUTO DIFF WBC: CPT | Performed by: INTERNAL MEDICINE

## 2023-06-30 RX ORDER — HYDRALAZINE HYDROCHLORIDE 25 MG/1
25 TABLET, FILM COATED ORAL EVERY 8 HOURS
Qty: 90 TABLET | Refills: 2 | Status: SHIPPED | OUTPATIENT
Start: 2023-06-30 | End: 2023-07-18

## 2023-06-30 RX ORDER — HYDROXYZINE HYDROCHLORIDE 25 MG/1
TABLET, FILM COATED ORAL
Qty: 30 TABLET | Refills: 2 | OUTPATIENT
Start: 2023-06-30

## 2023-07-02 ENCOUNTER — PATIENT OUTREACH (OUTPATIENT)
Dept: ADMINISTRATIVE | Facility: OTHER | Age: 78
End: 2023-07-02
Payer: MEDICARE

## 2023-07-02 ENCOUNTER — PATIENT MESSAGE (OUTPATIENT)
Dept: FAMILY MEDICINE | Facility: CLINIC | Age: 78
End: 2023-07-02
Payer: MEDICARE

## 2023-07-02 ENCOUNTER — TELEPHONE (OUTPATIENT)
Dept: ADMINISTRATIVE | Facility: CLINIC | Age: 78
End: 2023-07-02
Payer: MEDICARE

## 2023-07-02 NOTE — PROGRESS NOTES
CHW - Initial Contact    This Community Health Worker verified the Social Determinant of Health questionnaire with patient via telephone today.    Pt identified barriers of most importance are: food insecurities and transportation issues.   Referrals to community agencies completed with patient/caregiver consent outside of [facility] include: none at this time.  Referrals were put through [facility] - yes: [facility],  Support and Services: has no support at this time.  Other information discussed the patient needs / wants help with: Patient has food insecurities and transportation issues, will follow up in one week.   Follow up required: yes.  Follow-up Outreach - Due: 7/8/2023

## 2023-07-02 NOTE — PROGRESS NOTES
CHW - Initial Contact    This Community Health Worker completed the Social Determinant of Health questionnaire with patient via telephone today.    Pt identified barriers of most importance are: food insecurities and utility bill payment issues.   Referrals to community agencies completed with patient/caregiver consent outside of [facility] include: none at this time.  Referrals were put through [facility] - yes: [facility], [facility].  Support and Services: No support & services have been documented.  Other information discussed the patient needs / wants help with: Patient has food insecurities and transportation issues, will follow up in one week on CHW platform.   Follow up required: yes.  No future outreach task assigned

## 2023-07-05 ENCOUNTER — TELEPHONE (OUTPATIENT)
Dept: FAMILY MEDICINE | Facility: CLINIC | Age: 78
End: 2023-07-05
Payer: MEDICARE

## 2023-07-05 ENCOUNTER — HOSPITAL ENCOUNTER (OUTPATIENT)
Dept: WOUND CARE | Facility: HOSPITAL | Age: 78
Discharge: HOME | End: 2023-07-05
Attending: SURGERY
Payer: MEDICARE

## 2023-07-05 VITALS
WEIGHT: 171 LBS | HEIGHT: 64 IN | SYSTOLIC BLOOD PRESSURE: 176 MMHG | TEMPERATURE: 97 F | BODY MASS INDEX: 29.19 KG/M2 | RESPIRATION RATE: 18 BRPM | HEART RATE: 72 BPM | DIASTOLIC BLOOD PRESSURE: 79 MMHG

## 2023-07-05 DIAGNOSIS — S81.819A SKIN TEAR OF LOWER LEG WITHOUT COMPLICATION, INITIAL ENCOUNTER: Primary | ICD-10-CM

## 2023-07-05 DIAGNOSIS — S81.812D SKIN TEAR OF LEFT LOWER LEG WITHOUT COMPLICATION, SUBSEQUENT ENCOUNTER: ICD-10-CM

## 2023-07-05 DIAGNOSIS — I25.10 CORONARY ARTERY DISEASE INVOLVING NATIVE CORONARY ARTERY OF NATIVE HEART WITHOUT ANGINA PECTORIS: ICD-10-CM

## 2023-07-05 PROCEDURE — 87070 CULTURE OTHR SPECIMN AEROBIC: CPT | Performed by: SURGERY

## 2023-07-05 PROCEDURE — 11045 DBRDMT SUBQ TISS EACH ADDL: CPT

## 2023-07-05 PROCEDURE — 11042 DBRDMT SUBQ TIS 1ST 20SQCM/<: CPT

## 2023-07-05 PROCEDURE — 97597 DBRDMT OPN WND 1ST 20 CM/<: CPT

## 2023-07-05 PROCEDURE — 87075 CULTR BACTERIA EXCEPT BLOOD: CPT | Performed by: SURGERY

## 2023-07-05 PROCEDURE — 87186 SC STD MICRODIL/AGAR DIL: CPT | Performed by: SURGERY

## 2023-07-05 PROCEDURE — 87076 CULTURE ANAEROBE IDENT EACH: CPT | Performed by: SURGERY

## 2023-07-05 PROCEDURE — 87077 CULTURE AEROBIC IDENTIFY: CPT | Performed by: SURGERY

## 2023-07-05 RX ORDER — FAMCICLOVIR 500 MG/1
500 TABLET ORAL 3 TIMES DAILY
Qty: 21 TABLET | Refills: 0 | Status: SHIPPED | OUTPATIENT
Start: 2023-07-05 | End: 2023-07-12

## 2023-07-05 RX ORDER — CLOTRIMAZOLE AND BETAMETHASONE DIPROPIONATE 10; .64 MG/G; MG/G
CREAM TOPICAL 2 TIMES DAILY
Qty: 45 G | Refills: 1 | Status: SHIPPED | OUTPATIENT
Start: 2023-07-05 | End: 2023-09-11 | Stop reason: SDUPTHER

## 2023-07-05 NOTE — PROCEDURES
"Debridement    Date/Time: 7/5/2023 8:00 AM  Performed by: Elsie Gustafson MD  Authorized by: Elsie Gustafson MD     Time out: Immediately prior to procedure a "time out" was called to verify the correct patient, procedure, equipment, support staff and site/side marked as required.    Consent Done?:  Yes (Verbal)    Preparation: Patient was prepped and draped with clean technique    Local anesthesia used?: Yes    Local anesthetic:  Topical anesthetic    Wound Details:    Location:  Left leg    Type of Debridement:  Excisional       Length (cm):  7       Area (sq cm):  35       Width (cm):  5       Percent Debrided (%):  100       Depth (cm):  0.2       Total Area Debrided (sq cm):  35    Depth of debridement:  Subcutaneous tissue    Tissue debrided:  Subcutaneous    Devitalized tissue debrided:  Clots, Exudate, Fibrin and Biofilm    Instruments:  Curette, Forceps and Scissors  Bleeding:  Minimal  Hemostasis Achieved: Yes  Method Used:  Pressure and Silver Nitrate  Patient tolerance:  Patient tolerated the procedure well with no immediate complications  1st Wound Pain Assessment: 2  "

## 2023-07-06 ENCOUNTER — OFFICE VISIT (OUTPATIENT)
Dept: PRIMARY CARE CLINIC | Facility: CLINIC | Age: 78
End: 2023-07-06
Payer: MEDICARE

## 2023-07-06 VITALS
TEMPERATURE: 98 F | BODY MASS INDEX: 28.99 KG/M2 | DIASTOLIC BLOOD PRESSURE: 72 MMHG | HEART RATE: 83 BPM | WEIGHT: 168.88 LBS | OXYGEN SATURATION: 95 % | SYSTOLIC BLOOD PRESSURE: 127 MMHG

## 2023-07-06 DIAGNOSIS — R55 SYNCOPE AND COLLAPSE: Primary | ICD-10-CM

## 2023-07-06 DIAGNOSIS — I35.0 NONRHEUMATIC AORTIC VALVE STENOSIS: ICD-10-CM

## 2023-07-06 DIAGNOSIS — I65.22 STENOSIS OF LEFT CAROTID ARTERY: ICD-10-CM

## 2023-07-06 DIAGNOSIS — L97.911: ICD-10-CM

## 2023-07-06 PROCEDURE — 1160F PR REVIEW ALL MEDS BY PRESCRIBER/CLIN PHARMACIST DOCUMENTED: ICD-10-PCS | Mod: CPTII,S$GLB,, | Performed by: INTERNAL MEDICINE

## 2023-07-06 PROCEDURE — 3078F DIAST BP <80 MM HG: CPT | Mod: CPTII,S$GLB,, | Performed by: INTERNAL MEDICINE

## 2023-07-06 PROCEDURE — 1160F RVW MEDS BY RX/DR IN RCRD: CPT | Mod: CPTII,S$GLB,, | Performed by: INTERNAL MEDICINE

## 2023-07-06 PROCEDURE — 1159F MED LIST DOCD IN RCRD: CPT | Mod: CPTII,S$GLB,, | Performed by: INTERNAL MEDICINE

## 2023-07-06 PROCEDURE — 1101F PT FALLS ASSESS-DOCD LE1/YR: CPT | Mod: CPTII,S$GLB,, | Performed by: INTERNAL MEDICINE

## 2023-07-06 PROCEDURE — 99999 PR PBB SHADOW E&M-EST. PATIENT-LVL V: ICD-10-PCS | Mod: PBBFAC,,, | Performed by: INTERNAL MEDICINE

## 2023-07-06 PROCEDURE — 3288F PR FALLS RISK ASSESSMENT DOCUMENTED: ICD-10-PCS | Mod: CPTII,S$GLB,, | Performed by: INTERNAL MEDICINE

## 2023-07-06 PROCEDURE — 3288F FALL RISK ASSESSMENT DOCD: CPT | Mod: CPTII,S$GLB,, | Performed by: INTERNAL MEDICINE

## 2023-07-06 PROCEDURE — 99214 OFFICE O/P EST MOD 30 MIN: CPT | Mod: S$GLB,,, | Performed by: INTERNAL MEDICINE

## 2023-07-06 PROCEDURE — 1101F PR PT FALLS ASSESS DOC 0-1 FALLS W/OUT INJ PAST YR: ICD-10-PCS | Mod: CPTII,S$GLB,, | Performed by: INTERNAL MEDICINE

## 2023-07-06 PROCEDURE — 99214 PR OFFICE/OUTPT VISIT, EST, LEVL IV, 30-39 MIN: ICD-10-PCS | Mod: S$GLB,,, | Performed by: INTERNAL MEDICINE

## 2023-07-06 PROCEDURE — 1159F PR MEDICATION LIST DOCUMENTED IN MEDICAL RECORD: ICD-10-PCS | Mod: CPTII,S$GLB,, | Performed by: INTERNAL MEDICINE

## 2023-07-06 PROCEDURE — 3078F PR MOST RECENT DIASTOLIC BLOOD PRESSURE < 80 MM HG: ICD-10-PCS | Mod: CPTII,S$GLB,, | Performed by: INTERNAL MEDICINE

## 2023-07-06 PROCEDURE — 99999 PR PBB SHADOW E&M-EST. PATIENT-LVL V: CPT | Mod: PBBFAC,,, | Performed by: INTERNAL MEDICINE

## 2023-07-06 PROCEDURE — 3074F SYST BP LT 130 MM HG: CPT | Mod: CPTII,S$GLB,, | Performed by: INTERNAL MEDICINE

## 2023-07-06 PROCEDURE — 1125F PR PAIN SEVERITY QUANTIFIED, PAIN PRESENT: ICD-10-PCS | Mod: CPTII,S$GLB,, | Performed by: INTERNAL MEDICINE

## 2023-07-06 PROCEDURE — 1125F AMNT PAIN NOTED PAIN PRSNT: CPT | Mod: CPTII,S$GLB,, | Performed by: INTERNAL MEDICINE

## 2023-07-06 PROCEDURE — 3074F PR MOST RECENT SYSTOLIC BLOOD PRESSURE < 130 MM HG: ICD-10-PCS | Mod: CPTII,S$GLB,, | Performed by: INTERNAL MEDICINE

## 2023-07-06 NOTE — PROGRESS NOTES
[facility]   New Visit Progress Note   Recent Hospital Discharge     PRESENTING HISTORY     Chief Complaint/Reason for Admission:  Follow up Hospital Discharge   PCP: Lauren Brown MD    History of Present Illness:  Ms. Enedelia García is a 78 y.o. female who was recently admitted to the hospital.    [facility]  Discharge Summary        Patient Name: Enedelia García  MRN: [id number]  JENNY: [id number]  Patient Class: OP- Observation  Admission Date: 6/22/2023  Hospital Length of Stay: 0 days  Discharge Date and Time: 6/24/2023  3:42 PM  Attending Physician: No att. providers found   Discharging Provider: Yohana Teran MD  Primary Care Provider: Lauren Brown MD  ___________________________________________________________________    Today:  [facility] for initial hospital follow up.  Recently hospitalized for evaluation of syncope.   Unclear if true loss of consciousness.   Patient admitted to [facility] Medicine service.  EKG with sinus bradycardia in 50's-> home dose Metoprolol held.   Carotid Artery US with Left internal carotid artery occlusion-> continue statin therapy.   2 D echo with worsening of known aortic stenosis.   Seen in consultation with Cardiology team who recommended outpatient evaluation for aortic valve replacement.   Patient discharged to home.  Home Health orders sent to [facility] for processing.     Patient accompanied today by family.  Did not bring medication bottles for review.  Ambulatory with rolling walker.  Has [facility] with skilled nursing, wound care, and therapy.  Attending wound care clinic for leg wounds sustained during syncopal event.  Bactroban and Bactrim started by wound care team.   No further syncopal episodes since hospital discharge.     Review of Systems  General ROS: negative for chills, fever or weight loss  Psychological ROS: negative for hallucination, depression or suicidal ideation  Ophthalmic ROS:  negative for blurry vision, photophobia or eye pain  ENT ROS: negative for epistaxis, sore throat or rhinorrhea  Respiratory ROS: no cough, shortness of breath, or wheezing  Cardiovascular ROS: no chest pain or dyspnea on exertion  Gastrointestinal ROS: no abdominal pain, change in bowel habits, or black/ bloody stools  Genito-Urinary ROS: no dysuria, trouble voiding, or hematuria  Musculoskeletal ROS: negative for gait disturbance or muscular weakness  Neurological ROS: no syncope or seizures; no ataxia  + Rash, pain along dermatone right breast   Dermatological ROS: negative for pruritis, rash and jaundice      PAST HISTORY:     Past Medical History:   Diagnosis Date    CKD (chronic kidney disease) stage 4, GFR 15-29 ml/min     Coronary artery disease     Edema     Epilepsy     Hematuria, unspecified     Hematuria, unspecified     Hyperlipidemia     Hypertension     Iron deficiency anemia     Normocytic anemia        Past Surgical History:   Procedure Laterality Date    APPENDECTOMY      BACK SURGERY      CORONARY STENT PLACEMENT      HYSTERECTOMY      REVISION OF KNEE ARTHROPLASTY Left 7/18/2022    Procedure: REVISION, ARTHROPLASTY, KNEE;  Surgeon: Stan Catalan MD;  Location: [facility];  Service: Orthopedics;  Laterality: Left;    TONSILLECTOMY         Family History   Problem Relation Age of Onset    Heart disease Mother     Heart disease Father          MEDICATIONS & ALLERGIES:     Current Outpatient Medications on File Prior to Visit   Medication Sig Dispense Refill    acetaminophen (TYLENOL) 500 MG tablet Take 500 mg by mouth every 6 (six) hours as needed for Pain.      AIMOVIG AUTOINJECTOR 70 mg/mL autoinjector Inject 70 mg into the skin as needed.      aspirin (ECOTRIN) 81 MG EC tablet Take 81 mg by mouth once daily.      butalbital-acetaminophen-caffeine -40 mg (FIORICET, ESGIC) -40 mg per tablet Take 1 tablet by mouth every 8 (eight) hours as needed.      calcium carbonate/vitamin D3 (VITAMIN D-3  ORAL) Take 1 tablet by mouth once daily.      clotrimazole-betamethasone 1-0.05% (LOTRISONE) cream Apply topically 2 (two) times daily. 45 g 1    co-enzyme Q-10 30 mg capsule Take 30 mg by mouth once daily.      DULoxetine (CYMBALTA) 60 MG capsule Take 1 capsule (60 mg total) by mouth once daily. 90 capsule 1    famciclovir (FAMVIR) 500 MG tablet Take 1 tablet (500 mg total) by mouth 3 (three) times daily. for 7 days 21 tablet 0    FEROSUL 325 mg (65 mg iron) Tab tablet Take 325 mg by mouth once daily.      furosemide (LASIX) 40 MG tablet Take 2 tablets (80 mg total) by mouth 2 (two) times daily as needed (swelling). (Patient taking differently: Take 40 mg by mouth once daily.) 360 tablet 3    gabapentin (NEURONTIN) 600 MG tablet Take 600 mg by mouth 3 (three) times daily.      galcanezumab-gnlm 120 mg/mL PnIj Inject 120 mg into the skin every 28 days. maintenance dose 1 each 11    hydrALAZINE (APRESOLINE) 25 MG tablet Take 1 tablet (25 mg total) by mouth every 8 (eight) hours. 90 tablet 2    hydrOXYzine HCL (ATARAX) 25 MG tablet TAKE ONE TABLET BY MOUTH EVERY 6 HOURS AS NEEDED FOR ITCHING (Patient taking differently: Take 25 mg by mouth every 6 (six) hours as needed.) 30 tablet 2    irbesartan (AVAPRO) 300 MG tablet Take 1 tablet (300 mg total) by mouth every evening. 30 tablet 30    isosorbide mononitrate (IMDUR) 60 MG 24 hr tablet Take 1 tablet (60 mg total) by mouth once daily. 90 tablet 1    LINZESS 72 mcg Cap capsule TAKE ONE CAPSULE BY MOUTH BEFORE BREAKFAST 30 capsule 3    multivit-min-iron-FA-lutein (CENTRUM SILVER WOMEN) 8 mg iron-400 mcg-300 mcg Tab Take 1 tablet by mouth once daily.      nitroGLYCERIN (NITROSTAT) 0.3 MG SL tablet PLACE 1 TABLET UNDER THE TONGUE EVERY 5 MINUTES FOR UP TO 3 DOSES IF NEEDED FOR CHEST PAIN. CALL 911 IF PAIN PERSISTS 25 tablet 0    omega-3 fatty acids/fish oil (FISH OIL-OMEGA-3 FATTY ACIDS) 300-1,000 mg capsule Take 2 capsules by mouth once daily.      omeprazole (PRILOSEC)  20 MG capsule Take 20 mg by mouth once daily.      oxyCODONE (ROXICODONE) 10 mg Tab immediate release tablet Take 10 mg by mouth 4 (four) times daily.      rosuvastatin (CRESTOR) 40 MG Tab Take 1 tablet (40 mg total) by mouth once daily. 90 tablet 3    tiZANidine (ZANAFLEX) 4 MG tablet Take 1 tablet (4 mg total) by mouth every 8 (eight) hours as needed (spasms). 60 tablet 2     No current facility-administered medications on file prior to visit.        Review of patient's allergies indicates:   Allergen Reactions    Iodinated contrast media Anaphylaxis and Other (See Comments)    Nsaids (non-steroidal anti-inflammatory drug)      ADWOA    Phenergan [promethazine]      Vomiting       OBJECTIVE:     Vital Signs:  /72 (BP Location: Right arm, Patient Position: Sitting, BP Method: Medium (Automatic))   Pulse 83   Temp 98.2 °F (36.8 °C) (Oral)   Wt 76.6 kg (168 lb 14 oz)   SpO2 95%   BMI 28.99 kg/m²   Wt Readings from Last 3 Encounters:   07/06/23 1337 76.6 kg (168 lb 14 oz)   07/05/23 0832 77.6 kg (171 lb)   07/03/23 1346 77.6 kg (171 lb)     Body mass index is 28.99 kg/m².        Physical Exam:  /72 (BP Location: Right arm, Patient Position: Sitting, BP Method: Medium (Automatic))   Pulse 83   Temp 98.2 °F (36.8 °C) (Oral)   Wt 76.6 kg (168 lb 14 oz)   SpO2 95%   BMI 28.99 kg/m²   General appearance: alert, cooperative, no distress  Constitutional:Oriented to person, place, and time  + appears well-developed and well-nourished.   HEENT: Normocephalic, atraumatic, neck symmetrical, no nasal discharge   Eyes: conjunctivae/corneas clear, PERRL, EOM's intact  Lungs: clear to auscultation bilaterally, no dullness to percussion bilaterally  Heart: regular rate and rhythm without rub; no displacement of the PMI   Abdomen: soft, non-tender; bowel sounds normoactive; no organomegaly  Extremities: extremities symmetric; no clubbing, cyanosis, or edema  + left leg in wound dressing   Integument: Skin color,  texture, turgor normal; no rashes; hair distrubution normal  + rash right breast   Neurologic: Alert and oriented X 3, normal strength, normal coordination and gait  Psychiatric: no pressured speech; normal affect; no evidence of impaired cognition     Laboratory  Lab Results   Component Value Date    WBC 7.79 06/30/2023    HGB 10.4 (L) 06/30/2023    HCT 32.4 (L) 06/30/2023    MCV 97 06/30/2023     06/30/2023     BMP  Lab Results   Component Value Date     06/30/2023     06/30/2023    K 4.6 06/30/2023    K 4.6 06/30/2023     06/30/2023     06/30/2023    CO2 26 06/30/2023    CO2 26 06/30/2023    BUN 18 06/30/2023    BUN 18 06/30/2023    CREATININE 1.0 06/30/2023    CREATININE 1.0 06/30/2023    CALCIUM 10.2 06/30/2023    CALCIUM 10.2 06/30/2023    ANIONGAP 10 06/30/2023    ANIONGAP 10 06/30/2023    EGFRNORACEVR 58 (A) 06/30/2023    EGFRNORACEVR 58 (A) 06/30/2023     Lab Results   Component Value Date    ALT 20 06/30/2023    AST 26 06/30/2023    ALKPHOS 67 06/30/2023    BILITOT 0.4 06/30/2023     Lab Results   Component Value Date    INR 1.0 06/29/2022    INR 1.0 03/10/2022    INR 1.0 11/18/2013     Lab Results   Component Value Date    HGBA1C 5.2 06/30/2023       Diagnostic Results:    US Carotid bilateral 6/22/23:  Abnormally low velocities within the left cervical ICA with parvus tardus waveforms in the left ICA.  CTA of the neck may be obtained to look for possible proximal stenosis.  No hemodynamically significant stenosis of the right carotid system.  Normal directional flow within the bilateral vertebral arteries.    2 D echo 6/27/23:  There is severe aortic valve stenosis.  Aortic valve area is 0.97 cm2; peak velocity is 3.6 m/s; mean gradient is 35 mmHg.  Mild aortic regurgitation.  Concentric remodeling and normal systolic function.  The estimated ejection fraction is 70%.  Grade II left ventricular diastolic dysfunction.  Mild left atrial enlargement.  The estimated PA systolic  pressure is 48 mmHg (mild to moderate pulmonary hypertension).  With normal right ventricular systolic function.  Mild tricuspid regurgitation.  Normal central venous pressure (3 mmHg).      ASSESSMENT & PLAN:       Syncope and collapse  - thought 2/2 to worsening aortic valve stenosis in combination with left carotid artery occlusion leading to decreased cerebral perfusion  - education and counseling provided    Stenosis of left carotid artery  - continue statin therapy    Nonrheumatic aortic valve stenosis  - see Cardiology team 7/18/23     Traumatic ulcer of right lower leg, limited to breakdown of skin  - continue home health wound care and see [facility] 7/10/23  - continue Bactrim and Bactroban as prescribed     Rash R breast  - PCP previously examined, concern for shingles , patient on Famvir       Patient will be released from hospital follow up clinic.  She will see her PCP, Dr Brown, 10/19/23.     Instructions for the patient:      Scheduled Follow-up :  Future Appointments   Date Time Provider Department Center   7/10/2023 11:00 AM Pineda Farias MD [facility] W[facility]   7/18/2023 10:45 AM Patricia Rogers MD [facility]   10/19/2023  9:30 AM Lauren Brown MD [facility] FAM MED [facility]   1/8/2024 12:00 PM Althea Allen MD [facility]       Post Visit Medication List:     Medication List            Accurate as of July 6, 2023  3:16 PM. If you have any questions, ask your nurse or doctor.                CHANGE how you take these medications      furosemide 40 MG tablet  Commonly known as: LASIX  Take 2 tablets (80 mg total) by mouth 2 (two) times daily as needed (swelling).  What changed:   how much to take  when to take this     hydrOXYzine HCL 25 MG tablet  Commonly known as: ATARAX  TAKE ONE TABLET BY MOUTH EVERY 6 HOURS AS NEEDED FOR ITCHING  What changed:   how much to take  how to take this  when to take this  reasons to take this  additional  instructions            CONTINUE taking these medications      acetaminophen 500 MG tablet  Commonly known as: TYLENOL     AIMOVIG AUTOINJECTOR 70 mg/mL autoinjector  Generic drug: erenumab-aooe     aspirin 81 MG EC tablet  Commonly known as: ECOTRIN     butalbital-acetaminophen-caffeine -40 mg -40 mg per tablet  Commonly known as: FIORICET, ESGIC     CENTRUM SILVER WOMEN 8 mg iron-400 mcg-300 mcg Tab  Generic drug: multivit-min-iron-FA-lutein     clotrimazole-betamethasone 1-0.05% cream  Commonly known as: LOTRISONE  Apply topically 2 (two) times daily.     co-enzyme Q-10 30 mg capsule     DULoxetine 60 MG capsule  Commonly known as: CYMBALTA  Take 1 capsule (60 mg total) by mouth once daily.     famciclovir 500 MG tablet  Commonly known as: FAMVIR  Take 1 tablet (500 mg total) by mouth 3 (three) times daily. for 7 days     FeroSuL 325 mg (65 mg iron) Tab tablet  Generic drug: ferrous sulfate     fish oil-omega-3 fatty acids 300-1,000 mg capsule     gabapentin 600 MG tablet  Commonly known as: NEURONTIN     galcanezumab-gnlm 120 mg/mL Pnij  Inject 120 mg into the skin every 28 days. maintenance dose     hydrALAZINE 25 MG tablet  Commonly known as: APRESOLINE  Take 1 tablet (25 mg total) by mouth every 8 (eight) hours.     irbesartan 300 MG tablet  Commonly known as: AVAPRO  Take 1 tablet (300 mg total) by mouth every evening.     isosorbide mononitrate 60 MG 24 hr tablet  Commonly known as: IMDUR  Take 1 tablet (60 mg total) by mouth once daily.     LINZESS 72 mcg Cap capsule  Generic drug: linaCLOtide  TAKE ONE CAPSULE BY MOUTH BEFORE BREAKFAST     nitroGLYCERIN 0.3 MG SL tablet  Commonly known as: NITROSTAT  PLACE 1 TABLET UNDER THE TONGUE EVERY 5 MINUTES FOR UP TO 3 DOSES IF NEEDED FOR CHEST PAIN. CALL 911 IF PAIN PERSISTS     omeprazole 20 MG capsule  Commonly known as: PRILOSEC     oxyCODONE 10 mg Tab immediate release tablet  Commonly known as: ROXICODONE     rosuvastatin 40 MG Tab  Commonly known  as: CRESTOR  Take 1 tablet (40 mg total) by mouth once daily.     tiZANidine 4 MG tablet  Commonly known as: ZANAFLEX  Take 1 tablet (4 mg total) by mouth every 8 (eight) hours as needed (spasms).     VITAMIN D-3 ORAL              Signing Physician:  Margie Zamora MD

## 2023-07-07 ENCOUNTER — PATIENT OUTREACH (OUTPATIENT)
Dept: ADMINISTRATIVE | Facility: OTHER | Age: 78
End: 2023-07-07
Payer: MEDICARE

## 2023-07-07 ENCOUNTER — TELEPHONE (OUTPATIENT)
Dept: ADMINISTRATIVE | Facility: CLINIC | Age: 78
End: 2023-07-07
Payer: MEDICARE

## 2023-07-07 DIAGNOSIS — F43.21 ADJUSTMENT DISORDER WITH DEPRESSED MOOD: ICD-10-CM

## 2023-07-07 DIAGNOSIS — I10 PRIMARY HYPERTENSION: ICD-10-CM

## 2023-07-07 RX ORDER — HYDROXYZINE HYDROCHLORIDE 25 MG/1
TABLET, FILM COATED ORAL
Qty: 30 TABLET | Refills: 2 | Status: SHIPPED | OUTPATIENT
Start: 2023-07-07 | End: 2023-08-28 | Stop reason: SDUPTHER

## 2023-07-07 RX ORDER — ISOSORBIDE MONONITRATE 60 MG/1
60 TABLET, EXTENDED RELEASE ORAL DAILY
Qty: 90 TABLET | Refills: 1 | Status: SHIPPED | OUTPATIENT
Start: 2023-07-07 | End: 2023-08-28 | Stop reason: SDUPTHER

## 2023-07-07 NOTE — PROGRESS NOTES
Pt is already being followed by NIRALI Pearson. Informed pt to continue working with Lili unless there was something different needed today. Follow up is due on 07/08/2023.

## 2023-07-07 NOTE — TELEPHONE ENCOUNTER
No care due was identified.  [facility] Embedded Care Due Messages. Reference number: [id number].   7/07/2023 3:40:10 PM CDT

## 2023-07-07 NOTE — PROGRESS NOTES
Pt is already being followed by NIRALI Pearson. Informed pt to continue working with Lili unless there was something different needed today.

## 2023-07-09 LAB — BACTERIA SPEC AEROBE CULT: ABNORMAL

## 2023-07-10 ENCOUNTER — HOSPITAL ENCOUNTER (OUTPATIENT)
Dept: WOUND CARE | Facility: HOSPITAL | Age: 78
Discharge: HOME | End: 2023-07-10
Attending: PREVENTIVE MEDICINE
Payer: MEDICARE

## 2023-07-10 VITALS
BODY MASS INDEX: 30.05 KG/M2 | DIASTOLIC BLOOD PRESSURE: 74 MMHG | SYSTOLIC BLOOD PRESSURE: 144 MMHG | WEIGHT: 176 LBS | HEIGHT: 64 IN | HEART RATE: 86 BPM

## 2023-07-10 DIAGNOSIS — T14.8XXA SKIN AVULSION: ICD-10-CM

## 2023-07-10 DIAGNOSIS — S81.812D SKIN TEAR OF LEFT LOWER LEG WITHOUT COMPLICATION, SUBSEQUENT ENCOUNTER: Primary | ICD-10-CM

## 2023-07-10 DIAGNOSIS — S80.12XD HEMATOMA OF LEG, LEFT, SUBSEQUENT ENCOUNTER: ICD-10-CM

## 2023-07-10 PROBLEM — S81.812A SKIN TEAR OF LEFT LOWER LEG WITHOUT COMPLICATION: Status: ACTIVE | Noted: 2023-06-30

## 2023-07-10 LAB — BACTERIA SPEC ANAEROBE CULT: ABNORMAL

## 2023-07-10 PROCEDURE — 99213 OFFICE O/P EST LOW 20 MIN: CPT

## 2023-07-10 NOTE — PROGRESS NOTES
[facility]    Subjective:       Patient ID: Enedelia García is a 78 y.o. female.    Chief Complaint: Non-healing Wound (Left leg)    Measurements improved. Mupirocin d/c'd. Continue POC. Continue Bactrim. Orders sent to .  Review of Systems   All other systems reviewed and are negative.      Objective:     Vitals:    07/10/23 1109   BP: (!) 144/74   Pulse: 86         Physical Exam       Altered Skin Integrity 06/26/23 1300 Left anterior;lower Leg Traumatic Full thickness tissue loss. Subcutaneous fat may be visible but bone, tendon or muscle are not exposed (Active)   06/26/23 1300   Altered Skin Integrity Present on Admission - Did Patient arrive to the hospital with altered skin?: yes   Side: Left   Orientation: anterior;lower   Location: Leg   Wound Number:    Is this injury device related?: No   Primary Wound Type: Traumatic   Description of Altered Skin Integrity: Full thickness tissue loss. Subcutaneous fat may be visible but bone, tendon or muscle are not exposed   Ankle-Brachial Index:    Pulses:    Removal Indication and Assessment:    Wound Outcome:    (Retired) Wound Length (cm):    (Retired) Wound Width (cm):    (Retired) Depth (cm):    Wound Description (Comments):    Removal Indications:    Wound Image   07/10/23 1119   Dressing Appearance Moist drainage 07/10/23 1119   Drainage Amount Small 07/10/23 1119   Drainage Characteristics/Odor Serosanguineous 07/10/23 1119   Appearance Red;Moist 07/10/23 1119   Tissue loss description Full thickness 07/10/23 1119   Red (%), Wound Tissue Color 100 % 07/10/23 1119   Periwound Area Dry 07/10/23 1119   Wound Edges Irregular 07/10/23 1119   Wound Length (cm) 11 cm 07/10/23 1119   Wound Width (cm) 8.5 cm 07/10/23 1119   Wound Depth (cm) 0.1 cm 07/10/23 1119   Wound Volume (cm^3) 9.35 cm^3 07/10/23 1119   Wound Surface Area (cm^2) 93.5 cm^2 07/10/23 1119   Care Cleansed with:;Sterile normal saline 07/10/23  1119   Dressing Applied;Non-adherent;Rolled gauze;Tubular bandage;Absorptive Pad;Other (comment) 07/10/23 1119   Periwound Care Absorptive dressing applied 07/10/23 1119   Compression Tubular elasticized bandage 07/10/23 1119   Dressing Change Due 07/12/23 07/10/23 1119            Altered Skin Integrity 06/26/23 1300 Left lower;lateral Leg Traumatic (Active)   06/26/23 1300   Altered Skin Integrity Present on Admission - Did Patient arrive to the hospital with altered skin?:    Side: Left   Orientation: lower;lateral   Location: Leg   Wound Number:    Is this injury device related?:    Primary Wound Type: Traumatic   Description of Altered Skin Integrity:    Ankle-Brachial Index:    Pulses:    Removal Indication and Assessment:    Wound Outcome:    (Retired) Wound Length (cm):    (Retired) Wound Width (cm):    (Retired) Depth (cm):    Wound Description (Comments):    Removal Indications:    Wound Image   07/10/23 1119   Dressing Appearance Dried drainage 07/10/23 1119   Drainage Amount Small 07/10/23 1119   Drainage Characteristics/Odor Serosanguineous 07/10/23 1119   Appearance Red;Dry 07/10/23 1119   Tissue loss description Full thickness 07/10/23 1119   Red (%), Wound Tissue Color 100 % 07/10/23 1119   Periwound Area Edematous 07/10/23 1119   Wound Edges Irregular 07/10/23 1119   Wound Length (cm) 8.5 cm 07/10/23 1119   Wound Width (cm) 1.5 cm 07/10/23 1119   Wound Depth (cm) 0.1 cm 07/10/23 1119   Wound Volume (cm^3) 1.275 cm^3 07/10/23 1119   Wound Surface Area (cm^2) 12.75 cm^2 07/10/23 1119   Care Cleansed with:;Sterile normal saline 07/10/23 1119   Dressing Applied;Non-adherent;Rolled gauze;Tubular bandage;Absorptive Pad;Other (comment) 07/10/23 1119   Periwound Care Absorptive dressing applied 07/10/23 1119   Compression Tubular elasticized bandage 07/10/23 1119   Dressing Change Due 07/12/23 07/10/23 1119         Assessment/Plan:         ICD-10-CM ICD-9-CM   1. Skin tear of left lower leg without  complication, subsequent encounter  S81.812D V58.89     891.0   2. Hematoma of leg, left, subsequent encounter  S80.12XD V58.89     924.5   3. Skin avulsion  T14.8XXA 879.8       Wounds slowly improving. No signs of infection or necrosis.      Tissue pathology and/or culture taken:  [] Yes [x] No   Sharp debridement performed:   [] Yes [x] No   Labs ordered this visit:   [] Yes [x] No   Imaging ordered this visit:   [] Yes [x] No           Orders Placed This Encounter   Procedures    Change dressing     Left Lower Leg Skin Tears     Cleanse wound with:Normal Saline   Lidocaine:PRN   Silver nitrate:PRN   Primary dressing:  Xeroform 2 layers- Soak for 5-10 minutes before removing.   Secondary dressing:Large ABD Pad, Kerlix,  flexi net   Edema control: Tubi  F toe to knee   Frequency:Monday, Wednesday, Friday and Prn per home health when not in clinic.     Follow-up: Dr. Farias 7/17/23  Home Health:See above orders. Provide wound care and dressing changes as above. Soak Xeroform for 5-10 minutes before removing.   Other: Continue Bactrim        Follow up in about 1 week (around 7/17/2023).

## 2023-07-17 ENCOUNTER — HOSPITAL ENCOUNTER (OUTPATIENT)
Dept: WOUND CARE | Facility: HOSPITAL | Age: 78
Discharge: HOME | End: 2023-07-17
Attending: PREVENTIVE MEDICINE
Payer: MEDICARE

## 2023-07-17 VITALS
SYSTOLIC BLOOD PRESSURE: 140 MMHG | TEMPERATURE: 97 F | WEIGHT: 176 LBS | HEART RATE: 80 BPM | HEIGHT: 64 IN | DIASTOLIC BLOOD PRESSURE: 62 MMHG | BODY MASS INDEX: 30.05 KG/M2

## 2023-07-17 DIAGNOSIS — L97.912 TRAUMATIC ULCER OF RIGHT LOWER LEG WITH FAT LAYER EXPOSED: Primary | ICD-10-CM

## 2023-07-17 DIAGNOSIS — S80.12XD HEMATOMA OF LEG, LEFT, SUBSEQUENT ENCOUNTER: ICD-10-CM

## 2023-07-17 DIAGNOSIS — S81.812D SKIN TEAR OF LEFT LOWER LEG WITHOUT COMPLICATION, SUBSEQUENT ENCOUNTER: ICD-10-CM

## 2023-07-17 DIAGNOSIS — T14.8XXA SKIN AVULSION: ICD-10-CM

## 2023-07-17 PROCEDURE — 99212 OFFICE O/P EST SF 10 MIN: CPT

## 2023-07-17 NOTE — PROGRESS NOTES
[facility]     SUBJECTIVE:     History of Present Illness:  Patient is a 78 y.o. female presents with CAD. Fell in June; was bending down and was on the ground; was trying to hold on to the stature but fell. Did not lose consciousness.   Primary Diagnosis:   1. Hypertension  2. DM: none  3. Past smoker: over 10 years ago   4. Heart disease: In 2000, had one stent placed at [facility]. IN 2005, had 2 stents placed. - unknown anatomy     ROS   Since last visit in 10/22  No syncope since or before but gets dizzy - usually happens when she stands up from a sitting position   No chest pains  No shortness of breath at rest; no PND or orthopnea  Activity: vacuums; cleans dishes, makes bed, changes sheets; no symptoms with it.   Review of patient's allergies indicates:   Allergen Reactions    Iodinated contrast media Anaphylaxis and Other (See Comments)    Nsaids (non-steroidal anti-inflammatory drug)      ADWOA    Phenergan [promethazine]      Vomiting       Past Medical History:   Diagnosis Date    CKD (chronic kidney disease) stage 4, GFR 15-29 ml/min     Coronary artery disease     Edema     Epilepsy     Hematuria, unspecified     Hematuria, unspecified     Hyperlipidemia     Hypertension     Iron deficiency anemia     Normocytic anemia        Past Surgical History:   Procedure Laterality Date    APPENDECTOMY      BACK SURGERY      CORONARY STENT PLACEMENT      HYSTERECTOMY      REVISION OF KNEE ARTHROPLASTY Left 7/18/2022    Procedure: REVISION, ARTHROPLASTY, KNEE;  Surgeon: Stan Catalan MD;  Location: [facility] OR;  Service: Orthopedics;  Laterality: Left;    TONSILLECTOMY         Family History   Problem Relation Age of Onset    Heart disease Mother     Heart disease Father        Social History     Tobacco Use    Smoking status: Never    Smokeless tobacco: Never   Substance Use Topics    Alcohol use: No    Drug use: No        Past Hospitalization:   3/22: weakness and diarrhea  7/22: surgery for left knee    7/30/22: ER  visit for left knee pain and swelling ; fell in the ER   8/27/22: itching   9/9/22: infection left knee of tissue   9/16/22: syncope ; BP low ; Hgb 7.7  6/23: fall/ no LOC;   Home meds:  Current Outpatient Medications on File Prior to Visit   Medication Sig Dispense Refill    acetaminophen (TYLENOL) 500 MG tablet Take 500 mg by mouth every 6 (six) hours as needed for Pain.      aspirin (ECOTRIN) 81 MG EC tablet Take 81 mg by mouth once daily.      butalbital-acetaminophen-caffeine -40 mg (FIORICET, ESGIC) -40 mg per tablet Take 1 tablet by mouth every 8 (eight) hours as needed.      calcium carbonate/vitamin D3 (VITAMIN D-3 ORAL) Take 1 tablet by mouth once daily.      co-enzyme Q-10 30 mg capsule Take 30 mg by mouth once daily.      DULoxetine (CYMBALTA) 60 MG capsule Take 1 capsule (60 mg total) by mouth once daily. 90 capsule 1    FEROSUL 325 mg (65 mg iron) Tab tablet Take 325 mg by mouth once daily.      furosemide (LASIX) 40 MG tablet Take 2 tablets (80 mg total) by mouth 2 (two) times daily as needed (swelling). (Patient taking differently: Take 40 mg by mouth once daily.) 360 tablet 3    gabapentin (NEURONTIN) 600 MG tablet Take 600 mg by mouth 3 (three) times daily.      hydrALAZINE (APRESOLINE) 25 MG tablet Take 1 tablet (25 mg total) by mouth every 8 (eight) hours. 90 tablet 2    hydrOXYzine HCL (ATARAX) 25 MG tablet TAKE ONE TABLET BY MOUTH EVERY 6 HOURS AS NEEDED FOR ITCHING 30 tablet 2    irbesartan (AVAPRO) 300 MG tablet Take 1 tablet (300 mg total) by mouth every evening. 30 tablet 30    isosorbide mononitrate (IMDUR) 60 MG 24 hr tablet Take 1 tablet (60 mg total) by mouth once daily. 90 tablet 1    multivit-min-iron-FA-lutein (CENTRUM SILVER WOMEN) 8 mg iron-400 mcg-300 mcg Tab Take 1 tablet by mouth once daily.      nitroGLYCERIN (NITROSTAT) 0.3 MG SL tablet PLACE 1 TABLET UNDER THE TONGUE EVERY 5 MINUTES FOR UP TO 3 DOSES IF NEEDED FOR CHEST PAIN. CALL 911 IF PAIN PERSISTS 25 tablet 0     omega-3 fatty acids/fish oil (FISH OIL-OMEGA-3 FATTY ACIDS) 300-1,000 mg capsule Take 2 capsules by mouth once daily.      oxyCODONE (ROXICODONE) 10 mg Tab immediate release tablet Take 10 mg by mouth 4 (four) times daily.      rosuvastatin (CRESTOR) 40 MG Tab Take 1 tablet (40 mg total) by mouth once daily. 90 tablet 3    tiZANidine (ZANAFLEX) 4 MG tablet Take 1 tablet (4 mg total) by mouth every 8 (eight) hours as needed (spasms). 60 tablet 2    AIMOVIG AUTOINJECTOR 70 mg/mL autoinjector Inject 70 mg into the skin every 30 days.      clotrimazole-betamethasone 1-0.05% (LOTRISONE) cream Apply topically 2 (two) times daily. (Patient not taking: Reported on 2023) 45 g 1    galcanezumab-gnlm 120 mg/mL PnIj Inject 120 mg into the skin every 28 days. maintenance dose (Patient not taking: Reported on 2023) 1 each 11    LINZESS 72 mcg Cap capsule TAKE ONE CAPSULE BY MOUTH BEFORE BREAKFAST (Patient not taking: Reported on 2023) 30 capsule 3    omeprazole (PRILOSEC) 20 MG capsule Take 20 mg by mouth once daily.      ondansetron (ZOFRAN-ODT) 4 MG TbDL DISSOLVE 1 TABLET UNDER THE TONGUE EVERY 6 HOURS AS NEEDED FOR NAUSEA AND VOMITING       No current facility-administered medications on file prior to visit.       Cardiac meds:  Metoprolol succinate 25 mg- off this   Imdur 60 mg  Irbesartan 300 mg  Lasix 80 mg  rosuvastatin 40 mg   ASA 81 mg       OBJECTIVE:     Vital Signs (Most Recent)  Pulse: 73 (23 1118)  BP: 122/69 (23 1118)  SpO2: (!) 94 % (23 1118)      Physical Exam:    Neck: decreased  Carotids upstroke ,basilar  bruits; normal JVP  Lungs :clear  Heart: RR, normal S1,S2, systolic ejection murmur base to LSB, no gallops  Abd: no masses; no bruits;   Exts: normal DP and PT pulses bilaterally, trace  edema noted           LABS    GFR : GFR 47; Hgb: 7 yvon  : GFR 58 ; LDL 77   Diagnostic Results:    1.EK21: NSR; normal   1b. EK/22: NSR: normal   2.Echo: 21: normal  EF, diastolic dysfunction; mild TR; PAS 43; MIGDALIA 1.18 cm2 with aortic sclerosis   2b. Echo: 6/23: mild AI; moderately severe AS normal EF but velocity only 3.6   3.Nuclear stress: 11/13: negative for ischemia; normal EF   3b.Nuclear stress 5/22: EF normal; negative for ischemia   3c. DSE: negative for ischemia; EF normal   4. Carotid US: 6/23: right carotids OK:  but low flow; on lfeft;  normal vertebral flow   Chart review:  GFR 27 1 month ago    ASSESSMENT/PLAN:     1. CAD: s/p stent placement - asymptomatic now - recent negative stress test   2. Chronic diastolic dysfunction- asymptomatic   3. Hypertension controlled  4. Moderately severe aortic stenosis at the most  5. Last set of lipids - .4;   6. CKD 3 - improved GFR with latest one being high 40's   7. Anemia with Hgb 10 yvon stable   8. Carotid disease: right OK; left chronic?   Plan: 1.CTA of carotids: if OK with nephrology  2. Refer to [facility] for evaluation of TAVR   3. Continue the same medications  4. Return 1 month       Patricia Roegrs MD

## 2023-07-17 NOTE — PROGRESS NOTES
[facility]    Subjective:       Patient ID: Enedelia García is a 78 y.o. female.    Chief Complaint: Wound Care    Follow up for LLE wounds that are progressing well. Patient c/o sacral area hurting, no open wound noted.   Continue with the current plan of care.   Review of Systems   All other systems reviewed and are negative.      Objective:     Vitals:    07/17/23 1426   BP: (!) 140/62   Pulse: 80   Temp: 97 °F (36.1 °C)         Physical Exam       Altered Skin Integrity 06/26/23 1300 Left anterior;lower Leg Traumatic Full thickness tissue loss. Subcutaneous fat may be visible but bone, tendon or muscle are not exposed (Active)   06/26/23 1300   Altered Skin Integrity Present on Admission - Did Patient arrive to the hospital with altered skin?: yes   Side: Left   Orientation: anterior;lower   Location: Leg   Wound Number:    Is this injury device related?: No   Primary Wound Type: Traumatic   Description of Altered Skin Integrity: Full thickness tissue loss. Subcutaneous fat may be visible but bone, tendon or muscle are not exposed   Ankle-Brachial Index:    Pulses:    Removal Indication and Assessment:    Wound Outcome:    (Retired) Wound Length (cm):    (Retired) Wound Width (cm):    (Retired) Depth (cm):    Wound Description (Comments):    Removal Indications:    Dressing Appearance Intact;Moist drainage 07/17/23 1448   Drainage Amount Small 07/17/23 1448   Appearance Intact;Red;Moist 07/17/23 1448   Tissue loss description Full thickness 07/17/23 1448   Red (%), Wound Tissue Color 100 % 07/17/23 1448   Periwound Area Intact;Dry 07/17/23 1448   Wound Edges Irregular 07/17/23 1448   Wound Length (cm) 8 cm 07/17/23 1448   Wound Width (cm) 7.5 cm 07/17/23 1448   Wound Depth (cm) 0.1 cm 07/17/23 1448   Wound Volume (cm^3) 6 cm^3 07/17/23 1448   Wound Surface Area (cm^2) 60 cm^2 07/17/23 1448   Care Cleansed with:;Sterile normal saline 07/17/23 1448   Dressing  Applied;Changed;Non-adherent;Absorptive Pad;Cast padding 07/17/23 1448   Periwound Care Absorptive dressing applied 07/17/23 1448   Compression Tubular elasticized bandage 07/17/23 1448   Dressing Change Due 07/19/23 07/17/23 1448            Altered Skin Integrity 06/26/23 1300 Left lower;lateral Leg Traumatic (Active)   06/26/23 1300   Altered Skin Integrity Present on Admission - Did Patient arrive to the hospital with altered skin?:    Side: Left   Orientation: lower;lateral   Location: Leg   Wound Number:    Is this injury device related?:    Primary Wound Type: Traumatic   Description of Altered Skin Integrity:    Ankle-Brachial Index:    Pulses:    Removal Indication and Assessment:    Wound Outcome:    (Retired) Wound Length (cm):    (Retired) Wound Width (cm):    (Retired) Depth (cm):    Wound Description (Comments):    Removal Indications:    Wound Image   07/17/23 1448   Dressing Appearance Intact 07/17/23 1448   Drainage Amount Small 07/17/23 1448   Drainage Characteristics/Odor Serosanguineous 07/17/23 1448   Appearance Intact;Red;Maroon 07/17/23 1448   Tissue loss description Full thickness 07/17/23 1448   Red (%), Wound Tissue Color 100 % 07/17/23 1448   Periwound Area Intact;Edematous 07/17/23 1448   Wound Edges Irregular 07/17/23 1448   Wound Length (cm) 7.8 cm 07/17/23 1448   Wound Width (cm) 1.5 cm 07/17/23 1448   Wound Depth (cm) 0.1 cm 07/17/23 1448   Wound Volume (cm^3) 1.17 cm^3 07/17/23 1448   Wound Surface Area (cm^2) 11.7 cm^2 07/17/23 1448   Care Cleansed with:;Soap and water;Sterile normal saline 07/17/23 1448   Dressing Applied;Changed;Non-adherent;Cast padding 07/17/23 1448   Periwound Care Absorptive dressing applied 07/17/23 1448   Compression Tubular elasticized bandage 07/17/23 1448   Dressing Change Due 07/19/23 07/17/23 1448         Assessment/Plan:         ICD-10-CM ICD-9-CM   1. Traumatic ulcer of right lower leg with fat layer exposed  L97.912 707.19   2. Skin tear of left lower  leg without complication, subsequent encounter  S81.812D V58.89     891.0   3. Hematoma of leg, left, subsequent encounter  S80.12XD V58.89     924.5   4. Skin avulsion  T14.8XXA 879.8       Loose skin manually removed. Wounds slowly improving. No signs of infection or necrosis.      Tissue pathology and/or culture taken:  [] Yes [x] No   Sharp debridement performed:   [] Yes [x] No   Labs ordered this visit:   [] Yes [x] No   Imaging ordered this visit:   [] Yes [x] No           Orders Placed This Encounter   Procedures    Change dressing     Left Lower Leg Skin Tears     Cleanse wound with: Normal Saline   Lidocaine: PRN   Silver nitrate: PRN   Primary dressing:  Xeroform 2 layers- Soak for 5-10 minutes before removing.   Secondary dressing: Large ABD Pad, Kerlix,  flexi net   Edema control: Tubi  F toe to knee   Frequency: Monday, Wednesday, Friday and Prn per home health when not in clinic.     Follow-up: Dr. Farias in 2 weeks   Home Health: See above orders. Provide wound care and dressing changes as above. Soak Xeroform for 5-10 minutes before removing.   Other: Continue Bactrim        Follow up in about 2 weeks (around 7/31/2023).

## 2023-07-18 ENCOUNTER — OFFICE VISIT (OUTPATIENT)
Dept: CARDIOLOGY | Facility: CLINIC | Age: 78
End: 2023-07-18
Payer: MEDICARE

## 2023-07-18 VITALS
HEIGHT: 64 IN | OXYGEN SATURATION: 94 % | SYSTOLIC BLOOD PRESSURE: 122 MMHG | WEIGHT: 168.44 LBS | DIASTOLIC BLOOD PRESSURE: 69 MMHG | BODY MASS INDEX: 28.76 KG/M2 | HEART RATE: 73 BPM

## 2023-07-18 DIAGNOSIS — I25.10 CORONARY ARTERY DISEASE INVOLVING NATIVE CORONARY ARTERY OF NATIVE HEART WITHOUT ANGINA PECTORIS: ICD-10-CM

## 2023-07-18 DIAGNOSIS — I35.0 NONRHEUMATIC AORTIC VALVE STENOSIS: ICD-10-CM

## 2023-07-18 DIAGNOSIS — R09.89 BILATERAL CAROTID BRUITS: ICD-10-CM

## 2023-07-18 DIAGNOSIS — I65.23 BILATERAL CAROTID ARTERY STENOSIS: Primary | ICD-10-CM

## 2023-07-18 DIAGNOSIS — I50.32 CHRONIC DIASTOLIC HEART FAILURE: ICD-10-CM

## 2023-07-18 DIAGNOSIS — I35.0 AORTIC VALVE STENOSIS, ETIOLOGY OF CARDIAC VALVE DISEASE UNSPECIFIED: Chronic | ICD-10-CM

## 2023-07-18 PROCEDURE — 3074F PR MOST RECENT SYSTOLIC BLOOD PRESSURE < 130 MM HG: ICD-10-PCS | Mod: CPTII,S$GLB,, | Performed by: INTERNAL MEDICINE

## 2023-07-18 PROCEDURE — 99215 PR OFFICE/OUTPT VISIT, EST, LEVL V, 40-54 MIN: ICD-10-PCS | Mod: S$GLB,,, | Performed by: INTERNAL MEDICINE

## 2023-07-18 PROCEDURE — 3078F DIAST BP <80 MM HG: CPT | Mod: CPTII,S$GLB,, | Performed by: INTERNAL MEDICINE

## 2023-07-18 PROCEDURE — 99999 PR PBB SHADOW E&M-EST. PATIENT-LVL V: ICD-10-PCS | Mod: PBBFAC,,, | Performed by: INTERNAL MEDICINE

## 2023-07-18 PROCEDURE — 3078F PR MOST RECENT DIASTOLIC BLOOD PRESSURE < 80 MM HG: ICD-10-PCS | Mod: CPTII,S$GLB,, | Performed by: INTERNAL MEDICINE

## 2023-07-18 PROCEDURE — 3288F PR FALLS RISK ASSESSMENT DOCUMENTED: ICD-10-PCS | Mod: CPTII,S$GLB,, | Performed by: INTERNAL MEDICINE

## 2023-07-18 PROCEDURE — 1159F MED LIST DOCD IN RCRD: CPT | Mod: CPTII,S$GLB,, | Performed by: INTERNAL MEDICINE

## 2023-07-18 PROCEDURE — 1100F PTFALLS ASSESS-DOCD GE2>/YR: CPT | Mod: CPTII,S$GLB,, | Performed by: INTERNAL MEDICINE

## 2023-07-18 PROCEDURE — 1160F PR REVIEW ALL MEDS BY PRESCRIBER/CLIN PHARMACIST DOCUMENTED: ICD-10-PCS | Mod: CPTII,S$GLB,, | Performed by: INTERNAL MEDICINE

## 2023-07-18 PROCEDURE — 1100F PR PT FALLS ASSESS DOC 2+ FALLS/FALL W/INJURY/YR: ICD-10-PCS | Mod: CPTII,S$GLB,, | Performed by: INTERNAL MEDICINE

## 2023-07-18 PROCEDURE — 99999 PR PBB SHADOW E&M-EST. PATIENT-LVL V: CPT | Mod: PBBFAC,,, | Performed by: INTERNAL MEDICINE

## 2023-07-18 PROCEDURE — 1125F AMNT PAIN NOTED PAIN PRSNT: CPT | Mod: CPTII,S$GLB,, | Performed by: INTERNAL MEDICINE

## 2023-07-18 PROCEDURE — 99215 OFFICE O/P EST HI 40 MIN: CPT | Mod: S$GLB,,, | Performed by: INTERNAL MEDICINE

## 2023-07-18 PROCEDURE — 1125F PR PAIN SEVERITY QUANTIFIED, PAIN PRESENT: ICD-10-PCS | Mod: CPTII,S$GLB,, | Performed by: INTERNAL MEDICINE

## 2023-07-18 PROCEDURE — 1159F PR MEDICATION LIST DOCUMENTED IN MEDICAL RECORD: ICD-10-PCS | Mod: CPTII,S$GLB,, | Performed by: INTERNAL MEDICINE

## 2023-07-18 PROCEDURE — 3288F FALL RISK ASSESSMENT DOCD: CPT | Mod: CPTII,S$GLB,, | Performed by: INTERNAL MEDICINE

## 2023-07-18 PROCEDURE — 1160F RVW MEDS BY RX/DR IN RCRD: CPT | Mod: CPTII,S$GLB,, | Performed by: INTERNAL MEDICINE

## 2023-07-18 PROCEDURE — 3074F SYST BP LT 130 MM HG: CPT | Mod: CPTII,S$GLB,, | Performed by: INTERNAL MEDICINE

## 2023-07-18 RX ORDER — ONDANSETRON 4 MG/1
TABLET, ORALLY DISINTEGRATING ORAL
COMMUNITY
End: 2023-10-24 | Stop reason: ALTCHOICE

## 2023-07-19 ENCOUNTER — PATIENT OUTREACH (OUTPATIENT)
Dept: ADMINISTRATIVE | Facility: OTHER | Age: 78
End: 2023-07-19
Payer: MEDICARE

## 2023-07-19 NOTE — PROGRESS NOTES
CHW - Follow Up    This Community Health Worker completed a follow up visit with patient via telephone today.  Pt/Caregiver reported: Patient's daughter/caregiver states pt has food insecurities.  Community Health Worker provided: several referrals through [email or web address], will follow up in one week to check on status of referrals and patient's well being.  Follow up required: yes.  Follow-up Outreach - Due: 7/25/2023

## 2023-07-20 DIAGNOSIS — F43.21 ADJUSTMENT DISORDER WITH DEPRESSED MOOD: ICD-10-CM

## 2023-07-20 RX ORDER — DULOXETIN HYDROCHLORIDE 60 MG/1
CAPSULE, DELAYED RELEASE ORAL
Qty: 90 CAPSULE | Refills: 1 | Status: SHIPPED | OUTPATIENT
Start: 2023-07-20 | End: 2023-08-08 | Stop reason: SDUPTHER

## 2023-07-20 NOTE — TELEPHONE ENCOUNTER
No care due was identified.  [facility] Embedded Care Due Messages. Reference number: [id number].   7/20/2023 4:32:51 PM CDT

## 2023-07-25 ENCOUNTER — HOSPITAL ENCOUNTER (OUTPATIENT)
Dept: RADIOLOGY | Facility: HOSPITAL | Age: 78
Discharge: HOME | End: 2023-07-25
Attending: INTERNAL MEDICINE
Payer: MEDICARE

## 2023-07-25 ENCOUNTER — EXTERNAL HOME HEALTH (OUTPATIENT)
Dept: HOME HEALTH SERVICES | Facility: HOSPITAL | Age: 78
End: 2023-07-25
Payer: MEDICARE

## 2023-07-25 DIAGNOSIS — I65.23 BILATERAL CAROTID ARTERY STENOSIS: ICD-10-CM

## 2023-07-26 ENCOUNTER — TELEPHONE (OUTPATIENT)
Dept: CARDIOLOGY | Facility: CLINIC | Age: 78
End: 2023-07-26
Payer: MEDICARE

## 2023-07-26 NOTE — TELEPHONE ENCOUNTER
----- Message from Pati Sevilla sent at 7/26/2023  9:40 AM CDT -----  Type:  Needs Medical Advice    Who Called: pt    Would the patient rather a call back or a response via [facility]? Call   Best Call Back Number: [phone]  Additional Information: pt needs to have orders to have a CT scan and the medication that is needed  for her to take 13 hours /7 hours/ and 1 hour before the scan due to the pt being allergic to the dye

## 2023-07-26 NOTE — TELEPHONE ENCOUNTER
Pt called sending a message stating that she needs med orders for her to take her ct scan due to her being allergic to the dye I called pt and left a message on her VM returning her call

## 2023-07-28 ENCOUNTER — TELEPHONE (OUTPATIENT)
Dept: CARDIOLOGY | Facility: CLINIC | Age: 78
End: 2023-07-28
Payer: MEDICARE

## 2023-07-28 NOTE — TELEPHONE ENCOUNTER
----- Message from Katie Bonner sent at 7/28/2023  9:30 AM CDT -----  Type:  Pharmacy Calling to Clarify an RX    Pharmacy Name:[facility]   Prescription Name:  What do they need to clarify?:  Best Call Back Number:[phone]  Additional Information: waiting on script for medication for pt to take her CT Scan, pt is allergic to the dye

## 2023-07-28 NOTE — TELEPHONE ENCOUNTER
----- Message from Nelly De La Garza sent at 7/27/2023  1:27 PM CDT -----  Type:  Orders    Who Called:pt  Does the patient know what this is regarding?:orders  Would the patient rather a call back or a response via [facility]? call  Best Call Back Number:[phone]  Additional Information: Pt needs orders for medication to take scan due to allergic to iodine.  Pt went to  get scan was turned around because she did not have the medicine.

## 2023-07-31 ENCOUNTER — HOSPITAL ENCOUNTER (OUTPATIENT)
Dept: WOUND CARE | Facility: HOSPITAL | Age: 78
Discharge: HOME | End: 2023-07-31
Attending: PREVENTIVE MEDICINE
Payer: MEDICARE

## 2023-07-31 VITALS
DIASTOLIC BLOOD PRESSURE: 92 MMHG | HEART RATE: 67 BPM | SYSTOLIC BLOOD PRESSURE: 151 MMHG | WEIGHT: 168 LBS | HEIGHT: 64 IN | BODY MASS INDEX: 28.68 KG/M2

## 2023-07-31 DIAGNOSIS — L97.912 TRAUMATIC ULCER OF RIGHT LOWER LEG WITH FAT LAYER EXPOSED: ICD-10-CM

## 2023-07-31 DIAGNOSIS — S81.812D SKIN TEAR OF LEFT LOWER LEG WITHOUT COMPLICATION, SUBSEQUENT ENCOUNTER: Primary | ICD-10-CM

## 2023-07-31 DIAGNOSIS — S80.12XD HEMATOMA OF LEG, LEFT, SUBSEQUENT ENCOUNTER: ICD-10-CM

## 2023-07-31 PROCEDURE — 97597 DBRDMT OPN WND 1ST 20 CM/<: CPT

## 2023-07-31 NOTE — PROGRESS NOTES
[facility]    Subjective:       Patient ID: Enedelia García is a 78 y.o. female.    Chief Complaint: Non-healing Wound (Left leg)    LLE skin tears resolving. Selective debridement of dried exudate. Mupirocin and bordered foam to open area. Patient educated on wound care every other day until completely closed. Return as needed.    Review of Systems   All other systems reviewed and are negative.        Objective:     Vitals:    07/31/23 1316   BP: (!) 151/92   Pulse: 67         Physical Exam       Altered Skin Integrity 06/26/23 1300 Left lower;lateral Leg Traumatic (Active)   06/26/23 1300   Altered Skin Integrity Present on Admission - Did Patient arrive to the hospital with altered skin?:    Side: Left   Orientation: lower;lateral   Location: Leg   Wound Number:    Is this injury device related?:    Primary Wound Type: Traumatic   Description of Altered Skin Integrity:    Ankle-Brachial Index:    Pulses:    Removal Indication and Assessment:    Wound Outcome:    (Retired) Wound Length (cm):    (Retired) Wound Width (cm):    (Retired) Depth (cm):    Wound Description (Comments):    Removal Indications:    Wound Image    07/31/23 1343   Dressing Appearance Intact;Dried drainage 07/31/23 1343   Drainage Amount Scant 07/31/23 1343   Drainage Characteristics/Odor Serosanguineous 07/31/23 1343   Appearance Red;Moist 07/31/23 1343   Tissue loss description Full thickness 07/31/23 1343   Red (%), Wound Tissue Color 100 % 07/31/23 1343   Periwound Area Dry 07/31/23 1343   Wound Edges Undefined 07/31/23 1343   Wound Length (cm) 6 cm 07/31/23 1343   Wound Width (cm) 0.5 cm 07/31/23 1343   Wound Depth (cm) 0.1 cm 07/31/23 1343   Wound Volume (cm^3) 0.3 cm^3 07/31/23 1343   Wound Surface Area (cm^2) 3 cm^2 07/31/23 1343   Care Cleansed with:;Sterile normal saline;Debrided 07/31/23 1343   Dressing Applied;Island/border;Other (comment) 07/31/23 1343   Periwound Care  Moisturizer applied 07/31/23 1343   Dressing Change Due 08/02/23 07/31/23 1343         Assessment/Plan:         ICD-10-CM ICD-9-CM   1. Skin tear of left lower leg without complication, subsequent encounter  S81.812D V58.89     891.0   2. Traumatic ulcer of right lower leg with fat layer exposed  L97.912 707.19   3. Hematoma of leg, left, subsequent encounter  S80.12XD V58.89     924.5           Tissue pathology and/or culture taken:  [] Yes  [x]No   Sharp debridement performed:   [x] Yes [] No   Labs ordered this visit:   [] Yes  [x]No   Imaging ordered this visit:   [] Yes [x] No           Orders Placed This Encounter   Procedures    Change dressing     Left Lower Leg Skin Tears     Cleanse wound with: Normal Saline   Lidocaine: PRN   Silver nitrate: PRN   Primary dressing:  Mupirocin  Secondary dressing: 4 x 4 bordered foam  Edema control: Tubi  F toe to knee   Frequency: Every other day till resolved  Follow-up: Return as needed        Follow up return as needed.            This includes face to face time and non-face to face time preparing to see the patient (eg, review of tests), obtaining and/or reviewing separately obtained history, documenting clinical information in the electronic or other health record, independently interpreting results and communicating results to the patient/family/caregiver, or care coordinator.

## 2023-08-01 ENCOUNTER — PATIENT OUTREACH (OUTPATIENT)
Dept: ADMINISTRATIVE | Facility: OTHER | Age: 78
End: 2023-08-01
Payer: MEDICARE

## 2023-08-01 RX ORDER — DIPHENHYDRAMINE HCL 25 MG
50 TABLET ORAL SEE ADMIN INSTRUCTIONS
Qty: 2 TABLET | Refills: 0 | Status: SHIPPED | OUTPATIENT
Start: 2023-08-01 | End: 2023-08-28 | Stop reason: SDUPTHER

## 2023-08-01 RX ORDER — PREDNISONE 50 MG/1
50 TABLET ORAL SEE ADMIN INSTRUCTIONS
Qty: 3 TABLET | Refills: 0 | Status: SHIPPED | OUTPATIENT
Start: 2023-08-01 | End: 2024-01-03 | Stop reason: CLARIF

## 2023-08-01 NOTE — PROGRESS NOTES
CHW - Outreach Attempt    Community Health Worker left a voicemail message for 1st attempt to contact patient regardinst missed follow up visit attempt call,

## 2023-08-04 ENCOUNTER — TELEPHONE (OUTPATIENT)
Dept: CARDIOLOGY | Facility: CLINIC | Age: 78
End: 2023-08-04
Payer: MEDICARE

## 2023-08-04 NOTE — TELEPHONE ENCOUNTER
----- Message from Patricia Rogers MD sent at 8/1/2023  1:24 PM CDT -----  Regarding: RE: pt called again for med orders  Sent the scriopt for her   ----- Message -----  From: Martin Alicea MA  Sent: 7/28/2023   4:58 PM CDT  To: Patricia Rogers MD  Subject: pt called again for med orders                   Please advise   ----- Message -----  From: Martin Alicea MA  Sent: 7/27/2023   4:55 PM CDT  To: Martin Alicea MA      ----- Message -----  From: Nelly De La Garza  Sent: 7/27/2023   1:34 PM CDT  To: Sue Gomez Staff    Type:  Orders    Who Called:pt  Does the patient know what this is regarding?:orders  Would the patient rather a call back or a response via [facility]? call  Best Call Back Number:[phone]  Additional Information: Pt needs orders for medication to take scan due to allergic to iodine.  Pt went to  get scan was turned around because she did not have the medicine.

## 2023-08-04 NOTE — PROCEDURES
"Debridement    Date/Time: 7/31/2023 12:47 PM    Performed by: Pineda Farias MD  Authorized by: Pineda Farias MD    Time out: Immediately prior to procedure a "time out" was called to verify the correct patient, procedure, equipment, support staff and site/side marked as required.    Consent Done?:  Yes (Written)  Local anesthesia used?: Yes    Local anesthetic:  Topical anesthetic    Wound Details:    Location:  Left leg    Type of Debridement:  Non-excisional       Length (cm):  6       Area (sq cm):  3       Width (cm):  0.5       Percent Debrided (%):  90       Depth (cm):  0.1       Total Area Debrided (sq cm):  2.7    Depth of debridement:  Epidermis/Dermis    Tissue debrided:  Dermis and Epidermis    Devitalized tissue debrided:  Slough, Exudate and Fibrin    Instruments:  Blade  Bleeding:  Minimal  Hemostasis Achieved: Yes  Method Used:  Pressure  Patient tolerance:  Patient tolerated the procedure well with no immediate complications    "

## 2023-08-08 DIAGNOSIS — F43.21 ADJUSTMENT DISORDER WITH DEPRESSED MOOD: ICD-10-CM

## 2023-08-08 RX ORDER — DULOXETIN HYDROCHLORIDE 60 MG/1
60 CAPSULE, DELAYED RELEASE ORAL DAILY
Qty: 90 CAPSULE | Refills: 0 | Status: SHIPPED | OUTPATIENT
Start: 2023-08-08 | End: 2023-08-28 | Stop reason: SDUPTHER

## 2023-08-08 NOTE — TELEPHONE ENCOUNTER
No care due was identified.  [facility] Embedded Care Due Messages. Reference number: [id number].   8/08/2023 8:41:37 AM CDT

## 2023-08-09 NOTE — TELEPHONE ENCOUNTER
Refill Routing Note   Medication(s) are not appropriate for processing by [facility] for the following reason(s):      Patient seen in ED/Hospital since LOV with provider  Required vitals abnormal    [facility] action(s):  Defer Care Due:  None identified          Appointments  past 12m or future 3m with PCP    Date Provider   Last Visit   3/22/2023 Lauren Brown MD   Next Visit   8/8/2023 Lauren Brown MD   ED visits in past 90 days: 0        Note composed:7:09 PM 08/08/2023

## 2023-08-11 ENCOUNTER — HOSPITAL ENCOUNTER (OUTPATIENT)
Dept: RADIOLOGY | Facility: HOSPITAL | Age: 78
Discharge: HOME | End: 2023-08-11
Attending: INTERNAL MEDICINE
Payer: MEDICARE

## 2023-08-11 ENCOUNTER — DOCUMENT SCAN (OUTPATIENT)
Dept: HOME HEALTH SERVICES | Facility: HOSPITAL | Age: 78
End: 2023-08-11
Payer: MEDICARE

## 2023-08-11 PROCEDURE — 70498 CT ANGIOGRAPHY NECK: CPT | Mod: 26,,, | Performed by: INTERNAL MEDICINE

## 2023-08-11 PROCEDURE — 70496 CTA HEAD AND NECK (XPD): ICD-10-PCS | Mod: 26,,, | Performed by: INTERNAL MEDICINE

## 2023-08-11 PROCEDURE — 70498 CTA HEAD AND NECK (XPD): ICD-10-PCS | Mod: 26,,, | Performed by: INTERNAL MEDICINE

## 2023-08-11 PROCEDURE — 25500020 PHARM REV CODE 255: Performed by: INTERNAL MEDICINE

## 2023-08-11 PROCEDURE — 70496 CT ANGIOGRAPHY HEAD: CPT | Mod: 26,,, | Performed by: INTERNAL MEDICINE

## 2023-08-11 PROCEDURE — 70496 CT ANGIOGRAPHY HEAD: CPT | Mod: TC

## 2023-08-11 RX ADMIN — IOHEXOL 100 ML: 350 INJECTION, SOLUTION INTRAVENOUS at 09:08

## 2023-08-14 ENCOUNTER — TELEPHONE (OUTPATIENT)
Dept: FAMILY MEDICINE | Facility: CLINIC | Age: 78
End: 2023-08-14
Payer: MEDICARE

## 2023-08-14 NOTE — TELEPHONE ENCOUNTER
----- Message from Arianna Ramirez sent at 8/14/2023 12:15 PM CDT -----  Type:   Appointment Request      Name of Caller:pt  When is the first available appointment?n/a  Best Call Back Number:[phone]  Additional Information: pt stated  has appt for Wednesday 8/16 at 11:00AM would like to be scheduled to come at the same time

## 2023-08-14 NOTE — TELEPHONE ENCOUNTER
Patient has   bumps that appeared on left arm , patient is concern due to having her heart  vail replaced soon.

## 2023-08-15 NOTE — PROGRESS NOTES
CHW - Follow Up    This Community Health Worker completed a follow up visit with patient via telephone today.  Pt/Caregiver reported: Patient has food insecurities.  Community Health Worker provided: Referral to [facility], will follow up in one week to check status of referral for patient needs.  Follow up required: yes.  Follow-up Outreach - Due: 8/21/2023

## 2023-08-16 ENCOUNTER — OFFICE VISIT (OUTPATIENT)
Dept: FAMILY MEDICINE | Facility: CLINIC | Age: 78
End: 2023-08-16
Payer: MEDICARE

## 2023-08-16 VITALS
HEART RATE: 77 BPM | BODY MASS INDEX: 28.34 KG/M2 | HEIGHT: 64 IN | WEIGHT: 166 LBS | OXYGEN SATURATION: 96 % | SYSTOLIC BLOOD PRESSURE: 142 MMHG | DIASTOLIC BLOOD PRESSURE: 84 MMHG

## 2023-08-16 DIAGNOSIS — I10 PRIMARY HYPERTENSION: ICD-10-CM

## 2023-08-16 DIAGNOSIS — F41.1 GENERALIZED ANXIETY DISORDER: ICD-10-CM

## 2023-08-16 DIAGNOSIS — I35.0 NONRHEUMATIC AORTIC VALVE STENOSIS: ICD-10-CM

## 2023-08-16 DIAGNOSIS — R73.03 PREDIABETES: ICD-10-CM

## 2023-08-16 DIAGNOSIS — R21 RASH: Primary | ICD-10-CM

## 2023-08-16 DIAGNOSIS — N18.4 STAGE 4 CHRONIC KIDNEY DISEASE: ICD-10-CM

## 2023-08-16 DIAGNOSIS — F33.1 MODERATE EPISODE OF RECURRENT MAJOR DEPRESSIVE DISORDER: ICD-10-CM

## 2023-08-16 DIAGNOSIS — I70.0 AORTIC ATHEROSCLEROSIS: ICD-10-CM

## 2023-08-16 DIAGNOSIS — R56.9 SEIZURE-LIKE ACTIVITY: ICD-10-CM

## 2023-08-16 PROCEDURE — 3079F DIAST BP 80-89 MM HG: CPT | Mod: CPTII,S$GLB,, | Performed by: INTERNAL MEDICINE

## 2023-08-16 PROCEDURE — 3288F PR FALLS RISK ASSESSMENT DOCUMENTED: ICD-10-PCS | Mod: CPTII,S$GLB,, | Performed by: INTERNAL MEDICINE

## 2023-08-16 PROCEDURE — 99999 PR PBB SHADOW E&M-EST. PATIENT-LVL V: ICD-10-PCS | Mod: PBBFAC,,, | Performed by: INTERNAL MEDICINE

## 2023-08-16 PROCEDURE — 99214 PR OFFICE/OUTPT VISIT, EST, LEVL IV, 30-39 MIN: ICD-10-PCS | Mod: S$GLB,,, | Performed by: INTERNAL MEDICINE

## 2023-08-16 PROCEDURE — 1159F PR MEDICATION LIST DOCUMENTED IN MEDICAL RECORD: ICD-10-PCS | Mod: CPTII,S$GLB,, | Performed by: INTERNAL MEDICINE

## 2023-08-16 PROCEDURE — 1159F MED LIST DOCD IN RCRD: CPT | Mod: CPTII,S$GLB,, | Performed by: INTERNAL MEDICINE

## 2023-08-16 PROCEDURE — 1101F PR PT FALLS ASSESS DOC 0-1 FALLS W/OUT INJ PAST YR: ICD-10-PCS | Mod: CPTII,S$GLB,, | Performed by: INTERNAL MEDICINE

## 2023-08-16 PROCEDURE — 3077F PR MOST RECENT SYSTOLIC BLOOD PRESSURE >= 140 MM HG: ICD-10-PCS | Mod: CPTII,S$GLB,, | Performed by: INTERNAL MEDICINE

## 2023-08-16 PROCEDURE — 99999 PR PBB SHADOW E&M-EST. PATIENT-LVL V: CPT | Mod: PBBFAC,,, | Performed by: INTERNAL MEDICINE

## 2023-08-16 PROCEDURE — 1126F PR PAIN SEVERITY QUANTIFIED, NO PAIN PRESENT: ICD-10-PCS | Mod: CPTII,S$GLB,, | Performed by: INTERNAL MEDICINE

## 2023-08-16 PROCEDURE — 1126F AMNT PAIN NOTED NONE PRSNT: CPT | Mod: CPTII,S$GLB,, | Performed by: INTERNAL MEDICINE

## 2023-08-16 PROCEDURE — 1101F PT FALLS ASSESS-DOCD LE1/YR: CPT | Mod: CPTII,S$GLB,, | Performed by: INTERNAL MEDICINE

## 2023-08-16 PROCEDURE — 3079F PR MOST RECENT DIASTOLIC BLOOD PRESSURE 80-89 MM HG: ICD-10-PCS | Mod: CPTII,S$GLB,, | Performed by: INTERNAL MEDICINE

## 2023-08-16 PROCEDURE — 99214 OFFICE O/P EST MOD 30 MIN: CPT | Mod: S$GLB,,, | Performed by: INTERNAL MEDICINE

## 2023-08-16 PROCEDURE — 3077F SYST BP >= 140 MM HG: CPT | Mod: CPTII,S$GLB,, | Performed by: INTERNAL MEDICINE

## 2023-08-16 PROCEDURE — 1160F RVW MEDS BY RX/DR IN RCRD: CPT | Mod: CPTII,S$GLB,, | Performed by: INTERNAL MEDICINE

## 2023-08-16 PROCEDURE — 1160F PR REVIEW ALL MEDS BY PRESCRIBER/CLIN PHARMACIST DOCUMENTED: ICD-10-PCS | Mod: CPTII,S$GLB,, | Performed by: INTERNAL MEDICINE

## 2023-08-16 PROCEDURE — 3288F FALL RISK ASSESSMENT DOCD: CPT | Mod: CPTII,S$GLB,, | Performed by: INTERNAL MEDICINE

## 2023-08-16 NOTE — PROGRESS NOTES
Subjective:       Patient ID: Enedelia García is a 78 y.o. female.    Chief Complaint: Blister (On arm)      HPI  Enedelia García is a 78 y.o. female with  CAD, AS, HLD, ALEXANDRO, HTN, CKD-4, Anemia, Prediabetes, Fibromyalgia, OA, S/P Knee replacement and infection who presents today for Blister (On arm)    Reports that a few days ago she had a skin tear on her right arm when she hit her arm with a door. Later noted small blisters with no redness along her forearm that have resolved. Took benadryl and tried not to break the blisters. The tear has been healing and the blisters improved.    She saw a dermatologist a few weeks ago for a rash in back and extremities. She was told was anxiety related and scratching. The chest wall rash noted a few weeks ago was different and painful which improved with treatment of shingles.     In June was admitted on 06/22/2023 due to syncope and noted with left internal carotid artery occlusion known and recommended statin therapy.  Has known Aortic valve stenosis, per cardiology near-syncope or syncopal episode was in setting of cerebral hypoperfusion secondary to vascular disease or aortic valve stenosis but IV hydration has helped. Metoprolol held at discharge given bradycardia and feels better. She is scheduled to have a CTA to further evaluate.    Health Maintenance:  Health Maintenance   Topic Date Due    Shingles Vaccine (2 of 2) 05/04/2022    DEXA Scan  01/20/2025    Lipid Panel  06/30/2028    TETANUS VACCINE  09/09/2031    Hepatitis C Screening  Completed       Review of Systems   Constitutional: Negative.  Negative for fever and unexpected weight change.   HENT: Negative.  Negative for sore throat.         Occasional ear itching   Respiratory: Negative.  Negative for cough.    Cardiovascular: Negative.    Gastrointestinal: Negative.    Genitourinary:  Negative for difficulty urinating.   Musculoskeletal:  Positive for arthralgias and back pain.   Integumentary:   Positive for rash and wound.   Neurological:  Positive for dizziness. Headaches: Left frontal area.  Psychiatric/Behavioral:  The patient is nervous/anxious.       Past Medical History:   Diagnosis Date    Aortic atherosclerosis 6/14/2023    CKD (chronic kidney disease) stage 4, GFR 15-29 ml/min     Coronary artery disease     Edema     Epilepsy     Generalized anxiety disorder 12/20/2021    Hematuria, unspecified     Hematuria, unspecified     Hyperlipidemia     Hypertension     Iron deficiency anemia     Moderate episode of recurrent major depressive disorder     Nonrheumatic aortic valve stenosis 12/20/2021    Normocytic anemia     Prediabetes 2/24/2022       Past Surgical History:   Procedure Laterality Date    APPENDECTOMY      BACK SURGERY      CORONARY STENT PLACEMENT      HYSTERECTOMY      REVISION OF KNEE ARTHROPLASTY Left 7/18/2022    Procedure: REVISION, ARTHROPLASTY, KNEE;  Surgeon: Stan Catalan MD;  Location: [facility];  Service: Orthopedics;  Laterality: Left;    TONSILLECTOMY         Family History   Problem Relation Age of Onset    Heart disease Mother     Heart disease Father        Social History     Socioeconomic History    Marital status: Significant Other   Tobacco Use    Smoking status: Never    Smokeless tobacco: Never   Substance and Sexual Activity    Alcohol use: No    Drug use: No    Sexual activity: Not Currently     Social Determinants of Health     Financial Resource Strain: Low Risk  (6/23/2023)    Overall Financial Resource Strain (CARDIA)     Difficulty of Paying Living Expenses: Not hard at all   Food Insecurity: Food Insecurity Present (8/15/2023)    Hunger Vital Sign     Worried About Running Out of Food in the Last Year: Sometimes true     Ran Out of Food in the Last Year: Sometimes true   Transportation Needs: No Transportation Needs (6/23/2023)    PRAPARE - Transportation     Lack of Transportation (Medical): No     Lack of Transportation (Non-Medical): No   Physical Activity:  Insufficiently Active (7/27/2022)    Exercise Vital Sign     Days of Exercise per Week: 3 days     Minutes of Exercise per Session: 20 min   Stress: Stress Concern Present (7/19/2022)    [employer] [place] [employer] - Occupational Stress Questionnaire     Feeling of Stress : To some extent   Social Connections: Moderately Isolated (7/27/2022)    Social Connection and Isolation Panel [NHANES]     Frequency of Communication with Friends and Family: More than three times a week     Frequency of Social Gatherings with Friends and Family: More than three times a week     Attends Shinto Services: Never     Active Member of Clubs or Organizations: No     Attends Club or Organization Meetings: Never     Marital Status: Living with partner   Housing Stability: Low Risk  (8/15/2023)    Housing Stability Vital Sign     Unable to Pay for Housing in the Last Year: No     Number of Places Lived in the Last Year: 1     Unstable Housing in the Last Year: No       Current Outpatient Medications   Medication Sig Dispense Refill    acetaminophen (TYLENOL) 500 MG tablet Take 500 mg by mouth every 6 (six) hours as needed for Pain.      AIMOVIG AUTOINJECTOR 70 mg/mL autoinjector Inject 70 mg into the skin every 30 days.      aspirin (ECOTRIN) 81 MG EC tablet Take 81 mg by mouth once daily.      butalbital-acetaminophen-caffeine -40 mg (FIORICET, ESGIC) -40 mg per tablet Take 1 tablet by mouth every 8 (eight) hours as needed.      calcium carbonate/vitamin D3 (VITAMIN D-3 ORAL) Take 1 tablet by mouth once daily.      co-enzyme Q-10 30 mg capsule Take 30 mg by mouth once daily.      diphenhydrAMINE (BENADRYL ALLERGY) 25 mg tablet Take 2 tablets (50 mg total) by mouth As instructed for Insomnia. Take two tablets one hour prior to procedure 2 tablet 0    DULoxetine (CYMBALTA) 60 MG capsule Take 1 capsule (60 mg total) by mouth once daily. 90 capsule 0    FEROSUL 325 mg (65 mg iron) Tab tablet Take 325 mg by mouth  once daily.      furosemide (LASIX) 40 MG tablet Take 2 tablets (80 mg total) by mouth 2 (two) times daily as needed (swelling). (Patient taking differently: Take 40 mg by mouth once daily.) 360 tablet 3    gabapentin (NEURONTIN) 600 MG tablet Take 600 mg by mouth 3 (three) times daily.      hydrOXYzine HCL (ATARAX) 25 MG tablet TAKE ONE TABLET BY MOUTH EVERY 6 HOURS AS NEEDED FOR ITCHING 30 tablet 2    irbesartan (AVAPRO) 300 MG tablet Take 1 tablet (300 mg total) by mouth every evening. 30 tablet 30    isosorbide mononitrate (IMDUR) 60 MG 24 hr tablet Take 1 tablet (60 mg total) by mouth once daily. 90 tablet 1    multivit-min-iron-FA-lutein (CENTRUM SILVER WOMEN) 8 mg iron-400 mcg-300 mcg Tab Take 1 tablet by mouth once daily.      nitroGLYCERIN (NITROSTAT) 0.3 MG SL tablet PLACE 1 TABLET UNDER THE TONGUE EVERY 5 MINUTES FOR UP TO 3 DOSES IF NEEDED FOR CHEST PAIN. CALL 911 IF PAIN PERSISTS 25 tablet 0    omega-3 fatty acids/fish oil (FISH OIL-OMEGA-3 FATTY ACIDS) 300-1,000 mg capsule Take 2 capsules by mouth once daily.      omeprazole (PRILOSEC) 20 MG capsule Take 20 mg by mouth once daily.      ondansetron (ZOFRAN-ODT) 4 MG TbDL DISSOLVE 1 TABLET UNDER THE TONGUE EVERY 6 HOURS AS NEEDED FOR NAUSEA AND VOMITING      oxyCODONE (ROXICODONE) 10 mg Tab immediate release tablet Take 10 mg by mouth 4 (four) times daily.      predniSONE (DELTASONE) 50 MG Tab Take 1 tablet (50 mg total) by mouth As instructed. Take one tablet 13 hours prior to procedure, one tablet 7 hours  before procedure and then one tablet one hour prior to procedure. . 3 tablet 0    rosuvastatin (CRESTOR) 40 MG Tab Take 1 tablet (40 mg total) by mouth once daily. 90 tablet 3    tiZANidine (ZANAFLEX) 4 MG tablet Take 1 tablet (4 mg total) by mouth every 8 (eight) hours as needed (spasms). 60 tablet 2    clotrimazole-betamethasone 1-0.05% (LOTRISONE) cream Apply topically 2 (two) times daily. (Patient not taking: Reported on 7/18/2023) 45 g 1     galcanezumab-gnlm 120 mg/mL DianeIj Inject 120 mg into the skin every 28 days. maintenance dose (Patient not taking: Reported on 7/6/2023) 1 each 11    LINZESS 72 mcg Cap capsule TAKE ONE CAPSULE BY MOUTH BEFORE BREAKFAST (Patient not taking: Reported on 7/6/2023) 30 capsule 3     No current facility-administered medications for this visit.       Review of patient's allergies indicates:   Allergen Reactions    Iodinated contrast media Anaphylaxis and Other (See Comments)    Nsaids (non-steroidal anti-inflammatory drug)      ADWOA    Phenergan [promethazine]      Vomiting         Objective:       Last 3 sets of Vitals    Vitals - 1 value per visit 7/31/2023 8/16/2023 8/16/2023   SYSTOLIC 151 - 142   DIASTOLIC 92 - 84   Pulse 67 - 77   Temp - - -   Resp - - -   SPO2 - - 96   Weight (lb) 168 - 166.01   Weight (kg) 76.204 - 75.3   Height 64 - 64   BMI (Calculated) 28.8 - 28.5   VISIT REPORT - 17NONCRENCREPNotFromCR  [id number]; 17NONCRENCREPNotFromCR  [id number];   Pain Score  - 0 -   Some recent data might be hidden   Physical Exam  Constitutional:       General: She is not in acute distress.     Appearance: Normal appearance.   Eyes:      General: No scleral icterus.     Extraocular Movements: Extraocular movements intact.      Conjunctiva/sclera: Conjunctivae normal.   Neck:      Comments: No goiter.  Cardiovascular:      Rate and Rhythm: Normal rate and regular rhythm.      Pulses: Normal pulses.      Heart sounds: Normal heart sounds.   Pulmonary:      Effort: Pulmonary effort is normal.      Breath sounds: Normal breath sounds.   Abdominal:      General: Bowel sounds are normal. There is no distension.      Palpations: Abdomen is soft.   Musculoskeletal:         General: No swelling. Normal range of motion.   Lymphadenopathy:      Cervical: No cervical adenopathy.   Skin:     General: Skin is warm and dry.      Comments: Thin skin, no blisters noted. Has multiple healing scabbed lesions in upper back and arms.  Left leg abrasion healed with darken skin discoloration and thickened skin.   Neurological:      General: No focal deficit present.      Mental Status: She is alert and oriented to person, place, and time.   Psychiatric:         Mood and Affect: Mood normal.         Behavior: Behavior normal.           CBC:  Recent Labs   Lab 03/31/23  1250 06/22/23  1624 06/30/23  1239   WBC 13.12 H  13.12 H 7.14 7.79   RBC 4.07  4.07 3.40 L 3.33 L   Hemoglobin 12.8  12.8 10.7 L 10.4 L   Hematocrit 39.0  39.0 33.5 L 32.4 L   Platelets 269  269 265 349   MCV 96  96 99 H 97   MCH 31.4 H  31.4 H 31.5 H 31.2 H   MCHC 32.8  32.8 31.9 L 32.1     CMP:  Recent Labs   Lab 06/12/23  1015 06/22/23  1624 06/30/23  1239   Glucose 78  78 99 113 H  113 H   Calcium 9.7  9.7 9.6 10.2  10.2   Albumin 3.6  3.6 3.7 3.1 L  3.1 L   Total Protein 6.7 6.3 7.0   Sodium 141  141 142 140  140   Potassium 4.2  4.2 4.0 4.6  4.6   CO2 29  29 27 26  26   Chloride 104  104 105 104  104   BUN 13  13 19 18  18   Creatinine 1.2  1.2 1.3 1.0  1.0   Alkaline Phosphatase 88 87 67   ALT 22 20 20   AST 29 28 26   Total Bilirubin 0.2 0.3 0.4     URINALYSIS:  Recent Labs   Lab 07/31/22  0003 08/29/22  1527 12/05/22  1437 06/12/23  1002 06/22/23  1846   Color, UA Yellow   < > Yellow   < > Yellow   Specific Gravity, UA 1.010   < > 1.030   < > 1.025   pH, UA 6.0   < > 6.0   < > 5.0   Protein, UA Negative   < > Negative   < > Negative   Bacteria Rare   < >  --   --  None   Nitrite, UA Negative   < > Negative   < > Negative   Leukocytes, UA 1+ A   < > 1+ A   < > 1+ A   Urobilinogen, UA Negative   < > Negative   < > Negative   Hyaline Casts, UA 5 A  --  4 A  --  23 A    < > = values in this interval not displayed.      LIPIDS:  Recent Labs   Lab 02/23/22  1402 04/14/22  0240 11/17/22  1332 12/22/22  1020 06/30/23  1239   TSH 1.299   < > 0.501 1.549 0.514   HDL 47  --  50  --  50   Cholesterol 179  --  193  --  147   Triglycerides 168 H  --  151 H  --   99   LDL Cholesterol 98.4  --  112.8  --  77.2   HDL/Cholesterol Ratio 26.3  --  25.9  --  34.0   Non-HDL Cholesterol 132  --  143  --  97   Total Cholesterol/HDL Ratio 3.8  --  3.9  --  2.9    < > = values in this interval not displayed.     TSH:  Recent Labs   Lab 11/17/22  1332 12/22/22  1020 06/30/23  1239   TSH 0.501 1.549 0.514       A1C:  Recent Labs   Lab 05/22/21  0447 02/23/22  1402 03/10/22  1629 06/29/22  1253 08/12/22  1026 11/17/22  1332 06/30/23  1239   Hemoglobin A1C 5.7 H 5.8 H 5.7 H 6.4 H 5.3 5.6 5.2       Imaging:  CTA Head and Neck (xpd)  Narrative: EXAMINATION:  CTA HEAD AND NECK (XPD)    CLINICAL HISTORY:  carotid disease on ultrasound;Occlusion and stenosis of bilateral carotid arteries    TECHNIQUE:  Non contrast low dose axial images were obtained through the head.  CT angiogram was performed from the level of the yoselin to the top of the head following the IV administration of 100mL of Omnipaque 350.   Sagittal and coronal reconstructions and maximum intensity projection reconstructions were performed. Arterial stenosis percentages are based on NASCET measurement criteria.    COMPARISON:  CT head 06/22/2023; carotid ultrasound 06/22/2023    FINDINGS:  CT HEAD    BRAIN: Generalized parenchymal atrophy. No intracranial mass or hemorrhage. No evidence of acute infarction. No abnormal enhancement.    CSF SPACES: Symmetric prominence of the ventricles, sulci, and cisterns, likely secondary to parenchymal volume loss.    VESSELS: See below.    BONES: No fracture or focal osseous lesion. Paranasal sinuses and mastoid air cells are well aerated.    SOFT TISSUES: Unremarkable.    CTA HEAD    ARTERIES: Calcific atherosclerosis.  No aneurysm or dissection.    Anterior circulation: The right internal carotid artery is patent without significant stenosis.  The left internal carotid artery is occluded, with reconstituted flow in the terminal segment.  The anterior cerebral arteries are patent. The middle  cerebral arteries are patent.    Posterior circulation: The intracranial vertebral arteries and the basilar artery are patent. The posterior cerebral arteries are patent.    Communicating arteries: An anterior communicating artery is present. No posterior communicating arteries are identified.    VEINS: The major intracranial venous structures are patent without evidence of thrombosis.    CTA NECK    ARTERIES: Calcific atherosclerosis.  No aneurysm or dissection.    Aortic arch: Left-sided 3-vessel aortic arch. The brachiocephalic and proximal subclavian arteries are patent.    Carotids: The common carotid arteries are patent. The right internal carotid artery is patent.  The left internal carotid artery is occluded just distal to the carotid bifurcation.    Vertebrals: Cervical segments of the vertebral arteries are patent.    SOFT TISSUES: Multiple thyroid nodules measuring up to 1.1 cm, not meeting criteria for further evaluation.  Mediastinal and right hilar lymphadenopathy.  Mosaic attenuation in the visualized lungs.  Scattered pulmonary micro nodules with tree-in-bud configuration bilaterally.    BONES: Degenerative changes in the cervical spine.  No acute fracture or focal lesion.  Impression: Occlusion of the left internal carotid artery just distal to the carotid bifurcation with reconstituted flow in the terminal segment.    No intracranial mass, hemorrhage, or evidence of acute infarction.    Mosaic attenuation in the visualized lungs with scattered tree-in-bud micro nodules suggesting small airway disease.  If the patient is at high risk for lung cancer, consider follow-up chest CT in 12 months.    Mediastinal and right hilar lymphadenopathy, likely reactive.    This report was flagged in Epic as abnormal.    Electronically signed by: Roland Clark  Date:    08/11/2023  Time:    16:52      Assessment:       1. Rash    2. Primary hypertension    3. Nonrheumatic aortic valve stenosis    4. Generalized  anxiety disorder    5. Seizure-like activity    6. Moderate episode of recurrent major depressive disorder    7. Aortic atherosclerosis    8. Stage 4 chronic kidney disease    9. Prediabetes            Plan:       1. Rash  -     blisters present earlier are now resolved and possibly associated to fluid retention and inflammatory changes from her skin tear.  Now healing.  Moisturizer creams with color and vitamins recommended.  Could take collagen supplements.  - possible urticaria, upset dermatologist mentioned related to anxiety and scratching her psychogenic rash.  Antihistamines help and steroid creams.  Wants 2nd opinion.  -  Ambulatory referral/consult to Dermatology; Future; Expected date: 08/16/2023    2. Primary hypertension  -     Hypertension Digital Medicine ([facility]) Enrollment Order  -     Hypertension Digital Medicine ([facility]): Assign Onboarding Questionnaires  - reports has been stressed.  Beta-blockers held due to syncope and felt to be due to symptomatic aortic stenosis.  Workup in progress.    3. Nonrheumatic aortic valve stenosis   - Has CTA ordered.    4. Generalized anxiety disorder   - On Cymbalta, hydralazine, and gabapentin may help.    5. Seizure-like activity   - had neurology evaluation.  Suspect seizure due to vasovagal syncope.  Going through workup.    6. 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  Daughter is helping.   is sometimes agitated.    7. Aortic atherosclerosis   - Statins, aspirin.    8. Stage 4 chronic kidney disease   - kidney function has improved and appears to be stage III.    9. Prediabetes   - Stable with conservative management.     Health Maintenance Due   Topic Date Due    Shingles Vaccine (2 of 2) 05/04/2022    COVID-19 Vaccine (6 - Pfizer series) 03/08/2023            Return to clinic in 3-4 months.    Lauren Brown MD  [facility]  Disclaimer:  This note has been generated  using voice-recognition software. There may be grammatical or spelling errors that have been missed during proof-reading

## 2023-08-17 ENCOUNTER — DOCUMENT SCAN (OUTPATIENT)
Dept: HOME HEALTH SERVICES | Facility: HOSPITAL | Age: 78
End: 2023-08-17
Payer: MEDICARE

## 2023-08-18 ENCOUNTER — LAB VISIT (OUTPATIENT)
Dept: LAB | Facility: HOSPITAL | Age: 78
End: 2023-08-18
Payer: MEDICARE

## 2023-08-18 DIAGNOSIS — N18.2 CKD (CHRONIC KIDNEY DISEASE) STAGE 2, GFR 60-89 ML/MIN: ICD-10-CM

## 2023-08-18 LAB
BILIRUB UR QL STRIP: NEGATIVE
CLARITY UR: CLEAR
COLOR UR: YELLOW
CREAT UR-MCNC: 108.3 MG/DL (ref 15–325)
GLUCOSE UR QL STRIP: NEGATIVE
HGB UR QL STRIP: NEGATIVE
KETONES UR QL STRIP: NEGATIVE
LEUKOCYTE ESTERASE UR QL STRIP: NEGATIVE
NITRITE UR QL STRIP: NEGATIVE
PH UR STRIP: 7 [PH] (ref 5–8)
PROT UR QL STRIP: ABNORMAL
PROT UR-MCNC: 19 MG/DL (ref 0–15)
PROT/CREAT UR: 0.18 MG/G{CREAT} (ref 0–0.2)
SP GR UR STRIP: 1.03 (ref 1–1.03)
URN SPEC COLLECT METH UR: ABNORMAL
UROBILINOGEN UR STRIP-ACNC: NEGATIVE EU/DL

## 2023-08-18 PROCEDURE — 81003 URINALYSIS AUTO W/O SCOPE: CPT | Performed by: INTERNAL MEDICINE

## 2023-08-18 PROCEDURE — 82570 ASSAY OF URINE CREATININE: CPT | Performed by: INTERNAL MEDICINE

## 2023-08-21 NOTE — PROGRESS NOTES
[facility] Cardiology [address]     SUBJECTIVE:     History of Present Illness:  Patient is a 78 y.o. female presents with CAD.     Primary Diagnosis:   1. Hypertension  2. DM: none  3. Past smoker: over 10 years ago   4. Heart disease: In 2000, had one stent placed at [facility]. IN 2005, had 2 stents placed. - unknown anatomy     ROS   Since last visit in 7/23  No syncope since or before but gets dizzy - usually happens when she stands up from a sitting position   No chest pains  No shortness of breath at rest; no PND or orthopnea  Activity: vacuums; cleans dishes, makes bed, changes sheets; no symptoms with it.   Blood pressures usually normal till she gets upset   Review of patient's allergies indicates:   Allergen Reactions    Iodinated contrast media Anaphylaxis and Other (See Comments)    Nsaids (non-steroidal anti-inflammatory drug)      ADWOA    Phenergan [promethazine]      Vomiting       Past Medical History:   Diagnosis Date    Aortic atherosclerosis 6/14/2023    CKD (chronic kidney disease) stage 4, GFR 15-29 ml/min     Coronary artery disease     Edema     Epilepsy     Generalized anxiety disorder 12/20/2021    Hematuria, unspecified     Hematuria, unspecified     Hyperlipidemia     Hypertension     Iron deficiency anemia     Moderate episode of recurrent major depressive disorder     Nonrheumatic aortic valve stenosis 12/20/2021    Normocytic anemia     Prediabetes 2/24/2022       Past Surgical History:   Procedure Laterality Date    APPENDECTOMY      BACK SURGERY      CORONARY STENT PLACEMENT      HYSTERECTOMY      REVISION OF KNEE ARTHROPLASTY Left 7/18/2022    Procedure: REVISION, ARTHROPLASTY, KNEE;  Surgeon: Stan Catalan MD;  Location: [facility] OR;  Service: Orthopedics;  Laterality: Left;    TONSILLECTOMY         Family History   Problem Relation Age of Onset    Heart disease Mother     Heart disease Father        Social History     Tobacco Use    Smoking status: Never    Smokeless tobacco: Never   Substance Use Topics     Alcohol use: No    Drug use: No        Past Hospitalization:   3/22: weakness and diarrhea  7/22: surgery for left knee    7/30/22: ER visit for left knee pain and swelling ; fell in the ER   8/27/22: itching   9/9/22: infection left knee of tissue   9/16/22: syncope ; BP low ; Hgb 7.7  6/23: fall/ no LOC;   Home meds:  Current Outpatient Medications on File Prior to Visit   Medication Sig Dispense Refill    acetaminophen (TYLENOL) 500 MG tablet Take 500 mg by mouth every 6 (six) hours as needed for Pain.      AIMOVIG AUTOINJECTOR 70 mg/mL autoinjector Inject 70 mg into the skin every 30 days.      aspirin (ECOTRIN) 81 MG EC tablet Take 81 mg by mouth once daily.      butalbital-acetaminophen-caffeine -40 mg (FIORICET, ESGIC) -40 mg per tablet Take 1 tablet by mouth every 8 (eight) hours as needed.      calcium carbonate/vitamin D3 (VITAMIN D-3 ORAL) Take 1 tablet by mouth once daily.      clotrimazole-betamethasone 1-0.05% (LOTRISONE) cream Apply topically 2 (two) times daily. (Patient not taking: Reported on 7/18/2023) 45 g 1    co-enzyme Q-10 30 mg capsule Take 30 mg by mouth once daily.      diphenhydrAMINE (BENADRYL ALLERGY) 25 mg tablet Take 2 tablets (50 mg total) by mouth As instructed for Insomnia. Take two tablets one hour prior to procedure 2 tablet 0    DULoxetine (CYMBALTA) 60 MG capsule Take 1 capsule (60 mg total) by mouth once daily. 90 capsule 0    FEROSUL 325 mg (65 mg iron) Tab tablet Take 325 mg by mouth once daily.      furosemide (LASIX) 40 MG tablet Take 2 tablets (80 mg total) by mouth 2 (two) times daily as needed (swelling). (Patient taking differently: Take 40 mg by mouth once daily.) 360 tablet 3    gabapentin (NEURONTIN) 600 MG tablet Take 600 mg by mouth 3 (three) times daily.      galcanezumab-gnlm 120 mg/mL PnIj Inject 120 mg into the skin every 28 days. maintenance dose (Patient not taking: Reported on 7/6/2023) 1 each 11    hydrOXYzine HCL (ATARAX) 25 MG tablet TAKE  ONE TABLET BY MOUTH EVERY 6 HOURS AS NEEDED FOR ITCHING 30 tablet 2    irbesartan (AVAPRO) 300 MG tablet Take 1 tablet (300 mg total) by mouth every evening. 30 tablet 30    isosorbide mononitrate (IMDUR) 60 MG 24 hr tablet Take 1 tablet (60 mg total) by mouth once daily. 90 tablet 1    LINZESS 72 mcg Cap capsule TAKE ONE CAPSULE BY MOUTH BEFORE BREAKFAST (Patient not taking: Reported on 7/6/2023) 30 capsule 3    multivit-min-iron-FA-lutein (CENTRUM SILVER WOMEN) 8 mg iron-400 mcg-300 mcg Tab Take 1 tablet by mouth once daily.      nitroGLYCERIN (NITROSTAT) 0.3 MG SL tablet PLACE 1 TABLET UNDER THE TONGUE EVERY 5 MINUTES FOR UP TO 3 DOSES IF NEEDED FOR CHEST PAIN. CALL 911 IF PAIN PERSISTS 25 tablet 0    omega-3 fatty acids/fish oil (FISH OIL-OMEGA-3 FATTY ACIDS) 300-1,000 mg capsule Take 2 capsules by mouth once daily.      omeprazole (PRILOSEC) 20 MG capsule Take 20 mg by mouth once daily.      ondansetron (ZOFRAN-ODT) 4 MG TbDL DISSOLVE 1 TABLET UNDER THE TONGUE EVERY 6 HOURS AS NEEDED FOR NAUSEA AND VOMITING      oxyCODONE (ROXICODONE) 10 mg Tab immediate release tablet Take 10 mg by mouth 4 (four) times daily.      predniSONE (DELTASONE) 50 MG Tab Take 1 tablet (50 mg total) by mouth As instructed. Take one tablet 13 hours prior to procedure, one tablet 7 hours  before procedure and then one tablet one hour prior to procedure. . 3 tablet 0    rosuvastatin (CRESTOR) 40 MG Tab Take 1 tablet (40 mg total) by mouth once daily. 90 tablet 3    tiZANidine (ZANAFLEX) 4 MG tablet Take 1 tablet (4 mg total) by mouth every 8 (eight) hours as needed (spasms). 60 tablet 2     No current facility-administered medications on file prior to visit.       Cardiac meds:  Metoprolol succinate 25 mg- off this   Imdur 60 mg  Irbesartan 300 mg  Lasix 80 mg as needed   rosuvastatin 40 mg   ASA 81 mg       OBJECTIVE:     Vital Signs (Most Recent)  Pulse: (!) 59 (08/22/23 1137)  BP: (!) 163/89 (08/22/23 1137)  SpO2: 95 % (08/22/23  1137)      Physical Exam:    Neck: decreased  Carotids upstroke ,basilar  bruits; normal JVP  Lungs :clear  Heart: RR, normal S1,S2, systolic ejection murmur base to LSB, no gallops  Abd: no masses; no bruits;   Exts: normal DP and PT pulses bilaterally, trace  edema noted           LABS    GFR : GFR 47; Hgb: 7 yvon  : GFR 58 ; LDL 77 ; A1c 5.2   : Hgb 12; GFR 46    Diagnostic Results:    1.EK21: NSR; normal   1b. EK/22: NSR: normal   2.Echo: 21: normal EF, diastolic dysfunction; mild TR; PAS 43; MIGDALIA 1.18 cm2 with aortic sclerosis   2b. Echo: : mild AI; moderately severe AS normal EF but velocity only 3.6   3.Nuclear stress: : negative for ischemia; normal EF   3b.Nuclear stress : EF normal; negative for ischemia   3c. DSE: negative for ischemia; EF normal   4. Carotid US: : right carotids OK:  but low flow; on lfeft;  normal vertebral flow   5. CTA head: Left internal carotid occluded distal to bifurcation ; reconstitutation noted ; right normal   Chart review:  GFR 27 1 month ago    ASSESSMENT/PLAN:     1. CAD: s/p stent placement - asymptomatic now - recent negative stress test    2. Chronic diastolic dysfunction- asymptomatic   3. Hypertension controlled  4. Moderately severe aortic stenosis at the most - asymptomatic   5. Last set of lipids - LDL 70;    6. CKD 3 - improved GFR with latest one being high 40's   7. Anemia with Hgb 10 yvon stable   8. Carotid disease: right OK; left chronic occlusion   Plan: 1.continue the medications   2. Return  4 month       Patricia Rogers MD

## 2023-08-22 ENCOUNTER — OFFICE VISIT (OUTPATIENT)
Dept: CARDIOLOGY | Facility: CLINIC | Age: 78
End: 2023-08-22
Payer: MEDICARE

## 2023-08-22 VITALS
BODY MASS INDEX: 29.23 KG/M2 | HEIGHT: 64 IN | WEIGHT: 171.19 LBS | SYSTOLIC BLOOD PRESSURE: 163 MMHG | DIASTOLIC BLOOD PRESSURE: 89 MMHG | OXYGEN SATURATION: 95 % | HEART RATE: 59 BPM

## 2023-08-22 DIAGNOSIS — I50.32 CHRONIC DIASTOLIC HEART FAILURE: ICD-10-CM

## 2023-08-22 DIAGNOSIS — I35.0 AORTIC VALVE STENOSIS, ETIOLOGY OF CARDIAC VALVE DISEASE UNSPECIFIED: Chronic | ICD-10-CM

## 2023-08-22 DIAGNOSIS — I65.23 BILATERAL CAROTID ARTERY STENOSIS: Primary | ICD-10-CM

## 2023-08-22 DIAGNOSIS — I25.10 CORONARY ARTERY DISEASE INVOLVING NATIVE CORONARY ARTERY OF NATIVE HEART WITHOUT ANGINA PECTORIS: ICD-10-CM

## 2023-08-22 PROCEDURE — 1101F PR PT FALLS ASSESS DOC 0-1 FALLS W/OUT INJ PAST YR: ICD-10-PCS | Mod: CPTII,S$GLB,, | Performed by: INTERNAL MEDICINE

## 2023-08-22 PROCEDURE — 3077F PR MOST RECENT SYSTOLIC BLOOD PRESSURE >= 140 MM HG: ICD-10-PCS | Mod: CPTII,S$GLB,, | Performed by: INTERNAL MEDICINE

## 2023-08-22 PROCEDURE — 99214 OFFICE O/P EST MOD 30 MIN: CPT | Mod: S$GLB,,, | Performed by: INTERNAL MEDICINE

## 2023-08-22 PROCEDURE — 99999 PR PBB SHADOW E&M-EST. PATIENT-LVL III: ICD-10-PCS | Mod: PBBFAC,,, | Performed by: INTERNAL MEDICINE

## 2023-08-22 PROCEDURE — 3077F SYST BP >= 140 MM HG: CPT | Mod: CPTII,S$GLB,, | Performed by: INTERNAL MEDICINE

## 2023-08-22 PROCEDURE — 3288F FALL RISK ASSESSMENT DOCD: CPT | Mod: CPTII,S$GLB,, | Performed by: INTERNAL MEDICINE

## 2023-08-22 PROCEDURE — 1125F PR PAIN SEVERITY QUANTIFIED, PAIN PRESENT: ICD-10-PCS | Mod: CPTII,S$GLB,, | Performed by: INTERNAL MEDICINE

## 2023-08-22 PROCEDURE — 99214 PR OFFICE/OUTPT VISIT, EST, LEVL IV, 30-39 MIN: ICD-10-PCS | Mod: S$GLB,,, | Performed by: INTERNAL MEDICINE

## 2023-08-22 PROCEDURE — 1160F PR REVIEW ALL MEDS BY PRESCRIBER/CLIN PHARMACIST DOCUMENTED: ICD-10-PCS | Mod: CPTII,S$GLB,, | Performed by: INTERNAL MEDICINE

## 2023-08-22 PROCEDURE — 3079F PR MOST RECENT DIASTOLIC BLOOD PRESSURE 80-89 MM HG: ICD-10-PCS | Mod: CPTII,S$GLB,, | Performed by: INTERNAL MEDICINE

## 2023-08-22 PROCEDURE — 1125F AMNT PAIN NOTED PAIN PRSNT: CPT | Mod: CPTII,S$GLB,, | Performed by: INTERNAL MEDICINE

## 2023-08-22 PROCEDURE — 3079F DIAST BP 80-89 MM HG: CPT | Mod: CPTII,S$GLB,, | Performed by: INTERNAL MEDICINE

## 2023-08-22 PROCEDURE — 1160F RVW MEDS BY RX/DR IN RCRD: CPT | Mod: CPTII,S$GLB,, | Performed by: INTERNAL MEDICINE

## 2023-08-22 PROCEDURE — 1101F PT FALLS ASSESS-DOCD LE1/YR: CPT | Mod: CPTII,S$GLB,, | Performed by: INTERNAL MEDICINE

## 2023-08-22 PROCEDURE — 3288F PR FALLS RISK ASSESSMENT DOCUMENTED: ICD-10-PCS | Mod: CPTII,S$GLB,, | Performed by: INTERNAL MEDICINE

## 2023-08-22 PROCEDURE — 99999 PR PBB SHADOW E&M-EST. PATIENT-LVL III: CPT | Mod: PBBFAC,,, | Performed by: INTERNAL MEDICINE

## 2023-08-22 PROCEDURE — 1159F MED LIST DOCD IN RCRD: CPT | Mod: CPTII,S$GLB,, | Performed by: INTERNAL MEDICINE

## 2023-08-22 PROCEDURE — 1159F PR MEDICATION LIST DOCUMENTED IN MEDICAL RECORD: ICD-10-PCS | Mod: CPTII,S$GLB,, | Performed by: INTERNAL MEDICINE

## 2023-08-26 PROCEDURE — G0179 PR HOME HEALTH MD RECERTIFICATION: ICD-10-PCS | Mod: ,,, | Performed by: INTERNAL MEDICINE

## 2023-08-26 PROCEDURE — G0179 MD RECERTIFICATION HHA PT: HCPCS | Mod: ,,, | Performed by: INTERNAL MEDICINE

## 2023-08-28 DIAGNOSIS — I25.10 PRESENCE OF STENT IN CORONARY ARTERY IN PATIENT WITH CORONARY ARTERY DISEASE: ICD-10-CM

## 2023-08-28 DIAGNOSIS — F43.21 ADJUSTMENT DISORDER WITH DEPRESSED MOOD: ICD-10-CM

## 2023-08-28 DIAGNOSIS — R07.9 CHEST PAIN, UNSPECIFIED TYPE: ICD-10-CM

## 2023-08-28 DIAGNOSIS — Z95.5 PRESENCE OF STENT IN CORONARY ARTERY IN PATIENT WITH CORONARY ARTERY DISEASE: ICD-10-CM

## 2023-08-28 DIAGNOSIS — I10 PRIMARY HYPERTENSION: ICD-10-CM

## 2023-08-28 RX ORDER — HYDROXYZINE HYDROCHLORIDE 25 MG/1
TABLET, FILM COATED ORAL
Qty: 30 TABLET | Refills: 2 | OUTPATIENT
Start: 2023-08-28

## 2023-08-28 RX ORDER — DULOXETIN HYDROCHLORIDE 60 MG/1
60 CAPSULE, DELAYED RELEASE ORAL DAILY
Qty: 90 CAPSULE | Refills: 0 | Status: SHIPPED | OUTPATIENT
Start: 2023-08-28

## 2023-08-28 RX ORDER — DULOXETIN HYDROCHLORIDE 60 MG/1
60 CAPSULE, DELAYED RELEASE ORAL DAILY
Qty: 90 CAPSULE | Refills: 0 | Status: CANCELLED | OUTPATIENT
Start: 2023-08-28

## 2023-08-28 NOTE — TELEPHONE ENCOUNTER
No care due was identified.  [facility] Embedded Care Due Messages. Reference number: [id number].   8/28/2023 10:18:06 AM CDT

## 2023-08-28 NOTE — TELEPHONE ENCOUNTER
No care due was identified.  [facility] Embedded Care Due Messages. Reference number: [id number].   8/28/2023 4:47:44 PM CDT

## 2023-08-29 ENCOUNTER — DOCUMENT SCAN (OUTPATIENT)
Dept: HOME HEALTH SERVICES | Facility: HOSPITAL | Age: 78
End: 2023-08-29
Payer: MEDICARE

## 2023-08-29 RX ORDER — NITROGLYCERIN 0.3 MG/1
TABLET SUBLINGUAL
Qty: 25 TABLET | Refills: 0 | Status: SHIPPED | OUTPATIENT
Start: 2023-08-29

## 2023-08-29 RX ORDER — HYDROXYZINE HYDROCHLORIDE 25 MG/1
25 TABLET, FILM COATED ORAL EVERY 6 HOURS PRN
Qty: 30 TABLET | Refills: 2 | Status: SHIPPED | OUTPATIENT
Start: 2023-08-29 | End: 2023-11-09

## 2023-08-29 RX ORDER — ISOSORBIDE MONONITRATE 60 MG/1
60 TABLET, EXTENDED RELEASE ORAL DAILY
Qty: 90 TABLET | Refills: 1 | Status: SHIPPED | OUTPATIENT
Start: 2023-08-29

## 2023-09-03 RX ORDER — DIPHENHYDRAMINE HCL 25 MG
50 TABLET ORAL SEE ADMIN INSTRUCTIONS
Qty: 2 TABLET | Refills: 0 | Status: SHIPPED | OUTPATIENT
Start: 2023-09-03 | End: 2023-09-11 | Stop reason: SDUPTHER

## 2023-09-05 ENCOUNTER — PATIENT OUTREACH (OUTPATIENT)
Dept: ADMINISTRATIVE | Facility: OTHER | Age: 78
End: 2023-09-05

## 2023-09-05 NOTE — PROGRESS NOTES
CHW - Outreach Attempt    Community Health Worker left a voicemail message for 1st attempt to contact patient regardinst missed follow up visit attempt call.

## 2023-09-11 DIAGNOSIS — Z96.652 STATUS POST REVISION OF TOTAL REPLACEMENT OF LEFT KNEE: ICD-10-CM

## 2023-09-11 RX ORDER — DIPHENHYDRAMINE HCL 25 MG
50 TABLET ORAL SEE ADMIN INSTRUCTIONS
Qty: 2 TABLET | Refills: 0 | Status: SHIPPED | OUTPATIENT
Start: 2023-09-11 | End: 2024-01-04 | Stop reason: CLARIF

## 2023-09-11 RX ORDER — CLOTRIMAZOLE AND BETAMETHASONE DIPROPIONATE 10; .64 MG/G; MG/G
CREAM TOPICAL 2 TIMES DAILY
Qty: 45 G | Refills: 1 | Status: SHIPPED | OUTPATIENT
Start: 2023-09-11 | End: 2024-01-04

## 2023-09-11 RX ORDER — TIZANIDINE 4 MG/1
4 TABLET ORAL EVERY 8 HOURS PRN
Qty: 60 TABLET | Refills: 2 | Status: SHIPPED | OUTPATIENT
Start: 2023-09-11

## 2023-09-11 NOTE — TELEPHONE ENCOUNTER
No care due was identified.  [facility] Embedded Care Due Messages. Reference number: [id number].   9/11/2023 10:39:32 AM CDT

## 2023-09-18 ENCOUNTER — EXTERNAL HOME HEALTH (OUTPATIENT)
Dept: HOME HEALTH SERVICES | Facility: HOSPITAL | Age: 78
End: 2023-09-18
Payer: MEDICARE

## 2023-09-25 NOTE — PROGRESS NOTES
09/09/22 2338   Patient Request   Patient Requested apple juice   Nurse Notification   Bedside Nurse Notified? Yes   Name of Bedside Nurse VI Doyle   Nurse Notfication Method Secure Chat   Nurse Notified Of Patient Request   Admission   Initial VN Admission Questions Complete   Shift   Virtual Nurse - Patient Verbalized Approval Of Camera Use;VN Rounding   Safety/Activity   Patient Rounds bed in low position;call light in patient/parent reach;clutter free environment maintained;visualized patient;placement of personal items at bedside   Safety Promotion/Fall Prevention assistive device/personal item within reach;Fall Risk reviewed with patient/family;side rails raised x 2   Positioning   Body Position supine   Head of Bed (HOB) Positioning HOB at 30-45 degrees   VN cued in to pt's room to complete admission questions.admission questions completed with pt. Pt falling asleep several times t/o admission questions but easily arousable to voice. Call bell w/in reach. Instructed to call for needs/assist oob.    Patient on pantoprazole 40 mg we will continue

## 2023-10-13 NOTE — PROGRESS NOTES
CHW - Follow Up    This Community Health Worker completed a follow up visit with patient via telephone today.  Pt/Caregiver reported: Patient has food insecurities.  Community Health Worker provided: several referrals through [email or web address], will follow up in one week to check status of referrals and pt's future needs.  Follow up required: yes.  Follow-up Outreach - Due: 10/19/2023

## 2023-10-19 ENCOUNTER — OFFICE VISIT (OUTPATIENT)
Dept: FAMILY MEDICINE | Facility: CLINIC | Age: 78
End: 2023-10-19
Payer: MEDICARE

## 2023-10-19 VITALS
DIASTOLIC BLOOD PRESSURE: 62 MMHG | BODY MASS INDEX: 29.13 KG/M2 | OXYGEN SATURATION: 95 % | HEIGHT: 64 IN | WEIGHT: 170.63 LBS | SYSTOLIC BLOOD PRESSURE: 110 MMHG | HEART RATE: 70 BPM

## 2023-10-19 DIAGNOSIS — I25.118 CORONARY ARTERY DISEASE OF NATIVE ARTERY OF NATIVE HEART WITH STABLE ANGINA PECTORIS: ICD-10-CM

## 2023-10-19 DIAGNOSIS — F41.1 GENERALIZED ANXIETY DISORDER: Primary | ICD-10-CM

## 2023-10-19 DIAGNOSIS — I35.0 NONRHEUMATIC AORTIC VALVE STENOSIS: ICD-10-CM

## 2023-10-19 DIAGNOSIS — R73.03 PREDIABETES: ICD-10-CM

## 2023-10-19 DIAGNOSIS — I10 PRIMARY HYPERTENSION: ICD-10-CM

## 2023-10-19 DIAGNOSIS — E78.5 HYPERLIPIDEMIA, UNSPECIFIED HYPERLIPIDEMIA TYPE: ICD-10-CM

## 2023-10-19 DIAGNOSIS — F33.1 MODERATE EPISODE OF RECURRENT MAJOR DEPRESSIVE DISORDER: ICD-10-CM

## 2023-10-19 DIAGNOSIS — N18.4 STAGE 4 CHRONIC KIDNEY DISEASE: ICD-10-CM

## 2023-10-19 PROCEDURE — 3288F FALL RISK ASSESSMENT DOCD: CPT | Mod: CPTII,S$GLB,, | Performed by: INTERNAL MEDICINE

## 2023-10-19 PROCEDURE — 3074F SYST BP LT 130 MM HG: CPT | Mod: CPTII,S$GLB,, | Performed by: INTERNAL MEDICINE

## 2023-10-19 PROCEDURE — 1126F PR PAIN SEVERITY QUANTIFIED, NO PAIN PRESENT: ICD-10-PCS | Mod: CPTII,S$GLB,, | Performed by: INTERNAL MEDICINE

## 2023-10-19 PROCEDURE — 3288F PR FALLS RISK ASSESSMENT DOCUMENTED: ICD-10-PCS | Mod: CPTII,S$GLB,, | Performed by: INTERNAL MEDICINE

## 2023-10-19 PROCEDURE — 99215 OFFICE O/P EST HI 40 MIN: CPT | Mod: S$GLB,,, | Performed by: INTERNAL MEDICINE

## 2023-10-19 PROCEDURE — 3078F DIAST BP <80 MM HG: CPT | Mod: CPTII,S$GLB,, | Performed by: INTERNAL MEDICINE

## 2023-10-19 PROCEDURE — 1160F PR REVIEW ALL MEDS BY PRESCRIBER/CLIN PHARMACIST DOCUMENTED: ICD-10-PCS | Mod: CPTII,S$GLB,, | Performed by: INTERNAL MEDICINE

## 2023-10-19 PROCEDURE — 1159F PR MEDICATION LIST DOCUMENTED IN MEDICAL RECORD: ICD-10-PCS | Mod: CPTII,S$GLB,, | Performed by: INTERNAL MEDICINE

## 2023-10-19 PROCEDURE — 1159F MED LIST DOCD IN RCRD: CPT | Mod: CPTII,S$GLB,, | Performed by: INTERNAL MEDICINE

## 2023-10-19 PROCEDURE — 3074F PR MOST RECENT SYSTOLIC BLOOD PRESSURE < 130 MM HG: ICD-10-PCS | Mod: CPTII,S$GLB,, | Performed by: INTERNAL MEDICINE

## 2023-10-19 PROCEDURE — 1101F PR PT FALLS ASSESS DOC 0-1 FALLS W/OUT INJ PAST YR: ICD-10-PCS | Mod: CPTII,S$GLB,, | Performed by: INTERNAL MEDICINE

## 2023-10-19 PROCEDURE — 3078F PR MOST RECENT DIASTOLIC BLOOD PRESSURE < 80 MM HG: ICD-10-PCS | Mod: CPTII,S$GLB,, | Performed by: INTERNAL MEDICINE

## 2023-10-19 PROCEDURE — 1101F PT FALLS ASSESS-DOCD LE1/YR: CPT | Mod: CPTII,S$GLB,, | Performed by: INTERNAL MEDICINE

## 2023-10-19 PROCEDURE — 1160F RVW MEDS BY RX/DR IN RCRD: CPT | Mod: CPTII,S$GLB,, | Performed by: INTERNAL MEDICINE

## 2023-10-19 PROCEDURE — 1126F AMNT PAIN NOTED NONE PRSNT: CPT | Mod: CPTII,S$GLB,, | Performed by: INTERNAL MEDICINE

## 2023-10-19 PROCEDURE — 99999 PR PBB SHADOW E&M-EST. PATIENT-LVL V: CPT | Mod: PBBFAC,,, | Performed by: INTERNAL MEDICINE

## 2023-10-19 PROCEDURE — 99215 PR OFFICE/OUTPT VISIT, EST, LEVL V, 40-54 MIN: ICD-10-PCS | Mod: S$GLB,,, | Performed by: INTERNAL MEDICINE

## 2023-10-19 PROCEDURE — 99999 PR PBB SHADOW E&M-EST. PATIENT-LVL V: ICD-10-PCS | Mod: PBBFAC,,, | Performed by: INTERNAL MEDICINE

## 2023-10-19 RX ORDER — CITALOPRAM 10 MG/1
10 TABLET ORAL DAILY
Qty: 30 TABLET | Refills: 11 | Status: SHIPPED | OUTPATIENT
Start: 2023-10-19 | End: 2024-01-11

## 2023-10-19 RX ORDER — BUSPIRONE HYDROCHLORIDE 5 MG/1
5 TABLET ORAL DAILY PRN
Qty: 30 TABLET | Refills: 1 | Status: SHIPPED | OUTPATIENT
Start: 2023-10-19 | End: 2024-01-04
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
## 2023-10-22 ENCOUNTER — HOSPITAL ENCOUNTER (EMERGENCY)
Facility: HOSPITAL | Age: 78
Discharge: LEFT BEFORE DISCHARGE | End: 2023-10-22
Payer: MEDICARE

## 2023-10-22 VITALS
TEMPERATURE: 98 F | DIASTOLIC BLOOD PRESSURE: 41 MMHG | RESPIRATION RATE: 18 BRPM | OXYGEN SATURATION: 94 % | SYSTOLIC BLOOD PRESSURE: 96 MMHG | HEART RATE: 80 BPM | WEIGHT: 168 LBS | BODY MASS INDEX: 28.84 KG/M2

## 2023-10-22 PROCEDURE — 99900041 HC LEFT WITHOUT BEING SEEN- EMERGENCY

## 2023-10-24 ENCOUNTER — HOSPITAL ENCOUNTER (EMERGENCY)
Facility: HOSPITAL | Age: 78
Discharge: HOME | End: 2023-10-24
Attending: EMERGENCY MEDICINE
Payer: MEDICARE

## 2023-10-24 VITALS
HEART RATE: 60 BPM | RESPIRATION RATE: 17 BRPM | DIASTOLIC BLOOD PRESSURE: 60 MMHG | TEMPERATURE: 98 F | OXYGEN SATURATION: 98 % | SYSTOLIC BLOOD PRESSURE: 120 MMHG

## 2023-10-24 DIAGNOSIS — R10.9 ABDOMINAL PAIN, UNSPECIFIED ABDOMINAL LOCATION: ICD-10-CM

## 2023-10-24 DIAGNOSIS — R19.7 DIARRHEA, UNSPECIFIED TYPE: Primary | ICD-10-CM

## 2023-10-24 LAB
BACTERIA #/AREA URNS HPF: NORMAL /HPF
BASOPHILS # BLD AUTO: 0.06 K/UL (ref 0–0.2)
BASOPHILS NFR BLD: 0.8 % (ref 0–1.9)
BILIRUB UR QL STRIP: NEGATIVE
CLARITY UR: CLEAR
COLOR UR: YELLOW
DIFFERENTIAL METHOD: ABNORMAL
EOSINOPHIL # BLD AUTO: 0.3 K/UL (ref 0–0.5)
EOSINOPHIL NFR BLD: 4.8 % (ref 0–8)
ERYTHROCYTE [DISTWIDTH] IN BLOOD BY AUTOMATED COUNT: 12 % (ref 11.5–14.5)
GLUCOSE UR QL STRIP: NEGATIVE
HCT VFR BLD AUTO: 33.7 % (ref 37–48.5)
HGB BLD-MCNC: 11.1 G/DL (ref 12–16)
HGB UR QL STRIP: NEGATIVE
IMM GRANULOCYTES # BLD AUTO: 0.01 K/UL (ref 0–0.04)
IMM GRANULOCYTES NFR BLD AUTO: 0.1 % (ref 0–0.5)
KETONES UR QL STRIP: NEGATIVE
LEUKOCYTE ESTERASE UR QL STRIP: NEGATIVE
LIPASE SERPL-CCNC: 27 U/L (ref 4–60)
LYMPHOCYTES # BLD AUTO: 2.1 K/UL (ref 1–4.8)
LYMPHOCYTES NFR BLD: 29.2 % (ref 18–48)
MCH RBC QN AUTO: 32.1 PG (ref 27–31)
MCHC RBC AUTO-ENTMCNC: 32.9 G/DL (ref 32–36)
MCV RBC AUTO: 97 FL (ref 82–98)
MICROSCOPIC COMMENT: NORMAL
MONOCYTES # BLD AUTO: 1.2 K/UL (ref 0.3–1)
MONOCYTES NFR BLD: 17.3 % (ref 4–15)
NEUTROPHILS # BLD AUTO: 3.4 K/UL (ref 1.8–7.7)
NEUTROPHILS NFR BLD: 47.8 % (ref 38–73)
NITRITE UR QL STRIP: POSITIVE
NRBC BLD-RTO: 0 /100 WBC
PH UR STRIP: 8 [PH] (ref 5–8)
PLATELET # BLD AUTO: 276 K/UL (ref 150–450)
PMV BLD AUTO: 9.3 FL (ref 9.2–12.9)
PROT UR QL STRIP: NEGATIVE
RBC # BLD AUTO: 3.46 M/UL (ref 4–5.4)
RBC #/AREA URNS HPF: 0 /HPF (ref 0–4)
SP GR UR STRIP: 1 (ref 1–1.03)
SQUAMOUS #/AREA URNS HPF: 1 /HPF
URN SPEC COLLECT METH UR: ABNORMAL
UROBILINOGEN UR STRIP-ACNC: NEGATIVE EU/DL
WBC # BLD AUTO: 7.06 K/UL (ref 3.9–12.7)
WBC #/AREA URNS HPF: 0 /HPF (ref 0–5)

## 2023-10-24 PROCEDURE — 85025 COMPLETE CBC W/AUTO DIFF WBC: CPT

## 2023-10-24 PROCEDURE — 81000 URINALYSIS NONAUTO W/SCOPE: CPT

## 2023-10-24 PROCEDURE — 63600175 PHARM REV CODE 636 W HCPCS

## 2023-10-24 PROCEDURE — 96374 THER/PROPH/DIAG INJ IV PUSH: CPT

## 2023-10-24 PROCEDURE — 25000003 PHARM REV CODE 250

## 2023-10-24 PROCEDURE — 96376 TX/PRO/DX INJ SAME DRUG ADON: CPT

## 2023-10-24 PROCEDURE — 83690 ASSAY OF LIPASE: CPT

## 2023-10-24 PROCEDURE — 96361 HYDRATE IV INFUSION ADD-ON: CPT

## 2023-10-24 PROCEDURE — 87086 URINE CULTURE/COLONY COUNT: CPT

## 2023-10-24 PROCEDURE — 99284 EMERGENCY DEPT VISIT MOD MDM: CPT | Mod: 25

## 2023-10-24 PROCEDURE — 87186 SC STD MICRODIL/AGAR DIL: CPT

## 2023-10-24 PROCEDURE — 80053 COMPREHEN METABOLIC PANEL: CPT

## 2023-10-24 PROCEDURE — 63700000 PHARM REV CODE 250 ALT 637 W/O HCPCS

## 2023-10-24 PROCEDURE — 87077 CULTURE AEROBIC IDENTIFY: CPT

## 2023-10-24 PROCEDURE — 87088 URINE BACTERIA CULTURE: CPT

## 2023-10-24 RX ORDER — ONDANSETRON 2 MG/ML
4 INJECTION INTRAMUSCULAR; INTRAVENOUS
Status: COMPLETED | OUTPATIENT
Start: 2023-10-24 | End: 2023-10-24

## 2023-10-24 RX ORDER — SODIUM CHLORIDE 9 MG/ML
1000 INJECTION, SOLUTION INTRAVENOUS
Status: COMPLETED | OUTPATIENT
Start: 2023-10-24 | End: 2023-10-24

## 2023-10-24 RX ORDER — ONDANSETRON 4 MG/1
4 TABLET, FILM COATED ORAL EVERY 6 HOURS
Qty: 12 TABLET | Refills: 0 | Status: SHIPPED | OUTPATIENT
Start: 2023-10-24 | End: 2024-01-04 | Stop reason: CLARIF

## 2023-10-24 RX ORDER — DIPHENOXYLATE HYDROCHLORIDE AND ATROPINE SULFATE 2.5; .025 MG/1; MG/1
1 TABLET ORAL 4 TIMES DAILY PRN
Qty: 12 TABLET | Refills: 0 | Status: SHIPPED | OUTPATIENT
Start: 2023-10-24 | End: 2023-11-03

## 2023-10-24 RX ORDER — ONDANSETRON 2 MG/ML
8 INJECTION INTRAMUSCULAR; INTRAVENOUS
Status: DISCONTINUED | OUTPATIENT
Start: 2023-10-24 | End: 2023-10-24

## 2023-10-24 RX ORDER — AZITHROMYCIN 250 MG/1
500 TABLET, FILM COATED ORAL
Status: COMPLETED | OUTPATIENT
Start: 2023-10-24 | End: 2023-10-24

## 2023-10-24 RX ADMIN — ONDANSETRON 4 MG: 2 INJECTION INTRAMUSCULAR; INTRAVENOUS at 01:10

## 2023-10-24 RX ADMIN — AZITHROMYCIN MONOHYDRATE 500 MG: 250 TABLET ORAL at 02:10

## 2023-10-24 RX ADMIN — SODIUM CHLORIDE 1000 ML: 9 INJECTION, SOLUTION INTRAVENOUS at 11:10

## 2023-10-24 RX ADMIN — ONDANSETRON 4 MG: 2 INJECTION INTRAMUSCULAR; INTRAVENOUS at 12:10

## 2023-10-24 NOTE — DISCHARGE INSTRUCTIONS
Ms. García    Thank you for letting me care for you today! It was nice meeting you, and I hope you feel better soon.   If you would like access to your chart and what was done today please utilize the [facility].   Please don't hesitate to return if your symptoms worsen or you develop any other worrisome symptoms.    Our goal in the emergency department is to always give you outstanding care and exceptional service. You may receive a survey by mail or e-mail in the next week regarding your experience in our ED. We would greatly appreciate you completing and returning the survey. Your feedback provides us with a way to recognize our staff who give very good care and it helps us learn how to improve when your experience was below our aspiration of excellence.     Sincerely,    Emerald Moore PA-C  Emergency Department Physician Assistant  [facility], [place], and [place]

## 2023-10-24 NOTE — ED NOTES
Two patient identifiers have been checked and are correct.      Appearance: Pt awake, alert & oriented to person, place & time. Pt in no acute distress at present time. Pt is clean and well groomed with clothes appropriately fastened.   Skin: Skin warm, dry & intact. Color consistent with ethnicity. Mucous membranes moist. No breakdown or brusing noted.   Musculoskeletal: Patient moving all extremities well, no obvious swelling or deformities noted. Reports chronic back pain.   Respiratory: Respirations spontaneous, even, and non-labored. Visible chest rise noted. Airway is open and patent. No accessory muscle use noted.   Neurologic: Sensation is intact. Speech is clear and appropriate. Eyes open spontaneously, behavior appropriate to situation, follows commands, facial expression symmetrical, bilateral hand grasp equal and even, purposeful motor response noted.  Cardiac: All peripheral pulses present. No Bilateral lower extremity edema. Cap refill is <3 seconds.  Abdomen: Abdomen soft, non-tender to palpation.   : Pt reports dysuria & hematuria.

## 2023-10-24 NOTE — ED PROVIDER NOTES
Encounter Date: 10/24/2023       History     Chief Complaint   Patient presents with    Urinary Frequency     Pt presents to the ed with the complaints that she possibly has a UTI. Pt states that she was seen here on Sunday      77 yo F w PMHx including CAD s/p stent placement, chronic diastolic dysfunction, HTN, moderate aortic stenosis who presents to the ED with abdominal pain.  Patient reports several episodes of diarrhea starting 5 days ago.  States several episodes of loose stools daily. Able to tolerate solid/ liquids. Associated symptoms include nausea and abdominal cramping. Pt also endorses suprapubic tenderness. Notes dribbling when she urinates. Has been taking Azo with mild improvement of her symptoms. No dysuria or urinary frequency. Of note, pt reports chronic back pain which she sees back and spine. No recent injury/ trama. No recent antibiotic use. No fever, chills, malaise.     The history is provided by the patient.     Review of patient's allergies indicates:   Allergen Reactions    Iodinated contrast media Anaphylaxis and Other (See Comments)    Nsaids (non-steroidal anti-inflammatory drug)      ADWOA    Phenergan [promethazine]      Vomiting     Past Medical History:   Diagnosis Date    Aortic atherosclerosis 6/14/2023    CKD (chronic kidney disease) stage 4, GFR 15-29 ml/min     Coronary artery disease     Edema     Epilepsy     Generalized anxiety disorder 12/20/2021    Hematuria, unspecified     Hematuria, unspecified     Hyperlipidemia     Hypertension     Iron deficiency anemia     Moderate episode of recurrent major depressive disorder     Nonrheumatic aortic valve stenosis 12/20/2021    Normocytic anemia     Prediabetes 2/24/2022     Past Surgical History:   Procedure Laterality Date    APPENDECTOMY      BACK SURGERY      CORONARY STENT PLACEMENT      HYSTERECTOMY      REVISION OF KNEE ARTHROPLASTY Left 7/18/2022    Procedure: REVISION, ARTHROPLASTY, KNEE;  Surgeon: Stan Catalan MD;   Location: [facility] OR;  Service: Orthopedics;  Laterality: Left;    TONSILLECTOMY       Family History   Problem Relation Age of Onset    Heart disease Mother     Heart disease Father      Social History     Tobacco Use    Smoking status: Never    Smokeless tobacco: Never   Substance Use Topics    Alcohol use: No    Drug use: No     Review of Systems   Constitutional:  Negative for appetite change, chills and fever.   HENT:  Negative for congestion and sneezing.    Respiratory:  Negative for chest tightness, shortness of breath and wheezing.    Cardiovascular:  Negative for chest pain.   Gastrointestinal:  Positive for abdominal pain, diarrhea and nausea. Negative for abdominal distention, blood in stool and vomiting.   Genitourinary:  Negative for dysuria, flank pain, frequency, hematuria, pelvic pain, vaginal bleeding and vaginal discharge.   Neurological:  Negative for dizziness, weakness and headaches.   Psychiatric/Behavioral:  The patient is nervous/anxious.        Physical Exam     Initial Vitals [10/24/23 0951]   BP Pulse Resp Temp SpO2   120/60 60 17 98 °F (36.7 °C) 98 %      MAP       --         Physical Exam    Nursing note and vitals reviewed.  Constitutional: She appears well-developed and well-nourished. She is not diaphoretic. No distress.   HENT:   Head: Normocephalic and atraumatic.   Right Ear: External ear normal.   Left Ear: External ear normal.   Mouth/Throat: Oropharynx is clear and moist.   Eyes: EOM are normal. Pupils are equal, round, and reactive to light.   Neck: Neck supple.   Normal range of motion.  Cardiovascular:  Normal rate and normal heart sounds.           Pulmonary/Chest: Breath sounds normal. No respiratory distress. She has no wheezes.   Abdominal: Abdomen is soft. Bowel sounds are normal. She exhibits no distension. There is abdominal tenderness. There is no rebound and no guarding.   Musculoskeletal:         General: Normal range of motion.      Cervical back: Normal range of  motion and neck supple.     Neurological: She is alert and oriented to person, place, and time. GCS score is 15. GCS eye subscore is 4. GCS verbal subscore is 5. GCS motor subscore is 6.   Skin: Skin is warm. Capillary refill takes less than 2 seconds. No rash noted. No pallor.         ED Course   Procedures  Labs Reviewed   URINALYSIS, REFLEX TO URINE CULTURE - Abnormal; Notable for the following components:       Result Value    Nitrite, UA Positive (*)     All other components within normal limits    Narrative:     Specimen Source->Urine   CBC W/ AUTO DIFFERENTIAL - Abnormal; Notable for the following components:    RBC 3.46 (*)     Hemoglobin 11.1 (*)     Hematocrit 33.7 (*)     MCH 32.1 (*)     Mono # 1.2 (*)     Mono % 17.3 (*)     All other components within normal limits   COMPREHENSIVE METABOLIC PANEL - Abnormal; Notable for the following components:    CO2 18 (*)     Albumin 3.3 (*)     eGFR 58 (*)     Anion Gap 18 (*)     All other components within normal limits   CULTURE, URINE   CULTURE, URINE   LIPASE   URINALYSIS MICROSCOPIC    Narrative:     Specimen Source->Urine          Imaging Results    None          Medications   0.9%  NaCl infusion (0 mLs Intravenous Stopped 10/24/23 1239)   ondansetron injection 4 mg (4 mg Intravenous Given 10/24/23 1235)   ondansetron injection 4 mg (4 mg Intravenous Given 10/24/23 1316)   azithromycin tablet 500 mg (500 mg Oral Given 10/24/23 1441)     Medical Decision Making  Differential diagnosis     Gastroenteritis, colitis, IBD/IBS, biliary colic, GERD, PUD, constipation, UTI/pyelonephritis,  disorder,AAA, aortic dissection, mesenteric ischemia, perforated viscous, MI/ACS, SBO/volvulus, incarcerated/strangulated hernia, intussusception, ileus, appendicitis, cholecystitis, cholangitis, diverticulitis, esophagitis, hepatitis, nephrolithiasis, pancreatitis.    ED management     78 year-old female with past medical history hypertension, moderate aortic stenosis, chronic  diastolic dysfunction presents to the ED with abdominal pain. Patient is not toxic appearing, hemodynamically stable and resting comfortably on bed. Afebrile with vitals WNL. No distress on exam. Physical exam as stated above. CT imaging not obtain today since is unlikely offer additional benefit or weight the risk of contrast/radiation. Based routine labs, history and physical exam I do not suspect ADWOA or dehydration.  Patient appears hydrated and stable to tolerate p.o. in the ED. Abdominal exam benign.  UA negative for UTIs.  I do not suspect biliary colic, colitis, diverticulitis. No blood in stool that will be just suggestive of bacterial diarrhea. Due the frequency of diarrhea will give a dose of azithromycin prior to discharge.  Patient is comfortable with this plan and verbalized understanding. Advise to continue good hydration and take Lomotil for diarrhea.     I have discussed the specifics of the workup with the patient and the patient has verbalized understanding of the details of the workup, the diagnosis, the treatment plan, and the need for outpatient follow-up with PCP. ED precautions given. Discussed with pt about returning to the ED, if symptoms fail to improve or worsen. Care of patient also discussed with my attending Dr. Hathaway who agrees with assessment and plan.    RESULTS:  Documented in ED course. Labs/ekg interpreted by myself and Dr. Hathaway.                Amount and/or Complexity of Data Reviewed  External Data Reviewed: labs.  Labs: ordered.               ED Course as of 10/24/23 1652 Tue Oct 24, 2023   1140 Lipase: 27 [NW]   1142 Comprehensive metabolic panel(!)  Cr 1.0 at baseline. No concerns for ADWOA, GFR of 58 due to underlining kidney disease. [NW]   1143 CBC auto differential(!)  H/H stable [NW]   1143 UA still pending [NW]   1225 Updated patient and daughter bedside regarding test result.  Waiting for patient to provide urine sample at this time. [NW]   1300 Will give another  round of Zofran for nausea.  [NW]   1348 Urinalysis, Reflex to Urine Culture Urine, Clean Catch(!)  No leukocytes. + nitrates. [NW]   1400 Case discussed with Dr. Hathaway who agrees with my plan and treatment.  We will to give one dose of Z-pack in the ED to help with the diarrhea and send home with lomotil.  [NW]      ED Course User Index  [NW] Emerald Moore PA-C                      Clinical Impression:   Final diagnoses:  [R19.7] Diarrhea, unspecified type (Primary)  [R10.9] Abdominal pain, unspecified abdominal location        ED Disposition Condition    Discharge Stable          ED Prescriptions       Medication Sig Dispense Start Date End Date Auth. Provider    diphenoxylate-atropine 2.5-0.025 mg (LOMOTIL) 2.5-0.025 mg per tablet Take 1 tablet by mouth 4 (four) times daily as needed for Diarrhea. 12 tablet 10/24/2023 11/3/2023 Emerald Moore PA-C    ondansetron (ZOFRAN) 4 MG tablet Take 1 tablet (4 mg total) by mouth every 6 (six) hours. 12 tablet 10/24/2023 -- Emerald Moore PA-C          Follow-up Information       Follow up With Specialties Details Why Contact Info    Lauren Brown MD Family Medicine Schedule an appointment as soon as possible for a visit in 2 days As needed, If symptoms worsen [address] [address]  [phone]               Emerald Moore PA-C  10/24/23 3968

## 2023-10-25 ENCOUNTER — PATIENT OUTREACH (OUTPATIENT)
Dept: EMERGENCY MEDICINE | Facility: HOSPITAL | Age: 78
End: 2023-10-25
Payer: MEDICARE

## 2023-10-25 LAB
ALBUMIN SERPL BCP-MCNC: 3.3 G/DL (ref 3.5–5.2)
ALP SERPL-CCNC: 58 U/L (ref 55–135)
ALT SERPL W/O P-5'-P-CCNC: 19 U/L (ref 10–44)
ANION GAP SERPL CALC-SCNC: 12 MMOL/L (ref 8–16)
AST SERPL-CCNC: 29 U/L (ref 10–40)
BILIRUB SERPL-MCNC: 0.4 MG/DL (ref 0.1–1)
BUN SERPL-MCNC: 18 MG/DL (ref 8–23)
CALCIUM SERPL-MCNC: 9.8 MG/DL (ref 8.7–10.5)
CHLORIDE SERPL-SCNC: 103 MMOL/L (ref 95–110)
CO2 SERPL-SCNC: 24 MMOL/L (ref 23–29)
CREAT SERPL-MCNC: 1 MG/DL (ref 0.5–1.4)
EST. GFR  (NO RACE VARIABLE): 58 ML/MIN/1.73 M^2
GLUCOSE SERPL-MCNC: 92 MG/DL (ref 70–110)
POTASSIUM SERPL-SCNC: 4.4 MMOL/L (ref 3.5–5.1)
PROT SERPL-MCNC: 6.7 G/DL (ref 6–8.4)
SODIUM SERPL-SCNC: 139 MMOL/L (ref 136–145)

## 2023-10-26 NOTE — PROGRESS NOTES
I spoke with pt regarding ED visit on 10/25/23 for Diarrhea, unspecified type; Abdominal pain, unspecified abdominal location. Pt states that she is having back pain and can hardly get around Pt was scheduled an appt with Dr. Lim at [facility] on 10/31/23 for epidural in [facility]. Pt states that she is concerned about the shot because she is normally sedated and they are now telling her that they will not cover her being sedated. Pt states that she is concerned about being awake during this procedure. I contacted Dr. Lim's office and requested that they contact the pt for alternatives because she is afraid due to the pain. Pt does not have transportation issues and has no additional needs at this time.     Delilah Smith

## 2023-10-27 LAB — BACTERIA UR CULT: ABNORMAL

## 2023-11-06 ENCOUNTER — PATIENT OUTREACH (OUTPATIENT)
Dept: ADMINISTRATIVE | Facility: OTHER | Age: 78
End: 2023-11-06

## 2023-11-21 ENCOUNTER — PATIENT MESSAGE (OUTPATIENT)
Dept: FAMILY MEDICINE | Facility: CLINIC | Age: 78
End: 2023-11-21
Payer: MEDICARE

## 2023-11-30 ENCOUNTER — PATIENT MESSAGE (OUTPATIENT)
Dept: NEUROLOGY | Facility: CLINIC | Age: 78
End: 2023-11-30
Payer: MEDICARE

## 2023-11-30 NOTE — PROGRESS NOTES
CH[facility] - Outreach Attempt    Community Health Worker left a voicemail message for 2nd attempt to contact patient regardinnd missed follow up visit attempt call.

## 2023-12-06 ENCOUNTER — TELEPHONE (OUTPATIENT)
Dept: FAMILY MEDICINE | Facility: CLINIC | Age: 78
End: 2023-12-06
Payer: MEDICARE

## 2023-12-06 NOTE — TELEPHONE ENCOUNTER
----- Message from Aminta Barajas sent at 12/6/2023 10:47 AM CST -----  Type:  Sooner Appointment Request    Caller is requesting a sooner appointment.  Caller declined first available appointment listed below.  Caller will not accept being placed on the waitlist and is requesting a message be sent to doctor.  Name of Caller:pt  When is the first available appointment?n/a  Symptoms:Est Care  Would the patient rather a call back or a response via [employer]? call  Best Call Back Number:[phone]  Additional Information:

## 2023-12-08 ENCOUNTER — LAB VISIT (OUTPATIENT)
Dept: LAB | Facility: HOSPITAL | Age: 78
End: 2023-12-08
Attending: INTERNAL MEDICINE
Payer: MEDICARE

## 2023-12-08 ENCOUNTER — NURSE TRIAGE (OUTPATIENT)
Dept: ADMINISTRATIVE | Facility: CLINIC | Age: 78
End: 2023-12-08
Payer: MEDICARE

## 2023-12-08 DIAGNOSIS — N18.4 STAGE 4 CHRONIC KIDNEY DISEASE: ICD-10-CM

## 2023-12-08 DIAGNOSIS — F41.1 GENERALIZED ANXIETY DISORDER: ICD-10-CM

## 2023-12-08 DIAGNOSIS — I10 PRIMARY HYPERTENSION: ICD-10-CM

## 2023-12-08 LAB
ALBUMIN SERPL BCP-MCNC: 3.5 G/DL (ref 3.5–5.2)
ALP SERPL-CCNC: 83 U/L (ref 55–135)
ALT SERPL W/O P-5'-P-CCNC: 26 U/L (ref 10–44)
ANION GAP SERPL CALC-SCNC: 10 MMOL/L (ref 8–16)
AST SERPL-CCNC: 28 U/L (ref 10–40)
BASOPHILS # BLD AUTO: 0.07 K/UL (ref 0–0.2)
BASOPHILS NFR BLD: 1 % (ref 0–1.9)
BILIRUB SERPL-MCNC: 0.2 MG/DL (ref 0.1–1)
BUN SERPL-MCNC: 30 MG/DL (ref 8–23)
CALCIUM SERPL-MCNC: 10.1 MG/DL (ref 8.7–10.5)
CHLORIDE SERPL-SCNC: 99 MMOL/L (ref 95–110)
CO2 SERPL-SCNC: 33 MMOL/L (ref 23–29)
CREAT SERPL-MCNC: 1.4 MG/DL (ref 0.5–1.4)
DIFFERENTIAL METHOD: ABNORMAL
EOSINOPHIL # BLD AUTO: 0.3 K/UL (ref 0–0.5)
EOSINOPHIL NFR BLD: 3.9 % (ref 0–8)
ERYTHROCYTE [DISTWIDTH] IN BLOOD BY AUTOMATED COUNT: 12.1 % (ref 11.5–14.5)
EST. GFR  (NO RACE VARIABLE): 39 ML/MIN/1.73 M^2
GLUCOSE SERPL-MCNC: 85 MG/DL (ref 70–110)
HCT VFR BLD AUTO: 36.1 % (ref 37–48.5)
HGB BLD-MCNC: 11.6 G/DL (ref 12–16)
IMM GRANULOCYTES # BLD AUTO: 0.01 K/UL (ref 0–0.04)
IMM GRANULOCYTES NFR BLD AUTO: 0.1 % (ref 0–0.5)
LYMPHOCYTES # BLD AUTO: 2.2 K/UL (ref 1–4.8)
LYMPHOCYTES NFR BLD: 31.5 % (ref 18–48)
MCH RBC QN AUTO: 31.9 PG (ref 27–31)
MCHC RBC AUTO-ENTMCNC: 32.1 G/DL (ref 32–36)
MCV RBC AUTO: 99 FL (ref 82–98)
MONOCYTES # BLD AUTO: 0.9 K/UL (ref 0.3–1)
MONOCYTES NFR BLD: 12.4 % (ref 4–15)
NEUTROPHILS # BLD AUTO: 3.5 K/UL (ref 1.8–7.7)
NEUTROPHILS NFR BLD: 51.1 % (ref 38–73)
NRBC BLD-RTO: 0 /100 WBC
PLATELET # BLD AUTO: 262 K/UL (ref 150–450)
PMV BLD AUTO: 9.5 FL (ref 9.2–12.9)
POTASSIUM SERPL-SCNC: 4.6 MMOL/L (ref 3.5–5.1)
PROT SERPL-MCNC: 6.8 G/DL (ref 6–8.4)
RBC # BLD AUTO: 3.64 M/UL (ref 4–5.4)
SODIUM SERPL-SCNC: 142 MMOL/L (ref 136–145)
T4 FREE SERPL-MCNC: 0.71 NG/DL (ref 0.71–1.51)
TSH SERPL DL<=0.005 MIU/L-ACNC: 1.42 UIU/ML (ref 0.4–4)
WBC # BLD AUTO: 6.91 K/UL (ref 3.9–12.7)

## 2023-12-08 PROCEDURE — 36415 COLL VENOUS BLD VENIPUNCTURE: CPT | Performed by: INTERNAL MEDICINE

## 2023-12-08 PROCEDURE — 85025 COMPLETE CBC W/AUTO DIFF WBC: CPT | Performed by: INTERNAL MEDICINE

## 2023-12-08 PROCEDURE — 80053 COMPREHEN METABOLIC PANEL: CPT | Performed by: INTERNAL MEDICINE

## 2023-12-08 PROCEDURE — 84443 ASSAY THYROID STIM HORMONE: CPT | Performed by: INTERNAL MEDICINE

## 2023-12-08 PROCEDURE — 84439 ASSAY OF FREE THYROXINE: CPT | Performed by: INTERNAL MEDICINE

## 2023-12-08 NOTE — TELEPHONE ENCOUNTER
Pt's daughter Ms. Garcia states she needs to see her PCP. States she has hand tremors. Advised caller to see a Healthcare Provider in the office today. Advised caller the PCP does not have an opening today.  She refused an appointment with another Provider and states she cannot do a virtual visit. She is requesting to speak with Dr. Brown's nurse. Also advised of Urgent Care option. Caller hung up. Called back an no answer. Message routed to PCP's office to contact the daughter directly.

## 2023-12-08 NOTE — TELEPHONE ENCOUNTER
Reason for Disposition   Nursing judgment     NURSE'S JUDGEMENT    Protocols used: No Protocol [id number]

## 2023-12-11 ENCOUNTER — PATIENT MESSAGE (OUTPATIENT)
Dept: FAMILY MEDICINE | Facility: CLINIC | Age: 78
End: 2023-12-11
Payer: MEDICARE

## 2023-12-12 ENCOUNTER — TELEPHONE (OUTPATIENT)
Dept: FAMILY MEDICINE | Facility: CLINIC | Age: 78
End: 2023-12-12
Payer: MEDICARE

## 2023-12-12 NOTE — TELEPHONE ENCOUNTER
Furosemide was going to be change when apparently she is taking differently.  She was called to clarify how she is taking the furosemide.  A message was left to call us with those clarification.  Plans would be to decrease to half the strength 3 times a week.    ----- Message from Patricia Rogers MD sent at 12/12/2023  8:41 AM CST -----  Regarding: RE: Diuretics  Absolutely   ----- Message -----  From: Lauren Brown MD  Sent: 12/11/2023  10:05 PM CST  To: Patricia Rogers MD  Subject: Diuretics                                        Good evening, even though the patient's EF look good I see she has history of aortic stenosis.  Will itbe okay to change her diuretics to alternating 40 mg twice a day every other day and 40 mg daily every other day?    Lauren Haines

## 2023-12-20 RX ORDER — CEPHALEXIN 500 MG/1
500 CAPSULE ORAL 2 TIMES DAILY
Qty: 10 CAPSULE | Refills: 0 | Status: SHIPPED | OUTPATIENT
Start: 2023-12-20 | End: 2023-12-25

## 2023-12-26 ENCOUNTER — TELEPHONE (OUTPATIENT)
Dept: FAMILY MEDICINE | Facility: CLINIC | Age: 78
End: 2023-12-26
Payer: MEDICARE

## 2023-12-26 NOTE — TELEPHONE ENCOUNTER
Return patient phone call left to office in regards to an appointment. Patient didn't answer left vm.

## 2023-12-26 NOTE — TELEPHONE ENCOUNTER
----- Message from Ilda Batista sent at 12/26/2023  3:01 PM CST -----  Type:  Patient Returning Call    Who Called:pt  Who Left Message for Patient:office  Does the patient know what this is regarding?:appointment  Would the patient rather a call back or a response via [employer]? Call   Best Call Back Number:[phone]  Additional Information:

## 2023-12-26 NOTE — TELEPHONE ENCOUNTER
----- Message from Laya Cody sent at 12/26/2023  9:37 AM CST -----  Type:  Same Day Appointment Request    Caller is requesting a same day appointment.  Caller declined first available appointment listed below.    Name of Caller: Pt   When is the first available appointment? Panels were closed   Symptoms: Twitching, feeling sleepy, and below the knees: pins and needles feeling as well as numbness  Best Call Back Number: [phone]  Additional Information: Please be advised, pt prefers to be sometime this afternoon or sometime this week

## 2023-12-29 ENCOUNTER — OFFICE VISIT (OUTPATIENT)
Dept: FAMILY MEDICINE | Facility: CLINIC | Age: 78
End: 2023-12-29
Payer: MEDICARE

## 2023-12-29 VITALS
BODY MASS INDEX: 29.5 KG/M2 | DIASTOLIC BLOOD PRESSURE: 72 MMHG | HEIGHT: 64 IN | HEART RATE: 66 BPM | WEIGHT: 172.81 LBS | OXYGEN SATURATION: 95 % | SYSTOLIC BLOOD PRESSURE: 136 MMHG

## 2023-12-29 DIAGNOSIS — R51.9 CHRONIC NONINTRACTABLE HEADACHE, UNSPECIFIED HEADACHE TYPE: ICD-10-CM

## 2023-12-29 DIAGNOSIS — G89.29 CHRONIC NONINTRACTABLE HEADACHE, UNSPECIFIED HEADACHE TYPE: ICD-10-CM

## 2023-12-29 DIAGNOSIS — R25.1 TREMORS OF NERVOUS SYSTEM: ICD-10-CM

## 2023-12-29 DIAGNOSIS — R41.3 MEMORY CHANGES: Primary | ICD-10-CM

## 2023-12-29 PROCEDURE — 99214 PR OFFICE/OUTPT VISIT, EST, LEVL IV, 30-39 MIN: ICD-10-PCS | Mod: S$GLB,,, | Performed by: INTERNAL MEDICINE

## 2023-12-29 PROCEDURE — 99214 OFFICE O/P EST MOD 30 MIN: CPT | Mod: S$GLB,,, | Performed by: INTERNAL MEDICINE

## 2023-12-29 PROCEDURE — 1126F PR PAIN SEVERITY QUANTIFIED, NO PAIN PRESENT: ICD-10-PCS | Mod: CPTII,S$GLB,, | Performed by: INTERNAL MEDICINE

## 2023-12-29 PROCEDURE — 1126F AMNT PAIN NOTED NONE PRSNT: CPT | Mod: CPTII,S$GLB,, | Performed by: INTERNAL MEDICINE

## 2023-12-29 PROCEDURE — 3288F PR FALLS RISK ASSESSMENT DOCUMENTED: ICD-10-PCS | Mod: CPTII,S$GLB,, | Performed by: INTERNAL MEDICINE

## 2023-12-29 PROCEDURE — 1160F RVW MEDS BY RX/DR IN RCRD: CPT | Mod: CPTII,S$GLB,, | Performed by: INTERNAL MEDICINE

## 2023-12-29 PROCEDURE — 1159F MED LIST DOCD IN RCRD: CPT | Mod: CPTII,S$GLB,, | Performed by: INTERNAL MEDICINE

## 2023-12-29 PROCEDURE — 1159F PR MEDICATION LIST DOCUMENTED IN MEDICAL RECORD: ICD-10-PCS | Mod: CPTII,S$GLB,, | Performed by: INTERNAL MEDICINE

## 2023-12-29 PROCEDURE — 1160F PR REVIEW ALL MEDS BY PRESCRIBER/CLIN PHARMACIST DOCUMENTED: ICD-10-PCS | Mod: CPTII,S$GLB,, | Performed by: INTERNAL MEDICINE

## 2023-12-29 PROCEDURE — 99999 PR PBB SHADOW E&M-EST. PATIENT-LVL IV: CPT | Mod: PBBFAC,,, | Performed by: INTERNAL MEDICINE

## 2023-12-29 PROCEDURE — 1101F PR PT FALLS ASSESS DOC 0-1 FALLS W/OUT INJ PAST YR: ICD-10-PCS | Mod: CPTII,S$GLB,, | Performed by: INTERNAL MEDICINE

## 2023-12-29 PROCEDURE — 3075F SYST BP GE 130 - 139MM HG: CPT | Mod: CPTII,S$GLB,, | Performed by: INTERNAL MEDICINE

## 2023-12-29 PROCEDURE — 3075F PR MOST RECENT SYSTOLIC BLOOD PRESS GE 130-139MM HG: ICD-10-PCS | Mod: CPTII,S$GLB,, | Performed by: INTERNAL MEDICINE

## 2023-12-29 PROCEDURE — 3078F DIAST BP <80 MM HG: CPT | Mod: CPTII,S$GLB,, | Performed by: INTERNAL MEDICINE

## 2023-12-29 PROCEDURE — 99999 PR PBB SHADOW E&M-EST. PATIENT-LVL IV: ICD-10-PCS | Mod: PBBFAC,,, | Performed by: INTERNAL MEDICINE

## 2023-12-29 PROCEDURE — 1101F PT FALLS ASSESS-DOCD LE1/YR: CPT | Mod: CPTII,S$GLB,, | Performed by: INTERNAL MEDICINE

## 2023-12-29 PROCEDURE — 3288F FALL RISK ASSESSMENT DOCD: CPT | Mod: CPTII,S$GLB,, | Performed by: INTERNAL MEDICINE

## 2023-12-29 PROCEDURE — 3078F PR MOST RECENT DIASTOLIC BLOOD PRESSURE < 80 MM HG: ICD-10-PCS | Mod: CPTII,S$GLB,, | Performed by: INTERNAL MEDICINE

## 2023-12-31 NOTE — PROGRESS NOTES
Subjective:       Patient ID: Enedelia García is a 78 y.o. female.    Chief Complaint: Tremors, Memory Loss, and Numbness (Pins and needles )      HPI  Enedelia García is a 78 y.o. female with CAD, AS, HLD, ALEXANDRO, HTN, CKD-4, Anemia, Prediabetes, Fibromyalgia, OA, S/P Knee replacement and infection   who presents today for Tremors, Memory Loss, and Numbness (Pins and needles )    Reports she has been nervous.  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  She is on Cymbalta per pain management.  Does not feel Celexa has helped much but is a low-dose.  Has not seen the psychiatrist yet.  Now noticing tremors and not sure if is her anxiety or medications.  No new weaknesses.  Has been on gabapentin for a long time and not sure is helping her.  She follows with pain management.  Notices forgetfulness but not sure if is due to depression or exhaustion.  Her  has been needing a lot of attention.    Not sleeping well as she goes often to the bathroom.  Labs with increased BUN and will adjust diuretics with lower strength in the evening hoping that will help her nocturia.    Blood pressure and heart rate has been stable.      Still itching, Atarax helps.  Has dermatology appointment.    No current toxic habits.  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    Health Maintenance:  Health Maintenance   Topic Date Due    Shingles Vaccine (2 of 2) 05/04/2022    DEXA Scan  01/20/2025    Lipid Panel  06/30/2028    TETANUS VACCINE  09/09/2031    Hepatitis C Screening  Completed       Review of Systems   Constitutional: Negative.  Negative for fever and unexpected weight change.   HENT: Negative.  Negative for sore throat.         Occasional ear itching   Respiratory: Negative.  Negative for cough.    Cardiovascular: Negative.    Gastrointestinal: Negative.    Genitourinary:  Negative for difficulty urinating.   Musculoskeletal:  Positive for arthralgias and back pain.    Integumentary:  Positive for rash.   Neurological:  Positive for tremors, headaches and memory loss. Negative for dizziness.   Psychiatric/Behavioral:  The patient is nervous/anxious.       Past Medical History:   Diagnosis Date    Aortic atherosclerosis 6/14/2023    CKD (chronic kidney disease) stage 4, GFR 15-29 ml/min     Coronary artery disease     Edema     Epilepsy     Generalized anxiety disorder 12/20/2021    Hematuria, unspecified     Hematuria, unspecified     Hyperlipidemia     Hypertension     Iron deficiency anemia     Moderate episode of recurrent major depressive disorder     Nonrheumatic aortic valve stenosis 12/20/2021    Normocytic anemia     Prediabetes 2/24/2022       Past Surgical History:   Procedure Laterality Date    APPENDECTOMY      BACK SURGERY      CORONARY STENT PLACEMENT      HYSTERECTOMY      REVISION OF KNEE ARTHROPLASTY Left 7/18/2022    Procedure: REVISION, ARTHROPLASTY, KNEE;  Surgeon: Stan Catalan MD;  Location: [facility] OR;  Service: Orthopedics;  Laterality: Left;    TONSILLECTOMY         Family History   Problem Relation Age of Onset    Heart disease Mother     Heart disease Father        Social History     Socioeconomic History    Marital status: Significant Other   Tobacco Use    Smoking status: Never    Smokeless tobacco: Never   Substance and Sexual Activity    Alcohol use: No    Drug use: No    Sexual activity: Not Currently     Social Determinants of Health     Financial Resource Strain: Low Risk  (6/23/2023)    Overall Financial Resource Strain (CARDIA)     Difficulty of Paying Living Expenses: Not hard at all   Food Insecurity: Food Insecurity Present (8/15/2023)    Hunger Vital Sign     Worried About Running Out of Food in the Last Year: Sometimes true     Ran Out of Food in the Last Year: Sometimes true   Transportation Needs: No Transportation Needs (6/23/2023)    PRAPARE - Transportation     Lack of Transportation (Medical): No     Lack of Transportation (Non-Medical):  No   Physical Activity: Insufficiently Active (7/27/2022)    Exercise Vital Sign     Days of Exercise per Week: 3 days     Minutes of Exercise per Session: 20 min   Stress: Stress Concern Present (7/19/2022)    [place] [employer] - Occupational Stress Questionnaire     Feeling of Stress : To some extent   Social Connections: Moderately Isolated (7/27/2022)    Social Connection and Isolation Panel [NHANES]     Frequency of Communication with Friends and Family: More than three times a week     Frequency of Social Gatherings with Friends and Family: More than three times a week     Attends Shinto Services: Never     Active Member of Clubs or Organizations: No     Attends Club or Organization Meetings: Never     Marital Status: Living with partner   Housing Stability: Low Risk  (8/15/2023)    Housing Stability Vital Sign     Unable to Pay for Housing in the Last Year: No     Number of Places Lived in the Last Year: 1     Unstable Housing in the Last Year: No       Current Outpatient Medications   Medication Sig Dispense Refill    acetaminophen (TYLENOL) 500 MG tablet Take 500 mg by mouth every 6 (six) hours as needed for Pain.      AIMOVIG AUTOINJECTOR 70 mg/mL autoinjector Inject 70 mg into the skin every 30 days.      aspirin (ECOTRIN) 81 MG EC tablet Take 81 mg by mouth once daily.      busPIRone (BUSPAR) 5 MG Tab Take 1 tablet (5 mg total) by mouth daily as needed (anxiety). 30 tablet 1    butalbital-acetaminophen-caffeine -40 mg (FIORICET, ESGIC) -40 mg per tablet Take 1 tablet by mouth every 8 (eight) hours as needed.      calcium carbonate/vitamin D3 (VITAMIN D-3 ORAL) Take 1 tablet by mouth once daily.      citalopram (CELEXA) 10 MG tablet Take 1 tablet (10 mg total) by mouth once daily. 30 tablet 11    clotrimazole-betamethasone 1-0.05% (LOTRISONE) cream Apply topically 2 (two) times daily. 45 g 1    co-enzyme Q-10 30 mg capsule Take 30 mg by mouth once daily.       diphenhydrAMINE (BENADRYL ALLERGY) 25 mg tablet Take 2 tablets (50 mg total) by mouth As instructed for Insomnia. Take two tablets one hour prior to procedure 2 tablet 0    DULoxetine (CYMBALTA) 60 MG capsule Take 1 capsule (60 mg total) by mouth once daily. 90 capsule 0    FEROSUL 325 mg (65 mg iron) Tab tablet Take 325 mg by mouth once daily.      furosemide (LASIX) 40 MG tablet Take 2 tablets (80 mg total) by mouth 2 (two) times daily as needed (swelling). (Patient taking differently: Take 40 mg by mouth once daily.) 360 tablet 3    gabapentin (NEURONTIN) 600 MG tablet Take 600 mg by mouth 2 (two) times daily.      galcanezumab-gnlm 120 mg/mL PnIj Inject 120 mg into the skin every 28 days. maintenance dose 1 each 11    hydrOXYzine HCL (ATARAX) 25 MG tablet TAKE ONE TABLET BY MOUTH EVERY 6 HOURS AS NEEDED FOR ITCHING 30 tablet 2    irbesartan (AVAPRO) 300 MG tablet Take 1 tablet (300 mg total) by mouth every evening. 30 tablet 30    isosorbide mononitrate (IMDUR) 60 MG 24 hr tablet Take 1 tablet (60 mg total) by mouth once daily. 90 tablet 1    LINZESS 72 mcg Cap capsule TAKE ONE CAPSULE BY MOUTH BEFORE BREAKFAST 30 capsule 3    multivit-min-iron-FA-lutein (CENTRUM SILVER WOMEN) 8 mg iron-400 mcg-300 mcg Tab Take 1 tablet by mouth once daily.      nitroGLYCERIN (NITROSTAT) 0.3 MG SL tablet PLACE 1 TABLET UNDER THE TONGUE EVERY 5 MINUTES FOR UP TO 3 DOSES IF NEEDED FOR CHEST PAIN. CALL 911 IF PAIN PERSISTS 25 tablet 0    omega-3 fatty acids/fish oil (FISH OIL-OMEGA-3 FATTY ACIDS) 300-1,000 mg capsule Take 2 capsules by mouth once daily.      omeprazole (PRILOSEC) 20 MG capsule Take 20 mg by mouth once daily.      ondansetron (ZOFRAN) 4 MG tablet Take 1 tablet (4 mg total) by mouth every 6 (six) hours. 12 tablet 0    oxyCODONE (ROXICODONE) 10 mg Tab immediate release tablet Take 10 mg by mouth 4 (four) times daily.      predniSONE (DELTASONE) 50 MG Tab Take 1 tablet (50 mg total) by mouth As instructed. Take one  "tablet 13 hours prior to procedure, one tablet 7 hours  before procedure and then one tablet one hour prior to procedure. . 3 tablet 0    tiZANidine (ZANAFLEX) 4 MG tablet Take 1 tablet (4 mg total) by mouth every 8 (eight) hours as needed (spasms). 60 tablet 2    rosuvastatin (CRESTOR) 40 MG Tab Take 1 tablet (40 mg total) by mouth once daily. 90 tablet 3     No current facility-administered medications for this visit.       Review of patient's allergies indicates:   Allergen Reactions    Iodinated contrast media Anaphylaxis and Other (See Comments)    Nsaids (non-steroidal anti-inflammatory drug)      ADWOA    Phenergan [promethazine]      Vomiting         Objective:       Last 3 sets of Vitals        10/22/2023    12:06 PM 10/24/2023     9:51 AM 12/29/2023     2:32 PM   Vitals - 1 value per visit   SYSTOLIC 96 120 136   DIASTOLIC 41 60 72   Pulse 80 60 66   Temp 98.2 °F (36.8 °C) 98 °F (36.7 °C)    Resp 18 17    SPO2 94 % 98 % 95 %   Weight (lb) 168  172.84   Weight (kg) 76.204  78.4   Height   5' 4" (1.626 m)   BMI (Calculated)   29.7   Pain Score   Zero   Physical Exam  Constitutional:       General: She is not in acute distress.     Appearance: Normal appearance.   Eyes:      General: No scleral icterus.     Extraocular Movements: Extraocular movements intact.      Conjunctiva/sclera: Conjunctivae normal.   Neck:      Comments: No goiter.  Cardiovascular:      Rate and Rhythm: Normal rate and regular rhythm.      Pulses: Normal pulses.      Heart sounds: Normal heart sounds.   Pulmonary:      Effort: Pulmonary effort is normal.      Breath sounds: Normal breath sounds.   Abdominal:      General: Bowel sounds are normal. There is no distension.      Palpations: Abdomen is soft.   Musculoskeletal:         General: No swelling. Normal range of motion.   Lymphadenopathy:      Cervical: No cervical adenopathy.   Skin:     General: Skin is warm and dry.   Neurological:      General: No focal deficit present.      " Mental Status: She is alert and oriented to person, place, and time.      Motor: No weakness.      Comments: Hand tremor noted bilaterally.   Psychiatric:         Mood and Affect: Mood normal.         Behavior: Behavior normal.           CBC:  Recent Labs   Lab 08/18/23  0845 10/24/23  1101 12/08/23  0743   WBC 11.27 7.06 6.91   RBC 3.89 L 3.46 L 3.64 L   Hemoglobin 12.3 11.1 L 11.6 L   Hematocrit 38.5 33.7 L 36.1 L   Platelets 312 276 262   MCV 99 H 97 99 H   MCH 31.6 H 32.1 H 31.9 H   MCHC 31.9 L 32.9 32.1       CMP:  Recent Labs   Lab 06/30/23  1239 08/18/23  0845 10/24/23  1101 12/08/23  0743   Glucose 113 H  113 H   < > 92 85   Calcium 10.2  10.2   < > 9.8 10.1   Albumin 3.1 L  3.1 L   < > 3.3 L 3.5   Total Protein 7.0  --  6.7 6.8   Sodium 140  140   < > 139 142   Potassium 4.6  4.6   < > 4.4 4.6   CO2 26  26   < > 24 33 H   Chloride 104  104   < > 103 99   BUN 18  18   < > 18 30 H   Creatinine 1.0  1.0   < > 1.0 1.4   Alkaline Phosphatase 67  --  58 83   ALT 20  --  19 26   AST 26  --  29 28   Total Bilirubin 0.4  --  0.4 0.2    < > = values in this interval not displayed.       URINALYSIS:  Recent Labs   Lab 07/31/22  0003 08/29/22  1527 12/05/22  1437 06/12/23  1002 06/22/23  1846 08/18/23  0838 10/24/23  1239   Color, UA Yellow   < > Yellow   < > Yellow   < > Yellow   Specific Gravity, UA 1.010   < > 1.030   < > 1.025   < > 1.005   pH, UA 6.0   < > 6.0   < > 5.0   < > 8.0   Protein, UA Negative   < > Negative   < > Negative   < > Negative   Bacteria Rare   < >  --   --  None  --  Occasional   Nitrite, UA Negative   < > Negative   < > Negative   < > Positive A   Leukocytes, UA 1+ A   < > 1+ A   < > 1+ A   < > Negative   Urobilinogen, UA Negative   < > Negative   < > Negative   < > Negative   Hyaline Casts, UA 5 A  --  4 A  --  23 A  --   --     < > = values in this interval not displayed.        LIPIDS:  Recent Labs   Lab 02/23/22  1402 04/14/22  0240 11/17/22  1332 12/22/22  1020 06/30/23  1239  12/08/23  0743   TSH 1.299   < > 0.501 1.549 0.514 1.422   HDL 47  --  50  --  50  --    Cholesterol 179  --  193  --  147  --    Triglycerides 168 H  --  151 H  --  99  --    LDL Cholesterol 98.4  --  112.8  --  77.2  --    HDL/Cholesterol Ratio 26.3  --  25.9  --  34.0  --    Non-HDL Cholesterol 132  --  143  --  97  --    Total Cholesterol/HDL Ratio 3.8  --  3.9  --  2.9  --     < > = values in this interval not displayed.       TSH:  Recent Labs   Lab 12/22/22  1020 06/30/23  1239 12/08/23  0743   TSH 1.549 0.514 1.422         A1C:  Recent Labs   Lab 05/22/21  0447 02/23/22  1402 03/10/22  1629 06/29/22  1253 08/12/22  1026 11/17/22  1332 06/30/23  1239   Hemoglobin A1C 5.7 H 5.8 H 5.7 H 6.4 H 5.3 5.6 5.2         Imaging:  CTA Head and Neck (xpd)  Narrative: EXAMINATION:  CTA HEAD AND NECK (XPD)    CLINICAL HISTORY:  carotid disease on ultrasound;Occlusion and stenosis of bilateral carotid arteries    TECHNIQUE:  Non contrast low dose axial images were obtained through the head.  CT angiogram was performed from the level of the yoselin to the top of the head following the IV administration of 100mL of Omnipaque 350.   Sagittal and coronal reconstructions and maximum intensity projection reconstructions were performed. Arterial stenosis percentages are based on NASCET measurement criteria.    COMPARISON:  CT head 06/22/2023; carotid ultrasound 06/22/2023    FINDINGS:  CT HEAD    BRAIN: Generalized parenchymal atrophy. No intracranial mass or hemorrhage. No evidence of acute infarction. No abnormal enhancement.    CSF SPACES: Symmetric prominence of the ventricles, sulci, and cisterns, likely secondary to parenchymal volume loss.    VESSELS: See below.    BONES: No fracture or focal osseous lesion. Paranasal sinuses and mastoid air cells are well aerated.    SOFT TISSUES: Unremarkable.    CTA HEAD    ARTERIES: Calcific atherosclerosis.  No aneurysm or dissection.    Anterior circulation: The right internal carotid  artery is patent without significant stenosis.  The left internal carotid artery is occluded, with reconstituted flow in the terminal segment.  The anterior cerebral arteries are patent. The middle cerebral arteries are patent.    Posterior circulation: The intracranial vertebral arteries and the basilar artery are patent. The posterior cerebral arteries are patent.    Communicating arteries: An anterior communicating artery is present. No posterior communicating arteries are identified.    VEINS: The major intracranial venous structures are patent without evidence of thrombosis.    CTA NECK    ARTERIES: Calcific atherosclerosis.  No aneurysm or dissection.    Aortic arch: Left-sided 3-vessel aortic arch. The brachiocephalic and proximal subclavian arteries are patent.    Carotids: The common carotid arteries are patent. The right internal carotid artery is patent.  The left internal carotid artery is occluded just distal to the carotid bifurcation.    Vertebrals: Cervical segments of the vertebral arteries are patent.    SOFT TISSUES: Multiple thyroid nodules measuring up to 1.1 cm, not meeting criteria for further evaluation.  Mediastinal and right hilar lymphadenopathy.  Mosaic attenuation in the visualized lungs.  Scattered pulmonary micro nodules with tree-in-bud configuration bilaterally.    BONES: Degenerative changes in the cervical spine.  No acute fracture or focal lesion.  Impression: Occlusion of the left internal carotid artery just distal to the carotid bifurcation with reconstituted flow in the terminal segment.    No intracranial mass, hemorrhage, or evidence of acute infarction.    Mosaic attenuation in the visualized lungs with scattered tree-in-bud micro nodules suggesting small airway disease.  If the patient is at high risk for lung cancer, consider follow-up chest CT in 12 months.    Mediastinal and right hilar lymphadenopathy, likely reactive.    This report was flagged in Epic as  abnormal.    Electronically signed by: Roland Clark  Date:    08/11/2023  Time:    16:52      Assessment:       1. Memory changes    2. Tremors of nervous system    3. Chronic nonintractable headache, unspecified headache type            Plan:     Enedelia was seen today for tremors, memory loss and numbness.    Diagnoses and all orders for this visit:    Memory changes  -     multiple factors possibly associated including cardiovascular disease, depression/anxiety, medications.  -     Ambulatory referral/consult to Neurology; Future  -     Ambulatory referral/consult to Adult Neuropsychology; Future  - has referral to Neurology and Psychiatry.    Tremors of nervous system  -     will decrease gabapentin, hold Celexa.  Follow neurology evaluation.  -     Ambulatory referral/consult to Neurology; Future    Chronic nonintractable headache, unspecified headache type  -    suspect tension-type headache.  -     Ambulatory referral/consult to Neurology; Future       1. Return to clinic in 2-3 months.    Lauren Brown MD  [facility]  Disclaimer:  This note has been generated using voice-recognition software. There may be grammatical or spelling errors that have been missed during proof-reading

## 2024-01-03 ENCOUNTER — NURSE TRIAGE (OUTPATIENT)
Dept: ADMINISTRATIVE | Facility: CLINIC | Age: 79
End: 2024-01-03
Payer: MEDICARE

## 2024-01-03 ENCOUNTER — HOSPITAL ENCOUNTER (OUTPATIENT)
Facility: HOSPITAL | Age: 79
Discharge: HOME | End: 2024-01-04
Attending: EMERGENCY MEDICINE | Admitting: EMERGENCY MEDICINE
Payer: MEDICARE

## 2024-01-03 DIAGNOSIS — R07.9 CHEST PAIN, UNSPECIFIED TYPE: ICD-10-CM

## 2024-01-03 DIAGNOSIS — R42 DIZZINESS: ICD-10-CM

## 2024-01-03 DIAGNOSIS — R25.1 TREMOR: ICD-10-CM

## 2024-01-03 DIAGNOSIS — R00.0 TACHYCARDIA: ICD-10-CM

## 2024-01-03 DIAGNOSIS — J96.01 ACUTE RESPIRATORY FAILURE WITH HYPOXIA: Primary | ICD-10-CM

## 2024-01-03 DIAGNOSIS — R79.89 D-DIMER, ELEVATED: ICD-10-CM

## 2024-01-03 DIAGNOSIS — R53.83 CAREGIVER WITH FATIGUE: ICD-10-CM

## 2024-01-03 DIAGNOSIS — F43.21 ADJUSTMENT DISORDER WITH DEPRESSED MOOD: ICD-10-CM

## 2024-01-03 LAB
ALBUMIN SERPL BCP-MCNC: 3.5 G/DL (ref 3.5–5.2)
ALP SERPL-CCNC: 56 U/L (ref 55–135)
ALT SERPL W/O P-5'-P-CCNC: 20 U/L (ref 10–44)
ANION GAP SERPL CALC-SCNC: 11 MMOL/L (ref 8–16)
AST SERPL-CCNC: 28 U/L (ref 10–40)
BACTERIA #/AREA URNS HPF: ABNORMAL /HPF
BASOPHILS # BLD AUTO: 0.06 K/UL (ref 0–0.2)
BASOPHILS NFR BLD: 0.7 % (ref 0–1.9)
BILIRUB SERPL-MCNC: 0.3 MG/DL (ref 0.1–1)
BILIRUB UR QL STRIP: NEGATIVE
BNP SERPL-MCNC: 47 PG/ML (ref 0–99)
BUN SERPL-MCNC: 50 MG/DL (ref 8–23)
CALCIUM SERPL-MCNC: 10 MG/DL (ref 8.7–10.5)
CHLORIDE SERPL-SCNC: 96 MMOL/L (ref 95–110)
CLARITY UR: CLEAR
CO2 SERPL-SCNC: 30 MMOL/L (ref 23–29)
COLOR UR: YELLOW
CREAT SERPL-MCNC: 1.6 MG/DL (ref 0.5–1.4)
D DIMER PPP IA.FEU-MCNC: 0.87 MG/L FEU
DIFFERENTIAL METHOD BLD: ABNORMAL
EOSINOPHIL # BLD AUTO: 0.3 K/UL (ref 0–0.5)
EOSINOPHIL NFR BLD: 3.6 % (ref 0–8)
ERYTHROCYTE [DISTWIDTH] IN BLOOD BY AUTOMATED COUNT: 12.4 % (ref 11.5–14.5)
EST. GFR  (NO RACE VARIABLE): 33 ML/MIN/1.73 M^2
FIO2: 21 %
GLUCOSE SERPL-MCNC: 91 MG/DL (ref 70–110)
GLUCOSE UR QL STRIP: NEGATIVE
HCT VFR BLD AUTO: 33.8 % (ref 37–48.5)
HGB BLD-MCNC: 11.2 G/DL (ref 12–16)
HGB UR QL STRIP: NEGATIVE
HYALINE CASTS #/AREA URNS LPF: 5 /LPF
IMM GRANULOCYTES # BLD AUTO: 0.02 K/UL (ref 0–0.04)
IMM GRANULOCYTES NFR BLD AUTO: 0.2 % (ref 0–0.5)
INFLUENZA A, MOLECULAR: NEGATIVE
INFLUENZA B, MOLECULAR: NEGATIVE
KETONES UR QL STRIP: NEGATIVE
LEUKOCYTE ESTERASE UR QL STRIP: ABNORMAL
LYMPHOCYTES # BLD AUTO: 2.2 K/UL (ref 1–4.8)
LYMPHOCYTES NFR BLD: 27 % (ref 18–48)
MAGNESIUM SERPL-MCNC: 2.2 MG/DL (ref 1.6–2.6)
MCH RBC QN AUTO: 32 PG (ref 27–31)
MCHC RBC AUTO-ENTMCNC: 33.1 G/DL (ref 32–36)
MCV RBC AUTO: 97 FL (ref 82–98)
MICROSCOPIC COMMENT: ABNORMAL
MONOCYTES # BLD AUTO: 1.2 K/UL (ref 0.3–1)
MONOCYTES NFR BLD: 14.9 % (ref 4–15)
NEUTROPHILS # BLD AUTO: 4.4 K/UL (ref 1.8–7.7)
NEUTROPHILS NFR BLD: 53.6 % (ref 38–73)
NITRITE UR QL STRIP: NEGATIVE
NRBC BLD-RTO: 0 /100 WBC
PCO2 BLDA: 67.3 MMHG (ref 35–45)
PH SMN: 7.33 [PH] (ref 7.35–7.45)
PH UR STRIP: 5 [PH] (ref 5–8)
PLATELET # BLD AUTO: 271 K/UL (ref 150–450)
PMV BLD AUTO: 9.2 FL (ref 9.2–12.9)
PO2 BLDA: 19.5 MMHG (ref 40–60)
POC BASE DEFICIT: 8 MMOL/L (ref -2–2)
POC HCO3: 35.9 MMOL/L (ref 24–28)
POC PERFORMED BY: ABNORMAL
POC SATURATED O2: 22.4 % (ref 95–100)
POTASSIUM SERPL-SCNC: 3.6 MMOL/L (ref 3.5–5.1)
PROT SERPL-MCNC: 6.8 G/DL (ref 6–8.4)
PROT UR QL STRIP: NEGATIVE
RBC # BLD AUTO: 3.5 M/UL (ref 4–5.4)
RBC #/AREA URNS HPF: 1 /HPF (ref 0–4)
SARS-COV-2 RDRP RESP QL NAA+PROBE: NEGATIVE
SODIUM SERPL-SCNC: 137 MMOL/L (ref 136–145)
SP GR UR STRIP: 1.01 (ref 1–1.03)
SPECIMEN SOURCE: ABNORMAL
SPECIMEN SOURCE: NORMAL
SQUAMOUS #/AREA URNS HPF: 4 /HPF
TROPONIN I SERPL DL<=0.01 NG/ML-MCNC: 0.01 NG/ML (ref 0–0.03)
URN SPEC COLLECT METH UR: ABNORMAL
UROBILINOGEN UR STRIP-ACNC: NEGATIVE EU/DL
WBC # BLD AUTO: 8.23 K/UL (ref 3.9–12.7)
WBC #/AREA URNS HPF: 12 /HPF (ref 0–5)

## 2024-01-03 PROCEDURE — 87086 URINE CULTURE/COLONY COUNT: CPT

## 2024-01-03 PROCEDURE — 93010 ELECTROCARDIOGRAM REPORT: CPT | Mod: ,,, | Performed by: INTERNAL MEDICINE

## 2024-01-03 PROCEDURE — 81000 URINALYSIS NONAUTO W/SCOPE: CPT

## 2024-01-03 PROCEDURE — 63600175 PHARM REV CODE 636 W HCPCS: Performed by: STUDENT IN AN ORGANIZED HEALTH CARE EDUCATION/TRAINING PROGRAM

## 2024-01-03 PROCEDURE — 80053 COMPREHEN METABOLIC PANEL: CPT

## 2024-01-03 PROCEDURE — 83880 ASSAY OF NATRIURETIC PEPTIDE: CPT | Performed by: EMERGENCY MEDICINE

## 2024-01-03 PROCEDURE — 96361 HYDRATE IV INFUSION ADD-ON: CPT

## 2024-01-03 PROCEDURE — 85025 COMPLETE CBC W/AUTO DIFF WBC: CPT

## 2024-01-03 PROCEDURE — 82803 BLOOD GASES ANY COMBINATION: CPT

## 2024-01-03 PROCEDURE — 96365 THER/PROPH/DIAG IV INF INIT: CPT

## 2024-01-03 PROCEDURE — 99900035 HC TECH TIME PER 15 MIN (STAT)

## 2024-01-03 PROCEDURE — 25000003 PHARM REV CODE 250: Performed by: EMERGENCY MEDICINE

## 2024-01-03 PROCEDURE — 25000003 PHARM REV CODE 250: Performed by: STUDENT IN AN ORGANIZED HEALTH CARE EDUCATION/TRAINING PROGRAM

## 2024-01-03 PROCEDURE — G0378 HOSPITAL OBSERVATION PER HR: HCPCS

## 2024-01-03 PROCEDURE — 93005 ELECTROCARDIOGRAM TRACING: CPT

## 2024-01-03 PROCEDURE — 99285 EMERGENCY DEPT VISIT HI MDM: CPT | Mod: 25

## 2024-01-03 PROCEDURE — 96372 THER/PROPH/DIAG INJ SC/IM: CPT | Performed by: STUDENT IN AN ORGANIZED HEALTH CARE EDUCATION/TRAINING PROGRAM

## 2024-01-03 PROCEDURE — 84484 ASSAY OF TROPONIN QUANT: CPT

## 2024-01-03 PROCEDURE — 87502 INFLUENZA DNA AMP PROBE: CPT

## 2024-01-03 PROCEDURE — U0002 COVID-19 LAB TEST NON-CDC: HCPCS

## 2024-01-03 PROCEDURE — 83735 ASSAY OF MAGNESIUM: CPT

## 2024-01-03 PROCEDURE — 85379 FIBRIN DEGRADATION QUANT: CPT | Performed by: EMERGENCY MEDICINE

## 2024-01-03 PROCEDURE — 63600175 PHARM REV CODE 636 W HCPCS

## 2024-01-03 PROCEDURE — 63600175 PHARM REV CODE 636 W HCPCS: Performed by: EMERGENCY MEDICINE

## 2024-01-03 RX ORDER — ENOXAPARIN SODIUM 100 MG/ML
30 INJECTION SUBCUTANEOUS EVERY 24 HOURS
Status: DISCONTINUED | OUTPATIENT
Start: 2024-01-03 | End: 2024-01-04 | Stop reason: HOSPADM

## 2024-01-03 RX ORDER — IBUPROFEN 200 MG
16 TABLET ORAL
Status: DISCONTINUED | OUTPATIENT
Start: 2024-01-03 | End: 2024-01-04 | Stop reason: HOSPADM

## 2024-01-03 RX ORDER — OXYCODONE HYDROCHLORIDE 5 MG/1
10 TABLET ORAL EVERY 6 HOURS PRN
Status: DISCONTINUED | OUTPATIENT
Start: 2024-01-03 | End: 2024-01-04 | Stop reason: HOSPADM

## 2024-01-03 RX ORDER — LANOLIN ALCOHOL/MO/W.PET/CERES
1 CREAM (GRAM) TOPICAL DAILY
Status: DISCONTINUED | OUTPATIENT
Start: 2024-01-03 | End: 2024-01-04 | Stop reason: HOSPADM

## 2024-01-03 RX ORDER — HYDRALAZINE HYDROCHLORIDE 25 MG/1
25 TABLET, FILM COATED ORAL EVERY 8 HOURS
COMMUNITY
End: 2024-01-04

## 2024-01-03 RX ORDER — NALOXONE HCL 0.4 MG/ML
0.02 VIAL (ML) INJECTION
Status: DISCONTINUED | OUTPATIENT
Start: 2024-01-03 | End: 2024-01-04 | Stop reason: HOSPADM

## 2024-01-03 RX ORDER — ISOSORBIDE MONONITRATE 30 MG/1
60 TABLET, EXTENDED RELEASE ORAL DAILY
Status: DISCONTINUED | OUTPATIENT
Start: 2024-01-04 | End: 2024-01-04 | Stop reason: HOSPADM

## 2024-01-03 RX ORDER — IBUPROFEN 200 MG
24 TABLET ORAL
Status: DISCONTINUED | OUTPATIENT
Start: 2024-01-03 | End: 2024-01-04 | Stop reason: HOSPADM

## 2024-01-03 RX ORDER — DIPHENHYDRAMINE HCL 50 MG
50 CAPSULE ORAL
Status: DISCONTINUED | OUTPATIENT
Start: 2024-01-03 | End: 2024-01-03

## 2024-01-03 RX ORDER — DULOXETIN HYDROCHLORIDE 30 MG/1
60 CAPSULE, DELAYED RELEASE ORAL DAILY
Status: DISCONTINUED | OUTPATIENT
Start: 2024-01-04 | End: 2024-01-04 | Stop reason: HOSPADM

## 2024-01-03 RX ORDER — ATORVASTATIN CALCIUM 40 MG/1
80 TABLET, FILM COATED ORAL DAILY
Status: DISCONTINUED | OUTPATIENT
Start: 2024-01-04 | End: 2024-01-04 | Stop reason: HOSPADM

## 2024-01-03 RX ORDER — SODIUM CHLORIDE 0.9 % (FLUSH) 0.9 %
10 SYRINGE (ML) INJECTION EVERY 12 HOURS PRN
Status: DISCONTINUED | OUTPATIENT
Start: 2024-01-03 | End: 2024-01-04 | Stop reason: HOSPADM

## 2024-01-03 RX ORDER — HYDROXYZINE HYDROCHLORIDE 25 MG/1
TABLET, FILM COATED ORAL
Qty: 30 TABLET | Refills: 2 | Status: SHIPPED | OUTPATIENT
Start: 2024-01-03 | End: 2024-01-30

## 2024-01-03 RX ORDER — CITALOPRAM 10 MG/1
10 TABLET ORAL
Status: DISCONTINUED | OUTPATIENT
Start: 2024-01-03 | End: 2024-01-04 | Stop reason: HOSPADM

## 2024-01-03 RX ORDER — GLUCAGON 1 MG
1 KIT INJECTION
Status: DISCONTINUED | OUTPATIENT
Start: 2024-01-03 | End: 2024-01-04 | Stop reason: HOSPADM

## 2024-01-03 RX ORDER — ASPIRIN 81 MG/1
81 TABLET ORAL DAILY
Status: DISCONTINUED | OUTPATIENT
Start: 2024-01-04 | End: 2024-01-04 | Stop reason: HOSPADM

## 2024-01-03 RX ADMIN — FERROUS SULFATE TAB 325 MG (65 MG ELEMENTAL FE) 1 EACH: 325 (65 FE) TAB at 09:01

## 2024-01-03 RX ADMIN — ENOXAPARIN SODIUM 30 MG: 30 INJECTION SUBCUTANEOUS at 09:01

## 2024-01-03 RX ADMIN — CEFTRIAXONE SODIUM 1 G: 1 INJECTION, POWDER, FOR SOLUTION INTRAMUSCULAR; INTRAVENOUS at 04:01

## 2024-01-03 RX ADMIN — SODIUM CHLORIDE, POTASSIUM CHLORIDE, SODIUM LACTATE AND CALCIUM CHLORIDE 1000 ML: 600; 310; 30; 20 INJECTION, SOLUTION INTRAVENOUS at 03:01

## 2024-01-03 RX ADMIN — CITALOPRAM HYDROBROMIDE 10 MG: 10 TABLET ORAL at 09:01

## 2024-01-03 NOTE — TELEPHONE ENCOUNTER
"Spoke with pt who reports that she began to have muscle shaky 2 days ago. " I'm not even able to hold a cup." Pt advised to be seen in ED/UC. Pt sates she would like to be seen in office. Will send message to office.      Reason for Disposition   New-onset muscle jerks (twitches, spasms) and present now    Additional Information   Negative: Difficult to awaken or acting confused (e.g., disoriented, slurred speech)   Negative: Sounds like a life-threatening emergency to the triager   Negative: Muscle rigidity or tightness and present now    Protocols used: Muscle Jerks - Tics - [id number]    "

## 2024-01-03 NOTE — ED NOTES
Pt ambulated to and from bathroom with a pulse ox between % on RA throughout ambulation. PA aware.

## 2024-01-03 NOTE — ED NOTES
"Presents to ED from  EMS with c/o shaking x3-4 days that has increasingly become worse along with having difficulty walking for "months". C/o Neck and back pain which pt sees pain management for. Takes care of  who per pt is "a mean mean man." Assess if pt was being abused, pt denied and states "No, he's just mean." States having difficulty remembering things. VSS. No acute distress noted at this time. Symmetrical facial features noted along with equal hand . Call light is within reach. Cardiac monitoring in place. Safety is intact.   "

## 2024-01-03 NOTE — Clinical Note
Diagnosis: Dizziness [610780]   Future Attending Provider: MARION MITCHELL [[id number]]   Admitting Provider:: LELE MACDONALD [[id number]]   Special Needs:: No Special Needs [1]

## 2024-01-04 VITALS
SYSTOLIC BLOOD PRESSURE: 117 MMHG | BODY MASS INDEX: 29.37 KG/M2 | WEIGHT: 172 LBS | OXYGEN SATURATION: 98 % | DIASTOLIC BLOOD PRESSURE: 65 MMHG | HEIGHT: 64 IN | RESPIRATION RATE: 14 BRPM | TEMPERATURE: 98 F | HEART RATE: 64 BPM

## 2024-01-04 PROBLEM — R25.1 TREMOR: Status: ACTIVE | Noted: 2024-01-04

## 2024-01-04 PROBLEM — R79.89 D-DIMER, ELEVATED: Status: ACTIVE | Noted: 2024-01-04

## 2024-01-04 LAB
ALBUMIN SERPL BCP-MCNC: 3.2 G/DL (ref 3.5–5.2)
ALP SERPL-CCNC: 52 U/L (ref 55–135)
ALT SERPL W/O P-5'-P-CCNC: 18 U/L (ref 10–44)
ANION GAP SERPL CALC-SCNC: 5 MMOL/L (ref 8–16)
AST SERPL-CCNC: 23 U/L (ref 10–40)
BACTERIA UR CULT: NORMAL
BACTERIA UR CULT: NORMAL
BASOPHILS # BLD AUTO: 0.05 K/UL (ref 0–0.2)
BASOPHILS NFR BLD: 0.8 % (ref 0–1.9)
BILIRUB SERPL-MCNC: 0.2 MG/DL (ref 0.1–1)
BUN SERPL-MCNC: 38 MG/DL (ref 8–23)
CALCIUM SERPL-MCNC: 9.2 MG/DL (ref 8.7–10.5)
CHLORIDE SERPL-SCNC: 101 MMOL/L (ref 95–110)
CO2 SERPL-SCNC: 34 MMOL/L (ref 23–29)
CREAT SERPL-MCNC: 1.2 MG/DL (ref 0.5–1.4)
DIFFERENTIAL METHOD BLD: ABNORMAL
EOSINOPHIL # BLD AUTO: 0.3 K/UL (ref 0–0.5)
EOSINOPHIL NFR BLD: 4.4 % (ref 0–8)
ERYTHROCYTE [DISTWIDTH] IN BLOOD BY AUTOMATED COUNT: 12.3 % (ref 11.5–14.5)
EST. GFR  (NO RACE VARIABLE): 46 ML/MIN/1.73 M^2
FOLATE SERPL-MCNC: 16.5 NG/ML (ref 4–24)
GLUCOSE SERPL-MCNC: 86 MG/DL (ref 70–110)
HCT VFR BLD AUTO: 34 % (ref 37–48.5)
HGB BLD-MCNC: 11 G/DL (ref 12–16)
IMM GRANULOCYTES # BLD AUTO: 0.01 K/UL (ref 0–0.04)
IMM GRANULOCYTES NFR BLD AUTO: 0.2 % (ref 0–0.5)
LYMPHOCYTES # BLD AUTO: 1.7 K/UL (ref 1–4.8)
LYMPHOCYTES NFR BLD: 26.7 % (ref 18–48)
MAGNESIUM SERPL-MCNC: 2.2 MG/DL (ref 1.6–2.6)
MCH RBC QN AUTO: 31.6 PG (ref 27–31)
MCHC RBC AUTO-ENTMCNC: 32.4 G/DL (ref 32–36)
MCV RBC AUTO: 98 FL (ref 82–98)
MONOCYTES # BLD AUTO: 1 K/UL (ref 0.3–1)
MONOCYTES NFR BLD: 16 % (ref 4–15)
NEUTROPHILS # BLD AUTO: 3.3 K/UL (ref 1.8–7.7)
NEUTROPHILS NFR BLD: 51.9 % (ref 38–73)
NRBC BLD-RTO: 0 /100 WBC
PHOSPHATE SERPL-MCNC: 2.7 MG/DL (ref 2.7–4.5)
PLATELET # BLD AUTO: 258 K/UL (ref 150–450)
PMV BLD AUTO: 9.3 FL (ref 9.2–12.9)
POTASSIUM SERPL-SCNC: 4.1 MMOL/L (ref 3.5–5.1)
PROT SERPL-MCNC: 6.4 G/DL (ref 6–8.4)
RBC # BLD AUTO: 3.48 M/UL (ref 4–5.4)
SODIUM SERPL-SCNC: 140 MMOL/L (ref 136–145)
TSH SERPL DL<=0.005 MIU/L-ACNC: 1.02 UIU/ML (ref 0.4–4)
VIT B12 SERPL-MCNC: 1516 PG/ML (ref 210–950)
WBC # BLD AUTO: 6.37 K/UL (ref 3.9–12.7)

## 2024-01-04 PROCEDURE — 25000003 PHARM REV CODE 250: Performed by: STUDENT IN AN ORGANIZED HEALTH CARE EDUCATION/TRAINING PROGRAM

## 2024-01-04 PROCEDURE — G0378 HOSPITAL OBSERVATION PER HR: HCPCS

## 2024-01-04 PROCEDURE — 97140 MANUAL THERAPY 1/> REGIONS: CPT

## 2024-01-04 PROCEDURE — 97165 OT EVAL LOW COMPLEX 30 MIN: CPT

## 2024-01-04 PROCEDURE — 83735 ASSAY OF MAGNESIUM: CPT | Performed by: STUDENT IN AN ORGANIZED HEALTH CARE EDUCATION/TRAINING PROGRAM

## 2024-01-04 PROCEDURE — 97161 PT EVAL LOW COMPLEX 20 MIN: CPT

## 2024-01-04 PROCEDURE — 84100 ASSAY OF PHOSPHORUS: CPT | Performed by: STUDENT IN AN ORGANIZED HEALTH CARE EDUCATION/TRAINING PROGRAM

## 2024-01-04 PROCEDURE — 82746 ASSAY OF FOLIC ACID SERUM: CPT | Performed by: STUDENT IN AN ORGANIZED HEALTH CARE EDUCATION/TRAINING PROGRAM

## 2024-01-04 PROCEDURE — 80053 COMPREHEN METABOLIC PANEL: CPT | Performed by: STUDENT IN AN ORGANIZED HEALTH CARE EDUCATION/TRAINING PROGRAM

## 2024-01-04 PROCEDURE — 84443 ASSAY THYROID STIM HORMONE: CPT | Performed by: STUDENT IN AN ORGANIZED HEALTH CARE EDUCATION/TRAINING PROGRAM

## 2024-01-04 PROCEDURE — 85025 COMPLETE CBC W/AUTO DIFF WBC: CPT | Performed by: STUDENT IN AN ORGANIZED HEALTH CARE EDUCATION/TRAINING PROGRAM

## 2024-01-04 PROCEDURE — 82607 VITAMIN B-12: CPT | Performed by: STUDENT IN AN ORGANIZED HEALTH CARE EDUCATION/TRAINING PROGRAM

## 2024-01-04 PROCEDURE — 86592 SYPHILIS TEST NON-TREP QUAL: CPT | Performed by: STUDENT IN AN ORGANIZED HEALTH CARE EDUCATION/TRAINING PROGRAM

## 2024-01-04 RX ORDER — ROSUVASTATIN CALCIUM 40 MG/1
1 TABLET, COATED ORAL DAILY
COMMUNITY
End: 2024-01-04 | Stop reason: CLARIF

## 2024-01-04 RX ORDER — ISOSORBIDE MONONITRATE 60 MG/1
1 TABLET, EXTENDED RELEASE ORAL DAILY
COMMUNITY
End: 2024-01-04 | Stop reason: CLARIF

## 2024-01-04 RX ORDER — METOPROLOL SUCCINATE 25 MG/1
1 TABLET, EXTENDED RELEASE ORAL DAILY
COMMUNITY
End: 2024-01-04 | Stop reason: CLARIF

## 2024-01-04 RX ORDER — DIPHENOXYLATE HYDROCHLORIDE AND ATROPINE SULFATE 2.5; .025 MG/1; MG/1
1 TABLET ORAL 4 TIMES DAILY PRN
COMMUNITY
End: 2024-01-18

## 2024-01-04 RX ORDER — MUPIROCIN 20 MG/G
OINTMENT TOPICAL 2 TIMES DAILY
COMMUNITY
Start: 2023-12-15 | End: 2024-01-04

## 2024-01-04 RX ORDER — AMITRIPTYLINE HYDROCHLORIDE 10 MG/1
1 TABLET, FILM COATED ORAL NIGHTLY PRN
COMMUNITY
End: 2024-01-04 | Stop reason: CLARIF

## 2024-01-04 RX ORDER — IRBESARTAN 150 MG/1
1 TABLET ORAL NIGHTLY
COMMUNITY
End: 2024-01-04 | Stop reason: CLARIF

## 2024-01-04 RX ORDER — IRBESARTAN 300 MG/1
1 TABLET ORAL NIGHTLY
COMMUNITY
End: 2024-01-04 | Stop reason: CLARIF

## 2024-01-04 RX ORDER — BUSPIRONE HYDROCHLORIDE 5 MG/1
1 TABLET ORAL DAILY
COMMUNITY
End: 2024-01-04 | Stop reason: CLARIF

## 2024-01-04 RX ORDER — CITALOPRAM 10 MG/1
1 TABLET ORAL DAILY
COMMUNITY
End: 2024-01-04 | Stop reason: CLARIF

## 2024-01-04 RX ORDER — POLYETHYLENE GLYCOL 3350, SODIUM SULFATE ANHYDROUS, SODIUM BICARBONATE, SODIUM CHLORIDE, POTASSIUM CHLORIDE 236; 22.74; 6.74; 5.86; 2.97 G/4L; G/4L; G/4L; G/4L; G/4L
POWDER, FOR SOLUTION ORAL
COMMUNITY
End: 2024-01-04 | Stop reason: CLARIF

## 2024-01-04 RX ORDER — ONDANSETRON 4 MG/1
1 TABLET, FILM COATED ORAL EVERY 6 HOURS
COMMUNITY
End: 2024-01-04 | Stop reason: CLARIF

## 2024-01-04 RX ORDER — DIPHENHYDRAMINE HCL 25 MG
CAPSULE ORAL
COMMUNITY
End: 2024-01-04 | Stop reason: CLARIF

## 2024-01-04 RX ORDER — DOXYCYCLINE 100 MG/1
1 CAPSULE ORAL 2 TIMES DAILY
COMMUNITY
End: 2024-01-04 | Stop reason: CLARIF

## 2024-01-04 RX ORDER — DULOXETIN HYDROCHLORIDE 60 MG/1
1 CAPSULE, DELAYED RELEASE ORAL DAILY
COMMUNITY
End: 2024-01-04 | Stop reason: CLARIF

## 2024-01-04 RX ADMIN — ISOSORBIDE MONONITRATE 60 MG: 30 TABLET, EXTENDED RELEASE ORAL at 08:01

## 2024-01-04 RX ADMIN — ATORVASTATIN CALCIUM 80 MG: 40 TABLET, FILM COATED ORAL at 08:01

## 2024-01-04 RX ADMIN — FERROUS SULFATE TAB 325 MG (65 MG ELEMENTAL FE) 1 EACH: 325 (65 FE) TAB at 08:01

## 2024-01-04 RX ADMIN — OXYCODONE HYDROCHLORIDE 10 MG: 5 TABLET ORAL at 04:01

## 2024-01-04 RX ADMIN — DULOXETINE HYDROCHLORIDE 60 MG: 30 CAPSULE, DELAYED RELEASE ORAL at 08:01

## 2024-01-04 RX ADMIN — ASPIRIN 81 MG: 81 TABLET, COATED ORAL at 08:01

## 2024-01-04 NOTE — H&P
[facility] Hospital Medicine H&P Note     Admitting Team: [facility] Hospitalist Team B  Attending Physician: Tian Young MD  Resident: Nisha  Intern: Yemi    Date of Admit: 1/3/2024    Chief Complaint     UE and LE tremors associated with imbalance    Subjective:      History of Present Illness:  Enedelia García is a 78 y.o. female with past medical history of CAD (s/p stent placement), HF with diastolic dysfunction, moderate to severe Aortic stenosis (syncopal attack 6/2023), CKD stage 4, CAD, temporal lobe epilepsy, migraine, Generalized anxiety disorder, Hyperlipidemia, Hypertension, NISHA, and history of major depressive disorder. The patient presented to [facility] on 1/3/2024 with a primary complaint of new onset of BL UE and LE tremors associated with gait imbalance. The Bilateral arm and leg tremors started 3 days ago, and the tremors are continues increases with movement (things are falling of her hand). The imbalance stared 2 weeks ago after she felt that her feet are getting numb, and the numbness progressed till above her bilateral knees. The patient does not know is her tremors abort during sleep. Also states having decreased memory recall.     The patient reports dyspnea on exertion (1 block). Denies any chest pain, SOB at rest, abdominal pain, nausea, vomiting, weakness, or recent trauma.    Admitted on 6/2023 for a syncopal attack, Echo done showed worsening of severe aortic stenosis, and Bilateral carotid ultrasound showed decreased left internal carotid velocity. seen by [facility] Cardiology -recommended outpatient workup for aortic valve replacement.  Left internal carotid artery occlusion known and recommended statin therapy.  Per cardiology near-syncope or syncopal episode was in setting of cerebral hypoperfusion secondary to vascular disease or aortic valve stenosis but IV hydration has helped and further workup could be done in the outpatient setting. Metoprolol held at discharge  given bradycardia.       Vital signs in the ED: Temp 97.8, HR 62, RR 18, /60, MAP 87, SpO2 94%. Wt 5'4, Wt 78 Kg    Past Medical History:  Past Medical History:   Diagnosis Date    Aortic atherosclerosis 6/14/2023    CKD (chronic kidney disease) stage 4, GFR 15-29 ml/min     Coronary artery disease     Edema     Epilepsy     Generalized anxiety disorder 12/20/2021    Hematuria, unspecified     Hematuria, unspecified     Hyperlipidemia     Hypertension     Iron deficiency anemia     Moderate episode of recurrent major depressive disorder     Nonrheumatic aortic valve stenosis 12/20/2021    Normocytic anemia     Prediabetes 2/24/2022       Past Surgical History:  Past Surgical History:   Procedure Laterality Date    APPENDECTOMY      BACK SURGERY      CORONARY STENT PLACEMENT      HYSTERECTOMY      REVISION OF KNEE ARTHROPLASTY Left 7/18/2022    Procedure: REVISION, ARTHROPLASTY, KNEE;  Surgeon: Stan Catalan MD;  Location: [facility];  Service: Orthopedics;  Laterality: Left;    TONSILLECTOMY         Allergies:  Review of patient's allergies indicates:   Allergen Reactions    Iodinated contrast media Anaphylaxis and Other (See Comments)    Nsaids (non-steroidal anti-inflammatory drug)      ADWOA    Phenergan [promethazine]      Vomiting       Home Medications:  Prior to Admission medications    Medication Sig Start Date End Date Taking? Authorizing Provider   acetaminophen (TYLENOL) 500 MG tablet Take 500 mg by mouth every 6 (six) hours as needed for Pain.   Yes Provider, Historical   aspirin (ECOTRIN) 81 MG EC tablet Take 81 mg by mouth once daily.   Yes Provider, Historical   busPIRone (BUSPAR) 5 MG Tab Take 1 tablet (5 mg total) by mouth daily as needed (anxiety). 10/19/23  Yes Lauren Brown MD   calcium carbonate/vitamin D3 (VITAMIN D-3 ORAL) Take 1 tablet by mouth once daily.   Yes Provider, Historical   citalopram (CELEXA) 10 MG tablet Take 1 tablet (10 mg total) by mouth once daily.  Patient taking differently:  Take 10 mg by mouth every 48 hours. 10/19/23 10/18/24 Yes Lauren Brown MD   clotrimazole-betamethasone 1-0.05% (LOTRISONE) cream Apply topically 2 (two) times daily. 9/11/23  Yes Lauren Brown MD   co-enzyme Q-10 30 mg capsule Take 30 mg by mouth once daily.   Yes Provider, Historical   diphenhydrAMINE (BENADRYL ALLERGY) 25 mg tablet Take 2 tablets (50 mg total) by mouth As instructed for Insomnia. Take two tablets one hour prior to procedure 9/11/23  Yes Patricia Rogers MD   DULoxetine (CYMBALTA) 60 MG capsule Take 1 capsule (60 mg total) by mouth once daily. 8/28/23  Yes Lauren Brown MD   FEROSUL 325 mg (65 mg iron) Tab tablet Take 325 mg by mouth once daily. 8/29/22  Yes Provider, Historical   furosemide (LASIX) 40 MG tablet Take 2 tablets (80 mg total) by mouth 2 (two) times daily as needed (swelling).  Patient taking differently: Take 80 mg by mouth 2 (two) times a day. 6/12/23  Yes Althea Allen MD   gabapentin (NEURONTIN) 600 MG tablet Take 600 mg by mouth 2 (two) times daily.   Yes Provider, Historical   hydrOXYzine HCL (ATARAX) 25 MG tablet TAKE ONE TABLET BY MOUTH EVERY 6 HOURS AS NEEDED FOR ITCHING 1/3/24  Yes Lauren Brown MD   irbesartan (AVAPRO) 300 MG tablet Take 1 tablet (300 mg total) by mouth every evening. 5/30/23  Yes Lauren Brown MD   isosorbide mononitrate (IMDUR) 60 MG 24 hr tablet Take 1 tablet (60 mg total) by mouth once daily. 8/29/23  Yes Lauren Brown MD   LINZESS 72 mcg Cap capsule TAKE ONE CAPSULE BY MOUTH BEFORE BREAKFAST 6/29/23  Yes Lauren Brown MD   multivit-min-iron-FA-lutein (CENTRUM SILVER WOMEN) 8 mg iron-400 mcg-300 mcg Tab Take 1 tablet by mouth once daily.   Yes Provider, Historical   nitroGLYCERIN (NITROSTAT) 0.3 MG SL tablet PLACE 1 TABLET UNDER THE TONGUE EVERY 5 MINUTES FOR UP TO 3 DOSES IF NEEDED FOR CHEST PAIN. CALL 911 IF PAIN PERSISTS 8/29/23  Yes Lauren Brown MD   omega-3 fatty acids/fish oil (FISH OIL-OMEGA-3 FATTY ACIDS) 300-1,000 mg capsule Take 2  capsules by mouth once daily.   Yes Provider, Historical   omeprazole (PRILOSEC) 20 MG capsule Take 20 mg by mouth once daily. 3/28/22  Yes Provider, Historical   ondansetron (ZOFRAN) 4 MG tablet Take 1 tablet (4 mg total) by mouth every 6 (six) hours. 10/24/23  Yes Emerald Moore PA-C   oxyCODONE (ROXICODONE) 10 mg Tab immediate release tablet Take 10 mg by mouth 4 (four) times daily.   Yes Provider, Historical   rosuvastatin (CRESTOR) 40 MG Tab Take 1 tablet (40 mg total) by mouth once daily. 10/24/22 1/3/24 Yes Lauren Brown MD   tiZANidine (ZANAFLEX) 4 MG tablet Take 1 tablet (4 mg total) by mouth every 8 (eight) hours as needed (spasms). 9/11/23  Yes Lauren Brown MD   AIMOVIG AUTOINJECTOR 70 mg/mL autoinjector Inject 70 mg into the skin every 30 days. 6/15/23   Provider, Historical   butalbital-acetaminophen-caffeine -40 mg (FIORICET, ESGIC) -40 mg per tablet Take 1 tablet by mouth every 8 (eight) hours as needed. 6/15/23   Provider, Historical   galcanezumab-gnlm 120 mg/mL PnIj Inject 120 mg into the skin every 28 days. maintenance dose 1/3/23   April Starr PA-C   hydrALAZINE (APRESOLINE) 25 MG tablet Take 25 mg by mouth every 8 (eight) hours.    Provider, Historical   hydrOXYzine HCL (ATARAX) 25 MG tablet TAKE ONE TABLET BY MOUTH EVERY 6 HOURS AS NEEDED FOR ITCHING 11/9/23 1/3/24  Lauren Brown MD   predniSONE (DELTASONE) 50 MG Tab Take 1 tablet (50 mg total) by mouth As instructed. Take one tablet 13 hours prior to procedure, one tablet 7 hours  before procedure and then one tablet one hour prior to procedure. . 8/1/23 1/3/24  Patricia Rogers MD     Family History:  Family History   Problem Relation Age of Onset    Heart disease Mother     Heart disease Father        Social History:  Social History     Tobacco Use    Smoking status: Never    Smokeless tobacco: Never   Substance Use Topics    Alcohol use: No    Drug use: No     Lives with  I Work: retired     Review of  "Systems:  Pertinent items are noted in HPI. All other systems are reviewed and are negative.    Health Maintaince :   Primary Care Physician:NA    Objective:   Last 24 Hour Vital Signs:  BP  Min: 106/55  Max: 150/68  Temp  Av °F (36.7 °C)  Min: 97.8 °F (36.6 °C)  Max: 98.6 °F (37 °C)  Pulse  Av.9  Min: 56  Max: 81  Resp  Av.6  Min: 11  Max: 28  SpO2  Av.2 %  Min: 86 %  Max: 100 %  Height  Av' 4" (162.6 cm)  Min: 5' 4" (162.6 cm)  Max: 5' 4" (162.6 cm)  Weight  Av kg (172 lb)  Min: 78 kg (172 lb)  Max: 78 kg (172 lb)  Body mass index is 29.52 kg/m².  I/O last 3 completed shifts:  In: 1100 [IV Piggyback:1100]  Out: -     Physical Examination:  Examination  General: Patient sitting comfortably in NAD  Head: normocephalic, atraumatic  Eyes: PERRL, EOMI, no conjunctival injections or icterus, Bidirectional Horizontal nystagmus  Mouth: MMM, posterior oropharynx without erythema  Cardiac: RRR, no murmurs appreciated, no extra heart sounds  Pulmonary/Chest: CTAB, symmetric chest rise, no wheezing or crackles  GI: Soft, non tender, non distended, normoactive bowel sounds  Extremities: no edema, clubbing, or cyanosis  Skin: dry, warm, intact. No bruising or rashes.  Neuro: Alert and oriented, moving all extremities, power 5/5 in all 4 limbs bilaterally, normal tone, normal CN 3,4,6,5,7,8,12. Continuous BL arm and leg resting tremors that exacerbates with movement. BL finger to nose test, dysdiodokinesia, positive heel to shin test. Normal proprioception in both UL and LE. BL down going planters. Decreased light touch till bilateral knees.    Laboratory:  Most Recent Data:  CBC:   Lab Results   Component Value Date    WBC 6.37 2024    HGB 11.0 (L) 2024    HCT 34.0 (L) 2024     2024    MCV 98 2024    RDW 12.3 2024     BMP:   Lab Results   Component Value Date     2024    K 4.1 2024     2024    CO2 34 (H) 2024    BUN 38 " (H) 01/04/2024    CREATININE 1.2 01/04/2024    GLU 86 01/04/2024    CALCIUM 9.2 01/04/2024    MG 2.2 01/04/2024    PHOS 2.7 01/04/2024     LFTs:   Lab Results   Component Value Date    PROT 6.4 01/04/2024    ALBUMIN 3.2 (L) 01/04/2024    BILITOT 0.2 01/04/2024    AST 23 01/04/2024    ALKPHOS 52 (L) 01/04/2024    ALT 18 01/04/2024     Coags:   Lab Results   Component Value Date    INR 1.0 06/29/2022     FLP:   Lab Results   Component Value Date    CHOL 147 06/30/2023    HDL 50 06/30/2023    LDLCALC 77.2 06/30/2023    TRIG 99 06/30/2023    CHOLHDL 34.0 06/30/2023     DM:   Lab Results   Component Value Date    HGBA1C 5.2 06/30/2023    HGBA1C 5.6 11/17/2022    HGBA1C 5.3 08/12/2022    LDLCALC 77.2 06/30/2023    CREATININE 1.2 01/04/2024     Thyroid:   Lab Results   Component Value Date    TSH 1.020 01/04/2024    FREET4 0.71 12/08/2023     Anemia:   Lab Results   Component Value Date    IRON 73 08/18/2023    TIBC 332 08/18/2023    FERRITIN 166 08/18/2023    EMGOTIXZ68 406 04/26/2022    FOLATE 25.8 (H) 04/26/2022     Cardiac:   Lab Results   Component Value Date    TROPONINI 0.012 01/03/2024    BNP 47 01/03/2024     Urinalysis:   Lab Results   Component Value Date    LABURIN ESCHERICHIA COLI  50,000 - 99,999 cfu/ml   (A) 10/24/2023    COLORU Yellow 01/03/2024    SPECGRAV 1.010 01/03/2024    NITRITE Negative 01/03/2024    KETONESU Negative 01/03/2024    UROBILINOGEN Negative 01/03/2024    WBCUA 12 (H) 01/03/2024       Trended Cardiac Data:  Recent Labs   Lab 01/03/24  1424 01/03/24  1758   TROPONINI 0.012  --    BNP  --  47         Microbiology Data:  NA    Other Results:  EKG (my interpretation): Sinus rhythm with 1st degree A-V block     Radiology:  Imaging Results              NM Lung Scan Ventilation Perfusion (In process)  Result time 01/04/24 08:37:50                     US Lower Extremity Veins Bilateral (Final result)  Result time 01/03/24 21:22:34      Final result by Russel Chatterjee MD (01/03/24 21:22:34)                    Impression:      No evidence of deep venous thrombosis in either lower extremity.      Electronically signed by: Russel Chatterjee  Date:    01/03/2024  Time:    21:22               Narrative:    EXAMINATION:  US LOWER EXTREMITY VEINS BILATERAL    CLINICAL HISTORY:  Positive Ddimer; rule out LE DVT; Other specified abnormal findings of blood chemistry    TECHNIQUE:  Duplex and color flow Doppler and dynamic compression was performed of the bilateral lower extremity veins was performed.    COMPARISON:  None    FINDINGS:  Right thigh veins: The common femoral, femoral, popliteal, upper greater saphenous, and deep femoral veins are patent and free of thrombus. The veins are normally compressible and have normal phasic flow and augmentation response.    Right calf veins: The visualized calf veins are patent.    Left thigh veins: The common femoral, femoral, popliteal, upper greater saphenous, and deep femoral veins are patent and free of thrombus. The veins are normally compressible and have normal phasic flow and augmentation response.    Left calf veins: The visualized calf veins are patent.    Miscellaneous: None                                       CT Head Without Contrast (Final result)  Result time 01/03/24 14:56:55      Final result by Lui Davis III, MD (01/03/24 14:56:55)                   Impression:      No acute process seen.      Electronically signed by: Lui Davis MD  Date:    01/03/2024  Time:    14:56               Narrative:    EXAMINATION:  CT HEAD WITHOUT CONTRAST    CLINICAL HISTORY:  Dizziness, persistent/recurrent, cardiac or vascular cause suspected;    FINDINGS:  CT head:    No bleed, mass, or mass effect seen.  The brain parenchyma is unremarkable.  There is involutional change and microvascular small vessel ischemic change.  No skull lesion or skull fracture seen.  No acute infarct changes are seen.  There is no worrisome change from 08/11/2023.                                        X-Ray Chest AP Portable (Final result)  Result time 01/03/24 14:44:27      Final result by Lui Davis III, MD (01/03/24 14:44:27)                   Impression:      No acute process seen.      Electronically signed by: Lui Davis MD  Date:    01/03/2024  Time:    14:44               Narrative:    EXAMINATION:  XR CHEST AP PORTABLE    CLINICAL HISTORY:  Dizziness and giddiness    FINDINGS:  Chest one view:    There is aortic plaque.  Heart size is normal.  Lungs are clear.  There is DJD.                                     Assessment:     Enedelia García is a 78 y.o. female with:  Patient Active Problem List    Diagnosis Date Noted    Dizziness 01/03/2024    Stenosis of left carotid artery 07/06/2023    Skin tear of left lower leg without complication 06/30/2023    Seizure-like activity 06/14/2023    Chronic diastolic heart failure 06/14/2023    Aortic atherosclerosis 06/14/2023    Surgical wound, non healing 09/13/2022    Foreign body of left knee with infection 09/12/2022    Infection of left knee 09/09/2022    Anemia of chronic disease 09/01/2022    Acute pain of left knee 08/15/2022    Decreased range of motion (ROM) of left knee 08/15/2022    Decreased strength involving knee joint 08/15/2022    Blister of right leg without infection, initial encounter     Osteopenia of multiple sites 03/11/2022    Narcotic drug use 03/10/2022    Prediabetes 02/24/2022    Primary osteoarthritis of both first carpometacarpal joints 02/21/2022    Risk for falls 02/21/2022    Positive MELISA (antinuclear antibody) 02/21/2022    Generalized osteoarthritis 02/21/2022    Traumatic ulcer of right lower leg     Presence of stent in coronary artery in patient with coronary artery disease 12/20/2021    Caregiver with fatigue 12/20/2021    Status post revision of total replacement of left knee 12/20/2021    Acquired scoliosis 12/20/2021    Nonrheumatic aortic valve stenosis 12/20/2021    Arthritis  12/20/2021    Primary hypertension 12/20/2021    Osteoarthritis of knee 12/20/2021    Chronic low back pain 12/20/2021    Generalized anxiety disorder 12/20/2021    Stage 4 chronic kidney disease 12/20/2021    Alteration in skin integrity related to surgical incision     Moderate episode of recurrent major depressive disorder     GERD (gastroesophageal reflux disease) 11/18/2013    H/O esophageal spasm 11/17/2013    Hyperlipidemia 11/17/2013    Chronic pain 11/17/2013    Coronary artery disease of native artery of native heart with stable angina pectoris         Plan:     New onset of Tremors for evaluation   Imbalance  - Bilateral UL and LE tremors started 3 days ago associated with imbalance  - BL LE ascending numbness started 2 weeks ago  - Positive Romberg test, finger to nose test, dysdiodokinesia, heel to shin test  - No family history of tremors or any neurologic disease  - CT brain done: unremarkable for acute brain injury, except for involutional change and microvascular small vessel ischemic change.   Plan:  - MRI brain w/out contrast ordered  - Syphilis RPR ordered  - B12 level ordered  - Neurology consultation in the AM    Elevated D-dimer  - Exertional SOB, no chest pain  - D-dimer 0.87 H, pH 7.335, PCO2 67.3, HCO3 35.9  - History of iodine contrast allergy (1985) -> anaphylactic reaction  Plan:  - Bilateral US Duplex of the Legs for DVT  - Consider a V/Q scan AM    ADWOA on CKD3b  - On admission Cr 1.6, BUN 50  - Baseline Cr 1.4 (12/8/2023)  - 1 L of LR given in the ED  Plan:  - Avoid nephrotoxic drugs  - Avoid NSAIDs  - Input/out chart    HFpEF, stable  - On home Lasix 80 mg PO BiD  - Latest TTE 6/2023: severe aortic valve stenosis, EF 70%, Grade II diastolic dysfunction  Plan:  - Hold Lasix dose    CAD  - S/p Stent placement > 5 years ago at [facility]  - Trop 0.012 WNL  - Continue home Asprin, Imdur and Lipitor 80 mg    Carotid artery disease  - Carotid US 10/2022:    <50% stenosis Rt ICA  Occluded lt  ICA  Antegrade vertebral flow bilateral  - CTA Head and Neck 7/18/2023: Occlusion of the left internal carotid artery just distal to the carotid bifurcation with reconstituted flow in the terminal segment (the patient was premedicated with steroids for Hx of contrast anaphylaxis)  - Continue Asprin and Statin    Chronic left knee pain  - Continue home Duloxetine 60   - Continue home Oxy PRN q6hrs  Plan:  - PT/OT eval    NISHA  - On admission H/H 11.2/32, MCV 97  Plan  - Monitor daily CBC  - Continue home Ferrous sulfate  - Folate level ordered     Generalized anxiety disorder  Major depressive disorder  - Continue Citalopram 10 mg PO q48 hrs  - Continue home Duloxetine 60     Temporal lobe epilepsy  - Last seizure episode 3 years ago  - Currently not on any AED    Code Status: Full  DVT Prophylaxis: Lovenox daily  Diet: Cardiac  Disposition: Admit to Internal Medicine    Evelia Bragg MD  [facility] Intern    [facility] Medicine Hospitalist Pager numbers:   [facility] Hospitalist Medicine Team A ([place]): [phone]  [facility] Hospitalist Medicine Team B ([place]):  [phone]

## 2024-01-04 NOTE — ED PROVIDER NOTES
"Encounter Date: 1/3/2024       History     Chief Complaint   Patient presents with    Dizziness     States having shaking for 3 days. Also states "I've been just off balanced." States being very stressed recently due to being caregiver to . Also states having decreased memory recall. No focal deficits noted. Symmetrical facial features. Equal  noted.      Patient is a 78-year-old female with a past medical history of aortic atherosclerosis, CKD, CAD, epilepsy, generalized anxiety disorder, hyperlipidemia, hypertension, iron deficiency anemia, and prediabetes who presents to emergency room for "feeling off balance" over the past month and increased dizziness with "muscle jerking" over the past 3-4 days.  Patient states that muscle spasms are intermittent and spontaneous.  No pain.  Primary care physician is following patient for this and suspects it may be due to Celexa.  Currently weaning patient off medication.  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  Denies nausea, vomiting, vision changes, headache, abdominal pain, changes in bowel movements, blood in stool, fever, weakness, tingling, or numbness.  No treatment attempted prior to arrival.    The history is provided by the patient. No  was used.     Review of patient's allergies indicates:   Allergen Reactions    Iodinated contrast media Anaphylaxis and Other (See Comments)    Nsaids (non-steroidal anti-inflammatory drug)      ADWOA    Phenergan [promethazine]      Vomiting     Past Medical History:   Diagnosis Date    Aortic atherosclerosis 6/14/2023    CKD (chronic kidney disease) stage 4, GFR 15-29 ml/min     Coronary artery disease     Edema     Epilepsy     Generalized anxiety disorder 12/20/2021    Hematuria, unspecified     Hematuria, unspecified     Hyperlipidemia     Hypertension     Iron deficiency anemia     Moderate episode of recurrent major " depressive disorder     Nonrheumatic aortic valve stenosis 12/20/2021    Normocytic anemia     Prediabetes 2/24/2022     Past Surgical History:   Procedure Laterality Date    APPENDECTOMY      BACK SURGERY      CORONARY STENT PLACEMENT      HYSTERECTOMY      REVISION OF KNEE ARTHROPLASTY Left 7/18/2022    Procedure: REVISION, ARTHROPLASTY, KNEE;  Surgeon: Stan Catalan MD;  Location: [facility] OR;  Service: Orthopedics;  Laterality: Left;    TONSILLECTOMY       Family History   Problem Relation Age of Onset    Heart disease Mother     Heart disease Father      Social History     Tobacco Use    Smoking status: Never    Smokeless tobacco: Never   Substance Use Topics    Alcohol use: No    Drug use: No     Review of Systems   Constitutional:  Negative for chills, diaphoresis, fatigue and fever.   HENT:  Negative for congestion, sore throat and trouble swallowing.    Respiratory:  Negative for cough and shortness of breath.    Cardiovascular:  Negative for chest pain and palpitations.   Gastrointestinal:  Negative for abdominal pain, blood in stool, constipation, diarrhea, nausea and vomiting.   Genitourinary:  Negative for difficulty urinating, dysuria, frequency and urgency.   Musculoskeletal:  Negative for back pain and myalgias.   Skin:  Negative for rash and wound.   Neurological:  Positive for tremors. Negative for weakness, light-headedness, numbness and headaches.        + Muscle spasms to bilateral upper and lower extremities       Physical Exam     Initial Vitals [01/03/24 1335]   BP Pulse Resp Temp SpO2   133/60 62 18 97.8 °F (36.6 °C) (!) 94 %      MAP       --         Physical Exam    Nursing note and vitals reviewed.  Constitutional: She appears well-developed and well-nourished. She is not diaphoretic. No distress.   Patient lying in bed, well-appearing.  Awake and alert.  Able to speak in full sentences.  No slurred speech.   HENT:   Head: Normocephalic and atraumatic.   Right Ear: External ear normal.   Left  Ear: External ear normal.   Eyes: Conjunctivae and EOM are normal. Pupils are equal, round, and reactive to light.   Neck: Neck supple.   Normal range of motion.  Cardiovascular:  Normal rate, regular rhythm and normal heart sounds.     Exam reveals no friction rub.       No murmur heard.  Pulmonary/Chest: Breath sounds normal. No respiratory distress. She has no wheezes. She has no rhonchi. She has no rales.   Lungs clear to auscultation throughout.   Abdominal: Abdomen is soft. Bowel sounds are normal. She exhibits no distension. There is no abdominal tenderness.   Musculoskeletal:         General: No tenderness or edema. Normal range of motion.      Cervical back: Normal range of motion and neck supple.     Neurological: She is alert and oriented to person, place, and time. No cranial nerve deficit.   No cranial nerve deficits noted.  Strength 4/5 in bilateral lower and upper extremities.  No pronator drift.  Abnormal finger-to-nose.  Abnormal heel to shin.  Worse on right side.  Sensation intact throughout bilateral upper and lower extremities.   Skin: Skin is warm and dry.   Psychiatric: She has a normal mood and affect. Her behavior is normal. Thought content normal.         ED Course   Procedures  Labs Reviewed   CBC W/ AUTO DIFFERENTIAL - Abnormal; Notable for the following components:       Result Value    RBC 3.50 (*)     Hemoglobin 11.2 (*)     Hematocrit 33.8 (*)     MCH 32.0 (*)     Mono # 1.2 (*)     All other components within normal limits   COMPREHENSIVE METABOLIC PANEL - Abnormal; Notable for the following components:    CO2 30 (*)     BUN 50 (*)     Creatinine 1.6 (*)     eGFR 33 (*)     All other components within normal limits   URINALYSIS, REFLEX TO URINE CULTURE - Abnormal; Notable for the following components:    Leukocytes, UA 2+ (*)     All other components within normal limits    Narrative:     Specimen Source->Urine   URINALYSIS MICROSCOPIC - Abnormal; Notable for the following  components:    WBC, UA 12 (*)     Hyaline Casts, UA 5 (*)     All other components within normal limits    Narrative:     Specimen Source->Urine   D DIMER, QUANTITATIVE - Abnormal; Notable for the following components:    D-Dimer 0.87 (*)     All other components within normal limits   INFLUENZA A & B BY MOLECULAR   CULTURE, URINE   TROPONIN I   MAGNESIUM   B-TYPE NATRIURETIC PEPTIDE   SARS-COV-2 RNA AMPLIFICATION, QUAL        ECG Results              EKG 12-lead (In process)  Result time 01/03/24 15:21:20      In process by Interface, Lab In [facility] (01/03/24 15:21:20)                   Narrative:    Test Reason : R42,    Vent. Rate : 061 BPM     Atrial Rate : 061 BPM     P-R Int : 228 ms          QRS Dur : 066 ms      QT Int : 400 ms       P-R-T Axes : 063 064 051 degrees     QTc Int : 402 ms    Sinus rhythm with 1st degree A-V block  Otherwise normal ECG  When compared with ECG of 22-JUN-2023 19:18,  Nonspecific T wave abnormality no longer evident in Inferior leads    Referred By: [email or web address]   SELF           Confirmed By:                                   Imaging Results              US Lower Extremity Veins Bilateral (Final result)  Result time 01/03/24 21:22:34      Final result by Russel Chatterjee MD (01/03/24 21:22:34)                   Impression:      No evidence of deep venous thrombosis in either lower extremity.      Electronically signed by: Russel Chatterjee  Date:    01/03/2024  Time:    21:22               Narrative:    EXAMINATION:  US LOWER EXTREMITY VEINS BILATERAL    CLINICAL HISTORY:  Positive Ddimer; rule out LE DVT; Other specified abnormal findings of blood chemistry    TECHNIQUE:  Duplex and color flow Doppler and dynamic compression was performed of the bilateral lower extremity veins was performed.    COMPARISON:  None    FINDINGS:  Right thigh veins: The common femoral, femoral, popliteal, upper greater saphenous, and deep femoral veins are patent and free of thrombus. The veins are  normally compressible and have normal phasic flow and augmentation response.    Right calf veins: The visualized calf veins are patent.    Left thigh veins: The common femoral, femoral, popliteal, upper greater saphenous, and deep femoral veins are patent and free of thrombus. The veins are normally compressible and have normal phasic flow and augmentation response.    Left calf veins: The visualized calf veins are patent.    Miscellaneous: None                                       CT Head Without Contrast (Final result)  Result time 01/03/24 14:56:55      Final result by Lui Davis III, MD (01/03/24 14:56:55)                   Impression:      No acute process seen.      Electronically signed by: Lui Davis MD  Date:    01/03/2024  Time:    14:56               Narrative:    EXAMINATION:  CT HEAD WITHOUT CONTRAST    CLINICAL HISTORY:  Dizziness, persistent/recurrent, cardiac or vascular cause suspected;    FINDINGS:  CT head:    No bleed, mass, or mass effect seen.  The brain parenchyma is unremarkable.  There is involutional change and microvascular small vessel ischemic change.  No skull lesion or skull fracture seen.  No acute infarct changes are seen.  There is no worrisome change from 08/11/2023.                                       X-Ray Chest AP Portable (Final result)  Result time 01/03/24 14:44:27      Final result by Lui Davis III, MD (01/03/24 14:44:27)                   Impression:      No acute process seen.      Electronically signed by: Lui Davis MD  Date:    01/03/2024  Time:    14:44               Narrative:    EXAMINATION:  XR CHEST AP PORTABLE    CLINICAL HISTORY:  Dizziness and giddiness    FINDINGS:  Chest one view:    There is aortic plaque.  Heart size is normal.  Lungs are clear.  There is DJD.                                       Medications   aspirin EC tablet 81 mg (has no administration in time range)   citalopram tablet 10 mg (10 mg Oral Given 1/3/24 2133)  "  DULoxetine DR capsule 60 mg (has no administration in time range)   ferrous sulfate tablet 1 each (1 each Oral Given 1/3/24 2133)   isosorbide mononitrate 24 hr tablet 60 mg (has no administration in time range)   oxyCODONE immediate release tablet 10 mg (has no administration in time range)   atorvastatin tablet 80 mg (has no administration in time range)   sodium chloride 0.9% flush 10 mL (has no administration in time range)   naloxone 0.4 mg/mL injection 0.02 mg (has no administration in time range)   glucose chewable tablet 16 g (has no administration in time range)   glucose chewable tablet 24 g (has no administration in time range)   glucagon (human recombinant) injection 1 mg (has no administration in time range)   enoxaparin injection 30 mg (30 mg Subcutaneous Given 1/3/24 2134)   dextrose 10% bolus 125 mL 125 mL (has no administration in time range)   dextrose 10% bolus 250 mL 250 mL (has no administration in time range)   lactated ringers bolus 1,000 mL (0 mLs Intravenous Stopped 1/3/24 1634)   cefTRIAXone (ROCEPHIN) 1 g in dextrose 5 % in water (D5W) 100 mL IVPB (MB+) (0 g Intravenous Stopped 1/3/24 1703)     Medical Decision Making  Patient presents for muscle spasms and "feeling off balance." Vital signs stable and within normal limits.  Physical exam as stated above.    Differential Diagnosis includes, but is not limited to CVA/TIA, intracranial mass/hemorrhage, meningitis/encephalitis, sepsis, MI/ACS, arrhythmia, syncope, thyroid disease, neuroleptic malignant syndrome, serotonin syndrome, hypoxia/hypercapnea, uremic/hepatic encephalopathy, medication reaction, metabolic derangement, psychiatric disturbance, substance abuse, alcohol intoxication/withdrawal, hypoglycemia/hyperglycemia, or complicated migraine.  CT without sign of CVA/TIA.  No masses or hemorrhage noted.  Patient afebrile.  No neck stiffness or pain that would suggest meningitis/encephalitis.  Patient hemodynamically stable.  " Unlikely sepsis.  Urinalysis with signs of mild UTI.  Patient given ceftriaxone.  Also with notable ADWOA.  Patient given IV fluids.  Other lab work relatively unremarkable.  During ED stay, patient with multiple decreasing O2 saturation to mid 80s.  VBG with increased CO2 and acidosis.  Patient without history of COPD.  She does have history of CHF, though no evidence of fluid overload on exam.  During ambulatory pulse ox, patient tachycardic.  Discussed with Dr. Alvarez, supervising physician, who suggested further workup with D-dimer to rule out pulmonary embolism.  D-dimer elevated.     [facility] team accepted patient for further workup and management of hypoxia and ADWOA.  Discussed diagnostics and plan with patient who is amenable to admission/observation.  Patient is stable at this time.      Amount and/or Complexity of Data Reviewed  Labs: ordered. Decision-making details documented in ED Course.  Radiology: ordered. Decision-making details documented in ED Course.               ED Course as of 01/03/24 2311   Wed Jan 03, 2024   1449 X-Ray Chest AP Portable  FINDINGS:  Chest one view:     There is aortic plaque.  Heart size is normal.  Lungs are clear.  There is DJD.     Impression:     No acute process seen. [BJ]   1449 CBC auto differential(!)  CBC relatively unremarkable. Mild normocytic anemia. No elevation in WBC.  [BJ]   1459 CT Head Without Contrast  FINDINGS:  CT head:     No bleed, mass, or mass effect seen.  The brain parenchyma is unremarkable.  There is involutional change and microvascular small vessel ischemic change.  No skull lesion or skull fracture seen.  No acute infarct changes are seen.  There is no worrisome change from 08/11/2023.     Impression:     No acute process seen. [BJ]   1509 Comprehensive metabolic panel(!)  CMP with elevated BUN and creatinine.  BUN of 50 and creatinine of 1.6.  Significant for ADWOA.  Electrolytes within normal limits.  LFTs within normal limits. [BJ]    1509 Magnesium  Magnesium within normal limits. [BJ]   1512 Patient oxygen saturation decreasing to upper 80's per nurse. Will plan for VBG and oxygen supplementation.  [BJ]   1513 Troponin I  Troponin within normal limits. [BJ]   1525 Urinalysis, Reflex to Urine Culture Urine, Clean Catch(!)  Urinalysis with 2+ leukocytes.  No occult blood.  No nitrites. [BJ]   1527 Urinalysis Microscopic(!)  Microscopic urinalysis with elevation in white blood cells to 12.  Some squamous epithelium. [BJ]   1528 POCT Venous Blood Gas(!!)  Venous blood glass with respiratory acidosis.  CO2 of 67.3.  O2 of 19.5. [BJ]   1558 On re-evaluation, patient lungs clear to auscultation.  She is maintaining O2 saturation without oxygen at %.  Able to have extensive conversation in the room for over 5 minutes without decrease in oxygen saturation. [BJ]   1633 Will so ambulatory pulse ox prior to discharge.  [BJ]   1754 Discussed possibility of pulmonary embolism with supervising physician, Dr. Alvarez.  Additional lab work ordered.  Discussed with patient who is amenable to admission if warranted. [BJ]   1829 Brain natriuretic peptide  BNP of 47. [BJ]   1829 D dimer, quantitative(!)  D-dimer slightly elevated. [BJ]   1900 Discussed patient with [facility] who accepted patient for admission and further workup of hypoxia.  Advised to discontinue CTA at this time. Due to anaphylaxis with iodine, will consider other options such as VQ scan. Also recommended COVID and influenza test. Order placed.  [BJ]      ED Course User Index  [BJ] Ashlee Villa PA-C                           Clinical Impression:  Final diagnoses:  [R42] Dizziness (Primary)          ED Disposition Condition    Observation Stable                Ashlee Villa PA-C  01/03/24 5121

## 2024-01-04 NOTE — PT/OT/SLP PROGRESS
Occupational Therapy      Patient Name:  Enedelia García   MRN:  [id number]    Patient not seen today secondary to Other (Comment) ([facility]). Will follow-up as able.    1/4/2024

## 2024-01-04 NOTE — PHARMACY MED REC
"Admission Medication History     The home medication history was taken by Lucy Carbajal CPhT.    Medication history obtained from, Patient Verified    You may go to "Admission" then "Reconcile Home Medications" tabs to review and/or act upon these items.     The home medication list has been updated by the Pharmacy department.   Please read ALL comments highlighted in yellow.   Please address this information as you see fit.    Feel free to contact us if you have any questions or require assistance.      The medications listed below were removed from the home medication list.  Please reorder if appropriate:  Patient reports no longer taking the following medication(s):  Buspirone 5 mg  Linzess 72 mcg  Lotrisone 1-0.05% cream  Mupirocin 2% ointment      Lucy Carbajal CPhT.  Ext [phone]               .          "

## 2024-01-04 NOTE — PROGRESS NOTES
[facility] Medicine Progress Note    Primary Team: [facility] Hospitalist Team B  Attending Physician: Tian Young MD  Intern: Yemi    Subjective/Interval History      NAEDN, he patient is doing well, and her tremors improved significantly     Objective     Physical Examination:  Temp:  [97.8 °F (36.6 °C)-98.6 °F (37 °C)] 97.9 °F (36.6 °C)  Pulse:  [58-81] 58  Resp:  [12-28] 16  SpO2:  [86 %-100 %] 100 %  BP: (106-150)/(55-68) 139/62  General: Patient sitting comfortably in NAD  Head: normocephalic, atraumatic  Eyes: PERRL, EOMI, no conjunctival injections or icterus, Bidirectional Horizontal nystagmus  Mouth: MMM, posterior oropharynx without erythema  Cardiac: RRR, no murmurs appreciated, no extra heart sounds  Pulmonary/Chest: CTAB, symmetric chest rise, no wheezing or crackles  GI: Soft, non tender, non distended, normoactive bowel sounds  Extremities: no edema, clubbing, or cyanosis  Skin: dry, warm, intact. No bruising or rashes.  Neuro: Alert and oriented, moving all extremities, power 5/5 in all 4 limbs bilaterally, normal tone, normal CN 3,4,6,5,7,8,12. Continuous BL arm and leg resting tremors that exacerbates with movement. BL finger to nose test, dysdiodokinesia, positive heel to shin test. Normal proprioception in both UL and LE. BL down going planters. Decreased light touch till bilateral knees.    Intake/Output:    Intake/Output Summary (Last 24 hours) at 1/4/2024 0654  Last data filed at 1/3/2024 1703  Gross per 24 hour   Intake 1100 ml   Output --   Net 1100 ml     Net IO Since Admission: 1,100 mL [01/04/24 0654]    Laboratory:  Recent Labs   Lab 01/03/24  1424 01/03/24  1758   WBC 8.23  --    HGB 11.2*  --      --    MCV 97  --      --    K 3.6  --    CL 96  --    CO2 30*  --    BUN 50*  --    CREATININE 1.6*  --    GLU 91  --    CALCIUM 10.0  --    PROT 6.8  --    ALBUMIN 3.5  --    MG 2.2  --    AST 28  --    ALT 20  --    ALKPHOS 56  --    TROPONINI 0.012  --    BNP  --  47      All laboratory data reviewed    Microbiology: reviewed   Negative COVID/Flu/RSV    Radiology: reviewed      Current Medications:     Infusions:       Scheduled:   aspirin  81 mg Oral Daily    atorvastatin  80 mg Oral Daily    citalopram  10 mg Oral Q48H    DULoxetine  60 mg Oral Daily    enoxparin  30 mg Subcutaneous Daily    ferrous sulfate  1 tablet Oral Daily    isosorbide mononitrate  60 mg Oral Daily        PRN:  dextrose 10%, dextrose 10%, glucagon (human recombinant), glucose, glucose, naloxone, oxyCODONE, sodium chloride 0.9%    Assessment/Plan:     Enedelia García is a 78 y.o. female with past medical history of CAD (s/p stent placement), HF with diastolic dysfunction, moderate to severe Aortic stenosis (syncopal attack 6/2023), CKD stage 4, CAD, temporal lobe epilepsy, migraine, Hyperlipidemia, Hypertension, and NISHA. The patient presented on 1/3/2024 with new onset of BL UE and LE tremors associated with gait imbalance. Patient is admitted to [facility] for Tremor workup and a high D-dimer to rule out PE.    New onset of Tremors for evaluation   Imbalance  - Bilateral UL and LE tremors started 3 days ago associated with imbalance  - BL LE ascending numbness started 2 weeks ago  - Positive Romberg test, finger to nose test, dysdiodokinesia, heel to shin test  - No family history of tremors or any neurologic disease  - CT brain done: unremarkable for acute brain injury, except for involutional change and microvascular small vessel ischemic change.   - MRI brain w/out contrast 1/4/2024: unremarkable  - Syphilis RPR ordered  - B12 level 1516  - Neurology recs:   - Neuropathy workup given LE b/l numbness as outpt.  - FU w/ psychiatry for stress management      Elevated D-dimer  - Exertional SOB, no chest pain  - D-dimer 0.87 H, pH 7.335, PCO2 67.3, HCO3 35.9  - History of iodine contrast allergy (1985) -> anaphylactic reaction  - Bilateral US Duplex of the Legs for DVT: negative  - V/Q scan:  negative     ADWOA on CKD3b  - On admission Cr 1.6, BUN 50  - Baseline Cr 1.4 (12/8/2023)  - 1 L of LR given in the ED  - Avoid nephrotoxic drugs  - Avoid NSAIDs     HFpEF, stable  - On home Lasix 80 mg PO BiD  - Latest TTE 6/2023: severe aortic valve stenosis, EF 70%, Grade II diastolic dysfunction  Plan:  - Hold Lasix dose     CAD  - S/p Stent placement > 5 years ago at   - Trop 0.012 WNL  - Continue home Asprin, Imdur and Lipitor 80 mg     Carotid artery disease  - Carotid US 10/2022:    <50% stenosis Rt ICA  Occluded lt ICA  Antegrade vertebral flow bilateral  - CTA Head and Neck 7/18/2023: Occlusion of the left internal carotid artery just distal to the carotid bifurcation with reconstituted flow in the terminal segment (the patient was premedicated with steroids for Hx of contrast anaphylaxis)  - Continue Asprin and Statin     Chronic left knee pain  - Continue home Duloxetine 60   - Continue home Oxy PRN q6hrs  Plan:  - PT/OT eval     NISHA  - On admission H/H 11.2/32, MCV 97  Plan  - Monitor daily CBC  - Continue home Ferrous sulfate  - Folate level 16.5     Generalized anxiety disorder  Major depressive disorder  - Continue Citalopram 10 mg PO q48 hrs  - Continue home Duloxetine 60      Temporal lobe epilepsy  - Last seizure episode 3 years ago  - Currently not on any AED    #Healthcare maintenance   -primary care provider is No, Primary Doctor      Diet: Cardiac  VTE PPx: Lovenox  Code Status: Full    Dispo: Discharge today   Evelia Bragg MD  [facility]  [facility] Hospitalist Medicine Team B      [facility] Hospitalist Pager numbers:   [facility] Hospitalist Medicine Team A ([place]ira): [phone]  [facility] Hospitalist Medicine Team B ([place]):  [phone]

## 2024-01-04 NOTE — ED NOTES
Report received from VI Hoang. Care of pt assumed pt aaox4. Neuro intact. Resp even and unlabored. Nadn. Pt denies dizziness at this time. Family present at bedside. Call light within reach. Pt aware of poc

## 2024-01-04 NOTE — DISCHARGE SUMMARY
[facility] Medicine Discharge Summary    Primary Team: [facility] [place] Hospital[facility] Team B  Attending Physician: Tian Young MD  Resident: Dr. Cameron  Intern: Dr. Bragg    Date of Admit: 1/3/2024  Date of Discharge: 1/4/2024    Discharge to: Home/Self-Care  Condition: Stable    Discharge Diagnoses     Patient Active Problem List   Diagnosis    Coronary artery disease of native artery of native heart with stable angina pectoris    H/O esophageal spasm    Hyperlipidemia    Chronic pain    GERD (gastroesophageal reflux disease)    Moderate episode of recurrent major depressive disorder    Alteration in skin integrity related to surgical incision    Presence of stent in coronary artery in patient with coronary artery disease    Caregiver with fatigue    Status post revision of total replacement of left knee    Acquired scoliosis    Nonrheumatic aortic valve stenosis    Arthritis    Primary hypertension    Osteoarthritis of knee    Chronic low back pain    Generalized anxiety disorder    Stage 4 chronic kidney disease    Traumatic ulcer of right lower leg    Primary osteoarthritis of both first carpometacarpal joints    Risk for falls    Positive MELISA (antinuclear antibody)    Generalized osteoarthritis    Prediabetes    Narcotic drug use    Osteopenia of multiple sites    Blister of right leg without infection, initial encounter    Acute pain of left knee    Decreased range of motion (ROM) of left knee    Decreased strength involving knee joint    Anemia of chronic disease    Infection of left knee    Foreign body of left knee with infection    Surgical wound, non healing    Seizure-like activity    Chronic diastolic heart failure    Aortic atherosclerosis    Skin tear of left lower leg without complication    Stenosis of left carotid artery    Dizziness       Consultants and Procedures     Consultants:  Neurology    Procedures:   None    Imaging:  MRI brain w/out contrast  V/Q scan    Brief History of Present Illness      a  78 y.o. female with past medical history of CAD (s/p stent placement), HF with diastolic dysfunction, moderate to severe Aortic stenosis (syncopal attack 6/2023), CKD stage 4, CAD, temporal lobe epilepsy, migraine, Generalized anxiety disorder, Hyperlipidemia, Hypertension, NISHA, and history of major depressive disorder. The patient presented to [facility] on 1/3/2024 with a primary complaint of new onset of BL UE and LE tremors associated with gait imbalance. The Bilateral arm and leg tremors started 3 days ago, and the tremors are continues increases with movement (things are falling of her hand). The imbalance stared 2 weeks ago after she felt that her feet are getting numb, and the numbness progressed till above her bilateral knees. The patient does not know is her tremors abort during sleep. Also states having decreased memory recall.      The patient reports dyspnea on exertion (1 block). Denies any chest pain, SOB at rest, abdominal pain, nausea, vomiting, weakness, or recent trauma.    For the full HPI please refer to the History & Physical from this admission.    Hospital Course By Problem with Pertinent Findings     ew onset of Tremors for evaluation   Imbalance  - Bilateral UL and LE tremors started 3 days ago associated with imbalance  - BL LE ascending numbness started 2 weeks ago  - Positive Romberg test, finger to nose test, dysdiodokinesia, heel to shin test  - No family history of tremors or any neurologic disease  - CT brain done: unremarkable for acute brain injury, except for involutional change and microvascular small vessel ischemic change.   - MRI brain w/out contrast 1/4/2024: unremarkable  - Syphilis RPR ordered  - B12 level 1516  - Neurology recs:              - Neuropathy workup given LE b/l numbness as outpt.  - FU w/ psychiatry for stress management      Elevated D-dimer  - Exertional SOB, no chest pain  - D-dimer 0.87 H, pH 7.335, PCO2 67.3, HCO3 35.9  - History of  iodine contrast allergy (1985) -> anaphylactic reaction  - Bilateral US Duplex of the Legs for DVT: negative  - V/Q scan: negative     ADWOA on CKD3b  - On admission Cr 1.6, BUN 50  - Baseline Cr 1.4 (12/8/2023)  - 1 L of LR given in the ED  - Avoided nephrotoxic drugs  - Avoided NSAIDs     HFpEF, stable  - On home Lasix 80 mg PO BiD  - Latest TTE 6/2023: severe aortic valve stenosis, EF 70%, Grade II diastolic dysfunction  Plan:  - Held Lasix dose     CAD  - S/p Stent placement > 5 years ago at   - Trop 0.012 WNL  - Continued home Asprin, Imdur and Lipitor 80 mg     Carotid artery disease  - Carotid US 10/2022:    <50% stenosis Rt ICA  Occluded lt ICA  Antegrade vertebral flow bilateral  - CTA Head and Neck 7/18/2023: Occlusion of the left internal carotid artery just distal to the carotid bifurcation with reconstituted flow in the terminal segment (the patient was premedicated with steroids for Hx of contrast anaphylaxis)  - Continue Asprin and Statin     Chronic left knee pain  - Continued home Duloxetine 60   - Continued home Oxy PRN q6hrs     NISHA  - On admission H/H 11.2/32, MCV 97  - Monitor daily CBC  - Continued home Ferrous sulfate  - Folate level 16.5     Generalized anxiety disorder  Major depressive disorder  - Continue Citalopram 10 mg PO q48 hrs  - Continue home Duloxetine 60      Temporal lobe epilepsy  - Last seizure episode 3 years ago  - Currently not on any AED    Vitals:    01/04/24 1157   BP: 117/65   Pulse: 64   Resp: 14   Temp: 98.3 °F (36.8 °C)        Discharge Medications        Medication List        CONTINUE taking these medications      AIMOVIG AUTOINJECTOR 70 mg/mL autoinjector  Generic drug: erenumab-aooe     aspirin 81 MG EC tablet  Commonly known as: ECOTRIN     CENTRUM SILVER WOMEN 8 mg iron-400 mcg-50 mcg Tab  Generic drug: multivit-min-iron-FA-vit K-lut     citalopram 10 MG tablet  Commonly known as: CeleXA  Take 1 tablet (10 mg total) by mouth once daily.     co-enzyme Q-10 30 mg  capsule     diphenoxylate-atropine 2.5-0.025 mg 2.5-0.025 mg per tablet  Commonly known as: LOMOTIL     DULoxetine 60 MG capsule  Commonly known as: CYMBALTA  Take 1 capsule (60 mg total) by mouth once daily.     FeroSuL 325 mg (65 mg iron) Tab tablet  Generic drug: ferrous sulfate     fish oil-omega-3 fatty acids 300-1,000 mg capsule     furosemide 40 MG tablet  Commonly known as: LASIX  Take 2 tablets (80 mg total) by mouth 2 (two) times daily as needed (swelling).     hydrOXYzine HCL 25 MG tablet  Commonly known as: ATARAX  TAKE ONE TABLET BY MOUTH EVERY 6 HOURS AS NEEDED FOR ITCHING     irbesartan 300 MG tablet  Commonly known as: AVAPRO  Take 1 tablet (300 mg total) by mouth every evening.     isosorbide mononitrate 60 MG 24 hr tablet  Commonly known as: IMDUR  Take 1 tablet (60 mg total) by mouth once daily.     nitroGLYCERIN 0.3 MG SL tablet  Commonly known as: NITROSTAT  PLACE 1 TABLET UNDER THE TONGUE EVERY 5 MINUTES FOR UP TO 3 DOSES IF NEEDED FOR CHEST PAIN. CALL 911 IF PAIN PERSISTS     omeprazole 20 MG capsule  Commonly known as: PRILOSEC     oxyCODONE 10 mg Tab immediate release tablet  Commonly known as: ROXICODONE     rosuvastatin 40 MG Tab  Commonly known as: CRESTOR  Take 1 tablet (40 mg total) by mouth once daily.     tiZANidine 4 MG tablet  Commonly known as: ZANAFLEX  Take 1 tablet (4 mg total) by mouth every 8 (eight) hours as needed (spasms).     VITAMIN D-3 ORAL            STOP taking these medications      butalbital-acetaminophen-caffeine -40 mg -40 mg per tablet  Commonly known as: FIORICET, ESGIC     gabapentin 600 MG tablet  Commonly known as: NEURONTIN     hydrALAZINE 25 MG tablet  Commonly known as: APRESOLINE               Discharge Information:   Diet:  Cardiac    Physical Activity:  As tolerated             Instructions:  1. Take all medications as prescribed  2. Keep all follow-up appointments  3. Return to the hospital or call your primary care physicians if any  worsening symptoms such as fever, chest pain, shortness of breath, return of symptoms, or any other concerns.    Follow-Up Appointments:  Neurology outpatient  PCP  Cardiology as outpatient     Evelia Bragg MD  [facility] Intern  01/04/2024 3:16 PM

## 2024-01-04 NOTE — H&P
[facility] Hospital Medicine H&P Note     Admitting Team: [facility] Hospitalist Team B  Attending Physician: Tian Young MD  Resident: Nisha  Intern: Yemi    Date of Admit: 1/3/2024    Chief Complaint     UE and LE tremors associated with imbalance    Subjective:      History of Present Illness:  Enedelia García is a 78 y.o. female with past medical history of CAD (s/p stent placement), HF with diastolic dysfunction, moderate to severe Aortic stenosis (syncopal attack 6/2023), CKD stage 4, Coronary artery disease, Epilepsy, Generalized anxiety disorder, Hyperlipidemia, Hypertension, NISHA, and history of major depressive disorder. The patient presented to [facility] on 1/3/2024 with a primary complaint of new onset of BL UE and LE tremors associated with gait imbalance. The Bilateral arm and leg tremors started 3 days ago, and the tremors are continues increases with movement (things are falling of her hand). The imbalance stared 2 weeks ago after she felt that her feet are getting numb, and the numbness progressed till above her bilateral knees. The patient does not know is her tremors abort during sleep. Also states having decreased memory recall.     The patient reports dyspnea on exertion (1 block). Denies any chest pain, SOB at rest, abdominal pain, nausea, vomiting, weakness, or recent trauma.    Admitted on 6/2023 for a syncopal attack, Echo done showed worsening of severe aortic stenosis, and Bilateral carotid ultrasound showed decreased left internal carotid velocity. seen by [facility] Cardiology -recommended outpatient workup for aortic valve replacement.  Left internal carotid artery occlusion known and recommended statin therapy.  Per cardiology near-syncope or syncopal episode was in setting of cerebral hypoperfusion secondary to vascular disease or aortic valve stenosis but IV hydration has helped and further workup could be done in the outpatient setting. Metoprolol held at discharge given  bradycardia.     Past Medical History:  Past Medical History:   Diagnosis Date    Aortic atherosclerosis 6/14/2023    CKD (chronic kidney disease) stage 4, GFR 15-29 ml/min     Coronary artery disease     Edema     Epilepsy     Generalized anxiety disorder 12/20/2021    Hematuria, unspecified     Hematuria, unspecified     Hyperlipidemia     Hypertension     Iron deficiency anemia     Moderate episode of recurrent major depressive disorder     Nonrheumatic aortic valve stenosis 12/20/2021    Normocytic anemia     Prediabetes 2/24/2022       Past Surgical History:  Past Surgical History:   Procedure Laterality Date    APPENDECTOMY      BACK SURGERY      CORONARY STENT PLACEMENT      HYSTERECTOMY      REVISION OF KNEE ARTHROPLASTY Left 7/18/2022    Procedure: REVISION, ARTHROPLASTY, KNEE;  Surgeon: Stan Catalan MD;  Location: [facility];  Service: Orthopedics;  Laterality: Left;    TONSILLECTOMY         Allergies:  Review of patient's allergies indicates:   Allergen Reactions    Iodinated contrast media Anaphylaxis and Other (See Comments)    Nsaids (non-steroidal anti-inflammatory drug)      ADWOA    Phenergan [promethazine]      Vomiting       Home Medications:  Prior to Admission medications    Medication Sig Start Date End Date Taking? Authorizing Provider   acetaminophen (TYLENOL) 500 MG tablet Take 500 mg by mouth every 6 (six) hours as needed for Pain.   Yes Provider, Historical   aspirin (ECOTRIN) 81 MG EC tablet Take 81 mg by mouth once daily.   Yes Provider, Historical   busPIRone (BUSPAR) 5 MG Tab Take 1 tablet (5 mg total) by mouth daily as needed (anxiety). 10/19/23  Yes Lauren Brown MD   calcium carbonate/vitamin D3 (VITAMIN D-3 ORAL) Take 1 tablet by mouth once daily.   Yes Provider, Historical   citalopram (CELEXA) 10 MG tablet Take 1 tablet (10 mg total) by mouth once daily.  Patient taking differently: Take 10 mg by mouth every 48 hours. 10/19/23 10/18/24 Yes Lauren Brown MD   clotrimazole-betamethasone  1-0.05% (LOTRISONE) cream Apply topically 2 (two) times daily. 9/11/23  Yes Lauren Brown MD   co-enzyme Q-10 30 mg capsule Take 30 mg by mouth once daily.   Yes Provider, Historical   diphenhydrAMINE (BENADRYL ALLERGY) 25 mg tablet Take 2 tablets (50 mg total) by mouth As instructed for Insomnia. Take two tablets one hour prior to procedure 9/11/23  Yes Patricia Rogers MD   DULoxetine (CYMBALTA) 60 MG capsule Take 1 capsule (60 mg total) by mouth once daily. 8/28/23  Yes Lauren Brown MD   FEROSUL 325 mg (65 mg iron) Tab tablet Take 325 mg by mouth once daily. 8/29/22  Yes Provider, Historical   furosemide (LASIX) 40 MG tablet Take 2 tablets (80 mg total) by mouth 2 (two) times daily as needed (swelling).  Patient taking differently: Take 80 mg by mouth 2 (two) times a day. 6/12/23  Yes Althea Allen MD   gabapentin (NEURONTIN) 600 MG tablet Take 600 mg by mouth 2 (two) times daily.   Yes Provider, Historical   hydrOXYzine HCL (ATARAX) 25 MG tablet TAKE ONE TABLET BY MOUTH EVERY 6 HOURS AS NEEDED FOR ITCHING 1/3/24  Yes Lauren Brown MD   irbesartan (AVAPRO) 300 MG tablet Take 1 tablet (300 mg total) by mouth every evening. 5/30/23  Yes Lauren Brown MD   isosorbide mononitrate (IMDUR) 60 MG 24 hr tablet Take 1 tablet (60 mg total) by mouth once daily. 8/29/23  Yes Lauren Brown MD   LINZESS 72 mcg Cap capsule TAKE ONE CAPSULE BY MOUTH BEFORE BREAKFAST 6/29/23  Yes Lauren Brown MD   multivit-min-iron-FA-lutein (CENTRUM SILVER WOMEN) 8 mg iron-400 mcg-300 mcg Tab Take 1 tablet by mouth once daily.   Yes Provider, Historical   nitroGLYCERIN (NITROSTAT) 0.3 MG SL tablet PLACE 1 TABLET UNDER THE TONGUE EVERY 5 MINUTES FOR UP TO 3 DOSES IF NEEDED FOR CHEST PAIN. CALL 911 IF PAIN PERSISTS 8/29/23  Yes Lauren Brown MD   omega-3 fatty acids/fish oil (FISH OIL-OMEGA-3 FATTY ACIDS) 300-1,000 mg capsule Take 2 capsules by mouth once daily.   Yes Provider, Historical   omeprazole (PRILOSEC) 20 MG capsule Take 20 mg by  mouth once daily. 3/28/22  Yes Provider, Historical   ondansetron (ZOFRAN) 4 MG tablet Take 1 tablet (4 mg total) by mouth every 6 (six) hours. 10/24/23  Yes Emerald Moore PA-C   oxyCODONE (ROXICODONE) 10 mg Tab immediate release tablet Take 10 mg by mouth 4 (four) times daily.   Yes Provider, Historical   rosuvastatin (CRESTOR) 40 MG Tab Take 1 tablet (40 mg total) by mouth once daily. 10/24/22 1/3/24 Yes Lauren Brown MD   tiZANidine (ZANAFLEX) 4 MG tablet Take 1 tablet (4 mg total) by mouth every 8 (eight) hours as needed (spasms). 9/11/23  Yes Lauren Brown MD   AIMOVIG AUTOINJECTOR 70 mg/mL autoinjector Inject 70 mg into the skin every 30 days. 6/15/23   Provider, Historical   butalbital-acetaminophen-caffeine -40 mg (FIORICET, ESGIC) -40 mg per tablet Take 1 tablet by mouth every 8 (eight) hours as needed. 6/15/23   Provider, Historical   galcanezumab-gnlm 120 mg/mL PnIj Inject 120 mg into the skin every 28 days. maintenance dose 1/3/23   April Starr PA-C   hydrALAZINE (APRESOLINE) 25 MG tablet Take 25 mg by mouth every 8 (eight) hours.    Provider, Historical   hydrOXYzine HCL (ATARAX) 25 MG tablet TAKE ONE TABLET BY MOUTH EVERY 6 HOURS AS NEEDED FOR ITCHING 11/9/23 1/3/24  Lauren Brown MD   predniSONE (DELTASONE) 50 MG Tab Take 1 tablet (50 mg total) by mouth As instructed. Take one tablet 13 hours prior to procedure, one tablet 7 hours  before procedure and then one tablet one hour prior to procedure. . 8/1/23 1/3/24  Patricia Rogers MD     Family History:  Family History   Problem Relation Age of Onset    Heart disease Mother     Heart disease Father        Social History:  Social History     Tobacco Use    Smoking status: Never    Smokeless tobacco: Never   Substance Use Topics    Alcohol use: No    Drug use: No     Lives with  I Work: retired     Review of Systems:  Pertinent items are noted in HPI. All other systems are reviewed and are negative.    Health Maintaince :  "  Primary Care Physician:NA    Objective:   Last 24 Hour Vital Signs:  BP  Min: 133/60  Max: 139/64  Temp  Av.8 °F (36.6 °C)  Min: 97.8 °F (36.6 °C)  Max: 97.8 °F (36.6 °C)  Pulse  Av.1  Min: 59  Max: 72  Resp  Av.5  Min: 14  Max: 28  SpO2  Av.9 %  Min: 90 %  Max: 100 %  Height  Av' 4" (162.6 cm)  Min: 5' 4" (162.6 cm)  Max: 5' 4" (162.6 cm)  Weight  Av kg (172 lb)  Min: 78 kg (172 lb)  Max: 78 kg (172 lb)  Body mass index is 29.52 kg/m².  I/O last 3 completed shifts:  In: 1100 [IV Piggyback:1100]  Out: -     Physical Examination:  Examination  General: Patient sitting comfortably in NAD  Head: normocephalic, atraumatic  Eyes: PERRL, EOMI, no conjunctival injections or icterus, Bidirectional Horizontal nystagmus  Mouth: MMM, posterior oropharynx without erythema  Cardiac: RRR, no murmurs appreciated, no extra heart sounds  Pulmonary/Chest: CTAB, symmetric chest rise, no wheezing or crackles  GI: Soft, non tender, non distended, normoactive bowel sounds  Extremities: no edema, clubbing, or cyanosis  Skin: dry, warm, intact. No bruising or rashes.  Neuro: Alert and oriented, moving all extremities, power 5/5 in all 4 limbs bilaterally, normal tone, normal CN 3,4,6,5,7,8,12. Continuous BL arm and leg resting tremors that exacerbates with movement.    Laboratory:  Most Recent Data:  CBC:   Lab Results   Component Value Date    WBC 8.23 2024    HGB 11.2 (L) 2024    HCT 33.8 (L) 2024     2024    MCV 97 2024    RDW 12.4 2024     BMP:   Lab Results   Component Value Date     2024    K 3.6 2024    CL 96 2024    CO2 30 (H) 2024    BUN 50 (H) 2024    CREATININE 1.6 (H) 2024    GLU 91 2024    CALCIUM 10.0 2024    MG 2.2 2024    PHOS 3.3 2023     LFTs:   Lab Results   Component Value Date    PROT 6.8 2024    ALBUMIN 3.5 2024    BILITOT 0.3 2024    AST 28 2024    ALKPHOS " 56 01/03/2024    ALT 20 01/03/2024     Coags:   Lab Results   Component Value Date    INR 1.0 06/29/2022     FLP:   Lab Results   Component Value Date    CHOL 147 06/30/2023    HDL 50 06/30/2023    LDLCALC 77.2 06/30/2023    TRIG 99 06/30/2023    CHOLHDL 34.0 06/30/2023     DM:   Lab Results   Component Value Date    HGBA1C 5.2 06/30/2023    HGBA1C 5.6 11/17/2022    HGBA1C 5.3 08/12/2022    LDLCALC 77.2 06/30/2023    CREATININE 1.6 (H) 01/03/2024     Thyroid:   Lab Results   Component Value Date    TSH 1.422 12/08/2023    FREET4 0.71 12/08/2023     Anemia:   Lab Results   Component Value Date    IRON 73 08/18/2023    TIBC 332 08/18/2023    FERRITIN 166 08/18/2023    TNCRKWNT19 406 04/26/2022    FOLATE 25.8 (H) 04/26/2022     Cardiac:   Lab Results   Component Value Date    TROPONINI 0.012 01/03/2024    BNP 47 01/03/2024     Urinalysis:   Lab Results   Component Value Date    LABURIN ESCHERICHIA COLI  50,000 - 99,999 cfu/ml   (A) 10/24/2023    COLORU Yellow 01/03/2024    SPECGRAV 1.010 01/03/2024    NITRITE Negative 01/03/2024    KETONESU Negative 01/03/2024    UROBILINOGEN Negative 01/03/2024    WBCUA 12 (H) 01/03/2024       Trended Cardiac Data:  Recent Labs   Lab 01/03/24  1424 01/03/24  1758   TROPONINI 0.012  --    BNP  --  47       Microbiology Data:  NA    Other Results:  EKG (my interpretation): Sinus rhythm with 1st degree A-V block     Radiology:  Imaging Results              CT Head Without Contrast (Final result)  Result time 01/03/24 14:56:55      Final result by Lui Davis III, MD (01/03/24 14:56:55)                   Impression:      No acute process seen.      Electronically signed by: Lui Davis MD  Date:    01/03/2024  Time:    14:56               Narrative:    EXAMINATION:  CT HEAD WITHOUT CONTRAST    CLINICAL HISTORY:  Dizziness, persistent/recurrent, cardiac or vascular cause suspected;    FINDINGS:  CT head:    No bleed, mass, or mass effect seen.  The brain parenchyma is  unremarkable.  There is involutional change and microvascular small vessel ischemic change.  No skull lesion or skull fracture seen.  No acute infarct changes are seen.  There is no worrisome change from 08/11/2023.                                       X-Ray Chest AP Portable (Final result)  Result time 01/03/24 14:44:27      Final result by Lui Davis III, MD (01/03/24 14:44:27)                   Impression:      No acute process seen.      Electronically signed by: Lui Davis MD  Date:    01/03/2024  Time:    14:44               Narrative:    EXAMINATION:  XR CHEST AP PORTABLE    CLINICAL HISTORY:  Dizziness and giddiness    FINDINGS:  Chest one view:    There is aortic plaque.  Heart size is normal.  Lungs are clear.  There is DJD.                                     Assessment:     Enedelia García is a 78 y.o. female with:  Patient Active Problem List    Diagnosis Date Noted    Dizziness 01/03/2024    Stenosis of left carotid artery 07/06/2023    Skin tear of left lower leg without complication 06/30/2023    Seizure-like activity 06/14/2023    Chronic diastolic heart failure 06/14/2023    Aortic atherosclerosis 06/14/2023    Surgical wound, non healing 09/13/2022    Foreign body of left knee with infection 09/12/2022    Infection of left knee 09/09/2022    Anemia of chronic disease 09/01/2022    Acute pain of left knee 08/15/2022    Decreased range of motion (ROM) of left knee 08/15/2022    Decreased strength involving knee joint 08/15/2022    Blister of right leg without infection, initial encounter     Osteopenia of multiple sites 03/11/2022    Narcotic drug use 03/10/2022    Prediabetes 02/24/2022    Primary osteoarthritis of both first carpometacarpal joints 02/21/2022    Risk for falls 02/21/2022    Positive MELISA (antinuclear antibody) 02/21/2022    Generalized osteoarthritis 02/21/2022    Traumatic ulcer of right lower leg     Presence of stent in coronary artery in patient with coronary  artery disease 12/20/2021    Caregiver with fatigue 12/20/2021    Status post revision of total replacement of left knee 12/20/2021    Acquired scoliosis 12/20/2021    Nonrheumatic aortic valve stenosis 12/20/2021    Arthritis 12/20/2021    Primary hypertension 12/20/2021    Osteoarthritis of knee 12/20/2021    Chronic low back pain 12/20/2021    Generalized anxiety disorder 12/20/2021    Stage 4 chronic kidney disease 12/20/2021    Alteration in skin integrity related to surgical incision     Moderate episode of recurrent major depressive disorder     GERD (gastroesophageal reflux disease) 11/18/2013    H/O esophageal spasm 11/17/2013    Hyperlipidemia 11/17/2013    Chronic pain 11/17/2013    Coronary artery disease of native artery of native heart with stable angina pectoris         Plan:     New onset of Tremors for evaluation   Imbalance  - Bilateral UL and LE tremors started 3 days ago associated with imbalance  - BL LE ascending numbness started 2 weeks ago  - Positive Romberg test  - No family history of tremors or any neurologic disease  - CT brain done: unremarkable for acute brain injury, except for involutional change and microvascular small vessel ischemic change.   Plan:  - MRI brain w/out contrast ordered  - Syphilis RPR ordered  - B12 level ordered  - Neurology consultation in the AM    Elevated D-dimer  - Exertional SOB, no chest pain  - D-dimer 0.87 H, pH 7.335, PCO2 67.3, HCO3 35.9  - History of iodine contrast allergy (1985) -> anaphylactic reaction  Plan:  - Bilateral US Duplex of the Legs for DVT  - Consider a V/Q scan AM    ADWOA on CKD3b  - On admission Cr 1.6, BUN 50  - Baseline Cr 1.4 (12/8/2023)  - 1 L of LR given in the ED  Plan:  - Avoid nephrotoxic drugs  - Avoid NSAIDs  - Input/out chart    HFpEF, stable  - On home Lasix 80 mg PO BiD  - Latest TTE 6/2023: severe aortic valve stenosis, EF 70%, Grade II diastolic dysfunction  Plan:  - Hold Lasix dose    CAD  - S/p Stent placement  - Trop  0.012 WNL  - Continue home Asprin, Imdur and Lipitor 80 mg    Carotid artery disease  - Carotid US 10/2022:    <50% stenosis Rt ICA  Occluded lt ICA  Antegrade vertebral flow bilateral  - CTA Head and Neck 7/18/2023: Occlusion of the left internal carotid artery just distal to the carotid bifurcation with reconstituted flow in the terminal segment (the patient was premedicated with steroids for Hx of contrast anaphylaxis)  - Continue Asprin and Statin    Chronic left knee pain  - Continue home Duloxetine 60   - Continue home Oxy PRN q6hrs  Plan:  - PT/OT eval    NISHA  - On admission H/H 11.2/32, MCV 97  Plan  - Monitor daily CBC  - Continue home Ferrous sulfate  - Folate level ordered     Generalized anxiety disorder  Major depressive disorder  - Continue Citalopram 10 mg PO q48 hrs  - Continue home Duloxetine 60     Code Status: Full  DVT Prophylaxis: Lovenox daily  Diet: Cardiac  Disposition: Admit to Internal Medicine    Evelia Bragg MD  [facility] Intern    [facility] Medicine Hospitalist Pager numbers:   [facility] Hospitalist Medicine Team A ([place]): [phone]  [facility] Hospitalist Medicine Team B ([place]):  [phone]

## 2024-01-04 NOTE — PT/OT/SLP EVAL
Occupational Therapy   Evaluation and Discharge Note    Name: Enedelia García  MRN: [id number]  Admitting Diagnosis: Dizziness  Recent Surgery: * No surgery found *      Recommendations:     Discharge Recommendations: No Therapy Indicated  Discharge Equipment Recommendations: none  Barriers to discharge:  None    Assessment:     Enedelia García is a 78 y.o. female with a medical diagnosis of Dizziness. At this time, patient is functioning at their prior level of function and does not require further acute OT services.     Plan:     During this hospitalization, patient does not require further acute OT services.  Please re-consult if situation changes.    Plan of Care Reviewed with: patient, daughter    Subjective     Chief Complaint: chronic back pain 10/10  Patient/Family Comments/goals: Agreeable to OT. My  has Alz/dementia and does not want anyone to come to the house to work with him. He wants to go everywhere with me and he is getting so mean with his dementia/Alz. I am his caregiver and I have to do everything for him except bath, dress and feed him but sometimes I have to be there when he does all his self care because he gets mixed up.    Occupational Profile:  Living Environment: pt lives with spouse in [facility] with t/s combo  Previous level of function: Indep ADLS, ambulated Indep in house, used RW when grocery shopping. 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  Roles and Routines: mother, spouse, caretaker of spouse.  Equipment Used at home: shower chair, walker, rolling  Assistance upon Discharge: Daughter    Pain/Comfort:  Pain Rating 1: 10/10 (chronic back pain)  Location - Side 1: Bilateral  Location - Orientation 1: lower  Location 1: back  Pain Addressed 1: Distraction  Pain Rating Post-Intervention 1: 10/10    Patients cultural, spiritual, Shinto conflicts given the current situation: no    Objective:     Communicated with: nurse prior to session.  Patient  found ambulatory in room/germain with   upon OT entry to room.    General Precautions: Standard, diabetic, seizure  Orthopedic Precautions: N/A (chronic back pain, s/p 2 back sx and PT OP ~1 1/2 year ago' back protection tech/spinal precautions  education provided)  Braces: N/A  Respiratory Status: Room air     Occupational Performance:    Functional Mobility/Transfers:  Patient completed Sit <> Stand Transfer with independence  with  no assistive device   Patient completed Bed <> Chair Transfer using Step Transfer technique with independence with no assistive device  Functional Mobility: ambulated Indep in room, no LOB, back pain 10/10 -chronic.    Activities of Daily Living:  Feeding:  independence .  Grooming: independence .  Upper Body Dressing: independence .  Lower Body Dressing: independence pants/shoes  Toileting: independence simulated-pt declined need to use    Cognitive/Visual Perceptual:  Cognitive/Psychosocial Skills:     -       Oriented to: Person, Place, Time, and Situation   -       Follows Commands/attention:Follows two-step commands  -       Communication: clear/fluent  -       Memory: No Deficits noted  -       Safety awareness/insight to disability: intact   -       Mood/Affect/Coping skills/emotional control: Appropriate to situation  Visual/Perceptual:      -Intact .    Physical Exam:  Balance:    -       sitting: good  dynamic: good  standing: good  Postural examination/scapula alignment:    -       No postural abnormalities identified  Sensation:    -       Intact UE grossly  Motor Planning:    -       intact  Dominant hand:    -       right  Upper Extremity Range of Motion:     -       Right Upper Extremity: WFL  -       Left Upper Extremity: WFL  Upper Extremity Strength:    -       Right Upper Extremity: WFL  -       Left Upper Extremity: WFL   Strength:    -       Right Upper Extremity: WFL  -       Left Upper Extremity: WFL  Fine Motor Coordination:    -       Intact  Gross motor  coordination:   WFL  Neurological:    -       normal tone    AMPAC 6 Click ADL:  AMPAC Total Score: 24    Treatment & Education:  Purpose of OT and DC OT no OT needs identified.  Pt re-educated in back protection tech due to back pain: no bending, lifting or twisting  All questions/concerns addressed within scope.  Discussed use of delivery grocery service to minimize stressful tasks   Daughter was present and educated.      Patient left up in chair with all lines intact, call button in reach, nurse notified, and daughter present    GOALS:   Multidisciplinary Problems       Occupational Therapy Goals       Not on file              Multidisciplinary Problems (Resolved)          Problem: Occupational Therapy    Goal Priority Disciplines Outcome Interventions   Occupational Therapy Goal   (Resolved)     OT, PT/OT Met                        History:     Past Medical History:   Diagnosis Date    Aortic atherosclerosis 6/14/2023    CKD (chronic kidney disease) stage 4, GFR 15-29 ml/min     Coronary artery disease     Edema     Epilepsy     Generalized anxiety disorder 12/20/2021    Hematuria, unspecified     Hematuria, unspecified     Hyperlipidemia     Hypertension     Iron deficiency anemia     Moderate episode of recurrent major depressive disorder     Nonrheumatic aortic valve stenosis 12/20/2021    Normocytic anemia     Prediabetes 2/24/2022         Past Surgical History:   Procedure Laterality Date    APPENDECTOMY      BACK SURGERY      CORONARY STENT PLACEMENT      HYSTERECTOMY      REVISION OF KNEE ARTHROPLASTY Left 7/18/2022    Procedure: REVISION, ARTHROPLASTY, KNEE;  Surgeon: Stan Catalan MD;  Location: [facility] OR;  Service: Orthopedics;  Laterality: Left;    TONSILLECTOMY         Time Tracking:     OT Date of Treatment: 01/04/24  OT Start Time: 1325  OT Stop Time: 1339  OT Total Time (min): 14 min    Billable Minutes:Evaluation 14  Total Time 14    1/4/2024

## 2024-01-04 NOTE — ED NOTES
Pt reports 10/10 chronic back pain. Pt states that she normally takes 10mg oxycodone for pain. tamara

## 2024-01-04 NOTE — PT/OT/SLP EVAL
Physical Therapy Evaluation, Treatment, and Discharge Note    Patient Name:  Enedelia García   MRN:  [id number]    Recommendations:     Discharge Recommendations: Low Intensity Therapy (OP PT)  Discharge Equipment Recommendations: none   Barriers to discharge: None    Assessment:     Enedelia García is a 78 y.o. female admitted with a medical diagnosis of Dizziness. .  At this time, patient is functioning at their prior level of function and does not require further acute PT services.     Pt c/o chronic back pain. Pt ambulates without AD and with Mod I. Balance deficits noted with higher level testing. Leg length discrepancy noted (LLE shorter). Recommending OP PT to address back pain and higher level balance training.     Recent Surgery: * No surgery found *      Plan:     During this hospitalization, patient does not require further acute PT services.  Please re-consult if situation changes.      Subjective     Chief Complaint: back pain  Patient/Family Comments/goals: decrease pain  Pain/Comfort:  Pain Rating 1: 9/10  Location - Side 1: Bilateral  Location - Orientation 1: lower  Location 1: back  Pain Addressed 1: Reposition, Distraction, Cessation of Activity, Nurse notified  Pain Rating Post-Intervention 1: 9/10    Patients cultural, spiritual, Anglican conflicts given the current situation: no    Living Environment:   pt lives with spouse in [facility] with t/s combo   Prior to admission, patients level of function was  Indep ADLS, ambulated Indep in house, used RW when grocery shopping or as needed. 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 .  Equipment used at home: shower chair, walker, rolling.  DME owned (not currently used): none.  Upon discharge, patient will have assistance from daughter.    Objective:     Communicated with nsg prior to session.  Patient found up in chair with peripheral IV upon PT entry to room.    General Precautions: Standard, diabetic, seizure     Orthopedic Precautions:N/A   Braces: N/A  Respiratory Status: Room air    Exams:  Cognitive Exam:  Patient is oriented to Person, Place, Time, and Situation  Postural Exam:  Patient presented with the following abnormalities:    -       Rounded shoulders  -       Forward head  -       Kyphosis  -       leg length discrepancy (L shorter than R)  Sensation:    -       Impaired  numbness/tingling B lower legs from knee to foot, LT testing grossly intact  Skin Integrity/Edema:      -       Skin integrity: Visible skin intact  -       Edema: None noted BLE  RLE ROM: WFL  RLE Strength: WFL except 4-/5 hip   LLE ROM: WFL  LLE Strength: WFL except 4-/5 hip  SLS, tandem stance with eyes closed, and romberg with eyes closed impaired B  Romberg eyes open and tandem stance eyes open intact bilaterally      Functional Mobility:  Bed Mobility:     Supine to Sit: modified independence  Sit to Supine: modified independence  Transfers:     Sit to Stand:  modified independence with no AD  Gait: Pt ambulated ~50 ft with Mod I and no AD; antalgic gait pattern noted    AM-PAC 6 CLICK MOBILITY  Total Score:23       Treatment and Education:  Pt educated on role of PT/POC, healthy back precautions, benefits of OP therapy and possible interventions, slow transitions (due to pt reports hx of vertigo), and seeking advice from MD or OP PT on shoe inserts for leg length discrepancy   Performed muscle energy techniques to assist with re-adjusting pt: hip abduction/adduction and hip flexion/extension  Pt left standing next to nursing station to speak with her nurse    AM-PAC 6 CLICK MOBILITY  Total Score:23     Patient left ambulatory in room/germain with all lines intact, nsg notified, and nsg present.    GOALS:   Multidisciplinary Problems       Physical Therapy Goals          Problem: Physical Therapy    Goal Priority Disciplines Outcome Goal Variances Interventions   Physical Therapy Goal     PT, PT/OT Adequate for Care Transition                          History:     Past Medical History:   Diagnosis Date    Aortic atherosclerosis 6/14/2023    CKD (chronic kidney disease) stage 4, GFR 15-29 ml/min     Coronary artery disease     Edema     Epilepsy     Generalized anxiety disorder 12/20/2021    Hematuria, unspecified     Hematuria, unspecified     Hyperlipidemia     Hypertension     Iron deficiency anemia     Moderate episode of recurrent major depressive disorder     Nonrheumatic aortic valve stenosis 12/20/2021    Normocytic anemia     Prediabetes 2/24/2022       Past Surgical History:   Procedure Laterality Date    APPENDECTOMY      BACK SURGERY      CORONARY STENT PLACEMENT      HYSTERECTOMY      REVISION OF KNEE ARTHROPLASTY Left 7/18/2022    Procedure: REVISION, ARTHROPLASTY, KNEE;  Surgeon: Stan Catalan MD;  Location: [facility];  Service: Orthopedics;  Laterality: Left;    TONSILLECTOMY         Time Tracking:     PT Received On: 01/04/24  PT Start Time: 1408     PT Stop Time: 1431  PT Total Time (min): 23 min     Billable Minutes: Evaluation 13 and manual therapy 10      01/04/2024

## 2024-01-04 NOTE — PLAN OF CARE
Problem: Physical Therapy  Goal: Physical Therapy Goal  Outcome: Adequate for Care Transition     Pt c/o chronic back pain. Pt ambulates without AD and with Mod I. Balance deficits noted with higher level testing. Leg length discrepancy noted (LLE shorter). Recommending OP PT to address back pain and higher level balance training.

## 2024-01-04 NOTE — CONSULTS
"[facility] CONSULT  EVALUATION    Reason for consult:  UE and LE tremors associated with imbalance   Informant:  Pt   Other sources of information : Chart    CC:  Dizziness (States having shaking for 3 days. Also states "I've been just off balanced." States being very stressed recently due to being caregiver to . Also states having decreased memory recall. No focal deficits noted. Symmetrical facial features. Equal  noted. )       HPI: Enedelia García is a 78 y.o. female with PMH of CAD, HF, AS(symptomatic), CKD4, Epilepsy?, HTN, HLD< MDD, migraine who presents for UE and LE tremors associated with imbalance x3 days    On my exam this morning pt is almost at baseline. Says she has been very stressed taking care of her  and she thinks this might have been affecting her as well. She does report some "weird" sensation in feet which could be tingling. No other focal neuro symptoms    Outpatient Neurologist: Ro Chen ([place]) , last visit 1/3/2023 for migraine    ROS:   12pt ROS negative other then mentioned above    Histories:     Allergies:  Iodinated contrast media, Nsaids (non-steroidal anti-inflammatory drug), and Phenergan [promethazine]    Current Medications:    Current Facility-Administered Medications   Medication Dose Route Frequency Provider Last Rate Last Admin    aspirin EC tablet 81 mg  81 mg Oral Daily Caesar Cameron MD   81 mg at 01/04/24 0836    atorvastatin tablet 80 mg  80 mg Oral Daily Caesar Cameron MD   80 mg at 01/04/24 0836    citalopram tablet 10 mg  10 mg Oral Q48H Caesar Cameron MD   10 mg at 01/03/24 2133    dextrose 10% bolus 125 mL 125 mL  12.5 g Intravenous PRN Caesar Cameron MD        dextrose 10% bolus 250 mL 250 mL  25 g Intravenous PRN Caesar Cameron MD        DULoxetine DR capsule 60 mg  60 mg Oral Daily Caesar Cameron MD   60 mg at 01/04/24 0836    enoxaparin injection 30 mg  30 mg Subcutaneous Daily Caesar Cameron MD   30 mg at 01/03/24 2134 "    ferrous sulfate tablet 1 each  1 tablet Oral Daily Caesar Cameron MD   1 each at 01/04/24 0836    glucagon (human recombinant) injection 1 mg  1 mg Intramuscular PRN Caesar Cameron MD        glucose chewable tablet 16 g  16 g Oral PRN Caesar Cameron MD        glucose chewable tablet 24 g  24 g Oral PRN Caesar Cameron MD        isosorbide mononitrate 24 hr tablet 60 mg  60 mg Oral Daily Caesar Cameron MD   60 mg at 01/04/24 0836    naloxone 0.4 mg/mL injection 0.02 mg  0.02 mg Intravenous PRN Caesar Cameron MD        oxyCODONE immediate release tablet 10 mg  10 mg Oral Q6H PRN Caesar Cameron MD   10 mg at 01/04/24 0444    sodium chloride 0.9% flush 10 mL  10 mL Intravenous Q12H PRN Caesar Cameron MD         Current Outpatient Medications   Medication Sig Dispense Refill    acetaminophen (TYLENOL) 500 MG tablet Take 500 mg by mouth every 6 (six) hours as needed for Pain.      aspirin (ECOTRIN) 81 MG EC tablet Take 81 mg by mouth once daily.      busPIRone (BUSPAR) 5 MG Tab Take 1 tablet (5 mg total) by mouth daily as needed (anxiety). 30 tablet 1    calcium carbonate/vitamin D3 (VITAMIN D-3 ORAL) Take 1 tablet by mouth once daily.      citalopram (CELEXA) 10 MG tablet Take 1 tablet (10 mg total) by mouth once daily. (Patient taking differently: Take 10 mg by mouth every 48 hours.) 30 tablet 11    clotrimazole-betamethasone 1-0.05% (LOTRISONE) cream Apply topically 2 (two) times daily. 45 g 1    co-enzyme Q-10 30 mg capsule Take 30 mg by mouth once daily.      diphenhydrAMINE (BENADRYL ALLERGY) 25 mg tablet Take 2 tablets (50 mg total) by mouth As instructed for Insomnia. Take two tablets one hour prior to procedure 2 tablet 0    DULoxetine (CYMBALTA) 60 MG capsule Take 1 capsule (60 mg total) by mouth once daily. 90 capsule 0    FEROSUL 325 mg (65 mg iron) Tab tablet Take 325 mg by mouth once daily.      furosemide (LASIX) 40 MG tablet Take 2 tablets (80 mg total) by mouth 2 (two) times daily as  needed (swelling). (Patient taking differently: Take 80 mg by mouth 2 (two) times a day.) 360 tablet 3    gabapentin (NEURONTIN) 600 MG tablet Take 600 mg by mouth 2 (two) times daily.      hydrOXYzine HCL (ATARAX) 25 MG tablet TAKE ONE TABLET BY MOUTH EVERY 6 HOURS AS NEEDED FOR ITCHING 30 tablet 2    irbesartan (AVAPRO) 300 MG tablet Take 1 tablet (300 mg total) by mouth every evening. 30 tablet 30    isosorbide mononitrate (IMDUR) 60 MG 24 hr tablet Take 1 tablet (60 mg total) by mouth once daily. 90 tablet 1    LINZESS 72 mcg Cap capsule TAKE ONE CAPSULE BY MOUTH BEFORE BREAKFAST 30 capsule 3    multivit-min-iron-FA-lutein (CENTRUM SILVER WOMEN) 8 mg iron-400 mcg-300 mcg Tab Take 1 tablet by mouth once daily.      nitroGLYCERIN (NITROSTAT) 0.3 MG SL tablet PLACE 1 TABLET UNDER THE TONGUE EVERY 5 MINUTES FOR UP TO 3 DOSES IF NEEDED FOR CHEST PAIN. CALL 911 IF PAIN PERSISTS 25 tablet 0    omega-3 fatty acids/fish oil (FISH OIL-OMEGA-3 FATTY ACIDS) 300-1,000 mg capsule Take 2 capsules by mouth once daily.      omeprazole (PRILOSEC) 20 MG capsule Take 20 mg by mouth once daily.      ondansetron (ZOFRAN) 4 MG tablet Take 1 tablet (4 mg total) by mouth every 6 (six) hours. 12 tablet 0    oxyCODONE (ROXICODONE) 10 mg Tab immediate release tablet Take 10 mg by mouth 4 (four) times daily.      rosuvastatin (CRESTOR) 40 MG Tab Take 1 tablet (40 mg total) by mouth once daily. 90 tablet 3    tiZANidine (ZANAFLEX) 4 MG tablet Take 1 tablet (4 mg total) by mouth every 8 (eight) hours as needed (spasms). 60 tablet 2    AIMOVIG AUTOINJECTOR 70 mg/mL autoinjector Inject 70 mg into the skin every 30 days.      butalbital-acetaminophen-caffeine -40 mg (FIORICET, ESGIC) -40 mg per tablet Take 1 tablet by mouth every 8 (eight) hours as needed.      galcanezumab-gnlm 120 mg/mL PnIj Inject 120 mg into the skin every 28 days. maintenance dose 1 each 11    hydrALAZINE (APRESOLINE) 25 MG tablet Take 25 mg by mouth every 8  (eight) hours.           Past Medical/Surgical/Family/Social History:  PMHx:   Past Medical History:   Diagnosis Date    Aortic atherosclerosis 6/14/2023    CKD (chronic kidney disease) stage 4, GFR 15-29 ml/min     Coronary artery disease     Edema     Epilepsy     Generalized anxiety disorder 12/20/2021    Hematuria, unspecified     Hematuria, unspecified     Hyperlipidemia     Hypertension     Iron deficiency anemia     Moderate episode of recurrent major depressive disorder     Nonrheumatic aortic valve stenosis 12/20/2021    Normocytic anemia     Prediabetes 2/24/2022      Surgeries:   Past Surgical History:   Procedure Laterality Date    APPENDECTOMY      BACK SURGERY      CORONARY STENT PLACEMENT      HYSTERECTOMY      REVISION OF KNEE ARTHROPLASTY Left 7/18/2022    Procedure: REVISION, ARTHROPLASTY, KNEE;  Surgeon: Stan Catalan MD;  Location: [facility] OR;  Service: Orthopedics;  Laterality: Left;    TONSILLECTOMY        Family  Hx:   Family History   Problem Relation Age of Onset    Heart disease Mother     Heart disease Father       Social Hx:   Social History     Tobacco Use    Smoking status: Never    Smokeless tobacco: Never   Substance Use Topics    Alcohol use: No    Drug use: No         Current Evaluation:     Vital Signs:   Vitals:    01/04/24 0715   BP:    Pulse: (!) 56   Resp: 11   Temp:         General Exam  No apparent distress  Orientation  Alert, awake, oriented to self, place, time, and situation.  Memory  Recent and remote memory intact.  Language  Fluent speech. No dysarthria, No aphasia.   Cranial Nerves  PERRL, VF intact, EOMI, V1-V3 intact, symmetric facial expression, hearing grossly intact, SCM & TPZ 5/5, tongue midline, symmetric palate elevation.  Motor  Normal Bulk, Normal Tone  Right Upper Extremity: Normal 5/5 strength  Left Upper Extremity: Normal 5/5 strength  Right Lower Extremity: Normal 5/5 strength  Left Lower Extremity: Normal 5/5 strength  Sensory  Normal to light touch  "throughout but described tingling sensation in feet  DTR  Upper Extremities:  +2/4, symmetric  Lower Extremities: Patellar on R 2+, L postsurgical changes. 1+ b/l ankles   Cerebellar/Gait  Normal finger to nose wnl b/l. Very subtle physiologic tremor b/l hands      LABORATORY STUDIES:  Labs:  Recent Labs   Lab 01/03/24  1424 01/04/24  0651   WBC 8.23 6.37   HGB 11.2* 11.0*   HCT 33.8* 34.0*    258   MCV 97 98       Recent Labs   Lab 01/03/24  1424 01/04/24  0651    140   K 3.6 4.1   CL 96 101   CO2 30* 34*   BUN 50* 38*   GLU 91 86   CALCIUM 10.0 9.2   PROT 6.8 6.4   ALBUMIN 3.5 3.2*   BILITOT 0.3 0.2   AST 28 23   ALKPHOS 56 52*   ALT 20 18       No results for input(s): "INR", "PROTIME", "PTT" in the last 168 hours.    Recent Labs   Lab 01/03/24  1424 01/04/24  0651   GLU 91 86       Urine:   Lab Results   Component Value Date    LABURIN ESCHERICHIA COLI  50,000 - 99,999 cfu/ml   (A) 10/24/2023    SPECGRAV 1.010 01/03/2024    NITRITE Negative 01/03/2024    KETONESU Negative 01/03/2024    UROBILINOGEN Negative 01/03/2024    WBCUA 12 (H) 01/03/2024       No results for input(s): "HGBA1C", "GLUF", "MICROALBUR", "LDLCALC" in the last 168 hours.    Thyroid:   Recent Labs   Lab 01/04/24  0651   TSH 1.020       FLP:   No results for input(s): "CHOL", "HDL", "LDLCALC", "TRIG", "CHOLHDL" in the last 168 hours.    Cardiac markers:  Recent Labs   Lab 01/03/24  1424 01/03/24  1758   TROPONINI 0.012  --    BNP  --  47       RADIOLOGY STUDIES:    CTA H and N 7/18/2023:    Occlusion of the left internal carotid artery just distal to the carotid bifurcation with reconstituted flow in the terminal segment.     No intracranial mass, hemorrhage, or evidence of acute infarction.     Mosaic attenuation in the visualized lungs with scattered tree-in-bud micro nodules suggesting small airway disease.  If the patient is at high risk for lung cancer, consider follow-up chest CT in 12 months.     Mediastinal and right hilar " lymphadenopathy, likely reactive.    MRI Brain:  Chronic occlusion of left internal carotid artery.  No acute intracranial process.    Assessment/Plan:   Enedelia García is a 78 y.o. female with PMH of CAD, HF, AS(symptomatic), CKD4, Epilepsy?, HTN, HLD< MDD, migraine who presents for UE and LE tremors associated with imbalance x3 days. MRI negative for acute abnormalities. On exam low amplitude physiologic tremor noted.     - Neuropathy workup given LE b/l numbness can be done outpt. Labs pending  - No further workup inpt needed given nonconcerning exam and imaging  - FU w/ psychiatry for stress management     Differential diagnosis was explained to the patient. All questions were answered. Patient understood and agreed to adhere to plan.     No further intervention indicated at this time from Neurology Service. Please call if any further questions or any changes in neurologic condition.    Case to be discussed with Dr. Santiago    Electronically signed by:   Ashley Rich MD   1/4/2024 8:38 AM

## 2024-01-04 NOTE — ED NOTES
Pt spo2 dropped to 86% RA while asleep. Pt aroused to verbal stimuli. Pt denies sob or any other complaints at this time. Pt placed on 2 NC. Pt given warm blanket. Call light within reach. Vss. Pt made aware that she will be boarding in ER. Pt verbalized understanding

## 2024-01-04 NOTE — PLAN OF CARE
[place] - Emergency Dept  Initial Discharge Assessment       Primary Care Provider: Shelton Mason MD    Admission Diagnosis: Dizziness [R42]    Admission Date: 1/3/2024  Expected Discharge Date: Pt states she drives herself to the grocery store, with meals made by her daughter as well as house chores. Pt expressed she was burnt out caregiving for her . Pt will ask daughter to transport at time of d/c, however states she may not be available depending on time of d/c. Pt referred to Outpatient Case Management for further support.          Payor: [employer] [facility] / Plan: [employer] / Product Type: Medicare Advantage /     Extended Emergency Contact Information  Primary Emergency Contact: Laya Garcia   [facility]  Home Phone: [phone]  Relation: Daughter  Secondary Emergency Contact: Nick Gross   [place]  Mobile Phone: [phone]  Relation: Significant other    Discharge Plan A: Home with family  Discharge Plan B: Home Health, Home with family      [facility] - [place] - [address]   [place] [address]  Phone: [phone] Fax: [phone]      Initial Assessment (most recent)       Adult Discharge Assessment - 01/04/24 1250          Discharge Assessment    Assessment Type Discharge Planning Assessment     Confirmed/corrected address, phone number and insurance Yes     Source of Information patient     People in Home significant other     Do you expect to return to your current living situation? Yes     Do you have help at home or someone to help you manage your care at home? No     Prior to hospitilization cognitive status: Alert/Oriented     Current cognitive status: Alert/Oriented     Walking or Climbing Stairs Difficulty no     Dressing/Bathing Difficulty no     Home Accessibility wheelchair accessible;stairs to enter home     Equipment Currently Used at Home walker, rolling;shower chair     Patient currently being  followed by outpatient case management? Unable to determine (comments)     Do you currently have service(s) that help you manage your care at home? No     Do you take prescription medications? Yes     Do you have prescription coverage? Yes     Coverage [facility]     Do you have any problems affording any of your prescribed medications? No     Is the patient taking medications as prescribed? yes     Who is going to help you get home at discharge? Laya Garcia (Daughter) [phone]     How do you get to doctors appointments? family or friend will provide     Discharge Plan A Home with family     Discharge Plan B Home Health;Home with family     DME Needed Upon Discharge  none     Discharge Plan discussed with: Patient        Social Connections    In a typical week, how many times do you talk on the phone with family, friends, or neighbors? More than three times a week     How often do you get together with friends or relatives? More than three times a week     How often do you attend Mosque or Druze services? Never     Are you , , , , never , or living with a partner? Living with partner        OTHER    Name(s) of People in Home Nick Gross  [phone] (P)                       Pt had [facility] from June- September of last year related to syncope episodes.     Sw also added [facility] resource contact info to AVS for support.     Future Appointments   Date Time Provider Department Center   1/8/2024 12:00 PM Althea Allen MD [facility]   1/24/2024  1:30 PM Roseanne Cota MD [facility]   1/25/2024 10:40 AM Shelton Mason MD [facility] [place] [facility]  [place]  [phone]

## 2024-01-04 NOTE — ED NOTES
Pt asleep, resp even and unlabored. Nadn. Pt aroused to verbal stimuli. Ccm/bp/o2 monitoring in place. Call light within reach.

## 2024-01-05 ENCOUNTER — PATIENT OUTREACH (OUTPATIENT)
Dept: ADMINISTRATIVE | Facility: CLINIC | Age: 79
End: 2024-01-05
Payer: MEDICARE

## 2024-01-05 LAB — RPR SER QL: NORMAL

## 2024-01-05 NOTE — PROGRESS NOTES
3rd Attempt made to reach patient for TCC call. Left voicemail please call [phone] leave first name, last name, and  for Aleida I will return your call.

## 2024-01-06 DIAGNOSIS — I25.10 PRESENCE OF STENT IN CORONARY ARTERY IN PATIENT WITH CORONARY ARTERY DISEASE: ICD-10-CM

## 2024-01-06 DIAGNOSIS — Z95.5 PRESENCE OF STENT IN CORONARY ARTERY IN PATIENT WITH CORONARY ARTERY DISEASE: ICD-10-CM

## 2024-01-08 RX ORDER — ROSUVASTATIN CALCIUM 40 MG/1
40 TABLET, COATED ORAL
Qty: 90 TABLET | Refills: 3 | Status: SHIPPED | OUTPATIENT
Start: 2024-01-08

## 2024-01-08 NOTE — PROGRESS NOTES
C3 nurse spoke with Enedelia García  for a TCC post hospital discharge follow up call. The patient has a scheduled [facility] appointment with Shelton Mason MD  on 1/11/24 @ 1300.

## 2024-01-09 NOTE — PLAN OF CARE
Problem: Occupational Therapy  Goal: Occupational Therapy Goal  Outcome: Met   OT eval and discharge. Pt Indep ADLS and fx ambulation. No skilled OT needs identified. Will discontinue OT orders.    Anticoagulation Clinic Progress Note    Patient's visit was held by phone today.    Anticoagulation Summary  As of 2024      INR goal:  2.0-3.0   TTR:  63.5% (9.6 mo)   INR used for dosin.70 (2024)   Warfarin maintenance plan:  7.5 mg (5 mg x 1.5) every Mon, Wed, Fri; 10 mg (5 mg x 2) all other days   Weekly warfarin total:  62.5 mg   No change documented:  Emily Bell RPH   Plan last modified:  Sharifa Cruz RPH (2023)   Next INR check:  2024   Priority:  Maintenance   Target end date:  Indefinite    Indications    Pulmonary embolism with infarction [I26.99]  Factor 5 Leiden mutation  heterozygous [D68.51]  Chronic deep vein thrombosis (DVT) of left popliteal vein [I82.532]                 Anticoagulation Episode Summary       INR check location:      Preferred lab:      Send INR reminders to:   [facility] ON[facility] CBC ANTICOAG POOL    Comments:  EP lab/ coag phone call          Anticoagulation Care Providers       Provider Role Specialty Phone number    Sean Jefferson MD Referring Hematology and Oncology [phone]            Drug interactions: has remained unchanged.  Diet: has remained unchanged.    Clinic Interview:  No pertinent clinical findings have been reported.    INR History:      2023     8:15 AM 2023     9:09 AM 2023     9:15 AM 2023     8:42 AM 2023     9:00 AM 2024     8:26 AM 2024     8:45 AM   Anticoagulation Monitoring   INR 3.4  3.40  1.70  2.70   INR Date 2023   INR Goal 2.0-3.0  2.0-3.0  2.0-3.0  2.0-3.0   Trend Down  Down  Up  Same   Last Week Total 65 mg  62.5 mg  60 mg  62.5 mg   Next Week Total 55 mg  55 mg  62.5 mg  62.5 mg   Sun 10 mg  10 mg  10 mg  10 mg   Mon 7.5 mg  7.5 mg  7.5 mg  7.5 mg   Tue 2.5 mg (); Otherwise 10 mg  5 mg (); Otherwise 10 mg  10 mg  10 mg   Wed 7.5 mg  7.5 mg  7.5 mg  7.5 mg   Thu 10 mg  10 mg  10 mg  10 mg   Fri 7.5 mg  7.5 mg  7.5 mg  7.5 mg   Sat 10 mg  7.5  mg  10 mg  10 mg   Historical INR  3.40   1.70   2.70         Plan:  1. INR is Therapeutic today- see above in Anticoagulation Summary.   Will instruct Bryan Valadez to Continue their warfarin regimen- see above in Anticoagulation Summary.  2. Follow up in 4 weeks - patient out of town from end of January until beginning of February   3.They have been instructed to call if any changes in medications, doses, concerns, etc. Patient expresses understanding and has no further questions at this time.    Emily Bell [facility]

## 2024-01-11 ENCOUNTER — OFFICE VISIT (OUTPATIENT)
Dept: FAMILY MEDICINE | Facility: CLINIC | Age: 79
End: 2024-01-11
Payer: MEDICARE

## 2024-01-11 VITALS
HEIGHT: 64 IN | BODY MASS INDEX: 28.27 KG/M2 | DIASTOLIC BLOOD PRESSURE: 60 MMHG | HEART RATE: 68 BPM | OXYGEN SATURATION: 93 % | SYSTOLIC BLOOD PRESSURE: 112 MMHG | WEIGHT: 165.56 LBS

## 2024-01-11 DIAGNOSIS — I35.0 NONRHEUMATIC AORTIC VALVE STENOSIS: ICD-10-CM

## 2024-01-11 DIAGNOSIS — Z09 HOSPITAL DISCHARGE FOLLOW-UP: Primary | ICD-10-CM

## 2024-01-11 DIAGNOSIS — I25.118 CORONARY ARTERY DISEASE OF NATIVE ARTERY OF NATIVE HEART WITH STABLE ANGINA PECTORIS: ICD-10-CM

## 2024-01-11 DIAGNOSIS — M46.1 SACROILIITIS, NOT ELSEWHERE CLASSIFIED: ICD-10-CM

## 2024-01-11 DIAGNOSIS — I50.32 CHRONIC DIASTOLIC HEART FAILURE: ICD-10-CM

## 2024-01-11 DIAGNOSIS — Z76.89 ENCOUNTER TO ESTABLISH CARE WITH NEW DOCTOR: ICD-10-CM

## 2024-01-11 DIAGNOSIS — N18.4 STAGE 4 CHRONIC KIDNEY DISEASE: ICD-10-CM

## 2024-01-11 DIAGNOSIS — K21.9 GASTROESOPHAGEAL REFLUX DISEASE, UNSPECIFIED WHETHER ESOPHAGITIS PRESENT: ICD-10-CM

## 2024-01-11 DIAGNOSIS — F33.3 SEVERE EPISODE OF RECURRENT MAJOR DEPRESSIVE DISORDER, WITH PSYCHOTIC FEATURES: ICD-10-CM

## 2024-01-11 DIAGNOSIS — I10 PRIMARY HYPERTENSION: ICD-10-CM

## 2024-01-11 DIAGNOSIS — N25.81 SECONDARY HYPERPARATHYROIDISM OF RENAL ORIGIN: ICD-10-CM

## 2024-01-11 PROCEDURE — 99215 OFFICE O/P EST HI 40 MIN: CPT | Mod: S$GLB,,, | Performed by: FAMILY MEDICINE

## 2024-01-11 PROCEDURE — 99999 PR PBB SHADOW E&M-EST. PATIENT-LVL IV: CPT | Mod: PBBFAC,,, | Performed by: FAMILY MEDICINE

## 2024-01-11 RX ORDER — OMEPRAZOLE 20 MG/1
20 CAPSULE, DELAYED RELEASE ORAL DAILY PRN
Qty: 90 CAPSULE | Refills: 2 | Status: SHIPPED | OUTPATIENT
Start: 2024-01-11

## 2024-01-11 NOTE — PROGRESS NOTES
(Portions of this note were dictated using voice recognition software and may contain dictation related errors in spelling/grammar/syntax not found on text review)    CC:   Chief Complaint   Patient presents with    Follow-up     Due to kidney infection       HPI: 78 y.o. female presented to [facility] care as a new patient accompanied with her daughter.  She is medical history significant for coronary artery disease with stent placement, CKD stage or, prediabetes, generalized anxiety disorder, iron-deficiency anemia, hypertension, hyperlipidemia.  She presented to the ED recently with bilateral upper extremity and lower extremity tremors, gait imbalance and ascending numbness, stroke workup was initiated and she had blood workup, CT scan, MRI brain, there was no abnormality in the brain on imaging. She has found to have elevated d dimer, DVT study of legs were negative, had V/Q scan due to kidney function which did not show any blood clot. She will have outpt further work up with neurology.     ALEXANDRO:  Patient reports feeling anxious, she has a caretaker for her  who has dementia with behavioral disturbances while she feels overwhelmed and tired, stated that she does not like to be where she is, takes hydroxyzine as needed for anxiety and panic attacks, never had any therapy done in the past    HTN:  At pressure has been stable within irbesartan 300 mg, reports adherence, no side effects reported    HLD:  She takes Crestor and omega-3 fatty acid, cholesterol 147, LDL 77 (6/23)    CAD:  She follows with Cardiology, reports adherence with aspirin, statin and Imdur    CKD:  Follows up with Nephrology (Dr Monet), most recent GFR 46    She reports having acid reflux symptoms, needs refill on omeprazole   She denies having any other symptoms or concerns    Past Medical History:   Diagnosis Date    Aortic atherosclerosis 6/14/2023    CKD (chronic kidney disease) stage 4, GFR 15-29 ml/min     Coronary artery  disease     Edema     Epilepsy     Generalized anxiety disorder 12/20/2021    Hematuria, unspecified     Hematuria, unspecified     Hyperlipidemia     Hypertension     Iron deficiency anemia     Moderate episode of recurrent major depressive disorder     Nonrheumatic aortic valve stenosis 12/20/2021    Normocytic anemia     Prediabetes 2/24/2022       Past Surgical History:   Procedure Laterality Date    APPENDECTOMY      BACK SURGERY      CORONARY STENT PLACEMENT      HYSTERECTOMY      REVISION OF KNEE ARTHROPLASTY Left 7/18/2022    Procedure: REVISION, ARTHROPLASTY, KNEE;  Surgeon: Stan Catalan MD;  Location: [facility];  Service: Orthopedics;  Laterality: Left;    TONSILLECTOMY         Family History   Problem Relation Age of Onset    Heart disease Mother     Heart disease Father        Social History     Tobacco Use    Smoking status: Never    Smokeless tobacco: Never   Substance Use Topics    Alcohol use: No    Drug use: No       Lab Results   Component Value Date    WBC 6.37 01/04/2024    HGB 11.0 (L) 01/04/2024    HCT 34.0 (L) 01/04/2024    MCV 98 01/04/2024     01/04/2024    CHOL 147 06/30/2023    TRIG 99 06/30/2023    HDL 50 06/30/2023    ALT 18 01/04/2024    AST 23 01/04/2024    BILITOT 0.2 01/04/2024    ALKPHOS 52 (L) 01/04/2024     01/04/2024    K 4.1 01/04/2024     01/04/2024    CREATININE 1.2 01/04/2024    ESTGFRAFRICA 31 (A) 07/30/2022    EGFRNONAA 27 (A) 07/30/2022    CALCIUM 9.2 01/04/2024    ALBUMIN 3.2 (L) 01/04/2024    BUN 38 (H) 01/04/2024    CO2 34 (H) 01/04/2024    TSH 1.020 01/04/2024    INR 1.0 06/29/2022    HGBA1C 5.2 06/30/2023    LDLCALC 77.2 06/30/2023    GLU 86 01/04/2024    [id number] 43 08/18/2023             Vital signs reviewed  PE:   APPEARANCE: Well nourished, well developed, in no acute distress.    HEAD: Normocephalic, atraumatic.  EYES: EOMI.  Conjunctivae noninjected.  NOSE: Mucosa pink. Airway clear.  MOUTH & THROAT: No tonsillar enlargement. No pharyngeal  erythema or exudate.   NECK: Supple with no cervical lymphadenopathy.    CHEST: Good inspiratory effort. Lungs clear to auscultation with no wheezes or crackles.  CARDIOVASCULAR: Normal S1, S2. No rubs, murmurs, or gallops.  ABDOMEN: Bowel sounds normal. Not distended. Soft. No tenderness or masses. No organomegaly.  EXTREMITIES: No edema, cyanosis, or clubbing.    Review of Systems   Constitutional:  Negative for chills, fatigue and fever.   HENT: Negative.     Respiratory:  Negative for cough, shortness of breath and wheezing.    Cardiovascular:  Negative for chest pain, palpitations and leg swelling.   Gastrointestinal: Negative.    Genitourinary: Negative.    Neurological: Negative.    Psychiatric/Behavioral: Negative.     All other systems reviewed and are negative.      IMPRESSION  1. Hospital discharge follow-up    2. Gastroesophageal reflux disease, unspecified whether esophagitis present    3. Encounter to establish care with new doctor    4. Severe episode of recurrent major depressive disorder, with psychotic features    5. Secondary hyperparathyroidism of renal origin    6. Sacroiliitis, not elsewhere classified    7. Seizure-like activity    8. Coronary artery disease of native artery of native heart with stable angina pectoris    9. Nonrheumatic aortic valve stenosis    10. Primary hypertension    11. Chronic diastolic heart failure    12. Stage 4 chronic kidney disease            PLAN      1. Gastroesophageal reflux disease, unspecified whether esophagitis present    - omeprazole (PRILOSEC) 20 MG capsule; Take 1 capsule (20 mg total) by mouth daily as needed.  Dispense: 90 capsule; Refill: 2      2. Encounter to establish care with new doctor      3. Severe episode of recurrent major depressive disorder, with psychotic features    - Ambulatory referral/consult to Psychology; Future    Continue Cymbalta daily  Continue hydroxyzine as needed      4. Secondary hyperparathyroidism of renal origin    PTH  148 (8/23)    Followed by nephrology      5. Sacroiliitis, not elsewhere classified    Stable    No recent flare up    Tylenol as needed  On Cymbalta      6. Seizure-like activity    Follow up with neurology      7. Coronary artery disease of native artery of native heart with stable angina pectoris      8. Nonrheumatic aortic valve stenosis    Followed by cardiology    Continue current medications      9. Primary hypertension    Stable    Continue irbesartan      10. Chronic diastolic heart failure    EF 65 % as per last Echo 6/23    Followed by cardiology    Continue current meds      11. Stage 4 chronic kidney disease     Stable    Follow by nephrology      SCREENINGS               Age/demographic appropriate health maintenance:    Health Maintenance Due   Topic Date Due    RSV Vaccine (Age 60+ and Pregnant patients) (1 - 1-dose 60+ series) Never done    Shingles Vaccine (2 of 2) 05/04/2022           Spent adequate time in obtaining history and explaining differentials     40 minutes spent during this visit of which greater than 50% devoted to face-face counseling and coordination of care regarding diagnosis and management plan         Shelton Mason   1/11/2024

## 2024-01-18 ENCOUNTER — HOSPITAL ENCOUNTER (EMERGENCY)
Facility: HOSPITAL | Age: 79
Discharge: HOME | End: 2024-01-18
Attending: EMERGENCY MEDICINE
Payer: MEDICARE

## 2024-01-18 VITALS
HEART RATE: 78 BPM | SYSTOLIC BLOOD PRESSURE: 159 MMHG | DIASTOLIC BLOOD PRESSURE: 69 MMHG | TEMPERATURE: 99 F | OXYGEN SATURATION: 98 % | RESPIRATION RATE: 20 BRPM

## 2024-01-18 DIAGNOSIS — R53.1 WEAKNESS: ICD-10-CM

## 2024-01-18 DIAGNOSIS — E86.0 DEHYDRATION: Primary | ICD-10-CM

## 2024-01-18 LAB
ALBUMIN SERPL BCP-MCNC: 3.6 G/DL (ref 3.5–5.2)
ALP SERPL-CCNC: 57 U/L (ref 55–135)
ALT SERPL W/O P-5'-P-CCNC: 23 U/L (ref 10–44)
ANION GAP SERPL CALC-SCNC: 15 MMOL/L (ref 8–16)
AST SERPL-CCNC: 28 U/L (ref 10–40)
BACTERIA #/AREA URNS HPF: ABNORMAL /HPF
BASOPHILS # BLD AUTO: 0.07 K/UL (ref 0–0.2)
BASOPHILS NFR BLD: 0.8 % (ref 0–1.9)
BILIRUB SERPL-MCNC: 0.3 MG/DL (ref 0.1–1)
BILIRUB UR QL STRIP: NEGATIVE
BUN SERPL-MCNC: 42 MG/DL (ref 8–23)
CALCIUM SERPL-MCNC: 10.2 MG/DL (ref 8.7–10.5)
CHLORIDE SERPL-SCNC: 101 MMOL/L (ref 95–110)
CK SERPL-CCNC: 106 U/L (ref 20–180)
CLARITY UR: CLEAR
CO2 SERPL-SCNC: 25 MMOL/L (ref 23–29)
COLOR UR: YELLOW
CREAT SERPL-MCNC: 1.6 MG/DL (ref 0.5–1.4)
CTP QC/QA: YES
CTP QC/QA: YES
DIFFERENTIAL METHOD BLD: ABNORMAL
EOSINOPHIL # BLD AUTO: 0.2 K/UL (ref 0–0.5)
EOSINOPHIL NFR BLD: 2.6 % (ref 0–8)
ERYTHROCYTE [DISTWIDTH] IN BLOOD BY AUTOMATED COUNT: 12.5 % (ref 11.5–14.5)
EST. GFR  (NO RACE VARIABLE): 33 ML/MIN/1.73 M^2
GLUCOSE SERPL-MCNC: 85 MG/DL (ref 70–110)
GLUCOSE UR QL STRIP: NEGATIVE
HCT VFR BLD AUTO: 35.5 % (ref 37–48.5)
HGB BLD-MCNC: 12.2 G/DL (ref 12–16)
HGB UR QL STRIP: NEGATIVE
HYALINE CASTS #/AREA URNS LPF: 15 /LPF
IMM GRANULOCYTES # BLD AUTO: 0.02 K/UL (ref 0–0.04)
IMM GRANULOCYTES NFR BLD AUTO: 0.2 % (ref 0–0.5)
KETONES UR QL STRIP: ABNORMAL
LEUKOCYTE ESTERASE UR QL STRIP: ABNORMAL
LYMPHOCYTES # BLD AUTO: 1.7 K/UL (ref 1–4.8)
LYMPHOCYTES NFR BLD: 19.4 % (ref 18–48)
MAGNESIUM SERPL-MCNC: 2.2 MG/DL (ref 1.6–2.6)
MCH RBC QN AUTO: 32.2 PG (ref 27–31)
MCHC RBC AUTO-ENTMCNC: 34.4 G/DL (ref 32–36)
MCV RBC AUTO: 94 FL (ref 82–98)
MICROSCOPIC COMMENT: ABNORMAL
MONOCYTES # BLD AUTO: 1.1 K/UL (ref 0.3–1)
MONOCYTES NFR BLD: 12.6 % (ref 4–15)
NEUTROPHILS # BLD AUTO: 5.6 K/UL (ref 1.8–7.7)
NEUTROPHILS NFR BLD: 64.4 % (ref 38–73)
NITRITE UR QL STRIP: NEGATIVE
NRBC BLD-RTO: 0 /100 WBC
PH UR STRIP: 6 [PH] (ref 5–8)
PLATELET # BLD AUTO: 282 K/UL (ref 150–450)
PMV BLD AUTO: 9.3 FL (ref 9.2–12.9)
POC MOLECULAR INFLUENZA A AGN: NEGATIVE
POC MOLECULAR INFLUENZA B AGN: NEGATIVE
POTASSIUM SERPL-SCNC: 4.1 MMOL/L (ref 3.5–5.1)
PROT SERPL-MCNC: 6.6 G/DL (ref 6–8.4)
PROT UR QL STRIP: NEGATIVE
RBC # BLD AUTO: 3.79 M/UL (ref 4–5.4)
RBC #/AREA URNS HPF: 1 /HPF (ref 0–4)
SARS-COV-2 RDRP RESP QL NAA+PROBE: NEGATIVE
SODIUM SERPL-SCNC: 141 MMOL/L (ref 136–145)
SP GR UR STRIP: 1.02 (ref 1–1.03)
SQUAMOUS #/AREA URNS HPF: 1 /HPF
TROPONIN I SERPL DL<=0.01 NG/ML-MCNC: 0.02 NG/ML (ref 0–0.03)
TSH SERPL DL<=0.005 MIU/L-ACNC: 0.45 UIU/ML (ref 0.4–4)
URN SPEC COLLECT METH UR: ABNORMAL
UROBILINOGEN UR STRIP-ACNC: NEGATIVE EU/DL
WBC # BLD AUTO: 8.62 K/UL (ref 3.9–12.7)
WBC #/AREA URNS HPF: 6 /HPF (ref 0–5)

## 2024-01-18 PROCEDURE — 85025 COMPLETE CBC W/AUTO DIFF WBC: CPT | Performed by: EMERGENCY MEDICINE

## 2024-01-18 PROCEDURE — 87088 URINE BACTERIA CULTURE: CPT | Performed by: EMERGENCY MEDICINE

## 2024-01-18 PROCEDURE — 81000 URINALYSIS NONAUTO W/SCOPE: CPT | Performed by: EMERGENCY MEDICINE

## 2024-01-18 PROCEDURE — 87086 URINE CULTURE/COLONY COUNT: CPT | Performed by: EMERGENCY MEDICINE

## 2024-01-18 PROCEDURE — 93010 ELECTROCARDIOGRAM REPORT: CPT | Mod: ,,, | Performed by: INTERNAL MEDICINE

## 2024-01-18 PROCEDURE — 84484 ASSAY OF TROPONIN QUANT: CPT | Performed by: EMERGENCY MEDICINE

## 2024-01-18 PROCEDURE — 87077 CULTURE AEROBIC IDENTIFY: CPT | Performed by: EMERGENCY MEDICINE

## 2024-01-18 PROCEDURE — 87186 SC STD MICRODIL/AGAR DIL: CPT | Mod: 59 | Performed by: EMERGENCY MEDICINE

## 2024-01-18 PROCEDURE — 87635 SARS-COV-2 COVID-19 AMP PRB: CPT | Performed by: EMERGENCY MEDICINE

## 2024-01-18 PROCEDURE — 83735 ASSAY OF MAGNESIUM: CPT | Performed by: EMERGENCY MEDICINE

## 2024-01-18 PROCEDURE — 96360 HYDRATION IV INFUSION INIT: CPT

## 2024-01-18 PROCEDURE — 87502 INFLUENZA DNA AMP PROBE: CPT

## 2024-01-18 PROCEDURE — 84443 ASSAY THYROID STIM HORMONE: CPT | Performed by: EMERGENCY MEDICINE

## 2024-01-18 PROCEDURE — 82550 ASSAY OF CK (CPK): CPT | Performed by: EMERGENCY MEDICINE

## 2024-01-18 PROCEDURE — 93005 ELECTROCARDIOGRAM TRACING: CPT

## 2024-01-18 PROCEDURE — 80053 COMPREHEN METABOLIC PANEL: CPT | Performed by: EMERGENCY MEDICINE

## 2024-01-18 PROCEDURE — 99284 EMERGENCY DEPT VISIT MOD MDM: CPT | Mod: 25

## 2024-01-18 PROCEDURE — 25000003 PHARM REV CODE 250: Performed by: EMERGENCY MEDICINE

## 2024-01-18 RX ORDER — OXYCODONE AND ACETAMINOPHEN 10; 325 MG/1; MG/1
1 TABLET ORAL ONCE
Status: COMPLETED | OUTPATIENT
Start: 2024-01-18 | End: 2024-01-18

## 2024-01-18 RX ORDER — OXYCODONE AND ACETAMINOPHEN 10; 325 MG/1; MG/1
1 TABLET ORAL 4 TIMES DAILY PRN
COMMUNITY
Start: 2024-01-03

## 2024-01-18 RX ORDER — DIPHENHYDRAMINE HCL 25 MG
25 CAPSULE ORAL EVERY 6 HOURS PRN
COMMUNITY
End: 2024-06-06

## 2024-01-18 RX ADMIN — OXYCODONE AND ACETAMINOPHEN 1 TABLET: 10; 325 TABLET ORAL at 01:01

## 2024-01-18 RX ADMIN — SODIUM CHLORIDE 1000 ML: 9 INJECTION, SOLUTION INTRAVENOUS at 11:01

## 2024-01-18 NOTE — PLAN OF CARE
JASON notified of pt's visit to ED. JASON called [facility] to follow up on referral made in the last month for support at [phone]. JASON left vm, no answer. Pt recently saw PCP 1/11 accompanied with her daughter, return visit next week 1/25.   She will have outpt further work up with neurology.        01/18/24 1119   Post-Acute Status   Post-Acute Authorization Other   Other Status Community Services   Hospital Resources/Appts/Education Provided Community resources provided   Discharge Plan   Discharge Plan A Home   Discharge Plan B Home with family

## 2024-01-18 NOTE — ED NOTES
Pt. Is requesting her pain medication that she missed this morning prior to discharge. MD updated.

## 2024-01-18 NOTE — ED NOTES
Assisted pt. To void on bedpan. She reports feeling better after iv fluids. She does report 1 episode of diarrhea this morning. Daughter at bedside and pt. Is tolerating a smoothie.
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
## 2024-01-18 NOTE — PHARMACY MED REC
"  Admission Medication History     The home medication history was taken by Lucy Carbajal CPhT.    Medication history obtained from, Patient Verified    You may go to "Admission" then "Reconcile Home Medications" tabs to review and/or act upon these items.     The home medication list has been updated by the Pharmacy department.   Please read ALL comments highlighted in yellow.   Please address this information as you see fit.    Feel free to contact us if you have any questions or require assistance.      The medications listed below were removed from the home medication list.  Please reorder if appropriate:  Patient reports no longer taking the following medication(s):  Co-enzyme Q-10 30 mg  Lomotil 2.5-0.025 mg      Lucy Carbajal CPhT.  Ext [phone]             .          "

## 2024-01-18 NOTE — ED NOTES
"Initial RN assessment. Pt. Arrives via EMS from home. Pt. Has generalized complaints of fatigue, loss of appetite and describes failure to thrive since recent discharge from hospital (approx. 2 weeks ago). She states she cares for significant other who has had several strokes and early dementia and feels overwhelmed. She does admit some depression. Denies si/hi. She is on antidepressant therapy that she says is being managed by her pcp. She states she does have family support from her children. She also reports chronic back pain and is under pain management. She reports recent hospitalization for muscle tremors that subsided after she was taken off of Gabapentin. She surmises her symptoms as "I just don't feel back to myself".   "

## 2024-01-21 LAB
BACTERIA UR CULT: ABNORMAL
BACTERIA UR CULT: ABNORMAL

## 2024-01-23 ENCOUNTER — TELEPHONE (OUTPATIENT)
Dept: NEUROLOGY | Facility: CLINIC | Age: 79
End: 2024-01-23
Payer: MEDICARE

## 2024-01-23 ENCOUNTER — PATIENT MESSAGE (OUTPATIENT)
Dept: NEUROLOGY | Facility: CLINIC | Age: 79
End: 2024-01-23
Payer: MEDICARE

## 2024-01-23 NOTE — PROCEDURES
Performed by: Ashlee Villa PA-C  Authorized by: Dg Alvarez MD    Direct patient critical care time: 25 minutes  Additional history critical care time: 10 minutes  Ordering / reviewing critical care time: 10 minutes  Documentation critical care time: 15 minutes  Consulting other physicians critical care time: 5 minutes  Total critical care time (exclusive of procedural time) : 65 minutes  Critical care time was exclusive of separately billable procedures and treating other patients and teaching time.  Critical care was necessary to treat or prevent imminent or life-threatening deterioration of the following conditions: respiratory failure.  Critical care was time spent personally by me on the following activities: blood draw for specimens, development of treatment plan with patient or surrogate, discussions with consultants, interpretation of cardiac output measurements, evaluation of patient's response to treatment, examination of patient, obtaining history from patient or surrogate, ordering and performing treatments and interventions, ordering and review of laboratory studies, ordering and review of radiographic studies, pulse oximetry, re-evaluation of patient's condition and review of old charts.

## 2024-01-24 ENCOUNTER — SSC ENCOUNTER (OUTPATIENT)
Dept: ADMINISTRATIVE | Facility: OTHER | Age: 79
End: 2024-01-24
Payer: MEDICARE

## 2024-01-24 NOTE — PROGRESS NOTES
Please note the following patient's information was forwarded to [facility] ([facility] for case management and/or .    Please contact Ext. [phone] with any questions.    Thank you,    Alice Benjamin, [facility]  [phone]

## 2024-01-25 ENCOUNTER — OFFICE VISIT (OUTPATIENT)
Dept: URGENT CARE | Facility: CLINIC | Age: 79
End: 2024-01-25
Payer: MEDICARE

## 2024-01-25 VITALS
WEIGHT: 165.56 LBS | OXYGEN SATURATION: 96 % | TEMPERATURE: 98 F | HEART RATE: 83 BPM | DIASTOLIC BLOOD PRESSURE: 81 MMHG | SYSTOLIC BLOOD PRESSURE: 126 MMHG | HEIGHT: 64 IN | BODY MASS INDEX: 28.27 KG/M2 | RESPIRATION RATE: 18 BRPM

## 2024-01-25 DIAGNOSIS — B86 SCABIES: ICD-10-CM

## 2024-01-25 DIAGNOSIS — R21 RASH: Primary | ICD-10-CM

## 2024-01-25 PROCEDURE — 99213 OFFICE O/P EST LOW 20 MIN: CPT | Mod: S$GLB,,, | Performed by: FAMILY MEDICINE

## 2024-01-25 RX ORDER — PERMETHRIN 50 MG/G
CREAM TOPICAL ONCE
Qty: 60 G | Refills: 0 | Status: SHIPPED | OUTPATIENT
Start: 2024-01-25 | End: 2024-01-29

## 2024-01-25 RX ORDER — TRIAMCINOLONE ACETONIDE 1 MG/G
CREAM TOPICAL 2 TIMES DAILY
Qty: 80 G | Refills: 1 | Status: SHIPPED | OUTPATIENT
Start: 2024-01-25 | End: 2024-02-01

## 2024-01-25 RX ORDER — PREDNISONE 20 MG/1
20 TABLET ORAL 2 TIMES DAILY
Qty: 10 TABLET | Refills: 0 | Status: SHIPPED | OUTPATIENT
Start: 2024-01-25 | End: 2024-02-01

## 2024-01-25 NOTE — PROGRESS NOTES
"Subjective:      Patient ID: Enedelia García is a 78 y.o. female.    Vitals:  height is 5' 4" (1.626 m) and weight is 75.1 kg (165 lb 9.1 oz). Her oral temperature is 98 °F (36.7 °C). Her blood pressure is 126/81 and her pulse is 83. Her respiration is 18 and oxygen saturation is 96%.     Chief Complaint: OTHER    Pt coming into clinic with possible insect bites all over back, itching, sx started 3 weeks ago, treatment includes benadryl with no relief.  C/o itching all over her body, states was in ER for dehydration and had to stay in ER  Itching severely      Other  This is a new problem. The current episode started 1 to 4 weeks ago. The problem occurs constantly. Pertinent negatives include no chills, congestion, coughing, diaphoresis, joint swelling, myalgias, nausea, neck pain, numbness or vertigo. Nothing aggravates the symptoms. Treatments tried: benadryl. The treatment provided no relief.       Constitution: Negative for chills and sweating.   HENT:  Negative for congestion.    Neck: Negative for neck pain.   Respiratory:  Negative for cough.    Gastrointestinal:  Negative for nausea.   Musculoskeletal:  Negative for joint swelling and muscle ache.   Neurological:  Negative for history of vertigo and numbness.      Objective:     Physical Exam   Constitutional: She is oriented to person, place, and time. She appears well-developed. She is cooperative.  Non-toxic appearance. She does not appear ill. No distress.   HENT:   Head: Normocephalic and atraumatic.   Ears:   Right Ear: Hearing, tympanic membrane, external ear and ear canal normal.   Left Ear: Hearing, tympanic membrane, external ear and ear canal normal.   Nose: Nose normal. No mucosal edema, rhinorrhea or nasal deformity. No epistaxis. Right sinus exhibits no maxillary sinus tenderness and no frontal sinus tenderness. Left sinus exhibits no maxillary sinus tenderness and no frontal sinus tenderness.   Mouth/Throat: Uvula is midline, oropharynx " is clear and moist and mucous membranes are normal. Mucous membranes are moist. No trismus in the jaw. Normal dentition. No uvula swelling. No posterior oropharyngeal erythema.   Eyes: Conjunctivae and lids are normal. Right eye exhibits no discharge. Left eye exhibits no discharge. No scleral icterus.   Neck: Trachea normal and phonation normal. Neck supple.   Cardiovascular: Normal rate, regular rhythm, normal heart sounds and normal pulses.   Pulmonary/Chest: Effort normal and breath sounds normal. No respiratory distress.   Abdominal: Normal appearance and bowel sounds are normal. She exhibits no distension and no mass. Soft. There is no abdominal tenderness.   Musculoskeletal: Normal range of motion.         General: No deformity. Normal range of motion.   Neurological: She is alert and oriented to person, place, and time. She exhibits normal muscle tone. Coordination normal.   Skin: Skin is warm, dry, intact, not diaphoretic and not pale.         Comments: Multiple area of macular spots with burows on entire back, chest, abdomen     Psychiatric: Her speech is normal and behavior is normal. Judgment and thought content normal.   Nursing note and vitals reviewed.      Assessment:     1. Rash    2. Scabies        Plan:       Rash    Scabies    Other orders  -     permethrin (ELIMITE) 5 % cream; Apply topically once. for 1 dose  Dispense: 60 g; Refill: 0  -     triamcinolone acetonide 0.1% (KENALOG) 0.1 % cream; Apply topically 2 (two) times daily. for 10 days  Dispense: 80 g; Refill: 1  -     predniSONE (DELTASONE) 20 MG tablet; Take 1 tablet (20 mg total) by mouth 2 (two) times daily. for 7 days  Dispense: 10 tablet; Refill: 0

## 2024-01-29 ENCOUNTER — TELEPHONE (OUTPATIENT)
Dept: CARDIOLOGY | Facility: CLINIC | Age: 79
End: 2024-01-29
Payer: MEDICARE

## 2024-01-29 RX ORDER — PERMETHRIN 50 MG/G
CREAM TOPICAL
Qty: 60 G | Refills: 0 | Status: SHIPPED | OUTPATIENT
Start: 2024-01-29

## 2024-01-29 NOTE — TELEPHONE ENCOUNTER
----- Message from Laya Blantonspada sent at 1/29/2024  9:49 AM CST -----  Type:  Sooner Appointment Request    Caller is requesting a sooner appointment.  Caller declined first available appointment listed below.  Caller will not accept being placed on the waitlist and is requesting a message be sent to doctor.  Name of Caller: Pt's daughter, Laya   When is the first available appointment? 03/13 at 4PM   Symptoms: Hospital F/U   Would the patient rather a call back or a response via [facility]? Call back   Best Call Back Number: [phone]  Additional Information: Please be advised, caller stated that she was recently released from the hospital last week and was told to F/U w/  for a Hospital F/U

## 2024-01-30 DIAGNOSIS — F43.21 ADJUSTMENT DISORDER WITH DEPRESSED MOOD: ICD-10-CM

## 2024-01-30 RX ORDER — HYDROXYZINE HYDROCHLORIDE 25 MG/1
TABLET, FILM COATED ORAL
Qty: 30 TABLET | Refills: 2 | Status: SHIPPED | OUTPATIENT
Start: 2024-01-30 | End: 2024-03-18

## 2024-02-01 RX ORDER — TRIAMCINOLONE ACETONIDE 1 MG/G
CREAM TOPICAL 2 TIMES DAILY
Qty: 80 G | Refills: 1 | Status: SHIPPED | OUTPATIENT
Start: 2024-02-01

## 2024-02-04 NOTE — PROGRESS NOTES
[facility]     SUBJECTIVE:     History of Present Illness:  Patient is a 78 y.o. female presents with CAD.     Primary Diagnosis:   1. Hypertension  2. DM: none  3. Past smoker: over 10 years ago   4. Heart disease: In 2000, had one stent placed at [facility]. IN 2005, had 2 stents placed. - unknown anatomy   5. Tremors  6. Recent diagnosis of scabies   ROS   Since last visit in 8/23; no change   No syncope since or before but gets dizzy - usually happens when she stands up from a sitting position   No chest pains  No shortness of breath at rest; no PND or orthopnea  Activity: vacuums; cleans dishes, makes bed, changes sheets; no symptoms with it.   Blood pressures usually normal till she gets upset   Review of patient's allergies indicates:   Allergen Reactions    Iodinated contrast media Anaphylaxis and Other (See Comments)    Nsaids (non-steroidal anti-inflammatory drug)      ADWOA    Phenergan [promethazine]      Vomiting       Past Medical History:   Diagnosis Date    Aortic atherosclerosis 6/14/2023    CKD (chronic kidney disease) stage 4, GFR 15-29 ml/min     Coronary artery disease     Edema     Epilepsy     Generalized anxiety disorder 12/20/2021    Hematuria, unspecified     Hematuria, unspecified     Hyperlipidemia     Hypertension     Iron deficiency anemia     Moderate episode of recurrent major depressive disorder     Nonrheumatic aortic valve stenosis 12/20/2021    Normocytic anemia     Prediabetes 2/24/2022       Past Surgical History:   Procedure Laterality Date    APPENDECTOMY      BACK SURGERY      CORONARY STENT PLACEMENT      HYSTERECTOMY      REVISION OF KNEE ARTHROPLASTY Left 7/18/2022    Procedure: REVISION, ARTHROPLASTY, KNEE;  Surgeon: Stan Catalan MD;  Location: [facility] OR;  Service: Orthopedics;  Laterality: Left;    TONSILLECTOMY         Family History   Problem Relation Age of Onset    Heart disease Mother     Heart disease Father        Social History     Tobacco Use    Smoking status:  Never    Smokeless tobacco: Never   Substance Use Topics    Alcohol use: No    Drug use: No        Past Hospitalization:   3/22: weakness and diarrhea  7/22: surgery for left knee    7/30/22: ER visit for left knee pain and swelling ; fell in the ER   8/27/22: itching   9/9/22: infection left knee of tissue   9/16/22: syncope ; BP low ; Hgb 7.7  6/23: fall/ no LOC;   1/24: gait imbalance, depression;   Home meds:  Current Outpatient Medications on File Prior to Visit   Medication Sig Dispense Refill    AIMOVIG AUTOINJECTOR 70 mg/mL autoinjector Inject 70 mg into the skin every 30 days.      aspirin (ECOTRIN) 81 MG EC tablet Take 81 mg by mouth once daily.      calcium carbonate/vitamin D3 (VITAMIN D-3 ORAL) Take 1 tablet by mouth once daily.      ciprofloxacin HCl (CIPRO) 250 MG tablet Take 1 tablet (250 mg total) by mouth 2 (two) times a day. 6 tablet 0    diphenhydrAMINE (BENADRYL) 25 mg capsule Take 25 mg by mouth every 6 (six) hours as needed for Itching.      DULoxetine (CYMBALTA) 60 MG capsule Take 1 capsule (60 mg total) by mouth once daily. 90 capsule 0    FEROSUL 325 mg (65 mg iron) Tab tablet Take 325 mg by mouth once daily.      furosemide (LASIX) 40 MG tablet Take 2 tablets (80 mg total) by mouth 2 (two) times daily as needed (swelling). 360 tablet 3    hydrOXYzine HCL (ATARAX) 25 MG tablet TAKE ONE TABLET BY MOUTH EVERY 6 HOURS AS NEEDED FOR ITCHING 30 tablet 2    irbesartan (AVAPRO) 300 MG tablet Take 1 tablet (300 mg total) by mouth every evening. 30 tablet 30    isosorbide mononitrate (IMDUR) 60 MG 24 hr tablet Take 1 tablet (60 mg total) by mouth once daily. 90 tablet 1    multivit-min-iron-FA-lutein (CENTRUM SILVER WOMEN) 8 mg iron-400 mcg-300 mcg Tab Take 1 tablet by mouth once daily.      nitroGLYCERIN (NITROSTAT) 0.3 MG SL tablet PLACE 1 TABLET UNDER THE TONGUE EVERY 5 MINUTES FOR UP TO 3 DOSES IF NEEDED FOR CHEST PAIN. CALL 911 IF PAIN PERSISTS 25 tablet 0    omega-3 fatty acids/fish oil (FISH  OIL-OMEGA-3 FATTY ACIDS) 300-1,000 mg capsule Take 2 capsules by mouth once daily.      omeprazole (PRILOSEC) 20 MG capsule Take 1 capsule (20 mg total) by mouth daily as needed. 90 capsule 2    oxyCODONE-acetaminophen (PERCOCET)  mg per tablet Take 1 tablet by mouth 4 (four) times daily as needed.      permethrin (ELIMITE) 5 % cream APPLY TOPICALLY ONCE FOR ONE DOSE 60 g 0    rosuvastatin (CRESTOR) 40 MG Tab TAKE ONE TABLET BY MOUTH DAILY (Patient taking differently: Take 40 mg by mouth once daily.) 90 tablet 3    tiZANidine (ZANAFLEX) 4 MG tablet Take 1 tablet (4 mg total) by mouth every 8 (eight) hours as needed (spasms). 60 tablet 2    triamcinolone acetonide 0.1% (KENALOG) 0.1 % cream APPLY TOPICALLY TWICE DAILY FOR 10 DAYS 80 g 1     No current facility-administered medications on file prior to visit.       Cardiac meds:    Imdur 60 mg  Irbesartan 300 mg  Lasix 80 mg as needed   rosuvastatin 40 mg   ASA 81 mg       OBJECTIVE:     Vital Signs (Most Recent)  Pulse: (!) 59 (24)  BP: (!) 150/75 (24)  SpO2: 95 % (24)      Physical Exam:    Neck: decreased  Carotids upstroke ,basilar  bruits; normal JVP  Lungs :clear  Heart: RR, normal S1,S2, systolic ejection murmur base to LSB, no gallops  Abd: no masses; no bruits;   Exts: normal DP and PT pulses bilaterally, trace  edema noted           LABS    GFR : GFR 47; Hgb: 7 yvon  : GFR 58 ; LDL 77 ; A1c 5.2   : Hgb 12; GFR 46    : TSH normal; GFR 33  Diagnostic Results:    1.EK21: NSR; normal   1b. EK/22: NSR: normal   1c. EK/24: sinus bradycardia; normal   2.Echo: 21: normal EF, diastolic dysfunction; mild TR; PAS 43; MIGDALIA 1.18 cm2 with aortic sclerosis   2b. Echo: : mild AI; moderately severe AS normal EF but velocity only 3.6   3.Nuclear stress: : negative for ischemia; normal EF   3b.Nuclear stress : EF normal; negative for ischemia   3c. DSE: : negative for ischemia; EF normal    4. Carotid US: 6/23: right carotids OK:  but low flow; on lfeft;  normal vertebral flow   5. CTA head: Left internal carotid occluded distal to bifurcation ; reconstitutation noted ; right normal   Chart review:  GFR 27 1 month ago    ASSESSMENT/PLAN:     1. CAD: s/p stent placement - asymptomatic now - recent negative stress test 6/23   2. Chronic diastolic dysfunction- asymptomatic   3. Hypertension controlled  4. Moderately severe aortic stenosis at the most - asymptomatic   5. Lipids at goal   6. CKD 3 - improved GFR recent 30's   7. Anemia with Hgb 10 yvon stable   8. Carotid disease: right OK; left chronic occlusion   Plan: 1.continue the medications   2. Return  4 month       Patricia Rogers MD

## 2024-02-07 ENCOUNTER — OFFICE VISIT (OUTPATIENT)
Dept: CARDIOLOGY | Facility: CLINIC | Age: 79
End: 2024-02-07
Payer: MEDICARE

## 2024-02-07 VITALS
WEIGHT: 160.06 LBS | DIASTOLIC BLOOD PRESSURE: 75 MMHG | SYSTOLIC BLOOD PRESSURE: 150 MMHG | HEART RATE: 59 BPM | OXYGEN SATURATION: 95 % | HEIGHT: 64 IN | BODY MASS INDEX: 27.33 KG/M2

## 2024-02-07 DIAGNOSIS — I35.0 AORTIC VALVE STENOSIS, ETIOLOGY OF CARDIAC VALVE DISEASE UNSPECIFIED: Primary | ICD-10-CM

## 2024-02-07 DIAGNOSIS — I25.10 CORONARY ARTERY DISEASE INVOLVING NATIVE CORONARY ARTERY OF NATIVE HEART WITHOUT ANGINA PECTORIS: ICD-10-CM

## 2024-02-07 DIAGNOSIS — I65.23 BILATERAL CAROTID ARTERY STENOSIS: ICD-10-CM

## 2024-02-07 DIAGNOSIS — I50.32 CHRONIC DIASTOLIC HEART FAILURE: ICD-10-CM

## 2024-02-07 PROCEDURE — 99214 OFFICE O/P EST MOD 30 MIN: CPT | Mod: S$GLB,,, | Performed by: INTERNAL MEDICINE

## 2024-02-07 PROCEDURE — 99999 PR PBB SHADOW E&M-EST. PATIENT-LVL III: CPT | Mod: PBBFAC,,, | Performed by: INTERNAL MEDICINE

## 2024-02-26 ENCOUNTER — HOSPITAL ENCOUNTER (OUTPATIENT)
Dept: WOUND CARE | Facility: HOSPITAL | Age: 79
Discharge: HOME | End: 2024-02-26
Attending: PREVENTIVE MEDICINE
Payer: MEDICARE

## 2024-02-26 VITALS
TEMPERATURE: 83 F | WEIGHT: 160 LBS | HEART RATE: 83 BPM | HEIGHT: 64 IN | DIASTOLIC BLOOD PRESSURE: 71 MMHG | SYSTOLIC BLOOD PRESSURE: 157 MMHG | BODY MASS INDEX: 27.31 KG/M2

## 2024-02-26 DIAGNOSIS — L97.912 TRAUMATIC ULCER OF RIGHT LOWER LEG WITH FAT LAYER EXPOSED: Primary | ICD-10-CM

## 2024-02-26 DIAGNOSIS — S81.819A SKIN TEAR OF LOWER LEG WITHOUT COMPLICATION, INITIAL ENCOUNTER: ICD-10-CM

## 2024-02-26 PROCEDURE — 99213 OFFICE O/P EST LOW 20 MIN: CPT

## 2024-02-26 RX ORDER — DOXYCYCLINE HYCLATE 100 MG
100 TABLET ORAL EVERY 12 HOURS
Qty: 14 TABLET | Refills: 0 | Status: SHIPPED | OUTPATIENT
Start: 2024-02-26 | End: 2024-03-04

## 2024-02-26 RX ORDER — MUPIROCIN 20 MG/G
OINTMENT TOPICAL DAILY
Qty: 22 G | Refills: 2 | Status: SHIPPED | OUTPATIENT
Start: 2024-02-26 | End: 2024-04-16

## 2024-02-26 NOTE — PROGRESS NOTES
[facility]    Subjective:       Patient ID: Enedelia García is a 78 y.o. female.    Chief Complaint: Wound Consult    Consult for trauma wound to right lower leg along with skin tear to area. Pulses noted with doppler. Discussed plan of care with patient. Understanding voiced. Rx for doxycycline 100mg sent to pharmacy. Patient instructed on Rx and voiced understanding.      Review of Systems   All other systems reviewed and are negative.        Objective:     Vitals:    02/26/24 1007   BP: (!) 157/71   Pulse: 83   Temp: (!) 83 °F (28.3 °C)         Physical Exam       Altered Skin Integrity 02/26/24 1015 Right anterior;lower;medial Leg (Active)   02/26/24 1015   Altered Skin Integrity Present on Admission - Did Patient arrive to the hospital with altered skin?:    Side: Right   Orientation: anterior;lower;medial   Location: Leg   Wound Number:    Is this injury device related?:    Primary Wound Type:    Description of Altered Skin Integrity:    Ankle-Brachial Index:    Pulses: positive   Removal Indication and Assessment:    Wound Outcome:    (Retired) Wound Length (cm):    (Retired) Wound Width (cm):    (Retired) Depth (cm):    Wound Description (Comments):    Removal Indications:    Wound Image   02/26/24 1020   Dressing Appearance Intact;Moist drainage 02/26/24 1020   Drainage Amount Scant 02/26/24 1020   Drainage Characteristics/Odor Serosanguineous 02/26/24 1020   Appearance Pink 02/26/24 1020   Tissue loss description Partial thickness 02/26/24 1020   Red (%), Wound Tissue Color 100 % 02/26/24 1020   Periwound Area Dry;Hemosiderin Staining 02/26/24 1020   Wound Edges Defined 02/26/24 1020   Wound Length (cm) 2.2 cm 02/26/24 1020   Wound Width (cm) 1.5 cm 02/26/24 1020   Wound Depth (cm) 0.1 cm 02/26/24 1020   Wound Volume (cm^3) 0.33 cm^3 02/26/24 1020   Wound Surface Area (cm^2) 3.3 cm^2 02/26/24 1020   Care Cleansed with:;Sterile normal saline 02/26/24  1020   Dressing Applied;Rolled gauze 02/26/24 1020   Compression Tubular elasticized bandage 02/26/24 1020   Dressing Change Due 02/28/24 02/26/24 1020            Altered Skin Integrity 02/26/24 1021 Right lower;lateral Leg (Active)   02/26/24 1021   Altered Skin Integrity Present on Admission - Did Patient arrive to the hospital with altered skin?:    Side: Right   Orientation: lower;lateral   Location: Leg   Wound Number:    Is this injury device related?:    Primary Wound Type:    Description of Altered Skin Integrity:    Ankle-Brachial Index:    Pulses: positive   Removal Indication and Assessment:    Wound Outcome:    (Retired) Wound Length (cm):    (Retired) Wound Width (cm):    (Retired) Depth (cm):    Wound Description (Comments):    Removal Indications:    Wound Image   02/26/24 1020   Dressing Appearance Intact;Moist drainage 02/26/24 1020   Drainage Amount Scant 02/26/24 1020   Drainage Characteristics/Odor Serosanguineous 02/26/24 1020   Appearance Pink 02/26/24 1020   Red (%), Wound Tissue Color 100 % 02/26/24 1020   Periwound Area Intact;Hemosiderin Staining 02/26/24 1020   Wound Edges Defined 02/26/24 1020   Wound Length (cm) 3.8 cm 02/26/24 1020   Wound Width (cm) 2.2 cm 02/26/24 1020   Wound Depth (cm) 0.1 cm 02/26/24 1020   Wound Volume (cm^3) 0.836 cm^3 02/26/24 1020   Wound Surface Area (cm^2) 8.36 cm^2 02/26/24 1020   Care Cleansed with:;Sterile normal saline 02/26/24 1020   Dressing Applied;Rolled gauze 02/26/24 1020   Compression Tubular elasticized bandage 02/26/24 1020   Dressing Change Due 02/28/24 02/26/24 1020         Assessment/Plan:         ICD-10-CM ICD-9-CM   1. Traumatic ulcer of right lower leg with fat layer exposed  L97.912 707.19   2. Skin tear of lower leg without complication, initial encounter  S81.819A 891.0           Tissue pathology and/or culture taken:  [] Yes [x] No   Sharp debridement performed:   [] Yes [x] No   Labs ordered this visit:   [] Yes [x] No   Imaging  ordered this visit:   [] Yes [x] No           Orders Placed This Encounter   Procedures    Change dressing     Cleanse wound with:Normal Saline  Lidocaine:PRN    Primary dressing:Xeroform  Secondary dressing:Gauze, conform rolled gauze, secure with flexinet    Edema control: Tubigrip F    Frequency:Three times a week and PRN    Follow-up: 2 weeks      Other Orders: Take Rx antibiotics as prescribed        Follow up in about 2 weeks (around 3/11/2024).            This includes face to face time and non-face to face time preparing to see the patient (eg, review of tests), obtaining and/or reviewing separately obtained history, documenting clinical information in the electronic or other health record, independently interpreting results and communicating results to the patient/family/caregiver, or care coordinator.

## 2024-03-04 ENCOUNTER — PATIENT MESSAGE (OUTPATIENT)
Dept: FAMILY MEDICINE | Facility: CLINIC | Age: 79
End: 2024-03-04
Payer: MEDICARE

## 2024-03-05 ENCOUNTER — PATIENT MESSAGE (OUTPATIENT)
Dept: ADMINISTRATIVE | Facility: HOSPITAL | Age: 79
End: 2024-03-05
Payer: MEDICARE

## 2024-03-16 DIAGNOSIS — F43.21 ADJUSTMENT DISORDER WITH DEPRESSED MOOD: ICD-10-CM

## 2024-03-18 ENCOUNTER — HOSPITAL ENCOUNTER (OUTPATIENT)
Dept: WOUND CARE | Facility: HOSPITAL | Age: 79
Discharge: HOME | End: 2024-03-18
Attending: PREVENTIVE MEDICINE
Payer: MEDICARE

## 2024-03-18 DIAGNOSIS — B00.1 COLD SORE: ICD-10-CM

## 2024-03-18 DIAGNOSIS — L98.411 NON-PRESSURE CHRONIC ULCER OF BUTTOCK LIMITED TO BREAKDOWN OF SKIN: Primary | ICD-10-CM

## 2024-03-18 DIAGNOSIS — B02.7 DISSEMINATED HERPES ZOSTER: ICD-10-CM

## 2024-03-18 PROCEDURE — 99213 OFFICE O/P EST LOW 20 MIN: CPT

## 2024-03-18 RX ORDER — HYDROXYZINE HYDROCHLORIDE 25 MG/1
TABLET, FILM COATED ORAL
Qty: 30 TABLET | Refills: 2 | Status: SHIPPED | OUTPATIENT
Start: 2024-03-18 | End: 2024-04-25

## 2024-03-18 RX ORDER — ACYCLOVIR 800 MG/1
800 TABLET ORAL
Qty: 35 TABLET | Refills: 0 | Status: SHIPPED | OUTPATIENT
Start: 2024-03-18 | End: 2024-04-16

## 2024-03-18 RX ORDER — CLOTRIMAZOLE AND BETAMETHASONE DIPROPIONATE 10; .64 MG/G; MG/G
CREAM TOPICAL 2 TIMES DAILY
Qty: 45 G | Refills: 1 | Status: SHIPPED | OUTPATIENT
Start: 2024-03-18

## 2024-03-18 NOTE — PROGRESS NOTES
[facility]    Subjective:       Patient ID: Enedelia García is a 78 y.o. female.    Chief Complaint: Wound Care    Follow up for right leg wounds that are now healed. Mentioned that she has been having buttock ulcers since she was hospitalized. Was evaluated by Derm and placed on a prednisone taper, but the ulcers are still there. She is also having a new cold sore start.    Review of Systems      Objective:         Physical Exam       Altered Skin Integrity 03/18/24 1127 Buttocks Non pressure chronic ulcer (Active)   03/18/24 1127   Altered Skin Integrity Present on Admission - Did Patient arrive to the hospital with altered skin?:    Side:    Orientation:    Location: Buttocks   Wound Number:    Is this injury device related?: No   Primary Wound Type: Non pressure   Description of Altered Skin Integrity:    Ankle-Brachial Index:    Pulses:    Removal Indication and Assessment:    Wound Outcome:    (Retired) Wound Length (cm):    (Retired) Wound Width (cm):    (Retired) Depth (cm):    Wound Description (Comments):    Removal Indications:    Wound Image   03/18/24 1129   Dressing Appearance Intact 03/18/24 1129   Appearance Intact;Other (see comments) 03/18/24 1129   Red (%), Wound Tissue Color 100 % 03/18/24 1129   Periwound Area Intact 03/18/24 1129   Wound Edges Irregular 03/18/24 1129   Care Cleansed with:;Sterile normal saline 03/18/24 1129   Dressing Applied;Changed 03/18/24 1129   Periwound Care Topical treatment applied 03/18/24 1129   Dressing Change Due 03/20/24 03/18/24 1129         Assessment/Plan:         ICD-10-CM ICD-9-CM   1. 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 Cold sore  B00.1 054.9   3. Disseminated herpes zoster  B02.7 053.79       Start acycolvir.    Tissue pathology and/or culture taken:  [] Yes [x] No   Sharp debridement performed:   [] Yes [x] No   Labs ordered this visit:   [] Yes [x] No    Imaging ordered this visit:   [] Yes [x] No           Orders Placed This Encounter   Procedures    Change dressing     leanse wound with:Normal Saline   Lidocaine:PRN     Primary dressing:Lotrisone  Secondary dressing: Border dressing      Frequency: Daily    Follow-up: 3 weeks       Other Orders: Rx for Clotrimazole and Hydroxyzine  given to patient        Follow up in about 3 weeks (around 4/8/2024).            This includes face to face time and non-face to face time preparing to see the patient (eg, review of tests), obtaining and/or reviewing separately obtained history, documenting clinical information in the electronic or other health record, independently interpreting results and communicating results to the patient/family/caregiver, or care coordinator.

## 2024-03-27 ENCOUNTER — TELEPHONE (OUTPATIENT)
Dept: CARDIOLOGY | Facility: CLINIC | Age: 79
End: 2024-03-27
Payer: MEDICARE

## 2024-03-27 NOTE — TELEPHONE ENCOUNTER
I will call pt ON 3-28-24  RT PT CALL AND LVM FOR PT LETTING HER KNOW THAT DR AUSTIN WILL NOT BE BACK IN THE OFFICE UNTIL HER SCHEDULED APPT 4-22-24 AND THAT YESTERDAY WAS DR AUSTIN LAST DAY BEFORE SHE GO OUT ON VACATION

## 2024-03-27 NOTE — TELEPHONE ENCOUNTER
----- Message from Katie Bonner sent at 3/27/2024  3:52 PM CDT -----  Type:  Same Day Appointment Request    Caller is requesting a same day appointment.  Caller declined first available appointment listed below.    Name of Caller:Pt   When is the first available appointment?05/02   Symptoms:chest pain   Best Call Back Number: [phone]  Additional Information:

## 2024-04-16 ENCOUNTER — OFFICE VISIT (OUTPATIENT)
Dept: URGENT CARE | Facility: CLINIC | Age: 79
End: 2024-04-16
Payer: MEDICARE

## 2024-04-16 VITALS
DIASTOLIC BLOOD PRESSURE: 74 MMHG | HEIGHT: 64 IN | RESPIRATION RATE: 20 BRPM | TEMPERATURE: 98 F | WEIGHT: 152.75 LBS | OXYGEN SATURATION: 96 % | HEART RATE: 69 BPM | SYSTOLIC BLOOD PRESSURE: 146 MMHG | BODY MASS INDEX: 26.08 KG/M2

## 2024-04-16 DIAGNOSIS — L89.329 PRESSURE INJURY OF SKIN OF LEFT BUTTOCK, UNSPECIFIED INJURY STAGE: Primary | ICD-10-CM

## 2024-04-16 DIAGNOSIS — B02.8 HERPES ZOSTER WITH COMPLICATION: ICD-10-CM

## 2024-04-16 PROCEDURE — 99213 OFFICE O/P EST LOW 20 MIN: CPT | Mod: S$GLB,,,

## 2024-04-16 RX ORDER — ACYCLOVIR 800 MG/1
800 TABLET ORAL
Qty: 35 TABLET | Refills: 0 | Status: SHIPPED | OUTPATIENT
Start: 2024-04-16 | End: 2024-04-25 | Stop reason: SDUPTHER

## 2024-04-16 RX ORDER — MUPIROCIN 20 MG/G
OINTMENT TOPICAL 3 TIMES DAILY
Qty: 30 G | Refills: 0 | Status: SHIPPED | OUTPATIENT
Start: 2024-04-16 | End: 2024-04-25 | Stop reason: SDUPTHER

## 2024-04-16 NOTE — PATIENT INSTRUCTIONS
- Rest.    - Drink plenty of fluids.    - Acetaminophen (tylenol) or Ibuprofen (advil,motrin) as directed as needed for fever/pain. Avoid tylenol if you have a history of liver disease. Do not take ibuprofen if you have a history of GI bleeding, kidney disease, or if you take blood thinners.   - Take acyclovir as directed and apply topical mupirocin ointment as directed. Recommend taking tylenol as needed for pain relief. Recommend seated donut to help relieve pressure from area.   - Follow up with your PCP or specialty clinic as directed in the next 1-2 weeks if not improved or as needed.  You can call [phone] to schedule an appointment with the appropriate provider.    - Go to the ER or seek medical attention immediately if you develop new or worsening symptoms.     - You must understand that you have received an Urgent Care treatment only and that you may be released before all of your medical problems are known or treated.   - You, the patient, will arrange for follow up care as instructed.   - If your condition worsens or fails to improve we recommend that you receive another evaluation at the ER immediately or contact your PCP to discuss your concerns or return here.

## 2024-04-16 NOTE — PROGRESS NOTES
"Subjective:      Patient ID: Enedelia García is a 78 y.o. female.    Vitals:  height is 5' 4" (1.626 m) and weight is 69.3 kg (152 lb 12.5 oz). Her oral temperature is 97.9 °F (36.6 °C). Her blood pressure is 146/74 (abnormal) and her pulse is 69. Her respiration is 20 and oxygen saturation is 96%.     Chief Complaint: Rash (On her Buttocks )    78-year-old female presents to the clinic today with chief complaint of a rash potential infection on her buttocks.  She states that she was in the ED department in January for few days and notes that the staff did not change the bedding.  She believes that she might have gotten a staph infection from that occurrence in January. She has taken acyclovir and Lotrisone with some relief from wound care in 3/18/24. She has appointment with wound care next week. Rash is painful, itching and burning. . Denies any change in laundry detergent, soap/body products, hair products, face products, lotion, perfume, etc.  Denies any changed in diet or medications. Denies hx of seasonal, food, or environmental allergies. Denies any difficulty swallowing or breathing. Denies numbness or tingling. Denies radiation of pain. Denies fever, chills, body aches, chest pain, shortness of breath, abdominal pain, nausea, vomiting, diarrhea, or URI sx.        Rash  This is a recurrent problem. The current episode started more than 1 month ago. The problem has been gradually worsening since onset. The affected locations include the left buttock, right buttock and genitalia. The rash is characterized by itchiness, redness and blistering. It is unknown if there was an exposure to a precipitant. Pertinent negatives include no congestion, cough, diarrhea, eye pain, fatigue, fever, shortness of breath, sore throat or vomiting. Past treatments include anti-itch cream. The treatment provided no relief. There is no history of asthma or eczema.       Constitution: Negative for activity change, appetite " change, chills, sweating, fatigue, fever, generalized weakness and international travel in last 60 days.   HENT:  Negative for ear pain, ear discharge, tinnitus, hearing loss, congestion, nosebleeds, foreign body in nose, postnasal drip, sinus pain, sinus pressure, sore throat, trouble swallowing and voice change.    Neck: Negative for neck pain, neck stiffness and neck swelling.   Cardiovascular:  Negative for chest pain, leg swelling, palpitations and sob on exertion.   Eyes:  Negative for eye discharge, eye itching, eye pain, eye redness, photophobia, vision loss, double vision and blurred vision.   Respiratory:  Negative for chest tightness, cough, sputum production, shortness of breath, wheezing and asthma.    Gastrointestinal:  Negative for abdominal pain, nausea, vomiting, constipation, diarrhea, bright red blood in stool and heartburn.   Endocrine: cold intolerance and heat intolerance.   Genitourinary:  Negative for dysuria, frequency, urgency, urine decreased, flank pain and hematuria.   Musculoskeletal:  Negative for pain, joint pain, joint swelling, back pain and muscle ache.   Skin:  Positive for rash. Negative for erythema, bruising and hives.   Allergic/Immunologic: Negative for environmental allergies, seasonal allergies, food allergies, eczema, asthma, hives, itching and sneezing.   Neurological:  Negative for dizziness, light-headedness, headaches, disorientation and altered mental status.   Psychiatric/Behavioral:  Negative for altered mental status, disorientation, confusion, agitation and nervous/anxious. The patient is not nervous/anxious.       Objective:     Physical Exam   Constitutional: She is oriented to person, place, and time. She appears well-developed.   HENT:   Head: Normocephalic and atraumatic. Head is without abrasion, without contusion and without laceration.   Ears:   Right Ear: External ear normal.   Left Ear: External ear normal.   Nose: Nose normal.   Mouth/Throat: Oropharynx  is clear and moist and mucous membranes are normal.   Eyes: Conjunctivae, EOM and lids are normal. Pupils are equal, round, and reactive to light.   Neck: Trachea normal and phonation normal. Neck supple.   Cardiovascular: Normal rate, regular rhythm and normal heart sounds.   Pulmonary/Chest: Effort normal and breath sounds normal. No stridor. No respiratory distress.   Musculoskeletal: Normal range of motion.         General: Normal range of motion.   Neurological: She is alert and oriented to person, place, and time.   Skin: Skin is warm, dry, intact and no rash. Capillary refill takes less than 2 seconds. No abrasion, No burn, No bruising, No erythema and No ecchymosis        Psychiatric: Her speech is normal and behavior is normal. Judgment and thought content normal.   Nursing note and vitals reviewed.      Assessment:     1. Pressure injury of skin of left buttock, unspecified injury stage    2. Herpes zoster with complication        Plan:       Pressure injury of skin of left buttock, unspecified injury stage  -     mupirocin (BACTROBAN) 2 % ointment; Apply topically 3 (three) times daily.  Dispense: 30 g; Refill: 0    Herpes zoster with complication  -     acyclovir (ZOVIRAX) 800 MG Tab; Take 1 tablet (800 mg total) by mouth 5 (five) times daily. for 7 days  Dispense: 35 tablet; Refill: 0      Patient unable to do Toradol injection in clinic today due to kidney function. Continue prescribed narcotics or tylenol for pain relief. We had shared decision making for patient's treatment. We discussed side effects/alternatives/benefits/risk and patient would like to proceed with treatment plan. We also discussed other OTC treatment recommendations. Patient was counseled, explained with the test results meaning, expected course, and answered all of questions. Patient can take OTC Acetaminophen (Tylenol) and/or Ibuprofen (Motrin) as needed for pain relief and/or fever relief. Continue to drink plenty of fluids.  Follow up with wound care in 2 days.  Gave patient strict ER/urgent care precautions in case symptoms worsen or if any new concerns arise.

## 2024-04-21 NOTE — PROGRESS NOTES
[facility] Cardiology 701     SUBJECTIVE:     History of Present Illness:  Patient is a 79 y.o. female presents with CAD.     Primary Diagnosis:   1. Hypertension  2. DM: none  3. Past smoker: over 10 years ago   4. Heart disease: In 2000, had one stent placed at [facility]. IN 2005, had 2 stents placed. - unknown anatomy   5. Tremors  6. Recent diagnosis of scabies   ROS   Since last visit in 2/24; no change   No syncope since or before but gets dizzy - usually happens when she stands up from a sitting position   Chest pains noted recently for the past 2 months; mid chest; sharp pain; no radiation; mins in duration; takes NTG with help; can happen at any time usually at rest; also has it with carrying bags of clothes which she did not have in the past ; notes sweating with the pain as well   No shortness of breath at rest; no PND or orthopnea  Activity:  cleans dishes, makes bed, changes sheets; with occasional chest discomfort;   Blood pressures at home: 150's   Review of patient's allergies indicates:   Allergen Reactions    Iodinated contrast media Anaphylaxis and Other (See Comments)    Nsaids (non-steroidal anti-inflammatory drug)      ADWOA    Phenergan [promethazine]      Vomiting       Past Medical History:   Diagnosis Date    Aortic atherosclerosis 6/14/2023    CKD (chronic kidney disease) stage 4, GFR 15-29 ml/min     Coronary artery disease     Edema     Epilepsy     Generalized anxiety disorder 12/20/2021    Hematuria, unspecified     Hematuria, unspecified     Hyperlipidemia     Hypertension     Iron deficiency anemia     Moderate episode of recurrent major depressive disorder     Nonrheumatic aortic valve stenosis 12/20/2021    Normocytic anemia     Prediabetes 2/24/2022       Past Surgical History:   Procedure Laterality Date    APPENDECTOMY      BACK SURGERY      CORONARY STENT PLACEMENT      HYSTERECTOMY      REVISION OF KNEE ARTHROPLASTY Left 7/18/2022    Procedure: REVISION, ARTHROPLASTY, KNEE;  Surgeon: Stan  MD Alayna;  Location: [facility] OR;  Service: Orthopedics;  Laterality: Left;    TONSILLECTOMY         Family History   Problem Relation Name Age of Onset    Heart disease Mother      Heart disease Father         Social History     Tobacco Use    Smoking status: Never    Smokeless tobacco: Never   Substance Use Topics    Alcohol use: No    Drug use: No        Past Hospitalization:   3/22: weakness and diarrhea  7/22: surgery for left knee    7/30/22: ER visit for left knee pain and swelling ; fell in the ER   8/27/22: itching   9/9/22: infection left knee of tissue   9/16/22: syncope ; BP low ; Hgb 7.7  6/23: fall/ no LOC;   1/24: gait imbalance, depression;   Home meds:  Current Outpatient Medications on File Prior to Visit   Medication Sig Dispense Refill    acyclovir (ZOVIRAX) 800 MG Tab Take 1 tablet (800 mg total) by mouth 5 (five) times daily. for 7 days 35 tablet 0    AIMOVIG AUTOINJECTOR 70 mg/mL autoinjector Inject 70 mg into the skin every 30 days.      aspirin (ECOTRIN) 81 MG EC tablet Take 81 mg by mouth once daily.      calcium carbonate/vitamin D3 (VITAMIN D-3 ORAL) Take 1 tablet by mouth once daily.      clotrimazole-betamethasone 1-0.05% (LOTRISONE) cream Apply topically 2 (two) times daily. 45 g 1    diphenhydrAMINE (BENADRYL) 25 mg capsule Take 25 mg by mouth every 6 (six) hours as needed for Itching. (Patient not taking: Reported on 4/16/2024)      DULoxetine (CYMBALTA) 60 MG capsule Take 1 capsule (60 mg total) by mouth once daily. 90 capsule 0    FEROSUL 325 mg (65 mg iron) Tab tablet Take 325 mg by mouth once daily.      furosemide (LASIX) 40 MG tablet Take 2 tablets (80 mg total) by mouth 2 (two) times daily as needed (swelling). 360 tablet 3    hydrOXYzine HCL (ATARAX) 25 MG tablet TAKE ONE TABLET BY MOUTH EVERY 6 HOURS AS NEEDED FOR ITCHING 30 tablet 2    irbesartan (AVAPRO) 300 MG tablet Take 1 tablet (300 mg total) by mouth every evening. 30 tablet 30    isosorbide mononitrate (IMDUR) 60 MG 24  hr tablet Take 1 tablet (60 mg total) by mouth once daily. 90 tablet 1    multivit-min-iron-FA-lutein (CENTRUM SILVER WOMEN) 8 mg iron-400 mcg-300 mcg Tab Take 1 tablet by mouth once daily.      mupirocin (BACTROBAN) 2 % ointment Apply topically 3 (three) times daily. 30 g 0    nitroGLYCERIN (NITROSTAT) 0.3 MG SL tablet PLACE 1 TABLET UNDER THE TONGUE EVERY 5 MINUTES FOR UP TO 3 DOSES IF NEEDED FOR CHEST PAIN. CALL 911 IF PAIN PERSISTS 25 tablet 0    omega-3 fatty acids/fish oil (FISH OIL-OMEGA-3 FATTY ACIDS) 300-1,000 mg capsule Take 2 capsules by mouth once daily.      omeprazole (PRILOSEC) 20 MG capsule Take 1 capsule (20 mg total) by mouth daily as needed. 90 capsule 2    oxyCODONE-acetaminophen (PERCOCET)  mg per tablet Take 1 tablet by mouth 4 (four) times daily as needed.      permethrin (ELIMITE) 5 % cream APPLY TOPICALLY ONCE FOR ONE DOSE 60 g 0    rosuvastatin (CRESTOR) 40 MG Tab TAKE ONE TABLET BY MOUTH DAILY (Patient taking differently: Take 40 mg by mouth once daily.) 90 tablet 3    tiZANidine (ZANAFLEX) 4 MG tablet Take 1 tablet (4 mg total) by mouth every 8 (eight) hours as needed (spasms). 60 tablet 2    triamcinolone acetonide 0.1% (KENALOG) 0.1 % cream APPLY TOPICALLY TWICE DAILY FOR 10 DAYS 80 g 1     No current facility-administered medications on file prior to visit.       Cardiac meds:    Imdur 60 mg  Irbesartan 300 mg  Lasix 80 mg as needed   rosuvastatin 40 mg   ASA 81 mg       OBJECTIVE:     Vital Signs (Most Recent)  Pulse: 68 (04/22/24 1054)  BP: (!) 182/73 (04/22/24 1054)  SpO2: (!) 94 % (04/22/24 1054)      Physical Exam:    Neck: decreased  Carotids upstroke ,basilar  bruits; normal JVP  Lungs :clear  Heart: RR, normal S1,S2, systolic ejection murmur base to LSB, no gallops  Abd: no masses; no bruits;   Exts: normal DP and PT pulses bilaterally, trace  edema noted           LABS    GFR 9/22: GFR 47; Hgb: 7 yvon  6/23: GFR 58 ; LDL 77 ; A1c 5.2   8/23: Hgb 12; GFR 46    1/24: TSH  normal; GFR 33  Diagnostic Results:    1.EK21: NSR; normal   1b. EK/22: NSR: normal   1c. EK/24: sinus bradycardia; normal   2.Echo: 21: normal EF, diastolic dysfunction; mild TR; PAS 43; MIGDALIA 1.18 cm2 with aortic sclerosis   2b. Echo: : mild AI; moderately severe AS normal EF but velocity only 3.6   3.Nuclear stress: : negative for ischemia; normal EF   3b.Nuclear stress : EF normal; negative for ischemia   3c. DSE: : negative for ischemia; EF normal   4. Carotid US: : right carotids OK:  but low flow; on lfeft;  normal vertebral flow   5. CTA head: Left internal carotid occluded distal to bifurcation ; reconstitutation noted ; right normal   Chart review:  GFR 27 1 month ago    ASSESSMENT/PLAN:     1. CAD: s/p stent placement - recent symptoms of chest pain; though atypical, will need to check for CAD vs from the aortic strenosis, especially with history of stents   2. Chronic diastolic dysfunction- asymptomatic   3. Hypertension controlled  4. Moderately severe aortic stenosis at the most   5. Lipids at goal   6. CKD 3 - improved GFR recent 30's   7. Anemia with Hgb 10 yvon stable - improved   8. Carotid disease: right OK; left chronic occlusion   Plan: 1.stress nuclear to check the  coronaries  2. Echo to check aortic valve  3. Increase Imdur 60 mg BID  4. Return  1 month       Patricia Rogers MD

## 2024-04-22 ENCOUNTER — OFFICE VISIT (OUTPATIENT)
Dept: CARDIOLOGY | Facility: CLINIC | Age: 79
End: 2024-04-22
Payer: MEDICARE

## 2024-04-22 VITALS
OXYGEN SATURATION: 94 % | DIASTOLIC BLOOD PRESSURE: 73 MMHG | BODY MASS INDEX: 26.32 KG/M2 | WEIGHT: 154.19 LBS | HEART RATE: 68 BPM | HEIGHT: 64 IN | SYSTOLIC BLOOD PRESSURE: 182 MMHG

## 2024-04-22 DIAGNOSIS — R07.2 PRECORDIAL PAIN: ICD-10-CM

## 2024-04-22 DIAGNOSIS — I25.10 CORONARY ARTERY DISEASE INVOLVING NATIVE CORONARY ARTERY OF NATIVE HEART WITHOUT ANGINA PECTORIS: ICD-10-CM

## 2024-04-22 DIAGNOSIS — I35.0 AORTIC VALVE STENOSIS, ETIOLOGY OF CARDIAC VALVE DISEASE UNSPECIFIED: ICD-10-CM

## 2024-04-22 DIAGNOSIS — I10 PRIMARY HYPERTENSION: ICD-10-CM

## 2024-04-22 DIAGNOSIS — I65.23 BILATERAL CAROTID ARTERY STENOSIS: ICD-10-CM

## 2024-04-22 DIAGNOSIS — I50.32 CHRONIC DIASTOLIC HEART FAILURE: ICD-10-CM

## 2024-04-22 DIAGNOSIS — I20.81 ANGINA PECTORIS WITH CORONARY MICROVASCULAR DYSFUNCTION: Primary | ICD-10-CM

## 2024-04-22 PROCEDURE — 99999 PR PBB SHADOW E&M-EST. PATIENT-LVL IV: CPT | Mod: PBBFAC,,, | Performed by: INTERNAL MEDICINE

## 2024-04-22 PROCEDURE — 1125F AMNT PAIN NOTED PAIN PRSNT: CPT | Mod: CPTII,S$GLB,, | Performed by: INTERNAL MEDICINE

## 2024-04-22 PROCEDURE — 1160F RVW MEDS BY RX/DR IN RCRD: CPT | Mod: CPTII,S$GLB,, | Performed by: INTERNAL MEDICINE

## 2024-04-22 PROCEDURE — 3078F DIAST BP <80 MM HG: CPT | Mod: CPTII,S$GLB,, | Performed by: INTERNAL MEDICINE

## 2024-04-22 PROCEDURE — 1101F PT FALLS ASSESS-DOCD LE1/YR: CPT | Mod: CPTII,S$GLB,, | Performed by: INTERNAL MEDICINE

## 2024-04-22 PROCEDURE — 3288F FALL RISK ASSESSMENT DOCD: CPT | Mod: CPTII,S$GLB,, | Performed by: INTERNAL MEDICINE

## 2024-04-22 PROCEDURE — 99214 OFFICE O/P EST MOD 30 MIN: CPT | Mod: S$GLB,,, | Performed by: INTERNAL MEDICINE

## 2024-04-22 PROCEDURE — 3077F SYST BP >= 140 MM HG: CPT | Mod: CPTII,S$GLB,, | Performed by: INTERNAL MEDICINE

## 2024-04-22 PROCEDURE — 1159F MED LIST DOCD IN RCRD: CPT | Mod: CPTII,S$GLB,, | Performed by: INTERNAL MEDICINE

## 2024-04-22 RX ORDER — ISOSORBIDE MONONITRATE 60 MG/1
60 TABLET, EXTENDED RELEASE ORAL 2 TIMES DAILY
Qty: 180 TABLET | Refills: 1 | Status: SHIPPED | OUTPATIENT
Start: 2024-04-22

## 2024-04-25 ENCOUNTER — HOSPITAL ENCOUNTER (OUTPATIENT)
Dept: WOUND CARE | Facility: HOSPITAL | Age: 79
Discharge: HOME | End: 2024-04-25
Attending: PREVENTIVE MEDICINE
Payer: MEDICARE

## 2024-04-25 VITALS
WEIGHT: 154.31 LBS | HEART RATE: 65 BPM | SYSTOLIC BLOOD PRESSURE: 160 MMHG | HEIGHT: 64 IN | DIASTOLIC BLOOD PRESSURE: 65 MMHG | BODY MASS INDEX: 26.34 KG/M2

## 2024-04-25 DIAGNOSIS — L98.411 NON-PRESSURE CHRONIC ULCER OF BUTTOCK LIMITED TO BREAKDOWN OF SKIN: Primary | ICD-10-CM

## 2024-04-25 DIAGNOSIS — B02.8 HERPES ZOSTER WITH COMPLICATION: ICD-10-CM

## 2024-04-25 DIAGNOSIS — F43.21 ADJUSTMENT DISORDER WITH DEPRESSED MOOD: ICD-10-CM

## 2024-04-25 DIAGNOSIS — L89.329 PRESSURE INJURY OF SKIN OF LEFT BUTTOCK, UNSPECIFIED INJURY STAGE: ICD-10-CM

## 2024-04-25 PROCEDURE — 99212 OFFICE O/P EST SF 10 MIN: CPT

## 2024-04-25 RX ORDER — ACYCLOVIR 800 MG/1
800 TABLET ORAL
Qty: 35 TABLET | Refills: 0 | Status: SHIPPED | OUTPATIENT
Start: 2024-04-25 | End: 2024-06-17

## 2024-04-25 RX ORDER — HYDROXYZINE HYDROCHLORIDE 25 MG/1
TABLET, FILM COATED ORAL
Qty: 30 TABLET | Refills: 2 | Status: SHIPPED | OUTPATIENT
Start: 2024-04-25 | End: 2024-05-27

## 2024-04-25 RX ORDER — DOXYCYCLINE HYCLATE 100 MG
100 TABLET ORAL EVERY 12 HOURS
Qty: 20 TABLET | Refills: 0 | Status: SHIPPED | OUTPATIENT
Start: 2024-04-25 | End: 2024-05-05

## 2024-04-25 RX ORDER — MUPIROCIN 20 MG/G
OINTMENT TOPICAL 2 TIMES DAILY
Qty: 22 G | Refills: 3 | Status: SHIPPED | OUTPATIENT
Start: 2024-04-25

## 2024-04-25 NOTE — PROGRESS NOTES
[facility]    Subjective:       Patient ID: Enedelia García is a 79 y.o. female.    Chief Complaint: Non-healing Wound Follow Up    Follow up for small butt wounds that are painful and red. Has been treated multiple times with anitvirals with minimal change. Cold sore has resolved. Most likely shingles with bacterial over colonization.    Review of Systems      Objective:     Vitals:    04/25/24 1525   BP: (!) 160/65   Pulse: 65         Physical Exam       Altered Skin Integrity 03/18/24 1127 Buttocks Non pressure chronic ulcer (Active)   03/18/24 1127   Altered Skin Integrity Present on Admission - Did Patient arrive to the hospital with altered skin?:    Side:    Orientation:    Location: Buttocks   Wound Number:    Is this injury device related?: No   Primary Wound Type: Non pressure   Description of Altered Skin Integrity:    Ankle-Brachial Index:    Pulses:    Removal Indication and Assessment:    Wound Outcome:    (Retired) Wound Length (cm):    (Retired) Wound Width (cm):    (Retired) Depth (cm):    Wound Description (Comments):    Removal Indications:    Wound Image    04/25/24 1529   Dressing Appearance Intact 04/25/24 1529   Drainage Amount None 04/25/24 1529   Appearance Intact;Red 04/25/24 1529   Red (%), Wound Tissue Color 100 % 04/25/24 1529   Periwound Area Intact 04/25/24 1529   Wound Edges Irregular 04/25/24 1529   Care Cleansed with:;Sterile normal saline 04/25/24 1529   Dressing Applied;Changed;Island/border 04/25/24 1529   Periwound Care Topical treatment applied 04/25/24 1529   Dressing Change Due 04/26/24 04/25/24 1529         Assessment/Plan:         ICD-10-CM ICD-9-CM   1. 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 Herpes zoster with complication  B02.8 053.8   3. Pressure injury of skin of left buttock, unspecified injury stage  L89.329 707.05     707.20           Tissue pathology and/or  culture taken:  [] Yes [x] No   Sharp debridement performed:   [] Yes [x] No   Labs ordered this visit:   [] Yes [x] No   Imaging ordered this visit:   [] Yes [x] No           Orders Placed This Encounter   Procedures    Change dressing     cleanse wound with:Normal Saline   Lidocaine:PRN     Primary dressing: Mipirocin  Secondary dressing: Border dressing     Frequency: Daily     Follow-up: 4 weeks       Other Orders: Rx for Clotrimazole and Doxycycline sent to patients pharmacy        Follow up in about 4 weeks (around 5/23/2024).            This includes face to face time and non-face to face time preparing to see the patient (eg, review of tests), obtaining and/or reviewing separately obtained history, documenting clinical information in the electronic or other health record, independently interpreting results and communicating results to the patient/family/caregiver, or care coordinator.

## 2024-05-04 ENCOUNTER — HOSPITAL ENCOUNTER (EMERGENCY)
Facility: HOSPITAL | Age: 79
Discharge: HOME | End: 2024-05-04
Attending: STUDENT IN AN ORGANIZED HEALTH CARE EDUCATION/TRAINING PROGRAM
Payer: MEDICARE

## 2024-05-04 VITALS
SYSTOLIC BLOOD PRESSURE: 152 MMHG | WEIGHT: 154 LBS | RESPIRATION RATE: 16 BRPM | TEMPERATURE: 98 F | HEART RATE: 69 BPM | OXYGEN SATURATION: 97 % | DIASTOLIC BLOOD PRESSURE: 67 MMHG | BODY MASS INDEX: 26.43 KG/M2

## 2024-05-04 DIAGNOSIS — I35.0 AORTIC VALVE STENOSIS, ETIOLOGY OF CARDIAC VALVE DISEASE UNSPECIFIED: Primary | ICD-10-CM

## 2024-05-04 DIAGNOSIS — R07.9 CHEST PAIN: ICD-10-CM

## 2024-05-04 LAB
ALBUMIN SERPL BCP-MCNC: 3.4 G/DL (ref 3.5–5.2)
ALP SERPL-CCNC: 68 U/L (ref 55–135)
ALT SERPL W/O P-5'-P-CCNC: 25 U/L (ref 10–44)
ANION GAP SERPL CALC-SCNC: 10 MMOL/L (ref 8–16)
AST SERPL-CCNC: 28 U/L (ref 10–40)
BASOPHILS # BLD AUTO: 0.07 K/UL (ref 0–0.2)
BASOPHILS NFR BLD: 0.7 % (ref 0–1.9)
BILIRUB SERPL-MCNC: 0.2 MG/DL (ref 0.1–1)
BNP SERPL-MCNC: 85 PG/ML (ref 0–99)
BUN SERPL-MCNC: 45 MG/DL (ref 8–23)
CALCIUM SERPL-MCNC: 10.3 MG/DL (ref 8.7–10.5)
CHLORIDE SERPL-SCNC: 96 MMOL/L (ref 95–110)
CO2 SERPL-SCNC: 32 MMOL/L (ref 23–29)
CREAT SERPL-MCNC: 1.6 MG/DL (ref 0.5–1.4)
DIFFERENTIAL METHOD BLD: ABNORMAL
EOSINOPHIL # BLD AUTO: 0.3 K/UL (ref 0–0.5)
EOSINOPHIL NFR BLD: 3.1 % (ref 0–8)
ERYTHROCYTE [DISTWIDTH] IN BLOOD BY AUTOMATED COUNT: 13.5 % (ref 11.5–14.5)
EST. GFR  (NO RACE VARIABLE): 33 ML/MIN/1.73 M^2
GLUCOSE SERPL-MCNC: 142 MG/DL (ref 70–110)
HCT VFR BLD AUTO: 35 % (ref 37–48.5)
HGB BLD-MCNC: 11.8 G/DL (ref 12–16)
IMM GRANULOCYTES # BLD AUTO: 0.03 K/UL (ref 0–0.04)
IMM GRANULOCYTES NFR BLD AUTO: 0.3 % (ref 0–0.5)
LYMPHOCYTES # BLD AUTO: 2.5 K/UL (ref 1–4.8)
LYMPHOCYTES NFR BLD: 25.4 % (ref 18–48)
MCH RBC QN AUTO: 33.1 PG (ref 27–31)
MCHC RBC AUTO-ENTMCNC: 33.7 G/DL (ref 32–36)
MCV RBC AUTO: 98 FL (ref 82–98)
MONOCYTES # BLD AUTO: 1.3 K/UL (ref 0.3–1)
MONOCYTES NFR BLD: 13.6 % (ref 4–15)
NEUTROPHILS # BLD AUTO: 5.5 K/UL (ref 1.8–7.7)
NEUTROPHILS NFR BLD: 56.9 % (ref 38–73)
NRBC BLD-RTO: 0 /100 WBC
PLATELET # BLD AUTO: 315 K/UL (ref 150–450)
PMV BLD AUTO: 9.2 FL (ref 9.2–12.9)
POTASSIUM SERPL-SCNC: 4.1 MMOL/L (ref 3.5–5.1)
PROT SERPL-MCNC: 6.7 G/DL (ref 6–8.4)
RBC # BLD AUTO: 3.56 M/UL (ref 4–5.4)
SODIUM SERPL-SCNC: 138 MMOL/L (ref 136–145)
TROPONIN I SERPL DL<=0.01 NG/ML-MCNC: 0.01 NG/ML (ref 0–0.03)
WBC # BLD AUTO: 9.63 K/UL (ref 3.9–12.7)

## 2024-05-04 PROCEDURE — 84484 ASSAY OF TROPONIN QUANT: CPT | Performed by: STUDENT IN AN ORGANIZED HEALTH CARE EDUCATION/TRAINING PROGRAM

## 2024-05-04 PROCEDURE — 93010 ELECTROCARDIOGRAM REPORT: CPT | Mod: ,,, | Performed by: INTERNAL MEDICINE

## 2024-05-04 PROCEDURE — 99285 EMERGENCY DEPT VISIT HI MDM: CPT | Mod: 25

## 2024-05-04 PROCEDURE — 80053 COMPREHEN METABOLIC PANEL: CPT | Performed by: STUDENT IN AN ORGANIZED HEALTH CARE EDUCATION/TRAINING PROGRAM

## 2024-05-04 PROCEDURE — 85025 COMPLETE CBC W/AUTO DIFF WBC: CPT | Performed by: STUDENT IN AN ORGANIZED HEALTH CARE EDUCATION/TRAINING PROGRAM

## 2024-05-04 PROCEDURE — 83880 ASSAY OF NATRIURETIC PEPTIDE: CPT | Performed by: STUDENT IN AN ORGANIZED HEALTH CARE EDUCATION/TRAINING PROGRAM

## 2024-05-04 PROCEDURE — 93005 ELECTROCARDIOGRAM TRACING: CPT

## 2024-05-05 NOTE — ED PROVIDER NOTES
ED Provider Note - 5/4/2024    History     Chief Complaint   Patient presents with    Chest Pain     BIB [place] [facility] from home for complaints of  mid sternal chest heaviness x 2 weeks along with cough. Denies SOB. Also reports N/V x 2 days. Denies abdominal pain. Denies sick contacts. Does have a cardiac history with stent placement. States angiogram is scheduled for Thursday.       MARGARITA García is a 79 y.o. year old female with past medical and surgical history as seen below, presenting with chief complaint of chest types.  Intermittent over the past 2 weeks.  Worse over the past 2 days.  Associated with left-sided headache the patient also says is chronic.  Also patient having nausea throughout the day today.  Scheduled for outpatient echocardiogram and stress test by cardiologist Dr. Rogers        Past Medical History:   Diagnosis Date    Aortic atherosclerosis 6/14/2023    CKD (chronic kidney disease) stage 4, GFR 15-29 ml/min     Coronary artery disease     Edema     Epilepsy     Generalized anxiety disorder 12/20/2021    Hematuria, unspecified     Hematuria, unspecified     Hyperlipidemia     Hypertension     Iron deficiency anemia     Moderate episode of recurrent major depressive disorder     Nonrheumatic aortic valve stenosis 12/20/2021    Normocytic anemia     Prediabetes 2/24/2022     Past Surgical History:   Procedure Laterality Date    APPENDECTOMY      BACK SURGERY      CORONARY STENT PLACEMENT      HYSTERECTOMY      REVISION OF KNEE ARTHROPLASTY Left 7/18/2022    Procedure: REVISION, ARTHROPLASTY, KNEE;  Surgeon: Stan Catalan MD;  Location: [facility];  Service: Orthopedics;  Laterality: Left;    TONSILLECTOMY           Family History   Problem Relation Name Age of Onset    Heart disease Mother      Heart disease Father       Social History     Tobacco Use    Smoking status: Never    Smokeless tobacco: Never   Substance Use Topics    Alcohol use: No    Drug use: No     Social  Determinants of Health with Concerns     Food Insecurity: Food Insecurity Present (8/15/2023)    Hunger Vital Sign     Worried About Running Out of Food in the Last Year: Sometimes true     Ran Out of Food in the Last Year: Sometimes true   Physical Activity: Insufficiently Active (7/27/2022)    Exercise Vital Sign     Days of Exercise per Week: 3 days     Minutes of Exercise per Session: 20 min   Stress: Stress Concern Present (7/19/2022)    [employer] - Occupational Stress Questionnaire     Feeling of Stress : To some extent   Utilities: Not on file   Health Literacy: Not on file   Social Isolation: Not on file      Review of patient's allergies indicates:   Allergen Reactions    Iodinated contrast media Anaphylaxis and Other (See Comments)    Nsaids (non-steroidal anti-inflammatory drug)      ADWOA    Phenergan [promethazine]      Vomiting       Review of Systems     A full Review of Systems (ROS) was performed and was negative unless otherwise stated in the HPI.      Physical Exam     Vitals:    05/04/24 2035 05/04/24 2045   BP: (!) 149/65 (!) 152/67   Pulse: 70 69   Resp: 18 16   Temp: 98.2 °F (36.8 °C)    TempSrc: Oral    SpO2: 99% 97%   Weight: 69.9 kg (154 lb)         Physical Exam    Musculoskeletal:         General: No edema.           Lab Results- Independently reviewed by myself      Labs Reviewed   CBC W/ AUTO DIFFERENTIAL - Abnormal; Notable for the following components:       Result Value    RBC 3.56 (*)     Hemoglobin 11.8 (*)     Hematocrit 35.0 (*)     MCH 33.1 (*)     Mono # 1.3 (*)     All other components within normal limits   COMPREHENSIVE METABOLIC PANEL - Abnormal; Notable for the following components:    CO2 32 (*)     Glucose 142 (*)     BUN 45 (*)     Creatinine 1.6 (*)     Albumin 3.4 (*)     eGFR 33 (*)     All other components within normal limits   TROPONIN I   B-TYPE NATRIURETIC PEPTIDE           Imaging     Imaging Results              X-Ray Chest AP  Portable (Final result)  Result time 05/04/24 22:01:40      Final result by Thanh Keller DO (05/04/24 22:01:40)                   Impression:      No acute abnormality.      Electronically signed by: Thanh Keller  Date:    05/04/2024  Time:    22:01               Narrative:    EXAMINATION:  XR CHEST AP PORTABLE    CLINICAL HISTORY:  chest pain;    TECHNIQUE:  Single frontal view of the chest was performed.    COMPARISON:  01/03/2024.    FINDINGS:  The lungs are well expanded and clear. No focal opacities are seen. The pleural spaces are clear. The cardiac silhouette is unremarkable. The visualized osseous structures are intact.                                    X-Rays:   Independently Interpreted Readings:   Chest X-Ray: No acute abnormalities.               ED Course         Procedures         Orders Placed This Encounter    X-Ray Chest AP Portable    CBC auto differential    Comprehensive metabolic panel    Troponin I #1    BNP    Ambulatory referral/consult to Interventional Cardiology    EKG 12-lead    Possible Hospitalization          ED Course as of 05/05/24 0357   Sat May 04, 2024   2045 EKG 12-lead  Independent Interpretation of EKG:  Rhythm: Normal Sinus  Rate: 69  Axis: Normal  QTC: 417  No STEMI   [KB]   2101 CBC auto differential(!)  CBC: No significant anemia, platelet disorder, or leukocytosis.   [KB]   2133 Creatinine(!): 1.6  At baseline [KB]   2133 Troponin I: 0.009  Negative [KB]   2207 X-Ray Chest AP Portable  Independent interpretation of chest x-ray:  No consolidations, pneumothorax, or other acute findings  [KB]   2254 Offered admission for high risk factor for CP. Patient declining due to home obligations. She understands risks. Has previously scheduled outpatient cardiology f/u. Advised keeping these appointments. Will return to ED for worsening or changing symptoms. [KB]      ED Course User Index  [KB] Armand Hart MD              Medical Decision Making       The patient's list  of active medical problems, social history, medications, and allergies as documented per RN staff has been reviewed.           Medical Decision Making  79-year-old female presents for evaluation of chest pain.  Heart score 5.  Also with the patient's description of feeling like she was going to pass out, this is concerning for worsening of aortic stenosis.  Initial cardiac workup was negative.  Discussed possibility of admission due to all of these risk factors and concerns with the patient however she was concerned about her  at home and lack of caretakers.  After shared decision-making patient preferred discharge with outpatient follow-up with her cardiologist.  She understands she can return at anytime for reassessment or re-evaluation should symptoms worsen, change in character, or should her home obligations improve.    Amount and/or Complexity of Data Reviewed  External Data Reviewed: radiology, ECG and notes.  Labs: ordered. Decision-making details documented in ED Course.  Radiology: ordered and independent interpretation performed. Decision-making details documented in ED Course.  ECG/medicine tests: ordered and independent interpretation performed. Decision-making details documented in ED Course.    Risk  Decision regarding hospitalization.         Additional MDM:   Differential Diagnosis:   Symptom: Chest pain. <> The follow diagnoses were considered and will be evaluated: Acute MI, Atypical Chest Pain, Chest Wall Pain, Costochondritis, Myocarditis, Pericarditis, Pleural Effusion, Pleurisy, Pneumonia, Pneumothorax, Pulmonary Embolism, ST Elevation MI and Unstable Angina.      Heart Score:    History:          Moderately suspicious.  ECG:             Normal  Age:               >65 years  Risk factors: >= 3 risk factors or history of atherosclerotic disease  Troponin:       Less than or equal to normal limit  Heart Score = 5                  ED Prescriptions    None           Clinical Impression        Follow-up Information       Follow up With Specialties Details Why Contact Info    Patricia Rogers MD Cardiology, Interventional Cardiology Call in 2 days  [address] [facility]  [address]  [place] [address]  [phone]      [facility] Emergency Dept Emergency Medicine Go to  As needed, If symptoms worsen [address] [facility] [id number] [phone]            Referrals:  Orders Placed This Encounter   Procedures    Ambulatory referral/consult to Interventional Cardiology     Standing Status:   Future     Standing Expiration Date:   6/4/2025     Referral Priority:   Routine     Referral Type:   Consultation     Referral Reason:   Specialty Services Required     Requested Specialty:   Interventional Cardiology     Number of Visits Requested:   1       Disposition   ED Disposition Condition    Discharge Stable            Diagnosis    ICD-10-CM ICD-9-CM   1. Aortic valve stenosis, etiology of cardiac valve disease unspecified  I35.0 424.1   2. Chest pain  R07.9 786.50           Armand Hart MD        05/05/2024          DISCLAIMER: This note was prepared with [employer] voice recognition transcription software. Garbled syntax, mangled pronouns, and other bizarre constructions may be attributed to that software system.       Armand Hart MD  05/05/24 0359

## 2024-05-05 NOTE — ED NOTES
Pt presents to the ED c/o chest tightness that started 2 weeks ago. Pt states she has been under a lot of stress lately and she believes is the cause but wants to be sure. Pt has a hx of blockages has multiple stents in place. Pt denies any SOB. During interview pt is very tearful. Pt reassured in conversation. It was explained to the pt the type of labs drawn and what it would look for. Pt stated she understood process. Monitor in place. VSS. EKG obtained. IV in place. Pt AA&OX4.

## 2024-05-06 ENCOUNTER — PATIENT OUTREACH (OUTPATIENT)
Dept: EMERGENCY MEDICINE | Facility: HOSPITAL | Age: 79
End: 2024-05-06
Payer: MEDICARE

## 2024-05-06 NOTE — PROGRESS NOTES
Patient was seen in the ED on 5/4/24 for aortic valve stenosis. Patient was contacted for post ED discharge navigation. They have an appointment scheduled with Dr. Patricia Rogers on 5/22/24 at 11:45. She has cardio testing scheduled for 5/9. ED navigator will remind patient of appointment.

## 2024-05-07 LAB
OHS QRS DURATION: 80 MS
OHS QTC CALCULATION: 417 MS

## 2024-05-09 ENCOUNTER — HOSPITAL ENCOUNTER (OUTPATIENT)
Dept: CARDIOLOGY | Facility: HOSPITAL | Age: 79
Discharge: HOME | End: 2024-05-09
Attending: INTERNAL MEDICINE
Payer: MEDICARE

## 2024-05-09 ENCOUNTER — HOSPITAL ENCOUNTER (OUTPATIENT)
Dept: RADIOLOGY | Facility: HOSPITAL | Age: 79
Discharge: HOME | End: 2024-05-09
Attending: INTERNAL MEDICINE
Payer: MEDICARE

## 2024-05-09 VITALS — BODY MASS INDEX: 26.29 KG/M2 | HEIGHT: 64 IN | WEIGHT: 154 LBS

## 2024-05-09 DIAGNOSIS — I20.81 ANGINA PECTORIS WITH CORONARY MICROVASCULAR DYSFUNCTION: ICD-10-CM

## 2024-05-09 PROCEDURE — 78452 HT MUSCLE IMAGE SPECT MULT: CPT | Mod: 26,,, | Performed by: STUDENT IN AN ORGANIZED HEALTH CARE EDUCATION/TRAINING PROGRAM

## 2024-05-09 PROCEDURE — 78452 HT MUSCLE IMAGE SPECT MULT: CPT | Mod: TC

## 2024-05-09 PROCEDURE — 93306 TTE W/DOPPLER COMPLETE: CPT

## 2024-05-09 PROCEDURE — A9502 TC99M TETROFOSMIN: HCPCS | Performed by: INTERNAL MEDICINE

## 2024-05-09 PROCEDURE — 63600175 PHARM REV CODE 636 W HCPCS: Performed by: INTERNAL MEDICINE

## 2024-05-09 PROCEDURE — 93017 CV STRESS TEST TRACING ONLY: CPT

## 2024-05-09 RX ORDER — REGADENOSON 0.08 MG/ML
0.4 INJECTION, SOLUTION INTRAVENOUS
Status: COMPLETED | OUTPATIENT
Start: 2024-05-09 | End: 2024-05-09

## 2024-05-09 RX ADMIN — REGADENOSON 0.4 MG: 0.08 INJECTION, SOLUTION INTRAVENOUS at 08:05

## 2024-05-09 RX ADMIN — TETROFOSMIN 30.1 MILLICURIE: 1.38 INJECTION, POWDER, LYOPHILIZED, FOR SOLUTION INTRAVENOUS at 08:05

## 2024-05-09 RX ADMIN — TETROFOSMIN 10.7 MILLICURIE: 1.38 INJECTION, POWDER, LYOPHILIZED, FOR SOLUTION INTRAVENOUS at 07:05

## 2024-05-10 LAB
ASCENDING AORTA: 2.89 CM
AV INDEX (PROSTH): 0.23
AV MEAN GRADIENT: 35 MMHG
AV PEAK GRADIENT: 62 MMHG
AV REGURGITATION PRESSURE HALF TIME: 424.35 MS
AV VALVE AREA BY VELOCITY RATIO: 0.7 CM²
AV VALVE AREA: 0.72 CM²
AV VELOCITY RATIO: 0.23
BSA FOR ECHO PROCEDURE: 1.78 M2
CV ECHO LV RWT: 0.36 CM
CV STRESS BASE HR: 59 BPM
DIASTOLIC BLOOD PRESSURE: 58 MMHG
DOP CALC AO PEAK VEL: 3.94 M/S
DOP CALC AO VTI: 99.2 CM
DOP CALC LVOT AREA: 3.1 CM2
DOP CALC LVOT DIAMETER: 1.98 CM
DOP CALC LVOT PEAK VEL: 0.89 M/S
DOP CALC LVOT STROKE VOLUME: 71.71 CM3
DOP CALC MV VTI: 32.3 CM
DOP CALCLVOT PEAK VEL VTI: 23.3 CM
E WAVE DECELERATION TIME: 186.19 MSEC
E/A RATIO: 0.76
E/E' RATIO: 11.23 M/S
ECHO LV POSTERIOR WALL: 0.87 CM (ref 0.6–1.1)
FRACTIONAL SHORTENING: 42 % (ref 28–44)
INTERVENTRICULAR SEPTUM: 0.77 CM (ref 0.6–1.1)
IVC DIAMETER: 1.2 CM
LA MAJOR: 5.25 CM
LA MINOR: 5.08 CM
LA WIDTH: 3.8 CM
LEFT ATRIUM SIZE: 3.52 CM
LEFT ATRIUM VOLUME INDEX MOD: 30.9 ML/M2
LEFT ATRIUM VOLUME INDEX: 33.5 ML/M2
LEFT ATRIUM VOLUME MOD: 54.14 CM3
LEFT ATRIUM VOLUME: 58.71 CM3
LEFT INTERNAL DIMENSION IN SYSTOLE: 2.81 CM (ref 2.1–4)
LEFT VENTRICLE DIASTOLIC VOLUME INDEX: 63.37 ML/M2
LEFT VENTRICLE DIASTOLIC VOLUME: 110.89 ML
LEFT VENTRICLE MASS INDEX: 76 G/M2
LEFT VENTRICLE SYSTOLIC VOLUME INDEX: 17 ML/M2
LEFT VENTRICLE SYSTOLIC VOLUME: 29.68 ML
LEFT VENTRICULAR INTERNAL DIMENSION IN DIASTOLE: 4.86 CM (ref 3.5–6)
LEFT VENTRICULAR MASS: 133.58 G
LV LATERAL E/E' RATIO: 12.17 M/S
LV SEPTAL E/E' RATIO: 10.43 M/S
LVOT MG: 1.8 MMHG
LVOT MV: 0.64 CM/S
MV MEAN GRADIENT: 1 MMHG
MV PEAK A VEL: 0.96 M/S
MV PEAK E VEL: 0.73 M/S
MV PEAK GRADIENT: 4 MMHG
MV STENOSIS PRESSURE HALF TIME: 80.98 MS
MV VALVE AREA BY CONTINUITY EQUATION: 2.22 CM2
MV VALVE AREA P 1/2 METHOD: 2.72 CM2
OHS CV CPX 85 PERCENT MAX PREDICTED HEART RATE MALE: 120
OHS CV CPX MAX PREDICTED HEART RATE: 141
OHS CV CPX PATIENT IS FEMALE: 1
OHS CV CPX PATIENT IS MALE: 0
OHS CV CPX PEAK DIASTOLIC BLOOD PRESSURE: 56 MMHG
OHS CV CPX PEAK HEAR RATE: 67 BPM
OHS CV CPX PEAK RATE PRESSURE PRODUCT: NORMAL
OHS CV CPX PEAK SYSTOLIC BLOOD PRESSURE: 168 MMHG
OHS CV CPX PERCENT MAX PREDICTED HEART RATE ACHIEVED: 49
OHS CV CPX RATE PRESSURE PRODUCT PRESENTING: 7316
OHS CV RV/LV RATIO: 0.78 CM
OHS LV EJECTION FRACTION SIMPSONS BIPLANE MOD: 81 %
PISA AR MAX VEL: 5.37 M/S
PISA MRMAX VEL: 3.67 M/S
PISA TR MAX VEL: 3.29 M/S
RA MAJOR: 5.15 CM
RA PRESSURE ESTIMATED: 3 MMHG
RA WIDTH: 3.3 CM
RIGHT VENTRICLE DIASTOLIC MID DIMENSION: 2.9 CM
RIGHT VENTRICULAR END-DIASTOLIC DIMENSION: 3.8 CM
RV TB RVSP: 6 MMHG
RV TISSUE DOPPLER FREE WALL SYSTOLIC VELOCITY 1 (APICAL 4 CHAMBER VIEW): 16.53 CM/S
SINUS: 2.52 CM
STJ: 2.21 CM
SYSTOLIC BLOOD PRESSURE: 124 MMHG
TASV: 17 CM/S
TDI LATERAL: 0.06 M/S
TDI SEPTAL: 0.07 M/S
TDI: 0.07 M/S
TR MAX PG: 43 MMHG
TRICUSPID ANNULAR PLANE SYSTOLIC EXCURSION: 2.19 CM
TV REST PULMONARY ARTERY PRESSURE: 46 MMHG
Z-SCORE OF LEFT VENTRICULAR DIMENSION IN END DIASTOLE: 0.04
Z-SCORE OF LEFT VENTRICULAR DIMENSION IN END SYSTOLE: -0.5

## 2024-05-20 NOTE — PROGRESS NOTES
[facility]     SUBJECTIVE:     History of Present Illness:  Patient is a 79 y.o. female presents with CAD.     Primary Diagnosis:   1. Hypertension  2. DM: none  3. Past smoker: over 10 years ago   4. Heart disease: In 2000, had one stent placed at [facility]. IN 2005, had 2 stents placed. - unknown anatomy   5. Tremors  6. Recent diagnosis of scabies   ROS   Since last visit in 4/24; no change   No syncope since or before but gets dizzy - usually happens when she stands up from a sitting position   Chest pains noted recently for the past 2 months; mid chest; sharp pain; no radiation; mins in duration; takes NTG with help; can happen at any time usually at rest; also has it with carrying bags of clothes which she did not have in the past ; notes sweating with the pain as well   No shortness of breath at rest; no PND or orthopnea; notes shortness of breath with activity and feels tired   Activity:  cleans dishes, makes bed, changes sheets; with occasional chest discomfort;   Blood pressures at home: 150's   Review of patient's allergies indicates:   Allergen Reactions    Iodinated contrast media Anaphylaxis and Other (See Comments)    Nsaids (non-steroidal anti-inflammatory drug)      ADWOA    Phenergan [promethazine]      Vomiting       Past Medical History:   Diagnosis Date    Aortic atherosclerosis 6/14/2023    CKD (chronic kidney disease) stage 4, GFR 15-29 ml/min     Coronary artery disease     Edema     Epilepsy     Generalized anxiety disorder 12/20/2021    Hematuria, unspecified     Hematuria, unspecified     Hyperlipidemia     Hypertension     Iron deficiency anemia     Moderate episode of recurrent major depressive disorder     Nonrheumatic aortic valve stenosis 12/20/2021    Normocytic anemia     Prediabetes 2/24/2022       Past Surgical History:   Procedure Laterality Date    APPENDECTOMY      BACK SURGERY      CORONARY STENT PLACEMENT      HYSTERECTOMY      REVISION OF KNEE ARTHROPLASTY Left 7/18/2022     Procedure: REVISION, ARTHROPLASTY, KNEE;  Surgeon: Stan Catalan MD;  Location: [facility];  Service: Orthopedics;  Laterality: Left;    TONSILLECTOMY         Family History   Problem Relation Name Age of Onset    Heart disease Mother      Heart disease Father         Social History     Tobacco Use    Smoking status: Never    Smokeless tobacco: Never   Substance Use Topics    Alcohol use: No    Drug use: No        Past Hospitalization:   3/22: weakness and diarrhea  7/22: surgery for left knee    7/30/22: ER visit for left knee pain and swelling ; fell in the ER   8/27/22: itching   9/9/22: infection left knee of tissue   9/16/22: syncope ; BP low ; Hgb 7.7  6/23: fall/ no LOC;   1/24: gait imbalance, depression;   ER  visit: 5/4/24: chest pain; troponins negative   Home meds:  Current Outpatient Medications on File Prior to Visit   Medication Sig Dispense Refill    acyclovir (ZOVIRAX) 800 MG Tab Take 1 tablet (800 mg total) by mouth 5 (five) times daily. for 7 days 35 tablet 0    AIMOVIG AUTOINJECTOR 70 mg/mL autoinjector Inject 70 mg into the skin every 30 days.      aspirin (ECOTRIN) 81 MG EC tablet Take 81 mg by mouth once daily.      calcium carbonate/vitamin D3 (VITAMIN D-3 ORAL) Take 1 tablet by mouth once daily.      clotrimazole-betamethasone 1-0.05% (LOTRISONE) cream Apply topically 2 (two) times daily. 45 g 1    diphenhydrAMINE (BENADRYL) 25 mg capsule Take 25 mg by mouth every 6 (six) hours as needed for Itching. (Patient not taking: Reported on 4/16/2024)      DULoxetine (CYMBALTA) 60 MG capsule Take 1 capsule (60 mg total) by mouth once daily. 90 capsule 0    FEROSUL 325 mg (65 mg iron) Tab tablet Take 325 mg by mouth once daily.      furosemide (LASIX) 40 MG tablet Take 2 tablets (80 mg total) by mouth 2 (two) times daily as needed (swelling). 360 tablet 3    hydrOXYzine HCL (ATARAX) 25 MG tablet TAKE ONE TABLET BY MOUTH EVERY 6 HOURS AS NEEDED FOR ITCHING 30 tablet 2    irbesartan (AVAPRO) 300 MG tablet  Take 1 tablet (300 mg total) by mouth every evening. 30 tablet 30    isosorbide mononitrate (IMDUR) 60 MG 24 hr tablet Take 1 tablet (60 mg total) by mouth 2 (two) times a day. 180 tablet 1    multivit-min-iron-FA-lutein (CENTRUM SILVER WOMEN) 8 mg iron-400 mcg-300 mcg Tab Take 1 tablet by mouth once daily.      mupirocin (BACTROBAN) 2 % ointment Apply topically 2 (two) times daily. 22 g 3    nitroGLYCERIN (NITROSTAT) 0.3 MG SL tablet PLACE 1 TABLET UNDER THE TONGUE EVERY 5 MINUTES FOR UP TO 3 DOSES IF NEEDED FOR CHEST PAIN. CALL 911 IF PAIN PERSISTS 25 tablet 0    omega-3 fatty acids/fish oil (FISH OIL-OMEGA-3 FATTY ACIDS) 300-1,000 mg capsule Take 2 capsules by mouth once daily.      omeprazole (PRILOSEC) 20 MG capsule Take 1 capsule (20 mg total) by mouth daily as needed. 90 capsule 2    oxyCODONE-acetaminophen (PERCOCET)  mg per tablet Take 1 tablet by mouth 4 (four) times daily as needed.      permethrin (ELIMITE) 5 % cream APPLY TOPICALLY ONCE FOR ONE DOSE 60 g 0    rosuvastatin (CRESTOR) 40 MG Tab TAKE ONE TABLET BY MOUTH DAILY (Patient taking differently: Take 40 mg by mouth once daily.) 90 tablet 3    tiZANidine (ZANAFLEX) 4 MG tablet Take 1 tablet (4 mg total) by mouth every 8 (eight) hours as needed (spasms). 60 tablet 2    triamcinolone acetonide 0.1% (KENALOG) 0.1 % cream APPLY TOPICALLY TWICE DAILY FOR 10 DAYS 80 g 1     No current facility-administered medications on file prior to visit.       Cardiac meds:    Imdur 60 mg BID  Irbesartan 300 mg  Lasix 80 mg as needed   rosuvastatin 40 mg   ASA 81 mg       OBJECTIVE:     Vital Signs (Most Recent)  Pulse: 79 (05/22/24 1139)  BP: (!) 159/75 (05/22/24 1139)  SpO2: 96 % (05/22/24 1139)      Physical Exam:    Neck: decreased  Carotids upstroke ,basilar  bruits; normal JVP  Lungs :clear  Heart: RR, normal S1,S2, systolic ejection murmur base to LSB, no gallops  Abd: no masses; no bruits;   Exts: normal DP and PT pulses bilaterally, trace  edema noted            LABS    GFR : GFR 47; Hgb: 7 yvon  : GFR 58 ; LDL 77 ; A1c 5.2   : Hgb 12; GFR 46    : TSH normal; GFR 33  : GFR 33,   Diagnostic Results:    1.EK21: NSR; normal   1b. EK/22: NSR: normal   1c. EK/24: sinus bradycardia; normal   2.Echo: 21: normal EF, diastolic dysfunction; mild TR; PAS 43; MIGDALIA 1.18 cm2 with aortic sclerosis   2b. Echo: : mild AI; moderately severe AS normal EF but velocity only 3.6   2c. Echo: : EF normal; RV normal; mild TR, PAS 46; severe stenosis   3.Nuclear stress: : negative for ischemia; normal EF   3b.Nuclear stress : EF normal; negative for ischemia   3c. DSE: : negative for ischemia; EF normal   3d. Stress nuclear: : negative for ischemia   4. Carotid US: : right carotids OK:  but low flow; on lfeft;  normal vertebral flow   5. CTA head: Left internal carotid occluded distal to bifurcation ; reconstitutation noted ; right normal   Chart review:  GFR 27 1 month ago    ASSESSMENT/PLAN:     1. CAD: s/p stent placement - recent symptoms of chest pain- no change in symptoms and with recent nuclear stress negative for ischemia   2. Severe aortic stenosis on echo  3. Chronic diastolic dysfunction- asymptomatic   4. Hypertension controlled  5. Lipids at goal   6. CKD 3 - improved GFR recent 30's   7. Anemia with Hgb 10 yvon stable - improved   8. Carotid disease: right OK; left chronic occlusion   Plan: 1.refer for TAVR evaluaiton   2. Return 3 month       Patricia Rogers MD

## 2024-05-22 ENCOUNTER — OFFICE VISIT (OUTPATIENT)
Dept: CARDIOLOGY | Facility: CLINIC | Age: 79
End: 2024-05-22
Payer: MEDICARE

## 2024-05-22 VITALS
BODY MASS INDEX: 28.63 KG/M2 | WEIGHT: 145.81 LBS | HEART RATE: 79 BPM | HEIGHT: 60 IN | SYSTOLIC BLOOD PRESSURE: 159 MMHG | DIASTOLIC BLOOD PRESSURE: 75 MMHG | OXYGEN SATURATION: 96 %

## 2024-05-22 DIAGNOSIS — I65.23 BILATERAL CAROTID ARTERY STENOSIS: ICD-10-CM

## 2024-05-22 DIAGNOSIS — I25.10 CORONARY ARTERY DISEASE INVOLVING NATIVE CORONARY ARTERY OF NATIVE HEART WITHOUT ANGINA PECTORIS: ICD-10-CM

## 2024-05-22 DIAGNOSIS — I35.0 AORTIC VALVE STENOSIS, ETIOLOGY OF CARDIAC VALVE DISEASE UNSPECIFIED: Primary | ICD-10-CM

## 2024-05-22 PROCEDURE — 3288F FALL RISK ASSESSMENT DOCD: CPT | Mod: CPTII,S$GLB,, | Performed by: INTERNAL MEDICINE

## 2024-05-22 PROCEDURE — 1159F MED LIST DOCD IN RCRD: CPT | Mod: CPTII,S$GLB,, | Performed by: INTERNAL MEDICINE

## 2024-05-22 PROCEDURE — 3077F SYST BP >= 140 MM HG: CPT | Mod: CPTII,S$GLB,, | Performed by: INTERNAL MEDICINE

## 2024-05-22 PROCEDURE — 1101F PT FALLS ASSESS-DOCD LE1/YR: CPT | Mod: CPTII,S$GLB,, | Performed by: INTERNAL MEDICINE

## 2024-05-22 PROCEDURE — 99214 OFFICE O/P EST MOD 30 MIN: CPT | Mod: S$GLB,,, | Performed by: INTERNAL MEDICINE

## 2024-05-22 PROCEDURE — 1160F RVW MEDS BY RX/DR IN RCRD: CPT | Mod: CPTII,S$GLB,, | Performed by: INTERNAL MEDICINE

## 2024-05-22 PROCEDURE — 3078F DIAST BP <80 MM HG: CPT | Mod: CPTII,S$GLB,, | Performed by: INTERNAL MEDICINE

## 2024-05-22 PROCEDURE — 99999 PR PBB SHADOW E&M-EST. PATIENT-LVL III: CPT | Mod: PBBFAC,,, | Performed by: INTERNAL MEDICINE

## 2024-05-22 PROCEDURE — 1125F AMNT PAIN NOTED PAIN PRSNT: CPT | Mod: CPTII,S$GLB,, | Performed by: INTERNAL MEDICINE

## 2024-05-23 ENCOUNTER — HOSPITAL ENCOUNTER (OUTPATIENT)
Dept: WOUND CARE | Facility: HOSPITAL | Age: 79
Discharge: HOME | End: 2024-05-23
Attending: PREVENTIVE MEDICINE
Payer: MEDICARE

## 2024-05-23 VITALS
DIASTOLIC BLOOD PRESSURE: 74 MMHG | TEMPERATURE: 98 F | WEIGHT: 145.94 LBS | BODY MASS INDEX: 28.65 KG/M2 | HEART RATE: 70 BPM | HEIGHT: 60 IN | SYSTOLIC BLOOD PRESSURE: 185 MMHG

## 2024-05-23 DIAGNOSIS — B02.8 HERPES ZOSTER WITH COMPLICATION: ICD-10-CM

## 2024-05-23 DIAGNOSIS — L98.411 NON-PRESSURE CHRONIC ULCER OF BUTTOCK LIMITED TO BREAKDOWN OF SKIN: Primary | ICD-10-CM

## 2024-05-23 PROCEDURE — 99212 OFFICE O/P EST SF 10 MIN: CPT

## 2024-05-23 NOTE — PROGRESS NOTES
[facility]    Subjective:       Patient ID: Enedelia García is a 79 y.o. female.    Chief Complaint: Non-healing Wound Follow Up    Follow up for right buttock wound that is now healed. Discharge instructions given.     Review of Systems   All other systems reviewed and are negative.        Objective:     Vitals:    05/23/24 1433   BP: (!) 185/74   Pulse: 70   Temp: 97.9 °F (36.6 °C)         Physical Exam       Altered Skin Integrity 03/18/24 1127 Buttocks Non pressure chronic ulcer (Active)   03/18/24 1127   Altered Skin Integrity Present on Admission - Did Patient arrive to the hospital with altered skin?:    Side:    Orientation:    Location: Buttocks   Wound Number:    Is this injury device related?: No   Primary Wound Type: Non pressure   Description of Altered Skin Integrity:    Ankle-Brachial Index:    Pulses:    Removal Indication and Assessment:    Wound Outcome:    (Retired) Wound Length (cm):    (Retired) Wound Width (cm):    (Retired) Depth (cm):    Wound Description (Comments):    Removal Indications:    Wound Image   05/23/24 1531   Dressing Appearance Intact 05/23/24 1531   Drainage Amount None 05/23/24 1531   Appearance Intact;Pink 05/23/24 1531   Red (%), Wound Tissue Color 100 % 05/23/24 1531   Periwound Area Intact 05/23/24 1531   Care Cleansed with:;Sterile normal saline 05/23/24 1531         Assessment/Plan:         ICD-10-CM ICD-9-CM   1. 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 Herpes zoster with complication  B02.8 053.8           Tissue pathology and/or culture taken:  [] Yes [x] No   Sharp debridement performed:   [] Yes [x] No   Labs ordered this visit:   [] Yes [x] No   Imaging ordered this visit:   [] Yes [x] No           Orders Placed This Encounter   Procedures    Change dressing     Healed Wound Instructions:Your wound is healed, it is extremely fragile at this stage; protect it from  friction, wash it gently and pat dry.  If the wound should re-open, please call [phone] for further instructions.        Follow up if symptoms worsen or fail to improve.            This includes face to face time and non-face to face time preparing to see the patient (eg, review of tests), obtaining and/or reviewing separately obtained history, documenting clinical information in the electronic or other health record, independently interpreting results and communicating results to the patient/family/caregiver, or care coordinator.

## 2024-05-24 ENCOUNTER — EDUCATION (OUTPATIENT)
Dept: CARDIOLOGY | Facility: CLINIC | Age: 79
End: 2024-05-24
Payer: MEDICARE

## 2024-05-24 ENCOUNTER — PATIENT OUTREACH (OUTPATIENT)
Dept: EMERGENCY MEDICINE | Facility: HOSPITAL | Age: 79
End: 2024-05-24
Payer: MEDICARE

## 2024-05-24 DIAGNOSIS — I35.0 NONRHEUMATIC AORTIC VALVE STENOSIS: Primary | ICD-10-CM

## 2024-05-24 DIAGNOSIS — I35.0 SEVERE AORTIC STENOSIS: Primary | ICD-10-CM

## 2024-05-24 RX ORDER — SODIUM CHLORIDE 9 MG/ML
INJECTION, SOLUTION INTRAVENOUS CONTINUOUS
Status: CANCELLED | OUTPATIENT
Start: 2024-05-24 | End: 2024-05-24

## 2024-05-24 RX ORDER — DIPHENHYDRAMINE HCL 50 MG
50 CAPSULE ORAL ONCE
Status: CANCELLED | OUTPATIENT
Start: 2024-05-24 | End: 2024-05-24

## 2024-05-24 RX ORDER — SODIUM CHLORIDE 0.9 % (FLUSH) 0.9 %
10 SYRINGE (ML) INJECTION
Status: SHIPPED | OUTPATIENT
Start: 2024-05-24

## 2024-05-24 NOTE — PROGRESS NOTES
[facility]  CTA:  TAVR evaluation    It has been suggested by your doctor that you have a CT SCAN for TAVR evaluation.  Because you are sensitive to Iodine dye and dye is used to perform the test, your doctor would like for you to take several medications before the procedure to help prevent a severe reaction.    Prescriptions for Benadryl, Prednisone, and Tagamet will be prescribed for you.  You will be given 3 tablets of each.  If you are allergic to any of these, please notify your physician immediately.    Premedication Schedule:    At 6 pm the night before take:   Benadryl 50 mg   Prednisone 50 mg   Tagamet 300 mg    At 11 pm the night before take:   Benadryl 50 mg   Prednisone 50 mg   Tagamet 300 mg    At 6 am the morning of the procedure take:   Benadryl 50 mg   Prednisone 50 mg   Tagamet 300 mg

## 2024-05-27 ENCOUNTER — LAB VISIT (OUTPATIENT)
Dept: LAB | Facility: HOSPITAL | Age: 79
End: 2024-05-27
Attending: FAMILY MEDICINE
Payer: MEDICARE

## 2024-05-27 DIAGNOSIS — N18.2 CKD (CHRONIC KIDNEY DISEASE) STAGE 2, GFR 60-89 ML/MIN: ICD-10-CM

## 2024-05-27 DIAGNOSIS — F43.21 ADJUSTMENT DISORDER WITH DEPRESSED MOOD: ICD-10-CM

## 2024-05-27 LAB
BILIRUB UR QL STRIP: NEGATIVE
CLARITY UR: CLEAR
COLOR UR: YELLOW
CREAT UR-MCNC: 29.7 MG/DL (ref 15–325)
GLUCOSE UR QL STRIP: NEGATIVE
HGB UR QL STRIP: NEGATIVE
HYALINE CASTS #/AREA URNS LPF: 9 /LPF
KETONES UR QL STRIP: NEGATIVE
LEUKOCYTE ESTERASE UR QL STRIP: ABNORMAL
MICROSCOPIC COMMENT: ABNORMAL
NITRITE UR QL STRIP: NEGATIVE
PH UR STRIP: 7 [PH] (ref 5–8)
PROT UR QL STRIP: NEGATIVE
PROT UR-MCNC: <7 MG/DL (ref 0–15)
PROT/CREAT UR: NORMAL MG/G{CREAT} (ref 0–0.2)
RBC #/AREA URNS HPF: 0 /HPF (ref 0–4)
SP GR UR STRIP: 1.01 (ref 1–1.03)
SQUAMOUS #/AREA URNS HPF: 1 /HPF
URN SPEC COLLECT METH UR: ABNORMAL
UROBILINOGEN UR STRIP-ACNC: NEGATIVE EU/DL
WBC #/AREA URNS HPF: 1 /HPF (ref 0–5)

## 2024-05-27 PROCEDURE — 84156 ASSAY OF PROTEIN URINE: CPT | Performed by: INTERNAL MEDICINE

## 2024-05-27 PROCEDURE — 81000 URINALYSIS NONAUTO W/SCOPE: CPT | Performed by: INTERNAL MEDICINE

## 2024-05-27 RX ORDER — HYDROXYZINE HYDROCHLORIDE 25 MG/1
TABLET, FILM COATED ORAL
Qty: 30 TABLET | Refills: 2 | Status: SHIPPED | OUTPATIENT
Start: 2024-05-27 | End: 2024-06-17

## 2024-05-29 ENCOUNTER — OFFICE VISIT (OUTPATIENT)
Dept: URGENT CARE | Facility: CLINIC | Age: 79
End: 2024-05-29
Payer: MEDICARE

## 2024-05-29 VITALS
RESPIRATION RATE: 18 BRPM | BODY MASS INDEX: 26.65 KG/M2 | SYSTOLIC BLOOD PRESSURE: 146 MMHG | TEMPERATURE: 98 F | WEIGHT: 141.13 LBS | OXYGEN SATURATION: 96 % | HEIGHT: 61 IN | DIASTOLIC BLOOD PRESSURE: 76 MMHG | HEART RATE: 69 BPM

## 2024-05-29 NOTE — PROGRESS NOTES
"Subjective:      Patient ID: Enedelia García is a 79 y.o. female.    Vitals:  height is 5' 1" (1.549 m) and weight is 64 kg (141 lb 1.5 oz). Her oral temperature is 98 °F (36.7 °C). Her blood pressure is 146/76 (abnormal) and her pulse is 69. Her respiration is 18 and oxygen saturation is 96%.     Chief Complaint: Fall    Pt present with both arm injury from fall happened today. Tx include nothing at home. Pt states she was vacuuming and tripped over with the cored and hit her LT arm with wood.     Fall  The accident occurred Less than 1 hour ago. The pain is at a severity of 9/10. The pain is severe. She has tried nothing for the symptoms. The treatment provided no relief.   ROS   Objective:     Physical Exam    Assessment:     1. ERRONEOUS ENCOUNTER--DISREGARD        Plan:     Pt left due to having to home and take care of      ERRONEOUS ENCOUNTER--DISREGARD                    "

## 2024-05-30 ENCOUNTER — OFFICE VISIT (OUTPATIENT)
Dept: URGENT CARE | Facility: CLINIC | Age: 79
End: 2024-05-30
Payer: MEDICARE

## 2024-05-30 VITALS
BODY MASS INDEX: 26.62 KG/M2 | RESPIRATION RATE: 16 BRPM | SYSTOLIC BLOOD PRESSURE: 163 MMHG | TEMPERATURE: 98 F | DIASTOLIC BLOOD PRESSURE: 65 MMHG | HEIGHT: 61 IN | HEART RATE: 74 BPM | OXYGEN SATURATION: 100 % | WEIGHT: 141 LBS

## 2024-05-30 DIAGNOSIS — S51.811A SKIN TEAR OF FOREARM WITHOUT COMPLICATION, RIGHT, INITIAL ENCOUNTER: ICD-10-CM

## 2024-05-30 DIAGNOSIS — S40.021A CONTUSION OF BOTH UPPER EXTREMITIES, INITIAL ENCOUNTER: Primary | ICD-10-CM

## 2024-05-30 DIAGNOSIS — S40.022A CONTUSION OF BOTH UPPER EXTREMITIES, INITIAL ENCOUNTER: Primary | ICD-10-CM

## 2024-05-30 DIAGNOSIS — S51.812A SKIN TEAR OF FOREARM WITHOUT COMPLICATION, LEFT, INITIAL ENCOUNTER: ICD-10-CM

## 2024-05-30 PROCEDURE — 99214 OFFICE O/P EST MOD 30 MIN: CPT | Mod: S$GLB,,, | Performed by: PHYSICIAN ASSISTANT

## 2024-05-30 RX ORDER — MUPIROCIN 20 MG/G
OINTMENT TOPICAL 2 TIMES DAILY
Qty: 22 G | Refills: 0 | Status: SHIPPED | OUTPATIENT
Start: 2024-05-30 | End: 2024-06-06

## 2024-05-30 NOTE — PATIENT INSTRUCTIONS
PLEASE READ YOUR DISCHARGE INSTRUCTIONS ENTIRELY AS IT CONTAINS IMPORTANT INFORMATION.    Please take the antibiotics to completion. Please supplement with OTC probiotics and yogurt.     Keep open to air.  Use the antibiotic ointment to the wound 2x to open wound as directed for 1-2 weeks.     Do not submerge or go swimming for 1-2 weeks until wound is fully healed (IN 12-14 DAYS). Ok to shower and get wet; do not scrub wound. Do not use any hydrogen peroxide or alcohol to clean as it will prevent wound healing.    Take OTC Tylenol or anti-inflammatory as needed for pain. If no contraindication.    May apply ice/warm compression to help with pain or swelling.     Please return or see your primary care doctor for signs of worsening infection (purulent drainage, fever, worsening swelling/redness/pain, inability to move your extremity).        Please arrange follow up with your primary medical clinic as soon as possible. You must understand that you've received an Urgent Care treatment only and that you may be released before all of your medical problems are known or treated. You, the patient, will arrange for follow up as instructed. If your symptoms worsen or fail to improve you should go to the Emergency Room.    WE CANNOT RULE OUT ALL POSSIBLE CAUSES OF YOUR SYMPTOMS IN THE URGENT CARE SETTING PLEASE GO TO THE ER IF YOU FEELS YOUR CONDITION IS WORSENING OR YOU WOULD LIKE EMERGENT EVALUATION.      Follow-up care  Follow up with your healthcare provider as advised. If you have stitches or staples, be sure to return as directed to have them removed.    When to seek medical advice  Call your healthcare provider right away if any of these occur:  Wound bleeding not controlled by direct pressure  Signs of infection, including increasing pain in the wound, increasing wound redness or swelling, or pus or bad odor coming from the wound  Fever of 100.4°F (38.ºC) or as directed by your health care provider  Wound edges  re-open  Wound changes colors  Numbness or weakness in the affected extremity  Decreased movement of the extremity  Date Last Reviewed: 6/10/2015  © 3134-1539 [employer]. [address] [place] [address], [place] [address]. All rights reserved. This information is not intended as a substitute for professional medical care. Always follow your healthcare professional's instructions.

## 2024-05-30 NOTE — PROGRESS NOTES
"Subjective:      Patient ID: Enedelia García is a 79 y.o. female.    Vitals:  height is 5' 1" (1.549 m) and weight is 64 kg (141 lb). Her oral temperature is 98 °F (36.7 °C). Her blood pressure is 163/65 (abnormal) and her pulse is 74. Her respiration is 16 and oxygen saturation is 100%.     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  Has history significant for anxiety, coronary artery disease, hyperlipidemia, scoliosis, heart failure, stage 4 kidney disease, on aspirin 81 mg daily, and chronic opioids by Dr. Lim.  Pt states she fell at home 3 days ago  home .she tripped on the vacuum cord  and bruised   Both of her arms. states she is making sure it is not going to get infected . she has been keeping area clean   And dry.  Tried to use adhesive bandage but worsened her symptoms. pt is on daily blood thinners.   No loss of sensation, abnormal joint movement, active bleeding drainage, fever, chills, or focal weakness/deficits.    Other  This is a new problem. The current episode started in the past 7 days. The problem occurs constantly. The problem has been rapidly improving. Associated symptoms include arthralgias and myalgias. Pertinent negatives include no abdominal pain, chest pain, chills, congestion, coughing, fatigue, fever, headaches, joint swelling, nausea, neck pain, numbness, rash, sore throat, vomiting or weakness.       Constitution: Negative for activity change, chills, fatigue and fever.   HENT:  Negative for ear pain, hearing loss, congestion, postnasal drip, sinus pain, sinus pressure, sore throat and trouble swallowing.    Neck: Negative for neck pain, neck stiffness and painful lymph nodes.   Cardiovascular:  Negative for chest pain, leg swelling, palpitations and sob on exertion.   Eyes:  Negative for eye discharge, eye pain and vision loss.   Respiratory:  Negative for chest tightness, cough, " shortness of breath, wheezing and asthma.    Gastrointestinal:  Negative for abdominal pain, nausea, vomiting, rectal bleeding and bowel incontinence.   Genitourinary:  Negative for dysuria, frequency, urgency, urine decreased, flank pain, bladder incontinence, hematuria, history of kidney stones, vaginal pain and vaginal discharge.   Musculoskeletal:  Positive for trauma, joint pain and muscle ache. Negative for joint swelling and muscle cramps.   Skin:  Positive for abrasion and bruising. Negative for color change, pale, rash and wound.   Allergic/Immunologic: Negative for eczema, asthma and immunocompromised state.   Neurological:  Negative for dizziness, light-headedness, passing out, facial drooping, speech difficulty, coordination disturbances, loss of balance, headaches, disorientation, altered mental status, numbness, tingling and seizures.   Hematologic/Lymphatic: Negative for swollen lymph nodes and easy bruising/bleeding. Does not bruise/bleed easily.   Psychiatric/Behavioral:  Negative for altered mental status and disorientation.       Objective:     Physical Exam   Constitutional: She appears well-developed. She is cooperative.  Non-toxic appearance. She does not appear ill. No distress.   HENT:   Head: Normocephalic and atraumatic.   Ears:   Right Ear: Hearing, external ear and ear canal normal.   Left Ear: Hearing, external ear and ear canal normal.   Nose: Nose normal.   Mouth/Throat: Oropharynx is clear and moist. Mucous membranes are moist. Oropharynx is clear.   Eyes: Conjunctivae, EOM and lids are normal. Right eye exhibits no discharge. Left eye exhibits no discharge. vision grossly intact gaze aligned appropriately   Neck: Neck supple. No neck rigidity present.   Cardiovascular: Normal rate, regular rhythm and normal pulses.   Pulmonary/Chest: Effort normal. No accessory muscle usage. No respiratory distress. She has no wheezes. She exhibits no tenderness.   Abdominal: Normal appearance. She  exhibits no distension.   Musculoskeletal: Normal range of motion.         General: Normal range of motion.      Right lower leg: No edema.      Left lower leg: No edema.      Comments: Moves all extremities with normal tone, strength, and ROM.    Gait normal.      No tenderness to palpation of bilateral shoulder joint, elbow joint, and hand/wrist.   Neurological: no focal deficit. She is alert and at baseline. She has normal motor skills and normal sensation. She displays no weakness, facial symmetry and normal reflexes. No cranial nerve deficit or sensory deficit. She exhibits normal muscle tone. Gait and coordination normal. Coordination normal.   Skin: Skin is warm, dry, not diaphoretic and no rash. Capillary refill takes less than 2 seconds. bruising         Comments:   Superficial ecchymosis  and contusions on bilateral upper extremity with generalized thin skin.     Bilateral forearm with multiple superficial abrasion measuring 2.5 cm.   There is yellow granulation tissue and scabbing present at abrasions.   No warmth,   Fluctuance, tenderness, or drainage present.   Psychiatric: Her behavior is normal. Mood and thought content normal.   Nursing note and vitals reviewed.              Assessment:     1. Contusion of both upper extremities, initial encounter    2. Skin tear of forearm without complication, left, initial encounter    3. Skin tear of forearm without complication, right, initial encounter      Note dictated with voice recognition software, please excuse any grammatical errors.    Patient presents with clinical exam findings and history consistent with above.  We discussed the differential diagnosis.    On exam, patient is nontoxic appearing and vitals are stable.  Patient is essentially neurovascularly intact on exam.   bilateral upper extremity   Contusions and superficial skin tear/abrasion that does not require repair.  Up-to-date with tetanus 2020.    Diagnostic testing results were  independently reviewed and interpreted, which were discussed in depth with patient.   Test ordered in clinic:   none  Additionally, previous progress notes/admissions/lab were reviewed and interpreted.   CMP 05/04/2024 and 01/18/2024 with decreased kidney function and EGFR 33.   LA  reviewed which shows oxycodone APAP 10 mg # 60  and gabapentin 600 mg # 60 last sold 05/15/2024 by Dr. Lim   PCP progress note 01/11/2024 reviewed    We had shared medical decision making for patient's treatment and care.      Plan:      Wound was cleaned and dressed with Bactroban ointment.  Discussed leaving open to air since she has thin skin and any additional dressing or adhesive which worsened her skin tears.    Contusion of both upper extremities, initial encounter    Skin tear of forearm without complication, left, initial encounter  -     mupirocin (BACTROBAN) 2 % ointment; Apply topically 2 (two) times daily. for 7 days  Dispense: 22 g; Refill: 0    Skin tear of forearm without complication, right, initial encounter  -     mupirocin (BACTROBAN) 2 % ointment; Apply topically 2 (two) times daily. for 7 days  Dispense: 22 g; Refill: 0        Note dictated with voice recognition software, please excuse any grammatical errors.    We had shared decision making for patient's treatment. We discussed side effects/alternatives/benefits/risk and patient would like to proceed with treatment plan. We also discussed other OTC treatment recommendations.    Patient was counseled expected course and answered all of questions.     Patient was instructed to return for re-evaluation with urgent care or PCP for continued outpatient workup and management if symptoms do not improve/worsening symptoms. Strict ED versus clinic precautions given in depth.   Guideline, discharge and follow-up instructions given verbally/printed; patient will also receive via [employer]. Patient verbalized understanding and agreed with the entirety of plan of care.             Patient Instructions   PLEASE READ YOUR DISCHARGE INSTRUCTIONS ENTIRELY AS IT CONTAINS IMPORTANT INFORMATION.    Please take the antibiotics to completion. Please supplement with OTC probiotics and yogurt.     Keep open to air.  Use the antibiotic ointment to the wound 2x to open wound as directed for 1-2 weeks.     Do not submerge or go swimming for 1-2 weeks until wound is fully healed (IN 12-14 DAYS). Ok to shower and get wet; do not scrub wound. Do not use any hydrogen peroxide or alcohol to clean as it will prevent wound healing.    Take OTC Tylenol or anti-inflammatory as needed for pain. If no contraindication.    May apply ice/warm compression to help with pain or swelling.     Please return or see your primary care doctor for signs of worsening infection (purulent drainage, fever, worsening swelling/redness/pain, inability to move your extremity).        Please arrange follow up with your primary medical clinic as soon as possible. You must understand that you've received an Urgent Care treatment only and that you may be released before all of your medical problems are known or treated. You, the patient, will arrange for follow up as instructed. If your symptoms worsen or fail to improve you should go to the Emergency Room.    WE CANNOT RULE OUT ALL POSSIBLE CAUSES OF YOUR SYMPTOMS IN THE URGENT CARE SETTING PLEASE GO TO THE ER IF YOU FEELS YOUR CONDITION IS WORSENING OR YOU WOULD LIKE EMERGENT EVALUATION.      Follow-up care  Follow up with your healthcare provider as advised. If you have stitches or staples, be sure to return as directed to have them removed.    When to seek medical advice  Call your healthcare provider right away if any of these occur:  Wound bleeding not controlled by direct pressure  Signs of infection, including increasing pain in the wound, increasing wound redness or swelling, or pus or bad odor coming from the wound  Fever of 100.4°F (38.ºC) or as directed by your health  care provider  Wound edges re-open  Wound changes colors  Numbness or weakness in the affected extremity  Decreased movement of the extremity  Date Last Reviewed: 6/10/2015  © 1028-2628 [employer]. [address] [place] [address], [place] [address]. All rights reserved. This information is not intended as a substitute for professional medical care. Always follow your healthcare professional's instructions.

## 2024-05-31 ENCOUNTER — PATIENT MESSAGE (OUTPATIENT)
Dept: CARDIOLOGY | Facility: CLINIC | Age: 79
End: 2024-05-31
Payer: MEDICARE

## 2024-06-03 ENCOUNTER — LAB VISIT (OUTPATIENT)
Dept: LAB | Facility: HOSPITAL | Age: 79
End: 2024-06-03
Attending: INTERNAL MEDICINE
Payer: MEDICARE

## 2024-06-03 DIAGNOSIS — I35.0 NONRHEUMATIC AORTIC VALVE STENOSIS: ICD-10-CM

## 2024-06-03 LAB
CREAT SERPL-MCNC: 1.1 MG/DL (ref 0.5–1.4)
EST. GFR  (NO RACE VARIABLE): 51.1 ML/MIN/1.73 M^2

## 2024-06-03 PROCEDURE — 36415 COLL VENOUS BLD VENIPUNCTURE: CPT | Mod: PO | Performed by: INTERNAL MEDICINE

## 2024-06-03 PROCEDURE — 82565 ASSAY OF CREATININE: CPT | Performed by: INTERNAL MEDICINE

## 2024-06-04 ENCOUNTER — NURSE TRIAGE (OUTPATIENT)
Dept: ADMINISTRATIVE | Facility: CLINIC | Age: 79
End: 2024-06-04
Payer: MEDICARE

## 2024-06-04 ENCOUNTER — HOSPITAL ENCOUNTER (OUTPATIENT)
Dept: RADIOLOGY | Facility: HOSPITAL | Age: 79
Discharge: HOME | End: 2024-06-04
Attending: INTERNAL MEDICINE
Payer: MEDICARE

## 2024-06-04 ENCOUNTER — TELEPHONE (OUTPATIENT)
Dept: CARDIOLOGY | Facility: CLINIC | Age: 79
End: 2024-06-04
Payer: MEDICARE

## 2024-06-04 DIAGNOSIS — I35.0 NONRHEUMATIC AORTIC VALVE STENOSIS: ICD-10-CM

## 2024-06-04 PROCEDURE — 74176 CT ABD & PELVIS W/O CONTRAST: CPT | Mod: 26,,, | Performed by: RADIOLOGY

## 2024-06-04 PROCEDURE — 75571 CT HRT W/O DYE W/CA TEST: CPT | Mod: TC

## 2024-06-04 PROCEDURE — 71250 CT THORAX DX C-: CPT | Mod: 26,,, | Performed by: RADIOLOGY

## 2024-06-04 PROCEDURE — 74176 CT ABD & PELVIS W/O CONTRAST: CPT | Mod: TC

## 2024-06-04 PROCEDURE — 75571 CT HRT W/O DYE W/CA TEST: CPT | Mod: 26,,, | Performed by: RADIOLOGY

## 2024-06-04 NOTE — TELEPHONE ENCOUNTER
"Spoke to patient.  She was very upset that her CTA could not be done today due to Iodine allergy (anaphylaxis).  CT was done non-con per Dr. Morgan.  She also states that she was told that her valve stenosis is "extreme" and she needs to have her TAVR asap.  Explained to patient that she is scheduled at the soonest available for Dr. Morgan which is June 17th.  Also explained that she will need a LHC and a surgical consult before TAVR procedure can be considered.  She stated that she is having constant chest pain and taking NTG daily.  I advised her to go to the nearest ED and she declined.  She wanted to know if she came through the ED would her valve be fixed.  Spent 15 minutes on the phone with her explaining and re-explaining the protocol for TAVR.  She stated that she wants to talk to Dr. Alexander and does not believe what I am saying.  Patient hung up on me.        ----- Message from Carmen Rangel sent at 6/4/2024  3:52 PM CDT -----  Regarding: Test  Pt [phone] would like a call in ref to the test she couldn't do today because of the dye, and she's expecting a call from the doctor.    Thanks  "

## 2024-06-04 NOTE — TELEPHONE ENCOUNTER
OOC NT return call - call dropped during 2 pt identifiers. Attempted to call back but no answer left message and will make second attempt in 10 minutes.     2nd attempt unsuccessful - LM for pt to return call to [phone] for continued or worsening symptoms and to call 911 for medical emergencies.  Reason for Disposition   Unable to complete triage due to phone connection issues    Protocols used: No Contact or Duplicate Contact Call-A-AH

## 2024-06-05 ENCOUNTER — TELEPHONE (OUTPATIENT)
Dept: CARDIOLOGY | Facility: CLINIC | Age: 79
End: 2024-06-05
Payer: MEDICARE

## 2024-06-05 ENCOUNTER — DOCUMENTATION ONLY (OUTPATIENT)
Dept: CARDIOLOGY | Facility: CLINIC | Age: 79
End: 2024-06-05
Payer: MEDICARE

## 2024-06-05 DIAGNOSIS — I35.0 SEVERE AORTIC STENOSIS: Primary | ICD-10-CM

## 2024-06-05 NOTE — TELEPHONE ENCOUNTER
----- Message from Mukesh Breaux MA sent at 6/5/2024 10:44 AM CDT -----  Contact: pt  Pt is returning your call  ----- Message -----  From: Will Maldonado  Sent: 6/5/2024  10:03 AM CDT  To: Sue Gomez Staff    Type:  Patient Returning Call    Who Called:pt  Who Left Message for Patient:Martin Alicea MA  Does the patient know what this is regarding?: yes  Would the patient rather a call back or a response via [facility]? call  Best Call Back Number:[phone]   Additional Information:

## 2024-06-05 NOTE — TELEPHONE ENCOUNTER
----- Message from Surekha Russo sent at 6/5/2024  8:03 AM CDT -----  Type:  Needs Medical Advice    Who Called: Pt  Would the patient rather a call back or a response via [facility]? Call back  Best Call Back Number: [phone]  Additional Information: Pt requests Dr. Rogers call her back

## 2024-06-06 DIAGNOSIS — I35.0 SEVERE AORTIC STENOSIS: Primary | ICD-10-CM

## 2024-06-06 RX ORDER — DIPHENHYDRAMINE HCL 25 MG
25 CAPSULE ORAL 3 TIMES DAILY PRN
Status: DISCONTINUED | OUTPATIENT
Start: 2024-06-10 | End: 2024-06-06 | Stop reason: HOSPADM

## 2024-06-06 RX ORDER — FAMOTIDINE 20 MG/1
20 TABLET, FILM COATED ORAL 3 TIMES DAILY PRN
Qty: 3 TABLET | Refills: 0 | Status: SHIPPED | OUTPATIENT
Start: 2024-06-10 | End: 2024-06-17 | Stop reason: SDUPTHER

## 2024-06-06 RX ORDER — PREDNISONE 50 MG/1
50 TABLET ORAL 3 TIMES DAILY PRN
Qty: 3 TABLET | Refills: 0 | Status: SHIPPED | OUTPATIENT
Start: 2024-06-10 | End: 2024-06-17 | Stop reason: SDUPTHER

## 2024-06-06 RX ORDER — DIPHENHYDRAMINE HCL 25 MG
25 CAPSULE ORAL
Qty: 3 CAPSULE | Refills: 0 | Status: SHIPPED | OUTPATIENT
Start: 2024-06-10 | End: 2024-06-13

## 2024-06-06 RX ORDER — DIPHENHYDRAMINE HCL 25 MG
25 CAPSULE ORAL 3 TIMES DAILY PRN
Qty: 3 CAPSULE | Refills: 0 | Status: SHIPPED | OUTPATIENT
Start: 2024-06-10 | End: 2024-06-13

## 2024-06-07 DIAGNOSIS — Z96.652 STATUS POST REVISION OF TOTAL REPLACEMENT OF LEFT KNEE: ICD-10-CM

## 2024-06-07 RX ORDER — TIZANIDINE 4 MG/1
4 TABLET ORAL EVERY 8 HOURS PRN
Qty: 60 TABLET | Refills: 2 | Status: SHIPPED | OUTPATIENT
Start: 2024-06-07

## 2024-06-10 ENCOUNTER — LAB VISIT (OUTPATIENT)
Dept: LAB | Facility: HOSPITAL | Age: 79
End: 2024-06-10
Attending: STUDENT IN AN ORGANIZED HEALTH CARE EDUCATION/TRAINING PROGRAM
Payer: MEDICARE

## 2024-06-10 DIAGNOSIS — Z01.810 PRE-OPERATIVE CARDIOVASCULAR EXAMINATION: ICD-10-CM

## 2024-06-10 LAB
ANION GAP SERPL CALC-SCNC: 9 MMOL/L (ref 8–16)
BASOPHILS # BLD AUTO: 0.08 K/UL (ref 0–0.2)
BASOPHILS NFR BLD: 0.7 % (ref 0–1.9)
BUN SERPL-MCNC: 19 MG/DL (ref 8–23)
CALCIUM SERPL-MCNC: 10 MG/DL (ref 8.7–10.5)
CHLORIDE SERPL-SCNC: 103 MMOL/L (ref 95–110)
CO2 SERPL-SCNC: 29 MMOL/L (ref 23–29)
CREAT SERPL-MCNC: 1 MG/DL (ref 0.5–1.4)
DIFFERENTIAL METHOD BLD: ABNORMAL
EOSINOPHIL # BLD AUTO: 0.5 K/UL (ref 0–0.5)
EOSINOPHIL NFR BLD: 4.3 % (ref 0–8)
ERYTHROCYTE [DISTWIDTH] IN BLOOD BY AUTOMATED COUNT: 11.9 % (ref 11.5–14.5)
EST. GFR  (NO RACE VARIABLE): 57 ML/MIN/1.73 M^2
GLUCOSE SERPL-MCNC: 94 MG/DL (ref 70–110)
HCT VFR BLD AUTO: 38.1 % (ref 37–48.5)
HGB BLD-MCNC: 12.6 G/DL (ref 12–16)
IMM GRANULOCYTES # BLD AUTO: 0.03 K/UL (ref 0–0.04)
IMM GRANULOCYTES NFR BLD AUTO: 0.3 % (ref 0–0.5)
LYMPHOCYTES # BLD AUTO: 2.5 K/UL (ref 1–4.8)
LYMPHOCYTES NFR BLD: 23.4 % (ref 18–48)
MCH RBC QN AUTO: 32.7 PG (ref 27–31)
MCHC RBC AUTO-ENTMCNC: 33.1 G/DL (ref 32–36)
MCV RBC AUTO: 99 FL (ref 82–98)
MONOCYTES # BLD AUTO: 1.4 K/UL (ref 0.3–1)
MONOCYTES NFR BLD: 12.7 % (ref 4–15)
NEUTROPHILS # BLD AUTO: 6.3 K/UL (ref 1.8–7.7)
NEUTROPHILS NFR BLD: 58.6 % (ref 38–73)
NRBC BLD-RTO: 0 /100 WBC
PLATELET # BLD AUTO: 314 K/UL (ref 150–450)
PMV BLD AUTO: 9.4 FL (ref 9.2–12.9)
POTASSIUM SERPL-SCNC: 4.1 MMOL/L (ref 3.5–5.1)
RBC # BLD AUTO: 3.85 M/UL (ref 4–5.4)
SODIUM SERPL-SCNC: 141 MMOL/L (ref 136–145)
WBC # BLD AUTO: 10.77 K/UL (ref 3.9–12.7)

## 2024-06-10 PROCEDURE — 85025 COMPLETE CBC W/AUTO DIFF WBC: CPT | Performed by: STUDENT IN AN ORGANIZED HEALTH CARE EDUCATION/TRAINING PROGRAM

## 2024-06-10 PROCEDURE — 80048 BASIC METABOLIC PNL TOTAL CA: CPT | Performed by: STUDENT IN AN ORGANIZED HEALTH CARE EDUCATION/TRAINING PROGRAM

## 2024-06-10 PROCEDURE — 36415 COLL VENOUS BLD VENIPUNCTURE: CPT | Performed by: STUDENT IN AN ORGANIZED HEALTH CARE EDUCATION/TRAINING PROGRAM

## 2024-06-10 RX ORDER — IRBESARTAN 300 MG/1
300 TABLET ORAL NIGHTLY
Qty: 90 TABLET | Refills: 3 | Status: SHIPPED | OUTPATIENT
Start: 2024-06-10

## 2024-06-12 ENCOUNTER — HOSPITAL ENCOUNTER (OUTPATIENT)
Facility: HOSPITAL | Age: 79
Discharge: HOME | End: 2024-06-12
Attending: STUDENT IN AN ORGANIZED HEALTH CARE EDUCATION/TRAINING PROGRAM | Admitting: STUDENT IN AN ORGANIZED HEALTH CARE EDUCATION/TRAINING PROGRAM
Payer: MEDICARE

## 2024-06-12 VITALS
WEIGHT: 140 LBS | RESPIRATION RATE: 20 BRPM | BODY MASS INDEX: 26.43 KG/M2 | OXYGEN SATURATION: 100 % | DIASTOLIC BLOOD PRESSURE: 58 MMHG | SYSTOLIC BLOOD PRESSURE: 123 MMHG | TEMPERATURE: 97 F | HEIGHT: 61 IN | HEART RATE: 86 BPM

## 2024-06-12 DIAGNOSIS — I35.0 SEVERE AORTIC STENOSIS: ICD-10-CM

## 2024-06-12 DIAGNOSIS — Z01.810 PRE-OPERATIVE CARDIOVASCULAR EXAMINATION: Primary | ICD-10-CM

## 2024-06-12 DIAGNOSIS — Z01.818 PRE-OPERATIVE EXAM: ICD-10-CM

## 2024-06-12 LAB
OHS QRS DURATION: 82 MS
OHS QRS DURATION: 90 MS
OHS QTC CALCULATION: 447 MS
OHS QTC CALCULATION: 472 MS

## 2024-06-12 PROCEDURE — C1887 CATHETER, GUIDING: HCPCS | Performed by: STUDENT IN AN ORGANIZED HEALTH CARE EDUCATION/TRAINING PROGRAM

## 2024-06-12 PROCEDURE — 93005 ELECTROCARDIOGRAM TRACING: CPT

## 2024-06-12 PROCEDURE — 63600175 PHARM REV CODE 636 W HCPCS: Performed by: STUDENT IN AN ORGANIZED HEALTH CARE EDUCATION/TRAINING PROGRAM

## 2024-06-12 PROCEDURE — 93010 ELECTROCARDIOGRAM REPORT: CPT | Mod: ,,, | Performed by: INTERNAL MEDICINE

## 2024-06-12 PROCEDURE — 93454 CORONARY ARTERY ANGIO S&I: CPT | Mod: 26,,, | Performed by: STUDENT IN AN ORGANIZED HEALTH CARE EDUCATION/TRAINING PROGRAM

## 2024-06-12 PROCEDURE — C1894 INTRO/SHEATH, NON-LASER: HCPCS | Performed by: STUDENT IN AN ORGANIZED HEALTH CARE EDUCATION/TRAINING PROGRAM

## 2024-06-12 PROCEDURE — 99152 MOD SED SAME PHYS/QHP 5/>YRS: CPT | Performed by: STUDENT IN AN ORGANIZED HEALTH CARE EDUCATION/TRAINING PROGRAM

## 2024-06-12 PROCEDURE — 25500020 PHARM REV CODE 255: Performed by: STUDENT IN AN ORGANIZED HEALTH CARE EDUCATION/TRAINING PROGRAM

## 2024-06-12 PROCEDURE — C1769 GUIDE WIRE: HCPCS | Performed by: STUDENT IN AN ORGANIZED HEALTH CARE EDUCATION/TRAINING PROGRAM

## 2024-06-12 PROCEDURE — 93454 CORONARY ARTERY ANGIO S&I: CPT | Performed by: STUDENT IN AN ORGANIZED HEALTH CARE EDUCATION/TRAINING PROGRAM

## 2024-06-12 PROCEDURE — 99152 MOD SED SAME PHYS/QHP 5/>YRS: CPT | Mod: ,,, | Performed by: STUDENT IN AN ORGANIZED HEALTH CARE EDUCATION/TRAINING PROGRAM

## 2024-06-12 PROCEDURE — 93005 ELECTROCARDIOGRAM TRACING: CPT | Mod: 59

## 2024-06-12 PROCEDURE — 25000003 PHARM REV CODE 250: Performed by: STUDENT IN AN ORGANIZED HEALTH CARE EDUCATION/TRAINING PROGRAM

## 2024-06-12 RX ORDER — ACETAMINOPHEN 325 MG/1
650 TABLET ORAL EVERY 4 HOURS PRN
Status: DISCONTINUED | OUTPATIENT
Start: 2024-06-12 | End: 2024-06-12 | Stop reason: HOSPADM

## 2024-06-12 RX ORDER — SODIUM CHLORIDE 9 MG/ML
INJECTION, SOLUTION INTRAVENOUS CONTINUOUS
Status: ACTIVE | OUTPATIENT
Start: 2024-06-12 | End: 2024-06-12

## 2024-06-12 RX ORDER — HEPARIN SODIUM 1000 [USP'U]/ML
INJECTION, SOLUTION INTRAVENOUS; SUBCUTANEOUS
Status: DISCONTINUED | OUTPATIENT
Start: 2024-06-12 | End: 2024-06-12 | Stop reason: HOSPADM

## 2024-06-12 RX ORDER — VERAPAMIL HYDROCHLORIDE 2.5 MG/ML
INJECTION, SOLUTION INTRAVENOUS
Status: DISCONTINUED | OUTPATIENT
Start: 2024-06-12 | End: 2024-06-12 | Stop reason: HOSPADM

## 2024-06-12 RX ORDER — HEPARIN SODIUM 200 [USP'U]/100ML
INJECTION, SOLUTION INTRAVENOUS
Status: DISCONTINUED | OUTPATIENT
Start: 2024-06-12 | End: 2024-06-12 | Stop reason: HOSPADM

## 2024-06-12 RX ORDER — LIDOCAINE HYDROCHLORIDE 10 MG/ML
INJECTION, SOLUTION EPIDURAL; INFILTRATION; INTRACAUDAL; PERINEURAL
Status: DISCONTINUED | OUTPATIENT
Start: 2024-06-12 | End: 2024-06-12 | Stop reason: HOSPADM

## 2024-06-12 RX ORDER — MIDAZOLAM HYDROCHLORIDE 1 MG/ML
INJECTION, SOLUTION INTRAMUSCULAR; INTRAVENOUS
Status: DISCONTINUED | OUTPATIENT
Start: 2024-06-12 | End: 2024-06-12 | Stop reason: HOSPADM

## 2024-06-12 RX ORDER — IODIXANOL 320 MG/ML
INJECTION, SOLUTION INTRAVASCULAR
Status: DISCONTINUED | OUTPATIENT
Start: 2024-06-12 | End: 2024-06-12 | Stop reason: HOSPADM

## 2024-06-12 RX ORDER — ONDANSETRON 4 MG/1
8 TABLET, ORALLY DISINTEGRATING ORAL EVERY 8 HOURS PRN
Status: DISCONTINUED | OUTPATIENT
Start: 2024-06-12 | End: 2024-06-12 | Stop reason: HOSPADM

## 2024-06-12 RX ORDER — FENTANYL CITRATE 50 UG/ML
INJECTION, SOLUTION INTRAMUSCULAR; INTRAVENOUS
Status: DISCONTINUED | OUTPATIENT
Start: 2024-06-12 | End: 2024-06-12 | Stop reason: HOSPADM

## 2024-06-12 RX ADMIN — SODIUM CHLORIDE: 0.9 INJECTION, SOLUTION INTRAVENOUS at 08:06

## 2024-06-12 RX ADMIN — SODIUM CHLORIDE: 0.9 INJECTION, SOLUTION INTRAVENOUS at 11:06

## 2024-06-12 NOTE — PLAN OF CARE
2 cc of air removed from R radial vasc band. Hematoma decreased and soft with bruising noted to right wrist. +2 nik radial pulses palpated. Skin is normal in color, warm to touch, < 3 sec cap refill. VSS. Will continue to monitor pt for safety and needs.

## 2024-06-12 NOTE — PLAN OF CARE
Patient transfered to cath lab bay # 4 via stretcher with side rails up x2. Pt AAOx4 and able to follow commands. Pt is stable when connecting to cardiac monitors. VSS.   Right radial vasc band in place with 12cc of air in band per report. Site is CDI. Small hematoma and bruising noted above hand and under vasc band to R radial site site. +2 nik radial pulses palpated. Palpated nik pedal pulses. Skin normal in color and warm to touch, <3 sec cap refill.  Fall risk precautions given and patient acknowledges.  AIDET completed to pt. Will continue to monitor patient.

## 2024-06-12 NOTE — PLAN OF CARE
Remaining air removed from R radial vasc band. Gauze and tegaderm dressing applied. Site is CDI. No bleeding or hematoma noted around site at this time. Site and surrounding areas soft.  Bruising noted to skin around right radial site. +2 nik radial pulses palpated. Skin normal in color, warm to touch, < 3 sec cap refill. Will continue to monitor pt.

## 2024-06-12 NOTE — DISCHARGE SUMMARY
[facility] - Cath Lab (Hospital)  Discharge Note  Short Stay    Procedure(s) (LRB):  Angiogram, Coronary, with Left Heart Cath (N/A)      OUTCOME: Patient tolerated treatment/procedure well without complication and is now ready for discharge.    DISPOSITION: Home or Self Care    FINAL DIAGNOSIS:  <principal problem not specified>    FOLLOWUP: In clinic    DISCHARGE INSTRUCTIONS:    Discharge Procedure Orders   CBC W/ AUTO DIFFERENTIAL   Standing Status: Future Number of Occurrences: 1 Standing Exp. Date: 09/04/25     BASIC METABOLIC PANEL   Standing Status: Future Number of Occurrences: 1 Standing Exp. Date: 09/04/25        TIME SPENT ON DISCHARGE: 30 minutes

## 2024-06-12 NOTE — Clinical Note
Tetracycline Pregnancy And Lactation Text: This medication is Pregnancy Category D and not consider safe during pregnancy. It is also excreted in breast milk. The sheath was removed from the right radial artery. Xolair Counseling:  Patient informed of potential adverse effects including but not limited to fever, muscle aches, rash and allergic reactions.  The patient verbalized understanding of the proper use and possible adverse effects of Xolair.  All of the patient's questions and concerns were addressed. Methotrexate Pregnancy And Lactation Text: This medication is Pregnancy Category X and is known to cause fetal harm. This medication is excreted in breast milk. Acitretin Counseling:  I discussed with the patient the risks of acitretin including but not limited to hair loss, dry lips/skin/eyes, liver damage, hyperlipidemia, depression/suicidal ideation, photosensitivity.  Serious rare side effects can include but are not limited to pancreatitis, pseudotumor cerebri, bony changes, clot formation/stroke/heart attack.  Patient understands that alcohol is contraindicated since it can result in liver toxicity and significantly prolong the elimination of the drug by many years. Mirvaso Counseling: Mirvaso is a topical medication which can decrease superficial blood flow where applied. Side effects are uncommon and include stinging, redness and allergic reactions. Glycopyrrolate Counseling:  I discussed with the patient the risks of glycopyrrolate including but not limited to skin rash, drowsiness, dry mouth, difficulty urinating, and blurred vision. Protopic Counseling: Patient may experience a mild burning sensation during topical application. Protopic is not approved in children less than 2 years of age. There have been case reports of hematologic and skin malignancies in patients using topical calcineurin inhibitors although causality is questionable. Hydroquinone Pregnancy And Lactation Text: This medication has not been assigned a Pregnancy Risk Category but animal studies failed to show danger with the topical medication. It is unknown if the medication is excreted in breast milk. Enbrel Counseling:  I discussed with the patient the risks of etanercept including but not limited to myelosuppression, immunosuppression, autoimmune hepatitis, demyelinating diseases, lymphoma, and infections.  The patient understands that monitoring is required including a PPD at baseline and must alert us or the primary physician if symptoms of infection or other concerning signs are noted. Nsaids Counseling: NSAID Counseling: I discussed with the patient that NSAIDs should be taken with food. Prolonged use of NSAIDs can result in the development of stomach ulcers.  Patient advised to stop taking NSAIDs if abdominal pain occurs.  The patient verbalized understanding of the proper use and possible adverse effects of NSAIDs.  All of the patient's questions and concerns were addressed. Bactrim Counseling:  I discussed with the patient the risks of sulfa antibiotics including but not limited to GI upset, allergic reaction, drug rash, diarrhea, dizziness, photosensitivity, and yeast infections.  Rarely, more serious reactions can occur including but not limited to aplastic anemia, agranulocytosis, methemoglobinemia, blood dyscrasias, liver or kidney failure, lung infiltrates or desquamative/blistering drug rashes. Arava Pregnancy And Lactation Text: This medication is Pregnancy Category X and is absolutely contraindicated during pregnancy. It is unknown if it is excreted in breast milk. 5-Fu Pregnancy And Lactation Text: This medication is Pregnancy Category X and contraindicated in pregnancy and in women who may become pregnant. It is unknown if this medication is excreted in breast milk. Azithromycin Pregnancy And Lactation Text: This medication is considered safe during pregnancy and is also secreted in breast milk. Wartpeel Counseling:  I discussed with the patient the risks of Wartpeel including but not limited to erythema, scaling, itching, weeping, crusting, and pain. Cimetidine Pregnancy And Lactation Text: This medication is Pregnancy Category B and is considered safe during pregnancy. It is also excreted in breast milk and breast feeding isn't recommended. Detail Level: Simple Ilumya Counseling: I discussed with the patient the risks of tildrakizumab including but not limited to immunosuppression, malignancy, posterior leukoencephalopathy syndrome, and serious infections.  The patient understands that monitoring is required including a PPD at baseline and must alert us or the primary physician if symptoms of infection or other concerning signs are noted. Benzoyl Peroxide Counseling: Patient counseled that medicine may cause skin irritation and bleach clothing.  In the event of skin irritation, the patient was advised to reduce the amount of the drug applied or use it less frequently.   The patient verbalized understanding of the proper use and possible adverse effects of benzoyl peroxide.  All of the patient's questions and concerns were addressed. Dutasteride Male Counseling: Dustasteride Counseling:  I discussed with the patient the risks of use of dutasteride including but not limited to decreased libido, decreased ejaculate volume, and gynecomastia. Women who can become pregnant should not handle medication.  All of the patient's questions and concerns were addressed. Siliq Counseling:  I discussed with the patient the risks of Siliq including but not limited to new or worsening depression, suicidal thoughts and behavior, immunosuppression, malignancy, posterior leukoencephalopathy syndrome, and serious infections.  The patient understands that monitoring is required including a PPD at baseline and must alert us or the primary physician if symptoms of infection or other concerning signs are noted. There is also a special program designed to monitor depression which is required with Siliq. Cephalexin Counseling: I counseled the patient regarding use of cephalexin as an antibiotic for prophylactic and/or therapeutic purposes. Cephalexin (commonly prescribed under brand name Keflex) is a cephalosporin antibiotic which is active against numerous classes of bacteria, including most skin bacteria. Side effects may include nausea, diarrhea, gastrointestinal upset, rash, hives, yeast infections, and in rare cases, hepatitis, kidney disease, seizures, fever, confusion, neurologic symptoms, and others. Patients with severe allergies to penicillin medications are cautioned that there is about a 10% incidence of cross-reactivity with cephalosporins. When possible, patients with penicillin allergies should use alternatives to cephalosporins for antibiotic therapy. Imiquimod Counseling:  I discussed with the patient the risks of imiquimod including but not limited to erythema, scaling, itching, weeping, crusting, and pain.  Patient understands that the inflammatory response to imiquimod is variable from person to person and was educated regarded proper titration schedule.  If flu-like symptoms develop, patient knows to discontinue the medication and contact us. Hydroxychloroquine Pregnancy And Lactation Text: This medication has been shown to cause fetal harm but it isn't assigned a Pregnancy Risk Category. There are small amounts excreted in breast milk. Calcipotriene Counseling:  I discussed with the patient the risks of calcipotriene including but not limited to erythema, scaling, itching, and irritation. Siliq Pregnancy And Lactation Text: The risk during pregnancy and breastfeeding is uncertain with this medication. Ivermectin Counseling:  Patient instructed to take medication on an empty stomach with a full glass of water.  Patient informed of potential adverse effects including but not limited to nausea, diarrhea, dizziness, itching, and swelling of the extremities or lymph nodes.  The patient verbalized understanding of the proper use and possible adverse effects of ivermectin.  All of the patient's questions and concerns were addressed. Itraconazole Pregnancy And Lactation Text: This medication is Pregnancy Category C and it isn't know if it is safe during pregnancy. It is also excreted in breast milk. Picato Counseling:  I discussed with the patient the risks of Picato including but not limited to erythema, scaling, itching, weeping, crusting, and pain. Bexarotene Pregnancy And Lactation Text: This medication is Pregnancy Category X and should not be given to women who are pregnant or may become pregnant. This medication should not be used if you are breast feeding. Minoxidil Counseling: Minoxidil is a topical medication which can increase blood flow where it is applied. It is uncertain how this medication increases hair growth. Side effects are uncommon and include stinging and allergic reactions. Metronidazole Pregnancy And Lactation Text: This medication is Pregnancy Category B and considered safe during pregnancy.  It is also excreted in breast milk. Tetracycline Counseling: Patient counseled regarding possible photosensitivity and increased risk for sunburn.  Patient instructed to avoid sunlight, if possible.  When exposed to sunlight, patients should wear protective clothing, sunglasses, and sunscreen.  The patient was instructed to call the office immediately if the following severe adverse effects occur:  hearing changes, easy bruising/bleeding, severe headache, or vision changes.  The patient verbalized understanding of the proper use and possible adverse effects of tetracycline.  All of the patient's questions and concerns were addressed. Patient understands to avoid pregnancy while on therapy due to potential birth defects. Terbinafine Counseling: Patient counseling regarding adverse effects of terbinafine including but not limited to headache, diarrhea, rash, upset stomach, liver function test abnormalities, itching, taste/smell disturbance, nausea, abdominal pain, and flatulence.  There is a rare possibility of liver failure that can occur when taking terbinafine.  The patient understands that a baseline LFT and kidney function test may be required. The patient verbalized understanding of the proper use and possible adverse effects of terbinafine.  All of the patient's questions and concerns were addressed. Cyclosporine Pregnancy And Lactation Text: This medication is Pregnancy Category C and it isn't know if it is safe during pregnancy. This medication is excreted in breast milk. Topical Sulfur Applications Pregnancy And Lactation Text: This medication is Pregnancy Category C and has an unknown safety profile during pregnancy. It is unknown if this topical medication is excreted in breast milk. Oxybutynin Pregnancy And Lactation Text: This medication is Pregnancy Category B and is considered safe during pregnancy. It is unknown if it is excreted in breast milk. Quinolones Counseling:  I discussed with the patient the risks of fluoroquinolones including but not limited to GI upset, allergic reaction, drug rash, diarrhea, dizziness, photosensitivity, yeast infections, liver function test abnormalities, tendonitis/tendon rupture. Bactrim Pregnancy And Lactation Text: This medication is Pregnancy Category D and is known to cause fetal risk.  It is also excreted in breast milk. Valtrex Pregnancy And Lactation Text: this medication is Pregnancy Category B and is considered safe during pregnancy. This medication is not directly found in breast milk but it's metabolite acyclovir is present. Drysol Pregnancy And Lactation Text: This medication is considered safe during pregnancy and breast feeding. Finasteride Pregnancy And Lactation Text: This medication is absolutely contraindicated during pregnancy. It is unknown if it is excreted in breast milk. Winlevi Pregnancy And Lactation Text: This medication is considered safe during pregnancy and breastfeeding. Erythromycin Counseling:  I discussed with the patient the risks of erythromycin including but not limited to GI upset, allergic reaction, drug rash, diarrhea, increase in liver enzymes, and yeast infections. Albendazole Pregnancy And Lactation Text: This medication is Pregnancy Category C and it isn't known if it is safe during pregnancy. It is also excreted in breast milk. Dapsone Counseling: I discussed with the patient the risks of dapsone including but not limited to hemolytic anemia, agranulocytosis, rashes, methemoglobinemia, kidney failure, peripheral neuropathy, headaches, GI upset, and liver toxicity.  Patients who start dapsone require monitoring including baseline LFTs and weekly CBCs for the first month, then every month thereafter.  The patient verbalized understanding of the proper use and possible adverse effects of dapsone.  All of the patient's questions and concerns were addressed. Rifampin Counseling: I discussed with the patient the risks of rifampin including but not limited to liver damage, kidney damage, red-orange body fluids, nausea/vomiting and severe allergy. Cimzia Counseling:  I discussed with the patient the risks of Cimzia including but not limited to immunosuppression, allergic reactions and infections.  The patient understands that monitoring is required including a PPD at baseline and must alert us or the primary physician if symptoms of infection or other concerning signs are noted. Doxepin Counseling:  Patient advised that the medication is sedating and not to drive a car after taking this medication. Patient informed of potential adverse effects including but not limited to dry mouth, urinary retention, and blurry vision.  The patient verbalized understanding of the proper use and possible adverse effects of doxepin.  All of the patient's questions and concerns were addressed. Protopic Pregnancy And Lactation Text: This medication is Pregnancy Category C. It is unknown if this medication is excreted in breast milk when applied topically. Oral Minoxidil Pregnancy And Lactation Text: This medication should only be used when clearly needed if you are pregnant, attempting to become pregnant or breast feeding. Elidel Pregnancy And Lactation Text: This medication is Pregnancy Category C. It is unknown if this medication is excreted in breast milk. Ketoconazole Pregnancy And Lactation Text: This medication is Pregnancy Category C and it isn't know if it is safe during pregnancy. It is also excreted in breast milk and breast feeding isn't recommended. Solaraze Counseling:  I discussed with the patient the risks of Solaraze including but not limited to erythema, scaling, itching, weeping, crusting, and pain. Rituxan Pregnancy And Lactation Text: This medication is Pregnancy Category C and it isn't know if it is safe during pregnancy. It is unknown if this medication is excreted in breast milk but similar antibodies are known to be excreted. Dupixent Counseling: I discussed with the patient the risks of dupilumab including but not limited to eye infection and irritation, cold sores, injection site reactions, worsening of asthma, allergic reactions and increased risk of parasitic infection.  Live vaccines should be avoided while taking dupilumab. Dupilumab will also interact with certain medications such as warfarin and cyclosporine. The patient understands that monitoring is required and they must alert us or the primary physician if symptoms of infection or other concerning signs are noted. Hydroxyzine Counseling: Patient advised that the medication is sedating and not to drive a car after taking this medication.  Patient informed of potential adverse effects including but not limited to dry mouth, urinary retention, and blurry vision.  The patient verbalized understanding of the proper use and possible adverse effects of hydroxyzine.  All of the patient's questions and concerns were addressed. Otezla Pregnancy And Lactation Text: This medication is Pregnancy Category C and it isn't known if it is safe during pregnancy. It is unknown if it is excreted in breast milk. Tazorac Pregnancy And Lactation Text: This medication is not safe during pregnancy. It is unknown if this medication is excreted in breast milk. Niacinamide Pregnancy And Lactation Text: These medications are considered safe during pregnancy. Clindamycin Counseling: I counseled the patient regarding use of clindamycin as an antibiotic for prophylactic and/or therapeutic purposes. Clindamycin is active against numerous classes of bacteria, including skin bacteria. Side effects may include nausea, diarrhea, gastrointestinal upset, rash, hives, yeast infections, and in rare cases, colitis. Rhofade Pregnancy And Lactation Text: This medication has not been assigned a Pregnancy Risk Category. It is unknown if the medication is excreted in breast milk. Glycopyrrolate Pregnancy And Lactation Text: This medication is Pregnancy Category B and is considered safe during pregnancy. It is unknown if it is excreted breast milk. Gabapentin Pregnancy And Lactation Text: This medication is Pregnancy Category C and isn't considered safe during pregnancy. It is excreted in breast milk. High Dose Vitamin A Pregnancy And Lactation Text: High dose vitamin A therapy is contraindicated during pregnancy and breast feeding. Enbrel Pregnancy And Lactation Text: This medication is Pregnancy Category B and is considered safe during pregnancy. It is unknown if this medication is excreted in breast milk. Methotrexate Counseling:  Patient counseled regarding adverse effects of methotrexate including but not limited to nausea, vomiting, abnormalities in liver function tests. Patients may develop mouth sores, rash, diarrhea, and abnormalities in blood counts. The patient understands that monitoring is required including LFT's and blood counts.  There is a rare possibility of scarring of the liver and lung problems that can occur when taking methotrexate. Persistent nausea, loss of appetite, pale stools, dark urine, cough, and shortness of breath should be reported immediately. Patient advised to discontinue methotrexate treatment at least three months before attempting to become pregnant.  I discussed the need for folate supplements while taking methotrexate.  These supplements can decrease side effects during methotrexate treatment. The patient verbalized understanding of the proper use and possible adverse effects of methotrexate.  All of the patient's questions and concerns were addressed. Xolair Pregnancy And Lactation Text: This medication is Pregnancy Category B and is considered safe during pregnancy. This medication is excreted in breast milk. Birth Control Pills Counseling: Birth Control Pill Counseling: I discussed with the patient the potential side effects of OCPs including but not limited to increased risk of stroke, heart attack, thrombophlebitis, deep venous thrombosis, hepatic adenomas, breast changes, GI upset, headaches, and depression.  The patient verbalized understanding of the proper use and possible adverse effects of OCPs. All of the patient's questions and concerns were addressed. Winlevi Counseling:  I discussed with the patient the risks of topical clascoterone including but not limited to erythema, scaling, itching, and stinging. Patient voiced their understanding. Prednisone Counseling:  I discussed with the patient the risks of prolonged use of prednisone including but not limited to weight gain, insomnia, osteoporosis, mood changes, diabetes, susceptibility to infection, glaucoma and high blood pressure.  In cases where prednisone use is prolonged, patients should be monitored with blood pressure checks, serum glucose levels and an eye exam.  Additionally, the patient may need to be placed on GI prophylaxis, PCP prophylaxis, and calcium and vitamin D supplementation and/or a bisphosphonate.  The patient verbalized understanding of the proper use and the possible adverse effects of prednisone.  All of the patient's questions and concerns were addressed. Rhofade Counseling: Rhofade is a topical medication which can decrease superficial blood flow where applied. Side effects are uncommon and include stinging, redness and allergic reactions. Clofazimine Counseling:  I discussed with the patient the risks of clofazimine including but not limited to skin and eye pigmentation, liver damage, nausea/vomiting, gastrointestinal bleeding and allergy. Minocycline Counseling: Patient advised regarding possible photosensitivity and discoloration of the teeth, skin, lips, tongue and gums.  Patient instructed to avoid sunlight, if possible.  When exposed to sunlight, patients should wear protective clothing, sunglasses, and sunscreen.  The patient was instructed to call the office immediately if the following severe adverse effects occur:  hearing changes, easy bruising/bleeding, severe headache, or vision changes.  The patient verbalized understanding of the proper use and possible adverse effects of minocycline.  All of the patient's questions and concerns were addressed. Colchicine Counseling:  Patient counseled regarding adverse effects including but not limited to stomach upset (nausea, vomiting, stomach pain, or diarrhea).  Patient instructed to limit alcohol consumption while taking this medication.  Colchicine may reduce blood counts especially with prolonged use.  The patient understands that monitoring of kidney function and blood counts may be required, especially at baseline. The patient verbalized understanding of the proper use and possible adverse effects of colchicine.  All of the patient's questions and concerns were addressed. Zyclara Counseling:  I discussed with the patient the risks of imiquimod including but not limited to erythema, scaling, itching, weeping, crusting, and pain.  Patient understands that the inflammatory response to imiquimod is variable from person to person and was educated regarded proper titration schedule.  If flu-like symptoms develop, patient knows to discontinue the medication and contact us. Tremfya Counseling: I discussed with the patient the risks of guselkumab including but not limited to immunosuppression, serious infections, worsening of inflammatory bowel disease and drug reactions.  The patient understands that monitoring is required including a PPD at baseline and must alert us or the primary physician if symptoms of infection or other concerning signs are noted. Acitretin Pregnancy And Lactation Text: This medication is Pregnancy Category X and should not be given to women who are pregnant or may become pregnant in the future. This medication is excreted in breast milk. Griseofulvin Counseling:  I discussed with the patient the risks of griseofulvin including but not limited to photosensitivity, cytopenia, liver damage, nausea/vomiting and severe allergy.  The patient understands that this medication is best absorbed when taken with a fatty meal (e.g., ice cream or french fries). Tranexamic Acid Counseling:  Patient advised of the small risk of bleeding problems with tranexamic acid. They were also instructed to call if they developed any nausea, vomiting or diarrhea. All of the patient's questions and concerns were addressed. Propranolol Counseling:  I discussed with the patient the risks of propranolol including but not limited to low heart rate, low blood pressure, low blood sugar, restlessness and increased cold sensitivity. They should call the office if they experience any of these side effects. Erythromycin Pregnancy And Lactation Text: This medication is Pregnancy Category B and is considered safe during pregnancy. It is also excreted in breast milk. Use Enhanced Medication Counseling?: No Opioid Pregnancy And Lactation Text: These medications can lead to premature delivery and should be avoided during pregnancy. These medications are also present in breast milk in small amounts. Spironolactone Counseling: Patient advised regarding risks of diarrhea, abdominal pain, hyperkalemia, birth defects (for female patients), liver toxicity and renal toxicity. The patient may need blood work to monitor liver and kidney function and potassium levels while on therapy. The patient verbalized understanding of the proper use and possible adverse effects of spironolactone.  All of the patient's questions and concerns were addressed. Simponi Counseling:  I discussed with the patient the risks of golimumab including but not limited to myelosuppression, immunosuppression, autoimmune hepatitis, demyelinating diseases, lymphoma, and serious infections.  The patient understands that monitoring is required including a PPD at baseline and must alert us or the primary physician if symptoms of infection or other concerning signs are noted. Sski Pregnancy And Lactation Text: This medication is Pregnancy Category D and isn't considered safe during pregnancy. It is excreted in breast milk. Doxycycline Pregnancy And Lactation Text: This medication is Pregnancy Category D and not consider safe during pregnancy. It is also excreted in breast milk but is considered safe for shorter treatment courses. Skyrizi Counseling: I discussed with the patient the risks of risankizumab-rzaa including but not limited to immunosuppression, and serious infections.  The patient understands that monitoring is required including a PPD at baseline and must alert us or the primary physician if symptoms of infection or other concerning signs are noted. Cephalexin Pregnancy And Lactation Text: This medication is Pregnancy Category B and considered safe during pregnancy.  It is also excreted in breast milk but can be used safely for shorter doses. Bexarotene Counseling:  I discussed with the patient the risks of bexarotene including but not limited to hair loss, dry lips/skin/eyes, liver abnormalities, hyperlipidemia, pancreatitis, depression/suicidal ideation, photosensitivity, drug rash/allergic reactions, hypothyroidism, anemia, leukopenia, infection, cataracts, and teratogenicity.  Patient understands that they will need regular blood tests to check lipid profile, liver function tests, white blood cell count, thyroid function tests and pregnancy test if applicable. Itraconazole Counseling:  I discussed with the patient the risks of itraconazole including but not limited to liver damage, nausea/vomiting, neuropathy, and severe allergy.  The patient understands that this medication is best absorbed when taken with acidic beverages such as non-diet cola or ginger ale.  The patient understands that monitoring is required including baseline LFTs and repeat LFTs at intervals.  The patient understands that they are to contact us or the primary physician if concerning signs are noted. Solaraze Pregnancy And Lactation Text: This medication is Pregnancy Category B and is considered safe. There is some data to suggest avoiding during the third trimester. It is unknown if this medication is excreted in breast milk. Niacinamide Counseling: I recommended taking niacin or niacinamide, also know as vitamin B3, twice daily. Recent evidence suggests that taking vitamin B3 (500 mg twice daily) can reduce the risk of actinic keratoses and non-melanoma skin cancers. Side effects of vitamin B3 include flushing and headache. Azithromycin Counseling:  I discussed with the patient the risks of azithromycin including but not limited to GI upset, allergic reaction, drug rash, diarrhea, and yeast infections. 5-Fu Counseling: 5-Fluorouracil Counseling:  I discussed with the patient the risks of 5-fluorouracil including but not limited to erythema, scaling, itching, weeping, crusting, and pain. Taltz Counseling: I discussed with the patient the risks of ixekizumab including but not limited to immunosuppression, serious infections, worsening of inflammatory bowel disease and drug reactions.  The patient understands that monitoring is required including a PPD at baseline and must alert us or the primary physician if symptoms of infection or other concerning signs are noted. Dutasteride Pregnancy And Lactation Text: This medication is absolutely contraindicated in women, especially during pregnancy and breast feeding. Feminization of male fetuses is possible if taking while pregnant. Carac Counseling:  I discussed with the patient the risks of Carac including but not limited to erythema, scaling, itching, weeping, crusting, and pain. Dapsone Pregnancy And Lactation Text: This medication is Pregnancy Category C and is not considered safe during pregnancy or breast feeding. Xeljanz Counseling: I discussed with the patient the risks of Xeljanz therapy including increased risk of infection, liver issues, headache, diarrhea, or cold symptoms. Live vaccines should be avoided. They were instructed to call if they have any problems. Libtayo Counseling- I discussed with the patient the risks of Libtayo including but not limited to nausea, vomiting, diarrhea, and bone or muscle pain.  The patient verbalized understanding of the proper use and possible adverse effects of Libtayo.  All of the patient's questions and concerns were addressed. Finasteride Male Counseling: Finasteride Counseling:  I discussed with the patient the risks of use of finasteride including but not limited to decreased libido, decreased ejaculate volume, gynecomastia, and depression. Women should not handle medication.  All of the patient's questions and concerns were addressed. Isotretinoin Pregnancy And Lactation Text: This medication is Pregnancy Category X and is considered extremely dangerous during pregnancy. It is unknown if it is excreted in breast milk. Propranolol Pregnancy And Lactation Text: This medication is Pregnancy Category C and it isn't known if it is safe during pregnancy. It is excreted in breast milk. Oral Minoxidil Counseling- I discussed with the patient the risks of oral minoxidil including but not limited to shortness of breath, swelling of the feet or ankles, dizziness, lightheadedness, unwanted hair growth and allergic reaction.  The patient verbalized understanding of the proper use and possible adverse effects of oral minoxidil.  All of the patient's questions and concerns were addressed. Hydroxychloroquine Counseling:  I discussed with the patient that a baseline ophthalmologic exam is needed at the start of therapy and every year thereafter while on therapy. A CBC may also be warranted for monitoring.  The side effects of this medication were discussed with the patient, including but not limited to agranulocytosis, aplastic anemia, seizures, rashes, retinopathy, and liver toxicity. Patient instructed to call the office should any adverse effect occur.  The patient verbalized understanding of the proper use and possible adverse effects of Plaquenil.  All the patient's questions and concerns were addressed. Isotretinoin Counseling: Patient should get monthly blood tests, not donate blood, not drive at night if vision affected, not share medication, and not undergo elective surgery for 6 months after tx completed. Side effects reviewed, pt to contact office should one occur. Tranexamic Acid Pregnancy And Lactation Text: It is unknown if this medication is safe during pregnancy or breast feeding. Cimetidine Counseling:  I discussed with the patient the risks of Cimetidine including but not limited to gynecomastia, headache, diarrhea, nausea, drowsiness, arrhythmias, pancreatitis, skin rashes, psychosis, bone marrow suppression and kidney toxicity. Otezla Counseling: The side effects of Otezla were discussed with the patient, including but not limited to worsening or new depression, weight loss, diarrhea, nausea, upper respiratory tract infection, and headache. Patient instructed to call the office should any adverse effect occur.  The patient verbalized understanding of the proper use and possible adverse effects of Otezla.  All the patient's questions and concerns were addressed. SSKI Counseling:  I discussed with the patient the risks of SSKI including but not limited to thyroid abnormalities, metallic taste, GI upset, fever, headache, acne, arthralgias, paraesthesias, lymphadenopathy, easy bleeding, arrhythmias, and allergic reaction. Nsaids Pregnancy And Lactation Text: These medications are considered safe up to 30 weeks gestation. It is excreted in breast milk. Metronidazole Counseling:  I discussed with the patient the risks of metronidazole including but not limited to seizures, nausea/vomiting, a metallic taste in the mouth, nausea/vomiting and severe allergy. Cyclophosphamide Pregnancy And Lactation Text: This medication is Pregnancy Category D and it isn't considered safe during pregnancy. This medication is excreted in breast milk. Erivedge Counseling- I discussed with the patient the risks of Erivedge including but not limited to nausea, vomiting, diarrhea, constipation, weight loss, changes in the sense of taste, decreased appetite, muscle spasms, and hair loss.  The patient verbalized understanding of the proper use and possible adverse effects of Erivedge.  All of the patient's questions and concerns were addressed. High Dose Vitamin A Counseling: Side effects reviewed, pt to contact office should one occur. Cellcept Counseling:  I discussed with the patient the risks of mycophenolate mofetil including but not limited to infection/immunosuppression, GI upset, hypokalemia, hypercholesterolemia, bone marrow suppression, lymphoproliferative disorders, malignancy, GI ulceration/bleed/perforation, colitis, interstitial lung disease, kidney failure, progressive multifocal leukoencephalopathy, and birth defects.  The patient understands that monitoring is required including a baseline creatinine and regular CBC testing. In addition, patient must alert us immediately if symptoms of infection or other concerning signs are noted. Xelkarenz Pregnancy And Lactation Text: This medication is Pregnancy Category D and is not considered safe during pregnancy.  The risk during breast feeding is also uncertain. Ketoconazole Counseling:   Patient counseled regarding improving absorption with orange juice.  Adverse effects include but are not limited to breast enlargement, headache, diarrhea, nausea, upset stomach, liver function test abnormalities, taste disturbance, and stomach pain.  There is a rare possibility of liver failure that can occur when taking ketoconazole. The patient understands that monitoring of LFTs may be required, especially at baseline. The patient verbalized understanding of the proper use and possible adverse effects of ketoconazole.  All of the patient's questions and concerns were addressed. Infliximab Counseling:  I discussed with the patient the risks of infliximab including but not limited to myelosuppression, immunosuppression, autoimmune hepatitis, demyelinating diseases, lymphoma, and serious infections.  The patient understands that monitoring is required including a PPD at baseline and must alert us or the primary physician if symptoms of infection or other concerning signs are noted. Topical Clindamycin Counseling: Patient counseled that this medication may cause skin irritation or allergic reactions.  In the event of skin irritation, the patient was advised to reduce the amount of the drug applied or use it less frequently.   The patient verbalized understanding of the proper use and possible adverse effects of clindamycin.  All of the patient's questions and concerns were addressed. Cimzia Pregnancy And Lactation Text: This medication crosses the placenta but can be considered safe in certain situations. Cimzia may be excreted in breast milk. Fluconazole Counseling:  Patient counseled regarding adverse effects of fluconazole including but not limited to headache, diarrhea, nausea, upset stomach, liver function test abnormalities, taste disturbance, and stomach pain.  There is a rare possibility of liver failure that can occur when taking fluconazole.  The patient understands that monitoring of LFTs and kidney function test may be required, especially at baseline. The patient verbalized understanding of the proper use and possible adverse effects of fluconazole.  All of the patient's questions and concerns were addressed. Birth Control Pills Pregnancy And Lactation Text: This medication should be avoided if pregnant and for the first 30 days post-partum. Arava Counseling:  Patient counseled regarding adverse effects of Arava including but not limited to nausea, vomiting, abnormalities in liver function tests. Patients may develop mouth sores, rash, diarrhea, and abnormalities in blood counts. The patient understands that monitoring is required including LFTs and blood counts.  There is a rare possibility of scarring of the liver and lung problems that can occur when taking methotrexate. Persistent nausea, loss of appetite, pale stools, dark urine, cough, and shortness of breath should be reported immediately. Patient advised to discontinue Arava treatment and consult with a physician prior to attempting conception. The patient will have to undergo a treatment to eliminate Arava from the body prior to conception. Valtrex Counseling: I discussed with the patient the risks of valacyclovir including but not limited to kidney damage, nausea, vomiting and severe allergy.  The patient understands that if the infection seems to be worsening or is not improving, they are to call. Cyclosporine Counseling:  I discussed with the patient the risks of cyclosporine including but not limited to hypertension, gingival hyperplasia,myelosuppression, immunosuppression, liver damage, kidney damage, neurotoxicity, lymphoma, and serious infections. The patient understands that monitoring is required including baseline blood pressure, CBC, CMP, lipid panel and uric acid, and then 1-2 times monthly CMP and blood pressure. Oxybutynin Counseling:  I discussed with the patient the risks of oxybutynin including but not limited to skin rash, drowsiness, dry mouth, difficulty urinating, and blurred vision. Rituxan Counseling:  I discussed with the patient the risks of Rituxan infusions. Side effects can include infusion reactions, severe drug rashes including mucocutaneous reactions, reactivation of latent hepatitis and other infections and rarely progressive multifocal leukoencephalopathy.  All of the patient's questions and concerns were addressed. Benzoyl Peroxide Pregnancy And Lactation Text: This medication is Pregnancy Category C. It is unknown if benzoyl peroxide is excreted in breast milk. Azathioprine Counseling:  I discussed with the patient the risks of azathioprine including but not limited to myelosuppression, immunosuppression, hepatotoxicity, lymphoma, and infections.  The patient understands that monitoring is required including baseline LFTs, Creatinine, possible TPMP genotyping and weekly CBCs for the first month and then every 2 weeks thereafter.  The patient verbalized understanding of the proper use and possible adverse effects of azathioprine.  All of the patient's questions and concerns were addressed. Doxycycline Counseling:  Patient counseled regarding possible photosensitivity and increased risk for sunburn.  Patient instructed to avoid sunlight, if possible.  When exposed to sunlight, patients should wear protective clothing, sunglasses, and sunscreen.  The patient was instructed to call the office immediately if the following severe adverse effects occur:  hearing changes, easy bruising/bleeding, severe headache, or vision changes.  The patient verbalized understanding of the proper use and possible adverse effects of doxycycline.  All of the patient's questions and concerns were addressed. Humira Counseling:  I discussed with the patient the risks of adalimumab including but not limited to myelosuppression, immunosuppression, autoimmune hepatitis, demyelinating diseases, lymphoma, and serious infections.  The patient understands that monitoring is required including a PPD at baseline and must alert us or the primary physician if symptoms of infection or other concerning signs are noted. Thalidomide Counseling: I discussed with the patient the risks of thalidomide including but not limited to birth defects, anxiety, weakness, chest pain, dizziness, cough and severe allergy. Doxepin Pregnancy And Lactation Text: This medication is Pregnancy Category C and it isn't known if it is safe during pregnancy. It is also excreted in breast milk and breast feeding isn't recommended. Azathioprine Pregnancy And Lactation Text: This medication is Pregnancy Category D and isn't considered safe during pregnancy. It is unknown if this medication is excreted in breast milk. Hydroquinone Counseling:  Patient advised that medication may result in skin irritation, lightening (hypopigmentation), dryness, and burning.  In the event of skin irritation, the patient was advised to reduce the amount of the drug applied or use it less frequently.  Rarely, spots that are treated with hydroquinone can become darker (pseudoochronosis).  Should this occur, patient instructed to stop medication and call the office. The patient verbalized understanding of the proper use and possible adverse effects of hydroquinone.  All of the patient's questions and concerns were addressed. Dupixent Pregnancy And Lactation Text: This medication likely crosses the placenta but the risk for the fetus is uncertain. This medication is excreted in breast milk. Tazorac Counseling:  Patient advised that medication is irritating and drying.  Patient may need to apply sparingly and wash off after an hour before eventually leaving it on overnight.  The patient verbalized understanding of the proper use and possible adverse effects of tazorac.  All of the patient's questions and concerns were addressed. Calcipotriene Pregnancy And Lactation Text: This medication has not been proven safe during pregnancy. It is unknown if this medication is excreted in breast milk. Opioid Counseling: I discussed with the patient the potential side effects of opioids including but not limited to addiction, altered mental status, and depression. I stressed avoiding alcohol, benzodiazepines, muscle relaxants and sleep aids unless specifically okayed by a physician. The patient verbalized understanding of the proper use and possible adverse effects of opioids. All of the patient's questions and concerns were addressed. They were instructed to flush the remaining pills down the toilet if they did not need them for pain. Albendazole Counseling:  I discussed with the patient the risks of albendazole including but not limited to cytopenia, kidney damage, nausea/vomiting and severe allergy.  The patient understands that this medication is being used in an off-label manner. Griseofulvin Pregnancy And Lactation Text: This medication is Pregnancy Category X and is known to cause serious birth defects. It is unknown if this medication is excreted in breast milk but breast feeding should be avoided. Rifampin Pregnancy And Lactation Text: This medication is Pregnancy Category C and it isn't know if it is safe during pregnancy. It is also excreted in breast milk and should not be used if you are breast feeding. Elidel Counseling: Patient may experience a mild burning sensation during topical application. Elidel is not approved in children less than 2 years of age. There have been case reports of hematologic and skin malignancies in patients using topical calcineurin inhibitors although causality is questionable. Clindamycin Pregnancy And Lactation Text: This medication can be used in pregnancy if certain situations. Clindamycin is also present in breast milk. Cosentyx Counseling:  I discussed with the patient the risks of Cosentyx including but not limited to worsening of Crohn's disease, immunosuppression, allergic reactions and infections.  The patient understands that monitoring is required including a PPD at baseline and must alert us or the primary physician if symptoms of infection or other concerning signs are noted. Sarecycline Counseling: Patient advised regarding possible photosensitivity and discoloration of the teeth, skin, lips, tongue and gums.  Patient instructed to avoid sunlight, if possible.  When exposed to sunlight, patients should wear protective clothing, sunglasses, and sunscreen.  The patient was instructed to call the office immediately if the following severe adverse effects occur:  hearing changes, easy bruising/bleeding, severe headache, or vision changes.  The patient verbalized understanding of the proper use and possible adverse effects of sarecycline.  All of the patient's questions and concerns were addressed. Cyclophosphamide Counseling:  I discussed with the patient the risks of cyclophosphamide including but not limited to hair loss, hormonal abnormalities, decreased fertility, abdominal pain, diarrhea, nausea and vomiting, bone marrow suppression and infection. The patient understands that monitoring is required while taking this medication. Odomzo Counseling- I discussed with the patient the risks of Odomzo including but not limited to nausea, vomiting, diarrhea, constipation, weight loss, changes in the sense of taste, decreased appetite, muscle spasms, and hair loss.  The patient verbalized understanding of the proper use and possible adverse effects of Odomzo.  All of the patient's questions and concerns were addressed. Drysol Counseling:  I discussed with the patient the risks of drysol/aluminum chloride including but not limited to skin rash, itching, irritation, burning. Spironolactone Pregnancy And Lactation Text: This medication can cause feminization of the male fetus and should be avoided during pregnancy. The active metabolite is also found in breast milk. Libtayo Pregnancy And Lactation Text: This medication is contraindicated in pregnancy and when breast feeding. Stelara Counseling:  I discussed with the patient the risks of ustekinumab including but not limited to immunosuppression, malignancy, posterior leukoencephalopathy syndrome, and serious infections.  The patient understands that monitoring is required including a PPD at baseline and must alert us or the primary physician if symptoms of infection or other concerning signs are noted. Gabapentin Counseling: I discussed with the patient the risks of gabapentin including but not limited to dizziness, somnolence, fatigue and ataxia. Eucrisa Counseling: Patient may experience a mild burning sensation during topical application. Eucrisa is not approved in children less than 2 years of age. Hydroxyzine Pregnancy And Lactation Text: This medication is not safe during pregnancy and should not be taken. It is also excreted in breast milk and breast feeding isn't recommended. Topical Sulfur Applications Counseling: Topical Sulfur Counseling: Patient counseled that this medication may cause skin irritation or allergic reactions.  In the event of skin irritation, the patient was advised to reduce the amount of the drug applied or use it less frequently.   The patient verbalized understanding of the proper use and possible adverse effects of topical sulfur application.  All of the patient's questions and concerns were addressed. Topical Retinoid counseling:  Patient advised to apply a pea-sized amount only at bedtime and wait 30 minutes after washing their face before applying.  If too drying, patient may add a non-comedogenic moisturizer. The patient verbalized understanding of the proper use and possible adverse effects of retinoids.  All of the patient's questions and concerns were addressed.

## 2024-06-12 NOTE — PLAN OF CARE
Dr. Durand at the bedside and results given to pt.  Right wrist puncture site observed by MD at this time with no new orders.

## 2024-06-12 NOTE — Clinical Note
115 ml of contrast were injected throughout the case. 35 mL of contrast was the total wasted during the case. 150 mL was the total amount used during the case.

## 2024-06-12 NOTE — Clinical Note
The catheter was repositioned into the and insertion attempt was made into the ostium   right coronary artery. An angiography was performed of the right coronary arteries. Multiple views were taken. The angiography was performed via hand injection with 10 mL of contrast.

## 2024-06-12 NOTE — PLAN OF CARE
Pt arrived independently  with family from home, ambulates with/out assistance. Patient lying in bed with no complaints of pain or distress noted. Monitors applied. VSS, AAOx4.  Yellow non-skid socks placed on patient. Fall risk reviewed with patient. Procedure and recovery process explained to patient and all questions answered.  Patient verbalized understanding, denies questions. Will continue to monitor for safety and needs.

## 2024-06-12 NOTE — H&P
[facility] H&P Note    Cardiology Attending: Dr. Durand    Date of Admit: 6/12/2024  Date of Consult: 6/12/2024      History of Present Illness     Enedelia García is a 79 y.o. female with a PMH significant for HTN, HLD, CAD s/p PCI x2 (unknown anatomy, severe AS here for pre TAVR work up. Consents obtained. Questions addressed. Voiced no complaints.       Past Medical History:  Past Medical History:   Diagnosis Date    Aortic atherosclerosis 6/14/2023    CKD (chronic kidney disease) stage 4, GFR 15-29 ml/min     Coronary artery disease     Edema     Epilepsy     Generalized anxiety disorder 12/20/2021    Hematuria, unspecified     Hematuria, unspecified     Hyperlipidemia     Hypertension     Iron deficiency anemia     Moderate episode of recurrent major depressive disorder     Nonrheumatic aortic valve stenosis 12/20/2021    Normocytic anemia     Prediabetes 2/24/2022     Surgical History:  Past Surgical History:   Procedure Laterality Date    APPENDECTOMY      BACK SURGERY      CORONARY STENT PLACEMENT      HYSTERECTOMY      REVISION OF KNEE ARTHROPLASTY Left 7/18/2022    Procedure: REVISION, ARTHROPLASTY, KNEE;  Surgeon: Stan Catalan MD;  Location: [facility];  Service: Orthopedics;  Laterality: Left;    TONSILLECTOMY       Allergies:  Review of patient's allergies indicates:   Allergen Reactions    Iodinated contrast media Anaphylaxis and Other (See Comments)    Nsaids (non-steroidal anti-inflammatory drug)      ADWOA    Phenergan [promethazine]      Vomiting     Family History:  Family History   Problem Relation Name Age of Onset    Heart disease Mother      Heart disease Father       Social History:  Social History     Tobacco Use    Smoking status: Never    Smokeless tobacco: Never   Substance Use Topics    Alcohol use: No    Drug use: No     Review of Systems:    ROS  Medications:    Home Medications:  Prior to Admission medications    Medication Sig Start Date End Date Taking? Authorizing  Provider   aspirin (ECOTRIN) 81 MG EC tablet Take 81 mg by mouth once daily.   Yes Provider, Historical   calcium carbonate/vitamin D3 (VITAMIN D-3 ORAL) Take 1 tablet by mouth once daily.   Yes Provider, Historical   diphenhydrAMINE (BENADRYL) 25 mg capsule Take 1 capsule (25 mg total) by mouth after meals as needed (take first dose 6 pm day before procedure, seconond dose 11 pm night prior to procedure and third dose 6 am day of procedure). 6/10/24 6/13/24 Yes José Carreon MD   DULoxetine (CYMBALTA) 60 MG capsule Take 1 capsule (60 mg total) by mouth once daily. 8/28/23  Yes Lauren Brown MD   famotidine (PEPCID) 20 MG tablet Take 1 tablet (20 mg total) by mouth 3 (three) times daily as needed (take first dose 6 pm day before procedure, seconond dose 11 pm night prior to procedure and third dose 6 am day of procedure). 6/10/24  Yes José Carreon MD   furosemide (LASIX) 40 MG tablet Take 2 tablets (80 mg total) by mouth 2 (two) times daily as needed (swelling). 1/22/24  Yes Althea Allen MD   hydrOXYzine HCL (ATARAX) 25 MG tablet TAKE ONE TABLET BY MOUTH EVERY 6 HOURS AS NEEDED FOR ITCHING 5/27/24  Yes Shelton Mason MD   irbesartan (AVAPRO) 300 MG tablet TAKE ONE TABLET BY MOUTH EVERY EVENING 6/10/24  Yes Shelton Mason MD   isosorbide mononitrate (IMDUR) 60 MG 24 hr tablet Take 1 tablet (60 mg total) by mouth 2 (two) times a day. 4/22/24  Yes Patricia Rogers MD   multivit-min-iron-FA-lutein (CENTRUM SILVER WOMEN) 8 mg iron-400 mcg-300 mcg Tab Take 1 tablet by mouth once daily.   Yes Provider, Historical   nitroGLYCERIN (NITROSTAT) 0.3 MG SL tablet PLACE 1 TABLET UNDER THE TONGUE EVERY 5 MINUTES FOR UP TO 3 DOSES IF NEEDED FOR CHEST PAIN. CALL 911 IF PAIN PERSISTS 8/29/23  Yes Lauren Brown MD   omega-3 fatty acids/fish oil (FISH OIL-OMEGA-3 FATTY ACIDS) 300-1,000 mg capsule Take 2 capsules by mouth once daily.   Yes Provider, Historical   omeprazole (PRILOSEC) 20 MG capsule Take 1 capsule  (20 mg total) by mouth daily as needed. 1/11/24  Yes Shelton Mason MD   oxyCODONE-acetaminophen (PERCOCET)  mg per tablet Take 1 tablet by mouth 4 (four) times daily as needed. 1/3/24  Yes Provider, Historical   predniSONE (DELTASONE) 50 MG Tab Take 1 tablet (50 mg total) by mouth 3 (three) times daily as needed (take first dose 6pm night prior to procedure, take second dose 11pm night prior to procedure and take third dose at 6 am day of procedure). 6/10/24  Yes José Carreon MD   rosuvastatin (CRESTOR) 40 MG Tab TAKE ONE TABLET BY MOUTH DAILY  Patient taking differently: Take 40 mg by mouth once daily. 1/8/24  Yes Shelton Mason MD   tiZANidine (ZANAFLEX) 4 MG tablet Take 1 tablet (4 mg total) by mouth every 8 (eight) hours as needed (spasms). 6/7/24  Yes Shelton Mason MD   acyclovir (ZOVIRAX) 800 MG Tab Take 1 tablet (800 mg total) by mouth 5 (five) times daily. for 7 days 4/25/24 5/2/24  Pineda Farias MD   AIMOVIG AUTOINJECTOR 70 mg/mL autoinjector Inject 70 mg into the skin every 30 days. 6/15/23   Provider, Historical   clotrimazole-betamethasone 1-0.05% (LOTRISONE) cream Apply topically 2 (two) times daily. 3/18/24   Pineda Farias MD   diphenhydrAMINE (BENADRYL) 25 mg capsule Take 1 capsule (25 mg total) by mouth 3 (three) times daily as needed (take first dose 6 pm day before procedure, seconond dose 11 pm night prior to procedure and third dose 6 am day of procedure). 6/10/24 6/13/24  José Carreon MD   FEROSUL 325 mg (65 mg iron) Tab tablet Take 325 mg by mouth once daily. 8/29/22   Provider, Historical   mupirocin (BACTROBAN) 2 % ointment Apply topically 2 (two) times daily. 4/25/24   Pineda Farias MD   permethrin (ELIMITE) 5 % cream APPLY TOPICALLY ONCE FOR ONE DOSE 1/29/24   Sanjay Olivares MD   triamcinolone acetonide 0.1% (KENALOG) 0.1 % cream APPLY TOPICALLY TWICE DAILY FOR 10 DAYS 2/1/24   Sanjay Olivares MD       Current/Inpatient  "Medications:  Infusions:    Scheduled:    PRN:      Objective:    24-hour Vitals:   Temp:  [96.8 °F (36 °C)] 96.8 °F (36 °C)  Pulse:  [64-68] 64  Resp:  [17-18] 17  SpO2:  [96 %-99 %] 96 %  BP: (129-174)/(69-87) 129/87    Vitals: /87 (BP Location: Left arm, Patient Position: Lying)   Pulse 64   Temp 96.8 °F (36 °C) (Temporal)   Resp 17   Ht 5' 1" (1.549 m)   Wt 63.5 kg (140 lb)   SpO2 96%   Breastfeeding No   BMI 26.45 kg/m²   No intake or output data in the 24 hours ending 24 1045    Physical Exam   Labs:  I have reviewed the following labs below:    Recent Labs   Lab 06/10/24  1249   WBC 10.77   HGB 12.6   HCT 38.1         K 4.1      CO2 29   BUN 19   CREATININE 1.0   GLU 94   CALCIUM 10.0     Cardiovascular Studies/Imaging:  I have reviewed the following studies below:    .EK21: NSR; normal   1b. EK/22: NSR: normal   1c. EK/24: sinus bradycardia; normal   2.Echo: 21: normal EF, diastolic dysfunction; mild TR; PAS 43; MIGDALIA 1.18 cm2 with aortic sclerosis   2b. Echo: : mild AI; moderately severe AS normal EF but velocity only 3.6   2c. Echo: : EF normal; RV normal; mild TR, PAS 46; severe stenosis   3.Nuclear stress: : negative for ischemia; normal EF   3b.Nuclear stress : EF normal; negative for ischemia   3c. DSE: : negative for ischemia; EF normal   3d. Stress nuclear: : negative for ischemia   4. Carotid US: : right carotids OK:  but low flow; on lfeft;  normal vertebral flow   5. CTA head: Left internal carotid occluded distal to bifurcation ; reconstitutation noted ; right normal     Imaging:   CT Cardiac Scoring    Result Date: 2024  EXAMINATION: CT CALCIUM SCORING CARDIAC CLINICAL HISTORY: TAVR; Nonrheumatic aortic (valve) stenosis TECHNIQUE: The chest was surveyed from above the pulmonary valve through the posterior wall of the left ventricle.  Images were reviewed in axial targeted reformatted images centered on the " heart, and in full field of view for axial, sagittal and coronal planes. COMPARISON: CT of the chest abdomen and pelvis with no IV contrast dated 06/04/2020 FINDINGS: Quantitative assessment of coronary artery calcium was performed with dedicated technique including full field of view and targeted reformatted images. Agatston calcium score equals 1161. In this 79-year old woman this score translates to the 90-100th percentile for coronary calcium load according to age and gender. Standard interpretation for a calcium score of 1161 follows: This indicates: Extensive plaque burden. Very high cardiovascular disease risk. Clinical interpretation: High likelihood of at least one 'significant' coronary stenosis. Gender and age issues: Findings are of greater clinical significance when the score is above the 75th percentile for age and sex or if calcium is present in 2 or more vessels. Recommended clinical action: Very aggressive risk factor modification using NCEP guidelines as for established CAD. Consider non-invasive stress test to rule out ischemia. Additional findings: The accompanying full field of view images reveal no convincing evidence of acute airspace disease.  For more details in the bilateral lungs please refer to the CT of the chest abdomen and pelvis concomitantly performed.  No evidence of pneumothorax or pleural effusion.  Visualized aorta is normal in caliber with moderate atheromatous calcifications.  Main pulmonary and right and left pulmonary arteries are normal in size.  Cardiac silhouette is within normal limits with no evidence of pericardial effusion.  Degenerative changes in the spine.  Visualized upper abdomen is grossly unremarkable. Your total calcium score is 1161     Agatston calcium score equals 1161, which translates to the 90-100th percentile for coronary calcium in this 79-year-old woman. Recommended clinical action: Very aggressive risk factor modification using NCEP guidelines as for  established CAD. Consider non-invasive stress test to rule out ischemia. Additional findings and recommendations above. Electronically signed by: Mauri Cervantes Date:    06/04/2024 Time:    14:28    CT Chest Abdomen Pelvis Without Contrast (XPD)    Result Date: 6/4/2024  EXAMINATION: CT CHEST ABDOMEN PELVIS WITHOUT CONTRAST(XPD) CLINICAL HISTORY: TAVR; Nonrheumatic aortic (valve) stenosis TECHNIQUE: Axial images of the chest, abdomen, and pelvis were acquired without IV contrast.  Coronal and sagittal reconstructions were also obtained COMPARISON: CT 05/24/2021 FINDINGS: Thoracic soft tissues: Subcentimeter bilateral thyroid nodules with internal calcification.  No apparent axillary lymphadenopathy noting the bilateral axilla are incompletely visualized. Constance/Mediastinum: No mediastinal or hilar lymphadenopathy. Cardiovascular/TAVR: Evaluation is limited by lack of IV contrast in this patient with history of anaphylaxis to contrast. Aorta: Thoracic aorta is normal in caliber and contour with moderate calcific atherosclerosis.  Prominent calcification of the aortic valve leaflets. TAVR measurements: Please note measurements were made by measuring the noncalcified portion of the vessel and the actual luminal diameter may be different based upon the presence of noncalcified atherosclerosis which cannot be determined on this noncontrast exam. Mid abdominal aorta measured just below the renal arteries: Noncalcified lumen measures 0.8 cm Right Common iliac artery: 0.6 cm Right external iliac artery: 0.7 cm Right common femoral artery: 0.8 cm Left common iliac artery: 0.6 cm Left external iliac artery: 0.7 cm Left common femoral artery: 0.6 cm Heart: Normal in size. No pericardial effusion. Coronary artery stents versus severe calcific coronary atherosclerosis. Lungs: Trachea and bronchi are patent.  Lung symmetrically expanded without consolidation.  Bilateral solid pulmonary nodules.  The largest in the left lung  measures 0.4 cm (5-110) and the largest in the right lung measures 0.2 cm (5-57).  No pleural fluid or pneumothorax. Esophagus: Unremarkable. Stomach and duodenum: Unremarkable. Liver: Normal in size and contour.  No focal hepatic lesion. Gallbladder: No calcified gallstones. Bile Ducts: No evidence of dilated ducts. Pancreas: No mass or peripancreatic fat stranding. Spleen: Unremarkable. Adrenals: Unremarkable. Kidneys/Ureters: Right kidney is somewhat low lying.  2.1 cm right renal cyst, not significantly enlarged from prior.  0.5 cm indeterminate exophytic lesion arising from the right kidney, not definitively seen on CT 05/24/2021. no hydronephrosis or nephrolithiasis. No ureteral dilatation. Bladder: No evidence of wall thickening. Reproductive organs: Hysterectomy. Bowel/Mesentery: Small bowel is normal in caliber with no evidence of obstruction.  No evidence of inflammation or wall thickening.  Colon demonstrates no focal wall thickening.  Moderate stool burden through the colon.  Probable rectocele/pelvic organ prolapse. Peritoneum: No intraperitoneal free air or fluid. Lymph nodes: Enlarged right inguinal lymph node which is unchanged in size since 05/24/2021.  No new or enlarging lymph nodes.  No abnormal appearing lymph nodes identified elsewhere. Abdominal wall:  Unremarkable. Bones: No acute fracture. No suspicious osseous lesions.  Laminectomy at L2-L3.  Degenerative changes of the osseous structures.     Evaluation is limited by lack of IV contrast in this patient with history of anaphylaxis to contrast. Calcific atherosclerosis through the aorta and its branch vessels.  TAVR measurements are above.  Please note measurements were made by measuring the noncalcified portion of the lumen.  The actual luminal diameter may be more narrow (noncalcified atherosclerosis cannot be assessed on this noncontrast exam). 0.5 cm indeterminate exophytic lesion arising from the right kidney.  This may be a  proteinaceous cyst but small solid renal mass may have a similar appearance.  Further characterization with ultrasound or contrast-enhanced MRI could be attempted however the small size may preclude a definitive diagnosis. Small bilateral thyroid nodules which may be further evaluated with non emergent thyroid ultrasound. Small bilateral pulmonary nodules as detailed above.  For multiple solid nodules all <6 mm, Fleischner Society 2017 guidelines recommend no routine follow up for a low risk patient, or follow up with non-contrast chest CT at 12 months after discovery in a high risk patient. Other findings are detailed above. This report was flagged in Epic as abnormal. Electronically signed by resident: Sarah Hines Date:    06/04/2024 Time:    11:27 Electronically signed by: Dank Callejas MD Date:    06/04/2024 Time:    13:39    Assessment:    79 y.o. female with a PMH significant for HTN, HLD, CAD s/p PCI x2 (unknown anatomy, severe AS here for pre TAVR work up.     Plan/Recommendations:    - Plan for LHC/Cors +/- PCI +/- LV and aortic pressures +/- ventriculography   - Maintain NPO (except for sips with meds) past midnight  - All risks and benefits regarding the procedure have been discussed with the patient in detail including but not limited to bleeding, infection, renal failure, worsening condition, myocardial infarction, stroke, and failure of intent of procedure  - The patient has expressed understanding of all risks and benefits for the procedure  - All questions and concerns have been answered  - Informed consent forms have been explained to the patient and all consents have been signed     José Durand MD  [facility]  06/12/2024 10:45 AM

## 2024-06-12 NOTE — Clinical Note
The catheter was inserted into the ostium   left main. An angiography was performed of the left coronary arteries. Multiple views were taken. The angiography was performed via hand injection with 15 mL of contrast.

## 2024-06-12 NOTE — DISCHARGE INSTRUCTIONS
Discharge Instructions:     Do not drive a car, operate heavy equipment, care for a young child, etc for the next 12-24 hours.   Avoid drinking alcohol for 24 hours.  Do not make any important decisions for 24 hours.     Drink fluids to keep hydrated. Resume your usual diet as tolerated.      Rest for today then activity as tolerated.   Do not lift anything over 5 pounds for the first 3 days after procedure.     Remove dressing tomorrow after 1pm then may shower with warm soapy water. Do not scrub site. Pat dry.   May apply bandaid for 2 days.  No tub baths.  Do not submerge wound in water for 3 days.      Call MD for any unrelieved pain, excessive nausea or vomiting, redness around site, bleeding, or pus or foul smelling drainage, or any other questions or concerns.     Go to the ER for any difficulty breathing or chest pain.        If site swells or bleeds, hold direct pressure to area for 10 full minutes.   If site continues to bleed, continue to hold pressure to site and have someone bring you to the ER.       Follow any additional instructions given to you by MD.      Call MD to schedule a follow up appointment, or follow up as instructed.         Last Modified by Hayde Escalera RN at 6/1/22 [id number]

## 2024-06-12 NOTE — PLAN OF CARE
Patient discharged to home as ordered after 2 hour recovery per Dr. Durand. Pt is AAOx4, VSS, denies any pain.  All discharge instructions and printed materials reviewed and given to patient.   Patient instructed on follow-up appointment as ordered.   Patient verbalized understanding and agreement with all discharge instructions given.     Right wrist gauze and tegaderm dressing c.d.i. No bleeding or hematoma noted around site. Site and surrounding area is soft.    Palpated +2 nik radial pulses. Palpated nik pedal pulses.     Pt voided without difficulty prior to discharge Pt ate lunch tray. No n/v.   Pt got dressed and moving around without difficulty and no distress noted.  Pt in w/c. Left AC  20 gauge iv dc'd as ordered with tip intact. Small skin tear noted from tape of IV, dressed with mepilex dressing.   Pt discharged home via wheelchair to private vehicle by pt's daughter.

## 2024-06-14 ENCOUNTER — PATIENT MESSAGE (OUTPATIENT)
Dept: CARDIOTHORACIC SURGERY | Facility: CLINIC | Age: 79
End: 2024-06-14
Payer: MEDICARE

## 2024-06-14 NOTE — BRIEF OP NOTE
"POST CATH NOTE    HPI:     s/p catheterization secondary to: TAVR    Cath Results:  Access: RRA  LM: moderate caliber, SHELDON- 3 flow  LAD: moderate caliber, very tortuous vessel, stent patent with mild to moderate ISR appr 40-50% stenosis, MLI, SHELDON-3 flow  LCx: moderate caliber, mid Lcx 40-50% stenosis, SHELDON- 3 flow  RCA: RCA with MLI, stent patent with mild ISR, distal stent with 40-50% ISR SHELDON-3 flow      Closure device:  Patient tolerated procedure well, no complications    Post Cath Exam:  BP (!) 123/58 (BP Location: Left arm, Patient Position: Lying)   Pulse 86   Temp 96.7 °F (35.9 °C) (Temporal)   Resp 20   Ht 5' 1" (1.549 m)   Wt 63.5 kg (140 lb)   SpO2 100%   Breastfeeding No   BMI 26.45 kg/m²     Assessment:   Non-obstructive CAD  RCA stent patent with mild ISR   LAD is tortuous, stent patent mild- moderate ISR      Plan:  Follow up with Dr. Morgan for TAVR w/u  Continue current medication regimen  Follow up with Dr. Alexander       "

## 2024-06-17 ENCOUNTER — OFFICE VISIT (OUTPATIENT)
Dept: CARDIOLOGY | Facility: CLINIC | Age: 79
End: 2024-06-17
Payer: MEDICARE

## 2024-06-17 ENCOUNTER — OFFICE VISIT (OUTPATIENT)
Dept: CARDIOTHORACIC SURGERY | Facility: CLINIC | Age: 79
End: 2024-06-17
Payer: MEDICARE

## 2024-06-17 VITALS
BODY MASS INDEX: 26.09 KG/M2 | DIASTOLIC BLOOD PRESSURE: 64 MMHG | WEIGHT: 147.25 LBS | HEIGHT: 63 IN | SYSTOLIC BLOOD PRESSURE: 138 MMHG | OXYGEN SATURATION: 96 % | HEART RATE: 71 BPM

## 2024-06-17 VITALS
BODY MASS INDEX: 26.01 KG/M2 | DIASTOLIC BLOOD PRESSURE: 67 MMHG | WEIGHT: 146.81 LBS | OXYGEN SATURATION: 97 % | SYSTOLIC BLOOD PRESSURE: 154 MMHG | HEART RATE: 65 BPM | HEIGHT: 63 IN

## 2024-06-17 DIAGNOSIS — I65.22 STENOSIS OF LEFT CAROTID ARTERY: ICD-10-CM

## 2024-06-17 DIAGNOSIS — I35.0 AORTIC VALVE STENOSIS, ETIOLOGY OF CARDIAC VALVE DISEASE UNSPECIFIED: Primary | ICD-10-CM

## 2024-06-17 DIAGNOSIS — I25.10 CORONARY ARTERY DISEASE INVOLVING NATIVE CORONARY ARTERY OF NATIVE HEART WITHOUT ANGINA PECTORIS: ICD-10-CM

## 2024-06-17 DIAGNOSIS — Z91.041 CONTRAST MEDIA ALLERGY: ICD-10-CM

## 2024-06-17 DIAGNOSIS — I70.0 AORTIC ATHEROSCLEROSIS: ICD-10-CM

## 2024-06-17 DIAGNOSIS — F43.21 ADJUSTMENT DISORDER WITH DEPRESSED MOOD: ICD-10-CM

## 2024-06-17 DIAGNOSIS — I10 PRIMARY HYPERTENSION: ICD-10-CM

## 2024-06-17 DIAGNOSIS — I50.32 CHRONIC DIASTOLIC HEART FAILURE: ICD-10-CM

## 2024-06-17 DIAGNOSIS — E78.2 MIXED HYPERLIPIDEMIA: ICD-10-CM

## 2024-06-17 DIAGNOSIS — I35.0 NONRHEUMATIC AORTIC VALVE STENOSIS: ICD-10-CM

## 2024-06-17 DIAGNOSIS — I35.0 NONRHEUMATIC AORTIC VALVE STENOSIS: Primary | ICD-10-CM

## 2024-06-17 PROCEDURE — 3288F FALL RISK ASSESSMENT DOCD: CPT | Mod: CPTII,S$GLB,, | Performed by: THORACIC SURGERY (CARDIOTHORACIC VASCULAR SURGERY)

## 2024-06-17 PROCEDURE — 1126F AMNT PAIN NOTED NONE PRSNT: CPT | Mod: CPTII,S$GLB,, | Performed by: INTERNAL MEDICINE

## 2024-06-17 PROCEDURE — 3288F FALL RISK ASSESSMENT DOCD: CPT | Mod: CPTII,S$GLB,, | Performed by: INTERNAL MEDICINE

## 2024-06-17 PROCEDURE — 1160F RVW MEDS BY RX/DR IN RCRD: CPT | Mod: CPTII,S$GLB,, | Performed by: THORACIC SURGERY (CARDIOTHORACIC VASCULAR SURGERY)

## 2024-06-17 PROCEDURE — 99999 PR PBB SHADOW E&M-EST. PATIENT-LVL IV: CPT | Mod: PBBFAC,,, | Performed by: THORACIC SURGERY (CARDIOTHORACIC VASCULAR SURGERY)

## 2024-06-17 PROCEDURE — 3078F DIAST BP <80 MM HG: CPT | Mod: CPTII,S$GLB,, | Performed by: INTERNAL MEDICINE

## 2024-06-17 PROCEDURE — 3077F SYST BP >= 140 MM HG: CPT | Mod: CPTII,S$GLB,, | Performed by: THORACIC SURGERY (CARDIOTHORACIC VASCULAR SURGERY)

## 2024-06-17 PROCEDURE — 3078F DIAST BP <80 MM HG: CPT | Mod: CPTII,S$GLB,, | Performed by: THORACIC SURGERY (CARDIOTHORACIC VASCULAR SURGERY)

## 2024-06-17 PROCEDURE — 1101F PT FALLS ASSESS-DOCD LE1/YR: CPT | Mod: CPTII,S$GLB,, | Performed by: THORACIC SURGERY (CARDIOTHORACIC VASCULAR SURGERY)

## 2024-06-17 PROCEDURE — 1126F AMNT PAIN NOTED NONE PRSNT: CPT | Mod: CPTII,S$GLB,, | Performed by: THORACIC SURGERY (CARDIOTHORACIC VASCULAR SURGERY)

## 2024-06-17 PROCEDURE — 3075F SYST BP GE 130 - 139MM HG: CPT | Mod: CPTII,S$GLB,, | Performed by: INTERNAL MEDICINE

## 2024-06-17 PROCEDURE — 99205 OFFICE O/P NEW HI 60 MIN: CPT | Mod: S$GLB,,, | Performed by: INTERNAL MEDICINE

## 2024-06-17 PROCEDURE — 1159F MED LIST DOCD IN RCRD: CPT | Mod: CPTII,S$GLB,, | Performed by: THORACIC SURGERY (CARDIOTHORACIC VASCULAR SURGERY)

## 2024-06-17 PROCEDURE — 99999 PR PBB SHADOW E&M-EST. PATIENT-LVL V: CPT | Mod: PBBFAC,,, | Performed by: INTERNAL MEDICINE

## 2024-06-17 PROCEDURE — 1101F PT FALLS ASSESS-DOCD LE1/YR: CPT | Mod: CPTII,S$GLB,, | Performed by: INTERNAL MEDICINE

## 2024-06-17 PROCEDURE — 99205 OFFICE O/P NEW HI 60 MIN: CPT | Mod: S$GLB,,, | Performed by: THORACIC SURGERY (CARDIOTHORACIC VASCULAR SURGERY)

## 2024-06-17 RX ORDER — ONABOTULINUMTOXINA 100 [USP'U]/1
100 INJECTION, POWDER, LYOPHILIZED, FOR SOLUTION INTRADERMAL; INTRAMUSCULAR ONCE
COMMUNITY
Start: 2024-04-29

## 2024-06-17 RX ORDER — HYDRALAZINE HYDROCHLORIDE 25 MG/1
25 TABLET, FILM COATED ORAL EVERY 8 HOURS
COMMUNITY
Start: 2024-01-31

## 2024-06-17 RX ORDER — FAMOTIDINE 20 MG/1
40 TABLET, FILM COATED ORAL 3 TIMES DAILY PRN
Qty: 3 TABLET | Refills: 0 | Status: SHIPPED | OUTPATIENT
Start: 2024-06-17

## 2024-06-17 RX ORDER — CIMETIDINE 300 MG/1
300 TABLET, FILM COATED ORAL 3 TIMES DAILY
Qty: 3 TABLET | Refills: 0 | Status: SHIPPED | OUTPATIENT
Start: 2024-06-17 | End: 2024-06-18

## 2024-06-17 RX ORDER — BUTALBITAL, ACETAMINOPHEN AND CAFFEINE 50; 325; 40 MG/1; MG/1; MG/1
1 TABLET ORAL EVERY 8 HOURS PRN
COMMUNITY
Start: 2024-06-07

## 2024-06-17 RX ORDER — PREDNISONE 50 MG/1
50 TABLET ORAL 3 TIMES DAILY PRN
Qty: 3 TABLET | Refills: 0 | OUTPATIENT
Start: 2024-06-17 | End: 2024-06-22

## 2024-06-17 RX ORDER — HYDROXYZINE HYDROCHLORIDE 25 MG/1
TABLET, FILM COATED ORAL
Qty: 30 TABLET | Refills: 2 | Status: SHIPPED | OUTPATIENT
Start: 2024-06-17

## 2024-06-17 RX ORDER — LINACLOTIDE 72 UG/1
72 CAPSULE, GELATIN COATED ORAL
COMMUNITY
Start: 2024-03-09

## 2024-06-17 RX ORDER — CITALOPRAM 10 MG/1
10 TABLET ORAL DAILY
COMMUNITY
Start: 2024-01-22

## 2024-06-17 RX ORDER — FLUOCINOLONE ACETONIDE 0.11 MG/ML
0.01 OIL TOPICAL NIGHTLY
COMMUNITY
Start: 2024-05-17

## 2024-06-17 RX ORDER — VALACYCLOVIR HYDROCHLORIDE 1 G/1
2000 TABLET, FILM COATED ORAL 2 TIMES DAILY PRN
COMMUNITY
Start: 2024-02-06

## 2024-06-17 NOTE — H&P (VIEW-ONLY)
"Referring provider: Dr. Patricia Rogers     Patient ID:  Enedelia García is a 79 y.o. y.o. female who presents for evaluation Chest Pain (Sometimes a pinging in the chest/) and Dizziness (Most of time)    Enedelia García is a 79 y.o. female referred by Dr YOHANA Rogers for evaluation of severe AS (NYHA Class II sx).    She states since January of this year, she has had worsening ASKEW and fatigue. She now has to stop and rest after doing moderate exertion and is unable to complete household chores such as vacuuming. She has had frequent chest pressure/angina and has taken SL NTG tiana. 4-5x in the past month. Denies any other acute events.     The patient has undergone the following TAVR work-up:   ECHO (Date 05/09/24): MIGDALIA= 0.72 cm2, MG= 35mmHg, Peak Yasir= 3.94 m/s, DI: 0.23, AV Ca++ score: 1742 EF= 60-65%.   [facility] (Date 06/12/24): non-obstructing CAD per Dr. Durand   STS: 2.92%   Frailty: moderately frail    Iliacs are >6.02 on R  (NICOLE lesion present on non-con CT, may require PTA to advance sheath)  LVOT area by CTA (non-contrast) is 4.48 cm2 (27.4 mm X 23.0 mm) and Avg Diameter is 23.9 per my independent review of images on [employer].  Incidental findings on CT: none  CT Surgery risk assessment:  risk, per   due to pending appt with Dr. Philip   Rhythm issues: NSR  PFTs: N/A  KCCQ/5 meter walk: done  Comorbidities: CAD, PAD, occluded LICA, HFpEF      Enedelia García is a 29 mm EvolutFx valve candidate via RTF access. Not able to get Cta (contrast allergy) and sizing was via non-con CT. Will size with catheters at the time of WATSON.      Review of Systems   Constitutional:  Positive for malaise/fatigue.   Respiratory:  Positive for shortness of breath.    Cardiovascular:  Positive for leg swelling.   All other systems reviewed and are negative.     Objective:     /64 (BP Location: Right arm, Patient Position: Sitting, BP Method: Large (Automatic))   Pulse 71   Ht 5' 3" (1.6 m)   Wt " 66.8 kg (147 lb 4.3 oz)   SpO2 96%   BMI 26.09 kg/m²     Physical Exam  Vitals and nursing note reviewed.   Constitutional:       Appearance: Normal appearance.   HENT:      Head: Normocephalic and atraumatic.      Right Ear: External ear normal.      Left Ear: External ear normal.      Nose: Nose normal.      Mouth/Throat:      Mouth: Mucous membranes are moist.   Eyes:      Extraocular Movements: Extraocular movements intact.      Pupils: Pupils are equal, round, and reactive to light.   Cardiovascular:      Rate and Rhythm: Normal rate and regular rhythm.      Pulses: Normal pulses.      Heart sounds: Murmur heard.   Pulmonary:      Effort: Pulmonary effort is normal.   Abdominal:      General: Abdomen is flat.   Musculoskeletal:         General: Normal range of motion.      Cervical back: Normal range of motion.      Right lower leg: Edema present.      Left lower leg: Edema present.   Skin:     General: Skin is warm.      Capillary Refill: Capillary refill takes less than 2 seconds.   Neurological:      General: No focal deficit present.      Mental Status: She is alert and oriented to person, place, and time. Mental status is at baseline.     Labs:     Lab Results   Component Value Date     06/10/2024    K 4.1 06/10/2024     06/10/2024    CO2 29 06/10/2024    BUN 19 06/10/2024    CREATININE 1.0 06/10/2024    ANIONGAP 9 06/10/2024     Lab Results   Component Value Date    HGBA1C 5.2 06/30/2023     Lab Results   Component Value Date    BNP 85 05/04/2024    BNP 47 01/03/2024    BNP 91 06/22/2023       Lab Results   Component Value Date    WBC 10.77 06/10/2024    HGB 12.6 06/10/2024    HCT 38.1 06/10/2024     06/10/2024    GRAN 6.3 06/10/2024    GRAN 58.6 06/10/2024     Lab Results   Component Value Date    CHOL 147 06/30/2023    HDL 50 06/30/2023    LDLCALC 77.2 06/30/2023    TRIG 99 06/30/2023       Meds:     Current Outpatient Medications:     aspirin (ECOTRIN) 81 MG EC tablet, Take 81 mg  by mouth once daily., Disp: , Rfl:     calcium carbonate/vitamin D3 (VITAMIN D-3 ORAL), Take 1 tablet by mouth once daily., Disp: , Rfl:     clotrimazole-betamethasone 1-0.05% (LOTRISONE) cream, Apply topically 2 (two) times daily., Disp: 45 g, Rfl: 1    DULoxetine (CYMBALTA) 60 MG capsule, Take 1 capsule (60 mg total) by mouth once daily., Disp: 90 capsule, Rfl: 0    FEROSUL 325 mg (65 mg iron) Tab tablet, Take 325 mg by mouth once daily., Disp: , Rfl:     furosemide (LASIX) 40 MG tablet, Take 2 tablets (80 mg total) by mouth 2 (two) times daily as needed (swelling)., Disp: 360 tablet, Rfl: 3    hydrOXYzine HCL (ATARAX) 25 MG tablet, TAKE ONE TABLET BY MOUTH EVERY 6 HOURS AS NEEDED FOR ITCHING, Disp: 30 tablet, Rfl: 2    irbesartan (AVAPRO) 300 MG tablet, TAKE ONE TABLET BY MOUTH EVERY EVENING, Disp: 90 tablet, Rfl: 3    isosorbide mononitrate (IMDUR) 60 MG 24 hr tablet, Take 1 tablet (60 mg total) by mouth 2 (two) times a day., Disp: 180 tablet, Rfl: 1    multivit-min-iron-FA-lutein (CENTRUM SILVER WOMEN) 8 mg iron-400 mcg-300 mcg Tab, Take 1 tablet by mouth once daily., Disp: , Rfl:     mupirocin (BACTROBAN) 2 % ointment, Apply topically 2 (two) times daily., Disp: 22 g, Rfl: 3    nitroGLYCERIN (NITROSTAT) 0.3 MG SL tablet, PLACE 1 TABLET UNDER THE TONGUE EVERY 5 MINUTES FOR UP TO 3 DOSES IF NEEDED FOR CHEST PAIN. CALL 911 IF PAIN PERSISTS, Disp: 25 tablet, Rfl: 0    omega-3 fatty acids/fish oil (FISH OIL-OMEGA-3 FATTY ACIDS) 300-1,000 mg capsule, Take 2 capsules by mouth once daily., Disp: , Rfl:     omeprazole (PRILOSEC) 20 MG capsule, Take 1 capsule (20 mg total) by mouth daily as needed., Disp: 90 capsule, Rfl: 2    oxyCODONE-acetaminophen (PERCOCET)  mg per tablet, Take 1 tablet by mouth 4 (four) times daily as needed., Disp: , Rfl:     permethrin (ELIMITE) 5 % cream, APPLY TOPICALLY ONCE FOR ONE DOSE, Disp: 60 g, Rfl: 0    rosuvastatin (CRESTOR) 40 MG Tab, TAKE ONE TABLET BY MOUTH DAILY, Disp: 90  tablet, Rfl: 3    tiZANidine (ZANAFLEX) 4 MG tablet, Take 1 tablet (4 mg total) by mouth every 8 (eight) hours as needed (spasms)., Disp: 60 tablet, Rfl: 2    triamcinolone acetonide 0.1% (KENALOG) 0.1 % cream, APPLY TOPICALLY TWICE DAILY FOR 10 DAYS (Patient not taking: Reported on 6/17/2024), Disp: 80 g, Rfl: 1    BOTOX 100 unit SolR, Inject 100 Units into the skin once., Disp: , Rfl:     butalbital-acetaminophen-caffeine -40 mg (FIORICET, ESGIC) -40 mg per tablet, Take 1 tablet by mouth every 8 (eight) hours as needed for Headaches., Disp: , Rfl:     cimetidine (TAGAMET) 300 MG tablet, Take 1 tablet (300 mg total) by mouth 3 (three) times daily. Take first dose 6pm prior to the procedure, followed by the second dose at 11pm and the third dose at 6am the morning of the procedure for 3 doses, Disp: 3 tablet, Rfl: 0    citalopram (CELEXA) 10 MG tablet, Take 10 mg by mouth once daily., Disp: , Rfl:     famotidine (PEPCID) 20 MG tablet, Take 2 tablets (40 mg total) by mouth 3 (three) times daily as needed (take first dose 6 pm day before procedure, seconond dose 11 pm night prior to procedure and third dose 6 am day of procedure)., Disp: 3 tablet, Rfl: 0    fluocinolone and shower cap 0.01 % Oil, Apply 0.01 % topically every evening., Disp: , Rfl:     hydrALAZINE (APRESOLINE) 25 MG tablet, Take 25 mg by mouth every 8 (eight) hours., Disp: , Rfl:     LINZESS 72 mcg Cap capsule, Take 72 mcg by mouth before breakfast., Disp: , Rfl:     predniSONE (DELTASONE) 50 MG Tab, Take 1 tablet (50 mg total) by mouth 3 (three) times daily as needed (take first dose 6pm night prior to procedure, take second dose 11pm night prior to procedure and take third dose at 6 am day of procedure)., Disp: 3 tablet, Rfl: 0    valACYclovir (VALTREX) 1000 MG tablet, Take 2,000 mg by mouth 2 (two) times daily as needed., Disp: , Rfl:     Current Facility-Administered Medications:     sodium chloride 0.9% flush 10 mL, 10 mL, Intravenous,  DIANA, José Lee MD      Assessment & Plan:     Stenosis of left carotid artery  -100% occlusion LICA with mild stenosis to SHYAM  -continue asa, rosuvastatin and bp control     Aortic atherosclerosis  -asa, rosuvastatin     Chronic diastolic heart failure  -continue bp control and lasix     Primary hypertension  -continue current regimen     Nonrheumatic aortic valve stenosis  Enedelia García is a 79 y.o. female referred by Dr YOHANA Rogers for evaluation of severe AS (NYHA Class II sx).    The patient has undergone the following TAVR work-up:   ECHO (Date 05/09/24): MIDGALIA= 0.72 cm2, MG= 35mmHg, Peak Yasir= 3.94 m/s, DI: 0.23, AV Ca++ score: 1742 EF= 60-65%.   [facility] (Date 06/12/24): non-obstructing CAD per Dr. Durand   STS: 2.92%   Frailty: moderately frail    Iliacs are >6.02 on R  (NICOLE lesion present on non-con CT, may require PTA to advance sheath)  LVOT area by CTA (non-contrast) is 4.48 cm2 (27.4 mm X 23.0 mm) and Avg Diameter is 23.9 per my independent review of images on [employer].  Incidental findings on CT: none  CT Surgery risk assessment:  risk, per   due to pending appt with Dr. Philip   Rhythm issues: NSR  PFTs: N/A  KCCQ/5 meter walk: done  Comorbidities: CAD, PAD, occluded LICA, HFpEF      Enedelia García is a 29 mm EvolutFx valve candidate via RTF access. Not able to get Cta (contrast allergy) and sizing was via non-con CT. Will size with catheters at the time of WATSON.    Transcatheter Aortic valve replacement   Valve: 29mm EvolutFx  ECMO:  On Call  TAVR access: RTF  Valvuloplasty balloon: 18mm True  Viabahn size if needed: 7 x 5  Antithrombotic therapy: asa  Temporary pacing wire: No, Will pace from TAVR wire  Pacemaker risk factors: none   Post op destination: Stepdown  Antibiotics given: (to be done during procedure)  Heart failure on admission?  CONSENTING:  The patient was told that the procedure carries around a 12.5% risk of permanent pacemaker requirement and that risk  depends on the patients underlying conduction system.  Prior to the [facility] visit, all available records from referring provider were reviewed.  I have personally reviewed all the lab tests available related to the patient's case  The patient's images were reviewed by myself and the procedural planning was done with my own interpretation of the iliac and aortic anatomy based on CTA, angiography or JOANN. I have reviewed all other imaging studies relevant to the patient including echocardiography and coronary angiography.  Patient was educated abut the pathophysiology and natural history of severe aortic stenosis and was educated about the treatment options including surgical and transcatheter valve replacement. She agrees to be full code for the forseable future and to undergo workup for treatment of severe AS.   The risks, benefits, and alternatives of transcatheter aortic valve replacement were discussed with the patient. All questions were answered and informed consent was obtained. I had a detailed discussion with the patient regarding risk of stroke, MI, bleeding access site complications including limb loss, allergy, kidney failure including dialysis and death.  The patient understands the risks and benefits and wishes to go ahead with the procedure.  The referring Cardiologist was notified of the plan  All patient's questions were answered    Shared Decision Making:  This patient was seen today by a multidisciplinary heart team involving both a Structural Heart Specialist (Interventional Cardiologist) and a Cardiothoracic Surgeon. The patient was involved in shared decision-making to define clearer goals for treatment and align health decisions with their current values.  The patient understands the risks and expected benefits of their treatment options.         Coronary artery disease involving native coronary artery of native heart without angina pectoris  -continue asa, rosuvastatin and bp control      Hyperlipidemia  -rosuvastatin       Lui Alvares MD   Interventional Cardiology

## 2024-06-17 NOTE — PROGRESS NOTES
Subjective:      Patient ID: Enedelia García is a 79 y.o. female.    Chief Complaint: No chief complaint on file.      HPI:  Enedelia García is a 79 y.o. female who presents to an intial surgical evaluation for aortic stenosis as part of TAVR work up. Medical conditions include HTN, HLD, CAD s/p PCI x3, CKD4, Epilepsy, Tremors, left knee replacement.     She endorses chest pains  for the past 2 months for which she takes NTG with alleviation. CP occurs at rest. She reports ASKEW for which she attributes to old age. Also has palpitations, LE edema. Denies orthopnea, PND, presyncope, syncope. She intermittently uses walker for stability when she ambulates long distances. Quit smoking 20 years ago. Denies tobacco, ETOH, illicit drug use.     Of note, patient has had 3 incidents of scabies, most recent January of this year. ( 2017, 2022)     TTE   Preserve LVEF 60 - 65%. Grade I diastolic dysfunction. The aortic valve is a trileaflet valve. There is severe stenosis. Aortic valve area by VTI is 0.72 cm². Aortic valve peak velocity is 3.94 m/s. Mean gradient is 35 mmHg. The dimensionless index is 0.23. There is mild aortic regurgitation.    LHC   1.Non-obstructive CAD  2. RCA stent patent with mild ISR   3.LAD is tortuous, stent patent mild- moderate ISR     Family and social history reviewed    Review of patient's allergies indicates:   Allergen Reactions    Iodinated contrast media Anaphylaxis and Other (See Comments)    Nsaids (non-steroidal anti-inflammatory drug)      ADWOA    Phenergan [promethazine]      Vomiting     Past Medical History:   Diagnosis Date    Aortic atherosclerosis 6/14/2023    CKD (chronic kidney disease) stage 4, GFR 15-29 ml/min     Coronary artery disease     Edema     Epilepsy     Generalized anxiety disorder 12/20/2021    Hematuria, unspecified     Hematuria, unspecified     Hyperlipidemia     Hypertension     Iron deficiency anemia     Moderate episode of recurrent major depressive  disorder     Nonrheumatic aortic valve stenosis 12/20/2021    Normocytic anemia     Prediabetes 2/24/2022     Past Surgical History:   Procedure Laterality Date    ANGIOGRAM, CORONARY, WITH LEFT HEART CATHETERIZATION N/A 6/12/2024    Procedure: Angiogram, Coronary, with Left Heart Cath;  Surgeon: José Carreon MD;  Location: [facility] CATH LAB/EP;  Service: Cardiology;  Laterality: N/A;    APPENDECTOMY      BACK SURGERY      CORONARY STENT PLACEMENT      HYSTERECTOMY      REVISION OF KNEE ARTHROPLASTY Left 7/18/2022    Procedure: REVISION, ARTHROPLASTY, KNEE;  Surgeon: Stan Catalan MD;  Location: [facility] OR;  Service: Orthopedics;  Laterality: Left;    TONSILLECTOMY       Family History       Problem Relation (Age of Onset)    Heart disease Mother, Father          Social History     Socioeconomic History    Marital status: Significant Other   Tobacco Use    Smoking status: Never    Smokeless tobacco: Never   Substance and Sexual Activity    Alcohol use: No    Drug use: No    Sexual activity: Not Currently     Social Determinants of Health     Financial Resource Strain: Low Risk  (6/13/2024)    Overall Financial Resource Strain (CARDIA)     Difficulty of Paying Living Expenses: Not hard at all   Food Insecurity: Unknown (6/13/2024)    Hunger Vital Sign     Worried About Running Out of Food in the Last Year: Never true     Ran Out of Food in the Last Year: Patient declined   Transportation Needs: No Transportation Needs (6/23/2023)    PRAPARE - Transportation     Lack of Transportation (Medical): No     Lack of Transportation (Non-Medical): No   Physical Activity: Unknown (6/13/2024)    Exercise Vital Sign     Days of Exercise per Week: 3 days   Stress: Stress Concern Present (6/13/2024)    [employer] [place] [employer] - Occupational Stress Questionnaire     Feeling of Stress : Very much   Housing Stability: Low Risk  (8/15/2023)    Housing Stability Vital Sign     Unable to Pay for Housing in the Last  Year: No     Number of Places Lived in the Last Year: 1     Unstable Housing in the Last Year: No       Current medications Reviewed  Current Outpatient Medications on File Prior to Visit   Medication Sig Dispense Refill    acyclovir (ZOVIRAX) 800 MG Tab Take 1 tablet (800 mg total) by mouth 5 (five) times daily. for 7 days 35 tablet 0    AIMOVIG AUTOINJECTOR 70 mg/mL autoinjector Inject 70 mg into the skin every 30 days.      aspirin (ECOTRIN) 81 MG EC tablet Take 81 mg by mouth once daily.      calcium carbonate/vitamin D3 (VITAMIN D-3 ORAL) Take 1 tablet by mouth once daily.      clotrimazole-betamethasone 1-0.05% (LOTRISONE) cream Apply topically 2 (two) times daily. 45 g 1    DULoxetine (CYMBALTA) 60 MG capsule Take 1 capsule (60 mg total) by mouth once daily. 90 capsule 0    famotidine (PEPCID) 20 MG tablet Take 1 tablet (20 mg total) by mouth 3 (three) times daily as needed (take first dose 6 pm day before procedure, seconond dose 11 pm night prior to procedure and third dose 6 am day of procedure). 3 tablet 0    FEROSUL 325 mg (65 mg iron) Tab tablet Take 325 mg by mouth once daily.      furosemide (LASIX) 40 MG tablet Take 2 tablets (80 mg total) by mouth 2 (two) times daily as needed (swelling). 360 tablet 3    hydrOXYzine HCL (ATARAX) 25 MG tablet TAKE ONE TABLET BY MOUTH EVERY 6 HOURS AS NEEDED FOR ITCHING 30 tablet 2    irbesartan (AVAPRO) 300 MG tablet TAKE ONE TABLET BY MOUTH EVERY EVENING 90 tablet 3    isosorbide mononitrate (IMDUR) 60 MG 24 hr tablet Take 1 tablet (60 mg total) by mouth 2 (two) times a day. 180 tablet 1    multivit-min-iron-FA-lutein (CENTRUM SILVER WOMEN) 8 mg iron-400 mcg-300 mcg Tab Take 1 tablet by mouth once daily.      mupirocin (BACTROBAN) 2 % ointment Apply topically 2 (two) times daily. 22 g 3    nitroGLYCERIN (NITROSTAT) 0.3 MG SL tablet PLACE 1 TABLET UNDER THE TONGUE EVERY 5 MINUTES FOR UP TO 3 DOSES IF NEEDED FOR CHEST PAIN. CALL 911 IF PAIN PERSISTS 25 tablet 0     omega-3 fatty acids/fish oil (FISH OIL-OMEGA-3 FATTY ACIDS) 300-1,000 mg capsule Take 2 capsules by mouth once daily.      omeprazole (PRILOSEC) 20 MG capsule Take 1 capsule (20 mg total) by mouth daily as needed. 90 capsule 2    oxyCODONE-acetaminophen (PERCOCET)  mg per tablet Take 1 tablet by mouth 4 (four) times daily as needed.      permethrin (ELIMITE) 5 % cream APPLY TOPICALLY ONCE FOR ONE DOSE 60 g 0    predniSONE (DELTASONE) 50 MG Tab Take 1 tablet (50 mg total) by mouth 3 (three) times daily as needed (take first dose 6pm night prior to procedure, take second dose 11pm night prior to procedure and take third dose at 6 am day of procedure). 3 tablet 0    rosuvastatin (CRESTOR) 40 MG Tab TAKE ONE TABLET BY MOUTH DAILY 90 tablet 3    tiZANidine (ZANAFLEX) 4 MG tablet Take 1 tablet (4 mg total) by mouth every 8 (eight) hours as needed (spasms). 60 tablet 2    triamcinolone acetonide 0.1% (KENALOG) 0.1 % cream APPLY TOPICALLY TWICE DAILY FOR 10 DAYS 80 g 1     Current Facility-Administered Medications on File Prior to Visit   Medication Dose Route Frequency Provider Last Rate Last Admin    sodium chloride 0.9% flush 10 mL  10 mL Intravenous PRN José Lee MD           Review of Systems   Constitutional:  Negative for activity change, appetite change, fatigue and fever.   HENT:  Negative for nosebleeds.    Respiratory:  Negative for cough and shortness of breath.    Cardiovascular:  Positive for chest pain, palpitations and leg swelling.   Gastrointestinal:  Negative for abdominal distention, abdominal pain and nausea.   Genitourinary:  Negative for frequency.   Musculoskeletal:  Negative for arthralgias and myalgias.   Skin:  Negative for rash.   Neurological:  Positive for dizziness. Negative for numbness.   Hematological:  Does not bruise/bleed easily.     Objective:   Physical Exam  Constitutional:       Appearance: Normal appearance.   HENT:      Head: Normocephalic and atraumatic.   Eyes:       Extraocular Movements: Extraocular movements intact.   Cardiovascular:      Rate and Rhythm: Normal rate.      Heart sounds: Murmur heard.   Pulmonary:      Effort: Pulmonary effort is normal.   Abdominal:      General: Abdomen is flat.      Palpations: Abdomen is soft.   Musculoskeletal:         General: Normal range of motion.      Cervical back: Normal range of motion.   Skin:     General: Skin is warm and dry.      Capillary Refill: Capillary refill takes less than 2 seconds.   Neurological:      General: No focal deficit present.      Mental Status: She is alert.         Diagnostic Results:   [facility] 6/2024    Access: RRA  LM: moderate caliber, SHELDON- 3 flow  LAD: moderate caliber, very tortuous vessel, stent patent with mild to moderate ISR appr 40-50% stenosis, MLI, SHELDON-3 flow  LCx: moderate caliber, mid Lcx 40-50% stenosis, SHELDON- 3 flow  RCA: RCA with MLI, stent patent with mild ISR, distal stent with 40-50% ISR SHELDON-3 flow    1.Non-obstructive CAD  2. RCA stent patent with mild ISR   3.LAD is tortuous, stent patent mild- moderate ISR    CT [facility] 6/2024  Evaluation is limited by lack of IV contrast in this patient with history of anaphylaxis to contrast.     Calcific atherosclerosis through the aorta and its branch vessels.  TAVR measurements are above.  Please note measurements were made by measuring the noncalcified portion of the lumen.  The actual luminal diameter may be more narrow (noncalcified atherosclerosis cannot be assessed on this noncontrast exam).     0.5 cm indeterminate exophytic lesion arising from the right kidney.  This may be a proteinaceous cyst but small solid renal mass may have a similar appearance.  Further characterization with ultrasound or contrast-enhanced MRI could be attempted however the small size may preclude a definitive diagnosis.     Small bilateral thyroid nodules which may be further evaluated with non emergent thyroid ultrasound.     Small bilateral pulmonary nodules as  detailed above.  For multiple solid nodules all <6 mm, Fleischner Society 2017 guidelines recommend no routine follow up for a low risk patient, or follow up with non-contrast chest CT at 12 months after discovery in a high risk patient.    TTE 5/2024    Aortic Valve: The aortic valve is a trileaflet valve. There is severe stenosis. Aortic valve area by VTI is 0.72 cm². Aortic valve peak velocity is 3.94 m/s. Mean gradient is 35 mmHg. The dimensionless index is 0.23. There is mild aortic regurgitation.    Left Ventricle: The left ventricle is moderately dilated. Normal wall thickness. There is normal systolic function with a visually estimated ejection fraction of 60 - 65%. Grade I diastolic dysfunction.    Right Ventricle: Normal right ventricular cavity size. Systolic function is normal.    Left Atrium: Left atrium is mildly dilated.    Tricuspid Valve: There is mild regurgitation with a centrally directed jet.    Pulmonary Artery: There is mild to moderate pulmonary hypertension. The estimated pulmonary artery systolic pressure is 46 mmHg.    IVC/SVC: Normal venous pressure at 3 mmHg.     Assessment:   Aortic stenosis   Plan:   All pertinent labs and images reviewed. Patient not surgical candidate 2/2  debility, proceed with TAVR.

## 2024-06-17 NOTE — PROGRESS NOTES
"Referring provider: Dr. Patricia Rogers     Patient ID:  Enedelia García is a 79 y.o. y.o. female who presents for evaluation Chest Pain (Sometimes a pinging in the chest/) and Dizziness (Most of time)    Enedelia García is a 79 y.o. female referred by Dr YOHANA Rogers for evaluation of severe AS (NYHA Class II sx).    She states since January of this year, she has had worsening ASKEW and fatigue. She now has to stop and rest after doing moderate exertion and is unable to complete household chores such as vacuuming. She has had frequent chest pressure/angina and has taken SL NTG tiana. 4-5x in the past month. Denies any other acute events.     The patient has undergone the following TAVR work-up:   ECHO (Date 05/09/24): MIGDALIA= 0.72 cm2, MG= 35mmHg, Peak Yasir= 3.94 m/s, DI: 0.23, AV Ca++ score: 1742 EF= 60-65%.   [facility] (Date 06/12/24): non-obstructing CAD per Dr. Durand   STS: 2.92%   Frailty: moderately frail    Iliacs are >6.02 on R  (NICOLE lesion present on non-con CT, may require PTA to advance sheath)  LVOT area by CTA (non-contrast) is 4.48 cm2 (27.4 mm X 23.0 mm) and Avg Diameter is 23.9 per my independent review of images on [employer].  Incidental findings on CT: none  CT Surgery risk assessment:  risk, per   due to pending appt with Dr. Philip   Rhythm issues: NSR  PFTs: N/A  KCCQ/5 meter walk: done  Comorbidities: CAD, PAD, occluded LICA, HFpEF      Enedelia García is a 29 mm EvolutFx valve candidate via RTF access. Not able to get Cta (contrast allergy) and sizing was via non-con CT. Will size with catheters at the time of WATSON.      Review of Systems   Constitutional:  Positive for malaise/fatigue.   Respiratory:  Positive for shortness of breath.    Cardiovascular:  Positive for leg swelling.   All other systems reviewed and are negative.     Objective:     /64 (BP Location: Right arm, Patient Position: Sitting, BP Method: Large (Automatic))   Pulse 71   Ht 5' 3" (1.6 m)   Wt " 66.8 kg (147 lb 4.3 oz)   SpO2 96%   BMI 26.09 kg/m²     Physical Exam  Vitals and nursing note reviewed.   Constitutional:       Appearance: Normal appearance.   HENT:      Head: Normocephalic and atraumatic.      Right Ear: External ear normal.      Left Ear: External ear normal.      Nose: Nose normal.      Mouth/Throat:      Mouth: Mucous membranes are moist.   Eyes:      Extraocular Movements: Extraocular movements intact.      Pupils: Pupils are equal, round, and reactive to light.   Cardiovascular:      Rate and Rhythm: Normal rate and regular rhythm.      Pulses: Normal pulses.      Heart sounds: Murmur heard.   Pulmonary:      Effort: Pulmonary effort is normal.   Abdominal:      General: Abdomen is flat.   Musculoskeletal:         General: Normal range of motion.      Cervical back: Normal range of motion.      Right lower leg: Edema present.      Left lower leg: Edema present.   Skin:     General: Skin is warm.      Capillary Refill: Capillary refill takes less than 2 seconds.   Neurological:      General: No focal deficit present.      Mental Status: She is alert and oriented to person, place, and time. Mental status is at baseline.     Labs:     Lab Results   Component Value Date     06/10/2024    K 4.1 06/10/2024     06/10/2024    CO2 29 06/10/2024    BUN 19 06/10/2024    CREATININE 1.0 06/10/2024    ANIONGAP 9 06/10/2024     Lab Results   Component Value Date    HGBA1C 5.2 06/30/2023     Lab Results   Component Value Date    BNP 85 05/04/2024    BNP 47 01/03/2024    BNP 91 06/22/2023       Lab Results   Component Value Date    WBC 10.77 06/10/2024    HGB 12.6 06/10/2024    HCT 38.1 06/10/2024     06/10/2024    GRAN 6.3 06/10/2024    GRAN 58.6 06/10/2024     Lab Results   Component Value Date    CHOL 147 06/30/2023    HDL 50 06/30/2023    LDLCALC 77.2 06/30/2023    TRIG 99 06/30/2023       Meds:     Current Outpatient Medications:     aspirin (ECOTRIN) 81 MG EC tablet, Take 81 mg  by mouth once daily., Disp: , Rfl:     calcium carbonate/vitamin D3 (VITAMIN D-3 ORAL), Take 1 tablet by mouth once daily., Disp: , Rfl:     clotrimazole-betamethasone 1-0.05% (LOTRISONE) cream, Apply topically 2 (two) times daily., Disp: 45 g, Rfl: 1    DULoxetine (CYMBALTA) 60 MG capsule, Take 1 capsule (60 mg total) by mouth once daily., Disp: 90 capsule, Rfl: 0    FEROSUL 325 mg (65 mg iron) Tab tablet, Take 325 mg by mouth once daily., Disp: , Rfl:     furosemide (LASIX) 40 MG tablet, Take 2 tablets (80 mg total) by mouth 2 (two) times daily as needed (swelling)., Disp: 360 tablet, Rfl: 3    hydrOXYzine HCL (ATARAX) 25 MG tablet, TAKE ONE TABLET BY MOUTH EVERY 6 HOURS AS NEEDED FOR ITCHING, Disp: 30 tablet, Rfl: 2    irbesartan (AVAPRO) 300 MG tablet, TAKE ONE TABLET BY MOUTH EVERY EVENING, Disp: 90 tablet, Rfl: 3    isosorbide mononitrate (IMDUR) 60 MG 24 hr tablet, Take 1 tablet (60 mg total) by mouth 2 (two) times a day., Disp: 180 tablet, Rfl: 1    multivit-min-iron-FA-lutein (CENTRUM SILVER WOMEN) 8 mg iron-400 mcg-300 mcg Tab, Take 1 tablet by mouth once daily., Disp: , Rfl:     mupirocin (BACTROBAN) 2 % ointment, Apply topically 2 (two) times daily., Disp: 22 g, Rfl: 3    nitroGLYCERIN (NITROSTAT) 0.3 MG SL tablet, PLACE 1 TABLET UNDER THE TONGUE EVERY 5 MINUTES FOR UP TO 3 DOSES IF NEEDED FOR CHEST PAIN. CALL 911 IF PAIN PERSISTS, Disp: 25 tablet, Rfl: 0    omega-3 fatty acids/fish oil (FISH OIL-OMEGA-3 FATTY ACIDS) 300-1,000 mg capsule, Take 2 capsules by mouth once daily., Disp: , Rfl:     omeprazole (PRILOSEC) 20 MG capsule, Take 1 capsule (20 mg total) by mouth daily as needed., Disp: 90 capsule, Rfl: 2    oxyCODONE-acetaminophen (PERCOCET)  mg per tablet, Take 1 tablet by mouth 4 (four) times daily as needed., Disp: , Rfl:     permethrin (ELIMITE) 5 % cream, APPLY TOPICALLY ONCE FOR ONE DOSE, Disp: 60 g, Rfl: 0    rosuvastatin (CRESTOR) 40 MG Tab, TAKE ONE TABLET BY MOUTH DAILY, Disp: 90  tablet, Rfl: 3    tiZANidine (ZANAFLEX) 4 MG tablet, Take 1 tablet (4 mg total) by mouth every 8 (eight) hours as needed (spasms)., Disp: 60 tablet, Rfl: 2    triamcinolone acetonide 0.1% (KENALOG) 0.1 % cream, APPLY TOPICALLY TWICE DAILY FOR 10 DAYS (Patient not taking: Reported on 6/17/2024), Disp: 80 g, Rfl: 1    BOTOX 100 unit SolR, Inject 100 Units into the skin once., Disp: , Rfl:     butalbital-acetaminophen-caffeine -40 mg (FIORICET, ESGIC) -40 mg per tablet, Take 1 tablet by mouth every 8 (eight) hours as needed for Headaches., Disp: , Rfl:     cimetidine (TAGAMET) 300 MG tablet, Take 1 tablet (300 mg total) by mouth 3 (three) times daily. Take first dose 6pm prior to the procedure, followed by the second dose at 11pm and the third dose at 6am the morning of the procedure for 3 doses, Disp: 3 tablet, Rfl: 0    citalopram (CELEXA) 10 MG tablet, Take 10 mg by mouth once daily., Disp: , Rfl:     famotidine (PEPCID) 20 MG tablet, Take 2 tablets (40 mg total) by mouth 3 (three) times daily as needed (take first dose 6 pm day before procedure, seconond dose 11 pm night prior to procedure and third dose 6 am day of procedure)., Disp: 3 tablet, Rfl: 0    fluocinolone and shower cap 0.01 % Oil, Apply 0.01 % topically every evening., Disp: , Rfl:     hydrALAZINE (APRESOLINE) 25 MG tablet, Take 25 mg by mouth every 8 (eight) hours., Disp: , Rfl:     LINZESS 72 mcg Cap capsule, Take 72 mcg by mouth before breakfast., Disp: , Rfl:     predniSONE (DELTASONE) 50 MG Tab, Take 1 tablet (50 mg total) by mouth 3 (three) times daily as needed (take first dose 6pm night prior to procedure, take second dose 11pm night prior to procedure and take third dose at 6 am day of procedure)., Disp: 3 tablet, Rfl: 0    valACYclovir (VALTREX) 1000 MG tablet, Take 2,000 mg by mouth 2 (two) times daily as needed., Disp: , Rfl:     Current Facility-Administered Medications:     sodium chloride 0.9% flush 10 mL, 10 mL, Intravenous,  DIANA, José Lee MD      Assessment & Plan:     Stenosis of left carotid artery  -100% occlusion LICA with mild stenosis to SHYAM  -continue asa, rosuvastatin and bp control     Aortic atherosclerosis  -asa, rosuvastatin     Chronic diastolic heart failure  -continue bp control and lasix     Primary hypertension  -continue current regimen     Nonrheumatic aortic valve stenosis  Enedelia García is a 79 y.o. female referred by Dr YOHANA Rogers for evaluation of severe AS (NYHA Class II sx).    The patient has undergone the following TAVR work-up:   ECHO (Date 05/09/24): MIGDALIA= 0.72 cm2, MG= 35mmHg, Peak Yasir= 3.94 m/s, DI: 0.23, AV Ca++ score: 1742 EF= 60-65%.   [facility] (Date 06/12/24): non-obstructing CAD per Dr. Durand   STS: 2.92%   Frailty: moderately frail    Iliacs are >6.02 on R  (NICOLE lesion present on non-con CT, may require PTA to advance sheath)  LVOT area by CTA (non-contrast) is 4.48 cm2 (27.4 mm X 23.0 mm) and Avg Diameter is 23.9 per my independent review of images on [employer].  Incidental findings on CT: none  CT Surgery risk assessment:  risk, per   due to pending appt with Dr. Philip   Rhythm issues: NSR  PFTs: N/A  KCCQ/5 meter walk: done  Comorbidities: CAD, PAD, occluded LICA, HFpEF      Enedelia García is a 29 mm EvolutFx valve candidate via RTF access. Not able to get Cta (contrast allergy) and sizing was via non-con CT. Will size with catheters at the time of WATSON.    Transcatheter Aortic valve replacement   Valve: 29mm EvolutFx  ECMO:  On Call  TAVR access: RTF  Valvuloplasty balloon: 18mm True  Viabahn size if needed: 7 x 5  Antithrombotic therapy: asa  Temporary pacing wire: No, Will pace from TAVR wire  Pacemaker risk factors: none   Post op destination: Stepdown  Antibiotics given: (to be done during procedure)  Heart failure on admission?  CONSENTING:  The patient was told that the procedure carries around a 12.5% risk of permanent pacemaker requirement and that risk  depends on the patients underlying conduction system.  Prior to the [facility] visit, all available records from referring provider were reviewed.  I have personally reviewed all the lab tests available related to the patient's case  The patient's images were reviewed by myself and the procedural planning was done with my own interpretation of the iliac and aortic anatomy based on CTA, angiography or JOANN. I have reviewed all other imaging studies relevant to the patient including echocardiography and coronary angiography.  Patient was educated abut the pathophysiology and natural history of severe aortic stenosis and was educated about the treatment options including surgical and transcatheter valve replacement. She agrees to be full code for the forseable future and to undergo workup for treatment of severe AS.   The risks, benefits, and alternatives of transcatheter aortic valve replacement were discussed with the patient. All questions were answered and informed consent was obtained. I had a detailed discussion with the patient regarding risk of stroke, MI, bleeding access site complications including limb loss, allergy, kidney failure including dialysis and death.  The patient understands the risks and benefits and wishes to go ahead with the procedure.  The referring Cardiologist was notified of the plan  All patient's questions were answered    Shared Decision Making:  This patient was seen today by a multidisciplinary heart team involving both a Structural Heart Specialist (Interventional Cardiologist) and a Cardiothoracic Surgeon. The patient was involved in shared decision-making to define clearer goals for treatment and align health decisions with their current values.  The patient understands the risks and expected benefits of their treatment options.         Coronary artery disease involving native coronary artery of native heart without angina pectoris  -continue asa, rosuvastatin and bp control      Hyperlipidemia  -rosuvastatin       Lui Alvares MD   Interventional Cardiology

## 2024-06-17 NOTE — ASSESSMENT & PLAN NOTE
Enedelia García is a 79 y.o. female referred by Dr YOHANA Rogers for evaluation of severe AS (NYHA Class II sx).    The patient has undergone the following TAVR work-up:   ECHO (Date 05/09/24): MIGDALIA= 0.72 cm2, MG= 35mmHg, Peak Yasir= 3.94 m/s, DI: 0.23, AV Ca++ score: 1742 EF= 60-65%.   [facility] (Date 06/12/24): non-obstructing CAD per Dr. Durand   STS: 2.92%   Frailty: moderately frail    Iliacs are >6.02 on R  (NICOLE lesion present on non-con CT, may require PTA to advance sheath)  LVOT area by CTA (non-contrast) is 4.48 cm2 (27.4 mm X 23.0 mm) and Avg Diameter is 23.9 per my independent review of images on [employer].  Incidental findings on CT: none  CT Surgery risk assessment:  risk, per   due to pending appt with Dr. Philip   Rhythm issues: NSR  PFTs: N/A  KCCQ/5 meter walk: done  Comorbidities: CAD, PAD, occluded LICA, HFpEF      Enedelia García is a 29 mm EvolutFx valve candidate via RTF access. Not able to get Cta (contrast allergy) and sizing was via non-con CT. Will size with catheters at the time of WATSON.    Transcatheter Aortic valve replacement   Valve: 29mm EvolutFx  ECMO:  On Call  TAVR access: RTF  Valvuloplasty balloon: 18mm True  Viabahn size if needed: 7 x 5  Antithrombotic therapy: asa  Temporary pacing wire: No, Will pace from TAVR wire  Pacemaker risk factors: none   Post op destination: Stepdown  Antibiotics given: (to be done during procedure)  Heart failure on admission?  CONSENTING:  The patient was told that the procedure carries around a 12.5% risk of permanent pacemaker requirement and that risk depends on the patients underlying conduction system.  Prior to the clinc visit, all available records from referring provider were reviewed.  I have personally reviewed all the lab tests available related to the patient's case  The patient's images were reviewed by myself and the procedural planning was done with my own interpretation of the iliac and aortic anatomy based on CTA,  angiography or JOANN. I have reviewed all other imaging studies relevant to the patient including echocardiography and coronary angiography.  Patient was educated abut the pathophysiology and natural history of severe aortic stenosis and was educated about the treatment options including surgical and transcatheter valve replacement. She agrees to be full code for the forseable future and to undergo workup for treatment of severe AS.   The risks, benefits, and alternatives of transcatheter aortic valve replacement were discussed with the patient. All questions were answered and informed consent was obtained. I had a detailed discussion with the patient regarding risk of stroke, MI, bleeding access site complications including limb loss, allergy, kidney failure including dialysis and death.  The patient understands the risks and benefits and wishes to go ahead with the procedure.  The referring Cardiologist was notified of the plan  All patient's questions were answered    Shared Decision Making:  This patient was seen today by a multidisciplinary heart team involving both a Structural Heart Specialist (Interventional Cardiologist) and a Cardiothoracic Surgeon. The patient was involved in shared decision-making to define clearer goals for treatment and align health decisions with their current values.  The patient understands the risks and expected benefits of their treatment options.

## 2024-06-18 DIAGNOSIS — I35.0 NONRHEUMATIC AORTIC VALVE STENOSIS: Primary | ICD-10-CM

## 2024-06-18 RX ORDER — SODIUM CHLORIDE 9 MG/ML
INJECTION, SOLUTION INTRAVENOUS CONTINUOUS
Status: DISCONTINUED | OUTPATIENT
Start: 2024-06-18 | End: 2024-06-18 | Stop reason: HOSPADM

## 2024-06-18 RX ORDER — SODIUM CHLORIDE 0.9 % (FLUSH) 0.9 %
10 SYRINGE (ML) INJECTION
Status: DISCONTINUED | OUTPATIENT
Start: 2024-06-18 | End: 2024-06-18 | Stop reason: HOSPADM

## 2024-06-18 RX ORDER — DIPHENHYDRAMINE HCL 25 MG
50 CAPSULE ORAL ONCE
Status: DISCONTINUED | OUTPATIENT
Start: 2024-06-18 | End: 2024-06-18 | Stop reason: HOSPADM

## 2024-06-19 ENCOUNTER — EDUCATION (OUTPATIENT)
Dept: CARDIOLOGY | Facility: CLINIC | Age: 79
End: 2024-06-19
Payer: MEDICARE

## 2024-06-19 ENCOUNTER — TELEPHONE (OUTPATIENT)
Dept: CARDIOLOGY | Facility: CLINIC | Age: 79
End: 2024-06-19
Payer: MEDICARE

## 2024-06-19 NOTE — PROGRESS NOTES
TAVR DISCHARGE INSTRUCTIONS      You have received a temporary card with you valve serial number on it.  Please keep this in your wallet.  The company will send you a permanent plastic card in approximately 6 weeks.  This is important to keep with you in the future.  We cannot provide a duplicate if you lose the card.     1. General Post Op Instructions:  -   No lifting greater than 5 pounds for 5 days  -   No driving or operating heavy machinery for 5 days  -   You may shower as soon as you get home but no bathing or submerging in water       (lakes, pools, tub etc) for 1 week.                -   You may remove the dressing and replace with a band-aid.      -   Keep incision dry and clean.  No lotions, oils, or creams.  You may change the band-aid daily                   until a scab has formed over              -   You may feel a small lump in your groin area due to a closure device used after the procedure.                     The site may be black and blue or swollen and pink for a few days. If the site enlarges,                    becomes painful and/or starts to bleed,please call.               -    You may return to your regular activities as tolerated.        If you develop any fever, urinary tract infection, or any other signs of infection, please call our office immediately.    2.  Preventing infection on Your heart Valve:  -   DO NOT have any dental work, surgery, or any other elective procedures for 6 months.  -   Provide a copy of this card to your Dentist or Gastroenterologist to keep in your chart.  -  You DO need to take antibiotics prior to any routine dental cleaning, GI procedure (colonoscopy, endoscopy), (cystoscopy), or respiratory procedures (bronchoscopy).                -  You DO need antibiotics if you are having a procedure that requires cutting any infected area.              -  Your Dentist or Gastroenterologist will prescribe these for you prior to your  "appointment.        3.  Managing your heart Failure:  -    Weigh yourself daily and keep track of your weight  -    If you are on Lasix, continue to take it as prescribed.  -    If you gain 3 pounds overnight or 5 pounds over 5 days, double Lasix for 3 days or begin taking Lasix once a day for 3 days                   4.  Follow Up:  -   TAVR follow up protocol requires you to return for a one month and a one-year visit. We will schedule an echo, blood work  and an appointment to see a member of our team.      -   Please continue to see your regular Cardiologist for all other issues.    5.  Discharge:  - If you have any questions or concerns after discharge, please do not hesitate to call our office and speak with a nurse at [phone]              - If you have any problems or concerns related to a Pacemaker or Event monitor please call the Arrhythmia department at [phone].             -  Please address any other concerns with your primary Cardiologist.                   -  We answer messages sent via the "[employer]" portal Monday - Friday 8am - 5pm.               -  Please do not send emergency messages through the portal.                  After hours and weekends, please call [phone] to speak to a Registered Nurse.                      "

## 2024-06-19 NOTE — PROGRESS NOTES
Transcatheter Valve Replacement (TAVR)    You have been scheduled for your valve replacement on Tuesday, July 2, 2024.     Please report to the Cardiology Waiting Area on the Third floor of the hospital and check in at 6 AM.   You will then be taken to the SSCU (Short Stay Cardiac Unit) and prepared for your procedure. Please be aware that this is not the time of your procedure but the time you are to arrive.     Preperations for your procedure:  Shower with Dial soap the night before and the morning of your procedure.  Nothing to eat or drink after MIDNIGHT the night before.                                            You may take your regular morning medications with as much water or clear liquid as necessary.   Bring your cane and/or walker with you to the hospital.  Bring CPAP/BiPAP, or oxygen if you are on oxygen at home.  Call the office for any signs of infection ( fever, cough, pneumonia, urinary tract, etc.) We cannot implant a device in an infected patient.    Medications:  You may take your regular scheduled medications the morning of your procedure with as much water as necessary.  If you are diabetic and on Metformin (Glucophage), do not take it the day before, the day of, and 2 days after your procedure.    If you take a weekly GLP-1 Inhibitor (Ozempic, Semaglutide, etc) You must be off that medication for one full week prior to your procedure.  Anesthesia will cancel your procedure if you do not hold this medication for a minimum of 7 days prior to TAVR.     Iodine Allergy Premedication Schedule:    At 6 pm the night before take:   Benadryl 50 mg   Prednisone 50 mg   Tagamet 300 mg    At 11 pm the night before take:   Benadryl 50 mg   Prednisone 50 mg   Tagamet 300 mg    At 6 am the morning of the procedure take:   Benadryl 50 mg   Prednisone 50 mg    How long will the procedure take?  The procedure takes approximately three to four hours.  After the procedure, you will go to an ICU or the Cardiac Step  Down Unit.  The decision will be made by the valve team.  You will be monitored closely for the next several hours and remain overnight.       When can I go home?  Your length of stay in the hospital, will be determined by your physician.  Be prepared to stay 1-3 days.  You will be discharged when your physician thinks it is safe for you to go home.  You must have someone to drive you home when you are discharged.      Follow Up After Valve Replacement:  You will be scheduled for follow up in one month and one year with an echo, lab work, and a clinic visit.    If you are in a research trial, your follow up will be slightly different and according to the trial requirements.  You can follow up with your regular cardiologist regarding any other heart issues.  DO NOT SCHEDULE ANY ELECTIVE PROCEDURES FOR 6 MONTHS AFTER TAVR.  THIS INCLUDES DENTAL WORK, COLONOSCOPY,ETC.         Please contact our office at [phone] for any questions.  Mary Claudio RN          THE ABOVE INSTRUCTIONS WERE GIVEN TO THE PATIENT VERBALLY AND THEY VERBALIZED UNDERSTANDING.  THEY DO NOT REQUIRE ANY SPECIAL NEEDS AND DO NOT HAVE ANY LEARNING BARRIERS.          Directions for Reporting to Cardiology Waiting Area in the Hospital  If you park in the Parking Garage:  Take elevators to the1st floor of the parking garage.  Continue past the gift shop, coffee shop, and piano.  Take a right and go to the gold elevators. (Elevator B)  Take the elevator to the 3rd floor.  Follow the arrow on the sign on the wall that says Cath Lab Registration/EP Lab Registration.  Follow the long hallway all the way around until you come to a big open area.  This is the registration area.  Check in at Reception Desk.    OR    If family is dropping you off:  Have them drop you off at the front of the Hospital under the green overhang.  Enter through the doors and take a right.  Take the E elevators to the 3rd floor Cardiology Waiting Area.  Check in at the  Reception Desk in the waiting room.[facility]  Cardiac Catheterization Laboratory       Tagamet 300 mg

## 2024-06-21 ENCOUNTER — HOSPITAL ENCOUNTER (OUTPATIENT)
Facility: HOSPITAL | Age: 79
Discharge: HOME | End: 2024-06-22
Attending: EMERGENCY MEDICINE | Admitting: EMERGENCY MEDICINE
Payer: MEDICARE

## 2024-06-21 DIAGNOSIS — R07.9 CHEST PAIN: Primary | ICD-10-CM

## 2024-06-21 DIAGNOSIS — I10 HYPERTENSION, UNSPECIFIED TYPE: ICD-10-CM

## 2024-06-21 DIAGNOSIS — D64.9 ANEMIA, UNSPECIFIED TYPE: ICD-10-CM

## 2024-06-21 LAB
ALBUMIN SERPL BCP-MCNC: 3.2 G/DL (ref 3.5–5.2)
ALP SERPL-CCNC: 65 U/L (ref 55–135)
ALT SERPL W/O P-5'-P-CCNC: 19 U/L (ref 10–44)
ANION GAP SERPL CALC-SCNC: 9 MMOL/L (ref 8–16)
AST SERPL-CCNC: 24 U/L (ref 10–40)
BASOPHILS # BLD AUTO: 0.06 K/UL (ref 0–0.2)
BASOPHILS NFR BLD: 0.9 % (ref 0–1.9)
BILIRUB SERPL-MCNC: 0.3 MG/DL (ref 0.1–1)
BNP SERPL-MCNC: 36 PG/ML (ref 0–99)
BUN SERPL-MCNC: 24 MG/DL (ref 8–23)
CALCIUM SERPL-MCNC: 9.5 MG/DL (ref 8.7–10.5)
CHLORIDE SERPL-SCNC: 102 MMOL/L (ref 95–110)
CO2 SERPL-SCNC: 25 MMOL/L (ref 23–29)
CREAT SERPL-MCNC: 1.2 MG/DL (ref 0.5–1.4)
D DIMER PPP IA.FEU-MCNC: 1.45 MG/L FEU
DIFFERENTIAL METHOD BLD: ABNORMAL
EOSINOPHIL # BLD AUTO: 0.3 K/UL (ref 0–0.5)
EOSINOPHIL NFR BLD: 4.8 % (ref 0–8)
ERYTHROCYTE [DISTWIDTH] IN BLOOD BY AUTOMATED COUNT: 11.9 % (ref 11.5–14.5)
EST. GFR  (NO RACE VARIABLE): 46 ML/MIN/1.73 M^2
FOLATE SERPL-MCNC: 17.5 NG/ML (ref 4–24)
GLUCOSE SERPL-MCNC: 84 MG/DL (ref 70–110)
HCT VFR BLD AUTO: 28.1 % (ref 37–48.5)
HCV AB SERPL QL IA: NORMAL
HGB BLD-MCNC: 9 G/DL (ref 12–16)
HIV 1+2 AB+HIV1 P24 AG SERPL QL IA: NORMAL
IMM GRANULOCYTES # BLD AUTO: 0.02 K/UL (ref 0–0.04)
IMM GRANULOCYTES NFR BLD AUTO: 0.3 % (ref 0–0.5)
IRON SERPL-MCNC: 79 UG/DL (ref 30–160)
LYMPHOCYTES # BLD AUTO: 1.9 K/UL (ref 1–4.8)
LYMPHOCYTES NFR BLD: 27.7 % (ref 18–48)
MCH RBC QN AUTO: 32.8 PG (ref 27–31)
MCHC RBC AUTO-ENTMCNC: 32 G/DL (ref 32–36)
MCV RBC AUTO: 103 FL (ref 82–98)
MONOCYTES # BLD AUTO: 1.1 K/UL (ref 0.3–1)
MONOCYTES NFR BLD: 15.3 % (ref 4–15)
NEUTROPHILS # BLD AUTO: 3.5 K/UL (ref 1.8–7.7)
NEUTROPHILS NFR BLD: 51 % (ref 38–73)
NRBC BLD-RTO: 0 /100 WBC
OHS QRS DURATION: 74 MS
OHS QRS DURATION: 86 MS
OHS QTC CALCULATION: 433 MS
OHS QTC CALCULATION: 489 MS
PLATELET # BLD AUTO: 234 K/UL (ref 150–450)
PMV BLD AUTO: 9.4 FL (ref 9.2–12.9)
POTASSIUM SERPL-SCNC: 4.4 MMOL/L (ref 3.5–5.1)
PROT SERPL-MCNC: 6 G/DL (ref 6–8.4)
RBC # BLD AUTO: 2.74 M/UL (ref 4–5.4)
SATURATED IRON: 25 % (ref 20–50)
SODIUM SERPL-SCNC: 136 MMOL/L (ref 136–145)
TOTAL IRON BINDING CAPACITY: 312 UG/DL (ref 250–450)
TRANSFERRIN SERPL-MCNC: 211 MG/DL (ref 200–375)
TROPONIN I SERPL DL<=0.01 NG/ML-MCNC: 0.01 NG/ML (ref 0–0.03)
TROPONIN I SERPL DL<=0.01 NG/ML-MCNC: 0.01 NG/ML (ref 0–0.03)
TROPONIN I SERPL DL<=0.01 NG/ML-MCNC: <0.006 NG/ML (ref 0–0.03)
VIT B12 SERPL-MCNC: 530 PG/ML (ref 210–950)
WBC # BLD AUTO: 6.86 K/UL (ref 3.9–12.7)

## 2024-06-21 PROCEDURE — G0378 HOSPITAL OBSERVATION PER HR: HCPCS

## 2024-06-21 PROCEDURE — 84484 ASSAY OF TROPONIN QUANT: CPT | Mod: 91 | Performed by: PHYSICIAN ASSISTANT

## 2024-06-21 PROCEDURE — 93005 ELECTROCARDIOGRAM TRACING: CPT

## 2024-06-21 PROCEDURE — 25000003 PHARM REV CODE 250

## 2024-06-21 PROCEDURE — 99285 EMERGENCY DEPT VISIT HI MDM: CPT | Mod: 25

## 2024-06-21 PROCEDURE — 83880 ASSAY OF NATRIURETIC PEPTIDE: CPT | Performed by: EMERGENCY MEDICINE

## 2024-06-21 PROCEDURE — 83540 ASSAY OF IRON: CPT

## 2024-06-21 PROCEDURE — 25000003 PHARM REV CODE 250: Performed by: PHYSICIAN ASSISTANT

## 2024-06-21 PROCEDURE — 87389 HIV-1 AG W/HIV-1&-2 AB AG IA: CPT | Performed by: PHYSICIAN ASSISTANT

## 2024-06-21 PROCEDURE — 80053 COMPREHEN METABOLIC PANEL: CPT | Performed by: EMERGENCY MEDICINE

## 2024-06-21 PROCEDURE — 86803 HEPATITIS C AB TEST: CPT | Performed by: PHYSICIAN ASSISTANT

## 2024-06-21 PROCEDURE — 85025 COMPLETE CBC W/AUTO DIFF WBC: CPT | Performed by: EMERGENCY MEDICINE

## 2024-06-21 PROCEDURE — A9540 TC99M MAA: HCPCS | Performed by: EMERGENCY MEDICINE

## 2024-06-21 PROCEDURE — 82607 VITAMIN B-12: CPT

## 2024-06-21 PROCEDURE — 85379 FIBRIN DEGRADATION QUANT: CPT | Performed by: EMERGENCY MEDICINE

## 2024-06-21 PROCEDURE — 93010 ELECTROCARDIOGRAM REPORT: CPT | Mod: ,,, | Performed by: INTERNAL MEDICINE

## 2024-06-21 PROCEDURE — 82746 ASSAY OF FOLIC ACID SERUM: CPT

## 2024-06-21 PROCEDURE — A9567 TECHNETIUM TC-99M AEROSOL: HCPCS | Performed by: EMERGENCY MEDICINE

## 2024-06-21 PROCEDURE — 84484 ASSAY OF TROPONIN QUANT: CPT | Performed by: EMERGENCY MEDICINE

## 2024-06-21 PROCEDURE — 93010 ELECTROCARDIOGRAM REPORT: CPT | Mod: 76,,, | Performed by: INTERNAL MEDICINE

## 2024-06-21 RX ORDER — ONDANSETRON HYDROCHLORIDE 2 MG/ML
4 INJECTION, SOLUTION INTRAVENOUS EVERY 8 HOURS PRN
Status: DISCONTINUED | OUTPATIENT
Start: 2024-06-21 | End: 2024-06-22 | Stop reason: HOSPADM

## 2024-06-21 RX ORDER — OXYCODONE AND ACETAMINOPHEN 10; 325 MG/1; MG/1
1 TABLET ORAL EVERY 4 HOURS PRN
Status: DISCONTINUED | OUTPATIENT
Start: 2024-06-21 | End: 2024-06-21

## 2024-06-21 RX ORDER — POLYETHYLENE GLYCOL 3350 17 G/17G
17 POWDER, FOR SOLUTION ORAL 2 TIMES DAILY
Status: DISCONTINUED | OUTPATIENT
Start: 2024-06-21 | End: 2024-06-22 | Stop reason: HOSPADM

## 2024-06-21 RX ORDER — HYDRALAZINE HYDROCHLORIDE 25 MG/1
25 TABLET, FILM COATED ORAL EVERY 8 HOURS
Status: DISCONTINUED | OUTPATIENT
Start: 2024-06-21 | End: 2024-06-22 | Stop reason: HOSPADM

## 2024-06-21 RX ORDER — HYDRALAZINE HYDROCHLORIDE 25 MG/1
25 TABLET, FILM COATED ORAL
Status: COMPLETED | OUTPATIENT
Start: 2024-06-21 | End: 2024-06-21

## 2024-06-21 RX ORDER — FUROSEMIDE 40 MG/1
80 TABLET ORAL DAILY
Status: DISCONTINUED | OUTPATIENT
Start: 2024-06-22 | End: 2024-06-22 | Stop reason: HOSPADM

## 2024-06-21 RX ORDER — ASPIRIN 81 MG/1
81 TABLET ORAL DAILY
Status: DISCONTINUED | OUTPATIENT
Start: 2024-06-22 | End: 2024-06-22 | Stop reason: HOSPADM

## 2024-06-21 RX ORDER — SENNOSIDES 8.6 MG/1
8.6 TABLET ORAL 2 TIMES DAILY
Status: DISCONTINUED | OUTPATIENT
Start: 2024-06-21 | End: 2024-06-22 | Stop reason: HOSPADM

## 2024-06-21 RX ORDER — DULOXETIN HYDROCHLORIDE 60 MG/1
60 CAPSULE, DELAYED RELEASE ORAL DAILY
Status: DISCONTINUED | OUTPATIENT
Start: 2024-06-22 | End: 2024-06-22 | Stop reason: HOSPADM

## 2024-06-21 RX ORDER — OXYCODONE AND ACETAMINOPHEN 10; 325 MG/1; MG/1
1 TABLET ORAL EVERY 6 HOURS PRN
Status: DISCONTINUED | OUTPATIENT
Start: 2024-06-21 | End: 2024-06-22 | Stop reason: HOSPADM

## 2024-06-21 RX ORDER — NITROGLYCERIN 0.3 MG/1
0.3 TABLET SUBLINGUAL EVERY 5 MIN PRN
Status: DISCONTINUED | OUTPATIENT
Start: 2024-06-21 | End: 2024-06-22 | Stop reason: HOSPADM

## 2024-06-21 RX ORDER — BUTALBITAL, ACETAMINOPHEN AND CAFFEINE 50; 325; 40 MG/1; MG/1; MG/1
1 TABLET ORAL EVERY 8 HOURS PRN
Status: DISCONTINUED | OUTPATIENT
Start: 2024-06-21 | End: 2024-06-22 | Stop reason: HOSPADM

## 2024-06-21 RX ORDER — ISOSORBIDE MONONITRATE 60 MG/1
60 TABLET, EXTENDED RELEASE ORAL 2 TIMES DAILY
Status: DISCONTINUED | OUTPATIENT
Start: 2024-06-21 | End: 2024-06-22 | Stop reason: HOSPADM

## 2024-06-21 RX ORDER — TIZANIDINE 2 MG/1
4 TABLET ORAL EVERY 8 HOURS PRN
Status: DISCONTINUED | OUTPATIENT
Start: 2024-06-21 | End: 2024-06-22 | Stop reason: HOSPADM

## 2024-06-21 RX ORDER — HYDROXYZINE HYDROCHLORIDE 25 MG/1
25 TABLET, FILM COATED ORAL 3 TIMES DAILY PRN
Status: DISCONTINUED | OUTPATIENT
Start: 2024-06-21 | End: 2024-06-22 | Stop reason: HOSPADM

## 2024-06-21 RX ORDER — TALC
6 POWDER (GRAM) TOPICAL NIGHTLY PRN
Status: DISCONTINUED | OUTPATIENT
Start: 2024-06-21 | End: 2024-06-22 | Stop reason: HOSPADM

## 2024-06-21 RX ORDER — ISOSORBIDE MONONITRATE 60 MG/1
60 TABLET, EXTENDED RELEASE ORAL
Status: COMPLETED | OUTPATIENT
Start: 2024-06-21 | End: 2024-06-21

## 2024-06-21 RX ORDER — LOSARTAN POTASSIUM 50 MG/1
100 TABLET ORAL DAILY
Status: DISCONTINUED | OUTPATIENT
Start: 2024-06-22 | End: 2024-06-22 | Stop reason: HOSPADM

## 2024-06-21 RX ORDER — SODIUM CHLORIDE 0.9 % (FLUSH) 0.9 %
10 SYRINGE (ML) INJECTION
Status: DISCONTINUED | OUTPATIENT
Start: 2024-06-21 | End: 2024-06-22 | Stop reason: HOSPADM

## 2024-06-21 RX ORDER — ATORVASTATIN CALCIUM 40 MG/1
80 TABLET, FILM COATED ORAL NIGHTLY
Status: DISCONTINUED | OUTPATIENT
Start: 2024-06-21 | End: 2024-06-22 | Stop reason: HOSPADM

## 2024-06-21 RX ADMIN — HYDRALAZINE HYDROCHLORIDE 25 MG: 25 TABLET ORAL at 11:06

## 2024-06-21 RX ADMIN — KIT FOR THE PREPARATION OF TECHNETIUM TC 99M ALBUMIN AGGREGATED 5.5 MILLICURIE: 2 INJECTION, POWDER, LYOPHILIZED, FOR SUSPENSION INTRAPERITONEAL; INTRAVENOUS at 03:06

## 2024-06-21 RX ADMIN — POLYETHYLENE GLYCOL 3350 17 G: 17 POWDER, FOR SOLUTION ORAL at 07:06

## 2024-06-21 RX ADMIN — ISOSORBIDE MONONITRATE 60 MG: 60 TABLET, EXTENDED RELEASE ORAL at 08:06

## 2024-06-21 RX ADMIN — ATORVASTATIN CALCIUM 80 MG: 40 TABLET, FILM COATED ORAL at 08:06

## 2024-06-21 RX ADMIN — ISOSORBIDE MONONITRATE 60 MG: 60 TABLET, EXTENDED RELEASE ORAL at 11:06

## 2024-06-21 RX ADMIN — KIT FOR THE PREPARATION OF TECHNETIUM TC 99M PENTETATE 45 MILLICURIE: 20 INJECTION, POWDER, LYOPHILIZED, FOR SOLUTION INTRAVENOUS; RESPIRATORY (INHALATION) at 03:06

## 2024-06-21 RX ADMIN — SENNOSIDES 8.6 MG: 8.6 TABLET, FILM COATED ORAL at 07:06

## 2024-06-21 RX ADMIN — HYDRALAZINE HYDROCHLORIDE 25 MG: 25 TABLET ORAL at 09:06

## 2024-06-21 NOTE — ED PROVIDER NOTES
Encounter Date: 6/21/2024       History     Chief Complaint   Patient presents with    Chest Pain     CP x1 hr PTA while sitting on toilet. 10/10 crushing pain. Scheduled for aortic valve replacement on July 2. 324mg aspirin and 2 nitro in route. Took home nitro prior to EMS arrival as well.      79F with a PMHx Aortic Stenosis (scheduled valve replacement on 7/02/24), CKD, CAD, HLD, HTN, NISHA presents to the ED for chest pain that began this morning. Chest pain began when sitting up out of bed. Described as feeling like a punch to the chest and like pulling a muscle. The pain was 10/10, located in the inferior central/left side of the chest, radiating to the back/shoulders. She took nitro with no relief. The pain persisted for 10 minutes. She took an Oxycodone and called EMS. Oxycodone provided relief. Additional symptoms that came on with the chest pain include SOB, palpitations, nausea, and lightheadedness. She says she's never had pain like this before. She describes her past MI's as different pain with more severe lightheadedness and diaphoresis. She denies vomiting, abdominal pain, urinary sx, bowel sx, fevers/chills, diaphoresis.    En route she received ASA 324mg and Nitro x2. On initial evaluation she denies pain while lying down in bed. It did not worsen when she sat up in bed.        Review of patient's allergies indicates:   Allergen Reactions    Iodinated contrast media Anaphylaxis and Other (See Comments)    Nsaids (non-steroidal anti-inflammatory drug)      ADWOA    Phenergan [promethazine]      Vomiting     Past Medical History:   Diagnosis Date    Aortic atherosclerosis 6/14/2023    CKD (chronic kidney disease) stage 4, GFR 15-29 ml/min     Coronary artery disease     Edema     Epilepsy     Generalized anxiety disorder 12/20/2021    Hematuria, unspecified     Hematuria, unspecified     Hyperlipidemia     Hypertension     Iron deficiency anemia     Moderate episode of recurrent major depressive disorder      Nonrheumatic aortic valve stenosis 12/20/2021    Normocytic anemia     Prediabetes 2/24/2022     Past Surgical History:   Procedure Laterality Date    ANGIOGRAM, CORONARY, WITH LEFT HEART CATHETERIZATION N/A 6/12/2024    Procedure: Angiogram, Coronary, with Left Heart Cath;  Surgeon: oJsé Carreon MD;  Location: [facility] CATH LAB/EP;  Service: Cardiology;  Laterality: N/A;    APPENDECTOMY      BACK SURGERY      CORONARY STENT PLACEMENT      HYSTERECTOMY      REVISION OF KNEE ARTHROPLASTY Left 7/18/2022    Procedure: REVISION, ARTHROPLASTY, KNEE;  Surgeon: Stan Catalan MD;  Location: [facility] OR;  Service: Orthopedics;  Laterality: Left;    TONSILLECTOMY       Family History   Problem Relation Name Age of Onset    Heart disease Mother      Heart disease Father       Social History     Tobacco Use    Smoking status: Never    Smokeless tobacco: Never   Substance Use Topics    Alcohol use: No    Drug use: No     Review of Systems   Constitutional:  Negative for chills, diaphoresis and fever.   Eyes:  Negative for visual disturbance.   Respiratory:  Positive for shortness of breath.    Cardiovascular:  Positive for chest pain and palpitations. Negative for leg swelling.   Gastrointestinal:  Positive for nausea. Negative for abdominal pain and vomiting.   Genitourinary:  Negative for difficulty urinating and dysuria.   Neurological:  Negative for headaches.       Physical Exam     Initial Vitals [06/21/24 0648]   BP Pulse Resp Temp SpO2   112/73 80 16 98.1 °F (36.7 °C) 95 %      MAP       --         Physical Exam    Constitutional: She appears well-developed and well-nourished. She is not diaphoretic. No distress.   HENT:   Head: Normocephalic and atraumatic.   Eyes: Conjunctivae are normal.   Cardiovascular:  Normal rate and regular rhythm.           Murmur heard.  Pulmonary/Chest: Breath sounds normal. No respiratory distress. She exhibits no tenderness.   Abdominal: Abdomen is soft. She exhibits no distension. There  is no abdominal tenderness. There is no guarding.   Musculoskeletal:         General: Edema (trace, bilateral lower extremities) present.     Neurological: She is alert and oriented to person, place, and time.   Skin: Skin is warm and dry.   Psychiatric: She has a normal mood and affect.         ED Course   Procedures  Labs Reviewed   CBC W/ AUTO DIFFERENTIAL - Abnormal; Notable for the following components:       Result Value    RBC 2.74 (*)     Hemoglobin 9.0 (*)     Hematocrit 28.1 (*)      (*)     MCH 32.8 (*)     Mono # 1.1 (*)     Mono % 15.3 (*)     All other components within normal limits   COMPREHENSIVE METABOLIC PANEL - Abnormal; Notable for the following components:    BUN 24 (*)     Albumin 3.2 (*)     eGFR 46.0 (*)     All other components within normal limits   HIV 1 / 2 ANTIBODY    Narrative:     Release to patient->Immediate   HEPATITIS C ANTIBODY    Narrative:     Release to patient->Immediate   TROPONIN I   B-TYPE NATRIURETIC PEPTIDE   D DIMER, QUANTITATIVE   FOLATE   VITAMIN B12   IRON AND TIBC   TROPONIN I     EKG Readings: (Independently Interpreted)   Initial Reading: No STEMI. Previous EKG: Compared with most recent EKG Rhythm: Sinus Bradycardia. Heart Rate: 59. ST Segments: Normal ST Segments. T Waves Flipped: AVL.     ECG Results              EKG 12-lead (Final result)        Collection Time Result Time QRS Duration [facility] QTC Calculation    06/21/24 08:22:34 06/21/24 08:57:07 74 433                     Final result by Interface, Lab In [facility] (06/21/24 08:57:13)                   Narrative:    Test Reason : R07.9,    Vent. Rate : 059 BPM     Atrial Rate : 059 BPM     P-R Int : 204 ms          QRS Dur : 074 ms      QT Int : 438 ms       P-R-T Axes : 080 067 071 degrees     QTc Int : 433 ms    Sinus bradycardia  Otherwise normal ECG  When compared with ECG of 21-JUN-2024 08:20,  Premature ventricular complexes are no longer Present  QT has shortened  Confirmed by Tevin SALVADOR MD ([id number]) on  6/21/2024 8:57:01 AM    Referred By: ISAAC   SELF           Confirmed By:Tevin SALVADOR MD                                     EKG 12-lead (Final result)        Collection Time Result Time QRS Duration [facility] QTC Calculation    06/21/24 08:20:15 06/21/24 08:59:43 86 489                     Final result by Interface, Lab In [facility] (06/21/24 08:59:49)                   Narrative:    Test Reason : R07.9,    Vent. Rate : 071 BPM     Atrial Rate : 057 BPM     P-R Int : 170 ms          QRS Dur : 086 ms      QT Int : 450 ms       P-R-T Axes : 074 069 072 degrees     QTc Int : 489 ms    Sinus bradycardia with marked artifact  Nonspecific ST abnormality  Abnormal ECG  When compared with ECG of 12-JUN-2024 13:00,  Sinus rhythm has replaced Junctional rhythm  Slight ST elevation now present in Inferior leads  Non-specific change in ST segment in Anterior leads  Nonspecific T wave abnormality no longer evident in Inferior leads  Confirmed by Tevin SALVADOR MD ([id number]) on 6/21/2024 8:59:39 AM    Referred By: ISAAC   SELF           Confirmed By:Tevin SALVADOR MD                                  Imaging Results              X-Ray Chest AP Portable (Final result)  Result time 06/21/24 09:08:16      Final result by Barron Mcintosh MD (06/21/24 09:08:16)                   Impression:      See above      Electronically signed by: Barron Mcintosh MD  Date:    06/21/2024  Time:    09:08               Narrative:    EXAMINATION:  XR CHEST AP PORTABLE    CLINICAL HISTORY:  Chest pain, unspecified    TECHNIQUE:  Single frontal view of the chest was performed.    COMPARISON:  05/04/2024 none    FINDINGS:  Heart size normal.  No significant airspace consolidation or pleural effusion identified                                       Medications   hydrALAZINE tablet 25 mg (has no administration in time range)   isosorbide mononitrate 24 hr tablet 60 mg (has no administration in time range)     Medical Decision Making  79F presenting with chest pain.  Leading differentials include angina/ACS given risk factors, costochondritis, gastro-esophageal reflux.   In the setting of hemodynamic instability, diagnoses such as aortic dissection, PE, & pneumothorax are less likely.    HGB 9, down from 12 10 days ago. Pt denies black/bloody bowel movements. Reports recent constipation (last BM 2 days ago) and has noticed small blood streaks on toilet paper after straining for BM but denies blood in the stool itself. Additional labs for anemia work up ordered for later follow up.    Home AM BP meds given, as patient did not take at home.    Chest pain that has now resolved and has not recurred, initial troponin, BNP negative. In this patient with multiple cardiac risk factors and HEART score of 5, Will place in EDOU for observation and further cardiac risk factor stratification.    Amount and/or Complexity of Data Reviewed  Labs: ordered.  Radiology: ordered.    Risk  Prescription drug management.      Additional MDM:   Heart Score:    History:          Slightly suspicious.  ECG:             Nonspecific repolarisation disturbance  Age:               >65 years  Risk factors: >= 3 risk factors or history of atherosclerotic disease  Troponin:       Less than or equal to normal limit  Heart Score = 5               Attending Attestation:   Physician Attestation Statement for Resident:  As the supervising MD   Physician Attestation Statement: I have personally seen and examined this patient.   I agree with the above history.  -: Patient with a recent left heart catheterization 2 weeks ago with nonobstructive coronary artery disease and some in stent restenosis presents with acute chest pain this morning.  It was prior to going to the bathroom.  Epigastric in location.  Quite severe at onset but has since improved after receiving aspirin and nitroglycerin with EMS.  EKG with T-wave inversion in aVL which has been present previous LEEP.  Initial troponin was normal.  BNP normal.  Chest  x-ray negative for acute process.  She had some calf tenderness and a D-dimer was obtained.  Her hemoglobin is down at 9.0 from 12.611 days ago.  Uncertain etiology.  Her MCV is actually elevated.  She reported some trace red blood with wiping on the toilet paper but no black or bloody stools.  Does not account for this degree of a hemoglobin drop.  Given the elevated MCV, folate and B12 levels were sent.  She was hypertensive and her home antihypertensives were ordered.  Given the severity of her symptoms despite the reassuring troponin, in the setting of known CAD, will place into ED observation unit for troponin trending and further cardiac risk stratification.  Given recent left heart catheterization, I recommend Cardiology consult once in EDOU.   As the supervising MD I agree with the above PE.     As the supervising MD I agree with the above treatment, course, plan, and disposition.                                           Clinical Impression:  Final diagnoses:  [R07.9] Chest pain (Primary)  [D64.9] Anemia, unspecified type  [I10] Hypertension, unspecified type          ED Disposition Condition    Observation Stable                Twin Correa MD  Resident  06/21/24 1020       Carter Baugh MD  06/21/24 1100

## 2024-06-21 NOTE — ED TRIAGE NOTES
Enedelia García, a 79 y.o. female presents to the ED w/ complaint of chest pain. Pt states this morning she woke up with chest pain. States she also felt lightheaded but denies feeling these symptoms now.     Triage note:  Chief Complaint   Patient presents with    Chest Pain     CP x1 hr PTA while sitting on toilet. 10/10 crushing pain. Scheduled for aortic valve replacement on July 2. 324mg aspirin and 2 nitro in route. Took home nitro prior to EMS arrival as well.      Review of patient's allergies indicates:   Allergen Reactions    Iodinated contrast media Anaphylaxis and Other (See Comments)    Nsaids (non-steroidal anti-inflammatory drug)      ADWOA    Phenergan [promethazine]      Vomiting     Past Medical History:   Diagnosis Date    Aortic atherosclerosis 6/14/2023    CKD (chronic kidney disease) stage 4, GFR 15-29 ml/min     Coronary artery disease     Edema     Epilepsy     Generalized anxiety disorder 12/20/2021    Hematuria, unspecified     Hematuria, unspecified     Hyperlipidemia     Hypertension     Iron deficiency anemia     Moderate episode of recurrent major depressive disorder     Nonrheumatic aortic valve stenosis 12/20/2021    Normocytic anemia     Prediabetes 2/24/2022

## 2024-06-21 NOTE — PLAN OF CARE
[facility] Dept  Initial Discharge Assessment       Primary Care Provider: Shelton Mason MD    Admission Diagnosis: Chest pain [R07.9]    Admission Date: 6/21/2024  Expected Discharge Date:     Pt stated she uses a rollator to assist with ambulation and is independent with her ADLs'.      Pt stated she is getting aortic valve surgery on July 2.  Pt stated that she does not require any post acute services at this time but after her surgery may require some services.     Pt to d/c home with no needs when ready    Transition of Care Barriers: (P) None    Payor: [employer] [facility] / Plan: [employer] / Product Type: Medicare Advantage /     Extended Emergency Contact Information  Primary Emergency Contact: Laya Garcia   [facility]  Home Phone: [phone]  Relation: Daughter  Secondary Emergency Contact: Nick Gross   [place]  Mobile Phone: [phone]  Relation: Significant other    Discharge Plan A: (P) Home  Discharge Plan B: (P) Home      [facility] - [place] - [address] [facility] [address]  Phone: [phone] Fax: [phone]      Initial Assessment (most recent)       Adult Discharge Assessment - 06/21/24 1147          Discharge Assessment    Assessment Type Discharge Planning Assessment     Confirmed/corrected address, phone number and insurance Yes     Confirmed Demographics Correct on Facesheet     Source of Information patient     Communicated ISAURA with patient/caregiver Yes     People in Home significant other     Facility Arrived From: home     Do you expect to return to your current living situation? Yes     Do you have help at home or someone to help you manage your care at home? No     Prior to hospitilization cognitive status: Alert/Oriented;No Deficits     Current cognitive status: No Deficits;Alert/Oriented     Walking or Climbing Stairs Difficulty yes (P)      Walking or Climbing Stairs ambulation  difficulty, requires equipment (P)      Mobility Management rollator (P)      Dressing/Bathing Difficulty no     Home Accessibility stairs to enter home (P)      Number of Stairs, Main Entrance one (P)      Home Layout Able to live on 1st floor (P)      Equipment Currently Used at Home rollator (P)      Patient currently being followed by outpatient case management? No (P)      Do you currently have service(s) that help you manage your care at home? No (P)      Do you have any problems affording any of your prescribed medications? No (P)      Is the patient taking medications as prescribed? yes (P)      Who is going to help you get home at discharge? family/friends (P)      How do you get to doctors appointments? car, drives self (P)      Are you on dialysis? No (P)      Do you take coumadin? No (P)      Discharge Plan A Home (P)      Discharge Plan B Home (P)      DME Needed Upon Discharge  none (P)      Discharge Plan discussed with: Patient (P)      Transition of Care Barriers None (P)         Physical Activity    On average, how many days per week do you engage in moderate to strenuous exercise (like a brisk walk)? 0 days (P)      On average, how many minutes do you engage in exercise at this level? 0 min (P)         Financial Resource Strain    How hard is it for you to pay for the very basics like food, housing, medical care, and heating? Not very hard (P)         Housing Stability    In the last 12 months, was there a time when you were not able to pay the mortgage or rent on time? No (P)      At any time in the past 12 months, were you homeless or living in a shelter (including now)? No (P)         Transportation Needs    Has the lack of transportation kept you from medical appointments, meetings, work or from getting things needed for daily living? No (P)         Food Insecurity    Within the past 12 months, you worried that your food would run out before you got the money to buy more. Never true (P)       Within the past 12 months, the food you bought just didn't last and you didn't have money to get more. Never true (P)         Stress    Do you feel stress - tense, restless, nervous, or anxious, or unable to sleep at night because your mind is troubled all the time - these days? To some extent (P)         Social Isolation    How often do you feel lonely or isolated from those around you?  Rarely (P)         Alcohol Use    Q1: How often do you have a drink containing alcohol? Never (P)      Q2: How many drinks containing alcohol do you have on a typical day when you are drinking? Patient does not drink (P)      Q3: How often do you have six or more drinks on one occasion? Never (P)         Utilities    In the past 12 months has the electric, gas, oil, or water company threatened to shut off services in your home? No (P)         Health Literacy    How often do you need to have someone help you when you read instructions, pamphlets, or other written material from your doctor or pharmacy? Rarely (P)         OTHER    Name(s) of People in Home significant other Nick (P)                       Kasandra Harris CD, MSW, LMSW, RSW   Case Management  [facility]  Email: [email or web address]

## 2024-06-21 NOTE — ED NOTES
ASHIA Gordon notified of last patient BM was 3 days ago. Pt asking for laxative; awaiting orders at this time.

## 2024-06-21 NOTE — H&P
ED Observation Unit  History and Physical      I assumed care of this patient from the Main ED at onset of observation time, 0959 on 06/21/2024.       History of Present Illness:    79F with a PMHx Aortic Stenosis (scheduled valve replacement on 7/02/24), CKD, CAD, HLD, HTN, NISHA presents to the ED for chest pain that began this morning. Chest pain began when sitting up out of bed. Described as feeling like a punch to the chest and like pulling a muscle. The pain was 10/10, located in the inferior central/left side of the chest, radiating to the back/shoulders. She took nitro with no relief. The pain persisted for 10 minutes. She took an Oxycodone and called EMS.  She received 2 additional doses of nitroglycerin with EMS.  She also notes that she took an oxycodone for the pain.  She did ultimately get relief of the pain.  Additional symptoms that came on with the chest pain include SOB, palpitations, nausea, and lightheadedness. She says she's never had pain like this before. She describes her past MI's as different pain with more severe lightheadedness and diaphoresis. She denies vomiting, abdominal pain, urinary sx, bowel sx, fevers/chills, diaphoresis.  Patient does report a persistent heaviness in her chest but states that this has been present for a long time.  She has discussed this symptom with her cardiologist who attributes it to her severe aortic stenosis.      En route she received ASA 324mg and Nitro x2. On initial evaluation she denies pain while lying down in bed. It did not worsen when she sat up in bed.    I reviewed the ED Provider Note dated 06/21/2024  prior to my evaluation of this patient.  I reviewed all labs and imaging performed in the Main ED, prior to patient being placed in Observation. Patient was placed in the ED Observation Unit for Chest pain.    PMHx   Past Medical History:   Diagnosis Date    Aortic atherosclerosis 6/14/2023    CKD (chronic kidney disease) stage 4, GFR 15-29 ml/min      Coronary artery disease     Edema     Epilepsy     Generalized anxiety disorder 12/20/2021    Hematuria, unspecified     Hematuria, unspecified     Hyperlipidemia     Hypertension     Iron deficiency anemia     Moderate episode of recurrent major depressive disorder     Nonrheumatic aortic valve stenosis 12/20/2021    Normocytic anemia     Prediabetes 2/24/2022      Past Surgical History:   Procedure Laterality Date    ANGIOGRAM, CORONARY, WITH LEFT HEART CATHETERIZATION N/A 6/12/2024    Procedure: Angiogram, Coronary, with Left Heart Cath;  Surgeon: José Carreon MD;  Location: [facility] CATH LAB/EP;  Service: Cardiology;  Laterality: N/A;    APPENDECTOMY      BACK SURGERY      CORONARY STENT PLACEMENT      HYSTERECTOMY      REVISION OF KNEE ARTHROPLASTY Left 7/18/2022    Procedure: REVISION, ARTHROPLASTY, KNEE;  Surgeon: Stan Catalan MD;  Location: [facility] OR;  Service: Orthopedics;  Laterality: Left;    TONSILLECTOMY          Family Hx   Family History   Problem Relation Name Age of Onset    Heart disease Mother      Heart disease Father          Social Hx   Social History     Socioeconomic History    Marital status: Significant Other   Tobacco Use    Smoking status: Never    Smokeless tobacco: Never   Substance and Sexual Activity    Alcohol use: No    Drug use: No    Sexual activity: Not Currently     Social Determinants of Health     Financial Resource Strain: Low Risk  (6/21/2024)    Overall Financial Resource Strain (CARDIA)     Difficulty of Paying Living Expenses: Not very hard   Food Insecurity: No Food Insecurity (6/21/2024)    Hunger Vital Sign     Worried About Running Out of Food in the Last Year: Never true     Ran Out of Food in the Last Year: Never true   Transportation Needs: No Transportation Needs (6/21/2024)    TRANSPORTATION NEEDS     Transportation : No   Physical Activity: Inactive (6/21/2024)    Exercise Vital Sign     Days of Exercise per Week: 0 days     Minutes of Exercise per Session:  0 min   Stress: Stress Concern Present (6/21/2024)    [employer] [place] [employer] - Occupational Stress Questionnaire     Feeling of Stress : To some extent   Housing Stability: Low Risk  (6/21/2024)    Housing Stability Vital Sign     Unable to Pay for Housing in the Last Year: No     Homeless in the Last Year: No        Vital Signs   Vitals:    06/21/24 1301 06/21/24 1317 06/21/24 1332 06/21/24 1426   BP: (!) 166/73 (!) 168/72 (!) 173/72 (!) 155/63   Pulse: 68 66 75 71   Resp:  15 20 18   Temp:    98.2 °F (36.8 °C)   TempSrc:    Oral   SpO2: 98% 100% 99% 100%   Weight:       Height:            Review of Systems  Review of Systems   Cardiovascular:  Positive for chest pain (soreness).       Physical Exam  Physical Exam  Vitals reviewed.   Constitutional:       General: She is not in acute distress.     Appearance: She is not diaphoretic.      Comments: Patient is pleasant and conversant   HENT:      Head: Normocephalic and atraumatic.   Cardiovascular:      Rate and Rhythm: Normal rate and regular rhythm.      Heart sounds: Heart sounds not distant. Murmur heard.      Systolic murmur is present.      No friction rub. No gallop.   Pulmonary:      Effort: Pulmonary effort is normal. No respiratory distress.      Breath sounds: Normal breath sounds. No decreased breath sounds, wheezing, rhonchi or rales.   Chest:      Chest wall: Tenderness present.      Comments: There is mild tenderness to the central and slightly left parasternal chest.   Musculoskeletal:      Cervical back: Neck supple.   Skin:     General: Skin is warm and dry.   Neurological:      Mental Status: She is alert.   Psychiatric:         Mood and Affect: Mood and affect normal.         Medications:   Scheduled Meds:   [START ON 6/22/2024] aspirin  81 mg Oral Daily    atorvastatin  80 mg Oral QHS    [START ON 6/22/2024] DULoxetine  60 mg Oral Daily    [START ON 6/22/2024] furosemide  80 mg Oral Daily    hydrALAZINE  25 mg Oral Q8H     isosorbide mononitrate  60 mg Oral BID    [START ON 6/22/2024] losartan  100 mg Oral Daily     Continuous Infusions:  PRN Meds:.  Current Facility-Administered Medications:     butalbital-acetaminophen-caffeine -40 mg, 1 tablet, Oral, Q8H PRN    hydrOXYzine HCL, 25 mg, Oral, TID PRN    melatonin, 6 mg, Oral, Nightly PRN    nitroGLYCERIN, 0.3 mg, Sublingual, Q5 Min PRN    ondansetron, 4 mg, Intravenous, Q8H PRN    oxyCODONE-acetaminophen, 1 tablet, Oral, Q4H PRN    oxyCODONE-acetaminophen, 1 tablet, Oral, Q6H PRN    sodium chloride 0.9%, 10 mL, Intravenous, PRN    sodium chloride 0.9%, 10 mL, Intravenous, PRN    tiZANidine, 4 mg, Oral, Q8H PRN      Assessment/Plan:  Chest pain  Hx CAD with prior stents  - reports pain began when attempting to sit up out of bed.  Severe pain has subsided, but patient still has some soreness and tenderness to palpation.  Possibly MSK in nature.  Unclear if patient's symptoms improved with nitroglycerin or oxycodone- both taken after onset of chest pain.  - initial troponin within normal limits.  Continue to trend to peak or x3.  - D-dimer elevated.  Patient has anaphylactic allergy to iodine contrast.  V/Q scan ordered  - patient had LHC within the past few weeks that showed nonobstructive CAD.  Some restenosis of prior stented vessels.   - cardiology consult given complex cardiac history    Case was discussed with the ED provider, Dr. Baugh

## 2024-06-21 NOTE — ED NOTES
Telemetry Verification   Patient placed on Telemetry Box  Verified on ED monitor  Box [id number]   Rate 70   Rhythm NS

## 2024-06-21 NOTE — ED NOTES
Assumed care for Enedelia García, a 79 y.o. female at this time.    Triage note:  Chief Complaint   Patient presents with    Chest Pain     CP x1 hr PTA while sitting on toilet. 10/10 crushing pain. Scheduled for aortic valve replacement on July 2. 324mg aspirin and 2 nitro in route. Took home nitro prior to EMS arrival as well.      Review of patient's allergies indicates:   Allergen Reactions    Iodinated contrast media Anaphylaxis and Other (See Comments)    Nsaids (non-steroidal anti-inflammatory drug)      ADWOA    Phenergan [promethazine]      Vomiting     Past Medical History:   Diagnosis Date    Aortic atherosclerosis 6/14/2023    CKD (chronic kidney disease) stage 4, GFR 15-29 ml/min     Coronary artery disease     Edema     Epilepsy     Generalized anxiety disorder 12/20/2021    Hematuria, unspecified     Hematuria, unspecified     Hyperlipidemia     Hypertension     Iron deficiency anemia     Moderate episode of recurrent major depressive disorder     Nonrheumatic aortic valve stenosis 12/20/2021    Normocytic anemia     Prediabetes 2/24/2022

## 2024-06-22 VITALS
HEIGHT: 63 IN | TEMPERATURE: 99 F | RESPIRATION RATE: 18 BRPM | HEART RATE: 64 BPM | WEIGHT: 146 LBS | SYSTOLIC BLOOD PRESSURE: 135 MMHG | OXYGEN SATURATION: 99 % | DIASTOLIC BLOOD PRESSURE: 68 MMHG | BODY MASS INDEX: 25.87 KG/M2

## 2024-06-22 PROCEDURE — G0378 HOSPITAL OBSERVATION PER HR: HCPCS

## 2024-06-22 NOTE — PROGRESS NOTES
ED Observation Unit  Progress Note      HPI   79F with a PMHx Aortic Stenosis (scheduled valve replacement on 7/02/24), CKD, CAD, HLD, HTN, NISHA presents to the ED for chest pain that began this morning. Chest pain began when sitting up out of bed. Described as feeling like a punch to the chest and like pulling a muscle. The pain was 10/10, located in the inferior central/left side of the chest, radiating to the back/shoulders. She took nitro with no relief. The pain persisted for 10 minutes. She took an Oxycodone and called EMS.  She received 2 additional doses of nitroglycerin with EMS.  She also notes that she took an oxycodone for the pain.  She did ultimately get relief of the pain.  Additional symptoms that came on with the chest pain include SOB, palpitations, nausea, and lightheadedness. She says she's never had pain like this before. She describes her past MI's as different pain with more severe lightheadedness and diaphoresis. She denies vomiting, abdominal pain, urinary sx, bowel sx, fevers/chills, diaphoresis.  Patient does report a persistent heaviness in her chest but states that this has been present for a long time.  She has discussed this symptom with her cardiologist who attributes it to her severe aortic stenosis.      En route she received ASA 324mg and Nitro x2. On initial evaluation she denies pain while lying down in bed. It did not worsen when she sat up in bed.    Interval History    No acute events. Troponins remained negative x3. V/Q scan negative for PE. Recent cardiac cath report reviewed, non obstructive coronary disease. Pending cardiology consult. Reported constipation, given senna and miralax with good BM.     PMHx   Past Medical History:   Diagnosis Date    Aortic atherosclerosis 6/14/2023    CKD (chronic kidney disease) stage 4, GFR 15-29 ml/min     Coronary artery disease     Edema     Epilepsy     Generalized anxiety disorder 12/20/2021    Hematuria, unspecified     Hematuria,  unspecified     Hyperlipidemia     Hypertension     Iron deficiency anemia     Moderate episode of recurrent major depressive disorder     Nonrheumatic aortic valve stenosis 12/20/2021    Normocytic anemia     Prediabetes 2/24/2022      Past Surgical History:   Procedure Laterality Date    ANGIOGRAM, CORONARY, WITH LEFT HEART CATHETERIZATION N/A 6/12/2024    Procedure: Angiogram, Coronary, with Left Heart Cath;  Surgeon: José Carreon MD;  Location: [facility] CATH LAB/EP;  Service: Cardiology;  Laterality: N/A;    APPENDECTOMY      BACK SURGERY      CORONARY STENT PLACEMENT      HYSTERECTOMY      REVISION OF KNEE ARTHROPLASTY Left 7/18/2022    Procedure: REVISION, ARTHROPLASTY, KNEE;  Surgeon: Stan Catalan MD;  Location: [facility] OR;  Service: Orthopedics;  Laterality: Left;    TONSILLECTOMY          Family Hx   Family History   Problem Relation Name Age of Onset    Heart disease Mother      Heart disease Father          Social Hx   Social History     Socioeconomic History    Marital status: Significant Other   Tobacco Use    Smoking status: Never    Smokeless tobacco: Never   Substance and Sexual Activity    Alcohol use: No    Drug use: No    Sexual activity: Not Currently     Social Determinants of Health     Financial Resource Strain: Low Risk  (6/21/2024)    Overall Financial Resource Strain (CARDIA)     Difficulty of Paying Living Expenses: Not very hard   Food Insecurity: No Food Insecurity (6/21/2024)    Hunger Vital Sign     Worried About Running Out of Food in the Last Year: Never true     Ran Out of Food in the Last Year: Never true   Transportation Needs: No Transportation Needs (6/21/2024)    TRANSPORTATION NEEDS     Transportation : No   Physical Activity: Inactive (6/21/2024)    Exercise Vital Sign     Days of Exercise per Week: 0 days     Minutes of Exercise per Session: 0 min   Stress: Stress Concern Present (6/21/2024)    [employer] [place] [employer] - Occupational Stress Questionnaire      Feeling of Stress : To some extent   Housing Stability: Low Risk  (6/21/2024)    Housing Stability Vital Sign     Unable to Pay for Housing in the Last Year: No     Homeless in the Last Year: No        Vital Signs   Vitals:    06/21/24 1332 06/21/24 1426 06/21/24 1753 06/21/24 2011   BP: (!) 173/72 (!) 155/63 (!) 161/62 (!) 149/62   Pulse: 75 71 65 62   Resp: 20 18 17    Temp:  98.2 °F (36.8 °C) 98.4 °F (36.9 °C) 98.8 °F (37.1 °C)   TempSrc:  Oral Oral Oral   SpO2: 99% 100% 98% 100%   Weight:       Height:            Review of Systems  Review of Systems   Constitutional:  Positive for malaise/fatigue. Negative for chills, diaphoresis and fever.   HENT:  Negative for sore throat.    Eyes:  Negative for double vision.   Respiratory:  Negative for cough, shortness of breath and wheezing.    Cardiovascular:  Positive for chest pain (resolved). Negative for palpitations, orthopnea, leg swelling and PND.   Gastrointestinal:  Negative for abdominal pain, blood in stool, constipation, heartburn, nausea and vomiting.   Genitourinary:  Negative for hematuria.   Musculoskeletal:  Negative for falls and myalgias.   Neurological:  Negative for dizziness, loss of consciousness, weakness and headaches.   Psychiatric/Behavioral:  The patient is not nervous/anxious.        Brief Physical Exam/Reassessment   Physical Exam  Constitutional:       General: She is not in acute distress.     Appearance: She is not ill-appearing or toxic-appearing.   Cardiovascular:      Rate and Rhythm: Normal rate and regular rhythm.      Heart sounds: Murmur heard.   Pulmonary:      Breath sounds: No wheezing or rales.   Abdominal:      General: There is no distension.      Tenderness: There is no abdominal tenderness.   Musculoskeletal:      Right lower leg: No edema.      Left lower leg: No edema.   Neurological:      Mental Status: She is oriented to person, place, and time.      Motor: No weakness.         Labs/Imaging   Labs Reviewed   CBC W/ AUTO  DIFFERENTIAL - Abnormal; Notable for the following components:       Result Value    RBC 2.74 (*)     Hemoglobin 9.0 (*)     Hematocrit 28.1 (*)      (*)     MCH 32.8 (*)     Mono # 1.1 (*)     Mono % 15.3 (*)     All other components within normal limits   COMPREHENSIVE METABOLIC PANEL - Abnormal; Notable for the following components:    BUN 24 (*)     Albumin 3.2 (*)     eGFR 46.0 (*)     All other components within normal limits   D DIMER, QUANTITATIVE - Abnormal; Notable for the following components:    D-Dimer 1.45 (*)     All other components within normal limits   HIV 1 / 2 ANTIBODY    Narrative:     Release to patient->Immediate   HEPATITIS C ANTIBODY    Narrative:     Release to patient->Immediate   TROPONIN I   B-TYPE NATRIURETIC PEPTIDE   FOLATE   VITAMIN B12   IRON AND TIBC   TROPONIN I   TROPONIN I    Narrative:     Collection has been rescheduled by ERROL at 06/21/2024 15:18 Reason:   Patient wants techbro come back later      Imaging Results              NM Lung Scan Ventilation Perfusion (Final result)  Result time 06/21/24 15:44:04      Final result by Suzanne Bradley MD (06/21/24 15:44:04)                   Impression:        No scintigraphic evidence of pulmonary embolism      Electronically signed by: Suzanne Bradley  Date:    06/21/2024  Time:    15:44               Narrative:      EXAMINATION:    NM LUNG VENTILATION AND PERFUSION IMAGING    CLINICAL HISTORY:    78 y/o FemalePulmonary embolism (PE) suspected, positive D-dimer; chest pain    COMPARISON:    Chest Xray today and VQ 01/04/24    TECHNIQUE:    42 mCi of Tc DTPA areosol was administered through nebulizer and multiple images of the both lung fiellds were performed.    Following the IV administration of 5.3 mCi of Tc-99m-MAA right arm, multiple images of the thorax and lungs were obtained in various projections.    FINDINGS:    Ventilation images demonstrate mild heterogeneous distribution of the radiopharmaceutical in both lung  fields with activity noted in the nebulizer, central airway retention  and swallowed activity in the stomach    Perfusion demonstrate similar to heterogeneous uptake in both lung fields with the breast shadow and one hot spot due to clumped radiopharmaceutical at injection. There are no peripheral mismatched subsegmental or segmental perfusion defects to suggest pulmonary embolism                                       X-Ray Chest AP Portable (Final result)  Result time 06/21/24 09:08:16      Final result by Barron Mcintosh MD (06/21/24 09:08:16)                   Impression:      See above      Electronically signed by: Barron Mcintosh MD  Date:    06/21/2024  Time:    09:08               Narrative:    EXAMINATION:  XR CHEST AP PORTABLE    CLINICAL HISTORY:  Chest pain, unspecified    TECHNIQUE:  Single frontal view of the chest was performed.    COMPARISON:  05/04/2024 none    FINDINGS:  Heart size normal.  No significant airspace consolidation or pleural effusion identified                                       I reviewed all labs, imaging, EKGs.     Plan   Chest pain  Hx CAD with prior stents  - reports pain began when attempting to sit up out of bed.  Severe pain has subsided, but patient still has some soreness and tenderness to palpation.  Possibly MSK in nature.  Unclear if patient's symptoms improved with nitroglycerin or oxycodone- both taken after onset of chest pain.   - troponin negative x3  - d-dimer elevated but V/Q scan negative for PE. DVT US negative   - LHC on 06/12/24 showed nonobstructive CAD.  Some restenosis of prior stented vessels.   - cardiology consult given complex cardiac history    I have discussed this case with Kiara Silva PA-C.       Heidi Nunez PA-C  Hospital Medicine [facility]

## 2024-06-22 NOTE — DISCHARGE SUMMARY
ED Observation Unit  Discharge Summary        History of Present Illness:    79F with a PMHx Aortic Stenosis (scheduled valve replacement on 7/02/24), CKD, CAD, HLD, HTN, NISHA presents to the ED for chest pain that began this morning. Chest pain began when sitting up out of bed. Described as feeling like a punch to the chest and like pulling a muscle. The pain was 10/10, located in the inferior central/left side of the chest, radiating to the back/shoulders. She took nitro with no relief. The pain persisted for 10 minutes. She took an Oxycodone and called EMS.  She received 2 additional doses of nitroglycerin with EMS.  She also notes that she took an oxycodone for the pain.  She did ultimately get relief of the pain.  Additional symptoms that came on with the chest pain include SOB, palpitations, nausea, and lightheadedness. She says she's never had pain like this before. She describes her past MI's as different pain with more severe lightheadedness and diaphoresis. She denies vomiting, abdominal pain, urinary sx, bowel sx, fevers/chills, diaphoresis.  Patient does report a persistent heaviness in her chest but states that this has been present for a long time.  She has discussed this symptom with her cardiologist who attributes it to her severe aortic stenosis.      En route she received ASA 324mg and Nitro x2. On initial evaluation she denies pain while lying down in bed. It did not worsen when she sat up in bed.    Observation Course:    Troponins trended and remained negative x3. Patient had a recent cardiac cath on 06/21/2024 which revealed non-obstructive CAD. Discussed with on call cardiology, low suspicion for cardiac origin of chest pain. Pain was reproducible on exam, minimal inpatient workup to be added. Does have aortic stenosis and is scheduled for valve replacement with Dr. Morgan in 2 weeks. D-dimer elevated, VQ scan and LE  US negative for PE/DVT. Chest pain resolved during hospital course.  Advised  to follow up with her cardiologist and PCP for further monitoring and workup. No medication changes made.    On day of discharge patient's vital signs were stable and patient appeared clinically ready for discharge. Hospital course, discharge plan and return precautions discussed - patient expressed understanding. All questions answered at that time.     Consultants:    cardiology    Final Diagnosis:  Chest pain, non-ischemic     Discharge Condition: Good    Disposition: Home or Self Care     Time spent on the discharge of the patient including review of hospital course with the patient. reviewing discharge medications and arranging follow-up care 35 minutes.  Patient was seen and examined on the date of discharge and determined to be suitable for discharge.    Follow Up:  Future Appointments   Date Time Provider Department Center   7/2/2024  6:00 AM 3PREP, [facility] SSCU [facility]   8[facility]12/2024  [facility]   8/12/2024  9:15 AM LAB, APPOINTMENT [facility] LAB VNP [facility]   8/12/2024 10:00 AM José Lee MD [facility] CARDVAL [facility]   8/21/2024  7:15 AM [facility] US2 [facility] USOUNDO [facility]   8/27/2024 11:00 AM Patricia Rogers MD [facility]   9/9/2024  1:00 PM Althea Allen MD [facility]         Heidi Nunez PA-C  Hospital Medicine [facility]

## 2024-06-24 ENCOUNTER — PATIENT OUTREACH (OUTPATIENT)
Dept: ADMINISTRATIVE | Facility: CLINIC | Age: 79
End: 2024-06-24
Payer: MEDICARE

## 2024-06-29 DIAGNOSIS — F43.21 ADJUSTMENT DISORDER WITH DEPRESSED MOOD: ICD-10-CM

## 2024-07-01 ENCOUNTER — ANESTHESIA EVENT (OUTPATIENT)
Dept: MEDSURG UNIT | Facility: HOSPITAL | Age: 79
End: 2024-07-01
Payer: MEDICARE

## 2024-07-01 RX ORDER — HYDROXYZINE HYDROCHLORIDE 25 MG/1
TABLET, FILM COATED ORAL
Qty: 30 TABLET | Refills: 2 | Status: SHIPPED | OUTPATIENT
Start: 2024-07-01

## 2024-07-01 NOTE — ANESTHESIA PREPROCEDURE EVALUATION
[facility]  Anesthesia Pre-Operative Evaluation         Patient Name: Enedelia García  YOB: 1945  MRN: [id number]    SUBJECTIVE:     Pre-operative evaluation for Procedure(s) (LRB):  REPLACEMENT, AORTIC VALVE, TRANSCATHETER (TAVR) (N/A)     07/01/2024    Enedelia García is a 79 y.o. female w/ a significant PMHx of  CAD (stents to RCA and LAD), GERD, HLD, HTN, ALEXANDRO, CKD4, carotid stenosis depression, and severe aortic stenosis now presents for the above procedure(s). Of note, pt recently admitted 2/2 to chest pain. Trop negative x3 and pain possibly 2/2 to MSK in nature. Pt seen by Dr. Philip and ruled out as surgical candidate.     LHC: 6/12/24  1.Non-obstructive CAD  2.RCA stent patent with mild ISR   3.LAD is tortuous, stent patent mild- moderate ISR    TTE: 5/9/24  Aortic Valve: The aortic valve is a trileaflet valve. There is severe stenosis. Aortic valve area by VTI is 0.72 cm². Aortic valve peak velocity is 3.94 m/s. Mean gradient is 35 mmHg. The dimensionless index is 0.23. There is mild aortic regurgitation.    Left Ventricle: The left ventricle is moderately dilated. Normal wall thickness. There is normal systolic function with a visually estimated ejection fraction of 60 - 65%. Grade I diastolic dysfunction.    Right Ventricle: Normal right ventricular cavity size. Systolic function is normal.    Left Atrium: Left atrium is mildly dilated.    Tricuspid Valve: There is mild regurgitation with a centrally directed jet.    Pulmonary Artery: There is mild to moderate pulmonary hypertension. The estimated pulmonary artery systolic pressure is 46 mmHg.    IVC/SVC: Normal venous pressure at 3 mmHg.    LDA:  None documented     Prev airway: None documented.    Drips: None documented.    Patient Active Problem List   Diagnosis    Chest pain    Coronary artery disease involving native coronary artery of native heart without angina pectoris    H/O esophageal spasm     Hyperlipidemia    Chronic pain    GERD (gastroesophageal reflux disease)    Moderate episode of recurrent major depressive disorder    Alteration in skin integrity related to surgical incision    Presence of stent in coronary artery in patient with coronary artery disease    Caregiver with fatigue    Status post revision of total replacement of left knee    Acquired scoliosis    Nonrheumatic aortic valve stenosis    Arthritis    Primary hypertension    Osteoarthritis of knee    Chronic low back pain    Generalized anxiety disorder    Stage 4 chronic kidney disease    Primary osteoarthritis of both first carpometacarpal joints    Risk for falls    Positive MELISA (antinuclear antibody)    Generalized osteoarthritis    Prediabetes    Narcotic drug use    Osteopenia of multiple sites    Blister of right leg without infection, initial encounter    Acute pain of left knee    Decreased range of motion (ROM) of left knee    Decreased strength involving knee joint    Anemia of chronic disease    Infection of left knee    Foreign body of left knee with infection    Surgical wound, non healing    Seizure-like activity    Chronic diastolic heart failure    Aortic atherosclerosis    Skin tear of left lower leg without complication    Stenosis of left carotid artery    Dizziness    D-dimer, elevated    Tremor    Severe episode of recurrent major depressive disorder, with psychotic features    Secondary hyperparathyroidism of renal origin    Sacroiliitis, not elsewhere classified    Non-pressure chronic ulcer of buttock limited to breakdown of skin    Herpes zoster with complication       Review of patient's allergies indicates:   Allergen Reactions    Iodinated contrast media Anaphylaxis and Other (See Comments)    Nsaids (non-steroidal anti-inflammatory drug)      ADWOA    Phenergan [promethazine]      Vomiting       Current Outpatient Medications:  No current facility-administered medications for this encounter.    Current  Outpatient Medications:     aspirin (ECOTRIN) 81 MG EC tablet, Take 81 mg by mouth once daily., Disp: , Rfl:     BOTOX 100 unit SolR, Inject 100 Units into the skin once., Disp: , Rfl:     butalbital-acetaminophen-caffeine -40 mg (FIORICET, ESGIC) -40 mg per tablet, Take 1 tablet by mouth every 8 (eight) hours as needed for Headaches., Disp: , Rfl:     calcium carbonate/vitamin D3 (VITAMIN D-3 ORAL), Take 1 tablet by mouth once daily., Disp: , Rfl:     cimetidine (TAGAMET) 300 MG tablet, Take 1 tablet (300 mg total) by mouth 3 (three) times daily. Take first dose 6pm prior to the procedure, followed by the second dose at 11pm and the third dose at 6am the morning of the procedure for 3 doses, Disp: 3 tablet, Rfl: 0    citalopram (CELEXA) 10 MG tablet, Take 10 mg by mouth once daily., Disp: , Rfl:     clotrimazole-betamethasone 1-0.05% (LOTRISONE) cream, Apply topically 2 (two) times daily., Disp: 45 g, Rfl: 1    DULoxetine (CYMBALTA) 60 MG capsule, Take 1 capsule (60 mg total) by mouth once daily., Disp: 90 capsule, Rfl: 0    famotidine (PEPCID) 20 MG tablet, Take 2 tablets (40 mg total) by mouth 3 (three) times daily as needed (take first dose 6 pm day before procedure, seconond dose 11 pm night prior to procedure and third dose 6 am day of procedure)., Disp: 3 tablet, Rfl: 0    FEROSUL 325 mg (65 mg iron) Tab tablet, Take 325 mg by mouth once daily., Disp: , Rfl:     fluocinolone and shower cap 0.01 % Oil, Apply 0.01 % topically every evening., Disp: , Rfl:     furosemide (LASIX) 40 MG tablet, Take 2 tablets (80 mg total) by mouth 2 (two) times daily as needed (swelling)., Disp: 360 tablet, Rfl: 3    hydrALAZINE (APRESOLINE) 25 MG tablet, Take 25 mg by mouth every 8 (eight) hours., Disp: , Rfl:     hydrOXYzine HCL (ATARAX) 25 MG tablet, TAKE ONE TABLET BY MOUTH EVERY 6 HOURS AS NEEDED FOR ITCHING, Disp: 30 tablet, Rfl: 2    irbesartan (AVAPRO) 300 MG tablet, TAKE ONE TABLET BY MOUTH EVERY EVENING,  Disp: 90 tablet, Rfl: 3    isosorbide mononitrate (IMDUR) 60 MG 24 hr tablet, Take 1 tablet (60 mg total) by mouth 2 (two) times a day., Disp: 180 tablet, Rfl: 1    LINZESS 72 mcg Cap capsule, Take 72 mcg by mouth before breakfast., Disp: , Rfl:     multivit-min-iron-FA-lutein (CENTRUM SILVER WOMEN) 8 mg iron-400 mcg-300 mcg Tab, Take 1 tablet by mouth once daily., Disp: , Rfl:     mupirocin (BACTROBAN) 2 % ointment, Apply topically 2 (two) times daily., Disp: 22 g, Rfl: 3    nitroGLYCERIN (NITROSTAT) 0.3 MG SL tablet, PLACE 1 TABLET UNDER THE TONGUE EVERY 5 MINUTES FOR UP TO 3 DOSES IF NEEDED FOR CHEST PAIN. CALL 911 IF PAIN PERSISTS, Disp: 25 tablet, Rfl: 0    omega-3 fatty acids/fish oil (FISH OIL-OMEGA-3 FATTY ACIDS) 300-1,000 mg capsule, Take 2 capsules by mouth once daily., Disp: , Rfl:     omeprazole (PRILOSEC) 20 MG capsule, Take 1 capsule (20 mg total) by mouth daily as needed., Disp: 90 capsule, Rfl: 2    oxyCODONE-acetaminophen (PERCOCET)  mg per tablet, Take 1 tablet by mouth 4 (four) times daily as needed., Disp: , Rfl:     permethrin (ELIMITE) 5 % cream, APPLY TOPICALLY ONCE FOR ONE DOSE, Disp: 60 g, Rfl: 0    rosuvastatin (CRESTOR) 40 MG Tab, TAKE ONE TABLET BY MOUTH DAILY, Disp: 90 tablet, Rfl: 3    tiZANidine (ZANAFLEX) 4 MG tablet, Take 1 tablet (4 mg total) by mouth every 8 (eight) hours as needed (spasms)., Disp: 60 tablet, Rfl: 2    valACYclovir (VALTREX) 1000 MG tablet, Take 2,000 mg by mouth 2 (two) times daily as needed., Disp: , Rfl:     Past Surgical History:   Procedure Laterality Date    ANGIOGRAM, CORONARY, WITH LEFT HEART CATHETERIZATION N/A 6/12/2024    Procedure: Angiogram, Coronary, with Left Heart Cath;  Surgeon: José Carreon MD;  Location: [facility] CATH LAB/EP;  Service: Cardiology;  Laterality: N/A;    APPENDECTOMY      BACK SURGERY      CORONARY STENT PLACEMENT      HYSTERECTOMY      REVISION OF KNEE ARTHROPLASTY Left 7/18/2022    Procedure: REVISION, ARTHROPLASTY,  KNEE;  Surgeon: Stan Catalan MD;  Location: [facility];  Service: Orthopedics;  Laterality: Left;    TONSILLECTOMY         Social History     Socioeconomic History    Marital status: Significant Other   Tobacco Use    Smoking status: Never    Smokeless tobacco: Never   Substance and Sexual Activity    Alcohol use: No    Drug use: No    Sexual activity: Not Currently     Social Determinants of Health     Financial Resource Strain: Low Risk  (6/21/2024)    Overall Financial Resource Strain (CARDIA)     Difficulty of Paying Living Expenses: Not very hard   Food Insecurity: No Food Insecurity (6/21/2024)    Hunger Vital Sign     Worried About Running Out of Food in the Last Year: Never true     Ran Out of Food in the Last Year: Never true   Transportation Needs: No Transportation Needs (6/21/2024)    TRANSPORTATION NEEDS     Transportation : No   Physical Activity: Inactive (6/21/2024)    Exercise Vital Sign     Days of Exercise per Week: 0 days     Minutes of Exercise per Session: 0 min   Stress: Stress Concern Present (6/21/2024)    [employer] - Occupational Stress Questionnaire     Feeling of Stress : To some extent   Housing Stability: Low Risk  (6/21/2024)    Housing Stability Vital Sign     Unable to Pay for Housing in the Last Year: No     Homeless in the Last Year: No       OBJECTIVE:     Vital Signs Range (Last 24H):         Significant Labs:  Lab Results   Component Value Date    WBC 6.86 06/21/2024    HGB 9.0 (L) 06/21/2024    HCT 28.1 (L) 06/21/2024     06/21/2024    CHOL 147 06/30/2023    TRIG 99 06/30/2023    HDL 50 06/30/2023    ALT 19 06/21/2024    AST 24 06/21/2024     06/21/2024    K 4.4 06/21/2024     06/21/2024    CREATININE 1.2 06/21/2024    BUN 24 (H) 06/21/2024    CO2 25 06/21/2024    TSH 0.452 01/18/2024    INR 1.0 06/29/2022    HGBA1C 5.2 06/30/2023       Diagnostic Studies: No relevant studies.    EKG:   Results for orders placed or performed during  the hospital encounter of 06/21/24   EKG 12-lead    Collection Time: 06/21/24  8:22 AM   Result Value Ref Range    QRS Duration 74 ms    [facility] QTC Calculation 433 ms    Narrative    Test Reason : R07.9,    Vent. Rate : 059 BPM     Atrial Rate : 059 BPM     P-R Int : 204 ms          QRS Dur : 074 ms      QT Int : 438 ms       P-R-T Axes : 080 067 071 degrees     QTc Int : 433 ms    Sinus bradycardia  Otherwise normal ECG  When compared with ECG of 21-JUN-2024 08:20,  Premature ventricular complexes are no longer Present  QT has shortened  Confirmed by Tevin SALVADOR MD ([id number]) on 6/21/2024 8:57:01 AM    Referred By: [email or web address]   SELF           Confirmed By:Tevin SALVADOR MD       2D ECHO:  TTE:  Results for orders placed or performed in visit on 04/22/24   Echo   Result Value Ref Range    RA Width 3.30 cm    LA volume (mod) 54.14 cm3    Left Atrium Major Axis 5.25 cm    Left Atrium Minor Axis 5.08 cm    RA Major Axis 5.15 cm    LV Diastolic Volume 110.89 mL    LV Systolic Volume 29.68 mL    MV Peak A Yasir 0.96 m/s    MV stenosis pressure 1/2 time 80.98 ms    MV VTI 32.3 cm    TR Max Yasir 3.29 m/s    AR Max Yasir 5.37 m/s    MV Peak E Yasir 0.73 m/s    MV peak gradient 4 mmHg    Mr max yasir 3.67 m/s    Ao VTI 99.20 cm    Ao peak yasir 3.94 m/s    LVOT peak VTI 23.30 cm    LVOT peak yasir 0.89 m/s    LVOT diameter 1.98 cm    E wave deceleration time 186.19 msec    MV mean gradient 1 mmHg    AV mean gradient 35 mmHg    AV regurgitation pressure 1/2 time [id number] ms    RV S' 16.53 cm/s    TAPSE 2.19 cm    LA size 3.52 cm    Ascending aorta 2.89 cm    STJ 2.21 cm    Sinus 2.52 cm    LVIDs 2.81 2.1 - 4.0 cm    Posterior Wall 0.87 0.6 - 1.1 cm    IVS 0.77 0.6 - 1.1 cm    LVIDd 4.86 3.5 - 6.0 cm    TDI LATERAL 0.06 m/s    Left Ventricular Outflow Tract Mean Gradient 1.80 mmHg    Left Ventricular Outflow Tract Mean Velocity 0.64 cm/s    Parsons's Biplane MOD Ejection Fraction 81 %    IVC diameter 1.20 cm    TDI SEPTAL 0.07 m/s    LV  LATERAL E/E' RATIO 12.17 m/s    LV SEPTAL E/E' RATIO 10.43 m/s    FS 42 28 - 44 %    LV mass 133.58 g    Left Ventricle Relative Wall Thickness 0.36 cm    AV valve area 0.72 cm²    AV Velocity Ratio 0.23     AV index (prosthetic) 0.23     MV valve area p 1/2 method 2.72 cm2    MV valve area by continuity eq 2.22 cm2    E/A ratio 0.76     Mean e' 0.07 m/s    LVOT area 3.1 cm2    LVOT stroke volume 71.71 cm3    AV peak gradient 62 mmHg    E/E' ratio 11.23 m/s    Triscuspid Valve Regurgitation Peak Gradient 43 mmHg    MIGDALIA by Velocity Ratio 0.70 cm²    BSA 1.78 m2    LV Systolic Volume Index 17.0 mL/m2    LV Diastolic Volume Index 63.37 mL/m2    LV Mass Index 76 g/m2    LA Volume Index (Mod) 30.9 mL/m2    ZLVIDS -0.50     ZLVIDD 0.04     LA Volume Index 33.5 mL/m2    LA volume 58.71 cm3    RVDD 3.80 cm    RV-salvador mid d 2.9 cm    RV/LV Ratio 0.78 cm    LA WIDTH 3.8 cm    TASV 17.0 cm/s    TV resting pulmonary artery pressure 46 mmHg    RV TB RVSP 6 mmHg    Est. RA pres 3 mmHg    Narrative      Aortic Valve: The aortic valve is a trileaflet valve. There is severe   stenosis. Aortic valve area by VTI is 0.72 cm². Aortic valve peak velocity   is 3.94 m/s. Mean gradient is 35 mmHg. The dimensionless index is 0.23.   There is mild aortic regurgitation.    Left Ventricle: The left ventricle is moderately dilated. Normal wall   thickness. There is normal systolic function with a visually estimated   ejection fraction of 60 - 65%. Grade I diastolic dysfunction.    Right Ventricle: Normal right ventricular cavity size. Systolic   function is normal.    Left Atrium: Left atrium is mildly dilated.    Tricuspid Valve: There is mild regurgitation with a centrally directed   jet.    Pulmonary Artery: There is mild to moderate pulmonary hypertension. The   estimated pulmonary artery systolic pressure is 46 mmHg.    IVC/SVC: Normal venous pressure at 3 mmHg.         JOANN:  No results found for this or any previous  visit.    ASSESSMENT/PLAN:                                                                                                                  07/01/2024  Enedelia García is a 79 y.o., female.      Pre-op Assessment    I have reviewed the Patient Summary Reports.     I have reviewed the Nursing Notes. I have reviewed the NPO Status.   I have reviewed the Medications.     Review of Systems  Anesthesia Hx:  No problems with previous Anesthesia   History of prior surgery of interest to airway management or planning:          Denies Family Hx of Anesthesia complications.    Denies Personal Hx of Anesthesia complications.                    Cardiovascular:     Hypertension Valvular problems/Murmurs, AS  CAD   CABG/stent        hyperlipidemia                             Pulmonary:    Denies COPD.                     Renal/:  Chronic Renal Disease, CKD                Hepatic/GI:     GERD             Musculoskeletal:  Arthritis               Endocrine:        Denies Obesity / BMI > 30  Psych:  Psychiatric History  depression                Physical Exam  General: Well nourished    Airway:  Mallampati: III / II  Mouth Opening: Normal  TM Distance: Normal  Tongue: Normal  Neck ROM: Normal ROM    Chest/Lungs:  Normal Respiratory Rate    Heart:  Rate: Normal        Anesthesia Plan  Type of Anesthesia, risks & benefits discussed:    Anesthesia Type: Gen Natural Airway  Intra-op Monitoring Plan: Standard ASA Monitors and Art Line  Post Op Pain Control Plan: IV/PO Opioids PRN  Induction:  IV  Informed Consent: Informed consent signed with the Patient and all parties understand the risks and agree with anesthesia plan.  All questions answered.   ASA Score: 4  Day of Surgery Review of History & Physical: H&P Update referred to the surgeon/provider.  Anesthesia Plan Notes: NPO confirmed.  Patient high risk based upon hx of CAD and L carotid occlusion. Home BPs most commonly 150s. Will use norepi to maintain BPs near 120 as able  for procedure.      Ready For Surgery From Anesthesia Perspective.     .

## 2024-07-02 ENCOUNTER — HOSPITAL ENCOUNTER (INPATIENT)
Facility: HOSPITAL | Age: 79
LOS: 1 days | Discharge: HOME | DRG: 267 | End: 2024-07-03
Attending: INTERNAL MEDICINE | Admitting: INTERNAL MEDICINE
Payer: MEDICARE

## 2024-07-02 ENCOUNTER — ANESTHESIA (OUTPATIENT)
Dept: MEDSURG UNIT | Facility: HOSPITAL | Age: 79
End: 2024-07-02
Payer: MEDICARE

## 2024-07-02 DIAGNOSIS — I35.0 SEVERE AORTIC STENOSIS: ICD-10-CM

## 2024-07-02 DIAGNOSIS — I49.8 JUNCTIONAL RHYTHM: Primary | ICD-10-CM

## 2024-07-02 DIAGNOSIS — Z98.890 S/P CARDIAC CATHETERIZATION: ICD-10-CM

## 2024-07-02 DIAGNOSIS — I44.0 FIRST DEGREE HEART BLOCK BY ELECTROCARDIOGRAM: ICD-10-CM

## 2024-07-02 PROBLEM — N17.9 ACUTE ON CHRONIC KIDNEY FAILURE: Status: ACTIVE | Noted: 2024-07-02

## 2024-07-02 PROBLEM — I50.33 ACUTE ON CHRONIC DIASTOLIC HEART FAILURE: Status: ACTIVE | Noted: 2023-06-14

## 2024-07-02 PROBLEM — N18.9 ACUTE ON CHRONIC KIDNEY FAILURE: Status: ACTIVE | Noted: 2024-07-02

## 2024-07-02 LAB
ABO + RH BLD: NORMAL
ANION GAP SERPL CALC-SCNC: 12 MMOL/L (ref 8–16)
BASOPHILS # BLD AUTO: 0.02 K/UL (ref 0–0.2)
BASOPHILS NFR BLD: 0.4 % (ref 0–1.9)
BLD GP AB SCN CELLS X3 SERPL QL: NORMAL
BNP SERPL-MCNC: 70 PG/ML (ref 0–99)
BUN SERPL-MCNC: 38 MG/DL (ref 8–23)
CALCIUM SERPL-MCNC: 9.9 MG/DL (ref 8.7–10.5)
CHLORIDE SERPL-SCNC: 102 MMOL/L (ref 95–110)
CO2 SERPL-SCNC: 22 MMOL/L (ref 23–29)
CREAT SERPL-MCNC: 1.8 MG/DL (ref 0.5–1.4)
DIFFERENTIAL METHOD BLD: ABNORMAL
EOSINOPHIL # BLD AUTO: 0 K/UL (ref 0–0.5)
EOSINOPHIL NFR BLD: 0 % (ref 0–8)
ERYTHROCYTE [DISTWIDTH] IN BLOOD BY AUTOMATED COUNT: 12 % (ref 11.5–14.5)
EST. GFR  (NO RACE VARIABLE): 28.3 ML/MIN/1.73 M^2
GLUCOSE SERPL-MCNC: 243 MG/DL (ref 70–110)
HCT VFR BLD AUTO: 34.7 % (ref 37–48.5)
HGB BLD-MCNC: 11.5 G/DL (ref 12–16)
IMM GRANULOCYTES # BLD AUTO: 0.02 K/UL (ref 0–0.04)
IMM GRANULOCYTES NFR BLD AUTO: 0.4 % (ref 0–0.5)
LYMPHOCYTES # BLD AUTO: 0.6 K/UL (ref 1–4.8)
LYMPHOCYTES NFR BLD: 11.3 % (ref 18–48)
MCH RBC QN AUTO: 32.4 PG (ref 27–31)
MCHC RBC AUTO-ENTMCNC: 33.1 G/DL (ref 32–36)
MCV RBC AUTO: 98 FL (ref 82–98)
MONOCYTES # BLD AUTO: 0 K/UL (ref 0.3–1)
MONOCYTES NFR BLD: 0.7 % (ref 4–15)
NEUTROPHILS # BLD AUTO: 4.7 K/UL (ref 1.8–7.7)
NEUTROPHILS NFR BLD: 87.2 % (ref 38–73)
NRBC BLD-RTO: 0 /100 WBC
OHS QRS DURATION: 82 MS
OHS QRS DURATION: 82 MS
OHS QTC CALCULATION: 460 MS
OHS QTC CALCULATION: 465 MS
PLATELET # BLD AUTO: 344 K/UL (ref 150–450)
PMV BLD AUTO: 9.5 FL (ref 9.2–12.9)
POC ACTIVATED CLOTTING TIME K: 116 SEC (ref 74–137)
POC ACTIVATED CLOTTING TIME K: 140 SEC (ref 74–137)
POC ACTIVATED CLOTTING TIME K: 263 SEC (ref 74–137)
POCT GLUCOSE: 189 MG/DL (ref 70–110)
POCT GLUCOSE: 224 MG/DL (ref 70–110)
POTASSIUM SERPL-SCNC: 4.1 MMOL/L (ref 3.5–5.1)
RBC # BLD AUTO: 3.55 M/UL (ref 4–5.4)
SAMPLE: ABNORMAL
SAMPLE: ABNORMAL
SAMPLE: NORMAL
SODIUM SERPL-SCNC: 136 MMOL/L (ref 136–145)
SPECIMEN OUTDATE: NORMAL
WBC # BLD AUTO: 5.4 K/UL (ref 3.9–12.7)

## 2024-07-02 PROCEDURE — 93010 ELECTROCARDIOGRAM REPORT: CPT | Mod: ,,, | Performed by: INTERNAL MEDICINE

## 2024-07-02 PROCEDURE — 33361 REPLACE AORTIC VALVE PERQ: CPT | Mod: 62,Q0,, | Performed by: INTERNAL MEDICINE

## 2024-07-02 PROCEDURE — 36620 INSERTION CATHETER ARTERY: CPT | Mod: 59,,, | Performed by: ANESTHESIOLOGY

## 2024-07-02 PROCEDURE — C1769 GUIDE WIRE: HCPCS | Performed by: INTERNAL MEDICINE

## 2024-07-02 PROCEDURE — 25000003 PHARM REV CODE 250

## 2024-07-02 PROCEDURE — 86900 BLOOD TYPING SEROLOGIC ABO: CPT | Performed by: STUDENT IN AN ORGANIZED HEALTH CARE EDUCATION/TRAINING PROGRAM

## 2024-07-02 PROCEDURE — 63600175 PHARM REV CODE 636 W HCPCS: Performed by: STUDENT IN AN ORGANIZED HEALTH CARE EDUCATION/TRAINING PROGRAM

## 2024-07-02 PROCEDURE — 33361 REPLACE AORTIC VALVE PERQ: CPT | Mod: 62,Q0 | Performed by: INTERNAL MEDICINE

## 2024-07-02 PROCEDURE — 63600175 PHARM REV CODE 636 W HCPCS: Mod: JZ,JG | Performed by: INTERNAL MEDICINE

## 2024-07-02 PROCEDURE — 27201423 OPTIME MED/SURG SUP & DEVICES STERILE SUPPLY: Performed by: INTERNAL MEDICINE

## 2024-07-02 PROCEDURE — 37000008 HC ANESTHESIA 1ST 15 MINUTES: Performed by: INTERNAL MEDICINE

## 2024-07-02 PROCEDURE — 80048 BASIC METABOLIC PNL TOTAL CA: CPT | Performed by: STUDENT IN AN ORGANIZED HEALTH CARE EDUCATION/TRAINING PROGRAM

## 2024-07-02 PROCEDURE — 20600001 HC STEP DOWN PRIVATE ROOM

## 2024-07-02 PROCEDURE — 83880 ASSAY OF NATRIURETIC PEPTIDE: CPT | Performed by: STUDENT IN AN ORGANIZED HEALTH CARE EDUCATION/TRAINING PROGRAM

## 2024-07-02 PROCEDURE — 85025 COMPLETE CBC W/AUTO DIFF WBC: CPT | Performed by: STUDENT IN AN ORGANIZED HEALTH CARE EDUCATION/TRAINING PROGRAM

## 2024-07-02 PROCEDURE — 36415 COLL VENOUS BLD VENIPUNCTURE: CPT | Performed by: STUDENT IN AN ORGANIZED HEALTH CARE EDUCATION/TRAINING PROGRAM

## 2024-07-02 PROCEDURE — C1894 INTRO/SHEATH, NON-LASER: HCPCS | Performed by: INTERNAL MEDICINE

## 2024-07-02 PROCEDURE — 02RF38Z REPLACEMENT OF AORTIC VALVE WITH ZOOPLASTIC TISSUE, PERCUTANEOUS APPROACH: ICD-10-PCS | Performed by: INTERNAL MEDICINE

## 2024-07-02 PROCEDURE — 63600175 PHARM REV CODE 636 W HCPCS

## 2024-07-02 PROCEDURE — C1760 CLOSURE DEV, VASC: HCPCS | Performed by: INTERNAL MEDICINE

## 2024-07-02 PROCEDURE — 25000003 PHARM REV CODE 250: Performed by: INTERNAL MEDICINE

## 2024-07-02 PROCEDURE — 27800903 OPTIME MED/SURG SUP & DEVICES OTHER IMPLANTS: Performed by: INTERNAL MEDICINE

## 2024-07-02 PROCEDURE — 37000009 HC ANESTHESIA EA ADD 15 MINS: Performed by: INTERNAL MEDICINE

## 2024-07-02 PROCEDURE — 25000003 PHARM REV CODE 250: Performed by: STUDENT IN AN ORGANIZED HEALTH CARE EDUCATION/TRAINING PROGRAM

## 2024-07-02 PROCEDURE — 93005 ELECTROCARDIOGRAM TRACING: CPT

## 2024-07-02 PROCEDURE — 86901 BLOOD TYPING SEROLOGIC RH(D): CPT | Performed by: STUDENT IN AN ORGANIZED HEALTH CARE EDUCATION/TRAINING PROGRAM

## 2024-07-02 DEVICE — VALVE EVOLUT TM FX AORTIC 29MM: Type: IMPLANTABLE DEVICE | Site: HEART | Status: FUNCTIONAL

## 2024-07-02 DEVICE — SYS EVOLUT FX DELIVERY 23-29MM: Type: IMPLANTABLE DEVICE | Site: OTHER (ADD COMMENT) | Status: FUNCTIONAL

## 2024-07-02 DEVICE — SYS EVOLUT FX LOADING 23-29MM: Type: IMPLANTABLE DEVICE | Site: OTHER (ADD COMMENT) | Status: FUNCTIONAL

## 2024-07-02 RX ORDER — ONDANSETRON HYDROCHLORIDE 2 MG/ML
INJECTION, SOLUTION INTRAVENOUS
Status: DISCONTINUED | OUTPATIENT
Start: 2024-07-02 | End: 2024-07-02

## 2024-07-02 RX ORDER — HEPARIN SODIUM 1000 [USP'U]/ML
INJECTION, SOLUTION INTRAVENOUS; SUBCUTANEOUS
Status: DISCONTINUED | OUTPATIENT
Start: 2024-07-02 | End: 2024-07-02

## 2024-07-02 RX ORDER — OXYCODONE AND ACETAMINOPHEN 10; 325 MG/1; MG/1
1 TABLET ORAL EVERY 6 HOURS PRN
Status: DISCONTINUED | OUTPATIENT
Start: 2024-07-02 | End: 2024-07-03 | Stop reason: HOSPADM

## 2024-07-02 RX ORDER — ACETAMINOPHEN 325 MG/1
650 TABLET ORAL EVERY 4 HOURS PRN
Status: DISCONTINUED | OUTPATIENT
Start: 2024-07-02 | End: 2024-07-03 | Stop reason: HOSPADM

## 2024-07-02 RX ORDER — SODIUM CHLORIDE 0.9 % (FLUSH) 0.9 %
10 SYRINGE (ML) INJECTION
Status: DISCONTINUED | OUTPATIENT
Start: 2024-07-02 | End: 2024-07-03 | Stop reason: HOSPADM

## 2024-07-02 RX ORDER — DIPHENHYDRAMINE HYDROCHLORIDE 50 MG/ML
50 INJECTION INTRAMUSCULAR; INTRAVENOUS ONCE
Status: COMPLETED | OUTPATIENT
Start: 2024-07-02 | End: 2024-07-02

## 2024-07-02 RX ORDER — SODIUM CHLORIDE 9 MG/ML
INJECTION, SOLUTION INTRAVENOUS CONTINUOUS
Status: ACTIVE | OUTPATIENT
Start: 2024-07-02 | End: 2024-07-02

## 2024-07-02 RX ORDER — LOSARTAN POTASSIUM 50 MG/1
100 TABLET ORAL EVERY EVENING
Status: DISCONTINUED | OUTPATIENT
Start: 2024-07-02 | End: 2024-07-03 | Stop reason: HOSPADM

## 2024-07-02 RX ORDER — PROTAMINE SULFATE 10 MG/ML
INJECTION, SOLUTION INTRAVENOUS
Status: DISCONTINUED | OUTPATIENT
Start: 2024-07-02 | End: 2024-07-02

## 2024-07-02 RX ORDER — IBUPROFEN 200 MG
16 TABLET ORAL
Status: DISCONTINUED | OUTPATIENT
Start: 2024-07-02 | End: 2024-07-03 | Stop reason: HOSPADM

## 2024-07-02 RX ORDER — DULOXETIN HYDROCHLORIDE 60 MG/1
60 CAPSULE, DELAYED RELEASE ORAL DAILY
Status: DISCONTINUED | OUTPATIENT
Start: 2024-07-03 | End: 2024-07-03 | Stop reason: HOSPADM

## 2024-07-02 RX ORDER — FENTANYL CITRATE 50 UG/ML
INJECTION, SOLUTION INTRAMUSCULAR; INTRAVENOUS
Status: DISCONTINUED | OUTPATIENT
Start: 2024-07-02 | End: 2024-07-02

## 2024-07-02 RX ORDER — INSULIN ASPART 100 [IU]/ML
0-5 INJECTION, SOLUTION INTRAVENOUS; SUBCUTANEOUS
Status: DISCONTINUED | OUTPATIENT
Start: 2024-07-02 | End: 2024-07-03 | Stop reason: HOSPADM

## 2024-07-02 RX ORDER — FUROSEMIDE 80 MG/1
80 TABLET ORAL 2 TIMES DAILY PRN
Status: DISCONTINUED | OUTPATIENT
Start: 2024-07-03 | End: 2024-07-03 | Stop reason: HOSPADM

## 2024-07-02 RX ORDER — ONDANSETRON 8 MG/1
8 TABLET, ORALLY DISINTEGRATING ORAL EVERY 8 HOURS PRN
Status: DISCONTINUED | OUTPATIENT
Start: 2024-07-02 | End: 2024-07-03 | Stop reason: HOSPADM

## 2024-07-02 RX ORDER — CEFAZOLIN SODIUM 1 G/3ML
INJECTION, POWDER, FOR SOLUTION INTRAMUSCULAR; INTRAVENOUS
Status: DISCONTINUED | OUTPATIENT
Start: 2024-07-02 | End: 2024-07-02

## 2024-07-02 RX ORDER — IBUPROFEN 200 MG
24 TABLET ORAL
Status: DISCONTINUED | OUTPATIENT
Start: 2024-07-02 | End: 2024-07-03 | Stop reason: HOSPADM

## 2024-07-02 RX ORDER — HALOPERIDOL 5 MG/ML
0.5 INJECTION INTRAMUSCULAR EVERY 10 MIN PRN
Status: DISCONTINUED | OUTPATIENT
Start: 2024-07-02 | End: 2024-07-03 | Stop reason: HOSPADM

## 2024-07-02 RX ORDER — HYDRALAZINE HYDROCHLORIDE 25 MG/1
25 TABLET, FILM COATED ORAL EVERY 8 HOURS
Status: DISCONTINUED | OUTPATIENT
Start: 2024-07-02 | End: 2024-07-03 | Stop reason: HOSPADM

## 2024-07-02 RX ORDER — LIDOCAINE HYDROCHLORIDE 20 MG/ML
INJECTION, SOLUTION EPIDURAL; INFILTRATION; INTRACAUDAL; PERINEURAL
Status: DISCONTINUED | OUTPATIENT
Start: 2024-07-02 | End: 2024-07-03 | Stop reason: HOSPADM

## 2024-07-02 RX ORDER — ATORVASTATIN CALCIUM 20 MG/1
80 TABLET, FILM COATED ORAL DAILY
Status: DISCONTINUED | OUTPATIENT
Start: 2024-07-03 | End: 2024-07-03 | Stop reason: HOSPADM

## 2024-07-02 RX ORDER — ASPIRIN 81 MG/1
81 TABLET ORAL DAILY
Status: DISCONTINUED | OUTPATIENT
Start: 2024-07-03 | End: 2024-07-03 | Stop reason: HOSPADM

## 2024-07-02 RX ORDER — DIPHENHYDRAMINE HCL 50 MG
50 CAPSULE ORAL ONCE
Status: DISCONTINUED | OUTPATIENT
Start: 2024-07-02 | End: 2024-07-02

## 2024-07-02 RX ORDER — FUROSEMIDE 10 MG/ML
INJECTION INTRAMUSCULAR; INTRAVENOUS
Status: DISCONTINUED | OUTPATIENT
Start: 2024-07-02 | End: 2024-07-02

## 2024-07-02 RX ORDER — HYDROMORPHONE HYDROCHLORIDE 1 MG/ML
0.2 INJECTION, SOLUTION INTRAMUSCULAR; INTRAVENOUS; SUBCUTANEOUS EVERY 5 MIN PRN
Status: DISCONTINUED | OUTPATIENT
Start: 2024-07-02 | End: 2024-07-03 | Stop reason: HOSPADM

## 2024-07-02 RX ORDER — GLUCAGON 1 MG
1 KIT INJECTION
Status: DISCONTINUED | OUTPATIENT
Start: 2024-07-02 | End: 2024-07-03 | Stop reason: HOSPADM

## 2024-07-02 RX ORDER — ISOSORBIDE MONONITRATE 60 MG/1
60 TABLET, EXTENDED RELEASE ORAL 2 TIMES DAILY
Status: DISCONTINUED | OUTPATIENT
Start: 2024-07-02 | End: 2024-07-03 | Stop reason: HOSPADM

## 2024-07-02 RX ORDER — PANTOPRAZOLE SODIUM 40 MG/1
40 TABLET, DELAYED RELEASE ORAL DAILY
Status: DISCONTINUED | OUTPATIENT
Start: 2024-07-03 | End: 2024-07-03 | Stop reason: HOSPADM

## 2024-07-02 RX ADMIN — ONDANSETRON 4 MG: 2 INJECTION INTRAMUSCULAR; INTRAVENOUS at 09:07

## 2024-07-02 RX ADMIN — FENTANYL CITRATE 100 MCG: 50 INJECTION INTRAMUSCULAR; INTRAVENOUS at 07:07

## 2024-07-02 RX ADMIN — PROTAMINE SULFATE 40 MG: 10 INJECTION, SOLUTION INTRAVENOUS at 08:07

## 2024-07-02 RX ADMIN — FENTANYL CITRATE 100 MCG: 50 INJECTION INTRAMUSCULAR; INTRAVENOUS at 08:07

## 2024-07-02 RX ADMIN — INSULIN ASPART 2 UNITS: 100 INJECTION, SOLUTION INTRAVENOUS; SUBCUTANEOUS at 06:07

## 2024-07-02 RX ADMIN — ISOSORBIDE MONONITRATE 60 MG: 60 TABLET, EXTENDED RELEASE ORAL at 09:07

## 2024-07-02 RX ADMIN — HYDRALAZINE HYDROCHLORIDE 25 MG: 25 TABLET ORAL at 09:07

## 2024-07-02 RX ADMIN — NOREPINEPHRINE BITARTRATE 0.02 MCG/KG/MIN: 1 INJECTION, SOLUTION, CONCENTRATE INTRAVENOUS at 08:07

## 2024-07-02 RX ADMIN — ACETAMINOPHEN 650 MG: 325 TABLET ORAL at 02:07

## 2024-07-02 RX ADMIN — OXYCODONE AND ACETAMINOPHEN 1 TABLET: 10; 325 TABLET ORAL at 05:07

## 2024-07-02 RX ADMIN — HYDRALAZINE HYDROCHLORIDE 25 MG: 25 TABLET ORAL at 02:07

## 2024-07-02 RX ADMIN — CEFAZOLIN 2 G: 330 INJECTION, POWDER, FOR SOLUTION INTRAMUSCULAR; INTRAVENOUS at 08:07

## 2024-07-02 RX ADMIN — HEPARIN SODIUM 7000 UNITS: 1000 INJECTION, SOLUTION INTRAVENOUS; SUBCUTANEOUS at 08:07

## 2024-07-02 RX ADMIN — SODIUM CHLORIDE: 9 INJECTION, SOLUTION INTRAVENOUS at 07:07

## 2024-07-02 RX ADMIN — DIPHENHYDRAMINE HYDROCHLORIDE 50 MG: 50 INJECTION, SOLUTION INTRAMUSCULAR; INTRAVENOUS at 07:07

## 2024-07-02 RX ADMIN — FUROSEMIDE 40 MG: 10 INJECTION, SOLUTION INTRAVENOUS at 09:07

## 2024-07-02 NOTE — Clinical Note
0 ml of contrast were injected throughout the case. 200 mL of contrast was the total wasted during the case. 200 mL was the total amount used during the case.

## 2024-07-02 NOTE — TRANSFER OF CARE
"Anesthesia Transfer of Care Note    Patient: Enedelia García    Procedure(s) Performed: Procedure(s) (LRB):  REPLACEMENT, AORTIC VALVE, TRANSCATHETER (TAVR) (N/A)  Cardiac Cath Cosurgeon  REPLACEMENT, AORTIC VALVE, PERCUTANEOUS, TRANSCATHETER    Patient location: PACU    Anesthesia Type: general    Transport from OR: Transported from OR on 6-10 L/min O2 by face mask with adequate spontaneous ventilation    Post pain: adequate analgesia    Post assessment: no apparent anesthetic complications    Post vital signs: stable    Level of consciousness: awake and alert    Nausea/Vomiting: no nausea/vomiting    Complications: none    Transfer of care protocol was followed      Last vitals: Visit Vitals  /77 (BP Location: Left arm, Patient Position: Lying)   Pulse 91   Temp 36.5 °C (97.7 °F) (Temporal)   Resp 20   Ht 5' 3" (1.6 m)   Wt 66.2 kg (146 lb)   SpO2 96%   Breastfeeding No   BMI 25.86 kg/m²     "

## 2024-07-02 NOTE — INTERVAL H&P NOTE
The patient has been examined and the H&P has been reviewed:    I concur with the findings and no changes have occurred since H&P was written.    Procedure risks, benefits and alternative options discussed and understood by patient/family.    She complains of ASKEW yesterday and again today which is worse than her previous symptoms. This is consitent with acute on chronic diastolic CHF.    Additionally, labs this am are consistent with acute kidney injury that was noted prior to TAVR. We will continue with case, but use minimal contrast, and size valve with catheters/wire.     There are no hospital problems to display for this patient.

## 2024-07-02 NOTE — PROGRESS NOTES
L wrist vasc band removed per protocol. No s/s bleeding noted. Dry gauze and clear tegaderm place. Small skin tear noted where vasc band port rubbed against pt's skin.  Small mepilex applied to skin tear.

## 2024-07-02 NOTE — BRIEF OP NOTE
Brief Operative Note:    : José Lee MD     Referring Physician: José Lee     All Operators: Surgeon(s):  José Lee MD     Preoperative Diagnosis: Severe aortic stenosis [I35.0]     Postop Diagnosis: Severe aortic stenosis [I35.0]    Treatments/Procedures: Procedure(s) (LRB):  REPLACEMENT, AORTIC VALVE, TRANSCATHETER (TAVR) (N/A)  Cardiac Cath Cosurgeon  REPLACEMENT, AORTIC VALVE, PERCUTANEOUS, TRANSCATHETER    Findings:Severe structural disease is present. See catheterization report for full details.    -successful transfemoral aortic valve replacement with a 29mm EvolutFX valve  -no contrast used, CO2 for femoral angiogram    Estimated Blood loss: 20 cc    Specimens removed: No    Recommendations:   - Routine post-cath care as per orders  - Continue medical management, Risk factor reduction, Follow-up with outpatient cardiologist      Lui Alvares

## 2024-07-02 NOTE — PLAN OF CARE
Patient arrived to room. PIV placed, labs sent. Admit assessment completed. Plan of care discussed with patient. Daughter at bedside. Nurse call bell within reach. Will monitor

## 2024-07-02 NOTE — ANESTHESIA POSTPROCEDURE EVALUATION
Anesthesia Post Evaluation    Patient: Enedelia García    Procedure(s) Performed: Procedure(s) (LRB):  REPLACEMENT, AORTIC VALVE, TRANSCATHETER (TAVR) (N/A)  Cardiac Cath Cosurgeon  REPLACEMENT, AORTIC VALVE, PERCUTANEOUS, TRANSCATHETER    Final Anesthesia Type: general      Patient location during evaluation: PACU  Patient participation: Yes- Able to Participate  Level of consciousness: awake and alert  Post-procedure vital signs: reviewed and stable  Pain management: adequate  Airway patency: patent    PONV status at discharge: No PONV  Anesthetic complications: no      Cardiovascular status: blood pressure returned to baseline  Respiratory status: unassisted  Hydration status: euvolemic  Follow-up not needed.              Vitals Value Taken Time   BP 95/52 07/02/24 0931   Temp 98 07/02/24 0938   Pulse 61 07/02/24 0937   Resp 12 07/02/24 0937   SpO2 100 % 07/02/24 0937   Vitals shown include unfiled device data.      No case tracking events are documented in the log.      Pain/Mynor Score: Mynor Score: 10 (7/2/2024  9:09 AM)

## 2024-07-02 NOTE — NURSING
Plan of care reviewed with patient. Patient is AOX4 and VS stable. Patient remained free of falls and trauma, fall precautions are in place. Patient is ambulating independently, with stand by assist. Patient complains of back pain, treated with PRN meds. R groin site, CDI. L wrist site, CDI. R wrist Winnie site covered with gauze and coban. Patient has no questions at this time. Wheels are locked and the bed is in lowest position. The call bell is within reach. Telemetry is on.

## 2024-07-02 NOTE — NURSING
Patient arrived to 319. Patient AOX4 and VSS. L wrist insertion dressing CDI. R groin insertion site dressing CDI. Pulses 2+ bilaterally. Oriented to room and call bell use. Patient up in chair with wheels locked.

## 2024-07-02 NOTE — PLAN OF CARE
Pt states she took her allergy prep as follows:   7/1/24  5pm tagment and prednisone  11pm tagnebt and prednisone    7/2/24  6 am pepcid and prednisone    Pt states she didn't have benadryl ordered. Dr Alvares notified and order given and carried out  for a 1 x dose of Benadryl 50 mg iv. Report given to Lindsay in cath lab

## 2024-07-02 NOTE — PROGRESS NOTES
Patient arrived to EP PACU in stable condition. Report received from procedural RN and anesthesia provider. Right groin site w/ gauze and clear tegaderm CDI. Pt educated on importance of keeping RLE still and straight. Vasc band to L wrist WDL. Aman to R wrist w/ hematoma noted from a failed attempt to insert AL per Dr. Mojica.  Ecchymotic area soft, +2 R radial pulse. Continuous cardiac monitoring in place. PACU BCGs maintained. Safety measures verified. RN at bedside. Will continue to monitor.

## 2024-07-02 NOTE — PROGRESS NOTES
Nursing Transfer Note        Reason patient is being transferred: to CSU post recovery    Transfer To: 319    Transfer via bed    Transfer with cardiac monitoring    Transported by escort x2 and RN    Telemetry: Box Number [id number], Rate 79, and Rhythm NSR    Medicines sent: none    Any special needs or follow-up needed: bedrest complete.  Needs to get up to chair.    Patient belongings transferred with patient: No    Chart send with patient: Yes    Notified: daughter    Patient reassessed at: to be done by receiving RN (date, time)

## 2024-07-03 ENCOUNTER — CLINICAL SUPPORT (OUTPATIENT)
Dept: CARDIOLOGY | Facility: HOSPITAL | Age: 79
End: 2024-07-03
Attending: INTERNAL MEDICINE
Payer: MEDICARE

## 2024-07-03 VITALS
HEIGHT: 63 IN | WEIGHT: 146 LBS | DIASTOLIC BLOOD PRESSURE: 72 MMHG | SYSTOLIC BLOOD PRESSURE: 125 MMHG | RESPIRATION RATE: 18 BRPM | OXYGEN SATURATION: 95 % | BODY MASS INDEX: 25.87 KG/M2 | HEART RATE: 85 BPM | TEMPERATURE: 98 F

## 2024-07-03 PROBLEM — I44.0 FIRST DEGREE HEART BLOCK BY ELECTROCARDIOGRAM: Status: ACTIVE | Noted: 2024-07-03

## 2024-07-03 LAB
ANION GAP SERPL CALC-SCNC: 7 MMOL/L (ref 8–16)
ANISOCYTOSIS BLD QL SMEAR: SLIGHT
BASOPHILS # BLD AUTO: 0.05 K/UL (ref 0–0.2)
BASOPHILS NFR BLD: 0.3 % (ref 0–1.9)
BUN SERPL-MCNC: 40 MG/DL (ref 8–23)
CALCIUM SERPL-MCNC: 9.6 MG/DL (ref 8.7–10.5)
CHLORIDE SERPL-SCNC: 105 MMOL/L (ref 95–110)
CO2 SERPL-SCNC: 27 MMOL/L (ref 23–29)
CREAT SERPL-MCNC: 1.5 MG/DL (ref 0.5–1.4)
DIFFERENTIAL METHOD BLD: ABNORMAL
EOSINOPHIL # BLD AUTO: 0 K/UL (ref 0–0.5)
EOSINOPHIL NFR BLD: 0.1 % (ref 0–8)
ERYTHROCYTE [DISTWIDTH] IN BLOOD BY AUTOMATED COUNT: 12.2 % (ref 11.5–14.5)
EST. GFR  (NO RACE VARIABLE): 35.2 ML/MIN/1.73 M^2
GLUCOSE SERPL-MCNC: 89 MG/DL (ref 70–110)
HCT VFR BLD AUTO: 32.8 % (ref 37–48.5)
HGB BLD-MCNC: 10.9 G/DL (ref 12–16)
HYPOCHROMIA BLD QL SMEAR: ABNORMAL
IMM GRANULOCYTES # BLD AUTO: 0.05 K/UL (ref 0–0.04)
IMM GRANULOCYTES NFR BLD AUTO: 0.3 % (ref 0–0.5)
LYMPHOCYTES # BLD AUTO: 2.2 K/UL (ref 1–4.8)
LYMPHOCYTES NFR BLD: 14.8 % (ref 18–48)
MCH RBC QN AUTO: 32.8 PG (ref 27–31)
MCHC RBC AUTO-ENTMCNC: 33.2 G/DL (ref 32–36)
MCV RBC AUTO: 99 FL (ref 82–98)
MONOCYTES # BLD AUTO: 2.5 K/UL (ref 0.3–1)
MONOCYTES NFR BLD: 16.3 % (ref 4–15)
NEUTROPHILS # BLD AUTO: 10.3 K/UL (ref 1.8–7.7)
NEUTROPHILS NFR BLD: 68.2 % (ref 38–73)
NRBC BLD-RTO: 0 /100 WBC
OHS QRS DURATION: 80 MS
OHS QRS DURATION: 86 MS
OHS QTC CALCULATION: 452 MS
OHS QTC CALCULATION: 459 MS
OVALOCYTES BLD QL SMEAR: ABNORMAL
PLATELET # BLD AUTO: 279 K/UL (ref 150–450)
PMV BLD AUTO: 9.3 FL (ref 9.2–12.9)
POCT GLUCOSE: 138 MG/DL (ref 70–110)
POCT GLUCOSE: 75 MG/DL (ref 70–110)
POCT GLUCOSE: 84 MG/DL (ref 70–110)
POIKILOCYTOSIS BLD QL SMEAR: SLIGHT
POLYCHROMASIA BLD QL SMEAR: ABNORMAL
POTASSIUM SERPL-SCNC: 4.2 MMOL/L (ref 3.5–5.1)
RBC # BLD AUTO: 3.32 M/UL (ref 4–5.4)
SODIUM SERPL-SCNC: 139 MMOL/L (ref 136–145)
SPHEROCYTES BLD QL SMEAR: ABNORMAL
WBC # BLD AUTO: 15.06 K/UL (ref 3.9–12.7)

## 2024-07-03 PROCEDURE — 93005 ELECTROCARDIOGRAM TRACING: CPT

## 2024-07-03 PROCEDURE — 93010 ELECTROCARDIOGRAM REPORT: CPT | Mod: ,,, | Performed by: INTERNAL MEDICINE

## 2024-07-03 PROCEDURE — 25000003 PHARM REV CODE 250: Performed by: STUDENT IN AN ORGANIZED HEALTH CARE EDUCATION/TRAINING PROGRAM

## 2024-07-03 PROCEDURE — 36415 COLL VENOUS BLD VENIPUNCTURE: CPT | Performed by: STUDENT IN AN ORGANIZED HEALTH CARE EDUCATION/TRAINING PROGRAM

## 2024-07-03 PROCEDURE — 80048 BASIC METABOLIC PNL TOTAL CA: CPT | Performed by: STUDENT IN AN ORGANIZED HEALTH CARE EDUCATION/TRAINING PROGRAM

## 2024-07-03 PROCEDURE — 99223 1ST HOSP IP/OBS HIGH 75: CPT | Mod: GC,,, | Performed by: STUDENT IN AN ORGANIZED HEALTH CARE EDUCATION/TRAINING PROGRAM

## 2024-07-03 PROCEDURE — 85025 COMPLETE CBC W/AUTO DIFF WBC: CPT | Performed by: STUDENT IN AN ORGANIZED HEALTH CARE EDUCATION/TRAINING PROGRAM

## 2024-07-03 RX ADMIN — ISOSORBIDE MONONITRATE 60 MG: 60 TABLET, EXTENDED RELEASE ORAL at 08:07

## 2024-07-03 RX ADMIN — DULOXETINE HYDROCHLORIDE 60 MG: 60 CAPSULE, DELAYED RELEASE ORAL at 08:07

## 2024-07-03 RX ADMIN — OXYCODONE AND ACETAMINOPHEN 1 TABLET: 10; 325 TABLET ORAL at 12:07

## 2024-07-03 RX ADMIN — ATORVASTATIN CALCIUM 80 MG: 20 TABLET, FILM COATED ORAL at 08:07

## 2024-07-03 RX ADMIN — ACETAMINOPHEN 650 MG: 325 TABLET ORAL at 08:07

## 2024-07-03 RX ADMIN — PANTOPRAZOLE SODIUM 40 MG: 40 TABLET, DELAYED RELEASE ORAL at 08:07

## 2024-07-03 RX ADMIN — HYDRALAZINE HYDROCHLORIDE 25 MG: 25 TABLET ORAL at 05:07

## 2024-07-03 RX ADMIN — ASPIRIN 81 MG: 81 TABLET, COATED ORAL at 08:07

## 2024-07-03 NOTE — SUBJECTIVE & OBJECTIVE
Past Medical History:   Diagnosis Date    Aortic atherosclerosis 6/14/2023    CKD (chronic kidney disease) stage 4, GFR 15-29 ml/min     Coronary artery disease     Edema     Epilepsy     Generalized anxiety disorder 12/20/2021    Hematuria, unspecified     Hematuria, unspecified     Hyperlipidemia     Hypertension     Iron deficiency anemia     Moderate episode of recurrent major depressive disorder     Nonrheumatic aortic valve stenosis 12/20/2021    Normocytic anemia     Prediabetes 2/24/2022       Past Surgical History:   Procedure Laterality Date    ANGIOGRAM, CORONARY, WITH LEFT HEART CATHETERIZATION N/A 6/12/2024    Procedure: Angiogram, Coronary, with Left Heart Cath;  Surgeon: José Carreon MD;  Location: [facility] CATH LAB/EP;  Service: Cardiology;  Laterality: N/A;    APPENDECTOMY      BACK SURGERY      CORONARY STENT PLACEMENT      HYSTERECTOMY      REVISION OF KNEE ARTHROPLASTY Left 7/18/2022    Procedure: REVISION, ARTHROPLASTY, KNEE;  Surgeon: Stan Catalan MD;  Location: [facility] OR;  Service: Orthopedics;  Laterality: Left;    TONSILLECTOMY         Review of patient's allergies indicates:   Allergen Reactions    Iodinated contrast media Anaphylaxis and Other (See Comments)    Nsaids (non-steroidal anti-inflammatory drug)      ADWOA    Phenergan [promethazine]      Vomiting       No current facility-administered medications on file prior to encounter.     Current Outpatient Medications on File Prior to Encounter   Medication Sig    aspirin (ECOTRIN) 81 MG EC tablet Take 81 mg by mouth once daily.    butalbital-acetaminophen-caffeine -40 mg (FIORICET, ESGIC) -40 mg per tablet Take 1 tablet by mouth every 8 (eight) hours as needed for Headaches.    calcium carbonate/vitamin D3 (VITAMIN D-3 ORAL) Take 1 tablet by mouth once daily.    DULoxetine (CYMBALTA) 60 MG capsule Take 1 capsule (60 mg total) by mouth once daily.    famotidine (PEPCID) 20 MG tablet Take 2 tablets (40 mg total) by mouth 3  (three) times daily as needed (take first dose 6 pm day before procedure, seconond dose 11 pm night prior to procedure and third dose 6 am day of procedure).    FEROSUL 325 mg (65 mg iron) Tab tablet Take 325 mg by mouth once daily.    furosemide (LASIX) 40 MG tablet Take 2 tablets (80 mg total) by mouth 2 (two) times daily as needed (swelling).    hydrALAZINE (APRESOLINE) 25 MG tablet Take 25 mg by mouth every 8 (eight) hours.    irbesartan (AVAPRO) 300 MG tablet TAKE ONE TABLET BY MOUTH EVERY EVENING    isosorbide mononitrate (IMDUR) 60 MG 24 hr tablet Take 1 tablet (60 mg total) by mouth 2 (two) times a day.    multivit-min-iron-FA-lutein (CENTRUM SILVER WOMEN) 8 mg iron-400 mcg-300 mcg Tab Take 1 tablet by mouth once daily.    nitroGLYCERIN (NITROSTAT) 0.3 MG SL tablet PLACE 1 TABLET UNDER THE TONGUE EVERY 5 MINUTES FOR UP TO 3 DOSES IF NEEDED FOR CHEST PAIN. CALL 911 IF PAIN PERSISTS    omega-3 fatty acids/fish oil (FISH OIL-OMEGA-3 FATTY ACIDS) 300-1,000 mg capsule Take 2 capsules by mouth once daily.    omeprazole (PRILOSEC) 20 MG capsule Take 1 capsule (20 mg total) by mouth daily as needed.    oxyCODONE-acetaminophen (PERCOCET)  mg per tablet Take 1 tablet by mouth 4 (four) times daily as needed.    rosuvastatin (CRESTOR) 40 MG Tab TAKE ONE TABLET BY MOUTH DAILY    valACYclovir (VALTREX) 1000 MG tablet Take 2,000 mg by mouth 2 (two) times daily as needed.    BOTOX 100 unit SolR Inject 100 Units into the skin once.    cimetidine (TAGAMET) 300 MG tablet Take 1 tablet (300 mg total) by mouth 3 (three) times daily. Take first dose 6pm prior to the procedure, followed by the second dose at 11pm and the third dose at 6am the morning of the procedure for 3 doses    citalopram (CELEXA) 10 MG tablet Take 10 mg by mouth once daily.    clotrimazole-betamethasone 1-0.05% (LOTRISONE) cream Apply topically 2 (two) times daily.    fluocinolone and shower cap 0.01 % Oil Apply 0.01 % topically every evening.     LINZESS 72 mcg Cap capsule Take 72 mcg by mouth before breakfast.    mupirocin (BACTROBAN) 2 % ointment Apply topically 2 (two) times daily.    permethrin (ELIMITE) 5 % cream APPLY TOPICALLY ONCE FOR ONE DOSE    tiZANidine (ZANAFLEX) 4 MG tablet Take 1 tablet (4 mg total) by mouth every 8 (eight) hours as needed (spasms).     Family History       Problem Relation (Age of Onset)    Heart disease Mother, Father          Tobacco Use    Smoking status: Never    Smokeless tobacco: Never   Substance and Sexual Activity    Alcohol use: No    Drug use: No    Sexual activity: Not Currently     Review of Systems   Constitutional: Negative for chills and fever.   HENT: Negative.     Cardiovascular:  Negative for chest pain, irregular heartbeat, leg swelling, near-syncope, palpitations and syncope.   Respiratory:  Negative for shortness of breath, sleep disturbances due to breathing and wheezing.    Gastrointestinal: Negative.    Genitourinary: Negative.    Neurological: Negative.    Psychiatric/Behavioral: Negative.       Objective:     Vital Signs (Most Recent):  Temp: 98.3 °F (36.8 °C) (07/03/24 0755)  Pulse: 87 (07/03/24 0755)  Resp: 18 (07/03/24 0755)  BP: (!) 121/58 (07/03/24 0755)  SpO2: 96 % (07/03/24 0755) Vital Signs (24h Range):  Temp:  [97.8 °F (36.6 °C)-98.6 °F (37 °C)] 98.3 °F (36.8 °C)  Pulse:  [] 87  Resp:  [12-30] 18  SpO2:  [93 %-100 %] 96 %  BP: ()/(53-63) 121/58       Weight: 66.2 kg (146 lb)  Body mass index is 25.86 kg/m².    SpO2: 96 %        Physical Exam  Constitutional:       General: She is not in acute distress.     Appearance: She is normal weight.   Eyes:      Extraocular Movements: Extraocular movements intact.   Cardiovascular:      Rate and Rhythm: Normal rate and regular rhythm.      Pulses: Normal pulses.      Heart sounds: No murmur heard.  Pulmonary:      Effort: Pulmonary effort is normal. No respiratory distress.      Breath sounds: Normal breath sounds.   Abdominal:       General: Bowel sounds are normal.      Palpations: Abdomen is soft.   Musculoskeletal:      Right lower leg: No edema.      Left lower leg: No edema.   Skin:     General: Skin is warm and dry.   Neurological:      Mental Status: She is alert and oriented to person, place, and time.   Psychiatric:         Mood and Affect: Mood normal.            Significant Labs: EP:   Recent Labs   Lab 07/02/24  0650 07/03/24  0742    139   K 4.1 4.2    105   CO2 22* 27   * 89   BUN 38* 40*   CREATININE 1.8* 1.5*   CALCIUM 9.9 9.6   ANIONGAP 12 7*   WBC 5.40 15.06*   HGB 11.5* 10.9*   HCT 34.7* 32.8*    279   , CMP:   Recent Labs   Lab 07/02/24  0650 07/03/24  0742    139   K 4.1 4.2    105   CO2 22* 27   * 89   BUN 38* 40*   CREATININE 1.8* 1.5*   CALCIUM 9.9 9.6   ANIONGAP 12 7*   , CBC:   Recent Labs   Lab 07/02/24  0650 07/03/24  0742   WBC 5.40 15.06*   HGB 11.5* 10.9*   HCT 34.7* 32.8*    279   , and All pertinent lab results from the last 24 hours have been reviewed.    Significant Imaging: Echocardiogram: Transthoracic echo (TTE) complete (Cupid Only):   Results for orders placed or performed in visit on 04/22/24   Echo   Result Value Ref Range    RA Width 3.30 cm    LA volume (mod) 54.14 cm3    Left Atrium Major Axis 5.25 cm    Left Atrium Minor Axis 5.08 cm    RA Major Axis 5.15 cm    LV Diastolic Volume 110.89 mL    LV Systolic Volume 29.68 mL    MV Peak A Yasir 0.96 m/s    MV stenosis pressure 1/2 time 80.98 ms    MV VTI 32.3 cm    TR Max Yasir 3.29 m/s    AR Max Yasir 5.37 m/s    MV Peak E Yasir 0.73 m/s    MV peak gradient 4 mmHg    Mr max yasir 3.67 m/s    Ao VTI 99.20 cm    Ao peak yasir 3.94 m/s    LVOT peak VTI 23.30 cm    LVOT peak yasir 0.89 m/s    LVOT diameter 1.98 cm    E wave deceleration time 186.19 msec    MV mean gradient 1 mmHg    AV mean gradient 35 mmHg    AV regurgitation pressure 1/2 time [id number] ms    RV S' 16.53 cm/s    TAPSE 2.19 cm    LA size 3.52  cm    Ascending aorta 2.89 cm    STJ 2.21 cm    Sinus 2.52 cm    LVIDs 2.81 2.1 - 4.0 cm    Posterior Wall 0.87 0.6 - 1.1 cm    IVS 0.77 0.6 - 1.1 cm    LVIDd 4.86 3.5 - 6.0 cm    TDI LATERAL 0.06 m/s    Left Ventricular Outflow Tract Mean Gradient 1.80 mmHg    Left Ventricular Outflow Tract Mean Velocity 0.64 cm/s    Parsons's Biplane MOD Ejection Fraction 81 %    IVC diameter 1.20 cm    TDI SEPTAL 0.07 m/s    LV LATERAL E/E' RATIO 12.17 m/s    LV SEPTAL E/E' RATIO 10.43 m/s    FS 42 28 - 44 %    LV mass 133.58 g    Left Ventricle Relative Wall Thickness 0.36 cm    AV valve area 0.72 cm²    AV Velocity Ratio 0.23     AV index (prosthetic) 0.23     MV valve area p 1/2 method 2.72 cm2    MV valve area by continuity eq 2.22 cm2    E/A ratio 0.76     Mean e' 0.07 m/s    LVOT area 3.1 cm2    LVOT stroke volume 71.71 cm3    AV peak gradient 62 mmHg    E/E' ratio 11.23 m/s    Triscuspid Valve Regurgitation Peak Gradient 43 mmHg    MIGDALIA by Velocity Ratio 0.70 cm²    BSA 1.78 m2    LV Systolic Volume Index 17.0 mL/m2    LV Diastolic Volume Index 63.37 mL/m2    LV Mass Index 76 g/m2    LA Volume Index (Mod) 30.9 mL/m2    ZLVIDS -0.50     ZLVIDD 0.04     LA Volume Index 33.5 mL/m2    LA volume 58.71 cm3    RVDD 3.80 cm    RV-salvador mid d 2.9 cm    RV/LV Ratio 0.78 cm    LA WIDTH 3.8 cm    TASV 17.0 cm/s    TV resting pulmonary artery pressure 46 mmHg    RV TB RVSP 6 mmHg    Est. RA pres 3 mmHg    Narrative      Aortic Valve: The aortic valve is a trileaflet valve. There is severe   stenosis. Aortic valve area by VTI is 0.72 cm². Aortic valve peak velocity   is 3.94 m/s. Mean gradient is 35 mmHg. The dimensionless index is 0.23.   There is mild aortic regurgitation.    Left Ventricle: The left ventricle is moderately dilated. Normal wall   thickness. There is normal systolic function with a visually estimated   ejection fraction of 60 - 65%. Grade I diastolic dysfunction.    Right Ventricle: Normal right ventricular cavity size.  Systolic   function is normal.    Left Atrium: Left atrium is mildly dilated.    Tricuspid Valve: There is mild regurgitation with a centrally directed   jet.    Pulmonary Artery: There is mild to moderate pulmonary hypertension. The   estimated pulmonary artery systolic pressure is 46 mmHg.    IVC/SVC: Normal venous pressure at 3 mmHg.

## 2024-07-03 NOTE — PLAN OF CARE
Patient is ready for discharge. Patient stable alert and oriented. Tele, and IVs removed. No complaints of pain. Discussed discharge plan. Reviewed medications and side effects, appointments, and answered questions with patient.  Patient declined event monitor, insisting on using her walker to get off the unit.

## 2024-07-03 NOTE — HPI
79F w/ CKD, non-obstructive CAD, HTN, and severe AS now s/p TAVR (29mm EvolutFX valve) on 7/2/24.    EP consulted for accelerated junctional vs. 1st degree heart block with buried p-wave post procedure. Patient has known first degree heart block with prior ECG's showing CA interval > 200. TAVR procedure was without any apparent complication. Post procedural ECG noted increased prolongation of CA interval to > 400 (baseline CA interval closer to 200-250). Subsequently patient noted to have ECG without apparent p-waves but with same narrow QRS morphology concerning for accelerated junctional rhythm vs. P-waves buried in T-waves. Patient heart rate remain 80-90's.    During interview patient reports no symptoms and feeling much better since her TAVR.

## 2024-07-03 NOTE — CONSULTS
[facility] - Cardiology Stepdown  Cardiac Electrophysiology  Consult Note    Admission Date: 7/2/2024  Code Status: Prior   Attending Provider: José Lee MD  Consulting Provider: Javon Baker MD  Principal Problem:Nonrheumatic aortic valve stenosis    Inpatient consult to Electrophysiology  Consult performed by: Javon Baker MD  Consult ordered by: Lui Alvares MD        Subjective:     Chief Complaint:  ASKEW    HPI:   79F w/ CKD, non-obstructive CAD, HTN, and severe AS now s/p TAVR (29mm EvolutFX valve) on 7/2/24.    EP consulted for accelerated junctional vs. 1st degree heart block with buried p-wave post procedure. Patient has known first degree heart block with prior ECG's showing LA interval > 200. TAVR procedure was without any apparent complication. Post procedural ECG noted increased prolongation of LA interval to > 400 (baseline LA interval closer to 200-250). Subsequently patient noted to have ECG without apparent p-waves but with same narrow QRS morphology concerning for accelerated junctional rhythm vs. P-waves buried in T-waves. Patient heart rate remain 80-90's.    During interview patient reports no symptoms and feeling much better since her TAVR.     Past Medical History:   Diagnosis Date    Aortic atherosclerosis 6/14/2023    CKD (chronic kidney disease) stage 4, GFR 15-29 ml/min     Coronary artery disease     Edema     Epilepsy     Generalized anxiety disorder 12/20/2021    Hematuria, unspecified     Hematuria, unspecified     Hyperlipidemia     Hypertension     Iron deficiency anemia     Moderate episode of recurrent major depressive disorder     Nonrheumatic aortic valve stenosis 12/20/2021    Normocytic anemia     Prediabetes 2/24/2022       Past Surgical History:   Procedure Laterality Date    ANGIOGRAM, CORONARY, WITH LEFT HEART CATHETERIZATION N/A 6/12/2024    Procedure: Angiogram, Coronary, with Left Heart Cath;  Surgeon: José Carreon MD;  Location: [facility] CATH  LAB/EP;  Service: Cardiology;  Laterality: N/A;    APPENDECTOMY      BACK SURGERY      CORONARY STENT PLACEMENT      HYSTERECTOMY      REVISION OF KNEE ARTHROPLASTY Left 7/18/2022    Procedure: REVISION, ARTHROPLASTY, KNEE;  Surgeon: Stan Catalan MD;  Location: [facility] OR;  Service: Orthopedics;  Laterality: Left;    TONSILLECTOMY         Review of patient's allergies indicates:   Allergen Reactions    Iodinated contrast media Anaphylaxis and Other (See Comments)    Nsaids (non-steroidal anti-inflammatory drug)      ADWOA    Phenergan [promethazine]      Vomiting       No current facility-administered medications on file prior to encounter.     Current Outpatient Medications on File Prior to Encounter   Medication Sig    aspirin (ECOTRIN) 81 MG EC tablet Take 81 mg by mouth once daily.    butalbital-acetaminophen-caffeine -40 mg (FIORICET, ESGIC) -40 mg per tablet Take 1 tablet by mouth every 8 (eight) hours as needed for Headaches.    calcium carbonate/vitamin D3 (VITAMIN D-3 ORAL) Take 1 tablet by mouth once daily.    DULoxetine (CYMBALTA) 60 MG capsule Take 1 capsule (60 mg total) by mouth once daily.    famotidine (PEPCID) 20 MG tablet Take 2 tablets (40 mg total) by mouth 3 (three) times daily as needed (take first dose 6 pm day before procedure, seconond dose 11 pm night prior to procedure and third dose 6 am day of procedure).    FEROSUL 325 mg (65 mg iron) Tab tablet Take 325 mg by mouth once daily.    furosemide (LASIX) 40 MG tablet Take 2 tablets (80 mg total) by mouth 2 (two) times daily as needed (swelling).    hydrALAZINE (APRESOLINE) 25 MG tablet Take 25 mg by mouth every 8 (eight) hours.    irbesartan (AVAPRO) 300 MG tablet TAKE ONE TABLET BY MOUTH EVERY EVENING    isosorbide mononitrate (IMDUR) 60 MG 24 hr tablet Take 1 tablet (60 mg total) by mouth 2 (two) times a day.    multivit-min-iron-FA-lutein (CENTRUM SILVER WOMEN) 8 mg iron-400 mcg-300 mcg Tab Take 1 tablet by mouth once daily.     nitroGLYCERIN (NITROSTAT) 0.3 MG SL tablet PLACE 1 TABLET UNDER THE TONGUE EVERY 5 MINUTES FOR UP TO 3 DOSES IF NEEDED FOR CHEST PAIN. CALL 911 IF PAIN PERSISTS    omega-3 fatty acids/fish oil (FISH OIL-OMEGA-3 FATTY ACIDS) 300-1,000 mg capsule Take 2 capsules by mouth once daily.    omeprazole (PRILOSEC) 20 MG capsule Take 1 capsule (20 mg total) by mouth daily as needed.    oxyCODONE-acetaminophen (PERCOCET)  mg per tablet Take 1 tablet by mouth 4 (four) times daily as needed.    rosuvastatin (CRESTOR) 40 MG Tab TAKE ONE TABLET BY MOUTH DAILY    valACYclovir (VALTREX) 1000 MG tablet Take 2,000 mg by mouth 2 (two) times daily as needed.    BOTOX 100 unit SolR Inject 100 Units into the skin once.    cimetidine (TAGAMET) 300 MG tablet Take 1 tablet (300 mg total) by mouth 3 (three) times daily. Take first dose 6pm prior to the procedure, followed by the second dose at 11pm and the third dose at 6am the morning of the procedure for 3 doses    citalopram (CELEXA) 10 MG tablet Take 10 mg by mouth once daily.    clotrimazole-betamethasone 1-0.05% (LOTRISONE) cream Apply topically 2 (two) times daily.    fluocinolone and shower cap 0.01 % Oil Apply 0.01 % topically every evening.    LINZESS 72 mcg Cap capsule Take 72 mcg by mouth before breakfast.    mupirocin (BACTROBAN) 2 % ointment Apply topically 2 (two) times daily.    permethrin (ELIMITE) 5 % cream APPLY TOPICALLY ONCE FOR ONE DOSE    tiZANidine (ZANAFLEX) 4 MG tablet Take 1 tablet (4 mg total) by mouth every 8 (eight) hours as needed (spasms).     Family History       Problem Relation (Age of Onset)    Heart disease Mother, Father          Tobacco Use    Smoking status: Never    Smokeless tobacco: Never   Substance and Sexual Activity    Alcohol use: No    Drug use: No    Sexual activity: Not Currently     Review of Systems   Constitutional: Negative for chills and fever.   HENT: Negative.     Cardiovascular:  Negative for chest pain, irregular heartbeat, leg  swelling, near-syncope, palpitations and syncope.   Respiratory:  Negative for shortness of breath, sleep disturbances due to breathing and wheezing.    Gastrointestinal: Negative.    Genitourinary: Negative.    Neurological: Negative.    Psychiatric/Behavioral: Negative.       Objective:     Vital Signs (Most Recent):  Temp: 98.3 °F (36.8 °C) (07/03/24 0755)  Pulse: 87 (07/03/24 0755)  Resp: 18 (07/03/24 0755)  BP: (!) 121/58 (07/03/24 0755)  SpO2: 96 % (07/03/24 0755) Vital Signs (24h Range):  Temp:  [97.8 °F (36.6 °C)-98.6 °F (37 °C)] 98.3 °F (36.8 °C)  Pulse:  [] 87  Resp:  [12-30] 18  SpO2:  [93 %-100 %] 96 %  BP: ()/(53-63) 121/58       Weight: 66.2 kg (146 lb)  Body mass index is 25.86 kg/m².    SpO2: 96 %        Physical Exam  Constitutional:       General: She is not in acute distress.     Appearance: She is normal weight.   Eyes:      Extraocular Movements: Extraocular movements intact.   Cardiovascular:      Rate and Rhythm: Normal rate and regular rhythm.      Pulses: Normal pulses.      Heart sounds: No murmur heard.  Pulmonary:      Effort: Pulmonary effort is normal. No respiratory distress.      Breath sounds: Normal breath sounds.   Abdominal:      General: Bowel sounds are normal.      Palpations: Abdomen is soft.   Musculoskeletal:      Right lower leg: No edema.      Left lower leg: No edema.   Skin:     General: Skin is warm and dry.   Neurological:      Mental Status: She is alert and oriented to person, place, and time.   Psychiatric:         Mood and Affect: Mood normal.            Significant Labs: EP:   Recent Labs   Lab 07/02/24  0650 07/03/24  0742    139   K 4.1 4.2    105   CO2 22* 27   * 89   BUN 38* 40*   CREATININE 1.8* 1.5*   CALCIUM 9.9 9.6   ANIONGAP 12 7*   WBC 5.40 15.06*   HGB 11.5* 10.9*   HCT 34.7* 32.8*    279   , CMP:   Recent Labs   Lab 07/02/24  0650 07/03/24  0742    139   K 4.1 4.2    105   CO2 22* 27   * 89   BUN  38* 40*   CREATININE 1.8* 1.5*   CALCIUM 9.9 9.6   ANIONGAP 12 7*   , CBC:   Recent Labs   Lab 07/02/24  0650 07/03/24  0742   WBC 5.40 15.06*   HGB 11.5* 10.9*   HCT 34.7* 32.8*    279   , and All pertinent lab results from the last 24 hours have been reviewed.    Significant Imaging: Echocardiogram: Transthoracic echo (TTE) complete (Cupid Only):   Results for orders placed or performed in visit on 04/22/24   Echo   Result Value Ref Range    RA Width 3.30 cm    LA volume (mod) 54.14 cm3    Left Atrium Major Axis 5.25 cm    Left Atrium Minor Axis 5.08 cm    RA Major Axis 5.15 cm    LV Diastolic Volume 110.89 mL    LV Systolic Volume 29.68 mL    MV Peak A Yasir 0.96 m/s    MV stenosis pressure 1/2 time 80.98 ms    MV VTI 32.3 cm    TR Max Yasir 3.29 m/s    AR Max Yasir 5.37 m/s    MV Peak E Ysair 0.73 m/s    MV peak gradient 4 mmHg    Mr max yasir 3.67 m/s    Ao VTI 99.20 cm    Ao peak yasir 3.94 m/s    LVOT peak VTI 23.30 cm    LVOT peak yasir 0.89 m/s    LVOT diameter 1.98 cm    E wave deceleration time 186.19 msec    MV mean gradient 1 mmHg    AV mean gradient 35 mmHg    AV regurgitation pressure 1/2 time [id number] ms    RV S' 16.53 cm/s    TAPSE 2.19 cm    LA size 3.52 cm    Ascending aorta 2.89 cm    STJ 2.21 cm    Sinus 2.52 cm    LVIDs 2.81 2.1 - 4.0 cm    Posterior Wall 0.87 0.6 - 1.1 cm    IVS 0.77 0.6 - 1.1 cm    LVIDd 4.86 3.5 - 6.0 cm    TDI LATERAL 0.06 m/s    Left Ventricular Outflow Tract Mean Gradient 1.80 mmHg    Left Ventricular Outflow Tract Mean Velocity 0.64 cm/s    Parsons's Biplane MOD Ejection Fraction 81 %    IVC diameter 1.20 cm    TDI SEPTAL 0.07 m/s    LV LATERAL E/E' RATIO 12.17 m/s    LV SEPTAL E/E' RATIO 10.43 m/s    FS 42 28 - 44 %    LV mass 133.58 g    Left Ventricle Relative Wall Thickness 0.36 cm    AV valve area 0.72 cm²    AV Velocity Ratio 0.23     AV index (prosthetic) 0.23     MV valve area p 1/2 method 2.72 cm2    MV valve area by continuity eq 2.22 cm2    E/A ratio 0.76      Mean e' 0.07 m/s    LVOT area 3.1 cm2    LVOT stroke volume 71.71 cm3    AV peak gradient 62 mmHg    E/E' ratio 11.23 m/s    Triscuspid Valve Regurgitation Peak Gradient 43 mmHg    MIGDALIA by Velocity Ratio 0.70 cm²    BSA 1.78 m2    LV Systolic Volume Index 17.0 mL/m2    LV Diastolic Volume Index 63.37 mL/m2    LV Mass Index 76 g/m2    LA Volume Index (Mod) 30.9 mL/m2    ZLVIDS -0.50     ZLVIDD 0.04     LA Volume Index 33.5 mL/m2    LA volume 58.71 cm3    RVDD 3.80 cm    RV-salvador mid d 2.9 cm    RV/LV Ratio 0.78 cm    LA WIDTH 3.8 cm    TASV 17.0 cm/s    TV resting pulmonary artery pressure 46 mmHg    RV TB RVSP 6 mmHg    Est. RA pres 3 mmHg    Narrative      Aortic Valve: The aortic valve is a trileaflet valve. There is severe   stenosis. Aortic valve area by VTI is 0.72 cm². Aortic valve peak velocity   is 3.94 m/s. Mean gradient is 35 mmHg. The dimensionless index is 0.23.   There is mild aortic regurgitation.    Left Ventricle: The left ventricle is moderately dilated. Normal wall   thickness. There is normal systolic function with a visually estimated   ejection fraction of 60 - 65%. Grade I diastolic dysfunction.    Right Ventricle: Normal right ventricular cavity size. Systolic   function is normal.    Left Atrium: Left atrium is mildly dilated.    Tricuspid Valve: There is mild regurgitation with a centrally directed   jet.    Pulmonary Artery: There is mild to moderate pulmonary hypertension. The   estimated pulmonary artery systolic pressure is 46 mmHg.    IVC/SVC: Normal venous pressure at 3 mmHg.                 Assessment and Plan:     First degree heart block by electrocardiogram  79F w/ CKD, non-obstructive CAD, HTN, and severe AS now s/p TAVR (29mm EvolutFX valve) on 7/2/24 with subsequent increase in NE interval and possible intermittent accelerated junctional rhythm. Asymptomatic.     Recommendations:  [ ] recommend 30 day MCOT is of EvolutFX TAVR placement for monitoring  [ ] follow up as directed  with interventional cardiology team        Thank you for your consult. I will sign off. Please contact us if you have any additional questions.    Javon Baker MD  Cardiac Electrophysiology  [facility] - Cardiology Stepdown

## 2024-07-03 NOTE — PLAN OF CARE
[facility] - Cardiology Stepdown  Initial Discharge Assessment       Primary Care Provider: Shelton Mason MD    Admission Diagnosis: Severe aortic stenosis [I35.0]    Admission Date: 7/2/2024  Expected Discharge Date: 7/3/2024    Transition of Care Barriers: None    Payor: [employer] [facility] / Plan: [employer] / Product Type: Medicare Advantage /     Extended Emergency Contact Information  Primary Emergency Contact: JoseLaya perez   [facility]  Home Phone: [phone]  Relation: Daughter  Secondary Emergency Contact: Nick Gross   [place]  Mobile Phone: [phone]  Relation: Significant other    Discharge Plan A: Home with family  Discharge Plan B: Home with family      [facility] - [place]ter[place] - [address] [address]  Phone: [phone] Fax: [phone]      Initial Assessment (most recent)       Adult Discharge Assessment - 07/03/24 1130          Discharge Assessment    Assessment Type Discharge Planning Assessment     Confirmed/corrected address, phone number and insurance Yes     Confirmed Demographics Correct on Facesheet     Source of Information patient     Communicated ISAURA with patient/caregiver Yes     Reason For Admission Nonrheumatic aortic valve stenosis     People in Home significant other     Facility Arrived From: home     Do you expect to return to your current living situation? Yes     Do you have help at home or someone to help you manage your care at home? Yes     Who are your caregiver(s) and their phone number(s)? Laya Garcia (daughter) [phone]     Prior to hospitilization cognitive status: Alert/Oriented     Current cognitive status: Alert/Oriented     Walking or Climbing Stairs Difficulty yes     Walking or Climbing Stairs ambulation difficulty, requires equipment     Mobility Management rolling walker     Dressing/Bathing Difficulty no     Equipment Currently Used at Home walker, rolling      Readmission within 30 days? Yes     Patient currently being followed by outpatient case management? No     Do you currently have service(s) that help you manage your care at home? No     Do you take prescription medications? Yes     Do you have prescription coverage? Yes     Coverage dPHN     Do you have any problems affording any of your prescribed medications? No     Is the patient taking medications as prescribed? yes     Who is going to help you get home at discharge? Laya Garcia (daughter) [phone]     How do you get to doctors appointments? car, drives self;family or friend will provide     Are you on dialysis? No     Do you take coumadin? No     Discharge Plan A Home with family     Discharge Plan B Home with family     DME Needed Upon Discharge  none     Discharge Plan discussed with: Patient     Transition of Care Barriers None        OTHER    Name(s) of People in Home Nick Gross (KRISSY) [phone]                     Readmission Assessment (most recent)       Readmission Assessment - 24 1134          Readmission    Was this a planned readmission? Yes     Why were you hospitalized in the last 30 days? TAVR workup     Why were you readmitted? Planned readmission     When you left the hospital where did you go? Home with Family     Did patient/caregiver refused recommended DC plan? No     Tell me about what happened between when you left the hospital and the day you returned. N/A     When did you start not feeling well? N/A     Did you try to manage your symptoms your self? --   N/A    Did you call anyone? No     Why? N/A     Did you have  a follow-up appointment on discharge? No   scheduled to come in for TAVR                       CM met with the patient at the bedside and discussed the discharge plan. Gave her the discharge booklet and placed contact numbers on the white board in the room. Patient alert and sitting up in bed.  Patient verified her name , , PCP, Insurance and Pharmacy .  Stated she lives with Nick PERRY) in a single story house and has 1 steps to point of entry . Stated she is not on coumadin nor is she a dialysis patient and has not had HH. DME's include:[employer].  Stated she takes  medication as prescribed and has no trouble getting her medications. She is interested in bedside delivery. She stated she is going home on a heart monitor.    Discharge Plan A and Plan B have been determined by review of patient's clinical status, future medical and therapeutic needs, and coverage/benefits for post-acute care in coordination with multidisciplinary team members.    Mallorie Garcia RN         [phone]

## 2024-07-03 NOTE — DISCHARGE SUMMARY
[facility] - Cardiology Stepdown  Interventional Cardiology  Discharge Summary      Patient Name: Enedelia García  MRN: [id number]  Admission Date: 7/2/2024  Hospital Length of Stay: 1 days  Discharge Date and Time:  07/03/2024 9:16 AM  Attending Physician: José Lee MD  Discharging Provider: Lui Alvares MD  Primary Care Physician: Shelton Mason MD    HPI:  No notes on file    Procedure(s) (LRB):  REPLACEMENT, AORTIC VALVE, TRANSCATHETER (TAVR) (N/A)  Cardiac Cath [facility]  REPLACEMENT, AORTIC VALVE, PERCUTANEOUS, TRANSCATHETER     Indwelling Lines/Drains at time of discharge:  Lines/Drains/Airways       None                   Hospital Course:  Patient brought for planned TAVR that was successful with a 29mm EvolutFX valve, please see report for full details. The patient tolerated the procedure well and was instructed to continue her home medications. She was noted to have junctional rhythm post TAVR, and EP was consulted. She was given an even monitor for dc. She was then discharged in stable condition.       Goals of Care Treatment Preferences:  Code Status: Full Code      Consults (From admission, onward)          Status Ordering Provider     Inpatient consult to Electrophysiology  Once        Provider:  (Not yet assigned)    Acknowledged LUI ALVARES            Significant Diagnostic Studies: N/A    Pending Diagnostic Studies:       None          No new Assessment & Plan notes have been filed under this hospital service since the last note was generated.  Service: Interventional Cardiology      Discharged Condition: good    Follow Up:    Patient Instructions:      Cardiac event monitor   Standing Status: Future Standing Exp. Date: 07/03/25     Order Specific Question Answer Comments   Cardiac Event Monitor Auto Trigger    Release to patient Immediate      Medications:  Reconciled Home Medications:      Medication List        CONTINUE taking these medications      aspirin 81 MG EC  tablet  Commonly known as: ECOTRIN  Take 81 mg by mouth once daily.     BOTOX 100 unit Solr  Generic drug: onabotulinumtoxina  Inject 100 Units into the skin once.     butalbital-acetaminophen-caffeine -40 mg -40 mg per tablet  Commonly known as: FIORICET, ESGIC  Take 1 tablet by mouth every 8 (eight) hours as needed for Headaches.     CENTRUM SILVER WOMEN 8 mg iron-400 mcg-50 mcg Tab  Generic drug: multivit-min-iron-FA-vit K-lut  Take 1 tablet by mouth once daily.     cimetidine 300 MG tablet  Commonly known as: TAGAMET  Take 1 tablet (300 mg total) by mouth 3 (three) times daily. Take first dose 6pm prior to the procedure, followed by the second dose at 11pm and the third dose at 6am the morning of the procedure for 3 doses     citalopram 10 MG tablet  Commonly known as: CeleXA  Take 10 mg by mouth once daily.     clotrimazole-betamethasone 1-0.05% cream  Commonly known as: LOTRISONE  Apply topically 2 (two) times daily.     DULoxetine 60 MG capsule  Commonly known as: CYMBALTA  Take 1 capsule (60 mg total) by mouth once daily.     famotidine 20 MG tablet  Commonly known as: PEPCID  Take 2 tablets (40 mg total) by mouth 3 (three) times daily as needed (take first dose 6 pm day before procedure, seconond dose 11 pm night prior to procedure and third dose 6 am day of procedure).     FeroSuL 325 mg (65 mg iron) Tab tablet  Generic drug: ferrous sulfate  Take 325 mg by mouth once daily.     fish oil-omega-3 fatty acids 300-1,000 mg capsule  Take 2 capsules by mouth once daily.     fluocinolone and shower cap 0.01 % Oil  Apply 0.01 % topically every evening.     furosemide 40 MG tablet  Commonly known as: LASIX  Take 2 tablets (80 mg total) by mouth 2 (two) times daily as needed (swelling).     hydrALAZINE 25 MG tablet  Commonly known as: APRESOLINE  Take 25 mg by mouth every 8 (eight) hours.     hydrOXYzine HCL 25 MG tablet  Commonly known as: ATARAX  TAKE ONE TABLET BY MOUTH EVERY 6 HOURS AS NEEDED FOR  ITCHING     irbesartan 300 MG tablet  Commonly known as: AVAPRO  TAKE ONE TABLET BY MOUTH EVERY EVENING     isosorbide mononitrate 60 MG 24 hr tablet  Commonly known as: IMDUR  Take 1 tablet (60 mg total) by mouth 2 (two) times a day.     LINZESS 72 mcg Cap capsule  Generic drug: linaCLOtide  Take 72 mcg by mouth before breakfast.     mupirocin 2 % ointment  Commonly known as: BACTROBAN  Apply topically 2 (two) times daily.     nitroGLYCERIN 0.3 MG SL tablet  Commonly known as: NITROSTAT  PLACE 1 TABLET UNDER THE TONGUE EVERY 5 MINUTES FOR UP TO 3 DOSES IF NEEDED FOR CHEST PAIN. CALL 911 IF PAIN PERSISTS     omeprazole 20 MG capsule  Commonly known as: PRILOSEC  Take 1 capsule (20 mg total) by mouth daily as needed.     oxyCODONE-acetaminophen  mg per tablet  Commonly known as: PERCOCET  Take 1 tablet by mouth 4 (four) times daily as needed.     permethrin 5 % cream  Commonly known as: ELIMITE  APPLY TOPICALLY ONCE FOR ONE DOSE     rosuvastatin 40 MG Tab  Commonly known as: CRESTOR  TAKE ONE TABLET BY MOUTH DAILY     tiZANidine 4 MG tablet  Commonly known as: ZANAFLEX  Take 1 tablet (4 mg total) by mouth every 8 (eight) hours as needed (spasms).     valACYclovir 1000 MG tablet  Commonly known as: VALTREX  Take 2,000 mg by mouth 2 (two) times daily as needed.     VITAMIN D-3 ORAL  Take 1 tablet by mouth once daily.              Time spent on the discharge of patient: 20 minutes    Lui Alvares MD  Interventional Cardiology  [facility] - Cardiology Stepdown

## 2024-07-03 NOTE — NURSING
Received report from VI Cole around 19:00 . Patient alert and oriented. No  pain. No sign of distress. Iv line in place- flushed, saline locked. Tele monitor in place.On room air. Call bell given on his reach. Instructed to call for any concerns or assistance. Bed on lowest position. Non skid socks on. Plan of care discussed with patient, question answered.    Patient walking independently around her room and in the germain way with walker. Rt groin and Eros wrist dressing intact, clean and dry. Re instructed patient to call for assistance and for any concerns.

## 2024-07-03 NOTE — ASSESSMENT & PLAN NOTE
79F w/ CKD, non-obstructive CAD, HTN, and severe AS now s/p TAVR (29mm EvolutFX valve) on 7/2/24 with subsequent increase in CO interval and possible intermittent accelerated junctional rhythm. Asymptomatic.     Recommendations:  [ ] recommend 30 day MCOT is of EvolutFX TAVR placement for monitoring  [ ] follow up as directed with interventional cardiology team

## 2024-07-03 NOTE — HOSPITAL COURSE
Patient brought for planned TAVR that was successful with a 29mm EvolutFX valve, please see report for full details. The patient tolerated the procedure well and was instructed to continue her home medications. She was noted to have junctional rhythm post TAVR, and EP was consulted. She was given an even monitor for dc. She was then discharged in stable condition.

## 2024-07-03 NOTE — PLAN OF CARE
07/03/24 1245   Final Note   Assessment Type Final Discharge Note   Anticipated Discharge Disposition Home   What phone number can be called within the next 1-3 days to see how you are doing after discharge? [phone]   Hospital Resources/Appts/Education Provided Provided patient/caregiver with written discharge plan information;Provided education on problems/symptoms using teachback   Post-Acute Status   Discharge Delays None known at this time     Patient discharging home / self care. Per physician discharge summary : Discharged Condition: good . She will follow up with IC and they make their own appointments.     Mallorie Garcia RN    [phone]    Future Appointments   Date Time Provider Department Center   7/3/2024  1:00 PM MONITOR, ARRHYTHMIA EVENT NOM ARRHPRO [facility]   8/12/2024  8:00 AM ECHO, [facility] NOM ECHOSTR [facility]   8/12/2024  9:15 AM LAB, APPOINTMENT [facility] LAB VNP [facility]   8/12/2024 10:00 AM José Lee MD [facility] CARDVAL [facility]   8/21/2024  7:15 AM [facility] US2 [facility]   8/27/2024 11:00 AM Patricia Rogers MD [facility]   9/9/2024  1:00 PM Althea Allen MD [facility]

## 2024-07-03 NOTE — PLAN OF CARE
Problem: Dysrhythmia  Goal: Normalized Cardiac Rhythm  Outcome: Progressing  Intervention: Monitor and Manage Cardiac Rhythm Effect  Flowsheets (Taken 7/3/2024 0028)  Dysrhythmia Management: (meds given as ordered) other (see comments)  VTE Prevention/Management:   ambulation promoted   fluids promoted     Problem: Fall Injury Risk  Goal: Absence of Fall and Fall-Related Injury  Outcome: Progressing  Intervention: Identify and Manage Contributors  Flowsheets (Taken 7/3/2024 0028)  Self-Care Promotion:   independence encouraged   BADL personal objects within reach  Medication Review/Management: medications reviewed  Intervention: Promote Injury-Free Environment  Flowsheets (Taken 7/3/2024 0028)  Safety Promotion/Fall Prevention:   assistive device/personal item within reach   nonskid shoes/socks when out of bed   high risk medications identified   Fall Risk reviewed with patient/family   instructed to call staff for mobility   side rails raised x 2   room near unit station

## 2024-07-08 ENCOUNTER — PATIENT OUTREACH (OUTPATIENT)
Dept: ADMINISTRATIVE | Facility: CLINIC | Age: 79
End: 2024-07-08
Payer: MEDICARE

## 2024-07-08 NOTE — PROGRESS NOTES
C3 nurse spoke with Enedelia García  for a TCC Post Discharge follow-up call. The patient has a scheduled HOSFU appointment with [facility] NP on 7/10/24. The NP will call you to provide a time-frame for the appointment.

## 2024-07-10 ENCOUNTER — TELEPHONE (OUTPATIENT)
Dept: CARDIOLOGY | Facility: CLINIC | Age: 79
End: 2024-07-10
Payer: MEDICARE

## 2024-07-10 ENCOUNTER — OFFICE VISIT (OUTPATIENT)
Dept: HOME HEALTH SERVICES | Facility: CLINIC | Age: 79
End: 2024-07-10
Payer: MEDICARE

## 2024-07-10 VITALS
OXYGEN SATURATION: 96 % | SYSTOLIC BLOOD PRESSURE: 127 MMHG | DIASTOLIC BLOOD PRESSURE: 88 MMHG | TEMPERATURE: 97 F | HEART RATE: 75 BPM | RESPIRATION RATE: 16 BRPM

## 2024-07-10 DIAGNOSIS — I25.10 CORONARY ARTERY DISEASE INVOLVING NATIVE CORONARY ARTERY OF NATIVE HEART WITHOUT ANGINA PECTORIS: Primary | ICD-10-CM

## 2024-07-10 PROCEDURE — 3079F DIAST BP 80-89 MM HG: CPT | Mod: CPTII,S$GLB,, | Performed by: NURSE PRACTITIONER

## 2024-07-10 PROCEDURE — 3074F SYST BP LT 130 MM HG: CPT | Mod: CPTII,S$GLB,, | Performed by: NURSE PRACTITIONER

## 2024-07-10 PROCEDURE — 1159F MED LIST DOCD IN RCRD: CPT | Mod: CPTII,S$GLB,, | Performed by: NURSE PRACTITIONER

## 2024-07-10 PROCEDURE — 1160F RVW MEDS BY RX/DR IN RCRD: CPT | Mod: CPTII,S$GLB,, | Performed by: NURSE PRACTITIONER

## 2024-07-10 PROCEDURE — 99497 ADVNCD CARE PLAN 30 MIN: CPT | Mod: S$GLB,,, | Performed by: NURSE PRACTITIONER

## 2024-07-10 PROCEDURE — 1111F DSCHRG MED/CURRENT MED MERGE: CPT | Mod: CPTII,S$GLB,, | Performed by: NURSE PRACTITIONER

## 2024-07-10 PROCEDURE — 99495 TRANSJ CARE MGMT MOD F2F 14D: CPT | Mod: S$GLB,,, | Performed by: NURSE PRACTITIONER

## 2024-07-10 NOTE — PATIENT INSTRUCTIONS
Instructions:  - [facility] Nurse Practitioner to schedule home follow-up visit with patient  as needed.  - Continue all medications, treatments and therapies as ordered.   - Follow all instructions, recommendations as discussed.  - Maintain Safety Precautions at all times.  - Attend all medical appointments as scheduled.  - For worsening symptoms: call Primary Care Physician or Nurse Practitioner.  - For emergencies, call 911 or immediately report to the nearest emergency room.  - Limit Risks of environmental exposure to coronavirus/COVID-19 as discussed including: social distancing, hand hygiene, and limiting departures from the home for necessities only.

## 2024-07-10 NOTE — TELEPHONE ENCOUNTER
Returned call.  LM on VM.  Will attempt to contact in the morning     ----- Message from Trupti Soler MA sent at 7/10/2024 12:19 PM CDT -----  Contact: self  Pt is returning  your call .Please [phone].

## 2024-07-10 NOTE — TELEPHONE ENCOUNTER
"Returned call.  No answer.  Left message on voicemail.  Dr. Morgan does not prescribe pain medicine/Oxycodone.  Asked patient to call me to discuss "post op" pain she is experiencing.     ----- Message from Becky Wyatt sent at 7/9/2024  2:25 PM CDT -----  Contact: [phone]  Patient stated that she needs a post op note for her pain medication (oxycodone 10mg). She only has enough medication for the next day or 2. She needs 12 more pills. Patient uses [facility].  "

## 2024-07-10 NOTE — PROGRESS NOTES
[facility]  Transitional Care Management (TCM) Home Visit    Encounter Provider: Vale CORLEY Chairs   PCP: Shelton Mason MD  Consult Requested By: No ref. provider found  Admit Date: 7/2/24   IP Discharge Date: 7/3/24  Hospital Length of Stay:RRHLOS@ days  Days since discharge (from IP or SNF): 8   [facility] Contact Note: 07/08/2024 date07/10/2024  Hospital Diagnosis: No admission diagnoses are documented for this encounter.     HISTORY OF PRESENT ILLNESS      Patient ID: Enedelia García is a 79 y.o. female was recently admitted to the hospital, this is their TCM encounter.    Hospital Course Synopsis:    HPI:    Procedure(s) (LRB):  REPLACEMENT, AORTIC VALVE, TRANSCATHETER (TAVR) (N/A)  Cardiac Cath [facility]  REPLACEMENT, AORTIC VALVE, PERCUTANEOUS, TRANSCATHETER      Indwelling Lines/Drains at time of discharge:  Lines/Drains/Airways         None                         Hospital Course:  Patient brought for planned TAVR that was successful with a 29mm EvolutFX valve, please see report for full details. The patient tolerated the procedure well and was instructed to continue her home medications. She was noted to have junctional rhythm post TAVR, and EP was consulted. She was given an even monitor for dc. She was then discharged in stable condition.     DECISION MAKING TODAY   AAOx3  Ambulatory with no AD  VSS    Discussion held with pt and/or family related to advanced care planning.  Discussed end of life goals and pt's wishes regarding end of care.  Encouraged designation of a HPOA and discussing wishes should the patient become sick and lose decision-making capacity.  Reviewed living will, [place] Post, and pt's wishes regarding life support and tube feeding.  Reviewed pt's current code status and prognosis.   Pt reports he would like everything done to keep him alive.   FULL CODE STATUS  20 minutes spent in discussion of these services.                Assessment & Plan:  1. Coronary artery disease  involving native coronary artery of native heart without angina pectoris  Assessment & Plan:  -continue asa, rosuvastatin and bp control            Medication List on Discharge:     Medication List            Accurate as of July 10, 2024  5:17 PM. If you have any questions, ask your nurse or doctor.                CONTINUE taking these medications      aspirin 81 MG EC tablet  Commonly known as: ECOTRIN  Take 81 mg by mouth once daily.     BOTOX 100 unit Solr  Generic drug: onabotulinumtoxina  Inject 100 Units into the skin once.     butalbital-acetaminophen-caffeine -40 mg -40 mg per tablet  Commonly known as: FIORICET, ESGIC  Take 1 tablet by mouth every 8 (eight) hours as needed for Headaches.     CENTRUM SILVER WOMEN 8 mg iron-400 mcg-50 mcg Tab  Generic drug: multivit-min-iron-FA-vit K-lut  Take 1 tablet by mouth once daily.     cimetidine 300 MG tablet  Commonly known as: TAGAMET  Take 1 tablet (300 mg total) by mouth 3 (three) times daily. Take first dose 6pm prior to the procedure, followed by the second dose at 11pm and the third dose at 6am the morning of the procedure for 3 doses     citalopram 10 MG tablet  Commonly known as: CeleXA  Take 10 mg by mouth once daily.     clotrimazole-betamethasone 1-0.05% cream  Commonly known as: LOTRISONE  Apply topically 2 (two) times daily.     DULoxetine 60 MG capsule  Commonly known as: CYMBALTA  Take 1 capsule (60 mg total) by mouth once daily.     famotidine 20 MG tablet  Commonly known as: PEPCID  Take 2 tablets (40 mg total) by mouth 3 (three) times daily as needed (take first dose 6 pm day before procedure, seconond dose 11 pm night prior to procedure and third dose 6 am day of procedure).     FeroSuL 325 mg (65 mg iron) Tab tablet  Generic drug: ferrous sulfate  Take 325 mg by mouth once daily.     fish oil-omega-3 fatty acids 300-1,000 mg capsule  Take 2 capsules by mouth once daily.     fluocinolone and shower cap 0.01 % Oil  Apply 0.01 %  topically every evening.     furosemide 40 MG tablet  Commonly known as: LASIX  Take 2 tablets (80 mg total) by mouth 2 (two) times daily as needed (swelling).     hydrALAZINE 25 MG tablet  Commonly known as: APRESOLINE  Take 25 mg by mouth every 8 (eight) hours.     hydrOXYzine HCL 25 MG tablet  Commonly known as: ATARAX  TAKE ONE TABLET BY MOUTH EVERY 6 HOURS AS NEEDED FOR ITCHING     irbesartan 300 MG tablet  Commonly known as: AVAPRO  TAKE ONE TABLET BY MOUTH EVERY EVENING     isosorbide mononitrate 60 MG 24 hr tablet  Commonly known as: IMDUR  Take 1 tablet (60 mg total) by mouth 2 (two) times a day.     LINZESS 72 mcg Cap capsule  Generic drug: linaCLOtide  Take 72 mcg by mouth before breakfast.     mupirocin 2 % ointment  Commonly known as: BACTROBAN  Apply topically 2 (two) times daily.     nitroGLYCERIN 0.3 MG SL tablet  Commonly known as: NITROSTAT  PLACE 1 TABLET UNDER THE TONGUE EVERY 5 MINUTES FOR UP TO 3 DOSES IF NEEDED FOR CHEST PAIN. CALL 911 IF PAIN PERSISTS     omeprazole 20 MG capsule  Commonly known as: PRILOSEC  Take 1 capsule (20 mg total) by mouth daily as needed.     oxyCODONE-acetaminophen  mg per tablet  Commonly known as: PERCOCET  Take 1 tablet by mouth 4 (four) times daily as needed.     permethrin 5 % cream  Commonly known as: ELIMITE  APPLY TOPICALLY ONCE FOR ONE DOSE     rosuvastatin 40 MG Tab  Commonly known as: CRESTOR  TAKE ONE TABLET BY MOUTH DAILY     tiZANidine 4 MG tablet  Commonly known as: ZANAFLEX  Take 1 tablet (4 mg total) by mouth every 8 (eight) hours as needed (spasms).     valACYclovir 1000 MG tablet  Commonly known as: VALTREX  Take 2,000 mg by mouth 2 (two) times daily as needed.     VITAMIN D-3 ORAL  Take 1 tablet by mouth once daily.              Medication Reconciliation:  Were medications changed on discharge? Yes  Were medications in the home? Yes  Is the patient taking the medications as directed? Yes  Does the patient understand the medications and  changes? Yes  Does updated med list accurately reflects meds patient is currently taking? Yes    ENVIRONMENT OF CARE      Family and/or Caregiver present at visit?  Yes  Name of Caregiver:   History provided by: patient    Advance Care Planning   Advanced Care Planning Status:  Patient does not have an ACP conversation on file  Living Will: No  Power of : No  [place]: No    Does Caregiver have HCPoA: No  Changes today: 0  Is patient hospice appropriate: No  (If needed, use PPS <30 or FAST score >7)  Was referral to hospice placed: No       Impression upon entering the home:  Physical Dwelling: single family home   Appearance of home environment: cleaniness: clean  Functional Status: independent  Mobility: ambulatory  Nutritional access: adequate intake and access  Home Health: No, and does not need it at this time   DME/Supplies: none     Diagnostic tests reviewed/disposition: No diagnosic tests pending after this hospitalization.  Disease/illness education: CAD  Establishment or re-establishment of referral orders for community resources: No other necessary community resources.   Discussion with other health care providers: No discussion with other health care providers necessary.   Does patient have a PCP at [facility]? Yes   Repatriation plan with PCP? follow-up with PCP within 30d   Does patient have an ostomy (ileostomy, colostomy, suprapubic catheter, nephrostomy tube, tracheostomy, PEG tube, pleurex catheter, cholecystostomy, etc)? No  Were BPAs reviewed? Yes    Social History     Socioeconomic History    Marital status: Significant Other   Tobacco Use    Smoking status: Never    Smokeless tobacco: Never   Substance and Sexual Activity    Alcohol use: No    Drug use: No    Sexual activity: Not Currently     Social Determinants of Health     Financial Resource Strain: Low Risk  (6/21/2024)    Overall Financial Resource Strain (CARDIA)     Difficulty of Paying Living Expenses: Not very hard   Food  Insecurity: No Food Insecurity (6/21/2024)    Hunger Vital Sign     Worried About Running Out of Food in the Last Year: Never true     Ran Out of Food in the Last Year: Never true   Transportation Needs: No Transportation Needs (6/21/2024)    TRANSPORTATION NEEDS     Transportation : No   Physical Activity: Inactive (6/21/2024)    Exercise Vital Sign     Days of Exercise per Week: 0 days     Minutes of Exercise per Session: 0 min   Stress: Stress Concern Present (6/21/2024)    Citizen of [place] [employer] - Occupational Stress Questionnaire     Feeling of Stress : To some extent   Housing Stability: Low Risk  (6/21/2024)    Housing Stability Vital Sign     Unable to Pay for Housing in the Last Year: No     Homeless in the Last Year: No       OBJECTIVE:     Vital Signs:  Vitals:    07/10/24 1350   BP: 127/88   Pulse: 75   Resp: 16   Temp: 97.3 °F (36.3 °C)       Review of Systems   Constitutional:  Negative for fatigue and unexpected weight change.   HENT:  Negative for congestion.    Eyes:  Negative for redness.   Respiratory:  Negative for cough and shortness of breath.    Cardiovascular:  Negative for chest pain and palpitations.   Gastrointestinal:  Negative for abdominal distention.   Genitourinary:  Negative for difficulty urinating.   Musculoskeletal:  Negative for arthralgias and gait problem.   Neurological:  Negative for dizziness.   Psychiatric/Behavioral:  Negative for agitation.        Physical Exam:  Physical Exam  HENT:      Head: Atraumatic.   Cardiovascular:      Rate and Rhythm: Normal rate.      Pulses: Normal pulses.   Pulmonary:      Effort: Pulmonary effort is normal.   Skin:     General: Skin is warm and dry.      Capillary Refill: Capillary refill takes less than 2 seconds.   Neurological:      Mental Status: She is alert. Mental status is at baseline.         INSTRUCTIONS FOR PATIENT:     Scheduled Follow-up, Appts Reviewed with Modifications if Needed: Yes  Future Appointments    Date Time Provider Department Center   8/12/2024  8:00 AM ECHO, MAIN CAMPUS [facility] [address]   8/12/2024  9:15 AM LAB, APPOINTMENT [facility]   8/12/2024 10:00 AM José Lee MD [facility] CARDVAL [address]   8/21/2024  7:15 AM [facility] US2 [facility]   8/27/2024 11:00 AM Patricia Rogers MD [facility]   9/9/2024  1:00 PM Althea Allen MD [facility]       Signature: Vale CORLEY Chairs, NP    Transition of Care Visit:  I have reviewed and updated the history and problem list.  I have reconciled the medication list.  I have discussed the hospitalization and current medical issues, prognosis and plans with the patient/family.

## 2024-07-10 NOTE — PHYSICIAN QUERY
Due to the conflicting clinical picture, please clinically validate the Acute on chronic diastolic heart failure.   If validated, please provide additional clinical support for the Acute on chronic diastolic heart failure.    The condition is not confirmed and/or it has been ruled out

## 2024-07-20 DIAGNOSIS — F43.21 ADJUSTMENT DISORDER WITH DEPRESSED MOOD: ICD-10-CM

## 2024-07-22 RX ORDER — HYDROXYZINE HYDROCHLORIDE 25 MG/1
TABLET, FILM COATED ORAL
Qty: 30 TABLET | Refills: 2 | Status: SHIPPED | OUTPATIENT
Start: 2024-07-22

## 2024-08-12 ENCOUNTER — HOSPITAL ENCOUNTER (OUTPATIENT)
Dept: CARDIOLOGY | Facility: HOSPITAL | Age: 79
Discharge: HOME | End: 2024-08-12
Attending: INTERNAL MEDICINE
Payer: MEDICARE

## 2024-08-12 ENCOUNTER — OFFICE VISIT (OUTPATIENT)
Dept: CARDIOLOGY | Facility: CLINIC | Age: 79
End: 2024-08-12
Payer: MEDICARE

## 2024-08-12 VITALS
DIASTOLIC BLOOD PRESSURE: 60 MMHG | SYSTOLIC BLOOD PRESSURE: 124 MMHG | WEIGHT: 145.94 LBS | BODY MASS INDEX: 25.86 KG/M2 | HEART RATE: 66 BPM | HEIGHT: 63 IN | OXYGEN SATURATION: 95 %

## 2024-08-12 VITALS
HEART RATE: 64 BPM | DIASTOLIC BLOOD PRESSURE: 62 MMHG | HEIGHT: 63 IN | SYSTOLIC BLOOD PRESSURE: 138 MMHG | BODY MASS INDEX: 25.87 KG/M2 | WEIGHT: 146 LBS

## 2024-08-12 DIAGNOSIS — I70.0 AORTIC ATHEROSCLEROSIS: ICD-10-CM

## 2024-08-12 DIAGNOSIS — Z95.2 S/P TAVR (TRANSCATHETER AORTIC VALVE REPLACEMENT): Primary | ICD-10-CM

## 2024-08-12 DIAGNOSIS — E78.2 MIXED HYPERLIPIDEMIA: ICD-10-CM

## 2024-08-12 DIAGNOSIS — I10 PRIMARY HYPERTENSION: ICD-10-CM

## 2024-08-12 DIAGNOSIS — I25.10 CORONARY ARTERY DISEASE INVOLVING NATIVE CORONARY ARTERY OF NATIVE HEART WITHOUT ANGINA PECTORIS: ICD-10-CM

## 2024-08-12 DIAGNOSIS — I35.0 NONRHEUMATIC AORTIC VALVE STENOSIS: ICD-10-CM

## 2024-08-12 PROBLEM — Z95.3 S/P TAVR (TRANSCATHETER AORTIC VALVE REPLACEMENT): Status: ACTIVE | Noted: 2024-08-12

## 2024-08-12 LAB
ASCENDING AORTA: 2.37 CM
AV INDEX (PROSTH): 0.76
AV MEAN GRADIENT: 8 MMHG
AV PEAK GRADIENT: 12 MMHG
AV VALVE AREA BY VELOCITY RATIO: 2.31 CM²
AV VALVE AREA: 2.38 CM²
AV VELOCITY RATIO: 0.74
BSA FOR ECHO PROCEDURE: 1.72 M2
CV ECHO LV RWT: 0.3 CM
DOP CALC AO PEAK VEL: 1.71 M/S
DOP CALC AO VTI: 37.61 CM
DOP CALC LVOT AREA: 3.1 CM2
DOP CALC LVOT DIAMETER: 2 CM
DOP CALC LVOT PEAK VEL: 1.26 M/S
DOP CALC LVOT STROKE VOLUME: 89.55 CM3
DOP CALCLVOT PEAK VEL VTI: 28.52 CM
E WAVE DECELERATION TIME: 328.12 MSEC
E/A RATIO: 0.78
E/E' RATIO: 9.47 M/S
ECHO LV POSTERIOR WALL: 0.64 CM (ref 0.6–1.1)
FRACTIONAL SHORTENING: 53 % (ref 28–44)
INTERVENTRICULAR SEPTUM: 0.77 CM (ref 0.6–1.1)
LA MAJOR: 4.95 CM
LA MINOR: 5.05 CM
LA WIDTH: 3.55 CM
LEFT ATRIUM SIZE: 3.55 CM
LEFT ATRIUM VOLUME INDEX MOD: 26.7 ML/M2
LEFT ATRIUM VOLUME INDEX: 31.7 ML/M2
LEFT ATRIUM VOLUME MOD: 45.08 CM3
LEFT ATRIUM VOLUME: 53.56 CM3
LEFT INTERNAL DIMENSION IN SYSTOLE: 2.02 CM (ref 2.1–4)
LEFT VENTRICLE DIASTOLIC VOLUME INDEX: 49.78 ML/M2
LEFT VENTRICLE DIASTOLIC VOLUME: 84.13 ML
LEFT VENTRICLE MASS INDEX: 53 G/M2
LEFT VENTRICLE SYSTOLIC VOLUME INDEX: 7.7 ML/M2
LEFT VENTRICLE SYSTOLIC VOLUME: 13.08 ML
LEFT VENTRICULAR INTERNAL DIMENSION IN DIASTOLE: 4.32 CM (ref 3.5–6)
LEFT VENTRICULAR MASS: 90.05 G
LV LATERAL E/E' RATIO: 10.14 M/S
LV SEPTAL E/E' RATIO: 8.88 M/S
MV PEAK A VEL: 0.91 M/S
MV PEAK E VEL: 0.71 M/S
MV STENOSIS PRESSURE HALF TIME: 95.15 MS
MV VALVE AREA P 1/2 METHOD: 2.31 CM2
PISA TR MAX VEL: 2.48 M/S
RA MAJOR: 4.51 CM
RA PRESSURE ESTIMATED: 3 MMHG
RA WIDTH: 3.87 CM
RIGHT VENTRICLE DIASTOLIC BASEL DIMENSION: 3.4 CM
RV TB RVSP: 5 MMHG
SINUS: 2.27 CM
STJ: 2.14 CM
TDI LATERAL: 0.07 M/S
TDI SEPTAL: 0.08 M/S
TDI: 0.08 M/S
TR MAX PG: 25 MMHG
TRICUSPID ANNULAR PLANE SYSTOLIC EXCURSION: 2.31 CM
TV REST PULMONARY ARTERY PRESSURE: 28 MMHG
Z-SCORE OF LEFT VENTRICULAR DIMENSION IN END DIASTOLE: -0.86
Z-SCORE OF LEFT VENTRICULAR DIMENSION IN END SYSTOLE: -2.9

## 2024-08-12 PROCEDURE — 3078F DIAST BP <80 MM HG: CPT | Mod: CPTII,S$GLB,, | Performed by: INTERNAL MEDICINE

## 2024-08-12 PROCEDURE — 3074F SYST BP LT 130 MM HG: CPT | Mod: CPTII,S$GLB,, | Performed by: INTERNAL MEDICINE

## 2024-08-12 PROCEDURE — 99999 PR PBB SHADOW E&M-EST. PATIENT-LVL V: CPT | Mod: PBBFAC,,, | Performed by: INTERNAL MEDICINE

## 2024-08-12 PROCEDURE — 1159F MED LIST DOCD IN RCRD: CPT | Mod: CPTII,S$GLB,, | Performed by: INTERNAL MEDICINE

## 2024-08-12 PROCEDURE — 3288F FALL RISK ASSESSMENT DOCD: CPT | Mod: CPTII,S$GLB,, | Performed by: INTERNAL MEDICINE

## 2024-08-12 PROCEDURE — 1126F AMNT PAIN NOTED NONE PRSNT: CPT | Mod: CPTII,S$GLB,, | Performed by: INTERNAL MEDICINE

## 2024-08-12 PROCEDURE — 1101F PT FALLS ASSESS-DOCD LE1/YR: CPT | Mod: CPTII,S$GLB,, | Performed by: INTERNAL MEDICINE

## 2024-08-12 PROCEDURE — 93306 TTE W/DOPPLER COMPLETE: CPT

## 2024-08-12 PROCEDURE — 93306 TTE W/DOPPLER COMPLETE: CPT | Mod: 26,,, | Performed by: INTERNAL MEDICINE

## 2024-08-12 PROCEDURE — 99214 OFFICE O/P EST MOD 30 MIN: CPT | Mod: S$GLB,,, | Performed by: INTERNAL MEDICINE

## 2024-08-12 NOTE — PROGRESS NOTES
"Referring provider: No ref. provider found     Patient ID:  Enedelia Stewart is a 79 y.o. y.o. female who presents for follow-up Dizziness      Leticia Stewart is a 80 yo F with CAD, PAD, occluded LICA, HFpEF and AS s/p 29mm Evolut TAVR who presents for 1 month follow up after TAVR.     She is doing well, and has no more symptoms of AS. She does say she gets dizzy from time to time if she stands to quickly, otherwise she is feeling well. TTE with trace pvl but otherwise stable valve function. No other acute events.     Review of Systems   All other systems reviewed and are negative.     Objective:     /60 (BP Location: Left arm, Patient Position: Sitting, BP Method: Large (Automatic))   Pulse 66   Ht 5' 3" (1.6 m)   Wt 66.2 kg (145 lb 15.1 oz)   SpO2 95%   BMI 25.85 kg/m²     Physical Exam  Vitals and nursing note reviewed.   Constitutional:       Appearance: Normal appearance.   HENT:      Head: Normocephalic and atraumatic.      Right Ear: External ear normal.      Left Ear: External ear normal.      Nose: Nose normal.      Mouth/Throat:      Mouth: Mucous membranes are moist.   Eyes:      Pupils: Pupils are equal, round, and reactive to light.   Cardiovascular:      Rate and Rhythm: Normal rate and regular rhythm.      Pulses: Normal pulses.   Pulmonary:      Effort: Pulmonary effort is normal.   Abdominal:      General: Abdomen is flat.   Musculoskeletal:         General: Normal range of motion.      Cervical back: Normal range of motion.      Right lower leg: No edema.      Left lower leg: No edema.   Skin:     General: Skin is warm and dry.      Capillary Refill: Capillary refill takes less than 2 seconds.   Neurological:      General: No focal deficit present.      Mental Status: She is alert and oriented to person, place, and time. Mental status is at baseline.     Labs:     Lab Results   Component Value Date     07/03/2024    K 4.2 07/03/2024     07/03/2024    CO2 27 07/03/2024    " BUN 40 (H) 07/03/2024    CREATININE 1.5 (H) 07/03/2024    ANIONGAP 7 (L) 07/03/2024     Lab Results   Component Value Date    HGBA1C 5.2 06/30/2023     Lab Results   Component Value Date    BNP 70 07/02/2024    BNP 36 06/21/2024    BNP 85 05/04/2024       Lab Results   Component Value Date    WBC 15.06 (H) 07/03/2024    HGB 10.9 (L) 07/03/2024    HCT 32.8 (L) 07/03/2024     07/03/2024    GRAN 10.3 (H) 07/03/2024    GRAN 68.2 07/03/2024     Lab Results   Component Value Date    CHOL 147 06/30/2023    HDL 50 06/30/2023    LDLCALC 77.2 06/30/2023    TRIG 99 06/30/2023       Meds:     Current Outpatient Medications:     aspirin (ECOTRIN) 81 MG EC tablet, Take 81 mg by mouth once daily., Disp: , Rfl:     butalbital-acetaminophen-caffeine -40 mg (FIORICET, ESGIC) -40 mg per tablet, Take 1 tablet by mouth every 8 (eight) hours as needed for Headaches., Disp: , Rfl:     calcium carbonate/vitamin D3 (VITAMIN D-3 ORAL), Take 1 tablet by mouth once daily., Disp: , Rfl:     citalopram (CELEXA) 10 MG tablet, Take 10 mg by mouth once daily., Disp: , Rfl:     DULoxetine (CYMBALTA) 60 MG capsule, Take 1 capsule (60 mg total) by mouth once daily., Disp: 90 capsule, Rfl: 0    FEROSUL 325 mg (65 mg iron) Tab tablet, Take 325 mg by mouth once daily., Disp: , Rfl:     furosemide (LASIX) 40 MG tablet, Take 2 tablets (80 mg total) by mouth 2 (two) times daily as needed (swelling)., Disp: 360 tablet, Rfl: 3    hydrALAZINE (APRESOLINE) 25 MG tablet, Take 25 mg by mouth every 8 (eight) hours., Disp: , Rfl:     hydrOXYzine HCL (ATARAX) 25 MG tablet, TAKE ONE TABLET BY MOUTH EVERY 6 HOURS AS NEEDED FOR ITCHING, Disp: 30 tablet, Rfl: 2    irbesartan (AVAPRO) 300 MG tablet, TAKE ONE TABLET BY MOUTH EVERY EVENING, Disp: 90 tablet, Rfl: 3    isosorbide mononitrate (IMDUR) 60 MG 24 hr tablet, Take 1 tablet (60 mg total) by mouth 2 (two) times a day., Disp: 180 tablet, Rfl: 1    multivit-min-iron-FA-lutein (CENTRUM  SILVER WOMEN) 8 mg iron-400 mcg-300 mcg Tab, Take 1 tablet by mouth once daily., Disp: , Rfl:     omega-3 fatty acids/fish oil (FISH OIL-OMEGA-3 FATTY ACIDS) 300-1,000 mg capsule, Take 2 capsules by mouth once daily., Disp: , Rfl:     omeprazole (PRILOSEC) 20 MG capsule, Take 1 capsule (20 mg total) by mouth daily as needed., Disp: 90 capsule, Rfl: 2    oxyCODONE-acetaminophen (PERCOCET)  mg per tablet, Take 1 tablet by mouth 4 (four) times daily as needed., Disp: , Rfl:     rosuvastatin (CRESTOR) 40 MG Tab, TAKE ONE TABLET BY MOUTH DAILY, Disp: 90 tablet, Rfl: 3    tiZANidine (ZANAFLEX) 4 MG tablet, Take 1 tablet (4 mg total) by mouth every 8 (eight) hours as needed (spasms)., Disp: 60 tablet, Rfl: 2    BOTOX 100 unit SolR, Inject 100 Units into the skin once. (Patient not taking: Reported on 7/8/2024), Disp: , Rfl:     cimetidine (TAGAMET) 300 MG tablet, Take 1 tablet (300 mg total) by mouth 3 (three) times daily. Take first dose 6pm prior to the procedure, followed by the second dose at 11pm and the third dose at 6am the morning of the procedure for 3 doses, Disp: 3 tablet, Rfl: 0    clotrimazole-betamethasone 1-0.05% (LOTRISONE) cream, Apply topically 2 (two) times daily. (Patient not taking: Reported on 8/12/2024), Disp: 45 g, Rfl: 1    famotidine (PEPCID) 20 MG tablet, Take 2 tablets (40 mg total) by mouth 3 (three) times daily as needed (take first dose 6 pm day before procedure, seconond dose 11 pm night prior to procedure and third dose 6 am day of procedure). (Patient not taking: Reported on 8/12/2024), Disp: 3 tablet, Rfl: 0    fluocinolone and shower cap 0.01 % Oil, Apply 0.01 % topically every evening. (Patient not taking: Reported on 8/12/2024), Disp: , Rfl:     LINZESS 72 mcg Cap capsule, Take 72 mcg by mouth before breakfast. (Patient not taking: Reported on 8/12/2024), Disp: , Rfl:     mupirocin (BACTROBAN) 2 % ointment, Apply topically 2 (two) times daily. (Patient not taking:  Reported on 8/12/2024), Disp: 22 g, Rfl: 3    nitroGLYCERIN (NITROSTAT) 0.3 MG SL tablet, PLACE 1 TABLET UNDER THE TONGUE EVERY 5 MINUTES FOR UP TO 3 DOSES IF NEEDED FOR CHEST PAIN. CALL 911 IF PAIN PERSISTS (Patient not taking: Reported on 8/12/2024), Disp: 25 tablet, Rfl: 0    permethrin (ELIMITE) 5 % cream, APPLY TOPICALLY ONCE FOR ONE DOSE (Patient not taking: Reported on 8/12/2024), Disp: 60 g, Rfl: 0    valACYclovir (VALTREX) 1000 MG tablet, Take 2,000 mg by mouth 2 (two) times daily as needed. (Patient not taking: Reported on 8/12/2024), Disp: , Rfl:       Assessment & Plan:     Coronary artery disease involving native coronary artery of native heart without angina pectoris  -continue asa, rosuvastatin and bp control     Hyperlipidemia  -rosuvastatin     Primary hypertension  -continue current regimen       Aortic atherosclerosis  -asa, rosuvastatin       S/P TAVR (transcatheter aortic valve replacement)  -s/p 29mm Evolut 07/02/24   -stable TTE with trace pvl  -rtc 1 year       Lui Alvares MD   Interventional Cardiology

## 2024-08-14 ENCOUNTER — CARE AT HOME (OUTPATIENT)
Dept: HOME HEALTH SERVICES | Facility: CLINIC | Age: 79
End: 2024-08-14
Payer: MEDICARE

## 2024-08-14 DIAGNOSIS — I25.10 CORONARY ARTERY DISEASE INVOLVING NATIVE CORONARY ARTERY OF NATIVE HEART WITHOUT ANGINA PECTORIS: Primary | ICD-10-CM

## 2024-08-14 DIAGNOSIS — N18.4 STAGE 4 CHRONIC KIDNEY DISEASE: ICD-10-CM

## 2024-08-14 DIAGNOSIS — F43.21 ADJUSTMENT DISORDER WITH DEPRESSED MOOD: ICD-10-CM

## 2024-08-14 PROCEDURE — 99349 HOME/RES VST EST MOD MDM 40: CPT | Mod: S$GLB,,, | Performed by: NURSE PRACTITIONER

## 2024-08-14 RX ORDER — HYDROXYZINE HYDROCHLORIDE 25 MG/1
TABLET, FILM COATED ORAL
Qty: 30 TABLET | Refills: 2 | Status: SHIPPED | OUTPATIENT
Start: 2024-08-14

## 2024-08-14 NOTE — PROGRESS NOTES
[facility]  Transitional Care Management (TCM) Home Visit    Encounter Provider: Vale CORLEY Chairs   PCP: Shelton Mason MD  Consult Requested By: No ref. provider found  Admit Date: 7/2/24   IP Discharge Date: 7/3/24  Hospital Length of Stay:RRHLOS@ days  Days since discharge (from IP or SNF): 8   [facility] Contact Note: 07/08/2024 date07/10/2024  Hospital Diagnosis: No admission diagnoses are documented for this encounter.     HISTORY OF PRESENT ILLNESS      Patient ID: Enedelia García is a 79 y.o. female was recently admitted to the hospital, this is their TCM encounter.    Hospital Course Synopsis:    HPI:    Procedure(s) (LRB):  REPLACEMENT, AORTIC VALVE, TRANSCATHETER (TAVR) (N/A)  Cardiac Cath [facility]geon  REPLACEMENT, AORTIC VALVE, PERCUTANEOUS, TRANSCATHETER      Indwelling Lines/Drains at time of discharge:  Lines/Drains/Airways         None                         Hospital Course:  Patient brought for planned TAVR that was successful with a 29mm EvolutFX valve, please see report for full details. The patient tolerated the procedure well and was instructed to continue her home medications. She was noted to have junctional rhythm post TAVR, and EP was consulted. She was given an even monitor for dc. She was then discharged in stable condition.     DECISION MAKING TODAY   AAOx3  Ambulatory with no AD  VSS    No acute concerns on exam today              Assessment & Plan:  1. Coronary artery disease involving native coronary artery of native heart without angina pectoris  Assessment & Plan:  -continue asa, rosuvastatin and bp control       2. Stage 4 chronic kidney disease  Assessment & Plan:  Stable  Renally dose all meds             Medication List on Discharge:     Medication List            Accurate as of August 14, 2024 11:59 PM. If you have any questions, ask your nurse or doctor.                CONTINUE taking these medications      aspirin 81 MG EC tablet  Commonly known as: ECOTRIN  Take 81 mg  by mouth once daily.     BOTOX 100 unit Solr  Generic drug: onabotulinumtoxina  Inject 100 Units into the skin once.     butalbital-acetaminophen-caffeine -40 mg -40 mg per tablet  Commonly known as: FIORICET, ESGIC  Take 1 tablet by mouth every 8 (eight) hours as needed for Headaches.     CENTRUM SILVER WOMEN 8 mg iron-400 mcg-50 mcg Tab  Generic drug: multivit-min-iron-FA-vit K-lut  Take 1 tablet by mouth once daily.     cimetidine 300 MG tablet  Commonly known as: TAGAMET  Take 1 tablet (300 mg total) by mouth 3 (three) times daily. Take first dose 6pm prior to the procedure, followed by the second dose at 11pm and the third dose at 6am the morning of the procedure for 3 doses     citalopram 10 MG tablet  Commonly known as: CeleXA  Take 10 mg by mouth once daily.     clotrimazole-betamethasone 1-0.05% cream  Commonly known as: LOTRISONE  Apply topically 2 (two) times daily.     DULoxetine 60 MG capsule  Commonly known as: CYMBALTA  Take 1 capsule (60 mg total) by mouth once daily.     famotidine 20 MG tablet  Commonly known as: PEPCID  Take 2 tablets (40 mg total) by mouth 3 (three) times daily as needed (take first dose 6 pm day before procedure, seconond dose 11 pm night prior to procedure and third dose 6 am day of procedure).     FeroSuL 325 mg (65 mg iron) Tab tablet  Generic drug: ferrous sulfate  Take 325 mg by mouth once daily.     fish oil-omega-3 fatty acids 300-1,000 mg capsule  Take 2 capsules by mouth once daily.     fluocinolone and shower cap 0.01 % Oil  Apply 0.01 % topically every evening.     furosemide 40 MG tablet  Commonly known as: LASIX  Take 2 tablets (80 mg total) by mouth 2 (two) times daily as needed (swelling).     hydrALAZINE 25 MG tablet  Commonly known as: APRESOLINE  Take 25 mg by mouth every 8 (eight) hours.     hydrOXYzine HCL 25 MG tablet  Commonly known as: ATARAX  TAKE ONE TABLET BY MOUTH EVERY 6 HOURS AS NEEDED FOR ITCHING     irbesartan 300 MG tablet  Commonly  known as: AVAPRO  TAKE ONE TABLET BY MOUTH EVERY EVENING     isosorbide mononitrate 60 MG 24 hr tablet  Commonly known as: IMDUR  Take 1 tablet (60 mg total) by mouth 2 (two) times a day.     LINZESS 72 mcg Cap capsule  Generic drug: linaCLOtide  Take 72 mcg by mouth before breakfast.     mupirocin 2 % ointment  Commonly known as: BACTROBAN  Apply topically 2 (two) times daily.     nitroGLYCERIN 0.3 MG SL tablet  Commonly known as: NITROSTAT  PLACE 1 TABLET UNDER THE TONGUE EVERY 5 MINUTES FOR UP TO 3 DOSES IF NEEDED FOR CHEST PAIN. CALL 911 IF PAIN PERSISTS     omeprazole 20 MG capsule  Commonly known as: PRILOSEC  Take 1 capsule (20 mg total) by mouth daily as needed.     oxyCODONE-acetaminophen  mg per tablet  Commonly known as: PERCOCET  Take 1 tablet by mouth 4 (four) times daily as needed.     permethrin 5 % cream  Commonly known as: ELIMITE  APPLY TOPICALLY ONCE FOR ONE DOSE     rosuvastatin 40 MG Tab  Commonly known as: CRESTOR  TAKE ONE TABLET BY MOUTH DAILY     tiZANidine 4 MG tablet  Commonly known as: ZANAFLEX  Take 1 tablet (4 mg total) by mouth every 8 (eight) hours as needed (spasms).     valACYclovir 1000 MG tablet  Commonly known as: VALTREX  Take 2,000 mg by mouth 2 (two) times daily as needed.     VITAMIN D-3 ORAL  Take 1 tablet by mouth once daily.              Medication Reconciliation:  Were medications changed on discharge? Yes  Were medications in the home? Yes  Is the patient taking the medications as directed? Yes  Does the patient understand the medications and changes? Yes  Does updated med list accurately reflects meds patient is currently taking? Yes    ENVIRONMENT OF CARE      Family and/or Caregiver present at visit?  Yes  Name of Caregiver:   History provided by: patient    Advance Care Planning   Advanced Care Planning Status:  Patient has had an ACP conversation  Living Will: No  Power of : No  [place]: No    Does Caregiver have HCPoA: No  Changes today: 0  Is  patient hospice appropriate: No  (If needed, use PPS <30 or FAST score >7)  Was referral to hospice placed: No       Impression upon entering the home:  Physical Dwelling: single family home   Appearance of home environment: cleaniness: clean  Functional Status: independent  Mobility: ambulatory  Nutritional access: adequate intake and access  Home Health: No, and does not need it at this time   DME/Supplies: none     Diagnostic tests reviewed/disposition: No diagnosic tests pending after this hospitalization.  Disease/illness education: CAD  Establishment or re-establishment of referral orders for community resources: No other necessary community resources.   Discussion with other health care providers: No discussion with other health care providers necessary.   Does patient have a PCP at [facility]? Yes   Repatriation plan with PCP? follow-up with PCP within 30d   Does patient have an ostomy (ileostomy, colostomy, suprapubic catheter, nephrostomy tube, tracheostomy, PEG tube, pleurex catheter, cholecystostomy, etc)? No  Were BPAs reviewed? Yes    Social History     Socioeconomic History    Marital status: Significant Other   Tobacco Use    Smoking status: Never    Smokeless tobacco: Never   Substance and Sexual Activity    Alcohol use: No    Drug use: No    Sexual activity: Not Currently     Social Determinants of Health     Financial Resource Strain: Low Risk  (6/21/2024)    Overall Financial Resource Strain (CARDIA)     Difficulty of Paying Living Expenses: Not very hard   Food Insecurity: No Food Insecurity (6/21/2024)    Hunger Vital Sign     Worried About Running Out of Food in the Last Year: Never true     Ran Out of Food in the Last Year: Never true   Transportation Needs: No Transportation Needs (6/21/2024)    TRANSPORTATION NEEDS     Transportation : No   Physical Activity: Inactive (6/21/2024)    Exercise Vital Sign     Days of Exercise per Week: 0 days     Minutes of Exercise per Session: 0 min   Stress: Stress  Concern Present (6/21/2024)    [employer] [place] [employer] - Occupational Stress Questionnaire     Feeling of Stress : To some extent   Housing Stability: Low Risk  (6/21/2024)    Housing Stability Vital Sign     Unable to Pay for Housing in the Last Year: No     Homeless in the Last Year: No       OBJECTIVE:     Vital Signs:  Vitals:    08/14/24 0922   Pulse: 68   Resp: 18   Temp: 97.6 °F (36.4 °C)       Review of Systems   Constitutional:  Negative for fatigue and unexpected weight change.   HENT:  Negative for congestion.    Eyes:  Negative for redness.   Respiratory:  Negative for cough and shortness of breath.    Cardiovascular:  Negative for chest pain and palpitations.   Gastrointestinal:  Negative for abdominal distention.   Genitourinary:  Negative for difficulty urinating.   Musculoskeletal:  Negative for arthralgias and gait problem.   Neurological:  Negative for dizziness.   Psychiatric/Behavioral:  Negative for agitation.        Physical Exam:  Physical Exam  HENT:      Head: Atraumatic.   Cardiovascular:      Rate and Rhythm: Normal rate.      Pulses: Normal pulses.   Pulmonary:      Effort: Pulmonary effort is normal.   Skin:     General: Skin is warm and dry.      Capillary Refill: Capillary refill takes less than 2 seconds.   Neurological:      Mental Status: She is alert. Mental status is at baseline.         INSTRUCTIONS FOR PATIENT:     Scheduled Follow-up, Appts Reviewed with Modifications if Needed: Yes  Future Appointments   Date Time Provider Department Center   9/9/2024  1:00 PM Althea Allen MD [facility]   1/7/2025 10:45 AM Patricia Rogers MD [facility]  [facility]       Signature: Vale CORLEY Chairs, NP    Transition of Care Visit:  I have reviewed and updated the history and problem list.  I have reconciled the medication list.  I have discussed the hospitalization and current medical issues, prognosis and plans with the patient/family.

## 2024-08-16 DIAGNOSIS — F43.21 ADJUSTMENT DISORDER WITH DEPRESSED MOOD: ICD-10-CM

## 2024-08-16 RX ORDER — DULOXETIN HYDROCHLORIDE 30 MG/1
30 CAPSULE, DELAYED RELEASE ORAL DAILY
Qty: 30 CAPSULE | Refills: 3 | Status: SHIPPED | OUTPATIENT
Start: 2024-08-16 | End: 2025-08-16

## 2024-08-22 ENCOUNTER — HOSPITAL ENCOUNTER (OUTPATIENT)
Dept: RADIOLOGY | Facility: HOSPITAL | Age: 79
Discharge: HOME | End: 2024-08-22
Attending: INTERNAL MEDICINE
Payer: MEDICARE

## 2024-08-22 DIAGNOSIS — N18.2 CKD (CHRONIC KIDNEY DISEASE) STAGE 2, GFR 60-89 ML/MIN: ICD-10-CM

## 2024-08-22 PROCEDURE — 76770 US EXAM ABDO BACK WALL COMP: CPT | Mod: TC

## 2024-08-22 PROCEDURE — 76770 US EXAM ABDO BACK WALL COMP: CPT | Mod: 26,,, | Performed by: INTERNAL MEDICINE

## 2024-08-25 NOTE — PROGRESS NOTES
[facility] Cardiology [address]     SUBJECTIVE:     History of Present Illness:  Patient is a 79 y.o. female presents with CAD and s/p TAVR    Primary Diagnosis:   1. Hypertension  2. DM: none  3. Past smoker: over 10 years ago   4. Heart disease: In 2000, had one stent placed at [facility]. IN 2005, had 2 stents placed. - unknown anatomy   5. Tremors  6. Recent diagnosis of scabies   7. TAVR 7/2/24  ROS   Since last visit in 5/24; had cath and TAVR and since then:   No chest pains  No shortness of breath; no PND or orthopnea;  Blood pressures varialbe  Activity: taking care of ; housework with no issues   Mostly has issues with arthritis   Review of patient's allergies indicates:   Allergen Reactions    Iodinated contrast media Anaphylaxis and Other (See Comments)    Nsaids (non-steroidal anti-inflammatory drug)      ADWOA    Phenergan [promethazine]      Vomiting       Past Medical History:   Diagnosis Date    Aortic atherosclerosis 6/14/2023    CKD (chronic kidney disease) stage 4, GFR 15-29 ml/min     Coronary artery disease     Edema     Epilepsy     Generalized anxiety disorder 12/20/2021    Hematuria, unspecified     Hematuria, unspecified     Hyperlipidemia     Hypertension     Iron deficiency anemia     Moderate episode of recurrent major depressive disorder     Nonrheumatic aortic valve stenosis 12/20/2021    Normocytic anemia     Prediabetes 2/24/2022       Past Surgical History:   Procedure Laterality Date    ANGIOGRAM, CORONARY, WITH LEFT HEART CATHETERIZATION N/A 6/12/2024    Procedure: Angiogram, Coronary, with Left Heart Cath;  Surgeon: José Careron MD;  Location: [facility] CATH LAB/EP;  Service: Cardiology;  Laterality: N/A;    APPENDECTOMY      BACK SURGERY      CARDIAC CATH COSURGEON  7/2/2024    Procedure: Cardiac Cath Cosurgeon;  Surgeon: Santosh Colon MD;  Location: [facility] CATH LAB;  Service: Cardiology;;    CORONARY STENT PLACEMENT      HYSTERECTOMY      PERCUTANEOUS TRANSCATHETER AORTIC VALVE  REPLACEMENT (TAVR)  7/2/2024    Procedure: REPLACEMENT, AORTIC VALVE, PERCUTANEOUS, TRANSCATHETER;  Surgeon: Santosh Colon MD;  Location: [facility] CATH LAB;  Service: Cardiology;;    REVISION OF KNEE ARTHROPLASTY Left 7/18/2022    Procedure: REVISION, ARTHROPLASTY, KNEE;  Surgeon: Stan Catalan MD;  Location: [facility] OR;  Service: Orthopedics;  Laterality: Left;    TONSILLECTOMY      TRANSCATHETER AORTIC VALVE REPLACEMENT (TAVR) N/A 7/2/2024    Procedure: REPLACEMENT, AORTIC VALVE, TRANSCATHETER (TAVR);  Surgeon: José Lee MD;  Location: [facility] CATH LAB;  Service: Cardiology;  Laterality: N/A;       Family History   Problem Relation Name Age of Onset    Heart disease Mother      Heart disease Father         Social History     Tobacco Use    Smoking status: Never    Smokeless tobacco: Never   Substance Use Topics    Alcohol use: No    Drug use: No        Past Hospitalization:   3/22: weakness and diarrhea  7/22: surgery for left knee    7/30/22: ER visit for left knee pain and swelling ; fell in the ER   8/27/22: itching   9/9/22: infection left knee of tissue   9/16/22: syncope ; BP low ; Hgb 7.7  6/23: fall/ no LOC;   1/24: gait imbalance, depression;   ER  visit: 5/4/24: chest pain; troponins negative   Home meds:  Current Outpatient Medications on File Prior to Visit   Medication Sig Dispense Refill    aspirin (ECOTRIN) 81 MG EC tablet Take 81 mg by mouth once daily.      BOTOX 100 unit SolR Inject 100 Units into the skin once. (Patient not taking: Reported on 7/8/2024)      butalbital-acetaminophen-caffeine -40 mg (FIORICET, ESGIC) -40 mg per tablet Take 1 tablet by mouth every 8 (eight) hours as needed for Headaches.      calcium carbonate/vitamin D3 (VITAMIN D-3 ORAL) Take 1 tablet by mouth once daily.      cimetidine (TAGAMET) 300 MG tablet Take 1 tablet (300 mg total) by mouth 3 (three) times daily. Take first dose 6pm prior to the procedure, followed by the second dose at 11pm and the  third dose at 6am the morning of the procedure for 3 doses 3 tablet 0    citalopram (CELEXA) 10 MG tablet Take 10 mg by mouth once daily.      clotrimazole-betamethasone 1-0.05% (LOTRISONE) cream Apply topically 2 (two) times daily. (Patient not taking: Reported on 8/12/2024) 45 g 1    DULoxetine (CYMBALTA) 30 MG capsule Take 1 capsule (30 mg total) by mouth once daily. 30 capsule 3    famotidine (PEPCID) 20 MG tablet Take 2 tablets (40 mg total) by mouth 3 (three) times daily as needed (take first dose 6 pm day before procedure, seconond dose 11 pm night prior to procedure and third dose 6 am day of procedure). (Patient not taking: Reported on 8/12/2024) 3 tablet 0    FEROSUL 325 mg (65 mg iron) Tab tablet Take 325 mg by mouth once daily.      fluocinolone and shower cap 0.01 % Oil Apply 0.01 % topically every evening. (Patient not taking: Reported on 8/12/2024)      furosemide (LASIX) 40 MG tablet Take 2 tablets (80 mg total) by mouth 2 (two) times daily as needed (swelling). 360 tablet 3    hydrALAZINE (APRESOLINE) 25 MG tablet Take 25 mg by mouth every 8 (eight) hours.      hydrOXYzine HCL (ATARAX) 25 MG tablet TAKE ONE TABLET BY MOUTH EVERY 6 HOURS AS NEEDED FOR ITCHING 30 tablet 2    irbesartan (AVAPRO) 300 MG tablet TAKE ONE TABLET BY MOUTH EVERY EVENING 90 tablet 3    isosorbide mononitrate (IMDUR) 60 MG 24 hr tablet Take 1 tablet (60 mg total) by mouth 2 (two) times a day. 180 tablet 1    LINZESS 72 mcg Cap capsule Take 72 mcg by mouth before breakfast. (Patient not taking: Reported on 8/12/2024)      multivit-min-iron-FA-lutein (CENTRUM SILVER WOMEN) 8 mg iron-400 mcg-300 mcg Tab Take 1 tablet by mouth once daily.      mupirocin (BACTROBAN) 2 % ointment Apply topically 2 (two) times daily. (Patient not taking: Reported on 8/12/2024) 22 g 3    nitroGLYCERIN (NITROSTAT) 0.3 MG SL tablet PLACE 1 TABLET UNDER THE TONGUE EVERY 5 MINUTES FOR UP TO 3 DOSES IF NEEDED FOR CHEST PAIN. CALL 911 IF PAIN PERSISTS  (Patient not taking: Reported on 2024) 25 tablet 0    omega-3 fatty acids/fish oil (FISH OIL-OMEGA-3 FATTY ACIDS) 300-1,000 mg capsule Take 2 capsules by mouth once daily.      omeprazole (PRILOSEC) 20 MG capsule Take 1 capsule (20 mg total) by mouth daily as needed. 90 capsule 2    oxyCODONE-acetaminophen (PERCOCET)  mg per tablet Take 1 tablet by mouth 4 (four) times daily as needed.      permethrin (ELIMITE) 5 % cream APPLY TOPICALLY ONCE FOR ONE DOSE (Patient not taking: Reported on 2024) 60 g 0    rosuvastatin (CRESTOR) 40 MG Tab TAKE ONE TABLET BY MOUTH DAILY 90 tablet 3    tiZANidine (ZANAFLEX) 4 MG tablet Take 1 tablet (4 mg total) by mouth every 8 (eight) hours as needed (spasms). 60 tablet 2    valACYclovir (VALTREX) 1000 MG tablet Take 2,000 mg by mouth 2 (two) times daily as needed. (Patient not taking: Reported on 2024)       No current facility-administered medications on file prior to visit.       Cardiac meds:  Hydralazine 25 mg TID  Imdur 60 mg BID  Irbesartan 300 mg  Lasix 80 mg as needed   rosuvastatin 40 mg   ASA 81 mg       OBJECTIVE:     Vital Signs (Most Recent)         Physical Exam:    Neck: normal  Carotids upstroke ,basilar  bruits; normal JVP  Lungs :clear  Heart: RR, normal S1,S2, soft systolic ejection murmur base to LSB, no gallops  Abd: no masses; no bruits;   Exts: normal DP and PT pulses bilaterally, no  edema noted           LABS    GFR : GFR 47; Hgb: 7 yvon  : GFR 58 ; LDL 77 ; A1c 5.2   : Hgb 12; GFR 46    : TSH normal; GFR 33  : GFR 33,   Diagnostic Results:    1.EK21: NSR; normal   1b. EK/22: NSR: normal   1c. EK/24: sinus bradycardia; normal   2.Echo: 21: normal EF, diastolic dysfunction; mild TR; PAS 43; MIGDALIA 1.18 cm2 with aortic sclerosis   2b. Echo: : mild AI; moderately severe AS normal EF but velocity only 3.6   2c. Echo: : EF normal; RV normal; mild TR, PAS 46; severe stenosis   2c. Echo: : maria eugenia;   EF; TAVR normal   3.Nuclear stress: 11/13: negative for ischemia; normal EF   3b.Nuclear stress 5/22: EF normal; negative for ischemia   3c. DSE: 6/23: negative for ischemia; EF normal   3d. Stress nuclear: 5/24: negative for ischemia   4. Carotid US: 6/23: right carotids OK:  but low flow; on lfeft;  normal vertebral flow   5. CTA head: Left internal carotid occluded distal to bifurcation ; reconstitutation noted ; right normal   6. Cath: 6/24: nonobstructive CAD; previous right stent with mild ISR  6b. Cath: 7/24: TAVR   7. 30 day event montior: sinus   Chart review:  GFR 27 1 month ago  8/24 ; GFR 30   ASSESSMENT/PLAN:     1. CAD: s/p stent placement - nonobstructive disease on recent cath; no chest pains now    2. Severe aortic stenosis on echo - s/p TAVR  3. Chronic diastolic dysfunction- asymptomatic   4. Hypertension controlled  5. Lipids at goal   6. CKD 3 - improved GFR recent 30's   7. Anemia with Hgb 10 yvon stable - improved   8. Carotid disease: right OK; left chronic occlusion   Plan: 1.stop Imdur  2. Monitor blood pressures   3. Return 4 months       Patricia Rogers MD

## 2024-08-27 ENCOUNTER — OFFICE VISIT (OUTPATIENT)
Dept: CARDIOLOGY | Facility: CLINIC | Age: 79
End: 2024-08-27
Payer: MEDICARE

## 2024-08-27 VITALS
OXYGEN SATURATION: 92 % | DIASTOLIC BLOOD PRESSURE: 73 MMHG | WEIGHT: 149.69 LBS | BODY MASS INDEX: 26.52 KG/M2 | HEART RATE: 76 BPM | SYSTOLIC BLOOD PRESSURE: 145 MMHG | HEIGHT: 63 IN

## 2024-08-27 DIAGNOSIS — Z95.2 S/P TAVR (TRANSCATHETER AORTIC VALVE REPLACEMENT): Primary | ICD-10-CM

## 2024-08-27 DIAGNOSIS — I10 PRIMARY HYPERTENSION: ICD-10-CM

## 2024-08-27 DIAGNOSIS — I35.0 NONRHEUMATIC AORTIC VALVE STENOSIS: ICD-10-CM

## 2024-08-27 PROCEDURE — 1160F RVW MEDS BY RX/DR IN RCRD: CPT | Mod: CPTII,S$GLB,, | Performed by: INTERNAL MEDICINE

## 2024-08-27 PROCEDURE — 1159F MED LIST DOCD IN RCRD: CPT | Mod: CPTII,S$GLB,, | Performed by: INTERNAL MEDICINE

## 2024-08-27 PROCEDURE — 1101F PT FALLS ASSESS-DOCD LE1/YR: CPT | Mod: CPTII,S$GLB,, | Performed by: INTERNAL MEDICINE

## 2024-08-27 PROCEDURE — 1126F AMNT PAIN NOTED NONE PRSNT: CPT | Mod: CPTII,S$GLB,, | Performed by: INTERNAL MEDICINE

## 2024-08-27 PROCEDURE — 3078F DIAST BP <80 MM HG: CPT | Mod: CPTII,S$GLB,, | Performed by: INTERNAL MEDICINE

## 2024-08-27 PROCEDURE — 99999 PR PBB SHADOW E&M-EST. PATIENT-LVL III: CPT | Mod: PBBFAC,,, | Performed by: INTERNAL MEDICINE

## 2024-08-27 PROCEDURE — 99214 OFFICE O/P EST MOD 30 MIN: CPT | Mod: S$GLB,,, | Performed by: INTERNAL MEDICINE

## 2024-08-27 PROCEDURE — 3288F FALL RISK ASSESSMENT DOCD: CPT | Mod: CPTII,S$GLB,, | Performed by: INTERNAL MEDICINE

## 2024-08-27 PROCEDURE — 3077F SYST BP >= 140 MM HG: CPT | Mod: CPTII,S$GLB,, | Performed by: INTERNAL MEDICINE

## 2024-08-28 ENCOUNTER — TELEPHONE (OUTPATIENT)
Dept: FAMILY MEDICINE | Facility: CLINIC | Age: 79
End: 2024-08-28
Payer: MEDICARE

## 2024-08-28 NOTE — TELEPHONE ENCOUNTER
Booked patient for 8/30 at 120pm   Pt states having constipation off and on and occasional mucus with stools last 2 days off and on

## 2024-08-28 NOTE — TELEPHONE ENCOUNTER
----- Message from Pati Sevilla sent at 8/28/2024  9:49 AM CDT -----  Type:  Needs Medical Advice    Who Called:  pt   Symptoms (please be specific):  bowels issues   How long has patient had these symptoms:   4 days     Would the patient rather a call back or a response via [facility]? Call back   Best Call Back Number: [phone]  Additional Information:  pt is calling to get a appointment scheduled pt stated that she is a EST pt but I was not able to verify that information according to her chart pt was told to f/u with her PCP

## 2024-08-30 ENCOUNTER — HOSPITAL ENCOUNTER (OUTPATIENT)
Dept: RADIOLOGY | Facility: HOSPITAL | Age: 79
Discharge: HOME | End: 2024-08-30
Attending: FAMILY MEDICINE
Payer: MEDICARE

## 2024-08-30 ENCOUNTER — OFFICE VISIT (OUTPATIENT)
Dept: FAMILY MEDICINE | Facility: CLINIC | Age: 79
End: 2024-08-30
Payer: MEDICARE

## 2024-08-30 VITALS
OXYGEN SATURATION: 97 % | HEART RATE: 64 BPM | DIASTOLIC BLOOD PRESSURE: 76 MMHG | HEIGHT: 63 IN | WEIGHT: 146.63 LBS | BODY MASS INDEX: 25.98 KG/M2 | SYSTOLIC BLOOD PRESSURE: 130 MMHG

## 2024-08-30 DIAGNOSIS — K59.09 CHRONIC CONSTIPATION: ICD-10-CM

## 2024-08-30 DIAGNOSIS — R10.30 LOWER ABDOMINAL PAIN: ICD-10-CM

## 2024-08-30 DIAGNOSIS — N18.4 STAGE 4 CHRONIC KIDNEY DISEASE: ICD-10-CM

## 2024-08-30 DIAGNOSIS — E78.2 MIXED HYPERLIPIDEMIA: ICD-10-CM

## 2024-08-30 DIAGNOSIS — R10.30 LOWER ABDOMINAL PAIN: Primary | ICD-10-CM

## 2024-08-30 DIAGNOSIS — I70.0 AORTIC ATHEROSCLEROSIS: ICD-10-CM

## 2024-08-30 DIAGNOSIS — F11.20 OPIOID DEPENDENCE, UNCOMPLICATED: ICD-10-CM

## 2024-08-30 DIAGNOSIS — R56.9 SEIZURE-LIKE ACTIVITY: ICD-10-CM

## 2024-08-30 DIAGNOSIS — I10 PRIMARY HYPERTENSION: ICD-10-CM

## 2024-08-30 DIAGNOSIS — I25.10 CORONARY ARTERY DISEASE INVOLVING NATIVE CORONARY ARTERY OF NATIVE HEART WITHOUT ANGINA PECTORIS: ICD-10-CM

## 2024-08-30 DIAGNOSIS — R79.9 ABNORMAL BLOOD CHEMISTRY: ICD-10-CM

## 2024-08-30 DIAGNOSIS — R73.03 PREDIABETES: ICD-10-CM

## 2024-08-30 DIAGNOSIS — D69.2 OTHER NONTHROMBOCYTOPENIC PURPURA: ICD-10-CM

## 2024-08-30 DIAGNOSIS — Z23 ENCOUNTER FOR IMMUNIZATION: ICD-10-CM

## 2024-08-30 PROBLEM — L98.411 NON-PRESSURE CHRONIC ULCER OF BUTTOCK LIMITED TO BREAKDOWN OF SKIN: Status: RESOLVED | Noted: 2024-03-18 | Resolved: 2024-08-30

## 2024-08-30 PROCEDURE — 76700 US EXAM ABDOM COMPLETE: CPT | Mod: TC

## 2024-08-30 PROCEDURE — 99999 PR PBB SHADOW E&M-EST. PATIENT-LVL IV: CPT | Mod: PBBFAC,,, | Performed by: FAMILY MEDICINE

## 2024-08-30 PROCEDURE — 76700 US EXAM ABDOM COMPLETE: CPT | Mod: 26,,, | Performed by: INTERNAL MEDICINE

## 2024-08-30 NOTE — PROGRESS NOTES
"(Portions of this note were dictated using voice recognition software and may contain dictation related errors in spelling/grammar/syntax not found on text review)    CC:   Chief Complaint   Patient presents with    Constipation     Pt states in "episodes" and now having some "episodes" of mucus in stools        HPI: 79 y.o. female presented for follow-up with concerns about constipation.  She has medical history significant for CKD stage 4, essential hypertension, hyperlipidemia, iron-deficiency anemia, aortic stenosis status post TAVR, coronary artery disease, prediabetes, hyperlipidemia.    Patient reports that she has been having constipation, reports having small bowel movement every day with some episodes of mucus in stools, symptoms have worsened in the last 3-4 days, has associated pain in the lower abdomen when she stands.  She is on opioid medication for chronic pain as well as take iron supplements for iron-deficiency anemia.  She reports that she used to eat vegetables but not consuming most recently.    She used to take Linzess daily stated that she was having bowel accidents and after that she stopped taking it, currently not taking any stool softeners.    She takes irbesartan 300 mg on daily basis, taking hydralazine 25 mg twice a day blood pressure at home and states it stays in the range of 140s systolic mostly.    She had aortic valve replacement, follows up with Cardiology and electrophysiology, reports compliance all the medication and denies having any other symptoms or concerns.    She is due for shingles shot.    Past Medical History:   Diagnosis Date    Aortic atherosclerosis 6/14/2023    CKD (chronic kidney disease) stage 4, GFR 15-29 ml/min     Coronary artery disease     Edema     Epilepsy     Generalized anxiety disorder 12/20/2021    Hematuria, unspecified     Hematuria, unspecified     Hyperlipidemia     Hypertension     Iron deficiency anemia     Moderate episode of recurrent major " depressive disorder     Nonrheumatic aortic valve stenosis 12/20/2021    Normocytic anemia     Prediabetes 2/24/2022       Past Surgical History:   Procedure Laterality Date    ANGIOGRAM, CORONARY, WITH LEFT HEART CATHETERIZATION N/A 6/12/2024    Procedure: Angiogram, Coronary, with Left Heart Cath;  Surgeon: José Carreon MD;  Location: [facility] CATH LAB/EP;  Service: Cardiology;  Laterality: N/A;    APPENDECTOMY      BACK SURGERY      CARDIAC CATH COSURGEON  7/2/2024    Procedure: Cardiac Cath Cosurgeon;  Surgeon: Santosh Colon MD;  Location: [facility] CATH LAB;  Service: Cardiology;;    CORONARY STENT PLACEMENT      HYSTERECTOMY      PERCUTANEOUS TRANSCATHETER AORTIC VALVE REPLACEMENT (TAVR)  7/2/2024    Procedure: REPLACEMENT, AORTIC VALVE, PERCUTANEOUS, TRANSCATHETER;  Surgeon: Santosh Colon MD;  Location: [facility] CATH LAB;  Service: Cardiology;;    REVISION OF KNEE ARTHROPLASTY Left 7/18/2022    Procedure: REVISION, ARTHROPLASTY, KNEE;  Surgeon: Stan Catalan MD;  Location: [facility] OR;  Service: Orthopedics;  Laterality: Left;    TONSILLECTOMY      TRANSCATHETER AORTIC VALVE REPLACEMENT (TAVR) N/A 7/2/2024    Procedure: REPLACEMENT, AORTIC VALVE, TRANSCATHETER (TAVR);  Surgeon: José Lee MD;  Location: [facility] CATH LAB;  Service: Cardiology;  Laterality: N/A;       Family History   Problem Relation Name Age of Onset    Heart disease Mother      Heart disease Father         Social History     Tobacco Use    Smoking status: Never    Smokeless tobacco: Never   Substance Use Topics    Alcohol use: No    Drug use: No       Lab Results   Component Value Date    WBC 9.16 08/12/2024    HGB 11.4 (L) 08/12/2024    HCT 35.0 (L) 08/12/2024    MCV 99 (H) 08/12/2024     08/12/2024    CHOL 147 06/30/2023    TRIG 99 06/30/2023    HDL 50 06/30/2023    ALT 19 06/21/2024    AST 24 06/21/2024    BILITOT 0.3 06/21/2024    ALKPHOS 65 06/21/2024     07/03/2024    K 4.2 07/03/2024     07/03/2024     CREATININE 1.7 (H) 08/12/2024    ESTGFRAFRICA 31 (A) 07/30/2022    EGFRNONAA 27 (A) 07/30/2022    CALCIUM 9.6 07/03/2024    ALBUMIN 3.2 (L) 06/21/2024    BUN 40 (H) 07/03/2024    CO2 27 07/03/2024    TSH 0.452 01/18/2024    INR 1.0 06/29/2022    HGBA1C 5.2 06/30/2023    LDLCALC 77.2 06/30/2023    GLU 89 07/03/2024    DFUDFBDE03VL 48 05/27/2024             Vital signs reviewed  PE:   APPEARANCE: Well nourished, well developed, in no acute distress.    HEAD: Normocephalic, atraumatic.  EYES: EOMI.  Conjunctivae noninjected.  NOSE: Mucosa pink. Airway clear.  MOUTH & THROAT: No tonsillar enlargement. No pharyngeal erythema or exudate.   NECK: Supple with no cervical lymphadenopathy.    CHEST: Good inspiratory effort. Lungs clear to auscultation with no wheezes or crackles.  CARDIOVASCULAR: Normal S1, S2. No rubs, murmurs, or gallops.  ABDOMEN: Bowel sounds normal. Not distended. Soft, tenderness to palpation in the lower abdomen, No organomegaly.  EXTREMITIES: No edema, cyanosis, or clubbing.    Review of Systems   Constitutional:  Negative for chills, fatigue and fever.   HENT: Negative.     Respiratory:  Negative for cough, shortness of breath and wheezing.    Cardiovascular:  Negative for chest pain, palpitations and leg swelling.   Gastrointestinal:  Positive for abdominal pain, change in bowel habit and constipation.   Genitourinary: Negative.    Musculoskeletal: Negative.    Neurological: Negative.    Psychiatric/Behavioral: Negative.     All other systems reviewed and are negative.      IMPRESSION  1. Lower abdominal pain    2. Aortic atherosclerosis    3. Coronary artery disease involving native coronary artery of native heart without angina pectoris    4. Primary hypertension    5. Encounter for immunization    6. Abnormal blood chemistry    7. Stage 4 chronic kidney disease    8. Mixed hyperlipidemia    9. Prediabetes    10. Opioid dependence, uncomplicated    11. Other nonthrombocytopenic purpura    12.  Seizure-like activity    13. Chronic constipation            PLAN      1. Aortic atherosclerosis    - Lipid Panel; Future    Continue Crestor      2. Coronary artery disease involving native coronary artery of native heart without angina pectoris    - Lipid Panel; Future    Followed by cardiology     Continue current medications      3. Primary hypertension    Blood pressure elevated     She is taking hydralazine twice a day instead of 3 times a day    Advised to take hydralazine 25 mg t.i.d. along with irbesartan 300 mg evening    Advised to monitor blood pressure at home      4. Encounter for immunization    - varicella-zoster gE vac,2 of 2 SusR 0.5 mL      5. Lower abdominal pain    - US Abdomen Complete; Future    Suspect due to chronic constipation, she has tenderness on examination, will do ultrasound to rule out any obstruction      6. Abnormal blood chemistry    - Hemoglobin A1C; Future      7. Stage 4 chronic kidney disease    Followed by Nephrology, Dr. Allen      8. Mixed hyperlipidemia    Lipid panel ordered   Continue statin      9. Prediabetes    A1c ordered      10. Opioid dependence, uncomplicated    Followed by pain management      11. Other nonthrombocytopenic purpura    Stable     No concerns reported      12. Seizure-like activity     Stable    Followed by neurology    No concerns      13. Chronic constipation    Most likely medication induced, she is on iron tablets and Percocet      Advised to add magnesium nightly to improve gut motility     Encouraged to add high-fiber diet, can add Metamucil or Benefiber on daily basis for fiber supplementation     Maintain enough hydration     Advised to start taking Linzess on as needed basis         SCREENINGS      Immunizations:     Zoster today    Up-to-date with COVID vaccines       Age/demographic appropriate health maintenance:    Up-to-date with DEXA scan      Health Maintenance Due   Topic Date Due    RSV Vaccine (Age 60+ and Pregnant patients)  (1 - 1-dose 60+ series) Never done    Hemoglobin A1c (Prediabetes)  06/30/2024           Spent adequate time in obtaining history and explaining differentials     40 minutes spent during this visit of which greater than 50% devoted to face-face counseling and coordination of care regarding diagnosis and management plan       Shelton Mason   8/30/2024

## 2024-09-02 DIAGNOSIS — F43.21 ADJUSTMENT DISORDER WITH DEPRESSED MOOD: ICD-10-CM

## 2024-09-03 ENCOUNTER — LAB VISIT (OUTPATIENT)
Dept: LAB | Facility: HOSPITAL | Age: 79
End: 2024-09-03
Attending: FAMILY MEDICINE
Payer: MEDICARE

## 2024-09-03 DIAGNOSIS — I25.10 CORONARY ARTERY DISEASE INVOLVING NATIVE CORONARY ARTERY OF NATIVE HEART WITHOUT ANGINA PECTORIS: ICD-10-CM

## 2024-09-03 DIAGNOSIS — R79.9 ABNORMAL BLOOD CHEMISTRY: ICD-10-CM

## 2024-09-03 DIAGNOSIS — I70.0 AORTIC ATHEROSCLEROSIS: ICD-10-CM

## 2024-09-03 LAB
CHOLEST SERPL-MCNC: 168 MG/DL (ref 120–199)
CHOLEST/HDLC SERPL: 3.1 {RATIO} (ref 2–5)
ESTIMATED AVG GLUCOSE: 105 MG/DL (ref 68–131)
HBA1C MFR BLD: 5.3 % (ref 4–5.6)
HDLC SERPL-MCNC: 54 MG/DL (ref 40–75)
HDLC SERPL: 32.1 % (ref 20–50)
LDLC SERPL CALC-MCNC: 94.6 MG/DL (ref 63–159)
NONHDLC SERPL-MCNC: 114 MG/DL
TRIGL SERPL-MCNC: 97 MG/DL (ref 30–150)

## 2024-09-03 PROCEDURE — 80061 LIPID PANEL: CPT | Performed by: FAMILY MEDICINE

## 2024-09-03 PROCEDURE — 83036 HEMOGLOBIN GLYCOSYLATED A1C: CPT | Performed by: FAMILY MEDICINE

## 2024-09-03 PROCEDURE — 36415 COLL VENOUS BLD VENIPUNCTURE: CPT | Performed by: FAMILY MEDICINE

## 2024-09-03 RX ORDER — HYDRALAZINE HYDROCHLORIDE 25 MG/1
25 TABLET, FILM COATED ORAL EVERY 8 HOURS
Qty: 90 TABLET | Refills: 2 | Status: SHIPPED | OUTPATIENT
Start: 2024-09-03

## 2024-09-03 RX ORDER — HYDROXYZINE HYDROCHLORIDE 25 MG/1
TABLET, FILM COATED ORAL
Qty: 30 TABLET | Refills: 2 | Status: SHIPPED | OUTPATIENT
Start: 2024-09-03

## 2024-09-03 NOTE — TELEPHONE ENCOUNTER
----- Message from Pati Sevilla sent at 9/3/2024 11:51 AM CDT -----  Type:  Needs Medical Advice    Who Called:   pt     Would the patient rather a call back or a response via [facility]? Call back   Best Call Back Number: [phone]  Additional Information:  pt was seen on 08/26/24 and pt states that she was told to call the office to get instructions on how to take her medications and the pt also stated that the medications were not sent to the pharmacy for a new medication

## 2024-09-03 NOTE — TELEPHONE ENCOUNTER
----- Message from Pati Sevilla sent at 9/3/2024 11:51 AM CDT -----  Type:  Needs Medical Advice    Who Called:   pt     Would the patient rather a call back or a response via [facility]? Call back   Best Call Back Number: [phone]  Additional Information:  pt was seen on 08/26/24 and pt states that she was told to call the office to get instructions on how to take her medications and the pt also stated that the medications were not sent to the pharmacy for a new medication

## 2024-09-05 ENCOUNTER — HOSPITAL ENCOUNTER (OUTPATIENT)
Facility: HOSPITAL | Age: 79
Discharge: HOME | End: 2024-09-06
Attending: EMERGENCY MEDICINE | Admitting: FAMILY MEDICINE
Payer: MEDICARE

## 2024-09-05 DIAGNOSIS — R10.30 LOWER ABDOMINAL PAIN: ICD-10-CM

## 2024-09-05 DIAGNOSIS — K59.00 CONSTIPATION, UNSPECIFIED CONSTIPATION TYPE: Primary | ICD-10-CM

## 2024-09-05 DIAGNOSIS — R07.9 CHEST PAIN: ICD-10-CM

## 2024-09-05 LAB
ALBUMIN SERPL BCP-MCNC: 3.2 G/DL (ref 3.5–5.2)
ALP SERPL-CCNC: 68 U/L (ref 55–135)
ALT SERPL W/O P-5'-P-CCNC: 13 U/L (ref 10–44)
ANION GAP SERPL CALC-SCNC: 13 MMOL/L (ref 8–16)
AST SERPL-CCNC: 24 U/L (ref 10–40)
BACTERIA #/AREA URNS HPF: ABNORMAL /HPF
BASOPHILS # BLD AUTO: 0.05 K/UL (ref 0–0.2)
BASOPHILS NFR BLD: 0.4 % (ref 0–1.9)
BILIRUB SERPL-MCNC: 0.3 MG/DL (ref 0.1–1)
BILIRUB UR QL STRIP: NEGATIVE
BUN SERPL-MCNC: 16 MG/DL (ref 8–23)
CALCIUM SERPL-MCNC: 9.6 MG/DL (ref 8.7–10.5)
CHLORIDE SERPL-SCNC: 97 MMOL/L (ref 95–110)
CLARITY UR: CLEAR
CO2 SERPL-SCNC: 28 MMOL/L (ref 23–29)
COLOR UR: YELLOW
CREAT SERPL-MCNC: 1.2 MG/DL (ref 0.5–1.4)
DIFFERENTIAL METHOD BLD: ABNORMAL
EOSINOPHIL # BLD AUTO: 0.3 K/UL (ref 0–0.5)
EOSINOPHIL NFR BLD: 2.1 % (ref 0–8)
ERYTHROCYTE [DISTWIDTH] IN BLOOD BY AUTOMATED COUNT: 11.7 % (ref 11.5–14.5)
EST. GFR  (NO RACE VARIABLE): 46 ML/MIN/1.73 M^2
GLUCOSE SERPL-MCNC: 103 MG/DL (ref 70–110)
GLUCOSE UR QL STRIP: NEGATIVE
HCT VFR BLD AUTO: 35.8 % (ref 37–48.5)
HGB BLD-MCNC: 11.7 G/DL (ref 12–16)
HGB UR QL STRIP: NEGATIVE
HYALINE CASTS #/AREA URNS LPF: 2 /LPF
IMM GRANULOCYTES # BLD AUTO: 0.04 K/UL (ref 0–0.04)
IMM GRANULOCYTES NFR BLD AUTO: 0.3 % (ref 0–0.5)
KETONES UR QL STRIP: NEGATIVE
LACTATE SERPL-SCNC: 0.8 MMOL/L (ref 0.5–2.2)
LEUKOCYTE ESTERASE UR QL STRIP: ABNORMAL
LIPASE SERPL-CCNC: 26 U/L (ref 4–60)
LYMPHOCYTES # BLD AUTO: 1.5 K/UL (ref 1–4.8)
LYMPHOCYTES NFR BLD: 12.1 % (ref 18–48)
MCH RBC QN AUTO: 30.9 PG (ref 27–31)
MCHC RBC AUTO-ENTMCNC: 32.7 G/DL (ref 32–36)
MCV RBC AUTO: 95 FL (ref 82–98)
MICROSCOPIC COMMENT: ABNORMAL
MONOCYTES # BLD AUTO: 1.7 K/UL (ref 0.3–1)
MONOCYTES NFR BLD: 13.4 % (ref 4–15)
NEUTROPHILS # BLD AUTO: 8.9 K/UL (ref 1.8–7.7)
NEUTROPHILS NFR BLD: 71.7 % (ref 38–73)
NITRITE UR QL STRIP: NEGATIVE
NRBC BLD-RTO: 0 /100 WBC
PH UR STRIP: 7 [PH] (ref 5–8)
PLATELET # BLD AUTO: 381 K/UL (ref 150–450)
PMV BLD AUTO: 9.4 FL (ref 9.2–12.9)
POTASSIUM SERPL-SCNC: 3.1 MMOL/L (ref 3.5–5.1)
PROT SERPL-MCNC: 7.2 G/DL (ref 6–8.4)
PROT UR QL STRIP: NEGATIVE
RBC # BLD AUTO: 3.79 M/UL (ref 4–5.4)
RBC #/AREA URNS HPF: 0 /HPF (ref 0–4)
SARS-COV-2 RDRP RESP QL NAA+PROBE: NEGATIVE
SODIUM SERPL-SCNC: 138 MMOL/L (ref 136–145)
SP GR UR STRIP: 1.01 (ref 1–1.03)
SQUAMOUS #/AREA URNS HPF: 2 /HPF
URN SPEC COLLECT METH UR: ABNORMAL
UROBILINOGEN UR STRIP-ACNC: NEGATIVE EU/DL
WBC # BLD AUTO: 12.42 K/UL (ref 3.9–12.7)
WBC #/AREA URNS HPF: 2 /HPF (ref 0–5)

## 2024-09-05 PROCEDURE — 80053 COMPREHEN METABOLIC PANEL: CPT

## 2024-09-05 PROCEDURE — 96374 THER/PROPH/DIAG INJ IV PUSH: CPT

## 2024-09-05 PROCEDURE — 63600175 PHARM REV CODE 636 W HCPCS: Performed by: EMERGENCY MEDICINE

## 2024-09-05 PROCEDURE — 81000 URINALYSIS NONAUTO W/SCOPE: CPT

## 2024-09-05 PROCEDURE — 25000003 PHARM REV CODE 250: Performed by: EMERGENCY MEDICINE

## 2024-09-05 PROCEDURE — U0002 COVID-19 LAB TEST NON-CDC: HCPCS

## 2024-09-05 PROCEDURE — 85025 COMPLETE CBC W/AUTO DIFF WBC: CPT

## 2024-09-05 PROCEDURE — 83690 ASSAY OF LIPASE: CPT

## 2024-09-05 PROCEDURE — 99285 EMERGENCY DEPT VISIT HI MDM: CPT | Mod: 25

## 2024-09-05 PROCEDURE — 96361 HYDRATE IV INFUSION ADD-ON: CPT

## 2024-09-05 PROCEDURE — 96375 TX/PRO/DX INJ NEW DRUG ADDON: CPT

## 2024-09-05 PROCEDURE — 83605 ASSAY OF LACTIC ACID: CPT | Performed by: EMERGENCY MEDICINE

## 2024-09-05 RX ORDER — PSEUDOEPHEDRINE/ACETAMINOPHEN 30MG-500MG
100 TABLET ORAL
Status: COMPLETED | OUTPATIENT
Start: 2024-09-05 | End: 2024-09-05

## 2024-09-05 RX ORDER — HYDROMORPHONE HYDROCHLORIDE 1 MG/ML
1 INJECTION, SOLUTION INTRAMUSCULAR; INTRAVENOUS; SUBCUTANEOUS
Status: COMPLETED | OUTPATIENT
Start: 2024-09-05 | End: 2024-09-05

## 2024-09-05 RX ORDER — SYRING-NEEDL,DISP,INSUL,0.3 ML 29 G X1/2"
296 SYRINGE, EMPTY DISPOSABLE MISCELLANEOUS
Status: COMPLETED | OUTPATIENT
Start: 2024-09-05 | End: 2024-09-05

## 2024-09-05 RX ORDER — LIDOCAINE HYDROCHLORIDE 20 MG/ML
10 JELLY TOPICAL
Status: DISCONTINUED | OUTPATIENT
Start: 2024-09-05 | End: 2024-09-05

## 2024-09-05 RX ORDER — MORPHINE SULFATE 4 MG/ML
4 INJECTION, SOLUTION INTRAMUSCULAR; INTRAVENOUS
Status: COMPLETED | OUTPATIENT
Start: 2024-09-05 | End: 2024-09-05

## 2024-09-05 RX ORDER — ONDANSETRON HYDROCHLORIDE 2 MG/ML
4 INJECTION, SOLUTION INTRAVENOUS
Status: COMPLETED | OUTPATIENT
Start: 2024-09-05 | End: 2024-09-05

## 2024-09-05 RX ORDER — LIDOCAINE HYDROCHLORIDE 20 MG/ML
10 SOLUTION OROPHARYNGEAL
Status: COMPLETED | OUTPATIENT
Start: 2024-09-05 | End: 2024-09-05

## 2024-09-05 RX ADMIN — HYDROMORPHONE HYDROCHLORIDE 1 MG: 1 INJECTION, SOLUTION INTRAMUSCULAR; INTRAVENOUS; SUBCUTANEOUS at 07:09

## 2024-09-05 RX ADMIN — Medication 100 ML: at 09:09

## 2024-09-05 RX ADMIN — SODIUM CHLORIDE 500 ML: 0.9 INJECTION, SOLUTION INTRAVENOUS at 09:09

## 2024-09-05 RX ADMIN — ONDANSETRON 4 MG: 2 INJECTION INTRAMUSCULAR; INTRAVENOUS at 05:09

## 2024-09-05 RX ADMIN — LIDOCAINE HYDROCHLORIDE 10 ML: 20 SOLUTION ORAL; TOPICAL at 08:09

## 2024-09-05 RX ADMIN — MORPHINE SULFATE 4 MG: 4 INJECTION INTRAVENOUS at 05:09

## 2024-09-05 RX ADMIN — MAGNESIUM CITRATE 296 ML: 1.75 LIQUID ORAL at 09:09

## 2024-09-05 NOTE — ED PROVIDER NOTES
Emergency Department Provider Note    Enedelia García   79 y.o. female   [id number]      9/5/2024       History     This history was obtained from the patient without limitations.  She was driven to the ED by her daughter who is at the bedside.      She is a 79-year-old with the below past medical history.  She was seen by her primary care provider 6 days ago following several days of constipation.  She started a fiber and laxative regimen at the direction of that provider.  Yesterday, she past two large, hard, black stools.  She had anal pain as a result of this stool passage and noticed red blood on the toilet paper with wiping.  She complains of severe lower abdominal pain that began earlier today.  She had fever of 102° F a few hours ago.  She also had a frontal headache.  She drank water and applied two Salonpas patches; her headache is now resolved.  She continues to have lower abdominal pain.  She has nausea that resolved with Zofran.  She did not vomit.  She denies dizziness, chest pain, shortness of breath, and dysuria.         Past Medical History:   Diagnosis Date    Aortic atherosclerosis 6/14/2023    CKD (chronic kidney disease) stage 4, GFR 15-29 ml/min     Coronary artery disease     Edema     Epilepsy     Generalized anxiety disorder 12/20/2021    Hematuria, unspecified     Hematuria, unspecified     Hyperlipidemia     Hypertension     Iron deficiency anemia     Moderate episode of recurrent major depressive disorder     Nonrheumatic aortic valve stenosis 12/20/2021    Normocytic anemia     Prediabetes 2/24/2022      Past Surgical History:   Procedure Laterality Date    ANGIOGRAM, CORONARY, WITH LEFT HEART CATHETERIZATION N/A 6/12/2024    Procedure: Angiogram, Coronary, with Left Heart Cath;  Surgeon: José Carreon MD;  Location: [facility] CATH LAB/EP;  Service: Cardiology;  Laterality: N/A;    APPENDECTOMY      BACK SURGERY      CARDIAC CATH COSURGEON  7/2/2024    Procedure: Cardiac Cath  Riccardo;  Surgeon: Santosh Colon MD;  Location: [facility] CATH LAB;  Service: Cardiology;;    CORONARY STENT PLACEMENT      FLEXIBLE SIGMOIDOSCOPY N/A 9/6/2024    Procedure: SIGMOIDOSCOPY, FLEXIBLE;  Surgeon: Pilo Calles MD;  Location: [facility] ENDO;  Service: Endoscopy;  Laterality: N/A;    HYSTERECTOMY      PERCUTANEOUS TRANSCATHETER AORTIC VALVE REPLACEMENT (TAVR)  7/2/2024    Procedure: REPLACEMENT, AORTIC VALVE, PERCUTANEOUS, TRANSCATHETER;  Surgeon: Santosh Colon MD;  Location: [facility] CATH LAB;  Service: Cardiology;;    REVISION OF KNEE ARTHROPLASTY Left 7/18/2022    Procedure: REVISION, ARTHROPLASTY, KNEE;  Surgeon: Stan Catalan MD;  Location: [facility] OR;  Service: Orthopedics;  Laterality: Left;    TONSILLECTOMY      TRANSCATHETER AORTIC VALVE REPLACEMENT (TAVR) N/A 7/2/2024    Procedure: REPLACEMENT, AORTIC VALVE, TRANSCATHETER (TAVR);  Surgeon: José Lee MD;  Location: [facility] CATH LAB;  Service: Cardiology;  Laterality: N/A;      Family History   Problem Relation Name Age of Onset    Heart disease Mother      Heart disease Father        Social History     Socioeconomic History    Marital status: Significant Other   Tobacco Use    Smoking status: Never    Smokeless tobacco: Never   Substance and Sexual Activity    Alcohol use: No    Drug use: No    Sexual activity: Not Currently     Social Determinants of Health     Financial Resource Strain: Medium Risk (9/6/2024)    Overall Financial Resource Strain (CARDIA)     Difficulty of Paying Living Expenses: Somewhat hard   Food Insecurity: No Food Insecurity (9/6/2024)    Hunger Vital Sign     Worried About Running Out of Food in the Last Year: Never true     Ran Out of Food in the Last Year: Never true   Transportation Needs: No Transportation Needs (9/6/2024)    TRANSPORTATION NEEDS     Transportation : No   Physical Activity: Inactive (9/6/2024)    Exercise Vital Sign     Days of Exercise per Week: 0 days     Minutes of Exercise per Session:  0 min   Stress: Stress Concern Present (9/6/2024)    [employer] - Occupational Stress Questionnaire     Feeling of Stress : Very much   Housing Stability: Low Risk  (9/6/2024)    Housing Stability Vital Sign     Unable to Pay for Housing in the Last Year: No     Homeless in the Last Year: No      Review of patient's allergies indicates:   Allergen Reactions    Iodinated contrast media Anaphylaxis and Other (See Comments)    Nsaids (non-steroidal anti-inflammatory drug)      ADWOA    Phenergan [promethazine]      Vomiting           Physical Examination     Initial Vitals [09/05/24 1622]   BP Pulse Resp Temp SpO2   127/60 94 18 99.5 °F (37.5 °C) 95 %      MAP       --           Physical Exam    Nursing note and vitals reviewed.  Constitutional: She is not diaphoretic. No distress.   HENT:   Mouth/Throat: Oropharynx is clear and moist.   Eyes: Conjunctivae are normal. No scleral icterus.   Cardiovascular:  Normal rate, regular rhythm and normal heart sounds.     Exam reveals no gallop and no friction rub.       No murmur heard.  Pulmonary/Chest: No respiratory distress. She has no wheezes. She has no rhonchi.   Abdominal: Abdomen is soft. Bowel sounds are normal. She exhibits no distension. There is abdominal tenderness in the right lower quadrant, suprapubic area and left lower quadrant.   Abdominal tenderness more pronounced in the suprapubic region and left lower quadrant.   Genitourinary: Rectum:      No external hemorrhoid, internal hemorrhoid or abnormal anal tone.      Genitourinary Comments: No gross rectal blood. No fecal impaction.       Neurological: She is alert and oriented to person, place, and time. GCS score is 15. GCS eye subscore is 4. GCS verbal subscore is 5. GCS motor subscore is 6.   Skin: Skin is warm and dry. No pallor.            Labs     Labs Reviewed   CBC W/ AUTO DIFFERENTIAL - Abnormal       Result Value    WBC 12.42      RBC 3.79 (*)     Hemoglobin 11.7 (*)      Hematocrit 35.8 (*)     MCV 95      MCH 30.9      MCHC 32.7      RDW 11.7      Platelets 381      MPV 9.4      Immature Granulocytes 0.3      Gran # (ANC) 8.9 (*)     Immature Grans (Abs) 0.04      Lymph # 1.5      Mono # 1.7 (*)     Eos # 0.3      Baso # 0.05      nRBC 0      Gran % 71.7      Lymph % 12.1 (*)     Mono % 13.4      Eosinophil % 2.1      Basophil % 0.4      Differential Method Automated     COMPREHENSIVE METABOLIC PANEL - Abnormal    Sodium 138      Potassium 3.1 (*)     Chloride 97      CO2 28      Glucose 103      BUN 16      Creatinine 1.2      Calcium 9.6      Total Protein 7.2      Albumin 3.2 (*)     Total Bilirubin 0.3      Alkaline Phosphatase 68      AST 24      ALT 13      eGFR 46 (*)     Anion Gap 13     URINALYSIS, REFLEX TO URINE CULTURE - Abnormal    Specimen UA Urine, Clean Catch      Color, UA Yellow      Appearance, UA Clear      pH, UA 7.0      Specific Gravity, UA 1.010      Protein, UA Negative      Glucose, UA Negative      Ketones, UA Negative      Bilirubin (UA) Negative      Occult Blood UA Negative      Nitrite, UA Negative      Urobilinogen, UA Negative      Leukocytes, UA Trace (*)     Narrative:     Specimen Source->Urine   URINALYSIS MICROSCOPIC - Abnormal    RBC, UA 0      WBC, UA 2      Bacteria Rare      Squam Epithel, UA 2      Hyaline Casts, UA 2 (*)     Microscopic Comment SEE COMMENT      Narrative:     Specimen Source->Urine   COMPREHENSIVE METABOLIC PANEL - Abnormal    Sodium 138      Potassium 3.5      Chloride 99      CO2 29      Glucose 97      BUN 16      Creatinine 1.0      Calcium 9.1      Total Protein 5.9 (*)     Albumin 2.6 (*)     Total Bilirubin 0.5      Alkaline Phosphatase 55      AST 20      ALT 11      eGFR 57 (*)     Anion Gap 10     CBC W/ AUTO DIFFERENTIAL - Abnormal    WBC 12.38      RBC 3.10 (*)     Hemoglobin 9.8 (*)     Hematocrit 29.1 (*)     MCV 94      MCH 31.6 (*)     MCHC 33.7      RDW 11.6      Platelets 280      MPV 9.1 (*)      Immature Granulocytes 0.2      Gran # (ANC) 7.6      Immature Grans (Abs) 0.03      Lymph # 2.5      Mono # 1.9 (*)     Eos # 0.3      Baso # 0.06      nRBC 0      Gran % 61.6      Lymph % 20.0      Mono % 15.7 (*)     Eosinophil % 2.0      Basophil % 0.5      Differential Method Automated     LIPASE    Lipase 26     SARS-COV-2 RNA AMPLIFICATION, QUAL    SARS-CoV-2 RNA, Amplification, Qual Negative     LACTIC ACID, PLASMA    Lactate (Lactic Acid) 0.8          Imaging     Imaging Results              CT Abdomen Pelvis  Without Contrast (Final result)  Result time 09/05/24 19:08:09      Final result by Thanh Keller DO (09/05/24 19:08:09)                   Impression:      1. Wall thickening of the sigmoid colon with a large volume of stool proximally.  Findings are compatible with stercoral colitis versus colonic neoplasm.  No pneumatosis.  Recommend further evaluation with colonoscopy if not recently performed to exclude neoplasm.  2. Reactive wall thickening of the urinary bladder.      Electronically signed by: Thanh Keller  Date:    09/05/2024  Time:    19:08               Narrative:    EXAMINATION:  CT ABDOMEN PELVIS WITHOUT CONTRAST    CLINICAL HISTORY:  LLQ abdominal pain;RLQ abdominal pain (Age >= 14y);    TECHNIQUE:  Multiplanar images were obtained of the abdomen and pelvis from the hemidiaphragms through the symphysis pubis without intravenous contrast.    COMPARISON:  CT chest, abdomen, and pelvis from 06/04/2024.    FINDINGS:  Lung Bases: Clear.    Heart: Heart size is normal.  No pericardial effusion.    Liver: Normal in size and attenuation without focal hepatic lesion.    Biliary tract: No intrahepatic or extrahepatic biliary ductal dilatation.    Gallbladder: No radiodense gallstone. No wall thickening or pericholecystic fluid.    Pancreas: Normal. No pancreatic ductal dilatation.    Spleen: Normal size without focal lesion.    Adrenals: Normal.    Kidneys and urinary collecting systems: There  is a simple cyst in the right kidney midpole.  No hydronephrosis or urolithiasis.    Lymph nodes: None enlarged.    Stomach and bowel: The stomach is normal.  There is wall thickening of the sigmoid colon with adjacent pericolonic fat stranding, and a large volume stool which may be impacted.  Findings are compatible with stercoral colitis of the sigmoid colon, versus a neoplastic process.  There is no pneumatosis.  The appendix is not definitely seen, though there is no right lower quadrant inflammation.  No small bowel obstruction.    Peritoneum and mesentery: No ascites or free intraperitoneal air. No abdominal fluid collection.    Vasculature: There is moderate atherosclerotic disease.  There is no aneurysm.    Urinary bladder: There is reactive wall thickening of the left aspect of the bladder dome.    Reproductive organs: The uterus is absent.  The left ovary is unremarkable.    Body wall: No abnormality.    Musculoskeletal: No aggressive osseous lesion.  There are multilevel degenerative changes of the spine.  There are postop changes of L3 laminectomy.                                        ED Course     The patient received the following medications:  Medications   lactated ringers infusion ( Intravenous New Bag 9/6/24 0904)   morphine injection 4 mg (4 mg Intravenous Given 9/5/24 1740)   ondansetron injection 4 mg (4 mg Intravenous Given 9/5/24 1739)   HYDROmorphone injection 1 mg (1 mg Intravenous Given 9/5/24 1944)   LIDOcaine viscous HCl 2% oral solution 10 mL (10 mLs Mucous Membrane Given by Provider 9/5/24 2000)   glycerin 99.5% topical solution 100 mL (100 mLs Rectal Given 9/5/24 2102)     And   magnesium citrate solution 296 mL (296 mLs Rectal Given 9/5/24 2102)     And   sodium chloride 0.9% bolus 500 mL 500 mL (0 mLs Rectal Stopped 9/5/24 2310)   acetaminophen tablet 1,000 mg (1,000 mg Oral Given 9/6/24 0206)   potassium bicarbonate disintegrating tablet 25 mEq (25 mEq Oral Given 9/6/24 0315)    polyethylene glycol (GoLYTELY) solution (4,000 mLs Oral Given 9/6/24 1345)         ED Course as of 09/12/24 2201   Thu Sep 05, 2024   1724 Her workup was initiated by another provider who ordered labs (CBC, CMP, lipase, COVID-19 tests).  I added on cardiopulmonary monitoring and a lactic acid.  I also ordered CT imaging of the abdomen and pelvis to further assess.  I ordered IV morphine and Zofran for symptom relief.  Differential diagnoses includes cystitis, pyelonephritis, diverticulitis, appendicitis, and other conditions. [LP]   2040 CT findings concerning for stercoral colitis.  I just performed a digital rectal examination and the patient does not have rectal fecal impaction.  Ordering a brown bomber enema and if it does not result in a significant bowel movement, I will admit the patient to HM/IM/FM. [LP]      ED Course User Index  [LP] Shan Caputo III, MD        Medical Decision Making                 Medical Decision Making  See above ED course section for decision making.  No concerning lab abnormalities. CT findings concerning for stercoral colitis or neoplasm. She had persistent pain despite multiple doses of IV analgesics. She did not pass significant stool or have improvement in her symptoms with large volume enema. She was admitted for further evaluation and management.    Problems Addressed:  Lower abdominal pain: acute illness or injury    Amount and/or Complexity of Data Reviewed  Radiology: ordered.    Risk  OTC drugs.  Prescription drug management.              Diagnoses       ICD-10-CM ICD-9-CM   1. Constipation, unspecified constipation type  K59.00 564.00   2. Lower abdominal pain  R10.30 789.09         Dispostion      ED Disposition Condition    Discharge              Shan Caputo III, MD  09/12/24 2203

## 2024-09-05 NOTE — Clinical Note
Diagnosis: Lower abdominal pain [[id number]]   Future Attending Provider: KIZZY-FRANCHESCA HUSTON [[id number]]

## 2024-09-05 NOTE — ED NOTES
Patient presents to ed with c/o lower bilat abd pain that has been intermittent for one week. Pt states pain has worsened in the past two days. Patient also reports black stools that has been intermittent for one week. Pt c/o nausea. Took a zofran earlier today with some relief. Patient reports low grade fever today. Patient states she saw her PCP and had some imaging performed. Patient states she has not gotten her results yet. Cardiac monitor placed on patient. VS stable. Patient has daughter at bedside. Will cont to monitor.

## 2024-09-06 ENCOUNTER — TELEPHONE (OUTPATIENT)
Dept: FAMILY MEDICINE | Facility: CLINIC | Age: 79
End: 2024-09-06
Payer: MEDICARE

## 2024-09-06 ENCOUNTER — ANESTHESIA EVENT (OUTPATIENT)
Dept: ENDOSCOPY | Facility: HOSPITAL | Age: 79
End: 2024-09-06
Payer: MEDICARE

## 2024-09-06 ENCOUNTER — ANESTHESIA (OUTPATIENT)
Dept: ENDOSCOPY | Facility: HOSPITAL | Age: 79
End: 2024-09-06
Payer: MEDICARE

## 2024-09-06 ENCOUNTER — TELEPHONE (OUTPATIENT)
Dept: GASTROENTEROLOGY | Facility: HOSPITAL | Age: 79
End: 2024-09-06
Payer: MEDICARE

## 2024-09-06 VITALS
BODY MASS INDEX: 23.9 KG/M2 | OXYGEN SATURATION: 95 % | RESPIRATION RATE: 20 BRPM | SYSTOLIC BLOOD PRESSURE: 123 MMHG | TEMPERATURE: 98 F | DIASTOLIC BLOOD PRESSURE: 56 MMHG | HEART RATE: 63 BPM | HEIGHT: 64 IN | WEIGHT: 140 LBS

## 2024-09-06 VITALS — OXYGEN SATURATION: 97 % | RESPIRATION RATE: 18 BRPM | HEART RATE: 68 BPM | TEMPERATURE: 98 F

## 2024-09-06 PROBLEM — E87.6 HYPOKALEMIA: Status: ACTIVE | Noted: 2024-09-06

## 2024-09-06 PROBLEM — K59.00 CONSTIPATION: Status: ACTIVE | Noted: 2024-09-06

## 2024-09-06 PROBLEM — K52.89 STERCORAL COLITIS: Status: RESOLVED | Noted: 2024-09-06 | Resolved: 2024-09-06

## 2024-09-06 PROBLEM — K52.89 STERCORAL COLITIS: Status: ACTIVE | Noted: 2024-09-06

## 2024-09-06 LAB
ALBUMIN SERPL BCP-MCNC: 2.6 G/DL (ref 3.5–5.2)
ALP SERPL-CCNC: 55 U/L (ref 55–135)
ALT SERPL W/O P-5'-P-CCNC: 11 U/L (ref 10–44)
ANION GAP SERPL CALC-SCNC: 10 MMOL/L (ref 8–16)
AST SERPL-CCNC: 20 U/L (ref 10–40)
BASOPHILS # BLD AUTO: 0.06 K/UL (ref 0–0.2)
BASOPHILS NFR BLD: 0.5 % (ref 0–1.9)
BILIRUB SERPL-MCNC: 0.5 MG/DL (ref 0.1–1)
BUN SERPL-MCNC: 16 MG/DL (ref 8–23)
CALCIUM SERPL-MCNC: 9.1 MG/DL (ref 8.7–10.5)
CHLORIDE SERPL-SCNC: 99 MMOL/L (ref 95–110)
CO2 SERPL-SCNC: 29 MMOL/L (ref 23–29)
CREAT SERPL-MCNC: 1 MG/DL (ref 0.5–1.4)
DIFFERENTIAL METHOD BLD: ABNORMAL
EOSINOPHIL # BLD AUTO: 0.3 K/UL (ref 0–0.5)
EOSINOPHIL NFR BLD: 2 % (ref 0–8)
ERYTHROCYTE [DISTWIDTH] IN BLOOD BY AUTOMATED COUNT: 11.6 % (ref 11.5–14.5)
EST. GFR  (NO RACE VARIABLE): 57 ML/MIN/1.73 M^2
GLUCOSE SERPL-MCNC: 97 MG/DL (ref 70–110)
HCT VFR BLD AUTO: 29.1 % (ref 37–48.5)
HGB BLD-MCNC: 9.8 G/DL (ref 12–16)
IMM GRANULOCYTES # BLD AUTO: 0.03 K/UL (ref 0–0.04)
IMM GRANULOCYTES NFR BLD AUTO: 0.2 % (ref 0–0.5)
LYMPHOCYTES # BLD AUTO: 2.5 K/UL (ref 1–4.8)
LYMPHOCYTES NFR BLD: 20 % (ref 18–48)
MCH RBC QN AUTO: 31.6 PG (ref 27–31)
MCHC RBC AUTO-ENTMCNC: 33.7 G/DL (ref 32–36)
MCV RBC AUTO: 94 FL (ref 82–98)
MONOCYTES # BLD AUTO: 1.9 K/UL (ref 0.3–1)
MONOCYTES NFR BLD: 15.7 % (ref 4–15)
NEUTROPHILS # BLD AUTO: 7.6 K/UL (ref 1.8–7.7)
NEUTROPHILS NFR BLD: 61.6 % (ref 38–73)
NRBC BLD-RTO: 0 /100 WBC
PLATELET # BLD AUTO: 280 K/UL (ref 150–450)
PMV BLD AUTO: 9.1 FL (ref 9.2–12.9)
POTASSIUM SERPL-SCNC: 3.5 MMOL/L (ref 3.5–5.1)
PROT SERPL-MCNC: 5.9 G/DL (ref 6–8.4)
RBC # BLD AUTO: 3.1 M/UL (ref 4–5.4)
SODIUM SERPL-SCNC: 138 MMOL/L (ref 136–145)
WBC # BLD AUTO: 12.38 K/UL (ref 3.9–12.7)

## 2024-09-06 PROCEDURE — 25000003 PHARM REV CODE 250: Performed by: REGISTERED NURSE

## 2024-09-06 PROCEDURE — 85025 COMPLETE CBC W/AUTO DIFF WBC: CPT | Performed by: REGISTERED NURSE

## 2024-09-06 PROCEDURE — 37000009 HC ANESTHESIA EA ADD 15 MINS: Performed by: INTERNAL MEDICINE

## 2024-09-06 PROCEDURE — 45330 DIAGNOSTIC SIGMOIDOSCOPY: CPT | Mod: ,,, | Performed by: INTERNAL MEDICINE

## 2024-09-06 PROCEDURE — 63600175 PHARM REV CODE 636 W HCPCS: Performed by: REGISTERED NURSE

## 2024-09-06 PROCEDURE — 63600175 PHARM REV CODE 636 W HCPCS

## 2024-09-06 PROCEDURE — 25000003 PHARM REV CODE 250

## 2024-09-06 PROCEDURE — 37000008 HC ANESTHESIA 1ST 15 MINUTES: Performed by: INTERNAL MEDICINE

## 2024-09-06 PROCEDURE — G0378 HOSPITAL OBSERVATION PER HR: HCPCS

## 2024-09-06 PROCEDURE — 25000003 PHARM REV CODE 250: Performed by: INTERNAL MEDICINE

## 2024-09-06 PROCEDURE — 45330 DIAGNOSTIC SIGMOIDOSCOPY: CPT | Performed by: INTERNAL MEDICINE

## 2024-09-06 PROCEDURE — 80053 COMPREHEN METABOLIC PANEL: CPT | Performed by: REGISTERED NURSE

## 2024-09-06 RX ORDER — SODIUM CHLORIDE 0.9 % (FLUSH) 0.9 %
10 SYRINGE (ML) INJECTION EVERY 12 HOURS PRN
Status: DISCONTINUED | OUTPATIENT
Start: 2024-09-06 | End: 2024-09-06 | Stop reason: HOSPADM

## 2024-09-06 RX ORDER — NALOXONE HCL 0.4 MG/ML
0.02 VIAL (ML) INJECTION
Status: DISCONTINUED | OUTPATIENT
Start: 2024-09-06 | End: 2024-09-06 | Stop reason: HOSPADM

## 2024-09-06 RX ORDER — GABAPENTIN 600 MG/1
600 TABLET ORAL 2 TIMES DAILY
COMMUNITY
Start: 2024-08-22

## 2024-09-06 RX ORDER — DULOXETIN HYDROCHLORIDE 30 MG/1
30 CAPSULE, DELAYED RELEASE ORAL DAILY
Status: DISCONTINUED | OUTPATIENT
Start: 2024-09-06 | End: 2024-09-06 | Stop reason: HOSPADM

## 2024-09-06 RX ORDER — CITALOPRAM 10 MG/1
10 TABLET ORAL DAILY
Status: DISCONTINUED | OUTPATIENT
Start: 2024-09-06 | End: 2024-09-06 | Stop reason: HOSPADM

## 2024-09-06 RX ORDER — ACETAMINOPHEN 325 MG/1
650 TABLET ORAL EVERY 8 HOURS PRN
Status: DISCONTINUED | OUTPATIENT
Start: 2024-09-06 | End: 2024-09-06 | Stop reason: HOSPADM

## 2024-09-06 RX ORDER — OXYCODONE AND ACETAMINOPHEN 10; 325 MG/1; MG/1
1 TABLET ORAL EVERY 6 HOURS PRN
Status: DISCONTINUED | OUTPATIENT
Start: 2024-09-06 | End: 2024-09-06 | Stop reason: HOSPADM

## 2024-09-06 RX ORDER — CLOBETASOL PROPIONATE 0.5 MG/ML
SOLUTION TOPICAL EVERY MORNING
COMMUNITY
Start: 2024-05-17

## 2024-09-06 RX ORDER — IBUPROFEN 200 MG
24 TABLET ORAL
Status: DISCONTINUED | OUTPATIENT
Start: 2024-09-06 | End: 2024-09-06 | Stop reason: HOSPADM

## 2024-09-06 RX ORDER — ACETAMINOPHEN 500 MG
1000 TABLET ORAL ONCE
Status: COMPLETED | OUTPATIENT
Start: 2024-09-06 | End: 2024-09-06

## 2024-09-06 RX ORDER — IBUPROFEN 200 MG
16 TABLET ORAL
Status: DISCONTINUED | OUTPATIENT
Start: 2024-09-06 | End: 2024-09-06 | Stop reason: HOSPADM

## 2024-09-06 RX ORDER — ONDANSETRON HYDROCHLORIDE 2 MG/ML
4 INJECTION, SOLUTION INTRAVENOUS EVERY 8 HOURS PRN
Status: DISCONTINUED | OUTPATIENT
Start: 2024-09-06 | End: 2024-09-06 | Stop reason: HOSPADM

## 2024-09-06 RX ORDER — LIDOCAINE HYDROCHLORIDE 20 MG/ML
INJECTION INTRAVENOUS
Status: DISCONTINUED | OUTPATIENT
Start: 2024-09-06 | End: 2024-09-06

## 2024-09-06 RX ORDER — MORPHINE SULFATE 4 MG/ML
4 INJECTION, SOLUTION INTRAMUSCULAR; INTRAVENOUS EVERY 6 HOURS PRN
Status: DISCONTINUED | OUTPATIENT
Start: 2024-09-06 | End: 2024-09-06 | Stop reason: HOSPADM

## 2024-09-06 RX ORDER — PROPOFOL 10 MG/ML
VIAL (ML) INTRAVENOUS
Status: DISCONTINUED | OUTPATIENT
Start: 2024-09-06 | End: 2024-09-06

## 2024-09-06 RX ORDER — ATORVASTATIN CALCIUM 40 MG/1
80 TABLET, FILM COATED ORAL DAILY
Status: DISCONTINUED | OUTPATIENT
Start: 2024-09-06 | End: 2024-09-06 | Stop reason: HOSPADM

## 2024-09-06 RX ORDER — POLYETHYLENE GLYCOL 3350, SODIUM SULFATE ANHYDROUS, SODIUM BICARBONATE, SODIUM CHLORIDE, POTASSIUM CHLORIDE 236; 22.74; 6.74; 5.86; 2.97 G/4L; G/4L; G/4L; G/4L; G/4L
4000 POWDER, FOR SOLUTION ORAL ONCE
Status: COMPLETED | OUTPATIENT
Start: 2024-09-06 | End: 2024-09-06

## 2024-09-06 RX ORDER — LOSARTAN POTASSIUM 50 MG/1
100 TABLET ORAL DAILY
Status: DISCONTINUED | OUTPATIENT
Start: 2024-09-06 | End: 2024-09-06 | Stop reason: HOSPADM

## 2024-09-06 RX ORDER — ASPIRIN 81 MG/1
81 TABLET ORAL DAILY
Status: DISCONTINUED | OUTPATIENT
Start: 2024-09-06 | End: 2024-09-06 | Stop reason: HOSPADM

## 2024-09-06 RX ORDER — LACTULOSE 10 G/15ML
20 SOLUTION ORAL DAILY PRN
Qty: 500 ML | Refills: 1 | Status: SHIPPED | OUTPATIENT
Start: 2024-09-06 | End: 2024-09-16

## 2024-09-06 RX ORDER — PROPOFOL 10 MG/ML
VIAL (ML) INTRAVENOUS CONTINUOUS PRN
Status: DISCONTINUED | OUTPATIENT
Start: 2024-09-06 | End: 2024-09-06

## 2024-09-06 RX ORDER — SODIUM CHLORIDE, SODIUM LACTATE, POTASSIUM CHLORIDE, CALCIUM CHLORIDE 600; 310; 30; 20 MG/100ML; MG/100ML; MG/100ML; MG/100ML
INJECTION, SOLUTION INTRAVENOUS CONTINUOUS
Status: ACTIVE | OUTPATIENT
Start: 2024-09-06 | End: 2024-09-06

## 2024-09-06 RX ORDER — GALCANEZUMAB 120 MG/ML
INJECTION, SOLUTION SUBCUTANEOUS
COMMUNITY
Start: 2024-06-25

## 2024-09-06 RX ORDER — GLUCAGON 1 MG
1 KIT INJECTION
Status: DISCONTINUED | OUTPATIENT
Start: 2024-09-06 | End: 2024-09-06 | Stop reason: HOSPADM

## 2024-09-06 RX ORDER — RIMEGEPANT SULFATE 75 MG/75MG
75 TABLET, ORALLY DISINTEGRATING ORAL
COMMUNITY
Start: 2024-08-02

## 2024-09-06 RX ORDER — DOCUSATE SODIUM 100 MG/1
100 CAPSULE, LIQUID FILLED ORAL 2 TIMES DAILY
Status: DISCONTINUED | OUTPATIENT
Start: 2024-09-06 | End: 2024-09-06 | Stop reason: HOSPADM

## 2024-09-06 RX ADMIN — PROPOFOL 125 MCG/KG/MIN: 10 INJECTION, EMULSION INTRAVENOUS at 11:09

## 2024-09-06 RX ADMIN — OXYCODONE HYDROCHLORIDE AND ACETAMINOPHEN 1 TABLET: 10; 325 TABLET ORAL at 10:09

## 2024-09-06 RX ADMIN — LIDOCAINE HYDROCHLORIDE 100 MG: 20 INJECTION, SOLUTION INTRAVENOUS at 11:09

## 2024-09-06 RX ADMIN — CITALOPRAM HYDROBROMIDE 10 MG: 10 TABLET ORAL at 09:09

## 2024-09-06 RX ADMIN — DOCUSATE SODIUM 100 MG: 100 CAPSULE, LIQUID FILLED ORAL at 09:09

## 2024-09-06 RX ADMIN — ATORVASTATIN CALCIUM 80 MG: 20 TABLET, FILM COATED ORAL at 09:09

## 2024-09-06 RX ADMIN — POLYETHYLENE GLYCOL 3350, SODIUM SULFATE ANHYDROUS, SODIUM BICARBONATE, SODIUM CHLORIDE, POTASSIUM CHLORIDE 4000 ML: 236; 22.74; 6.74; 5.86; 2.97 POWDER, FOR SOLUTION ORAL at 01:09

## 2024-09-06 RX ADMIN — LOSARTAN POTASSIUM 100 MG: 50 TABLET, FILM COATED ORAL at 09:09

## 2024-09-06 RX ADMIN — SODIUM CHLORIDE, POTASSIUM CHLORIDE, SODIUM LACTATE AND CALCIUM CHLORIDE: 600; 310; 30; 20 INJECTION, SOLUTION INTRAVENOUS at 09:09

## 2024-09-06 RX ADMIN — DULOXETINE HYDROCHLORIDE 30 MG: 30 CAPSULE, DELAYED RELEASE ORAL at 09:09

## 2024-09-06 RX ADMIN — POTASSIUM BICARBONATE 25 MEQ: 978 TABLET, EFFERVESCENT ORAL at 03:09

## 2024-09-06 RX ADMIN — ACETAMINOPHEN 1000 MG: 500 TABLET ORAL at 02:09

## 2024-09-06 RX ADMIN — PROPOFOL 70 MG: 10 INJECTION, EMULSION INTRAVENOUS at 11:09

## 2024-09-06 RX ADMIN — ASPIRIN 81 MG: 81 TABLET, COATED ORAL at 09:09

## 2024-09-06 NOTE — ASSESSMENT & PLAN NOTE
Chronic, controlled. Latest blood pressure and vitals reviewed-     Temp:  [98.1 °F (36.7 °C)-100.2 °F (37.9 °C)]   Pulse:  [64-94]   Resp:  [13-20]   BP: ()/(54-70)   SpO2:  [95 %-100 %] .   Home meds for hypertension were reviewed and noted below.   Hypertension Medications               furosemide (LASIX) 40 MG tablet Take 2 tablets (80 mg total) by mouth 2 (two) times daily as needed (swelling).    hydrALAZINE (APRESOLINE) 25 MG tablet Take 1 tablet (25 mg total) by mouth every 8 (eight) hours.    irbesartan (AVAPRO) 300 MG tablet TAKE ONE TABLET BY MOUTH EVERY EVENING    isosorbide mononitrate (IMDUR) 60 MG 24 hr tablet Take 1 tablet (60 mg total) by mouth 2 (two) times a day.            While in the hospital, will manage blood pressure as follows; Continue home antihypertensive regimen-partial resumption    Will utilize p.r.n. blood pressure medication only if patient's blood pressure greater than 180/110 and she develops symptoms such as worsening chest pain or shortness of breath.

## 2024-09-06 NOTE — PLAN OF CARE
0805  Message sent to Dr Shelton Mason's (PCP)  requesting a hospfu appt. Pt will be notified of appt date & time. Information added to the pt's discharge paperwork.       Will continue to follow.

## 2024-09-06 NOTE — SUBJECTIVE & OBJECTIVE
Past Medical History:   Diagnosis Date    Aortic atherosclerosis 6/14/2023    CKD (chronic kidney disease) stage 4, GFR 15-29 ml/min     Coronary artery disease     Edema     Epilepsy     Generalized anxiety disorder 12/20/2021    Hematuria, unspecified     Hematuria, unspecified     Hyperlipidemia     Hypertension     Iron deficiency anemia     Moderate episode of recurrent major depressive disorder     Nonrheumatic aortic valve stenosis 12/20/2021    Normocytic anemia     Prediabetes 2/24/2022       Past Surgical History:   Procedure Laterality Date    ANGIOGRAM, CORONARY, WITH LEFT HEART CATHETERIZATION N/A 6/12/2024    Procedure: Angiogram, Coronary, with Left Heart Cath;  Surgeon: José Carreon MD;  Location: [facility] CATH LAB/EP;  Service: Cardiology;  Laterality: N/A;    APPENDECTOMY      BACK SURGERY      CARDIAC CATH COSURGEON  7/2/2024    Procedure: Cardiac Cath Cosurgeon;  Surgeon: Santosh Colon MD;  Location: [facility] CATH LAB;  Service: Cardiology;;    CORONARY STENT PLACEMENT      HYSTERECTOMY      PERCUTANEOUS TRANSCATHETER AORTIC VALVE REPLACEMENT (TAVR)  7/2/2024    Procedure: REPLACEMENT, AORTIC VALVE, PERCUTANEOUS, TRANSCATHETER;  Surgeon: Santosh Colon MD;  Location: [facility] CATH LAB;  Service: Cardiology;;    REVISION OF KNEE ARTHROPLASTY Left 7/18/2022    Procedure: REVISION, ARTHROPLASTY, KNEE;  Surgeon: Stan Catalan MD;  Location: [facility] OR;  Service: Orthopedics;  Laterality: Left;    TONSILLECTOMY      TRANSCATHETER AORTIC VALVE REPLACEMENT (TAVR) N/A 7/2/2024    Procedure: REPLACEMENT, AORTIC VALVE, TRANSCATHETER (TAVR);  Surgeon: José Lee MD;  Location: [facility] CATH LAB;  Service: Cardiology;  Laterality: N/A;       Review of patient's allergies indicates:   Allergen Reactions    Iodinated contrast media Anaphylaxis and Other (See Comments)    Nsaids (non-steroidal anti-inflammatory drug)      ADWOA    Phenergan [promethazine]      Vomiting       No current  facility-administered medications on file prior to encounter.     Current Outpatient Medications on File Prior to Encounter   Medication Sig    aspirin (ECOTRIN) 81 MG EC tablet Take 81 mg by mouth once daily.    DULoxetine (CYMBALTA) 30 MG capsule Take 1 capsule (30 mg total) by mouth once daily.    furosemide (LASIX) 40 MG tablet Take 2 tablets (80 mg total) by mouth 2 (two) times daily as needed (swelling).    hydrALAZINE (APRESOLINE) 25 MG tablet Take 1 tablet (25 mg total) by mouth every 8 (eight) hours.    hydrOXYzine HCL (ATARAX) 25 MG tablet TAKE ONE TABLET BY MOUTH EVERY 6 HOURS AS NEEDED FOR ITCHING    omeprazole (PRILOSEC) 20 MG capsule Take 1 capsule (20 mg total) by mouth daily as needed.    oxyCODONE-acetaminophen (PERCOCET)  mg per tablet Take 1 tablet by mouth 4 (four) times daily as needed.    rosuvastatin (CRESTOR) 40 MG Tab TAKE ONE TABLET BY MOUTH DAILY    BOTOX 100 unit SolR Inject 100 Units into the skin once. (Patient not taking: Reported on 7/8/2024)    butalbital-acetaminophen-caffeine -40 mg (FIORICET, ESGIC) -40 mg per tablet Take 1 tablet by mouth every 8 (eight) hours as needed for Headaches.    calcium carbonate/vitamin D3 (VITAMIN D-3 ORAL) Take 1 tablet by mouth once daily.    cimetidine (TAGAMET) 300 MG tablet Take 1 tablet (300 mg total) by mouth 3 (three) times daily. Take first dose 6pm prior to the procedure, followed by the second dose at 11pm and the third dose at 6am the morning of the procedure for 3 doses    citalopram (CELEXA) 10 MG tablet Take 10 mg by mouth once daily.    clotrimazole-betamethasone 1-0.05% (LOTRISONE) cream Apply topically 2 (two) times daily.    famotidine (PEPCID) 20 MG tablet Take 2 tablets (40 mg total) by mouth 3 (three) times daily as needed (take first dose 6 pm day before procedure, seconond dose 11 pm night prior to procedure and third dose 6 am day of procedure).    FEROSUL 325 mg (65 mg iron) Tab tablet Take 325 mg by mouth  once daily.    fluocinolone and shower cap 0.01 % Oil Apply 0.01 % topically every evening.    irbesartan (AVAPRO) 300 MG tablet TAKE ONE TABLET BY MOUTH EVERY EVENING    isosorbide mononitrate (IMDUR) 60 MG 24 hr tablet Take 1 tablet (60 mg total) by mouth 2 (two) times a day. (Patient not taking: Reported on 8/30/2024)    LINZESS 72 mcg Cap capsule Take 72 mcg by mouth before breakfast. (Patient not taking: Reported on 8/12/2024)    multivit-min-iron-FA-lutein (CENTRUM SILVER WOMEN) 8 mg iron-400 mcg-300 mcg Tab Take 1 tablet by mouth once daily.    omega-3 fatty acids/fish oil (FISH OIL-OMEGA-3 FATTY ACIDS) 300-1,000 mg capsule Take 2 capsules by mouth once daily.    tiZANidine (ZANAFLEX) 4 MG tablet Take 1 tablet (4 mg total) by mouth every 8 (eight) hours as needed (spasms).    [DISCONTINUED] mupirocin (BACTROBAN) 2 % ointment Apply topically 2 (two) times daily. (Patient not taking: Reported on 8/12/2024)    [DISCONTINUED] nitroGLYCERIN (NITROSTAT) 0.3 MG SL tablet PLACE 1 TABLET UNDER THE TONGUE EVERY 5 MINUTES FOR UP TO 3 DOSES IF NEEDED FOR CHEST PAIN. CALL 911 IF PAIN PERSISTS (Patient not taking: Reported on 8/12/2024)    [DISCONTINUED] permethrin (ELIMITE) 5 % cream APPLY TOPICALLY ONCE FOR ONE DOSE (Patient not taking: Reported on 8/12/2024)    [DISCONTINUED] valACYclovir (VALTREX) 1000 MG tablet Take 2,000 mg by mouth 2 (two) times daily as needed. (Patient not taking: Reported on 8/12/2024)     Family History       Problem Relation (Age of Onset)    Heart disease Mother, Father          Tobacco Use    Smoking status: Never    Smokeless tobacco: Never   Substance and Sexual Activity    Alcohol use: No    Drug use: No    Sexual activity: Not Currently     Review of Systems   Constitutional:  Positive for fever.   Gastrointestinal:  Positive for abdominal pain, blood in stool and constipation.   All other systems reviewed and are negative.    Objective:     Vital Signs (Most Recent):  Temp: 98.2 °F (36.8  °C) (09/06/24 0306)  Pulse: 78 (09/06/24 0306)  Resp: 20 (09/06/24 0306)  BP: (!) 154/69 (09/06/24 0306)  SpO2: 96 % (09/06/24 0150) Vital Signs (24h Range):  Temp:  [98.2 °F (36.8 °C)-100.2 °F (37.9 °C)] 98.2 °F (36.8 °C)  Pulse:  [78-94] 78  Resp:  [15-20] 20  SpO2:  [95 %-100 %] 96 %  BP: (127-171)/(60-70) 154/69     Weight: 63.5 kg (140 lb)  Body mass index is 24.03 kg/m².     Physical Exam  Vitals reviewed.   Constitutional:       Appearance: Normal appearance. She is ill-appearing.   HENT:      Head: Normocephalic.      Nose: Nose normal.      Mouth/Throat:      Pharynx: Oropharynx is clear.   Eyes:      Pupils: Pupils are equal, round, and reactive to light.   Cardiovascular:      Rate and Rhythm: Normal rate and regular rhythm.      Pulses: Normal pulses.      Heart sounds: Normal heart sounds.   Pulmonary:      Effort: Pulmonary effort is normal.      Breath sounds: Normal breath sounds.   Abdominal:      General: Bowel sounds are normal.      Palpations: Abdomen is soft.   Musculoskeletal:         General: Normal range of motion.      Cervical back: Normal range of motion.   Skin:     General: Skin is warm and dry.      Capillary Refill: Capillary refill takes less than 2 seconds.   Neurological:      General: No focal deficit present.      Mental Status: She is alert and oriented to person, place, and time. Mental status is at baseline.   Psychiatric:         Mood and Affect: Mood normal.         Behavior: Behavior normal.              CRANIAL NERVES     CN III, IV, VI   Pupils are equal, round, and reactive to light.       Significant Labs: All pertinent labs within the past 24 hours have been reviewed.  Recent Lab Results         09/05/24  1759   09/05/24  1742   09/05/24  1633   09/05/24  1632        Albumin       3.2       ALP       68       ALT       13       Anion Gap       13       Appearance, UA Clear             AST       24       Bacteria, UA Rare             Baso #       0.05       Basophil %        0.4       Bilirubin (UA) Negative             BILIRUBIN TOTAL       0.3  Comment: For infants and newborns, interpretation of results should be based  on gestational age, weight and in agreement with clinical  observations.    Premature Infant recommended reference ranges:  Up to 24 hours.............<8.0 mg/dL  Up to 48 hours............<12.0 mg/dL  3-5 days..................<15.0 mg/dL  6-29 days.................<15.0 mg/dL         BUN       16       Calcium       9.6       Chloride       97       CO2       28       Color, UA Yellow             Creatinine       1.2       Differential Method       Automated       eGFR       46       Eos #       0.3       Eos %       2.1       Glucose       103       Glucose, UA Negative             Gran # (ANC)       8.9       Gran %       71.7       Hematocrit       35.8       Hemoglobin       11.7       Hyaline Casts, UA 2             Immature Grans (Abs)       0.04  Comment: Mild elevation in immature granulocytes is non specific and   can be seen in a variety of conditions including stress response,   acute inflammation, trauma and pregnancy. Correlation with other   laboratory and clinical findings is essential.         Immature Granulocytes       0.3       Ketones, UA Negative             Lactic Acid Level   0.8  Comment: Falsely low lactic acid results can be found in samples   containing >=13.0 mg/dL total bilirubin and/or >=3.5 mg/dL   direct bilirubin.             Leukocyte Esterase, UA Trace             Lipase       26       Lymph #       1.5       Lymph %       12.1       MCH       30.9       MCHC       32.7       MCV       95       Microscopic Comment SEE COMMENT  Comment: Other formed elements not mentioned in the report are not   present in the microscopic examination.                Mono #       1.7       Mono %       13.4       MPV       9.4       NITRITE UA Negative             nRBC       0       Blood, UA Negative             pH, UA 7.0             Platelet  Count       381       Potassium       3.1       PROTEIN TOTAL       7.2       Protein, UA Negative  Comment: Recommend a 24 hour urine protein or a urine   protein/creatinine ratio if globulin induced proteinuria is  clinically suspected.               RBC       3.79       RBC, UA 0             RDW       11.7       SARS-CoV-2 RNA, Amplification, Qual     Negative  Comment: This test utilizes isothermal nucleic acid amplification technology   to   detect the SARS-CoV-2 RdRp nucleic acid segment. The analytical   sensitivity   (limit of detection) is 500 copies/swab.    A POSITIVE result is indicative of the presence of SARS-CoV-2 RNA;   clinical   correlation with patient history and other diagnostic information is   necessary to determine patient infection status.    A NEGATIVE result means that SARS-CoV-2 nucleic acids are not present   above   the limit of detection. A NEGATIVE result should be treated as   presumptive.   It does not rule out the possibility of COVID-19 and should not be   the sole   basis for treatment decisions.    If COVID-19 is strongly suspected based on clinical and exposure   history,   re-testing using an alternate molecular assay should be considered.    This test is Food and Drug Administration (FDA) approved. Performance   characteristics of this has been independently verified by [facility] Department of Pathology and Laboratory Medicine.           Sodium       138       Spec Grav UA 1.010             Specimen UA Urine, Clean Catch             Squam Epithel, UA 2             UROBILINOGEN UA Negative             WBC, UA 2             WBC       12.42               Significant Imaging: I have reviewed all pertinent imaging results/findings within the past 24 hours.  I have reviewed and interpreted all pertinent imaging results/findings within the past 24 hours.

## 2024-09-06 NOTE — TRANSFER OF CARE
"Anesthesia Transfer of Care Note    Patient: Enedelia García    Procedure(s) Performed: Procedure(s) (LRB):  SIGMOIDOSCOPY, FLEXIBLE (N/A)    Patient location: GI    Anesthesia Type: general    Transport from OR: Transported from OR on room air with adequate spontaneous ventilation    Post pain: adequate analgesia    Post assessment: no apparent anesthetic complications    Post vital signs: stable    Level of consciousness: awake    Nausea/Vomiting: no nausea/vomiting    Complications: none    Transfer of care protocol was followed      Last vitals: Visit Vitals  /65 (Patient Position: Lying)   Pulse 66   Temp 36.6 °C (97.9 °F) (Temporal)   Resp 18   Ht 5' 4" (1.626 m)   Wt 63.5 kg (140 lb)   SpO2 (!) 94%   BMI 24.03 kg/m²     "

## 2024-09-06 NOTE — TELEPHONE ENCOUNTER
Pt with constipation ,d/c from hospitl    Seen simone in past  Needs NP clinic visit,   Likely needs another discussion of colonoscopy    Flex sig without obvious mass   Likely recommend full colon with good prep

## 2024-09-06 NOTE — PROVATION PATIENT INSTRUCTIONS
Discharge Summary/Instructions after an Endoscopic Procedure  Patient Name: Enedelia García  Patient MRN: [id number]  Patient YOB: 1945 Friday, September 6, 2024  Pilo Calles MD  Dear patient,  As a result of recent federal legislation (The Federal Cures Act), you may   receive lab or pathology results from your procedure in your [facility]   account before your physician is able to contact you. Your physician or   their representative will relay the results to you with their   recommendations at their soonest availability.  Thank you,  Your health is very important to us during the Covid Crisis. Following your   procedure today, you will receive a daily text for 2 weeks asking about   signs or symptoms of Covid 19.  Please respond to this text when you   receive it so we can follow up and keep you as safe as possible.   RESTRICTIONS:  During your procedure today, you received medications for sedation.  These   medications may affect your judgment, balance and coordination.  Therefore,   for 24 hours, you have the following restrictions:   - DO NOT drive a car, operate machinery, make legal/financial decisions,   sign important papers or drink alcohol.    ACTIVITY:  Today: no heavy lifting, straining or running due to procedural   sedation/anesthesia.  The following day: return to full activity including work.  DIET:  Eat and drink normally unless instructed otherwise.     TREATMENT FOR COMMON SIDE EFFECTS:  - Mild abdominal pain, nausea, belching, bloating or excessive gas:  rest,   eat lightly and use a heating pad.  - Sore Throat: treat with throat lozenges and/or gargle with warm salt   water.  - Because air was used during the procedure, expelling large amounts of air   from your rectum or belching is normal.  - If a bowel prep was taken, you may not have a bowel movement for 1-3 days.    This is normal.  SYMPTOMS TO WATCH FOR AND REPORT TO YOUR PHYSICIAN:  1. Abdominal pain or bloating, other  than gas cramps.  2. Chest pain.  3. Back pain.  4. Signs of infection such as: chills or fever occurring within 24 hours   after the procedure.  5. Rectal bleeding, which would show as bright red, maroon, or black stools.   (A tablespoon of blood from the rectum is not serious, especially if   hemorrhoids are present.)  6. Vomiting.  7. Weakness or dizziness.  GO DIRECTLY TO THE NEAREST EMERGENCY ROOM IF YOU HAVE ANY OF THE FOLLOWING:      Difficulty breathing              Chills and/or fever over 101 F   Persistent vomiting and/or vomiting blood   Severe abdominal pain   Severe chest pain   Black, tarry stools   Bleeding- more than one tablespoon   Any other symptom or condition that you feel may need urgent attention  Your doctor recommends these additional instructions:  If any biopsies were taken, your doctors clinic will contact you in 1 to 2   weeks with any results.  - Return patient to hospital [facility] for ongoing care.   - Perform a colonoscopy at appointment to be scheduled.   - Resume previous diet.   - Aggressive bowel regimen  - Further recommendation as per referring GI.  For questions, problems or results please call your physician - Pilo Calles MD.  EMERGENCY PHONE NUMBER: [phone],  LAB RESULTS: [phone]  IF A COMPLICATION OR EMERGENCY SITUATION ARISES AND YOU ARE UNABLE TO REACH   YOUR PHYSICIAN - GO DIRECTLY TO THE EMERGENCY ROOM.  Pilo Calles MD  9/6/2024 11:51:53 AM  This report has been verified and signed electronically.  Dear patient,  As a result of recent federal legislation (The Federal Cures Act), you may   receive lab or pathology results from your procedure in your [facility]   account before your physician is able to contact you. Your physician or   their representative will relay the results to you with their   recommendations at their soonest availability.  Thank you,  PROVATION

## 2024-09-06 NOTE — ED NOTES
Patient is resting comfortably on bed, watching TV, conscious, coherent, oriented, responding to verbal commands , awake and alert.

## 2024-09-06 NOTE — ANESTHESIA POSTPROCEDURE EVALUATION
Anesthesia Post Evaluation    Patient: Enedelia García    Procedure(s) Performed: Procedure(s) (LRB):  SIGMOIDOSCOPY, FLEXIBLE (N/A)    Final Anesthesia Type: general      Patient location during evaluation: PACU  Patient participation: Yes- Able to Participate  Level of consciousness: awake and alert  Post-procedure vital signs: reviewed and stable  Pain management: adequate  Airway patency: patent    PONV status at discharge: No PONV  Anesthetic complications: no      Cardiovascular status: blood pressure returned to baseline  Respiratory status: unassisted  Hydration status: euvolemic  Follow-up not needed.          Vitals Value Taken Time   /56 09/06/24 1158   Temp 36.8 °C (98.2 °F) 09/06/24 1128   Pulse 65 09/06/24 1158   Resp 20 09/06/24 1158   SpO2 96 % 09/06/24 1158         Event Time   Out of Recovery 09/06/2024 11:50:00         Pain/Mynor Score: Pain Rating Prior to Med Admin: 9 (9/6/2024 10:21 AM)  Pain Rating Post Med Admin: 5 (9/6/2024  3:06 AM)  Mynor Score: 10 (9/6/2024 11:58 AM)

## 2024-09-06 NOTE — PLAN OF CARE
09/06/24 1030   Rounds   Attendance Provider;Nurse    Discharge Plan A Home with family   Why the patient remains in the hospital Requires continued medical care       1030  CM was informed by Dr Rodriguez that the pt might be medically stable to discharge home today following the flex sig.     1320  CM was informed by nurse Nelson that the pt would like to be discharged. Dr Santos added to the message.     [place] - Emergency Dept  Initial Discharge Assessment       Primary Care Provider: Shelton Mason MD    Admission Diagnosis: Lower abdominal pain [R10.30]  Chest pain [R07.9]    Admission Date: 9/5/2024  Expected Discharge Date: 9/6/2024    Consult: AMITA    Payor: [email or web address] [facility] / Plan: [employer] / Product Type: Medicare Advantage /     Extended Emergency Contact Information  Primary Emergency Contact: Laya Garcia   [facility]  Home Phone: [phone]  Relation: Daughter  Secondary Emergency Contact: Nick Gross   [place]  Mobile Phone: [phone]  Relation: Significant other    Discharge Plan A: (P) Home with family  Discharge Plan B: (P) Home Health      [facility] - [place] - [address] [facility] [address]  Phone: [phone] Fax: [phone]      Initial Assessment (most recent)       Adult Discharge Assessment - 09/06/24 1340          Discharge Assessment    Assessment Type Discharge Planning Assessment (P)      Confirmed/corrected address, phone number and insurance Yes (P)      Confirmed Demographics Correct on Facesheet (P)      Source of Information patient (P)      Communicated ISAURA with patient/caregiver Yes (P)      People in Home significant other (P)    s.o., Nick Gross ([phone])    Do you expect to return to your current living situation? Yes (P)      Do you have help at home or someone to help you manage your care at home? Yes (P)      Prior to hospitilization cognitive  status: Alert/Oriented (P)      Current cognitive status: Alert/Oriented (P)      Equipment Currently Used at Home blood pressure machine;rollator;shower chair (P)      Readmission within 30 days? No (P)      Patient currently being followed by outpatient case management? No (P)      Do you currently have service(s) that help you manage your care at home? No (P)      Do you take prescription medications? Yes (P)      Do you have prescription coverage? Yes (P)      Do you have any problems affording any of your prescribed medications? No (P)      Is the patient taking medications as prescribed? yes (P)      How do you get to doctors appointments? car, drives self;family or friend will provide (P)      Are you on dialysis? No (P)      Do you take coumadin? No (P)      Discharge Plan A Home with family (P)      Discharge Plan B Home Health (P)      DME Needed Upon Discharge  none (P)      Discharge Plan discussed with: Patient (P)         Physical Activity    On average, how many days per week do you engage in moderate to strenuous exercise (like a brisk walk)? 0 days (P)      On average, how many minutes do you engage in exercise at this level? 0 min (P)         Financial Resource Strain    How hard is it for you to pay for the very basics like food, housing, medical care, and heating? Somewhat hard (P)         Housing Stability    In the last 12 months, was there a time when you were not able to pay the mortgage or rent on time? No (P)      At any time in the past 12 months, were you homeless or living in a shelter (including now)? No (P)         Transportation Needs    Has the lack of transportation kept you from medical appointments, meetings, work or from getting things needed for daily living? No (P)         Food Insecurity    Within the past 12 months, you worried that your food would run out before you got the money to buy more. Never true (P)      Within the past 12 months, the food you bought just didn't last  and you didn't have money to get more. Never true (P)         Stress    Do you feel stress - tense, restless, nervous, or anxious, or unable to sleep at night because your mind is troubled all the time - these days? Very much (P)         Social Isolation    How often do you feel lonely or isolated from those around you?  Sometimes (P)         Alcohol Use    Q1: How often do you have a drink containing alcohol? Never (P)      Q2: How many drinks containing alcohol do you have on a typical day when you are drinking? Patient does not drink (P)      Q3: How often do you have six or more drinks on one occasion? Never (P)         [employer]    In the past 12 months has the electric, gas, oil, or water company threatened to shut off services in your home? No (P)         Health Literacy    How often do you need to have someone help you when you read instructions, pamphlets, or other written material from your doctor or pharmacy? Never (P)                       1340  Patient resting quietly in bed with daughter, Laya Garcia ([phone]), at the bedside when CM rounded. Patient was admitted with constipation, is being followed by GI, & had a flex sig done this AM.    Patient lives & is the caregiver for her s.o., Nick Gross ([phone]), has equipment to assist with ADLs, & denied the need for assistance with transportation at time of discharge. Pt stated she wants to be discharged & will return for an out-pt colonoscopy. Message sent to Dr Santos informing of above.     CM informed the pt a scheduled hospital follow up appointment with Dr Margie Zamora on 9/13/2024 at 1330. Information added to the pt's discharge paperwork.     1400  DC order noted. Message sent to nurse Nelson informing that the pt is cleared to discharge.       Will continue to follow.

## 2024-09-06 NOTE — ANESTHESIA PREPROCEDURE EVALUATION
09/06/2024  Enedelia García is a 79 y.o., female.  Past Medical History:   Diagnosis Date    Aortic atherosclerosis 6/14/2023    CKD (chronic kidney disease) stage 4, GFR 15-29 ml/min     Coronary artery disease     Edema     Epilepsy     Generalized anxiety disorder 12/20/2021    Hematuria, unspecified     Hematuria, unspecified     Hyperlipidemia     Hypertension     Iron deficiency anemia     Moderate episode of recurrent major depressive disorder     Nonrheumatic aortic valve stenosis 12/20/2021    Normocytic anemia     Prediabetes 2/24/2022     Past Surgical History:   Procedure Laterality Date    ANGIOGRAM, CORONARY, WITH LEFT HEART CATHETERIZATION N/A 6/12/2024    Procedure: Angiogram, Coronary, with Left Heart Cath;  Surgeon: José Carreon MD;  Location: [facility] CATH LAB/EP;  Service: Cardiology;  Laterality: N/A;    APPENDECTOMY      BACK SURGERY      CARDIAC CATH COSURGEON  7/2/2024    Procedure: Cardiac Cath Cosurgeon;  Surgeon: Santosh Colon MD;  Location: [facility] CATH LAB;  Service: Cardiology;;    CORONARY STENT PLACEMENT      HYSTERECTOMY      PERCUTANEOUS TRANSCATHETER AORTIC VALVE REPLACEMENT (TAVR)  7/2/2024    Procedure: REPLACEMENT, AORTIC VALVE, PERCUTANEOUS, TRANSCATHETER;  Surgeon: Santosh Colon MD;  Location: [facility] CATH LAB;  Service: Cardiology;;    REVISION OF KNEE ARTHROPLASTY Left 7/18/2022    Procedure: REVISION, ARTHROPLASTY, KNEE;  Surgeon: Stan Catalan MD;  Location: [facility] OR;  Service: Orthopedics;  Laterality: Left;    TONSILLECTOMY      TRANSCATHETER AORTIC VALVE REPLACEMENT (TAVR) N/A 7/2/2024    Procedure: REPLACEMENT, AORTIC VALVE, TRANSCATHETER (TAVR);  Surgeon: José Lee MD;  Location: [facility] CATH LAB;  Service: Cardiology;  Laterality: N/A;     Pre-op Assessment       I have reviewed the Medications.     Review of Systems  Anesthesia Hx:  No  problems with previous Anesthesia             Denies Family Hx of Anesthesia complications.     Cardiovascular:     Hypertension   CAD                                        Renal/:  Chronic Renal Disease                Hepatic/GI:     GERD             Musculoskeletal:  Arthritis               Neurological:       Seizures                                Psych:  Psychiatric History                  Physical Exam  General: Well nourished    Airway:  Mallampati: II   TM Distance: Normal  Neck ROM: Normal ROM    Dental:  Intact    Chest/Lungs:  Clear to auscultation    Heart:  Rate: Normal  Rhythm: Regular Rhythm        Anesthesia Plan  Type of Anesthesia, risks & benefits discussed:    Anesthesia Type: Gen Natural Airway  Intra-op Monitoring Plan: Standard ASA Monitors  Post Op Pain Control Plan: multimodal analgesia  Induction:  IV  Informed Consent: Informed consent signed with the Patient and all parties understand the risks and agree with anesthesia plan.  All questions answered.   ASA Score: 3    Ready For Surgery From Anesthesia Perspective.     .

## 2024-09-06 NOTE — PLAN OF CARE
Reported off to Ramon DELEON  v/s  98.2  65  20  123/56  96%RA.  Patient denies any present discomforts, NAD noted.  Patient will be transported back to ED ROOM 15.

## 2024-09-06 NOTE — ED NOTES
Patient is resting comfortably on bed, alert, awake, responding to verbal commands, conscious, coherent and oriented.

## 2024-09-06 NOTE — ASSESSMENT & PLAN NOTE
Patient's most recent potassium results are listed below.   Recent Labs     09/05/24  1632   K 3.1*     Plan  - Replete potassium per protocol  - Monitor potassium Daily  - Patient's hypokalemia is stable  - Recheck this AM

## 2024-09-06 NOTE — ED NOTES
Patient is resting comfortably on bed, sleeping, alert, conscious, coherent, oriented, responding to verbal commands and not in distress.

## 2024-09-06 NOTE — TELEPHONE ENCOUNTER
----- Message from Ellie Lamas sent at 9/6/2024  8:04 AM CDT -----  .Type:  Sooner Apoointment Request    Caller is requesting a sooner appointment.  Caller declined first available appointment listed below.  Caller will not accept being placed on the waitlist and is requesting a message be sent to doctor.  Name of Caller:Kalani   When is the first available appointment?  Symptoms:hospital follow up  Would the patient rather a call back or a response via [facility]? Call back  Best Call Back Number:  Additional Information:

## 2024-09-06 NOTE — TELEPHONE ENCOUNTER
Spoke with Kalani , pt is booked with Dr Zamora for 9/13  States pt was not booked at 804 am today with Dr Zamora    To disregard message

## 2024-09-06 NOTE — ASSESSMENT & PLAN NOTE
Patient with 10 days of constipation, started fiber and laxatives 6 days ago  Severy lower abd pain  T max 102 at home. WBC normal, no left shift  CT a/p shows stercoral colitis vs colonic neoplasm.  GI consulted for colonoscopy  Keep NPO, low dose IVF 100cc/h x5h total LR

## 2024-09-06 NOTE — ASSESSMENT & PLAN NOTE
S/P TAVR 06/2024  Stable  -did not resume home PO lasix on admission..consider resumption if warranted vs resumption on DC

## 2024-09-06 NOTE — ED NOTES
Bed: EXAM 15  Expected date:   Expected time:   Means of arrival:   Comments:  Patient will return

## 2024-09-06 NOTE — HPI
Patient is a 79-year-old female with a history of OA, HTN, s/p TAVR presents to ED with lower abdominal pain.  Patient has saw her PCP 6 days we will after several days constipation which she was prescribed fiber and laxatives.  Patient has had continued constipation.  She did pass 2 hard black stools today which she also noticed bright red blood when wiping.  Patient reports earlier today she developed a 102 fever, has had continued severe lower abdominal pain. Labwork remarkable for hypokalemia, LFT normal. WBC normal, no left shift. CT a/p shows stercoral colitis, neoplasm unable to be excluded and large fecal matter proximally. Recommends colonoscopy for eval. She was given large volume enema without improvement. Pt will be admitted to hospital medicine. Will consult GI for eval

## 2024-09-06 NOTE — CONSULTS
[place] - Emergency Dept  Gastroenterology  Consult Note    Patient Name: Enedelia García  MRN: [id number]  Admission Date: 9/5/2024  Hospital Length of Stay: 0 days  Code Status: Full Code   Attending Provider: Vilma Abad*   Consulting Provider: Vadim Byrne MD  Primary Care Physician: Shelton Mason MD  Principal Problem:Constipation    Gastroenterology  Consult performed by: Vadim Byrne MD  Consult ordered by: Trev Mills NP        Subjective:     HPI:  This 80 y/o lady admitted for abd pain and constipation.  Gi consulted for abnormal imaging  Hx of OA, s/p TAVR.  Pt here with ongoing lower abd pain with assoc constipation. Can't pas the stool, very hard. No raditiang sypmtoms. + fatigue and lethargy. Low grade fevers.     Ct with sigmoid thickening and colitis and concern for impaction    Past Medical History:   Diagnosis Date    Aortic atherosclerosis 6/14/2023    CKD (chronic kidney disease) stage 4, GFR 15-29 ml/min     Coronary artery disease     Edema     Epilepsy     Generalized anxiety disorder 12/20/2021    Hematuria, unspecified     Hematuria, unspecified     Hyperlipidemia     Hypertension     Iron deficiency anemia     Moderate episode of recurrent major depressive disorder     Nonrheumatic aortic valve stenosis 12/20/2021    Normocytic anemia     Prediabetes 2/24/2022       Past Surgical History:   Procedure Laterality Date    ANGIOGRAM, CORONARY, WITH LEFT HEART CATHETERIZATION N/A 6/12/2024    Procedure: Angiogram, Coronary, with Left Heart Cath;  Surgeon: José Carreon MD;  Location: [facility] CATH LAB/EP;  Service: Cardiology;  Laterality: N/A;    APPENDECTOMY      BACK SURGERY      CARDIAC CATH COSURGEON  7/2/2024    Procedure: Cardiac Cath Cosurgeon;  Surgeon: Santosh Colon MD;  Location: [facility] CATH LAB;  Service: Cardiology;;    CORONARY STENT PLACEMENT      HYSTERECTOMY      PERCUTANEOUS TRANSCATHETER AORTIC VALVE REPLACEMENT (TAVR)  7/2/2024     Procedure: REPLACEMENT, AORTIC VALVE, PERCUTANEOUS, TRANSCATHETER;  Surgeon: Santosh Colon MD;  Location: [facility] CATH LAB;  Service: Cardiology;;    REVISION OF KNEE ARTHROPLASTY Left 7/18/2022    Procedure: REVISION, ARTHROPLASTY, KNEE;  Surgeon: Stan Catalan MD;  Location: [facility] OR;  Service: Orthopedics;  Laterality: Left;    TONSILLECTOMY      TRANSCATHETER AORTIC VALVE REPLACEMENT (TAVR) N/A 7/2/2024    Procedure: REPLACEMENT, AORTIC VALVE, TRANSCATHETER (TAVR);  Surgeon: José Lee MD;  Location: [facility] CATH LAB;  Service: Cardiology;  Laterality: N/A;       Review of patient's allergies indicates:   Allergen Reactions    Iodinated contrast media Anaphylaxis and Other (See Comments)    Nsaids (non-steroidal anti-inflammatory drug)      ADWOA    Phenergan [promethazine]      Vomiting     Family History       Problem Relation (Age of Onset)    Heart disease Mother, Father          Tobacco Use    Smoking status: Never    Smokeless tobacco: Never   Substance and Sexual Activity    Alcohol use: No    Drug use: No    Sexual activity: Not Currently     Review of Systems   Constitutional:  Positive for fatigue and fever. Negative for chills.   HENT:  Negative for mouth sores and nosebleeds.    Eyes:  Negative for pain and redness.   Respiratory:  Negative for cough and shortness of breath.    Cardiovascular:  Negative for chest pain and palpitations.   Gastrointestinal:  Positive for abdominal pain and constipation.   Genitourinary:  Negative for dysuria and hematuria.   Musculoskeletal:  Negative for arthralgias and joint swelling.   Neurological:  Negative for seizures and facial asymmetry.   Psychiatric/Behavioral:  Negative for agitation and confusion.      Objective:     Vital Signs (Most Recent):  Temp: 98.2 °F (36.8 °C) (09/06/24 1128)  Pulse: 65 (09/06/24 1158)  Resp: 20 (09/06/24 1158)  BP: (!) 123/56 (09/06/24 1158)  SpO2: 96 % (09/06/24 1158) Vital Signs (24h Range):  Temp:  [97.9 °F (36.6  °C)-100.2 °F (37.9 °C)] 98.2 °F (36.8 °C)  Pulse:  [58-94] 65  Resp:  [13-23] 20  SpO2:  [94 %-100 %] 96 %  BP: ()/(52-70) 123/56     Weight: 63.5 kg (140 lb) (09/05/24 1940)  Body mass index is 24.03 kg/m².      Intake/Output Summary (Last 24 hours) at 9/6/2024 1503  Last data filed at 9/5/2024 2310  Gross per 24 hour   Intake 300 ml   Output --   Net 300 ml       Lines/Drains/Airways       Peripheral Intravenous Line  Duration                  Peripheral IV - Single Lumen 09/05/24 1632 20 G 1 in Left Antecubital <1 day                     Physical Exam  Vitals reviewed.   Constitutional:       General: She is not in acute distress.     Appearance: She is not diaphoretic.   HENT:      Head: Normocephalic and atraumatic.   Eyes:      General: No scleral icterus.     Conjunctiva/sclera: Conjunctivae normal.   Cardiovascular:      Rate and Rhythm: Normal rate.      Heart sounds: Normal heart sounds.   Pulmonary:      Effort: Pulmonary effort is normal. No respiratory distress.      Breath sounds: Normal breath sounds. No stridor.   Abdominal:      General: There is distension.      Palpations: Abdomen is soft. There is no mass.      Tenderness: There is abdominal tenderness. There is no guarding or rebound.   Musculoskeletal:         General: No tenderness or deformity.      Cervical back: Neck supple.   Lymphadenopathy:      Cervical: No cervical adenopathy.   Skin:     General: Skin is warm and dry.      Findings: No rash.   Neurological:      Mental Status: She is alert and oriented to person, place, and time.      Gait: Gait normal.   Psychiatric:         Mood and Affect: Mood normal.         Behavior: Behavior normal.          Significant Labs:  CBC:   Recent Labs   Lab 09/05/24  1632 09/06/24  0430   WBC 12.42 12.38   HGB 11.7* 9.8*   HCT 35.8* 29.1*    280     BMP:   Recent Labs   Lab 09/06/24  0430   GLU 97      K 3.5   CL 99   CO2 29   BUN 16   CREATININE 1.0   CALCIUM 9.1     CMP:   Recent  "Labs   Lab 09/06/24  0430   GLU 97   CALCIUM 9.1   ALBUMIN 2.6*   PROT 5.9*      K 3.5   CO2 29   CL 99   BUN 16   CREATININE 1.0   ALKPHOS 55   ALT 11   AST 20   BILITOT 0.5     Coagulation: No results for input(s): "PT", "INR", "APTT" in the last 48 hours.    Significant Imaging:  Imaging results within the past 24 hours have been reviewed.  Assessment/Plan:     GI  * Constipation  Vs colon mass  Concern for impaction    Recs  Aggressive bowel regimen  Will plan Flex sig for possible disimpaction today , evaluate sigmoid colon for lesinos  Npo  Miralax BID          Thank you for your consult. I will follow-up with patient. Please contact us if you have any additional questions.    Vadim Byrne MD  Gastroenterology  [facility] - Emergency Dept  "

## 2024-09-06 NOTE — ED NOTES
Patient is sleeping, conscious, coherent, oriented, responding to verbal commands, alert, breathing normal and not in distress.

## 2024-09-06 NOTE — ED NOTES
Patient is resting comfortably, alert, awake, conscious, coherent and oriented. Vitals stable. Connected to continuous cardiac monitor.

## 2024-09-06 NOTE — ED NOTES
Patient is conscious, coherent, oriented, alert, awake, responding to verbal commands. Vitals stable. Family at bedside.

## 2024-09-06 NOTE — H&P
[place] - Emergency Dept  [facility] Medicine  History & Physical    Patient Name: Enedelia García  MRN: [id number]  Patient Class: OP- Observation  Admission Date: 9/5/2024  Attending Physician: Emma Santos MD   Primary Care Provider: Shelton Mason MD         Patient information was obtained from patient and ER records.     Subjective:     Principal Problem:Constipation    Chief Complaint:   Chief Complaint   Patient presents with    Abdominal Pain     Patient reports to the ED complaining of dark stool, headache, body aches, and abdominal cramping that started 1 week ago. Patient also endorses subjective fever started yesterday. Patient appears mildly distressed in triage, Rx patch applied to forehead noted in triage.         HPI: Patient is a 79-year-old female with a history of OA, HTN, s/p TAVR presents to ED with lower abdominal pain.  Patient has saw her PCP 6 days we will after several days constipation which she was prescribed fiber and laxatives.  Patient has had continued constipation.  She did pass 2 hard black stools today which she also noticed bright red blood when wiping.  Patient reports earlier today she developed a 102 fever, has had continued severe lower abdominal pain. Labwork remarkable for hypokalemia, LFT normal. WBC normal, no left shift. CT a/p shows stercoral colitis, neoplasm unable to be excluded and large fecal matter proximally. Recommends colonoscopy for eval. She was given large volume enema without improvement. Pt will be admitted to hospital medicine. Will consult GI for eval      Past Medical History:   Diagnosis Date    Aortic atherosclerosis 6/14/2023    CKD (chronic kidney disease) stage 4, GFR 15-29 ml/min     Coronary artery disease     Edema     Epilepsy     Generalized anxiety disorder 12/20/2021    Hematuria, unspecified     Hematuria, unspecified     Hyperlipidemia     Hypertension     Iron deficiency anemia     Moderate episode of recurrent major depressive  disorder     Nonrheumatic aortic valve stenosis 12/20/2021    Normocytic anemia     Prediabetes 2/24/2022       Past Surgical History:   Procedure Laterality Date    ANGIOGRAM, CORONARY, WITH LEFT HEART CATHETERIZATION N/A 6/12/2024    Procedure: Angiogram, Coronary, with Left Heart Cath;  Surgeon: José Carreon MD;  Location: [facility] CATH LAB/EP;  Service: Cardiology;  Laterality: N/A;    APPENDECTOMY      BACK SURGERY      CARDIAC CATH COSURGEON  7/2/2024    Procedure: Cardiac Cath Cosurgeon;  Surgeon: Santosh Colon MD;  Location: [facility] CATH LAB;  Service: Cardiology;;    CORONARY STENT PLACEMENT      HYSTERECTOMY      PERCUTANEOUS TRANSCATHETER AORTIC VALVE REPLACEMENT (TAVR)  7/2/2024    Procedure: REPLACEMENT, AORTIC VALVE, PERCUTANEOUS, TRANSCATHETER;  Surgeon: Santosh Colon MD;  Location: [facility] CATH LAB;  Service: Cardiology;;    REVISION OF KNEE ARTHROPLASTY Left 7/18/2022    Procedure: REVISION, ARTHROPLASTY, KNEE;  Surgeon: Stan Catalan MD;  Location: [facility] OR;  Service: Orthopedics;  Laterality: Left;    TONSILLECTOMY      TRANSCATHETER AORTIC VALVE REPLACEMENT (TAVR) N/A 7/2/2024    Procedure: REPLACEMENT, AORTIC VALVE, TRANSCATHETER (TAVR);  Surgeon: José Lee MD;  Location: [facility] CATH LAB;  Service: Cardiology;  Laterality: N/A;       Review of patient's allergies indicates:   Allergen Reactions    Iodinated contrast media Anaphylaxis and Other (See Comments)    Nsaids (non-steroidal anti-inflammatory drug)      ADWOA    Phenergan [promethazine]      Vomiting       No current facility-administered medications on file prior to encounter.     Current Outpatient Medications on File Prior to Encounter   Medication Sig    aspirin (ECOTRIN) 81 MG EC tablet Take 81 mg by mouth once daily.    DULoxetine (CYMBALTA) 30 MG capsule Take 1 capsule (30 mg total) by mouth once daily.    furosemide (LASIX) 40 MG tablet Take 2 tablets (80 mg total) by mouth 2 (two) times daily as needed  (swelling).    hydrALAZINE (APRESOLINE) 25 MG tablet Take 1 tablet (25 mg total) by mouth every 8 (eight) hours.    hydrOXYzine HCL (ATARAX) 25 MG tablet TAKE ONE TABLET BY MOUTH EVERY 6 HOURS AS NEEDED FOR ITCHING    omeprazole (PRILOSEC) 20 MG capsule Take 1 capsule (20 mg total) by mouth daily as needed.    oxyCODONE-acetaminophen (PERCOCET)  mg per tablet Take 1 tablet by mouth 4 (four) times daily as needed.    rosuvastatin (CRESTOR) 40 MG Tab TAKE ONE TABLET BY MOUTH DAILY    BOTOX 100 unit SolR Inject 100 Units into the skin once. (Patient not taking: Reported on 7/8/2024)    butalbital-acetaminophen-caffeine -40 mg (FIORICET, ESGIC) -40 mg per tablet Take 1 tablet by mouth every 8 (eight) hours as needed for Headaches.    calcium carbonate/vitamin D3 (VITAMIN D-3 ORAL) Take 1 tablet by mouth once daily.    cimetidine (TAGAMET) 300 MG tablet Take 1 tablet (300 mg total) by mouth 3 (three) times daily. Take first dose 6pm prior to the procedure, followed by the second dose at 11pm and the third dose at 6am the morning of the procedure for 3 doses    citalopram (CELEXA) 10 MG tablet Take 10 mg by mouth once daily.    clotrimazole-betamethasone 1-0.05% (LOTRISONE) cream Apply topically 2 (two) times daily.    famotidine (PEPCID) 20 MG tablet Take 2 tablets (40 mg total) by mouth 3 (three) times daily as needed (take first dose 6 pm day before procedure, seconond dose 11 pm night prior to procedure and third dose 6 am day of procedure).    FEROSUL 325 mg (65 mg iron) Tab tablet Take 325 mg by mouth once daily.    fluocinolone and shower cap 0.01 % Oil Apply 0.01 % topically every evening.    irbesartan (AVAPRO) 300 MG tablet TAKE ONE TABLET BY MOUTH EVERY EVENING    isosorbide mononitrate (IMDUR) 60 MG 24 hr tablet Take 1 tablet (60 mg total) by mouth 2 (two) times a day. (Patient not taking: Reported on 8/30/2024)    LINZESS 72 mcg Cap capsule Take 72 mcg by mouth before breakfast. (Patient not  taking: Reported on 8/12/2024)    multivit-min-iron-FA-lutein (CENTRUM SILVER WOMEN) 8 mg iron-400 mcg-300 mcg Tab Take 1 tablet by mouth once daily.    omega-3 fatty acids/fish oil (FISH OIL-OMEGA-3 FATTY ACIDS) 300-1,000 mg capsule Take 2 capsules by mouth once daily.    tiZANidine (ZANAFLEX) 4 MG tablet Take 1 tablet (4 mg total) by mouth every 8 (eight) hours as needed (spasms).    [DISCONTINUED] mupirocin (BACTROBAN) 2 % ointment Apply topically 2 (two) times daily. (Patient not taking: Reported on 8/12/2024)    [DISCONTINUED] nitroGLYCERIN (NITROSTAT) 0.3 MG SL tablet PLACE 1 TABLET UNDER THE TONGUE EVERY 5 MINUTES FOR UP TO 3 DOSES IF NEEDED FOR CHEST PAIN. CALL 911 IF PAIN PERSISTS (Patient not taking: Reported on 8/12/2024)    [DISCONTINUED] permethrin (ELIMITE) 5 % cream APPLY TOPICALLY ONCE FOR ONE DOSE (Patient not taking: Reported on 8/12/2024)    [DISCONTINUED] valACYclovir (VALTREX) 1000 MG tablet Take 2,000 mg by mouth 2 (two) times daily as needed. (Patient not taking: Reported on 8/12/2024)     Family History       Problem Relation (Age of Onset)    Heart disease Mother, Father          Tobacco Use    Smoking status: Never    Smokeless tobacco: Never   Substance and Sexual Activity    Alcohol use: No    Drug use: No    Sexual activity: Not Currently     Review of Systems   Constitutional:  Positive for fever.   Gastrointestinal:  Positive for abdominal pain, blood in stool and constipation.   All other systems reviewed and are negative.    Objective:     Vital Signs (Most Recent):  Temp: 98.2 °F (36.8 °C) (09/06/24 0306)  Pulse: 78 (09/06/24 0306)  Resp: 20 (09/06/24 0306)  BP: (!) 154/69 (09/06/24 0306)  SpO2: 96 % (09/06/24 0150) Vital Signs (24h Range):  Temp:  [98.2 °F (36.8 °C)-100.2 °F (37.9 °C)] 98.2 °F (36.8 °C)  Pulse:  [78-94] 78  Resp:  [15-20] 20  SpO2:  [95 %-100 %] 96 %  BP: (127-171)/(60-70) 154/69     Weight: 63.5 kg (140 lb)  Body mass index is 24.03 kg/m².     Physical  Exam  Vitals reviewed.   Constitutional:       Appearance: Normal appearance. She is ill-appearing.   HENT:      Head: Normocephalic.      Nose: Nose normal.      Mouth/Throat:      Pharynx: Oropharynx is clear.   Eyes:      Pupils: Pupils are equal, round, and reactive to light.   Cardiovascular:      Rate and Rhythm: Normal rate and regular rhythm.      Pulses: Normal pulses.      Heart sounds: Normal heart sounds.   Pulmonary:      Effort: Pulmonary effort is normal.      Breath sounds: Normal breath sounds.   Abdominal:      General: Bowel sounds are normal.      Palpations: Abdomen is soft.   Musculoskeletal:         General: Normal range of motion.      Cervical back: Normal range of motion.   Skin:     General: Skin is warm and dry.      Capillary Refill: Capillary refill takes less than 2 seconds.   Neurological:      General: No focal deficit present.      Mental Status: She is alert and oriented to person, place, and time. Mental status is at baseline.   Psychiatric:         Mood and Affect: Mood normal.         Behavior: Behavior normal.              CRANIAL NERVES     CN III, IV, VI   Pupils are equal, round, and reactive to light.       Significant Labs: All pertinent labs within the past 24 hours have been reviewed.  Recent Lab Results         09/05/24  1759   09/05/24  1742   09/05/24  1633   09/05/24  1632        Albumin       3.2       ALP       68       ALT       13       Anion Gap       13       Appearance, UA Clear             AST       24       Bacteria, UA Rare             Baso #       0.05       Basophil %       0.4       Bilirubin (UA) Negative             BILIRUBIN TOTAL       0.3  Comment: For infants and newborns, interpretation of results should be based  on gestational age, weight and in agreement with clinical  observations.    Premature Infant recommended reference ranges:  Up to 24 hours.............<8.0 mg/dL  Up to 48 hours............<12.0 mg/dL  3-5 days..................<15.0  mg/dL  6-29 days.................<15.0 mg/dL         BUN       16       Calcium       9.6       Chloride       97       CO2       28       Color, UA Yellow             Creatinine       1.2       Differential Method       Automated       eGFR       46       Eos #       0.3       Eos %       2.1       Glucose       103       Glucose, UA Negative             Gran # (ANC)       8.9       Gran %       71.7       Hematocrit       35.8       Hemoglobin       11.7       Hyaline Casts, UA 2             Immature Grans (Abs)       0.04  Comment: Mild elevation in immature granulocytes is non specific and   can be seen in a variety of conditions including stress response,   acute inflammation, trauma and pregnancy. Correlation with other   laboratory and clinical findings is essential.         Immature Granulocytes       0.3       Ketones, UA Negative             Lactic Acid Level   0.8  Comment: Falsely low lactic acid results can be found in samples   containing >=13.0 mg/dL total bilirubin and/or >=3.5 mg/dL   direct bilirubin.             Leukocyte Esterase, UA Trace             Lipase       26       Lymph #       1.5       Lymph %       12.1       MCH       30.9       MCHC       32.7       MCV       95       Microscopic Comment SEE COMMENT  Comment: Other formed elements not mentioned in the report are not   present in the microscopic examination.                Mono #       1.7       Mono %       13.4       MPV       9.4       NITRITE UA Negative             nRBC       0       Blood, UA Negative             pH, UA 7.0             Platelet Count       381       Potassium       3.1       PROTEIN TOTAL       7.2       Protein, UA Negative  Comment: Recommend a 24 hour urine protein or a urine   protein/creatinine ratio if globulin induced proteinuria is  clinically suspected.               RBC       3.79       RBC, UA 0             RDW       11.7       SARS-CoV-2 RNA, Amplification, Qual     Negative  Comment: This test  utilizes isothermal nucleic acid amplification technology   to   detect the SARS-CoV-2 RdRp nucleic acid segment. The analytical   sensitivity   (limit of detection) is 500 copies/swab.    A POSITIVE result is indicative of the presence of SARS-CoV-2 RNA;   clinical   correlation with patient history and other diagnostic information is   necessary to determine patient infection status.    A NEGATIVE result means that SARS-CoV-2 nucleic acids are not present   above   the limit of detection. A NEGATIVE result should be treated as   presumptive.   It does not rule out the possibility of COVID-19 and should not be   the sole   basis for treatment decisions.    If COVID-19 is strongly suspected based on clinical and exposure   history,   re-testing using an alternate molecular assay should be considered.    This test is Food and Drug Administration (FDA) approved. Performance   characteristics of this has been independently verified by [facility] Department of Pathology and Laboratory Medicine.           Sodium       138       Spec Grav UA 1.010             Specimen UA Urine, Clean Catch             Squam Epithel, UA 2             UROBILINOGEN UA Negative             WBC, UA 2             WBC       12.42               Significant Imaging: I have reviewed all pertinent imaging results/findings within the past 24 hours.  I have reviewed and interpreted all pertinent imaging results/findings within the past 24 hours.  Assessment/Plan:     * Constipation  Patient with 10 days of constipation, started fiber and laxatives 6 days ago  Severy lower abd pain  T max 102 at home. WBC normal, no left shift  CT a/p shows stercoral colitis vs colonic neoplasm.  GI consulted for colonoscopy  Keep NPO, low dose IVF 100cc/h x5h total LR       Hypokalemia  Patient's most recent potassium results are listed below.   Recent Labs     09/05/24  1632   K 3.1*     Plan  - Replete potassium per protocol  - Monitor potassium  Daily  - Patient's hypokalemia is stable  - Recheck this AM     S/P TAVR (transcatheter aortic valve replacement)  S/P TAVR 06/2024  Stable  -did not resume home PO lasix on admission..consider resumption if warranted vs resumption on DC        Primary hypertension  Chronic, controlled. Latest blood pressure and vitals reviewed-     Temp:  [98.1 °F (36.7 °C)-100.2 °F (37.9 °C)]   Pulse:  [64-94]   Resp:  [13-20]   BP: ()/(54-70)   SpO2:  [95 %-100 %] .   Home meds for hypertension were reviewed and noted below.   Hypertension Medications               furosemide (LASIX) 40 MG tablet Take 2 tablets (80 mg total) by mouth 2 (two) times daily as needed (swelling).    hydrALAZINE (APRESOLINE) 25 MG tablet Take 1 tablet (25 mg total) by mouth every 8 (eight) hours.    irbesartan (AVAPRO) 300 MG tablet TAKE ONE TABLET BY MOUTH EVERY EVENING    isosorbide mononitrate (IMDUR) 60 MG 24 hr tablet Take 1 tablet (60 mg total) by mouth 2 (two) times a day.            While in the hospital, will manage blood pressure as follows; Continue home antihypertensive regimen-partial resumption    Will utilize p.r.n. blood pressure medication only if patient's blood pressure greater than 180/110 and she develops symptoms such as worsening chest pain or shortness of breath.    Chronic pain  Take percocet at home      Hyperlipidemia  Continue statin        VTE Risk Mitigation (From admission, onward)           Ordered     Reason for No Pharmacological VTE Prophylaxis  Once        Question:  Reasons:  Answer:  Risk of Bleeding    09/06/24 0201     IP VTE HIGH RISK PATIENT  Once         09/06/24 0201     Place sequential compression device  Until discontinued         09/06/24 0201                         On 09/06/2024, patient should be placed in hospital observation services under my care in collaboration with Dr Santos.           Trev Mills NP  Department of Hospital Medicine  [facility] Emergency Dept

## 2024-09-06 NOTE — ASSESSMENT & PLAN NOTE
Vs colon mass  Concern for impaction    Recs  Aggressive bowel regimen  Will plan Flex sig for possible disimpaction today , evaluate sigmoid colon for lesinos  Npo  Miralax BID

## 2024-09-06 NOTE — SUBJECTIVE & OBJECTIVE
Past Medical History:   Diagnosis Date    Aortic atherosclerosis 6/14/2023    CKD (chronic kidney disease) stage 4, GFR 15-29 ml/min     Coronary artery disease     Edema     Epilepsy     Generalized anxiety disorder 12/20/2021    Hematuria, unspecified     Hematuria, unspecified     Hyperlipidemia     Hypertension     Iron deficiency anemia     Moderate episode of recurrent major depressive disorder     Nonrheumatic aortic valve stenosis 12/20/2021    Normocytic anemia     Prediabetes 2/24/2022       Past Surgical History:   Procedure Laterality Date    ANGIOGRAM, CORONARY, WITH LEFT HEART CATHETERIZATION N/A 6/12/2024    Procedure: Angiogram, Coronary, with Left Heart Cath;  Surgeon: José Carreon MD;  Location: [facility] CATH LAB/EP;  Service: Cardiology;  Laterality: N/A;    APPENDECTOMY      BACK SURGERY      CARDIAC CATH COSURGEON  7/2/2024    Procedure: Cardiac Cath Cosurgeon;  Surgeon: Santosh Colon MD;  Location: [facility] CATH LAB;  Service: Cardiology;;    CORONARY STENT PLACEMENT      HYSTERECTOMY      PERCUTANEOUS TRANSCATHETER AORTIC VALVE REPLACEMENT (TAVR)  7/2/2024    Procedure: REPLACEMENT, AORTIC VALVE, PERCUTANEOUS, TRANSCATHETER;  Surgeon: Santosh Colon MD;  Location: [facility] CATH LAB;  Service: Cardiology;;    REVISION OF KNEE ARTHROPLASTY Left 7/18/2022    Procedure: REVISION, ARTHROPLASTY, KNEE;  Surgeon: Stan Catalan MD;  Location: [facility] OR;  Service: Orthopedics;  Laterality: Left;    TONSILLECTOMY      TRANSCATHETER AORTIC VALVE REPLACEMENT (TAVR) N/A 7/2/2024    Procedure: REPLACEMENT, AORTIC VALVE, TRANSCATHETER (TAVR);  Surgeon: José Lee MD;  Location: [facility] CATH LAB;  Service: Cardiology;  Laterality: N/A;       Review of patient's allergies indicates:   Allergen Reactions    Iodinated contrast media Anaphylaxis and Other (See Comments)    Nsaids (non-steroidal anti-inflammatory drug)      ADWOA    Phenergan [promethazine]      Vomiting     Family History       Problem  Relation (Age of Onset)    Heart disease Mother, Father          Tobacco Use    Smoking status: Never    Smokeless tobacco: Never   Substance and Sexual Activity    Alcohol use: No    Drug use: No    Sexual activity: Not Currently     Review of Systems   Constitutional:  Positive for fatigue and fever. Negative for chills.   HENT:  Negative for mouth sores and nosebleeds.    Eyes:  Negative for pain and redness.   Respiratory:  Negative for cough and shortness of breath.    Cardiovascular:  Negative for chest pain and palpitations.   Gastrointestinal:  Positive for abdominal pain and constipation.   Genitourinary:  Negative for dysuria and hematuria.   Musculoskeletal:  Negative for arthralgias and joint swelling.   Neurological:  Negative for seizures and facial asymmetry.   Psychiatric/Behavioral:  Negative for agitation and confusion.      Objective:     Vital Signs (Most Recent):  Temp: 98.2 °F (36.8 °C) (09/06/24 1128)  Pulse: 65 (09/06/24 1158)  Resp: 20 (09/06/24 1158)  BP: (!) 123/56 (09/06/24 1158)  SpO2: 96 % (09/06/24 1158) Vital Signs (24h Range):  Temp:  [97.9 °F (36.6 °C)-100.2 °F (37.9 °C)] 98.2 °F (36.8 °C)  Pulse:  [58-94] 65  Resp:  [13-23] 20  SpO2:  [94 %-100 %] 96 %  BP: ()/(52-70) 123/56     Weight: 63.5 kg (140 lb) (09/05/24 1940)  Body mass index is 24.03 kg/m².      Intake/Output Summary (Last 24 hours) at 9/6/2024 1503  Last data filed at 9/5/2024 2310  Gross per 24 hour   Intake 300 ml   Output --   Net 300 ml       Lines/Drains/Airways       Peripheral Intravenous Line  Duration                  Peripheral IV - Single Lumen 09/05/24 1632 20 G 1 in Left Antecubital <1 day                     Physical Exam  Vitals reviewed.   Constitutional:       General: She is not in acute distress.     Appearance: She is not diaphoretic.   HENT:      Head: Normocephalic and atraumatic.   Eyes:      General: No scleral icterus.     Conjunctiva/sclera: Conjunctivae normal.   Cardiovascular:       "Rate and Rhythm: Normal rate.      Heart sounds: Normal heart sounds.   Pulmonary:      Effort: Pulmonary effort is normal. No respiratory distress.      Breath sounds: Normal breath sounds. No stridor.   Abdominal:      General: There is distension.      Palpations: Abdomen is soft. There is no mass.      Tenderness: There is abdominal tenderness. There is no guarding or rebound.   Musculoskeletal:         General: No tenderness or deformity.      Cervical back: Neck supple.   Lymphadenopathy:      Cervical: No cervical adenopathy.   Skin:     General: Skin is warm and dry.      Findings: No rash.   Neurological:      Mental Status: She is alert and oriented to person, place, and time.      Gait: Gait normal.   Psychiatric:         Mood and Affect: Mood normal.         Behavior: Behavior normal.          Significant Labs:  CBC:   Recent Labs   Lab 09/05/24  1632 09/06/24  0430   WBC 12.42 12.38   HGB 11.7* 9.8*   HCT 35.8* 29.1*    280     BMP:   Recent Labs   Lab 09/06/24  0430   GLU 97      K 3.5   CL 99   CO2 29   BUN 16   CREATININE 1.0   CALCIUM 9.1     CMP:   Recent Labs   Lab 09/06/24  0430   GLU 97   CALCIUM 9.1   ALBUMIN 2.6*   PROT 5.9*      K 3.5   CO2 29   CL 99   BUN 16   CREATININE 1.0   ALKPHOS 55   ALT 11   AST 20   BILITOT 0.5     Coagulation: No results for input(s): "PT", "INR", "APTT" in the last 48 hours.    Significant Imaging:  Imaging results within the past 24 hours have been reviewed.  "

## 2024-09-06 NOTE — ED NOTES
Patient is resting comfortably on bed, patient is alert, awake, conscious, coherent and oriented. Connected to continuous cardiac monitor, vitals stable.

## 2024-09-06 NOTE — ED NOTES
Report received from VI Tubbs    Patient denies pain at this time and does not need anything at this time.

## 2024-09-06 NOTE — H&P
History & Physical -   Gastroenterology      SUBJECTIVE:     Procedure: Flexible Sigmoidoscopy    Chief Complaint/Indication for Procedure: constipation     History of Present Illness:  Patient is a 79 y.o. female presents with constipation, rectal bleed and abnormal CT scan.     CT scan on 9/5/2024    1. Wall thickening of the sigmoid colon with a large volume of stool proximally.  Findings are compatible with stercoral colitis versus colonic neoplasm.  No pneumatosis.  Recommend further evaluation with colonoscopy if not recently performed to exclude neoplasm.  2. Reactive wall thickening of the urinary bladder.      Review of patient's allergies indicates:   Allergen Reactions    Iodinated contrast media Anaphylaxis and Other (See Comments)    Nsaids (non-steroidal anti-inflammatory drug)      ADWOA    Phenergan [promethazine]      Vomiting        Past Medical History:   Diagnosis Date    Aortic atherosclerosis 6/14/2023    CKD (chronic kidney disease) stage 4, GFR 15-29 ml/min     Coronary artery disease     Edema     Epilepsy     Generalized anxiety disorder 12/20/2021    Hematuria, unspecified     Hematuria, unspecified     Hyperlipidemia     Hypertension     Iron deficiency anemia     Moderate episode of recurrent major depressive disorder     Nonrheumatic aortic valve stenosis 12/20/2021    Normocytic anemia     Prediabetes 2/24/2022     Past Surgical History:   Procedure Laterality Date    ANGIOGRAM, CORONARY, WITH LEFT HEART CATHETERIZATION N/A 6/12/2024    Procedure: Angiogram, Coronary, with Left Heart Cath;  Surgeon: José Careron MD;  Location: [facility] CATH LAB/EP;  Service: Cardiology;  Laterality: N/A;    APPENDECTOMY      BACK SURGERY      CARDIAC CATH COSURGEON  7/2/2024    Procedure: Cardiac Cath Cosurgeon;  Surgeon: Santosh Colon MD;  Location: [facility] CATH LAB;  Service: Cardiology;;    CORONARY STENT PLACEMENT      HYSTERECTOMY      PERCUTANEOUS TRANSCATHETER AORTIC VALVE REPLACEMENT  "(TAVR)  7/2/2024    Procedure: REPLACEMENT, AORTIC VALVE, PERCUTANEOUS, TRANSCATHETER;  Surgeon: Santosh Colon MD;  Location: [facility] CATH LAB;  Service: Cardiology;;    REVISION OF KNEE ARTHROPLASTY Left 7/18/2022    Procedure: REVISION, ARTHROPLASTY, KNEE;  Surgeon: Stan Catalan MD;  Location: [facility] OR;  Service: Orthopedics;  Laterality: Left;    TONSILLECTOMY      TRANSCATHETER AORTIC VALVE REPLACEMENT (TAVR) N/A 7/2/2024    Procedure: REPLACEMENT, AORTIC VALVE, TRANSCATHETER (TAVR);  Surgeon: José Lee MD;  Location: [facility] CATH LAB;  Service: Cardiology;  Laterality: N/A;     Family History   Problem Relation Name Age of Onset    Heart disease Mother      Heart disease Father       Social History     Tobacco Use    Smoking status: Never    Smokeless tobacco: Never   Substance Use Topics    Alcohol use: No    Drug use: No       Review of Systems:  Constitutional: no fever or chills  Respiratory: no cough or shortness of breath  Cardiovascular: no chest pain or palpitations    OBJECTIVE:     Vital Signs (Most Recent)  Temp: 97.9 °F (36.6 °C) (09/06/24 1101)  Pulse: 66 (09/06/24 1101)  Resp: 18 (09/06/24 1101)  BP: 136/65 (09/06/24 1101)  SpO2: (!) 94 % (09/06/24 1101)    Physical Exam:  General: well developed, well nourished  Lungs:  normal respiratory effort  Heart: regular rate, S1, S2 normal    Laboratory  CBC:   Recent Labs   Lab 09/06/24  0430   WBC 12.38   RBC 3.10*   HGB 9.8*   HCT 29.1*      MCV 94   MCH 31.6*   MCHC 33.7     CMP:   Recent Labs   Lab 09/06/24  0430   GLU 97   CALCIUM 9.1   ALBUMIN 2.6*   PROT 5.9*      K 3.5   CO2 29   CL 99   BUN 16   CREATININE 1.0   ALKPHOS 55   ALT 11   AST 20   BILITOT 0.5     Coagulation: No results for input(s): "LABPROT", "INR", "APTT" in the last 168 hours.    Diagnostic Results:      ASSESSMENT/PLAN:     Constipation  Rectal bleed  Abnormal CT scan    Plan: Flexible Sigmoidoscopy    Anesthesia Plan: MAC    ASA Grade: ASA 3 - Patient " with moderate systemic disease with functional limitations    The impression and plan was discussed in detail with the patient. All questions have been answered and the patient voices understanding of our plan at this point. The risk of the procedure was discussed in detail which includes but not limited to bleeding, infection, perforation in some cases requiring surgery with its spectrum of complications.

## 2024-09-09 ENCOUNTER — PATIENT OUTREACH (OUTPATIENT)
Dept: ADMINISTRATIVE | Facility: CLINIC | Age: 79
End: 2024-09-09
Payer: MEDICARE

## 2024-09-09 NOTE — PLAN OF CARE
[facility] - Emergency Dept  Discharge Final Note    Primary Care Provider: Shelton Mason MD    Expected Discharge Date: 9/6/2024    Final Discharge Note (most recent)       Final Note - 09/09/24 0749          Final Note    Assessment Type Final Discharge Note (P)      Anticipated Discharge Disposition Home or Self Care (P)      Hospital Resources/Appts/Education Provided Appointments scheduled and added to AVS (P)                      Contact Info       Margie Zamora MD   Specialty: Internal Medicine    [address]  [address]  [place] [address]   Phone: [phone]       Next Steps: Follow up on 9/13/2024    Instructions: at 1:30pm; hospital follow up appointment in the [facility]

## 2024-09-13 ENCOUNTER — OFFICE VISIT (OUTPATIENT)
Dept: PRIMARY CARE CLINIC | Facility: CLINIC | Age: 79
End: 2024-09-13
Payer: MEDICARE

## 2024-09-13 VITALS
BODY MASS INDEX: 24.62 KG/M2 | HEART RATE: 63 BPM | HEIGHT: 64 IN | SYSTOLIC BLOOD PRESSURE: 131 MMHG | DIASTOLIC BLOOD PRESSURE: 66 MMHG | OXYGEN SATURATION: 96 % | WEIGHT: 144.19 LBS

## 2024-09-13 DIAGNOSIS — K59.00 CONSTIPATION, UNSPECIFIED CONSTIPATION TYPE: Primary | ICD-10-CM

## 2024-09-13 PROCEDURE — 99999 PR PBB SHADOW E&M-EST. PATIENT-LVL V: CPT | Mod: PBBFAC,,, | Performed by: INTERNAL MEDICINE

## 2024-09-13 NOTE — PROGRESS NOTES
"[facility]   New Visit Progress Note   Recent Hospital Discharge     PRESENTING HISTORY     Chief Complaint/Reason for Admission:  Follow up Hospital Discharge   PCP: Shelton Mason MD    History of Present Illness:  Ms. Enedelia García is a 79 y.o. female who was recently admitted to the hospital.    __________________________________________________________________    Today:    [facility] up.  Hospitalized 9/5/24 - 9/6/24 for management of constipation with concern for impaction vs obstructing neoplasm   No discharge summary available for reference.  Admitted to [facility] service with GI consultation.  CT ABD concerning for sigmoid colon wall thickening and large volume stool proximally.   Aggressive bowel regimen initiated.  Underwent sigmoidoscopy 9/6/24-> preparation was poor, stool in rectum/rectosigmoid colon, and sigmoid colon. Congested, erythematous and eroded mucosa in rectum.   No obvious mass.  Will need GI follow up and likely outpatient colonoscopy.  Patient discharged to home.     Patient accompanied today by family.  Ambulatory with rolling walker.  Independent with ADL's.  Did not bring medication bottles for review.  Self discontinued LInzess many months ago.   Since hospital discharge- having frequent bowel movements, multiple times a day, described as "sludge", not formed.   ABD pain has resolved.   Tolerating oral intake.     Review of Systems  General ROS: negative for chills, fever or weight loss  Psychological ROS: negative for hallucination, depression or suicidal ideation  Ophthalmic ROS: negative for blurry vision, photophobia or eye pain  ENT ROS: negative for epistaxis, sore throat or rhinorrhea  Respiratory ROS: no cough, shortness of breath, or wheezing  Cardiovascular ROS: no chest pain or dyspnea on exertion  Gastrointestinal ROS: + frequent bowel movements, "sludge"   Genito-Urinary ROS: no dysuria, trouble " voiding, or hematuria  Musculoskeletal ROS: negative for gait disturbance or muscular weakness  Neurological ROS: no syncope or seizures; no ataxia  Dermatological ROS: negative for pruritis, rash and jaundice      PAST HISTORY:     Past Medical History:   Diagnosis Date    Aortic atherosclerosis 6/14/2023    CKD (chronic kidney disease) stage 4, GFR 15-29 ml/min     Coronary artery disease     Edema     Epilepsy     Generalized anxiety disorder 12/20/2021    Hematuria, unspecified     Hematuria, unspecified     Hyperlipidemia     Hypertension     Iron deficiency anemia     Moderate episode of recurrent major depressive disorder     Nonrheumatic aortic valve stenosis 12/20/2021    Normocytic anemia     Prediabetes 2/24/2022       Past Surgical History:   Procedure Laterality Date    ANGIOGRAM, CORONARY, WITH LEFT HEART CATHETERIZATION N/A 6/12/2024    Procedure: Angiogram, Coronary, with Left Heart Cath;  Surgeon: José Carreon MD;  Location: [facility] CATH LAB/EP;  Service: Cardiology;  Laterality: N/A;    APPENDECTOMY      BACK SURGERY      CARDIAC CATH COSURGEON  7/2/2024    Procedure: Cardiac Cath Cosurgeon;  Surgeon: Santosh Colon MD;  Location: [facility] CATH LAB;  Service: Cardiology;;    CORONARY STENT PLACEMENT      FLEXIBLE SIGMOIDOSCOPY N/A 9/6/2024    Procedure: SIGMOIDOSCOPY, FLEXIBLE;  Surgeon: Pilo Calles MD;  Location: [facility] ENDO;  Service: Endoscopy;  Laterality: N/A;    HYSTERECTOMY      PERCUTANEOUS TRANSCATHETER AORTIC VALVE REPLACEMENT (TAVR)  7/2/2024    Procedure: REPLACEMENT, AORTIC VALVE, PERCUTANEOUS, TRANSCATHETER;  Surgeon: Santosh Colon MD;  Location: [facility] CATH LAB;  Service: Cardiology;;    REVISION OF KNEE ARTHROPLASTY Left 7/18/2022    Procedure: REVISION, ARTHROPLASTY, KNEE;  Surgeon: Stan Catalan MD;  Location: [facility] OR;  Service: Orthopedics;  Laterality: Left;    TONSILLECTOMY      TRANSCATHETER AORTIC VALVE REPLACEMENT (TAVR) N/A 7/2/2024    Procedure:  REPLACEMENT, AORTIC VALVE, TRANSCATHETER (TAVR);  Surgeon: José Lee MD;  Location: [facility] CATH LAB;  Service: Cardiology;  Laterality: N/A;       Family History   Problem Relation Name Age of Onset    Heart disease Mother      Heart disease Father           MEDICATIONS & ALLERGIES:     Current Outpatient Medications on File Prior to Visit   Medication Sig Dispense Refill    aspirin (ECOTRIN) 81 MG EC tablet Take 81 mg by mouth once daily.      butalbital-acetaminophen-caffeine -40 mg (FIORICET, ESGIC) -40 mg per tablet Take 1 tablet by mouth every 8 (eight) hours as needed for Headaches.      calcium carbonate/vitamin D3 (VITAMIN D-3 ORAL) Take 1 tablet by mouth once daily.      cimetidine (TAGAMET) 300 MG tablet Take 1 tablet (300 mg total) by mouth 3 (three) times daily. Take first dose 6pm prior to the procedure, followed by the second dose at 11pm and the third dose at 6am the morning of the procedure for 3 doses 3 tablet 0    citalopram (CELEXA) 10 MG tablet Take 10 mg by mouth once daily.      clobetasoL (TEMOVATE) 0.05 % external solution Apply topically every morning.      DULoxetine (CYMBALTA) 30 MG capsule Take 1 capsule (30 mg total) by mouth once daily. 30 capsule 3    EMGALITY  mg/mL PnIj Inject into the skin every 30 days.      famotidine (PEPCID) 20 MG tablet Take 2 tablets (40 mg total) by mouth 3 (three) times daily as needed (take first dose 6 pm day before procedure, seconond dose 11 pm night prior to procedure and third dose 6 am day of procedure). 3 tablet 0    FEROSUL 325 mg (65 mg iron) Tab tablet Take 325 mg by mouth once daily.      fluocinolone and shower cap 0.01 % Oil Apply 0.01 % topically every evening.      furosemide (LASIX) 40 MG tablet Take 2 tablets (80 mg total) by mouth 2 (two) times daily as needed (swelling). 360 tablet 3    gabapentin (NEURONTIN) 600 MG tablet Take 600 mg by mouth 2 (two) times daily.      hydrALAZINE (APRESOLINE) 25 MG tablet Take  "1 tablet (25 mg total) by mouth every 8 (eight) hours. 90 tablet 2    hydrOXYzine HCL (ATARAX) 25 MG tablet TAKE ONE TABLET BY MOUTH EVERY 6 HOURS AS NEEDED FOR ITCHING 30 tablet 2    irbesartan (AVAPRO) 300 MG tablet TAKE ONE TABLET BY MOUTH EVERY EVENING 90 tablet 3    lactulose (CHRONULAC) 20 gram/30 mL Soln Take 30 mLs (20 g total) by mouth daily as needed (constipation). 500 mL 1    LINZESS 72 mcg Cap capsule Take 72 mcg by mouth before breakfast.      multivit-min-iron-FA-lutein (CENTRUM SILVER WOMEN) 8 mg iron-400 mcg-300 mcg Tab Take 1 tablet by mouth once daily.      NURTEC 75 mg odt Take 75 mg by mouth every 48 hours as needed.      omega-3 fatty acids/fish oil (FISH OIL-OMEGA-3 FATTY ACIDS) 300-1,000 mg capsule Take 2 capsules by mouth once daily.      omeprazole (PRILOSEC) 20 MG capsule Take 1 capsule (20 mg total) by mouth daily as needed. 90 capsule 2    oxyCODONE-acetaminophen (PERCOCET)  mg per tablet Take 1 tablet by mouth 4 (four) times daily as needed.      rosuvastatin (CRESTOR) 40 MG Tab TAKE ONE TABLET BY MOUTH DAILY 90 tablet 3    tiZANidine (ZANAFLEX) 4 MG tablet Take 1 tablet (4 mg total) by mouth every 8 (eight) hours as needed (spasms). 60 tablet 2     No current facility-administered medications on file prior to visit.        Review of patient's allergies indicates:   Allergen Reactions    Iodinated contrast media Anaphylaxis and Other (See Comments)    Nsaids (non-steroidal anti-inflammatory drug)      ADWOA    Phenergan [promethazine]      Vomiting       OBJECTIVE:     Vital Signs:  /66 (BP Location: Left arm, Patient Position: Sitting, BP Method: Large (Automatic))   Pulse 63   Ht 5' 4" (1.626 m)   Wt 65.4 kg (144 lb 2.9 oz)   SpO2 96%   BMI 24.75 kg/m²   Wt Readings from Last 3 Encounters:   09/05/24 1940 63.5 kg (140 lb)   09/05/24 1622 61.2 kg (135 lb)   08/30/24 1320 66.5 kg (146 lb 9.7 oz)   08/27/24 1053 67.9 kg (149 lb 11.1 oz)     Body mass index is 24.75 kg/m². " "       Physical Exam:  /66 (BP Location: Left arm, Patient Position: Sitting, BP Method: Large (Automatic))   Pulse 63   Ht 5' 4" (1.626 m)   Wt 65.4 kg (144 lb 2.9 oz)   SpO2 96%   BMI 24.75 kg/m²   General appearance: alert, cooperative, no distress  Constitutional:Oriented to person, place, and time  + appears well-developed and well-nourished.   HEENT: Normocephalic, atraumatic, neck symmetrical, no nasal discharge   Eyes: conjunctivae/corneas clear, PERRL, EOM's intact  Lungs: clear to auscultation bilaterally, no dullness to percussion bilaterally  Heart: regular rate and rhythm without rub; no displacement of the PMI   Abdomen: soft, non-tender; bowel sounds normoactive; no organomegaly  Extremities: extremities symmetric; no clubbing, cyanosis, or edema  Integument: Skin color, texture, turgor normal; no rashes; hair distrubution normal  Neurologic: Alert and oriented X 3, normal strength, normal coordination and gait  Psychiatric: no pressured speech; normal affect; no evidence of impaired cognition     Laboratory  Lab Results   Component Value Date    WBC 12.38 09/06/2024    HGB 9.8 (L) 09/06/2024    HCT 29.1 (L) 09/06/2024    MCV 94 09/06/2024     09/06/2024     BMP  Lab Results   Component Value Date     09/06/2024    K 3.5 09/06/2024    CL 99 09/06/2024    CO2 29 09/06/2024    BUN 16 09/06/2024    CREATININE 1.0 09/06/2024    CALCIUM 9.1 09/06/2024    ANIONGAP 10 09/06/2024    EGFRNORACEVR 57 (A) 09/06/2024     Lab Results   Component Value Date    ALT 11 09/06/2024    AST 20 09/06/2024    ALKPHOS 55 09/06/2024    BILITOT 0.5 09/06/2024     Lab Results   Component Value Date    INR 1.0 06/29/2022    INR 1.0 03/10/2022    INR 1.0 11/18/2013     Lab Results   Component Value Date    HGBA1C 5.3 09/03/2024       Diagnostic Results:    CT ABD Pelvis 9/5/24:  1. Wall thickening of the sigmoid colon with a large volume of stool proximally.  Findings are compatible with stercoral colitis " versus colonic neoplasm.  No pneumatosis.  Recommend further evaluation with colonoscopy if not recently performed to exclude neoplasm.  2. Reactive wall thickening of the urinary bladder.    Sigmoidoscopy 9/6/24:  Impression:            - Preparation of the colon was poor.                          - Stool in the rectum, in the recto-sigmoid colon                          and in the sigmoid colon.                          - Congested, erythematous and eroded mucosa in the                          rectum.                          - No specimens collected.   Recommendation:        - Return patient to hospital [facility] for ongoing care.                          - Perform a colonoscopy at appointment to be                          scheduled.                          - Resume previous diet.                          - Aggressive bowel regimen                          - Further recommendation as per referring GI.     ASSESSMENT & PLAN:       Constipation, unspecified constipation type  - recent hospitalization as above  - moving bowels presently but likely with incomplete evacuation  - on chronic Percocet ~ 4 times daily, likely a significant contributing factor although patient struggled with constipation at baseline prior to Percocet therapy  - prescribed Linzess but self discontinued- I have advised her to restart  - has lactulose for prn use  - increase fiber and water intake  - see GI 9/26/24- will need to consider outpatient colonoscopy   -     linaCLOtide (LINZESS) 72 mcg Cap capsule; Take 1 capsule (72 mcg total) by mouth before breakfast.  Dispense: 30 capsule; Refill: 6    Patient will be released from hospital follow up clinic.  Follow up as arranged below.     Instructions for the patient:      Scheduled Follow-up :  Future Appointments   Date Time Provider Department Center   9/26/2024  1:40 PM Bharati Crawford NP [facility] GASTRO [facility]   1/7/2025 10:45 AM Patricia Rogers MD [facility]  [facility]        Post Visit Medication List:     Medication List            Accurate as of September 13, 2024  3:49 PM. If you have any questions, ask your nurse or doctor.                CONTINUE taking these medications      aspirin 81 MG EC tablet  Commonly known as: ECOTRIN     butalbital-acetaminophen-caffeine -40 mg -40 mg per tablet  Commonly known as: FIORICET, ESGIC     CENTRUM SILVER WOMEN 8 mg iron-400 mcg-50 mcg Tab  Generic drug: multivit-min-iron-FA-vit K-lut     cimetidine 300 MG tablet  Commonly known as: TAGAMET  Take 1 tablet (300 mg total) by mouth 3 (three) times daily. Take first dose 6pm prior to the procedure, followed by the second dose at 11pm and the third dose at 6am the morning of the procedure for 3 doses     citalopram 10 MG tablet  Commonly known as: CeleXA     clobetasoL 0.05 % external solution  Commonly known as: TEMOVATE     DULoxetine 30 MG capsule  Commonly known as: CYMBALTA  Take 1 capsule (30 mg total) by mouth once daily.     EMGALITY  mg/mL Pnij  Generic drug: galcanezumab-gnlm     famotidine 20 MG tablet  Commonly known as: PEPCID  Take 2 tablets (40 mg total) by mouth 3 (three) times daily as needed (take first dose 6 pm day before procedure, seconond dose 11 pm night prior to procedure and third dose 6 am day of procedure).     FeroSuL 325 mg (65 mg iron) Tab tablet  Generic drug: ferrous sulfate     fish oil-omega-3 fatty acids 300-1,000 mg capsule     fluocinolone and shower cap 0.01 % Oil     furosemide 40 MG tablet  Commonly known as: LASIX  Take 2 tablets (80 mg total) by mouth 2 (two) times daily as needed (swelling).     gabapentin 600 MG tablet  Commonly known as: NEURONTIN     hydrALAZINE 25 MG tablet  Commonly known as: APRESOLINE  Take 1 tablet (25 mg total) by mouth every 8 (eight) hours.     hydrOXYzine HCL 25 MG tablet  Commonly known as: ATARAX  TAKE ONE TABLET BY MOUTH EVERY 6 HOURS AS NEEDED FOR ITCHING     irbesartan 300 MG tablet  Commonly known  as: AVAPRO  TAKE ONE TABLET BY MOUTH EVERY EVENING     lactulose 20 gram/30 mL Soln  Commonly known as: CHRONULAC  Take 30 mLs (20 g total) by mouth daily as needed (constipation).     linaCLOtide 72 mcg Cap capsule  Commonly known as: LINZESS  Take 1 capsule (72 mcg total) by mouth before breakfast.     NURTEC 75 mg odt  Generic drug: rimegepant     omeprazole 20 MG capsule  Commonly known as: PRILOSEC  Take 1 capsule (20 mg total) by mouth daily as needed.     oxyCODONE-acetaminophen  mg per tablet  Commonly known as: PERCOCET     rosuvastatin 40 MG Tab  Commonly known as: CRESTOR  TAKE ONE TABLET BY MOUTH DAILY     tiZANidine 4 MG tablet  Commonly known as: ZANAFLEX  Take 1 tablet (4 mg total) by mouth every 8 (eight) hours as needed (spasms).     VITAMIN D-3 ORAL               Where to Get Your Medications        These medications were sent to [facility] [place] - [address] [place] [address]      Phone: [phone]   linaCLOtide 72 mcg Cap capsule         Signing Physician:  Margie Zamora MD

## 2024-09-23 DIAGNOSIS — F43.21 ADJUSTMENT DISORDER WITH DEPRESSED MOOD: ICD-10-CM

## 2024-09-23 RX ORDER — HYDROXYZINE HYDROCHLORIDE 25 MG/1
TABLET, FILM COATED ORAL
Qty: 30 TABLET | Refills: 2 | Status: SHIPPED | OUTPATIENT
Start: 2024-09-23

## 2024-09-26 ENCOUNTER — TELEPHONE (OUTPATIENT)
Dept: GASTROENTEROLOGY | Facility: CLINIC | Age: 79
End: 2024-09-26
Payer: MEDICARE

## 2024-09-26 NOTE — TELEPHONE ENCOUNTER
Spoke with pt and rescheduled for virtual appt with simone potrillo 10/1/1:40pm. Verbal understanding

## 2024-09-26 NOTE — TELEPHONE ENCOUNTER
----- Message from Mariana Moyer sent at 9/26/2024 11:11 AM CDT -----  Regarding: self [phone]  Type: Patient Call Back    Who called: self     What is the request in detail: pt asked for a call back to r/s appt she had to cancel todays appt.     Can the clinic reply by [facility] no    Would the patient rather a call back or a response via [facility]? Call back     Best call back number: [phone]

## 2024-09-29 DIAGNOSIS — K21.9 GASTROESOPHAGEAL REFLUX DISEASE, UNSPECIFIED WHETHER ESOPHAGITIS PRESENT: ICD-10-CM

## 2024-09-30 RX ORDER — OMEPRAZOLE 20 MG/1
20 CAPSULE, DELAYED RELEASE ORAL
Qty: 90 CAPSULE | Refills: 2 | Status: SHIPPED | OUTPATIENT
Start: 2024-09-30

## 2024-10-01 ENCOUNTER — OFFICE VISIT (OUTPATIENT)
Dept: GASTROENTEROLOGY | Facility: CLINIC | Age: 79
End: 2024-10-01
Payer: MEDICARE

## 2024-10-01 ENCOUNTER — TELEPHONE (OUTPATIENT)
Dept: GASTROENTEROLOGY | Facility: CLINIC | Age: 79
End: 2024-10-01

## 2024-10-01 DIAGNOSIS — Z12.11 SCREENING FOR COLON CANCER: ICD-10-CM

## 2024-10-01 DIAGNOSIS — K59.04 CHRONIC IDIOPATHIC CONSTIPATION: Primary | ICD-10-CM

## 2024-10-01 DIAGNOSIS — R50.9 FEVER, UNSPECIFIED FEVER CAUSE: ICD-10-CM

## 2024-10-01 DIAGNOSIS — Z09 HOSPITAL DISCHARGE FOLLOW-UP: ICD-10-CM

## 2024-10-01 PROCEDURE — 99214 OFFICE O/P EST MOD 30 MIN: CPT | Mod: 95,,, | Performed by: NURSE PRACTITIONER

## 2024-10-01 NOTE — Clinical Note
Colton Mason. I had a virtual visit with Mr. Kelly this afternoon. She c/o fever, chills, body aches, and one episode of vomiting (after drinking ensure shake). Tmax 101.1. She took Tylenol for symptoms. I recommended to take the Tylenol every 6 hours as needed and if symptoms do not improve or if Tylenol does not control fever to go to ER or urgent care. I just wanted to let you know.   Thanks,  Bharati

## 2024-10-01 NOTE — TELEPHONE ENCOUNTER
----- Message from Bharati Crawford NP sent at 10/1/2024  2:19 PM CDT -----  Colonoscopy with Dr. Byrne. Jayalax extended prep.

## 2024-10-01 NOTE — PROGRESS NOTES
GASTROENTEROLOGY CLINIC NOTE    Chief Complaint: The primary encounter diagnosis was Chronic idiopathic constipation. Diagnoses of Hospital discharge follow-up, Fever, unspecified fever cause, and Screening for colon cancer were also pertinent to this visit.  Referring provider/PCP: Shelton Mason MD    HPI:  Enedelia García is a 79 y.o. female who is a new patient to me with a PMH of Coronary artery disease, Epilepsy, Hyperlipidemia, Hypertension, and Normocytic anemia .  She presents via telemedicine encounter today to establish care for constipation, anemia, and colon cancer screening.  These are new problems.  Mild anemia noted on recent labwork.  Denies shortness of breath, dizziness, syncope, unexplained weight loss, or changes in appetite.  She is experiencing constipation which is new within the last month.  Bowel movements were daily and soft in consistency.  Now bowel movements occur every other day and are described as [place] Type 1 in consistency.  Notes mucus in stool, straining with bowel movements, and occasional blood when she has increased straining.  No melena, diarrhea, abdominal pain, or nocturnal symptoms. She does report increased amount of stress recently as her  is currently hospitalized and has h/o Parkinson's.  She is his primary caregiver.  She is scheduled to have knee replacement surgery in July 2022.    ______________________________________________________    Interval Note 10/1/2024    The patient location is: [place]  The chief complaint leading to consultation is: Constipation, Hospital follow up    Visit type: audiovisual    Face to Face time with patient: 14:30  30 minutes of total time spent on the encounter, which includes face to face time and non-face to face time preparing to see the patient (eg, review of tests), Obtaining and/or reviewing separately obtained history, Documenting clinical information in the electronic or other health record, Independently  interpreting results (not separately reported) and communicating results to the patient/family/caregiver, or Care coordination (not separately reported).     Each patient to whom he or she provides medical services by telemedicine is:  (1) informed of the relationship between the physician and patient and the respective role of any other health care provider with respect to management of the patient; and (2) notified that he or she may decline to receive medical services by telemedicine and may withdraw from such care at any time.    Notes:    Ms. Leticia García presents via telemedicine encounter for hospital follow up regarding constipation.     Presented to ER 2024 and hospitalized overnight for management of constipation (concern for impaction vs obstructing neoplasm). CT abdomen concerning for sigmoid wall thickening and large volume of stool.   Flex Sig 2024 preparation was poor, stool in rectum/rectosigmoid colon, and sigmoid colon. Congested, erythematous and eroded mucosa in rectum.   No obvious mass.    It has been recommended she restart Linzess. She celestin not like taking Linzess; states it causes diarrhea. Since discharge having 5-6 watery bowel movements per day with incomplete emptying. Taking Benefiber twice a day.     Today, she does c/o tmax 101.1 with accompanied aches, chills, and headache. Taking Tylenol for symptoms.     Treatments Tried: Glycolax (not helping), Linzess 72 mcg (causing diarrhea)  NSAIDs: ASA 81mg  Anticoagulation or Antiplatelet: No    Prior Upper Endoscopy: No  Prior Colonoscopy: Over 10 years ago. No abnormal findings  Flex Si2024 with Dr. Calles  Impression:            - Preparation of the colon was poor.                          - Stool in the rectum, in the recto-sigmoid colon                          and in the sigmoid colon.                          - Congested, erythematous and eroded mucosa in the                          rectum.                          -  No specimens collected.     Family h/o Colon Cancer: No  Family h/o Crohn's Disease or Ulcerative Colitis: No  Family h/o Celiac Sprue: No  Abdominal Surgeries: Appendectomy, hysterectomy    Review of Systems   Constitutional:  Positive for chills, fever and malaise/fatigue. Negative for weight loss.   HENT:  Negative for sore throat.    Eyes:  Negative for blurred vision.   Respiratory:  Negative for cough and shortness of breath.    Cardiovascular:  Negative for chest pain.   Gastrointestinal:  Positive for diarrhea. Negative for abdominal pain, blood in stool, constipation, heartburn, melena, nausea and vomiting.   Genitourinary:  Negative for dysuria.   Musculoskeletal:  Negative for myalgias.   Skin:  Negative for rash.   Neurological:  Negative for headaches.   Endo/Heme/Allergies:  Negative for environmental allergies.   Psychiatric/Behavioral:  Negative for suicidal ideas. The patient is not nervous/anxious.        Past Medical History: has a past medical history of Aortic atherosclerosis, CKD (chronic kidney disease) stage 4, GFR 15-29 ml/min, Coronary artery disease, Edema, Epilepsy, Generalized anxiety disorder, Hematuria, unspecified, Hematuria, unspecified, Hyperlipidemia, Hypertension, Iron deficiency anemia, Moderate episode of recurrent major depressive disorder, Nonrheumatic aortic valve stenosis, Normocytic anemia, and Prediabetes.    Past Surgical History: has a past surgical history that includes Coronary stent placement; Hysterectomy; Tonsillectomy; Appendectomy; Back surgery; Revision of knee arthroplasty (Left, 7/18/2022); ANGIOGRAM, CORONARY, WITH LEFT HEART CATHETERIZATION (N/A, 6/12/2024); Transcatheter aortic valve replacement (TAVR) (N/A, 7/2/2024); Cardiac Cath [facility] (7/2/2024); Percutaneous transcatheter aortic valve replacement (TAVR) (7/2/2024); and Flexible sigmoidoscopy (N/A, 9/6/2024).    Family History:family history includes Heart disease in her father and  mother.    Allergies:   Review of patient's allergies indicates:   Allergen Reactions    Iodinated contrast media Anaphylaxis and Other (See Comments)    Nsaids (non-steroidal anti-inflammatory drug)      ADWOA    Phenergan [promethazine]      Vomiting       Social History: reports that she has never smoked. She has never used smokeless tobacco. She reports that she does not drink alcohol and does not use drugs.    Home medications:   Current Outpatient Medications on File Prior to Visit   Medication Sig Dispense Refill    aspirin (ECOTRIN) 81 MG EC tablet Take 81 mg by mouth once daily.      butalbital-acetaminophen-caffeine -40 mg (FIORICET, ESGIC) -40 mg per tablet Take 1 tablet by mouth every 8 (eight) hours as needed for Headaches.      calcium carbonate/vitamin D3 (VITAMIN D-3 ORAL) Take 1 tablet by mouth once daily.      citalopram (CELEXA) 10 MG tablet Take 10 mg by mouth once daily.      DULoxetine (CYMBALTA) 30 MG capsule Take 1 capsule (30 mg total) by mouth once daily. 30 capsule 3    FEROSUL 325 mg (65 mg iron) Tab tablet Take 325 mg by mouth once daily.      furosemide (LASIX) 40 MG tablet Take 2 tablets (80 mg total) by mouth 2 (two) times daily as needed (swelling). 360 tablet 3    gabapentin (NEURONTIN) 600 MG tablet Take 600 mg by mouth 2 (two) times daily.      hydrALAZINE (APRESOLINE) 25 MG tablet Take 1 tablet (25 mg total) by mouth every 8 (eight) hours. 90 tablet 2    hydrOXYzine HCL (ATARAX) 25 MG tablet TAKE ONE TABLET BY MOUTH EVERY 6 HOURS AS NEEDED FOR ITCHING 30 tablet 2    irbesartan (AVAPRO) 300 MG tablet TAKE ONE TABLET BY MOUTH EVERY EVENING 90 tablet 3    linaCLOtide (LINZESS) 72 mcg Cap capsule Take 1 capsule (72 mcg total) by mouth before breakfast. 30 capsule 6    multivit-min-iron-FA-lutein (CENTRUM SILVER WOMEN) 8 mg iron-400 mcg-300 mcg Tab Take 1 tablet by mouth once daily.      NURTEC 75 mg odt Take 75 mg by mouth every 48 hours as needed.      omega-3 fatty  acids/fish oil (FISH OIL-OMEGA-3 FATTY ACIDS) 300-1,000 mg capsule Take 2 capsules by mouth once daily.      omeprazole (PRILOSEC) 20 MG capsule TAKE ONE CAPSULE BY MOUTH DAILY AS NEEDED 90 capsule 2    oxyCODONE-acetaminophen (PERCOCET)  mg per tablet Take 1 tablet by mouth 4 (four) times daily as needed.      rosuvastatin (CRESTOR) 40 MG Tab TAKE ONE TABLET BY MOUTH DAILY 90 tablet 3    tiZANidine (ZANAFLEX) 4 MG tablet Take 1 tablet (4 mg total) by mouth every 8 (eight) hours as needed (spasms). 60 tablet 2    [DISCONTINUED] cimetidine (TAGAMET) 300 MG tablet Take 1 tablet (300 mg total) by mouth 3 (three) times daily. Take first dose 6pm prior to the procedure, followed by the second dose at 11pm and the third dose at 6am the morning of the procedure for 3 doses (Patient not taking: Reported on 9/13/2024) 3 tablet 0    [DISCONTINUED] clobetasoL (TEMOVATE) 0.05 % external solution Apply topically every morning.      [DISCONTINUED] EMGALITY  mg/mL PnIj Inject into the skin every 30 days. (Patient not taking: Reported on 9/13/2024)      [DISCONTINUED] famotidine (PEPCID) 20 MG tablet Take 2 tablets (40 mg total) by mouth 3 (three) times daily as needed (take first dose 6 pm day before procedure, seconond dose 11 pm night prior to procedure and third dose 6 am day of procedure). (Patient not taking: Reported on 9/13/2024) 3 tablet 0    [DISCONTINUED] fluocinolone and shower cap 0.01 % Oil Apply 0.01 % topically every evening.       No current facility-administered medications on file prior to visit.       Vital signs:  There were no vitals taken for this visit.    Physical Exam  Constitutional:       Comments: Limited Physical Exam d/t Telemedicine Encounter   Pulmonary:      Effort: Pulmonary effort is normal. No respiratory distress.   Neurological:      Mental Status: She is alert and oriented to person, place, and time.   Psychiatric:         Mood and Affect: Mood is anxious.         Behavior:  "Behavior normal.         Thought Content: Thought content normal.         Routine labs:  Lab Results   Component Value Date    WBC 12.38 09/06/2024    HGB 9.8 (L) 09/06/2024    HCT 29.1 (L) 09/06/2024    MCV 94 09/06/2024     09/06/2024     Lab Results   Component Value Date    INR 1.0 06/29/2022     Lab Results   Component Value Date    IRON 79 06/21/2024    FERRITIN 139 05/27/2024    TIBC 312 06/21/2024    FESATURATED 25 06/21/2024     Lab Results   Component Value Date     09/06/2024    K 3.5 09/06/2024    CL 99 09/06/2024    CO2 29 09/06/2024    BUN 16 09/06/2024    CREATININE 1.0 09/06/2024     Lab Results   Component Value Date    ALBUMIN 2.6 (L) 09/06/2024    ALT 11 09/06/2024    AST 20 09/06/2024    ALKPHOS 55 09/06/2024    BILITOT 0.5 09/06/2024     No results found for: "GLUCOSE"  Lab Results   Component Value Date    TSH 0.452 01/18/2024     Lab Results   Component Value Date    CALCIUM 9.1 09/06/2024    PHOS 3.1 05/27/2024       Imaging:  CT Abdomen Pelvis  Without Contrast  Narrative: EXAMINATION:  CT ABDOMEN PELVIS WITHOUT CONTRAST    CLINICAL HISTORY:  LLQ abdominal pain;RLQ abdominal pain (Age >= 14y);    TECHNIQUE:  Multiplanar images were obtained of the abdomen and pelvis from the hemidiaphragms through the symphysis pubis without intravenous contrast.    COMPARISON:  CT chest, abdomen, and pelvis from 06/04/2024.    FINDINGS:  Lung Bases: Clear.    Heart: Heart size is normal.  No pericardial effusion.    Liver: Normal in size and attenuation without focal hepatic lesion.    Biliary tract: No intrahepatic or extrahepatic biliary ductal dilatation.    Gallbladder: No radiodense gallstone. No wall thickening or pericholecystic fluid.    Pancreas: Normal. No pancreatic ductal dilatation.    Spleen: Normal size without focal lesion.    Adrenals: Normal.    Kidneys and urinary collecting systems: There is a simple cyst in the right kidney midpole.  No hydronephrosis or " urolithiasis.    Lymph nodes: None enlarged.    Stomach and bowel: The stomach is normal.  There is wall thickening of the sigmoid colon with adjacent pericolonic fat stranding, and a large volume stool which may be impacted.  Findings are compatible with stercoral colitis of the sigmoid colon, versus a neoplastic process.  There is no pneumatosis.  The appendix is not definitely seen, though there is no right lower quadrant inflammation.  No small bowel obstruction.    Peritoneum and mesentery: No ascites or free intraperitoneal air. No abdominal fluid collection.    Vasculature: There is moderate atherosclerotic disease.  There is no aneurysm.    Urinary bladder: There is reactive wall thickening of the left aspect of the bladder dome.    Reproductive organs: The uterus is absent.  The left ovary is unremarkable.    Body wall: No abnormality.    Musculoskeletal: No aggressive osseous lesion.  There are multilevel degenerative changes of the spine.  There are postop changes of L3 laminectomy.  Impression: 1. Wall thickening of the sigmoid colon with a large volume of stool proximally.  Findings are compatible with stercoral colitis versus colonic neoplasm.  No pneumatosis.  Recommend further evaluation with colonoscopy if not recently performed to exclude neoplasm.  2. Reactive wall thickening of the urinary bladder.    Electronically signed by: Thanh Keller  Date:    09/05/2024  Time:    19:08      I have reviewed prior labs, imaging, and notes.      Assessment:  1. Chronic idiopathic constipation    2. Hospital discharge follow-up    3. Fever, unspecified fever cause    4. Screening for colon cancer      Hospital discharge for constipation. Sigmoid thickening and colitis noted on CT. Flex Sig with extensive amount of stool.   Taking Linzess and Benefiber. Feels Linzess is causing diarrhea.   She is likely having overflow diarrhea.   Temp of 101.1 accompanied by chills, and body aches began today.        Plan:  Orders Placed This Encounter    Case Request Endoscopy: COLONOSCOPY       Colonoscopy for colon cancer screening. Miralax extended prep as patient has h/o CKD.  Linzess 72 mcg daily for constipation. Can trial every other day.   Consider changing medication to amitiza or trulance if Linzess continues to cause frequent bowel movements.   Increase water intake.     Will message PCP regarding fever and chills. Advise patient to take tylenol every 6 hours as needed for fever. If symptoms worsen or fever is not controlled with Tylenol go to urgent care or ER.     Discussed risks versus benefits given the sensitivity to the prep solution limitations.  Patient understands risks and wishes to proceed with Miralax Dulcolax prep for colonoscopy.      Plan of care discussed with patient who is in agreement and verbalized understanding.     I have explained the planned procedures to the patient.The risks, benefits and alternatives of the procedure were also explained in detail. Patient verbalized understanding, all questions were answered. The patient agrees to proceed as planned    Follow Up: As Needed           Bharati Crawford, CRISTIAN,FNP-BC  [facility]

## 2024-10-02 ENCOUNTER — OFFICE VISIT (OUTPATIENT)
Dept: FAMILY MEDICINE | Facility: CLINIC | Age: 79
End: 2024-10-02
Attending: FAMILY MEDICINE
Payer: MEDICARE

## 2024-10-02 ENCOUNTER — TELEPHONE (OUTPATIENT)
Dept: GASTROENTEROLOGY | Facility: CLINIC | Age: 79
End: 2024-10-02
Payer: MEDICARE

## 2024-10-02 ENCOUNTER — NURSE TRIAGE (OUTPATIENT)
Dept: ADMINISTRATIVE | Facility: CLINIC | Age: 79
End: 2024-10-02
Payer: MEDICARE

## 2024-10-02 VITALS
DIASTOLIC BLOOD PRESSURE: 62 MMHG | WEIGHT: 147.25 LBS | OXYGEN SATURATION: 96 % | BODY MASS INDEX: 25.28 KG/M2 | SYSTOLIC BLOOD PRESSURE: 128 MMHG | HEART RATE: 71 BPM

## 2024-10-02 DIAGNOSIS — L03.116 CELLULITIS OF LEFT LOWER LEG: ICD-10-CM
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
## 2024-10-02 DIAGNOSIS — S81.802A OPEN WOUND OF LEFT LOWER LEG, INITIAL ENCOUNTER: ICD-10-CM

## 2024-10-02 DIAGNOSIS — S81.852A DOG BITE OF LEFT LOWER LEG, INITIAL ENCOUNTER: Primary | ICD-10-CM

## 2024-10-02 PROCEDURE — 1101F PT FALLS ASSESS-DOCD LE1/YR: CPT | Mod: CPTII,S$GLB,, | Performed by: FAMILY MEDICINE

## 2024-10-02 PROCEDURE — 3074F SYST BP LT 130 MM HG: CPT | Mod: CPTII,S$GLB,, | Performed by: FAMILY MEDICINE

## 2024-10-02 PROCEDURE — 1160F RVW MEDS BY RX/DR IN RCRD: CPT | Mod: CPTII,S$GLB,, | Performed by: FAMILY MEDICINE

## 2024-10-02 PROCEDURE — 3078F DIAST BP <80 MM HG: CPT | Mod: CPTII,S$GLB,, | Performed by: FAMILY MEDICINE

## 2024-10-02 PROCEDURE — 99999 PR PBB SHADOW E&M-EST. PATIENT-LVL V: CPT | Mod: PBBFAC,,, | Performed by: FAMILY MEDICINE

## 2024-10-02 PROCEDURE — 99214 OFFICE O/P EST MOD 30 MIN: CPT | Mod: S$GLB,,, | Performed by: FAMILY MEDICINE

## 2024-10-02 PROCEDURE — 3288F FALL RISK ASSESSMENT DOCD: CPT | Mod: CPTII,S$GLB,, | Performed by: FAMILY MEDICINE

## 2024-10-02 PROCEDURE — 1159F MED LIST DOCD IN RCRD: CPT | Mod: CPTII,S$GLB,, | Performed by: FAMILY MEDICINE

## 2024-10-02 RX ORDER — MUPIROCIN 20 MG/G
OINTMENT TOPICAL 3 TIMES DAILY
Qty: 22 G | Refills: 1 | Status: SHIPPED | OUTPATIENT
Start: 2024-10-02

## 2024-10-02 RX ORDER — AMOXICILLIN AND CLAVULANATE POTASSIUM 875; 125 MG/1; MG/1
1 TABLET, FILM COATED ORAL 2 TIMES DAILY
Qty: 20 TABLET | Refills: 0 | Status: SHIPPED | OUTPATIENT
Start: 2024-10-02

## 2024-10-02 NOTE — PROGRESS NOTES
Subjective     Patient ID: Enedelia García is a 79 y.o. female.    Chief Complaint: Animal Bite (Dog bite x 3 days ago (her own dog, she stepped on by accident) ANKLE LEFT, now red area and entire lower leg hurting )    79 yr old pleasant female with hypertension, anxiety/depression, CKD, GERD, CAD, and other co morbidities presents today for evaluation of dog bite in her left leg. It is wound now and fever+ 101 F. No other symptoms. It happened when her pet dog bite her leg. Dog is vaccinated and so she is tetanus vaccinated. No other issues - details as follows -      Animal Bite   The incident occurred more than 2 days ago. There is an injury to the Left lower leg. The pain is moderate. Pertinent negatives include no chest pain, no fussiness, no visual disturbance, no nausea, no bladder incontinence, no headaches, no hearing loss, no seizures and no difficulty breathing. There have been no prior injuries to these areas. She is Right-handed. Her tetanus status is UTD. She has been Behaving normally. There were no sick contacts. Recently, medical care has been given by the PCP. Services received include medications given.     Review of Systems   Constitutional: Negative.  Negative for activity change, diaphoresis and unexpected weight change.   HENT: Negative.  Negative for nasal congestion, ear discharge, hearing loss, rhinorrhea, sore throat and voice change.    Eyes: Negative.  Negative for pain, discharge and visual disturbance.   Respiratory: Negative.  Negative for chest tightness, shortness of breath and wheezing.    Cardiovascular:  Positive for leg swelling. Negative for chest pain.   Gastrointestinal: Negative.  Negative for abdominal distention, anal bleeding, constipation and nausea.   Endocrine: Negative.  Negative for cold intolerance, polydipsia and polyuria.   Genitourinary: Negative.  Negative for bladder incontinence, decreased urine volume, difficulty urinating, dysuria, frequency, menstrual  problem and vaginal pain.   Musculoskeletal:  Positive for leg pain and myalgias. Negative for arthralgias and gait problem.   Integumentary:  Positive for wound. Negative for color change and pallor.   Allergic/Immunologic: Negative.  Negative for environmental allergies and immunocompromised state.   Neurological: Negative.  Negative for dizziness, tremors, seizures, speech difficulty and headaches.   Hematological: Negative.  Negative for adenopathy. Does not bruise/bleed easily.   Psychiatric/Behavioral: Negative.  Negative for agitation, confusion, decreased concentration, hallucinations, self-injury and suicidal ideas. The patient is not nervous/anxious.      Past Medical History:   Diagnosis Date    Aortic atherosclerosis 6/14/2023    CKD (chronic kidney disease) stage 4, GFR 15-29 ml/min     Coronary artery disease     Edema     Epilepsy     Generalized anxiety disorder 12/20/2021    Hematuria, unspecified     Hematuria, unspecified     Hyperlipidemia     Hypertension     Iron deficiency anemia     Moderate episode of recurrent major depressive disorder     Nonrheumatic aortic valve stenosis 12/20/2021    Normocytic anemia     Prediabetes 2/24/2022       Past Surgical History:   Procedure Laterality Date    ANGIOGRAM, CORONARY, WITH LEFT HEART CATHETERIZATION N/A 6/12/2024    Procedure: Angiogram, Coronary, with Left Heart Cath;  Surgeon: José Carreon MD;  Location: [facility] CATH LAB/EP;  Service: Cardiology;  Laterality: N/A;    APPENDECTOMY      BACK SURGERY      CARDIAC CATH COSURGEON  7/2/2024    Procedure: Cardiac Cath Cosurgeon;  Surgeon: Santosh Colon MD;  Location: [facility] CATH LAB;  Service: Cardiology;;    CORONARY STENT PLACEMENT      FLEXIBLE SIGMOIDOSCOPY N/A 9/6/2024    Procedure: SIGMOIDOSCOPY, FLEXIBLE;  Surgeon: Pilo Calles MD;  Location: [facility] ENDO;  Service: Endoscopy;  Laterality: N/A;    HYSTERECTOMY      PERCUTANEOUS TRANSCATHETER AORTIC VALVE REPLACEMENT (TAVR)   7/2/2024    Procedure: REPLACEMENT, AORTIC VALVE, PERCUTANEOUS, TRANSCATHETER;  Surgeon: Santosh Colon MD;  Location: [facility] CATH LAB;  Service: Cardiology;;    REVISION OF KNEE ARTHROPLASTY Left 7/18/2022    Procedure: REVISION, ARTHROPLASTY, KNEE;  Surgeon: Stan Catalan MD;  Location: [facility] OR;  Service: Orthopedics;  Laterality: Left;    TONSILLECTOMY      TRANSCATHETER AORTIC VALVE REPLACEMENT (TAVR) N/A 7/2/2024    Procedure: REPLACEMENT, AORTIC VALVE, TRANSCATHETER (TAVR);  Surgeon: José Lee MD;  Location: [facility] CATH LAB;  Service: Cardiology;  Laterality: N/A;       Family History   Problem Relation Name Age of Onset    Heart disease Mother      Heart disease Father         Social History     Socioeconomic History    Marital status: Significant Other   Tobacco Use    Smoking status: Never    Smokeless tobacco: Never   Substance and Sexual Activity    Alcohol use: No    Drug use: No    Sexual activity: Not Currently     Social Drivers of Health     Financial Resource Strain: Medium Risk (9/6/2024)    Overall Financial Resource Strain (CARDIA)     Difficulty of Paying Living Expenses: Somewhat hard   Food Insecurity: No Food Insecurity (9/6/2024)    Hunger Vital Sign     Worried About Running Out of Food in the Last Year: Never true     Ran Out of Food in the Last Year: Never true   Transportation Needs: No Transportation Needs (9/6/2024)    TRANSPORTATION NEEDS     Transportation : No   Physical Activity: Inactive (9/6/2024)    Exercise Vital Sign     Days of Exercise per Week: 0 days     Minutes of Exercise per Session: 0 min   Stress: Stress Concern Present (9/6/2024)    [employer] [employer] - Occupational Stress Questionnaire     Feeling of Stress : Very much   Housing Stability: Low Risk  (9/6/2024)    Housing Stability Vital Sign     Unable to Pay for Housing in the Last Year: No     Homeless in the Last Year: No       Current Outpatient Medications   Medication Sig  Dispense Refill    aspirin (ECOTRIN) 81 MG EC tablet Take 81 mg by mouth once daily.      butalbital-acetaminophen-caffeine -40 mg (FIORICET, ESGIC) -40 mg per tablet Take 1 tablet by mouth every 8 (eight) hours as needed for Headaches.      calcium carbonate/vitamin D3 (VITAMIN D-3 ORAL) Take 1 tablet by mouth once daily.      citalopram (CELEXA) 10 MG tablet Take 10 mg by mouth once daily.      DULoxetine (CYMBALTA) 30 MG capsule Take 1 capsule (30 mg total) by mouth once daily. 30 capsule 3    FEROSUL 325 mg (65 mg iron) Tab tablet Take 325 mg by mouth once daily.      furosemide (LASIX) 40 MG tablet Take 2 tablets (80 mg total) by mouth 2 (two) times daily as needed (swelling). 360 tablet 3    gabapentin (NEURONTIN) 600 MG tablet Take 600 mg by mouth 2 (two) times daily.      hydrALAZINE (APRESOLINE) 25 MG tablet Take 1 tablet (25 mg total) by mouth every 8 (eight) hours. 90 tablet 2    hydrOXYzine HCL (ATARAX) 25 MG tablet TAKE ONE TABLET BY MOUTH EVERY 6 HOURS AS NEEDED FOR ITCHING 30 tablet 2    irbesartan (AVAPRO) 300 MG tablet TAKE ONE TABLET BY MOUTH EVERY EVENING 90 tablet 3    linaCLOtide (LINZESS) 72 mcg Cap capsule Take 1 capsule (72 mcg total) by mouth before breakfast. 30 capsule 6    multivit-min-iron-FA-lutein (CENTRUM SILVER WOMEN) 8 mg iron-400 mcg-300 mcg Tab Take 1 tablet by mouth once daily.      NURTEC 75 mg odt Take 75 mg by mouth every 48 hours as needed.      omega-3 fatty acids/fish oil (FISH OIL-OMEGA-3 FATTY ACIDS) 300-1,000 mg capsule Take 2 capsules by mouth once daily.      omeprazole (PRILOSEC) 20 MG capsule TAKE ONE CAPSULE BY MOUTH DAILY AS NEEDED 90 capsule 2    oxyCODONE-acetaminophen (PERCOCET)  mg per tablet Take 1 tablet by mouth 4 (four) times daily as needed.      rosuvastatin (CRESTOR) 40 MG Tab TAKE ONE TABLET BY MOUTH DAILY 90 tablet 3    tiZANidine (ZANAFLEX) 4 MG tablet Take 1 tablet (4 mg total) by mouth every 8 (eight) hours as needed (spasms). 60  tablet 2    amoxicillin-clavulanate 875-125mg (AUGMENTIN) 875-125 mg per tablet Take 1 tablet by mouth 2 (two) times daily. 20 tablet 0    mupirocin (BACTROBAN) 2 % ointment Apply topically 3 (three) times daily. 22 g 1     No current facility-administered medications for this visit.       Review of patient's allergies indicates:   Allergen Reactions    Iodinated contrast media Anaphylaxis and Other (See Comments)    Nsaids (non-steroidal anti-inflammatory drug)      ADWOA    Phenergan [promethazine]      Vomiting          Objective   Vitals:    10/02/24 1109   BP: 128/62   Pulse: 71       Physical Exam  Constitutional:       General: She is not in acute distress.     Appearance: She is well-developed. She is not diaphoretic.   HENT:      Head: Normocephalic and atraumatic.      Right Ear: External ear normal.      Left Ear: External ear normal.      Nose: Nose normal.      Mouth/Throat:      Pharynx: No oropharyngeal exudate.   Eyes:      General: No scleral icterus.        Right eye: No discharge.         Left eye: No discharge.      Conjunctiva/sclera: Conjunctivae normal.      Pupils: Pupils are equal, round, and reactive to light.   Neck:      Thyroid: No thyromegaly.      Vascular: No JVD.      Trachea: No tracheal deviation.   Cardiovascular:      Rate and Rhythm: Normal rate and regular rhythm.      Heart sounds: Normal heart sounds. No murmur heard.     No friction rub. No gallop.   Pulmonary:      Effort: Pulmonary effort is normal.      Breath sounds: Normal breath sounds. No stridor. No wheezing or rales.   Chest:      Chest wall: No tenderness.   Abdominal:      General: Bowel sounds are normal. There is no distension.      Palpations: Abdomen is soft. There is no mass.      Tenderness: There is no abdominal tenderness. There is no guarding or rebound.      Hernia: No hernia is present.   Musculoskeletal:         General: Swelling and tenderness present. Normal range of motion.      Cervical back: Normal  range of motion and neck supple.      Left lower leg: Edema present.      Comments: Left leg cellulitis and warm+   Lymphadenopathy:      Cervical: No cervical adenopathy.   Skin:     General: Skin is warm and dry.      Coloration: Skin is not pale.      Findings: Erythema (left leg 1 cm open wound with eschar) present. No rash.   Neurological:      Mental Status: She is alert and oriented to person, place, and time.      Cranial Nerves: No cranial nerve deficit.      Motor: No abnormal muscle tone.      Coordination: Coordination normal.      Deep Tendon Reflexes: Reflexes are normal and symmetric. Reflexes normal.   Psychiatric:         Behavior: Behavior normal.         Thought Content: Thought content normal.         Judgment: Judgment normal.            Assessment and Plan     1. Dog bite of left lower leg, initial encounter  -     amoxicillin-clavulanate 875-125mg (AUGMENTIN) 875-125 mg per tablet; Take 1 tablet by mouth 2 (two) times daily.  Dispense: 20 tablet; Refill: 0  -     mupirocin (BACTROBAN) 2 % ointment; Apply topically 3 (three) times daily.  Dispense: 22 g; Refill: 1    2. Cellulitis of left lower leg  -     amoxicillin-clavulanate 875-125mg (AUGMENTIN) 875-125 mg per tablet; Take 1 tablet by mouth 2 (two) times daily.  Dispense: 20 tablet; Refill: 0  -     mupirocin (BACTROBAN) 2 % ointment; Apply topically 3 (three) times daily.  Dispense: 22 g; Refill: 1    3. Open wound of left lower leg, initial encounter  -     amoxicillin-clavulanate 875-125mg (AUGMENTIN) 875-125 mg per tablet; Take 1 tablet by mouth 2 (two) times daily.  Dispense: 20 tablet; Refill: 0  -     mupirocin (BACTROBAN) 2 % ointment; Apply topically 3 (three) times daily.  Dispense: 22 g; Refill: 1        Dog bite/cellulitis left leg  -handout givem  -icing  -mupirocin local application as directed  -augmentin PO twice daily x 10 days (14 days if not improving).Side effects of medications have been discussed and patient agreed to  proceed with treatment and understands the risks and benefits.    Spent adequate time in obtaining history and explaining differentials      30 minutes spent during this visit of which greater than 50% devoted to face-face counseling and coordination of care regarding diagnosis and management plan           Follow up if symptoms worsen or fail to improve.

## 2024-10-02 NOTE — TELEPHONE ENCOUNTER
Patient reports she stepped on her dog's paw on Monday and was bitten on the ankle. Patient has been putting antibiotic ointment but started running fever yesterday. The leg is very tender to the touch. Dispo provided- go to ER now or office with pcp approval. Sent secure chat to [facility] and discussed with Dr Shaw who advised patient could be seen in clinic or VV/evisit. Patient preferred to be seen in person and an appointment was made within time frame. Instructed to call back with additional questions or worsening of symptoms. Patient verbalized understanding.     Reason for Disposition   Cut (length > 1/8 inch or 3 mm) or skin tear and any animal  (Exception: Superficial scratch that doesn't go through dermis.)    Additional Information   Negative: Major bleeding (actively dripping or spurting) that can't be stopped   Negative: Sounds like a life-threatening emergency to the triager   Negative: Any break in skin from BITE (e.g., cut, puncture, or scratch) and WILD animal at risk for RABIES (e.g., bat, raccoon, morales, skunk, coyote, other carnivores; see Background for list)   Negative: Any break in skin from BITE (e.g., cut, puncture, or scratch) and PET animal (e.g., dog, cat, or ferret) at risk for RABIES (e.g., sick, stray, unprovoked bite, developing country)   Negative: Any break in skin from BITE (e.g., cut, puncture, or scratch) and monkey    Protocols used: Animal Bite-A-OH

## 2024-10-02 NOTE — PLAN OF CARE
[facility] Emergency Department Plan of Care Note    Referral source: Nurse On-Call      Discussed patient with: Nurse On Call: INGRID Lyons      Reason for consult: Wound, dog bite      Medical Record Review:  Medication List with Changes/Refills   Current Medications    ASPIRIN (ECOTRIN) 81 MG EC TABLET    Take 81 mg by mouth once daily.    BUTALBITAL-ACETAMINOPHEN-CAFFEINE -40 MG (FIORICET, ESGIC) -40 MG PER TABLET    Take 1 tablet by mouth every 8 (eight) hours as needed for Headaches.    CALCIUM CARBONATE/VITAMIN D3 (VITAMIN D-3 ORAL)    Take 1 tablet by mouth once daily.    CITALOPRAM (CELEXA) 10 MG TABLET    Take 10 mg by mouth once daily.    DULOXETINE (CYMBALTA) 30 MG CAPSULE    Take 1 capsule (30 mg total) by mouth once daily.    FEROSUL 325 MG (65 MG IRON) TAB TABLET    Take 325 mg by mouth once daily.    FUROSEMIDE (LASIX) 40 MG TABLET    Take 2 tablets (80 mg total) by mouth 2 (two) times daily as needed (swelling).    GABAPENTIN (NEURONTIN) 600 MG TABLET    Take 600 mg by mouth 2 (two) times daily.    HYDRALAZINE (APRESOLINE) 25 MG TABLET    Take 1 tablet (25 mg total) by mouth every 8 (eight) hours.    HYDROXYZINE HCL (ATARAX) 25 MG TABLET    TAKE ONE TABLET BY MOUTH EVERY 6 HOURS AS NEEDED FOR ITCHING    IRBESARTAN (AVAPRO) 300 MG TABLET    TAKE ONE TABLET BY MOUTH EVERY EVENING    LINACLOTIDE (LINZESS) 72 MCG CAP CAPSULE    Take 1 capsule (72 mcg total) by mouth before breakfast.    MULTIVIT-MIN-IRON-FA-LUTEIN (CENTRUM SILVER WOMEN) 8 MG IRON-400 MCG-300 MCG TAB    Take 1 tablet by mouth once daily.    NURTEC 75 MG ODT    Take 75 mg by mouth every 48 hours as needed.    OMEGA-3 FATTY ACIDS/FISH OIL (FISH OIL-OMEGA-3 FATTY ACIDS) 300-1,000 MG CAPSULE    Take 2 capsules by mouth once daily.    OMEPRAZOLE (PRILOSEC) 20 MG CAPSULE    TAKE ONE CAPSULE BY MOUTH DAILY AS NEEDED    OXYCODONE-ACETAMINOPHEN (PERCOCET)  MG PER TABLET    Take 1 tablet by mouth 4 (four) times daily as  needed.    ROSUVASTATIN (CRESTOR) 40 MG TAB    TAKE ONE TABLET BY MOUTH DAILY    TIZANIDINE (ZANAFLEX) 4 MG TABLET    Take 1 tablet (4 mg total) by mouth every 8 (eight) hours as needed (spasms).           Disposition recommended: virtual visit, e-visit, or PCP. Pt opted for PCP visit.      Additional Recommendations: would likely need augmentin Rx and consider pain medication for dog bite with possible infection/cellulitis.

## 2024-10-02 NOTE — TELEPHONE ENCOUNTER
Spoke with pt and scheduled colonoscopy with dr muñoz oct 14. Went over instructions and mailed as well. All questions answered. Verbal understanding

## 2024-10-02 NOTE — TELEPHONE ENCOUNTER
----- Message from Will sent at 10/2/2024  2:55 PM CDT -----  Contact: pt  Type:  Patient Returning Call    Who Called:pt  Who Left Message for Patient:Roberto Perales MA  Does the patient know what this is regarding?: yes  Would the patient rather a call back or a response via [facility]? call  Best Call Back Number:[phone]   Additional Information:

## 2024-10-02 NOTE — TELEPHONE ENCOUNTER
----- Message from Aminta sent at 10/2/2024  8:50 AM CDT -----  Type:  Patient Returning Call    Who Called:pt  Who Left Message for Patient:Roberto Perales  Does the patient know what this is regarding?:returning call  Would the patient rather a call back or a response via [facility]? call  Best Call Back Number: [phone]  Additional Information:

## 2024-10-02 NOTE — LETTER
[facility]  [address]  [address] [address]  Phone: [phone]         MPORTANT INFORMATION TO KNOW BEFORE YOUR PROCEDURE    [facility] 2nd Floor         If your procedure requires the administration of anesthesia, it is necessary for a responsible adult to drive you home. (Medical Transportation, Uber, Lyft, Taxi, etc. may ONLY be used if a responsible adult is present to accompany you home.  The responsible adult CAN'T be the  of the service).      person must be available to return to pick you up within 15 minutes of being notified of discharge.       Please bring a picture ID, insurance card, & copayment      Take Medications as directed below:        If you begin taking any blood thinning medications or injectable weight loss/diabetes medications (other than insulin) , please contact the endoscopy scheduling department listed below as soon as possible.    If you are diabetic see the attached instruction sheet regarding your medication.     If you take HEART, BLOOD PRESSURE, SEIZURE, PAIN, LUNG (including inhalers/nebulizers), ANTI-REJECTION (transplant patients), or PSYCHIATRIC medications, please take at your regular times with a sip of water or as directed by the scheduling nurse.     Important contact information:    Endoscopy [phone] Hours of operation Monday-Friday 8:00-4:30pm.    Questions about insurance or financial obligations call [phone] or [phone].    If you have questions regarding the prep or need to reschedule, please call [phone]. After hours questions requiring immediate assistance, contact [facility] nurse line at [phone] or [phone].   NOTE:     On occasion, unforeseen circumstances may cause a delay in your procedure start time. We respect your time and appreciate your patience during these circumstances.        Miralax Extended Prep for Colonoscopy    Date of  procedure: OCT/14/2024 with       [address] [facility] [place] [address]   Take the Elevators to 2nd Floor Endoscopy Procedural Area      As soon as possible:   your over-the-counter prep (MIRALAX) and DULCOLAX LAXATIVE TABLETS     What You Can do:  You may have clear liquids ONLY -see below for list.     What You CANNOT do:   Do not EAT solid food, drink milk or anything colored red.  Do not drink alcohol.  Do not take oral medications within 1 hour of starting each dose of MIRALAX.  No gum chewing or candy morning of procedure.      Liquids That Are OK to Drink:   Water  Sports drinks (Gatorade, Power-Aid)  Coffee or tea (no cream or nondairy creamer)  Clear juices without pulp (apple, white grape)  Gelatin desserts (no fruit or toppings)  Clear soda (sprite, coke, ginger ale)  Chicken broth (until 12 midnight the night before procedure)    Note:     (Please disregard the MIRALAX bottle instructions).    It is not uncommon to experience some abdominal cramping, nausea and/or vomiting when taking the prep. If you have nausea and/or vomiting while taking the prep, stop drinking for 20 to 30 minutes then resume.      How to take prep:    MIRALAX Bowel Prep-Two (2) bottles-Each bottle is   8.3 oz. (238 grams). You must drink water with each dose, and additional water after each dose.    IMPORTANT: If you experience preparation-related symptoms (for example, nausea, bloating, or cramping), pause or slow the rate of drinking until your symptoms decrease.      DOSE 1-- 2 Days Before Procedure OCT/12/2024    Drink at least 6 to 8 glasses of clear liquids from time you wake up until you begin your prep and then continue until bedtime to avoid dehydration.     You may have clear liquids ONLY.    Step 1-In the morning-Mix one (1) 8.3 oz. bottle of Miralax (238 grams) and 64 oz. of Gatorade/Power-Aid, place in the refrigerator (do not add ice).     Step 2-12:00 pm (NOON) Take four (4)  Dulcolax (Bisacodyl) tablets with at least 8 ounces or more of clear liquids.    Step 3-6:00 pm- Drink one 8 oz. glass of the Miralax/Gatorade prep every 15 minutes until all the mixture (64 oz./half gallon) is gone. Set a timer as a reminder.      DOSE 2--Day Before Colonoscopy OCT/13/2024    Drink at least 6 to 8 glasses of clear liquids from time you wake up until you begin your prep and then continue until bedtime to avoid dehydration.     You may have clear liquids ONLY.    Step 1-In the morning-Mix one (1) 8.3 oz. bottle of Miralax (238 grams) and 64 oz. of Gatorade/Power-Aid, place in the refrigerator (do not add ice).     Step 2-6:00 pm- Drink one 8 oz. glass of the Miralax/Gatorade prep every 15 minutes until the half the mixture (32 oz./quart) is gone. Set a timer as a reminder. Refrigerate the remaining half of the liquid for dose 3. See below when to begin this step.       DOSE 3--Day of the Colonoscopy OCT/14/2024at 2-3 AM    Step 1-Drink the 2nd half of the prep within 1 hour.  Step 2- You may continue drinking water/clear liquids until 4 hours before your colonoscopy or as directed by the scheduling nurse.    For information about your procedure, two (2) things to view prior to colonoscopy:  Please watch this informational video. It is important to watch this animated consent video prior to your arrival. If you haven't watched the video prior to arriving, you are required to watch it during admission which can causes delays.    Options for viewing:   Using a keyboard:  press and hold the control tab (Ctrl) and left mouse click to follow links.           Colonoscopy Instructional Video                                                                                   OR    Type link address into your web browser's address bar:  [email or web address]      Educational Booklet with pictures:      Colonoscopy Prep - Liquid

## 2024-10-07 ENCOUNTER — TELEPHONE (OUTPATIENT)
Dept: ENDOSCOPY | Facility: HOSPITAL | Age: 79
End: 2024-10-07
Payer: MEDICARE

## 2024-10-07 PROBLEM — N18.9 ACUTE ON CHRONIC KIDNEY FAILURE: Status: RESOLVED | Noted: 2024-07-02 | Resolved: 2024-10-07

## 2024-10-07 PROBLEM — N17.9 ACUTE ON CHRONIC KIDNEY FAILURE: Status: RESOLVED | Noted: 2024-07-02 | Resolved: 2024-10-07

## 2024-10-07 NOTE — TELEPHONE ENCOUNTER
Contacted Pt for Endoscopy precall to confirm scheduled procedure(s) Colonoscopy on 10/14/24. Pt did not answer. Left voicemail for pt to call Endoscopy Scheduling to confirm.

## 2024-10-08 ENCOUNTER — TELEPHONE (OUTPATIENT)
Dept: ENDOSCOPY | Facility: HOSPITAL | Age: 79
End: 2024-10-08
Payer: MEDICARE

## 2024-10-08 NOTE — TELEPHONE ENCOUNTER
Pt telephoned with questions about bowel prep for colonoscopy on 10/14/24.  Spoke with pt and all instructions reviewed.  Pt verbalized understanding.  Instructions mailed and emailed.      Miralax Extended Prep for Colonoscopy     Date of procedure: 10/14/24 Arrive at: 8:00AM        [address] of Department[address] [place] [address]   Take the Elevators to 2nd Floor Endoscopy Procedural Area        As soon as possible:   your over-the-counter prep (MIRALAX) and DULCOLAX LAXATIVE TABLETS      What You Can do:  You may have clear liquids ONLY -see below for list.      What You CANNOT do:   Do not EAT solid food, drink milk or anything colored red.  Do not drink alcohol.  Do not take oral medications within 1 hour of starting each dose of MIRALAX.  No gum chewing or candy morning of procedure.        Liquids That Are OK to Drink:   Water  Sports drinks (Gatorade, Power-Aid)  Coffee or tea (no cream or nondairy creamer)  Clear juices without pulp (apple, white grape)  Gelatin desserts (no fruit or toppings)  Clear soda (sprite, coke, ginger ale)  Chicken broth (until 12 midnight the night before procedure)     Note:      (Please disregard the MIRALAX bottle instructions).     It is not uncommon to experience some abdominal cramping, nausea and/or vomiting when taking the prep. If you have nausea and/or vomiting while taking the prep, stop drinking for 20 to 30 minutes then resume.        How to take prep:     MIRALAX Bowel Prep-Two (2) bottles-Each bottle is   8.3 oz. (238 grams). You must drink water with each dose, and additional water after each dose.     IMPORTANT: If you experience preparation-related symptoms (for example, nausea, bloating, or cramping), pause or slow the rate of drinking until your symptoms decrease.        DOSE 1-- 2 Days Before Procedure 10/12/24     Drink at least 6 to 8 glasses of clear liquids from time you wake up until you begin your prep and then continue until  bedtime to avoid dehydration.      You may have clear liquids ONLY.     Step 1-In the morning-Mix one (1) 8.3 oz. bottle of Miralax (238 grams) and 64 oz. of Gatorade/Power-Aid, place in the refrigerator (do not add ice).      Step 2-12:00 pm (NOON) Take four (4) Dulcolax (Bisacodyl) tablets with at least 8 ounces or more of clear liquids.     Step 3-6:00 pm- Drink one 8 oz. glass of the Miralax/Gatorade prep every 15 minutes until all the mixture (64 oz./half gallon) is gone. Set a timer as a reminder.        DOSE 2--Day Before Colonoscopy 10/13/24     Drink at least 6 to 8 glasses of clear liquids from time you wake up until you begin your prep and then continue until bedtime to avoid dehydration.      You may have clear liquids ONLY.     Step 1-In the morning-Mix one (1) 8.3 oz. bottle of Miralax (238 grams) and 64 oz. of Gatorade/Power-Aid, place in the refrigerator (do not add ice).      Step 2-6:00 pm- Drink one 8 oz. glass of the Miralax/Gatorade prep every 15 minutes until the half the mixture (32 oz./quart) is gone. Set a timer as a reminder. Refrigerate the remaining half of the liquid for dose 3. See below when to begin this step.         DOSE 3--Day of the Colonoscopy 10/14/24at 2-3 AM     Step 1-Drink the 2nd half of the prep within 1 hour.  Step 2- You may continue drinking water/clear liquids until 4 hours before your colonoscopy or as directed by the scheduling nurse 5:00 AM.     For information about your procedure, two (2) things to view prior to colonoscopy:  Please watch this informational video. It is important to watch this animated consent video prior to your arrival. If you haven't watched the video prior to arriving, you are required to watch it during admission which can causes delays.     Options for viewing:   Using a keyboard:  press and hold the control tab (Ctrl) and left mouse click to follow links.            Colonoscopy Instructional Video                                                                                    OR     Type link address into your web browser's address bar:  [email or web address]        Educational Booklet with pictures:        Colonoscopy Prep - Liquid        Comments:          IMPORTANT INFORMATION TO KNOW BEFORE YOUR PROCEDURE     [facility] 2nd Floor            If your procedure requires the administration of anesthesia, it is necessary for a responsible adult to drive you home. (Medical Transportation, Uber, Lyft, Taxi, etc. may ONLY be used if a responsible adult is present to accompany you home.  The responsible adult CAN'T be the  of the service).       person must be available to return to pick you up within 15 minutes of being notified of discharge.         Please bring a picture ID, insurance card, & copayment        Take Medications as directed below:  If you begin taking any blood thinning medications, injectable weight loss/diabetes medications (other than insulin) , or Adipex (Phentermine) please contact the endoscopy scheduling department listed below as soon as possible.     If you are diabetic see the attached instruction sheet regarding your medication.      If you take HEART, BLOOD PRESSURE, SEIZURE, PAIN, LUNG (including inhalers/nebulizers), ANTI-REJECTION (transplant patients), or PSYCHIATRIC medications, please take at your regular times with a sip of water or as directed by the scheduling nurse.      Important contact information:     Endoscopy [phone] Hours of operation Monday-Friday 8:00-4:30pm.     Questions about insurance or financial obligations call [phone] or [phone].     If you have questions regarding the prep or need to reschedule, please call [phone]. After hours questions requiring immediate assistance, contact [facility] nurse line at [phone] or [phone].   NOTE:      On occasion, unforeseen circumstances may cause a delay  in your procedure start time. We respect your time and appreciate your patience during these circumstances.            Comments:

## 2024-10-08 NOTE — TELEPHONE ENCOUNTER
Spoke to pt for pre-call to confirm scheduled Colonoscopy and patient verbalized understanding of the following:       Date of Procedure (s)  verified 10/14/24  Arrival Time 8:00 AM verified.  Location of Procedure(s) [address] verified.  NPO status reinforced. Ok to continue clear liquids up until 4 hours prior to the Endoscopy procedure.   Denies blood thinners and GLP-1s.  Pt confirmed receipt of prep instructions and Rx prep (if applicable).  Instructions provided to patient via Email [email or web address] and Postal Mail  Pt confirmed ride home after procedure if procedure requires anesthesia.   Pre-call screening questionnaire reviewed and completed with patient.   Appointment details are tentative, including check-in time.  If the patient begins taking any blood thinning medications, injectable weight loss/diabetes medications (other than insulin), or Adipex (phentermine) patient was instructed to contact the endoscopy scheduling department as soon as possible.  Patient was advised to call the endoscopy scheduling department if any questions or concerns arise.       [facility] Endoscopy Scheduling Department

## 2024-10-09 ENCOUNTER — TELEPHONE (OUTPATIENT)
Dept: ENDOSCOPY | Facility: HOSPITAL | Age: 79
End: 2024-10-09
Payer: MEDICARE

## 2024-10-09 NOTE — TELEPHONE ENCOUNTER
Pt telephoned with questions about upcoming Colonoscopy.  Spoke with pt and reviewed arrival time, location, and pre-procedure instructions.  Pt verbalized understanding.  Pt confirmed she received the emailed instructions yesterday and I reminded her I have mailed a physical copy to her address as well. Encouraged pt to call back with any questions.  Direct contact number provided.

## 2024-10-11 DIAGNOSIS — I10 PRIMARY HYPERTENSION: ICD-10-CM

## 2024-10-12 ENCOUNTER — TELEPHONE (OUTPATIENT)
Dept: FAMILY MEDICINE | Facility: CLINIC | Age: 79
End: 2024-10-12
Payer: MEDICARE

## 2024-10-12 NOTE — TELEPHONE ENCOUNTER
----- Message from Arianna sent at 10/11/2024 10:55 AM CDT -----  Type:  Needs Medical Advice    Who Called: pt  Would the patient rather a call back or a response via [facility]? call  Best Call Back Number: [phone]  Additional Information: pt stepped on dog foot and got bite states she has cellulitis was told to call office on last day of medicine wanted to inform office her leg swelling went down but its still red and looks infected would like to know what she needs to do

## 2024-10-13 ENCOUNTER — NURSE TRIAGE (OUTPATIENT)
Dept: ADMINISTRATIVE | Facility: CLINIC | Age: 79
End: 2024-10-13
Payer: MEDICARE

## 2024-10-13 RX ORDER — ISOSORBIDE MONONITRATE 60 MG/1
60 TABLET, EXTENDED RELEASE ORAL 2 TIMES DAILY
Qty: 180 TABLET | Refills: 3 | Status: SHIPPED | OUTPATIENT
Start: 2024-10-13

## 2024-10-14 ENCOUNTER — NURSE TRIAGE (OUTPATIENT)
Dept: ADMINISTRATIVE | Facility: CLINIC | Age: 79
End: 2024-10-14
Payer: MEDICARE

## 2024-10-14 ENCOUNTER — ANESTHESIA EVENT (OUTPATIENT)
Dept: ENDOSCOPY | Facility: HOSPITAL | Age: 79
End: 2024-10-14
Payer: MEDICARE

## 2024-10-14 ENCOUNTER — TELEPHONE (OUTPATIENT)
Dept: FAMILY MEDICINE | Facility: CLINIC | Age: 79
End: 2024-10-14
Payer: MEDICARE

## 2024-10-14 ENCOUNTER — HOSPITAL ENCOUNTER (OUTPATIENT)
Facility: HOSPITAL | Age: 79
Discharge: HOME | End: 2024-10-14
Attending: INTERNAL MEDICINE | Admitting: INTERNAL MEDICINE
Payer: MEDICARE

## 2024-10-14 ENCOUNTER — ANESTHESIA (OUTPATIENT)
Dept: ENDOSCOPY | Facility: HOSPITAL | Age: 79
End: 2024-10-14
Payer: MEDICARE

## 2024-10-14 VITALS
RESPIRATION RATE: 10 BRPM | HEART RATE: 66 BPM | HEIGHT: 64 IN | OXYGEN SATURATION: 98 % | WEIGHT: 135 LBS | BODY MASS INDEX: 23.05 KG/M2 | TEMPERATURE: 98 F | SYSTOLIC BLOOD PRESSURE: 158 MMHG | DIASTOLIC BLOOD PRESSURE: 67 MMHG

## 2024-10-14 DIAGNOSIS — S81.852A DOG BITE OF LEFT LOWER LEG, INITIAL ENCOUNTER: ICD-10-CM

## 2024-10-14 DIAGNOSIS — Z12.11 SCREEN FOR COLON CANCER: ICD-10-CM

## 2024-10-14 DIAGNOSIS — S81.802A OPEN WOUND OF LEFT LOWER LEG, INITIAL ENCOUNTER: ICD-10-CM

## 2024-10-14 DIAGNOSIS — F43.21 ADJUSTMENT DISORDER WITH DEPRESSED MOOD: ICD-10-CM
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
## 2024-10-14 DIAGNOSIS — L03.116 CELLULITIS OF LEFT LOWER LEG: ICD-10-CM

## 2024-10-14 PROCEDURE — 37000009 HC ANESTHESIA EA ADD 15 MINS: Performed by: INTERNAL MEDICINE

## 2024-10-14 PROCEDURE — 63600175 PHARM REV CODE 636 W HCPCS: Performed by: NURSE ANESTHETIST, CERTIFIED REGISTERED

## 2024-10-14 PROCEDURE — 45380 COLONOSCOPY AND BIOPSY: CPT | Mod: PT | Performed by: INTERNAL MEDICINE

## 2024-10-14 PROCEDURE — 88305 TISSUE EXAM BY PATHOLOGIST: CPT | Performed by: STUDENT IN AN ORGANIZED HEALTH CARE EDUCATION/TRAINING PROGRAM

## 2024-10-14 PROCEDURE — 88305 TISSUE EXAM BY PATHOLOGIST: CPT | Mod: 26,,, | Performed by: STUDENT IN AN ORGANIZED HEALTH CARE EDUCATION/TRAINING PROGRAM

## 2024-10-14 PROCEDURE — 27201012 HC FORCEPS, HOT/COLD, DISP: Performed by: INTERNAL MEDICINE

## 2024-10-14 PROCEDURE — 45380 COLONOSCOPY AND BIOPSY: CPT | Mod: PT,,, | Performed by: INTERNAL MEDICINE

## 2024-10-14 PROCEDURE — 37000008 HC ANESTHESIA 1ST 15 MINUTES: Performed by: INTERNAL MEDICINE

## 2024-10-14 RX ORDER — SODIUM CHLORIDE 0.9 % (FLUSH) 0.9 %
10 SYRINGE (ML) INJECTION
Status: DISCONTINUED | OUTPATIENT
Start: 2024-10-14 | End: 2024-10-14 | Stop reason: HOSPADM

## 2024-10-14 RX ORDER — LIDOCAINE HYDROCHLORIDE 20 MG/ML
INJECTION INTRAVENOUS
Status: DISCONTINUED | OUTPATIENT
Start: 2024-10-14 | End: 2024-10-14

## 2024-10-14 RX ORDER — HYDROXYZINE HYDROCHLORIDE 25 MG/1
TABLET, FILM COATED ORAL
Qty: 30 TABLET | Refills: 2 | Status: SHIPPED | OUTPATIENT
Start: 2024-10-14

## 2024-10-14 RX ORDER — PROPOFOL 10 MG/ML
VIAL (ML) INTRAVENOUS CONTINUOUS PRN
Status: DISCONTINUED | OUTPATIENT
Start: 2024-10-14 | End: 2024-10-14

## 2024-10-14 RX ORDER — PROPOFOL 10 MG/ML
VIAL (ML) INTRAVENOUS
Status: DISCONTINUED | OUTPATIENT
Start: 2024-10-14 | End: 2024-10-14

## 2024-10-14 RX ORDER — SODIUM CHLORIDE 9 MG/ML
INJECTION, SOLUTION INTRAVENOUS CONTINUOUS
Status: DISCONTINUED | OUTPATIENT
Start: 2024-10-14 | End: 2024-10-14 | Stop reason: HOSPADM

## 2024-10-14 RX ORDER — AMOXICILLIN AND CLAVULANATE POTASSIUM 875; 125 MG/1; MG/1
1 TABLET, FILM COATED ORAL 2 TIMES DAILY
Qty: 14 TABLET | Refills: 0 | Status: SHIPPED | OUTPATIENT
Start: 2024-10-14

## 2024-10-14 RX ADMIN — PROPOFOL 150 MCG/KG/MIN: 10 INJECTION, EMULSION INTRAVENOUS at 09:10

## 2024-10-14 RX ADMIN — PROPOFOL 70 MG: 10 INJECTION, EMULSION INTRAVENOUS at 09:10

## 2024-10-14 RX ADMIN — LIDOCAINE HYDROCHLORIDE 100 MG: 20 INJECTION, SOLUTION INTRAVENOUS at 09:10

## 2024-10-14 NOTE — TELEPHONE ENCOUNTER
Woke up latePatient is 2h behind on taking AM prep. She will take now 415a. and then NPO  Reason for Disposition   Bowel prep for colonoscopy, questions about    Protocols used: Colonoscopy Symptoms and Eosigeure-J-PN

## 2024-10-14 NOTE — PROVATION PATIENT INSTRUCTIONS
Discharge Summary/Instructions after an Endoscopic Procedure  Patient Name: Enedelia García  Patient MRN: [id number]  Patient YOB: 1945 Monday, October 14, 2024  Vadim Byrne MD  Dear patient,  As a result of recent federal legislation (The Federal Cures Act), you may   receive lab or pathology results from your procedure in your [facility]   account before your physician is able to contact you. Your physician or   their representative will relay the results to you with their   recommendations at their soonest availability.  Thank you,  Your health is very important to us during the Covid Crisis. Following your   procedure today, you will receive a daily text for 2 weeks asking about   signs or symptoms of Covid 19.  Please respond to this text when you   receive it so we can follow up and keep you as safe as possible.   RESTRICTIONS:  During your procedure today, you received medications for sedation.  These   medications may affect your judgment, balance and coordination.  Therefore,   for 24 hours, you have the following restrictions:   - DO NOT drive a car, operate machinery, make legal/financial decisions,   sign important papers or drink alcohol.    ACTIVITY:  Today: no heavy lifting, straining or running due to procedural   sedation/anesthesia.  The following day: return to full activity including work.  DIET:  Eat and drink normally unless instructed otherwise.     TREATMENT FOR COMMON SIDE EFFECTS:  - Mild abdominal pain, nausea, belching, bloating or excessive gas:  rest,   eat lightly and use a heating pad.  - Sore Throat: treat with throat lozenges and/or gargle with warm salt   water.  - Because air was used during the procedure, expelling large amounts of air   from your rectum or belching is normal.  - If a bowel prep was taken, you may not have a bowel movement for 1-3 days.    This is normal.  SYMPTOMS TO WATCH FOR AND REPORT TO YOUR PHYSICIAN:  1. Abdominal pain or bloating, other  than gas cramps.  2. Chest pain.  3. Back pain.  4. Signs of infection such as: chills or fever occurring within 24 hours   after the procedure.  5. Rectal bleeding, which would show as bright red, maroon, or black stools.   (A tablespoon of blood from the rectum is not serious, especially if   hemorrhoids are present.)  6. Vomiting.  7. Weakness or dizziness.  GO DIRECTLY TO THE NEAREST EMERGENCY ROOM IF YOU HAVE ANY OF THE FOLLOWING:      Difficulty breathing              Chills and/or fever over 101 F   Persistent vomiting and/or vomiting blood   Severe abdominal pain   Severe chest pain   Black, tarry stools   Bleeding- more than one tablespoon   Any other symptom or condition that you feel may need urgent attention  Your doctor recommends these additional instructions:  If any biopsies were taken, your doctors clinic will contact you in 1 to 2   weeks with any results.  - Discharge patient to home.   - Patient has a contact number available for emergencies.  The signs and   symptoms of potential delayed complications were discussed with the   patient.  Return to normal activities tomorrow.  Written discharge   instructions were provided to the patient.   - Resume previous diet.   - Continue present medications.   - Await pathology results.   - No repeat colonoscopy due to age and the absence of advanced adenomas.   - Return to GI clinic PRN.   - Use laxatives and fiber regimen to reduce constipation  For questions, problems or results please call your physician - Vadim Byrne MD.  EMERGENCY PHONE NUMBER: [phone],  LAB RESULTS: [phone]  IF A COMPLICATION OR EMERGENCY SITUATION ARISES AND YOU ARE UNABLE TO REACH   YOUR PHYSICIAN - GO DIRECTLY TO THE EMERGENCY ROOM.  Vadim Byrne MD  10/14/2024 9:55:14 AM  This report has been verified and signed electronically.  Dear patient,  As a result of recent federal legislation (The Federal Cures Act), you may   receive lab or pathology results from  your procedure in your [facility]   account before your physician is able to contact you. Your physician or   their representative will relay the results to you with their   recommendations at their soonest availability.  Thank you,  PROVATION

## 2024-10-14 NOTE — TRANSFER OF CARE
"Anesthesia Transfer of Care Note    Patient: Enedelia García    Procedure(s) Performed: Procedure(s) (LRB):  COLONOSCOPY (N/A)    Patient location: GI    Anesthesia Type: general    Transport from OR: Transported from OR on room air with adequate spontaneous ventilation    Post pain: adequate analgesia    Post assessment: no apparent anesthetic complications and tolerated procedure well    Post vital signs: stable    Level of consciousness: awake, alert and oriented    Nausea/Vomiting: no nausea/vomiting    Complications: none    Transfer of care protocol was followed      Last vitals: Visit Vitals  BP (!) 155/67 (BP Location: Left arm, Patient Position: Lying)   Pulse 69   Temp 36.8 °C (98.2 °F) (Skin)   Resp 18   Ht 5' 4" (1.626 m)   Wt 61.2 kg (135 lb)   SpO2 95%   Breastfeeding No   BMI 23.17 kg/m²     "

## 2024-10-14 NOTE — ANESTHESIA POSTPROCEDURE EVALUATION
Anesthesia Post Evaluation    Patient: Enedelia García    Procedure(s) Performed: Procedure(s) (LRB):  COLONOSCOPY (N/A)    Final Anesthesia Type: general      Patient location during evaluation: PACU  Patient participation: Yes- Able to Participate  Level of consciousness: awake and alert  Post-procedure vital signs: reviewed and stable  Pain management: adequate  Airway patency: patent    PONV status at discharge: No PONV  Anesthetic complications: no      Cardiovascular status: stable  Respiratory status: spontaneous ventilation  Hydration status: euvolemic  Follow-up not needed.              Vitals Value Taken Time   /67 10/14/24 1025   Temp 36.5 °C (97.7 °F) 10/14/24 0955   Pulse 66 10/14/24 1025   Resp 10 10/14/24 1025   SpO2 98 % 10/14/24 1025         Event Time   Out of Recovery 10:54:02         Pain/Mynor Score: No data recorded

## 2024-10-14 NOTE — TELEPHONE ENCOUNTER
Pt reports she has a colonoscopy tomorrow and she has questions about prep. Questions answered per instructions in pt's chart. Care advice information only. Pt verbalizes understanding.   Reason for Disposition   Question about upcoming scheduled surgery, procedure or test, no triage required, and triager able to answer question    Protocols used: Information Only Call - No Triage-A-

## 2024-10-14 NOTE — ANESTHESIA PREPROCEDURE EVALUATION
10/14/2024  Enedelia García is a 79 y.o., female.      Pre-op Assessment    I have reviewed the Patient Summary Reports.     I have reviewed the Nursing Notes.    I have reviewed the Medications.     Review of Systems  Anesthesia Hx:             Denies Family Hx of Anesthesia complications.    Denies Personal Hx of Anesthesia complications.                    Procedure: COLONOSCOPY (N/A)         Patient Active Problem List   Diagnosis    Chest pain    Coronary artery disease involving native coronary artery of native heart without angina pectoris    H/O esophageal spasm    Hyperlipidemia    Chronic pain    GERD (gastroesophageal reflux disease)    Moderate episode of recurrent major depressive disorder    Alteration in skin integrity related to surgical incision    Presence of stent in coronary artery in patient with coronary artery disease    Caregiver with fatigue    Status post revision of total replacement of left knee    Acquired scoliosis    Nonrheumatic aortic valve stenosis    Arthritis    Primary hypertension    Osteoarthritis of knee    Chronic low back pain    Generalized anxiety disorder    Stage 4 chronic kidney disease    Primary osteoarthritis of both first carpometacarpal joints    Risk for falls    Positive MELISA (antinuclear antibody)    Generalized osteoarthritis    Prediabetes    Narcotic drug use    Osteopenia of multiple sites    Blister of right leg without infection, initial encounter    Acute pain of left knee    Decreased range of motion (ROM) of left knee    Decreased strength involving knee joint    Anemia of chronic disease    Infection of left knee    Foreign body of left knee with infection    Surgical wound, non healing    Seizure-like activity    Acute on chronic diastolic heart failure    Aortic atherosclerosis    Skin tear of left lower leg without complication     Stenosis of left carotid artery    Dizziness    D-dimer, elevated    Tremor    Severe episode of recurrent major depressive disorder, with psychotic features    Secondary hyperparathyroidism of renal origin    Sacroiliitis, not elsewhere classified    Herpes zoster with complication    First degree heart block by electrocardiogram    S/P TAVR (transcatheter aortic valve replacement)    Opioid dependence, uncomplicated    Other nonthrombocytopenic purpura    Hypokalemia    Constipation       Past Medical History:   Diagnosis Date    Aortic atherosclerosis 6/14/2023    CKD (chronic kidney disease) stage 4, GFR 15-29 ml/min     Coronary artery disease     Edema     Epilepsy     Generalized anxiety disorder 12/20/2021    Hematuria, unspecified     Hematuria, unspecified     Hyperlipidemia     Hypertension     Iron deficiency anemia     Moderate episode of recurrent major depressive disorder     Nonrheumatic aortic valve stenosis 12/20/2021    Normocytic anemia     Prediabetes 2/24/2022       ECHO: Results for orders placed during the hospital encounter of 08/12/24    Echo    Interpretation Summary    Left Ventricle: The left ventricle is normal in size. Ventricular mass is normal. Normal wall thickness. Normal wall motion. There is normal systolic function with a visually estimated ejection fraction of 60 - 65%. There is normal diastolic function. Normal left ventricular filling pressure.    Right Ventricle: Normal right ventricular cavity size. Wall thickness is normal. Right ventricle wall motion  is normal. Systolic function is normal.    Left Atrium: Normal left atrial size.    Right Atrium: Normal right atrial size.    Aortic Valve: There is a transcatheter valve replacement in the aortic position. It is reported to be a 29 EvolutFX valve valve. Aortic valve area by VTI is 2.38 cm². Aortic valve peak velocity is 1.71 m/s. Mean gradient is 8 mmHg. The dimensionless index is 0.76. Significant decrease in AV velocity  compared to that reported at outside institution.    Aortic Valve: There is no significant central regurgitation. Trace paravalvular regurgitation.    Mitral Valve: There is mild bileaflet sclerosis.    Aorta: Aortic root is normal in size measuring 2.27 cm. Ascending aorta is normal measuring 2.37 cm.    Pulmonary Artery: The estimated pulmonary artery systolic pressure is 28 mmHg.    IVC/SVC: Normal venous pressure at 3 mmHg.      Body mass index is 23.17 kg/m².    Tobacco Use: Low Risk  (10/14/2024)    Patient History     Smoking Tobacco Use: Never     Smokeless Tobacco Use: Never     Passive Exposure: Not on file       Social History     Substance and Sexual Activity   Drug Use No        Alcohol Use: Not At Risk (9/6/2024)    AUDIT-C     Frequency of Alcohol Consumption: Never     Average Number of Drinks: Patient does not drink     Frequency of Binge Drinking: Never       Review of patient's allergies indicates:   Allergen Reactions    Iodinated contrast media Anaphylaxis and Other (See Comments)    Nsaids (non-steroidal anti-inflammatory drug)      ADWOA    Phenergan [promethazine]      Vomiting         Airway:  No value filed.     Physical Exam  General: Well nourished    Airway:  Mallampati: II   TM Distance: Normal  Neck ROM: Normal ROM    Dental:  Intact        Anesthesia Plan  Type of Anesthesia, risks & benefits discussed:    Anesthesia Type: Gen Natural Airway  Intra-op Monitoring Plan: Standard ASA Monitors  Post Op Pain Control Plan: multimodal analgesia  Induction:  IV  Informed Consent: Informed consent signed with the Patient and all parties understand the risks and agree with anesthesia plan.  All questions answered.   ASA Score: 3  Day of Surgery Review of History & Physical: H&P Update referred to the surgeon/provider.    Ready For Surgery From Anesthesia Perspective.     .

## 2024-10-15 LAB
FINAL PATHOLOGIC DIAGNOSIS: NORMAL
GROSS: NORMAL
Lab: NORMAL

## 2024-10-17 ENCOUNTER — HOSPITAL ENCOUNTER (OUTPATIENT)
Dept: WOUND CARE | Facility: HOSPITAL | Age: 79
Discharge: HOME | End: 2024-10-17
Attending: PREVENTIVE MEDICINE
Payer: MEDICARE

## 2024-10-17 VITALS — HEART RATE: 65 BPM | RESPIRATION RATE: 18 BRPM | SYSTOLIC BLOOD PRESSURE: 186 MMHG | DIASTOLIC BLOOD PRESSURE: 80 MMHG

## 2024-10-17 DIAGNOSIS — S91.051A DOG BITE OF RIGHT ANKLE, INITIAL ENCOUNTER: Primary | ICD-10-CM
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
## 2024-10-17 PROCEDURE — 99213 OFFICE O/P EST LOW 20 MIN: CPT | Mod: 25

## 2024-10-17 PROCEDURE — 97597 DBRDMT OPN WND 1ST 20 CM/<: CPT

## 2024-10-17 NOTE — PROCEDURES
"Debridement    Date/Time: 10/17/2024 3:00 PM    Performed by: Pineda Farias MD  Authorized by: Pineda Farias MD    Time out: Immediately prior to procedure a "time out" was called to verify the correct patient, procedure, equipment, support staff and site/side marked as required.    Consent Done?:  Yes (Written)  Local anesthesia used?: Yes    Local anesthetic:  Topical anesthetic    Type of Debridement:  Excisional       Length (cm):  1       Area (sq cm):  0.5       Width (cm):  0.5       Percent Debrided (%):  100       Depth (cm):  0.1       Total Area Debrided (sq cm):  0.5    Depth of debridement:  Epidermis/Dermis    Devitalized tissue debrided:  Slough    Instruments:  Curette  Bleeding:  Minimal  Method Used:  Pressure  Patient tolerance:  Patient tolerated the procedure well with no immediate complications    "

## 2024-10-17 NOTE — PROGRESS NOTES
Wound Care & Hyperbaric Medicine    Subjective:       Patient ID: Enedelia García is a 79 y.o. female.    Chief Complaint: Non-healing Wound Follow Up    Readmit to wound clinic for dog bite to left leg. Occurrence happened 2 weeks ago. Patient is on Augmentin and using Bactroban. No signs of infection, area with dry fibrin. Wound debrided and clean red tissue noted. Patient to continue Bactroban daily. Return to clinic in 2 weeks.    Review of Systems   All other systems reviewed and are negative.        Objective:     Vitals:    10/17/24 1442   BP: (!) 186/80   Pulse: 65   Resp: 18         Physical Exam       Wound 10/17/24 1444 Traumatic Left anterior;lower Leg (Active)   10/17/24 1444   Present on Original Admission: Y   Primary Wound Type: Traumatic   Side: Left   Orientation: anterior;lower   Location: Leg   Wound Approximate Age at First Assessment (Weeks):    Wound Number:    Is this injury device related?:    Incision Type:    Closure Method:    Wound Description (Comments):    Type:    Additional Comments:    Ankle-Brachial Index:    Pulses:    Removal Indication and Assessment:    Wound Outcome:    Wound Image    10/17/24 1444   Dressing Appearance Open to air 10/17/24 1444   Drainage Amount None 10/17/24 1444   Appearance Fibrin;Dry 10/17/24 1444   Tissue loss description Full thickness 10/17/24 1444   Wound Edges Undefined 10/17/24 1444   Wound Length (cm) 1 cm 10/17/24 1444   Wound Width (cm) 0.5 cm 10/17/24 1444   Wound Depth (cm) 0.1 cm 10/17/24 1444   Wound Volume (cm^3) 0.05 cm^3 10/17/24 1444   Wound Surface Area (cm^2) 0.5 cm^2 10/17/24 1444   Care Cleansed with:;Sterile normal saline;Debrided 10/17/24 1444   Dressing Applied;Other (comment);Bandaid 10/17/24 1444   Dressing Change Due 10/18/24 10/17/24 1444         Assessment/Plan:         ICD-10-CM ICD-9-CM   1. Dog bite of right ankle, initial encounter  S91.051A 891.0    W54.0XXA E906.0       Wound is improving well  so far with self care. No signs of infection or necrosis.      Tissue pathology and/or culture taken:  [] Yes [x] No   Sharp debridement performed:   [x] Yes [] No   Labs ordered this visit:   [] Yes [x] No   Imaging ordered this visit:   [] Yes [x] No           Orders Placed This Encounter   Procedures    Change dressing     Left anterior lower leg:   Cleanse wound with:saline  Lidocaine:prn  Silver nitrate:prn  Periwound care:maintain dry  Primary dressing:bactroban  Secondary dressing:bandaid  Frequency:daily  Follow-up:2 weeks with         Follow up in about 2 weeks (around 10/31/2024) for .            This includes face to face time and non-face to face time preparing to see the patient (eg, review of tests), obtaining and/or reviewing separately obtained history, documenting clinical information in the electronic or other health record, independently interpreting results and communicating results to the patient/family/caregiver, or care coordinator.

## 2024-10-29 DIAGNOSIS — F43.21 ADJUSTMENT DISORDER WITH DEPRESSED MOOD: ICD-10-CM

## 2024-10-30 RX ORDER — HYDROXYZINE HYDROCHLORIDE 25 MG/1
TABLET, FILM COATED ORAL
Qty: 30 TABLET | Refills: 2 | Status: SHIPPED | OUTPATIENT
Start: 2024-10-30

## 2024-11-19 DIAGNOSIS — F43.21 ADJUSTMENT DISORDER WITH DEPRESSED MOOD: ICD-10-CM

## 2024-11-19 RX ORDER — HYDROXYZINE HYDROCHLORIDE 25 MG/1
TABLET, FILM COATED ORAL
Qty: 30 TABLET | Refills: 2 | Status: SHIPPED | OUTPATIENT
Start: 2024-11-19

## 2024-12-05 DIAGNOSIS — F43.21 ADJUSTMENT DISORDER WITH DEPRESSED MOOD: ICD-10-CM

## 2024-12-05 RX ORDER — DULOXETIN HYDROCHLORIDE 30 MG/1
30 CAPSULE, DELAYED RELEASE ORAL
Qty: 30 CAPSULE | Refills: 3 | Status: SHIPPED | OUTPATIENT
Start: 2024-12-05

## 2025-02-26 DIAGNOSIS — Z78.0 MENOPAUSE: ICD-10-CM

## 2025-08-18 DIAGNOSIS — Z95.3 S/P TAVR (TRANSCATHETER AORTIC VALVE REPLACEMENT): Primary | ICD-10-CM

## (undated) DEVICE — SYR 30CC LUER LOCK

## (undated) DEVICE — DRAPE C-ARM/MOBILE XRAY 44X80

## (undated) DEVICE — CONTAINER SPECIMEN STR 3 0Z

## (undated) DEVICE — STOPCOCK 3-WAY

## (undated) DEVICE — DRESSING TEGADERM 2X2 3/4

## (undated) DEVICE — DRESSING GAUZE XEROFORM 5X9

## (undated) DEVICE — KIT CUSTOM MANIFOLD

## (undated) DEVICE — KIT MNTR POLE MT DUL 12&48 MAC

## (undated) DEVICE — CATH OPTITORQUE TIGER 5F 100CM

## (undated) DEVICE — PAD DEFIB CADENCE ADULT R2

## (undated) DEVICE — DRESSING AQUACEL SACRAL 9 X 9

## (undated) DEVICE — COVER OVERHEAD SURG LT BLUE

## (undated) DEVICE — DURAPREP SURG SCRUB 26ML

## (undated) DEVICE — Device

## (undated) DEVICE — TRAY CATH LAB OMC

## (undated) DEVICE — BANDAGE MATRIX HK LOOP 6IN 5YD

## (undated) DEVICE — VISIPAQUE 320 200ML +PK

## (undated) DEVICE — SUT STRATAFIX PDS 1 CTX 18IN

## (undated) DEVICE — COVER TABLE 44X90 STERILE

## (undated) DEVICE — GUIDEWIRE SUPRA CORE 035 190CM

## (undated) DEVICE — GOWN POLY REINF BRTH SLV LG

## (undated) DEVICE — BLADE SAW RECIP 76MMX12.5MM

## (undated) DEVICE — KIT CUSTOM WASTE BAG

## (undated) DEVICE — CABLE PACING ALLGTR CLIP 12FT

## (undated) DEVICE — HOOD T-5 TEAR AWAY STERILE

## (undated) DEVICE — CATH IMPULSE FR4 5FR 125CM

## (undated) DEVICE — TUBE CONTRAST SQUEEZE

## (undated) DEVICE — NOZZLE BNE INJ CEM FEM POST 65

## (undated) DEVICE — SPIKE SHORT LG BORE 1-WAY 2IN

## (undated) DEVICE — TOWEL OR DISP STRL BLUE 4/PK

## (undated) DEVICE — BLADE OSTEOTOME 12MM

## (undated) DEVICE — CATH MPA2 INFINITI 4FR 100CM

## (undated) DEVICE — BANDAGE ACE ELASTIC 6"

## (undated) DEVICE — DRAPE TIBURON ORTHOPEDIC SPLIT

## (undated) DEVICE — GUIDEWIRE STF .035X260CM STR

## (undated) DEVICE — COVER PROBE US 5.5X58L NON LTX

## (undated) DEVICE — INTERPULSE SET

## (undated) DEVICE — KIT MICROINTRO 4F .018X40X7CM

## (undated) DEVICE — CATH ANG PIGTAIL 4FR INFINITY

## (undated) DEVICE — UNDERGLOVES BIOGEL PI SIZE 8

## (undated) DEVICE — COVER BACK TABLE 72X21

## (undated) DEVICE — SCRUB 10% POVIDONE IODINE 4OZ

## (undated) DEVICE — LINE 60IN PRESSURE MON.

## (undated) DEVICE — KIT MX BNE CEM REVOLTN W/BRKWY

## (undated) DEVICE — GUIDEWIRE CONFIDA BECKER CURVE

## (undated) DEVICE — DRESSING AQUACEL AG ADV 3.5X12

## (undated) DEVICE — KIT GLIDESHEATH SLEND 6FR 10CM

## (undated) DEVICE — KIT CUSTOM PROCEDURE CONTRAST

## (undated) DEVICE — DRESSING COVER AQUACEL AG SURG

## (undated) DEVICE — PAD ABDOMINAL 5X9 STERILE

## (undated) DEVICE — ALCOHOL 70% ISOP W/GREEN 16OZ

## (undated) DEVICE — SHEATH INTRODUCER 8FR 11CM

## (undated) DEVICE — HEMOSTAT VASC BAND REG 24CM

## (undated) DEVICE — KIT LEFT HEART MANIFOLD CUSTOM

## (undated) DEVICE — YANKAUER OPEN TIP W/O VENT

## (undated) DEVICE — BLADE 90X13X1.27MM

## (undated) DEVICE — MANIFOLD 4 PORT

## (undated) DEVICE — GUIDEWIRE X SPORT .014IN 190CM

## (undated) DEVICE — CATH DXTERITY AL20 100CM 6FR

## (undated) DEVICE — ADAPTER DUAL IRRIGATION

## (undated) DEVICE — GUIDEWIRE EMERALD 150CM PTFE

## (undated) DEVICE — GUIDEWIRE STF .035X180CM ANG

## (undated) DEVICE — GAUZE SPONGE 4X4 12PLY

## (undated) DEVICE — SUT CTD VICRYL 2.0

## (undated) DEVICE — CLOSURE SKIN STERI STRIP 1/2X4

## (undated) DEVICE — TRAY FOLEY 16FR INFECTION CONT

## (undated) DEVICE — SYR 10CC LUER LOCK

## (undated) DEVICE — DRAPE CASSETTE 20 X 40

## (undated) DEVICE — SOL IRR NACL .9% 3000ML

## (undated) DEVICE — CATH IMPULSE PIGTAIL 6F 110CM

## (undated) DEVICE — CATH IMPULSE 5F 100CM FR4

## (undated) DEVICE — DRAPE ANGIO BRACH 38X44IN

## (undated) DEVICE — NDL 18GA X1 1/2 REG BEVEL

## (undated) DEVICE — SYS KNEE EPAK PIN ATTUNE
Type: IMPLANTABLE DEVICE | Site: KNEE | Status: NON-FUNCTIONAL
Removed: 2022-07-18

## (undated) DEVICE — OMNIPAQUE CONTRAST 350MG/100ML

## (undated) DEVICE — STAPLER SKIN ROTATING HEAD

## (undated) DEVICE — ELECTRODE REM PLYHSV RETURN 9

## (undated) DEVICE — GLOVE BIOGEL ORTHOPEDIC 7.5

## (undated) DEVICE — ADHESIVE DERMABOND ADVANCED

## (undated) DEVICE — DRESSING TEGADERM 2 3/8 X 2.75

## (undated) DEVICE — FLEXSHEATH DRYSEAL 14FR 33CM

## (undated) DEVICE — ELECTRODE BLD 1 INCH TEFLON

## (undated) DEVICE — DEVICE PERCLOSE SUT CLSR 6FR

## (undated) DEVICE — DRAPE INCISE IOBAN 2 23X23IN

## (undated) DEVICE — GUIDEWIRE EMERALD .035IN 260CM

## (undated) DEVICE — SUT VICRYL 1 OB 36 CTX